# Patient Record
Sex: FEMALE | Race: WHITE | NOT HISPANIC OR LATINO | Employment: OTHER | URBAN - METROPOLITAN AREA
[De-identification: names, ages, dates, MRNs, and addresses within clinical notes are randomized per-mention and may not be internally consistent; named-entity substitution may affect disease eponyms.]

---

## 2020-09-27 ENCOUNTER — HOSPITAL ENCOUNTER (INPATIENT)
Facility: HOSPITAL | Age: 65
LOS: 5 days | Discharge: HOME | DRG: 674 | End: 2020-10-02
Attending: EMERGENCY MEDICINE | Admitting: INTERNAL MEDICINE
Payer: COMMERCIAL

## 2020-09-27 DIAGNOSIS — E87.20 METABOLIC ACIDOSIS: ICD-10-CM

## 2020-09-27 DIAGNOSIS — R10.9 ABDOMINAL PAIN: ICD-10-CM

## 2020-09-27 DIAGNOSIS — N17.9 AKI (ACUTE KIDNEY INJURY): Primary | ICD-10-CM

## 2020-09-27 DIAGNOSIS — K52.9 COLITIS: ICD-10-CM

## 2020-09-27 DIAGNOSIS — I10 HTN (HYPERTENSION): ICD-10-CM

## 2020-09-27 DIAGNOSIS — K52.9 ACUTE COLITIS: ICD-10-CM

## 2020-09-27 DIAGNOSIS — I16.0 HYPERTENSIVE URGENCY: ICD-10-CM

## 2020-09-27 DIAGNOSIS — N17.9 ACUTE KIDNEY INJURY: ICD-10-CM

## 2020-09-27 DIAGNOSIS — I25.10 CAD (CORONARY ARTERY DISEASE): ICD-10-CM

## 2020-09-27 DIAGNOSIS — I10 HYPERTENSION COMPLICATIONS: ICD-10-CM

## 2020-09-27 PROBLEM — E78.5 HLD (HYPERLIPIDEMIA): Status: ACTIVE | Noted: 2020-09-27

## 2020-09-27 PROBLEM — K92.1 BLOOD IN STOOL: Status: ACTIVE | Noted: 2020-09-27

## 2020-09-27 PROBLEM — E27.8 ADRENAL HYPERPLASIA: Status: ACTIVE | Noted: 2020-09-27

## 2020-09-27 LAB
ABO + RH BLD: NORMAL
ALBUMIN SERPL BCP-MCNC: 3.4 G/DL (ref 3.5–5.2)
ALP SERPL-CCNC: 94 U/L (ref 55–135)
ALT SERPL W/O P-5'-P-CCNC: 7 U/L (ref 10–44)
AMYLASE SERPL-CCNC: 115 U/L (ref 20–110)
ANION GAP SERPL CALC-SCNC: 10 MMOL/L (ref 8–16)
APTT PPP: 32.8 SEC (ref 23.6–33.3)
AST SERPL-CCNC: 12 U/L (ref 10–40)
BASOPHILS # BLD AUTO: 0.05 K/UL (ref 0–0.2)
BASOPHILS NFR BLD: 0.7 % (ref 0–1.9)
BILIRUB SERPL-MCNC: 0.7 MG/DL (ref 0.1–1)
BLD GP AB SCN CELLS X3 SERPL QL: NORMAL
BUN SERPL-MCNC: 52 MG/DL (ref 8–23)
CALCIUM SERPL-MCNC: 8.2 MG/DL (ref 8.7–10.5)
CHLORIDE SERPL-SCNC: 114 MMOL/L (ref 95–110)
CK MB SERPL-MCNC: 1 NG/ML (ref 0.1–6.5)
CK SERPL-CCNC: 31 U/L (ref 20–180)
CO2 SERPL-SCNC: 15 MMOL/L (ref 23–29)
CREAT SERPL-MCNC: 3.5 MG/DL (ref 0.5–1.4)
DIFFERENTIAL METHOD: ABNORMAL
EOSINOPHIL # BLD AUTO: 0.1 K/UL (ref 0–0.5)
EOSINOPHIL NFR BLD: 0.7 % (ref 0–8)
ERYTHROCYTE [DISTWIDTH] IN BLOOD BY AUTOMATED COUNT: 13.5 % (ref 11.5–14.5)
EST. GFR  (AFRICAN AMERICAN): 15 ML/MIN/1.73 M^2
EST. GFR  (NON AFRICAN AMERICAN): 13 ML/MIN/1.73 M^2
GLUCOSE SERPL-MCNC: 123 MG/DL (ref 70–110)
HCT VFR BLD AUTO: 42.8 % (ref 37–48.5)
HGB BLD-MCNC: 14.2 G/DL (ref 12–16)
IMM GRANULOCYTES # BLD AUTO: 0.02 K/UL (ref 0–0.04)
IMM GRANULOCYTES NFR BLD AUTO: 0.3 % (ref 0–0.5)
INR PPP: 1.1
LIPASE SERPL-CCNC: 60 U/L (ref 4–60)
LYMPHOCYTES # BLD AUTO: 1.2 K/UL (ref 1–4.8)
LYMPHOCYTES NFR BLD: 16.5 % (ref 18–48)
MAGNESIUM SERPL-MCNC: 2.4 MG/DL (ref 1.6–2.6)
MCH RBC QN AUTO: 28.5 PG (ref 27–31)
MCHC RBC AUTO-ENTMCNC: 33.2 G/DL (ref 32–36)
MCV RBC AUTO: 86 FL (ref 82–98)
MONOCYTES # BLD AUTO: 0.4 K/UL (ref 0.3–1)
MONOCYTES NFR BLD: 5.4 % (ref 4–15)
NEUTROPHILS # BLD AUTO: 5.3 K/UL (ref 1.8–7.7)
NEUTROPHILS NFR BLD: 76.4 % (ref 38–73)
NRBC BLD-RTO: 0 /100 WBC
OB PNL STL: NEGATIVE
PLATELET # BLD AUTO: 213 K/UL (ref 150–350)
PMV BLD AUTO: 9.3 FL (ref 9.2–12.9)
POTASSIUM SERPL-SCNC: 5 MMOL/L (ref 3.5–5.1)
PROT SERPL-MCNC: 6.4 G/DL (ref 6–8.4)
PROTHROMBIN TIME: 14.1 SEC (ref 10.6–14.8)
RBC # BLD AUTO: 4.99 M/UL (ref 4–5.4)
SARS-COV-2 RDRP RESP QL NAA+PROBE: NEGATIVE
SODIUM SERPL-SCNC: 139 MMOL/L (ref 136–145)
TROPONIN I SERPL DL<=0.01 NG/ML-MCNC: <0.03 NG/ML
WBC # BLD AUTO: 6.99 K/UL (ref 3.9–12.7)

## 2020-09-27 PROCEDURE — 93005 ELECTROCARDIOGRAM TRACING: CPT | Performed by: INTERNAL MEDICINE

## 2020-09-27 PROCEDURE — 82272 OCCULT BLD FECES 1-3 TESTS: CPT

## 2020-09-27 PROCEDURE — C9113 INJ PANTOPRAZOLE SODIUM, VIA: HCPCS | Performed by: EMERGENCY MEDICINE

## 2020-09-27 PROCEDURE — 80053 COMPREHEN METABOLIC PANEL: CPT

## 2020-09-27 PROCEDURE — 51798 US URINE CAPACITY MEASURE: CPT

## 2020-09-27 PROCEDURE — 82550 ASSAY OF CK (CPK): CPT

## 2020-09-27 PROCEDURE — 82150 ASSAY OF AMYLASE: CPT

## 2020-09-27 PROCEDURE — 63600175 PHARM REV CODE 636 W HCPCS: Performed by: EMERGENCY MEDICINE

## 2020-09-27 PROCEDURE — 25000003 PHARM REV CODE 250: Performed by: INTERNAL MEDICINE

## 2020-09-27 PROCEDURE — 12000002 HC ACUTE/MED SURGE SEMI-PRIVATE ROOM

## 2020-09-27 PROCEDURE — 84484 ASSAY OF TROPONIN QUANT: CPT

## 2020-09-27 PROCEDURE — S0030 INJECTION, METRONIDAZOLE: HCPCS | Performed by: EMERGENCY MEDICINE

## 2020-09-27 PROCEDURE — 85730 THROMBOPLASTIN TIME PARTIAL: CPT

## 2020-09-27 PROCEDURE — 51702 INSERT TEMP BLADDER CATH: CPT

## 2020-09-27 PROCEDURE — 25000003 PHARM REV CODE 250: Performed by: EMERGENCY MEDICINE

## 2020-09-27 PROCEDURE — 82553 CREATINE MB FRACTION: CPT

## 2020-09-27 PROCEDURE — 93010 ELECTROCARDIOGRAM REPORT: CPT | Mod: ,,, | Performed by: INTERNAL MEDICINE

## 2020-09-27 PROCEDURE — 85025 COMPLETE CBC W/AUTO DIFF WBC: CPT

## 2020-09-27 PROCEDURE — 36415 COLL VENOUS BLD VENIPUNCTURE: CPT

## 2020-09-27 PROCEDURE — 99291 CRITICAL CARE FIRST HOUR: CPT

## 2020-09-27 PROCEDURE — 83735 ASSAY OF MAGNESIUM: CPT

## 2020-09-27 PROCEDURE — U0002 COVID-19 LAB TEST NON-CDC: HCPCS

## 2020-09-27 PROCEDURE — 87040 BLOOD CULTURE FOR BACTERIA: CPT

## 2020-09-27 PROCEDURE — 83690 ASSAY OF LIPASE: CPT

## 2020-09-27 PROCEDURE — 86850 RBC ANTIBODY SCREEN: CPT

## 2020-09-27 PROCEDURE — 93010 EKG 12-LEAD: ICD-10-PCS | Mod: ,,, | Performed by: INTERNAL MEDICINE

## 2020-09-27 PROCEDURE — S0030 INJECTION, METRONIDAZOLE: HCPCS | Performed by: INTERNAL MEDICINE

## 2020-09-27 PROCEDURE — 85610 PROTHROMBIN TIME: CPT

## 2020-09-27 RX ORDER — SODIUM CHLORIDE 9 MG/ML
1000 INJECTION, SOLUTION INTRAVENOUS
Status: DISCONTINUED | OUTPATIENT
Start: 2020-09-27 | End: 2020-09-27

## 2020-09-27 RX ORDER — SODIUM CHLORIDE 9 MG/ML
INJECTION, SOLUTION INTRAVENOUS CONTINUOUS
Status: DISCONTINUED | OUTPATIENT
Start: 2020-09-27 | End: 2020-09-28

## 2020-09-27 RX ORDER — ASPIRIN 81 MG/1
81 TABLET ORAL DAILY
COMMUNITY
End: 2022-03-11

## 2020-09-27 RX ORDER — ACETAMINOPHEN 325 MG/1
650 TABLET ORAL EVERY 4 HOURS PRN
Status: DISCONTINUED | OUTPATIENT
Start: 2020-09-27 | End: 2020-10-02 | Stop reason: HOSPADM

## 2020-09-27 RX ORDER — CARVEDILOL 3.12 MG/1
3.12 TABLET ORAL 2 TIMES DAILY
Status: DISCONTINUED | OUTPATIENT
Start: 2020-09-27 | End: 2020-09-27

## 2020-09-27 RX ORDER — PANTOPRAZOLE SODIUM 40 MG/10ML
80 INJECTION, POWDER, LYOPHILIZED, FOR SOLUTION INTRAVENOUS
Status: COMPLETED | OUTPATIENT
Start: 2020-09-27 | End: 2020-09-27

## 2020-09-27 RX ORDER — LEVOFLOXACIN 5 MG/ML
250 INJECTION, SOLUTION INTRAVENOUS
Status: DISCONTINUED | OUTPATIENT
Start: 2020-09-29 | End: 2020-10-02 | Stop reason: HOSPADM

## 2020-09-27 RX ORDER — CARVEDILOL 25 MG/1
25 TABLET ORAL 2 TIMES DAILY WITH MEALS
Status: ON HOLD | COMMUNITY
End: 2020-10-02 | Stop reason: HOSPADM

## 2020-09-27 RX ORDER — POTASSIUM CHLORIDE 1.5 G/1.58G
40 POWDER, FOR SOLUTION ORAL
Status: DISCONTINUED | OUTPATIENT
Start: 2020-09-27 | End: 2020-10-02 | Stop reason: HOSPADM

## 2020-09-27 RX ORDER — PHENAZOPYRIDINE HYDROCHLORIDE 95 MG/1
95 TABLET ORAL 3 TIMES DAILY PRN
Status: ON HOLD | COMMUNITY
End: 2020-10-02 | Stop reason: HOSPADM

## 2020-09-27 RX ORDER — LOPERAMIDE HYDROCHLORIDE 2 MG/1
2 CAPSULE ORAL 4 TIMES DAILY PRN
COMMUNITY
End: 2020-10-21

## 2020-09-27 RX ORDER — ONDANSETRON 2 MG/ML
4 INJECTION INTRAMUSCULAR; INTRAVENOUS EVERY 6 HOURS PRN
Status: DISCONTINUED | OUTPATIENT
Start: 2020-09-27 | End: 2020-10-02 | Stop reason: HOSPADM

## 2020-09-27 RX ORDER — ATORVASTATIN CALCIUM 40 MG/1
40 TABLET, FILM COATED ORAL NIGHTLY
Status: DISCONTINUED | OUTPATIENT
Start: 2020-09-27 | End: 2020-10-02 | Stop reason: HOSPADM

## 2020-09-27 RX ORDER — NIFEDIPINE 30 MG/1
30 TABLET, FILM COATED, EXTENDED RELEASE ORAL DAILY
Status: ON HOLD | COMMUNITY
End: 2020-10-02 | Stop reason: HOSPADM

## 2020-09-27 RX ORDER — LEVOFLOXACIN 5 MG/ML
500 INJECTION, SOLUTION INTRAVENOUS
Status: COMPLETED | OUTPATIENT
Start: 2020-09-27 | End: 2020-09-27

## 2020-09-27 RX ORDER — CARVEDILOL 3.12 MG/1
3.12 TABLET ORAL 2 TIMES DAILY
Status: DISCONTINUED | OUTPATIENT
Start: 2020-09-27 | End: 2020-10-02 | Stop reason: HOSPADM

## 2020-09-27 RX ORDER — NIFEDIPINE 30 MG/1
30 TABLET, EXTENDED RELEASE ORAL DAILY
Status: DISCONTINUED | OUTPATIENT
Start: 2020-09-27 | End: 2020-09-28

## 2020-09-27 RX ORDER — LANOLIN ALCOHOL/MO/W.PET/CERES
800 CREAM (GRAM) TOPICAL
Status: DISCONTINUED | OUTPATIENT
Start: 2020-09-27 | End: 2020-10-02 | Stop reason: HOSPADM

## 2020-09-27 RX ORDER — LISINOPRIL 40 MG/1
40 TABLET ORAL DAILY
Status: ON HOLD | COMMUNITY
End: 2020-10-02 | Stop reason: HOSPADM

## 2020-09-27 RX ORDER — LEVOFLOXACIN 5 MG/ML
500 INJECTION, SOLUTION INTRAVENOUS
Status: DISCONTINUED | OUTPATIENT
Start: 2020-09-28 | End: 2020-09-27

## 2020-09-27 RX ORDER — PANTOPRAZOLE SODIUM 40 MG/1
40 TABLET, DELAYED RELEASE ORAL DAILY
Status: DISCONTINUED | OUTPATIENT
Start: 2020-09-28 | End: 2020-10-02 | Stop reason: HOSPADM

## 2020-09-27 RX ORDER — METRONIDAZOLE 500 MG/100ML
500 INJECTION, SOLUTION INTRAVENOUS
Status: DISCONTINUED | OUTPATIENT
Start: 2020-09-27 | End: 2020-10-02 | Stop reason: HOSPADM

## 2020-09-27 RX ORDER — ATORVASTATIN CALCIUM 40 MG/1
40 TABLET, FILM COATED ORAL DAILY
COMMUNITY

## 2020-09-27 RX ORDER — METRONIDAZOLE 500 MG/100ML
500 INJECTION, SOLUTION INTRAVENOUS
Status: COMPLETED | OUTPATIENT
Start: 2020-09-27 | End: 2020-09-27

## 2020-09-27 RX ADMIN — LEVOFLOXACIN 500 MG: 500 INJECTION, SOLUTION INTRAVENOUS at 12:09

## 2020-09-27 RX ADMIN — METRONIDAZOLE 500 MG: 500 INJECTION, SOLUTION INTRAVENOUS at 10:09

## 2020-09-27 RX ADMIN — SODIUM CHLORIDE: 0.9 INJECTION, SOLUTION INTRAVENOUS at 03:09

## 2020-09-27 RX ADMIN — ATORVASTATIN CALCIUM 40 MG: 40 TABLET, FILM COATED ORAL at 09:09

## 2020-09-27 RX ADMIN — CARVEDILOL 3.12 MG: 3.12 TABLET, FILM COATED ORAL at 09:09

## 2020-09-27 RX ADMIN — PANTOPRAZOLE SODIUM 80 MG: 40 INJECTION, POWDER, FOR SOLUTION INTRAVENOUS at 12:09

## 2020-09-27 RX ADMIN — SODIUM CHLORIDE 500 ML: 0.9 INJECTION, SOLUTION INTRAVENOUS at 12:09

## 2020-09-27 RX ADMIN — METRONIDAZOLE 500 MG: 500 INJECTION, SOLUTION INTRAVENOUS at 01:09

## 2020-09-27 NOTE — Clinical Note
The site was marked. Prepped: groin. Prepped with: ChloraPrep. The site was clipped. The patient was draped.  I will STOP taking the medications listed below when I get home from the hospital:    Micardis HCT 80 mg-12.5 mg oral tablet  -- 1 tab(s) by mouth once a day    atenolol 25 mg oral tablet  -- 1 tab(s) by mouth once a day

## 2020-09-27 NOTE — ED NOTES
No urine returned with ojeda insertion. Bladder scan performed after insertion with 0 mL showing in bladder.

## 2020-09-27 NOTE — Clinical Note
Catheter is inserted into the and secured in place right atrium. POWER TRIALYSIS DIALYSIS CATHETER

## 2020-09-27 NOTE — ED TRIAGE NOTES
Pt states she had diarrhea all day yesterday, then at 0230 this am she used the restroom and noticed a lot of blood with diarrhea.  At 0830 this am she had another BM which was diarrhea with less blood.

## 2020-09-27 NOTE — PROGRESS NOTES
Pharmacist Renal Dose Adjustment Note    Latricia Higgins is a 65 y.o. female being treated with the medication Levofloxacin    Patient Data:    Vital Signs (Most Recent):  Temp: 98.4 °F (36.9 °C) (09/27/20 1447)  Pulse: 67 (09/27/20 1447)  Resp: 16 (09/27/20 1447)  BP: (!) 167/86 (09/27/20 1447)  SpO2: 99 % (09/27/20 1447)   Vital Signs (72h Range):  Temp:  [98.4 °F (36.9 °C)-98.7 °F (37.1 °C)]   Pulse:  [63-75]   Resp:  [16-18]   BP: (126-177)/(65-86)   SpO2:  [96 %-100 %]      Recent Labs   Lab 09/27/20  1009   CREATININE 3.5*     Serum creatinine: 3.5 mg/dL (H) 09/27/20 1009  Estimated creatinine clearance: 17.4 mL/min (A)    Medication: Levofloxacin 500 mg IV Q24h will be changed to Levofloxacin 500 mg IV once (a dose was given in the ED at 1300 on 9/27/20), then Levofloxacin 250 mg IV Q48h    Pharmacist's Name: Aida Crane PharmD  Pharmacist's Extension: [phone]

## 2020-09-27 NOTE — H&P
[facility]  History & Physical    Patient Name: Latricia Higgins  MRN: [id number]  Admission Date: 9/27/2020  Attending Physician: Harjit Cardoza MD   Primary Care Provider: Ralf Ham MD         Patient information was obtained from patient and ER records.     Subjective:     Principal Problem:Acute colitis    Chief Complaint:   Chief Complaint   Patient presents with    Rectal Bleeding     Since 0230 this am        HPI: 65 year old patient getting admitted with acute colitis and RICHIE  Today patient all of sudden had Lower abdominal pain followed by Bloody diarrhea  Describes pain as crampy and spastic which was temporary followed by 2 x episodes of fresh blood in stools  She came to ER and ER MD did a rectal examination which showed blood and hemorrhoids  Patient denies fever/nausea or any other c/o  She never had Colonoscopy before    Past Medical History:   Diagnosis Date    HLD (hyperlipidemia)     Hypertension        Past Surgical History:   Procedure Laterality Date    NO PAST SURGERIES         Review of patient's allergies indicates:  No Known Allergies    No current facility-administered medications on file prior to encounter.      Current Outpatient Medications on File Prior to Encounter   Medication Sig    aspirin (ECOTRIN) 81 MG EC tablet Take 81 mg by mouth once daily.    atorvastatin (LIPITOR) 40 MG tablet Take 40 mg by mouth once daily.    carvediloL (COREG) 25 MG tablet Take 25 mg by mouth 2 (two) times daily with meals.    lisinopriL (PRINIVIL,ZESTRIL) 40 MG tablet Take 40 mg by mouth once daily.    loperamide (IMODIUM) 2 mg capsule Take 2 mg by mouth 4 (four) times daily as needed for Diarrhea.    NIFEdipine (ADALAT CC) 30 MG TbSR Take 30 mg by mouth once daily.    phenazopyridine (PYRIDIUM) 95 MG tablet Take 95 mg by mouth 3 (three) times daily as needed for Pain.     Family History     Problem Relation (Age of Onset)    Cancer Mother    Heart disease  Father        Tobacco Use    Smoking status: Not on file   Substance and Sexual Activity    Alcohol use: Not on file    Drug use: Not on file    Sexual activity: Not on file     Review of Systems   Constitutional: Negative for activity change and appetite change.   HENT: Negative for congestion and dental problem.    Eyes: Negative for discharge and itching.   Respiratory: Negative for shortness of breath.    Cardiovascular: Negative for chest pain.   Gastrointestinal: Positive for abdominal pain, blood in stool and diarrhea. Negative for abdominal distention.   Endocrine: Negative for cold intolerance.   Genitourinary: Negative for difficulty urinating and dysuria.   Musculoskeletal: Negative for arthralgias and back pain.   Skin: Negative for color change.   Neurological: Negative for dizziness and facial asymmetry.   Hematological: Negative for adenopathy.   Psychiatric/Behavioral: Negative for agitation and behavioral problems.     Objective:     Vital Signs (Most Recent):  Temp: 98.4 °F (36.9 °C) (09/27/20 1447)  Pulse: 67 (09/27/20 1447)  Resp: 16 (09/27/20 1447)  BP: (!) 167/86 (09/27/20 1447)  SpO2: 99 % (09/27/20 1447) Vital Signs (24h Range):  Temp:  [98.4 °F (36.9 °C)-98.7 °F (37.1 °C)] 98.4 °F (36.9 °C)  Pulse:  [63-75] 67  Resp:  [16-18] 16  SpO2:  [96 %-100 %] 99 %  BP: (126-177)/(65-86) 167/86     Weight: 83.2 kg (183 lb 6.8 oz)  Body mass index is 29.61 kg/m².    Physical Exam  Vitals signs and nursing note reviewed.   Constitutional:       General: She is not in acute distress.  HENT:      Head: Atraumatic.      Right Ear: External ear normal.      Left Ear: External ear normal.      Nose: Nose normal.      Mouth/Throat:      Mouth: Mucous membranes are moist.   Eyes:      Conjunctiva/sclera: Conjunctivae normal.   Neck:      Musculoskeletal: Normal range of motion.   Cardiovascular:      Rate and Rhythm: Normal rate.   Pulmonary:      Effort: Pulmonary effort is normal.   Abdominal:       General: There is no distension.   Musculoskeletal: Normal range of motion.   Skin:     General: Skin is warm.   Neurological:      Mental Status: She is alert and oriented to person, place, and time.   Psychiatric:         Behavior: Behavior normal.             Significant Labs:   CBC:   Recent Labs   Lab 09/27/20  1009   WBC 6.99   HGB 14.2   HCT 42.8        CMP:   Recent Labs   Lab 09/27/20  1009      K 5.0   *   CO2 15*   *   BUN 52*   CREATININE 3.5*   CALCIUM 8.2*   PROT 6.4   ALBUMIN 3.4*   BILITOT 0.7   ALKPHOS 94   AST 12   ALT 7*   ANIONGAP 10   EGFRNONAA 13.0*       Significant Imaging: I have reviewed all pertinent imaging results/findings within the past 24 hours.    Assessment/Plan:     * Acute colitis  Stool and Blood cultures  NPO from midnight  Start Iv Fluids/Iv abx  Consult GI MD  Patient may need CT W Contrast imaging once her renal function is stable      Blood in stool  As above      RICHIE (acute kidney injury)  Continue iv fluids  Pt has NAGMA       HLD (hyperlipidemia)  On Statins      HTN (hypertension)  Stable issue  Hold ACE inh      Adrenal hyperplasia  Cortisol in AM  Need CT w contrast later on        VTE Risk Mitigation (From admission, onward)         Ordered     IP VTE LOW RISK PATIENT  Once      09/27/20 1239     Place CHRISTOPHER hose  Until discontinued      09/27/20 1239     Place sequential compression device  Until discontinued      09/27/20 1239                   Harjit Cardoza MD  Department of Hospital Medicine   [facility]

## 2020-09-27 NOTE — HPI
65 year old patient getting admitted with acute colitis and RICHIE  Today patient all of sudden had Lower abdominal pain followed by Bloody diarrhea  Describes pain as crampy and spastic which was temporary followed by 2 x episodes of fresh blood in stools  She came to ER and ER MD did a rectal examination which showed blood and hemorrhoids  Patient denies fever/nausea or any other c/o  She never had Colonoscopy before

## 2020-09-27 NOTE — ED PROVIDER NOTES
Encounter Date: 9/27/2020       History     Chief Complaint   Patient presents with    Rectal Bleeding     Since 0230 this am     Patient with a history of hypertension.  Patient noted some mild lower abdominal cramping last week.  She started with more diffuse abdominal cramping yesterday.  She had approximately 6-7 loose stools.  Last night she had loose stool with large amount of bright red blood.  This happened again this morning so came to the emergency department for further evaluation.  She denies any chest pain or shortness of breath.  No similar symptoms in the past.  Currently abdominal pain is very mild.        Review of patient's allergies indicates:  No Known Allergies  History reviewed. No pertinent past medical history.  History reviewed. No pertinent surgical history.  No family history on file.  Social History     Tobacco Use    Smoking status: Not on file   Substance Use Topics    Alcohol use: Not on file    Drug use: Not on file     Review of Systems   Constitutional: Negative for chills and fever.   HENT: Negative for congestion.    Eyes: Negative for visual disturbance.   Respiratory: Negative for shortness of breath.    Cardiovascular: Negative for chest pain and palpitations.   Gastrointestinal: Positive for abdominal pain and diarrhea. Negative for vomiting.   Genitourinary: Negative for dysuria.   Musculoskeletal: Negative for joint swelling.   Neurological: Negative for headaches.   Psychiatric/Behavioral: Negative for confusion.       Physical Exam     Initial Vitals [09/27/20 0956]   BP Pulse Resp Temp SpO2   126/68 75 18 98.7 °F (37.1 °C) 96 %      MAP       --         Physical Exam    Nursing note and vitals reviewed.  Constitutional: She is not diaphoretic. No distress.   HENT:   Head: Normocephalic and atraumatic.   Eyes: Conjunctivae are normal.   Neck: Normal range of motion.   Cardiovascular: Normal rate.   Pulmonary/Chest: Breath sounds normal.   Abdominal: Soft. Bowel sounds  are normal.   Mild diffuse abdominal tenderness with no guarding or rebound.  Pain is more pronounced to left upper quadrant.  Rectal exam with small amount of bright red blood.  Patient with small external hemorrhoid with no active bleeding.   Musculoskeletal: Normal range of motion.   Neurological: She is alert and oriented to person, place, and time. She has normal strength. No cranial nerve deficit or sensory deficit.   No gross deficits   Skin: No rash noted.   Psychiatric: She has a normal mood and affect.         ED Course   Critical Care    Date/Time: 9/27/2020 11:41 AM  Performed by: Avila Quiles MD  Authorized by: Avila Quiles MD   Direct patient critical care time: 15 minutes  Additional history critical care time: 5 minutes  Ordering / reviewing critical care time: 5 minutes  Documentation critical care time: 5 minutes  Consulting other physicians critical care time: 5 minutes  Total critical care time (exclusive of procedural time) : 35 minutes        Labs Reviewed   AMYLASE - Abnormal; Notable for the following components:       Result Value    Amylase 115 (*)     All other components within normal limits   CBC W/ AUTO DIFFERENTIAL - Abnormal; Notable for the following components:    Gran% 76.4 (*)     Lymph% 16.5 (*)     All other components within normal limits   COMPREHENSIVE METABOLIC PANEL - Abnormal; Notable for the following components:    Chloride 114 (*)     CO2 15 (*)     Glucose 123 (*)     BUN, Bld 52 (*)     Creatinine 3.5 (*)     Calcium 8.2 (*)     Albumin 3.4 (*)     ALT 7 (*)     eGFR if  15.0 (*)     eGFR if non  13.0 (*)     All other components within normal limits   CLOSTRIDIUM DIFFICILE   CULTURE, STOOL   CULTURE, BLOOD   SARS-COV-2 RNA AMPLIFICATION, QUAL   APTT   LIPASE   MAGNESIUM   PROTIME-INR   TROPONIN I   CK-MB   CK   OCCULT BLOOD X 1, STOOL   URINALYSIS, REFLEX TO URINE CULTURE   STOOL EXAM-OVA,CYSTS,PARASITES   WBC, STOOL   TYPE  & SCREEN        ECG Results          EKG 12-lead (In process)  Result time 09/27/20 12:03:58    [facility] (09/27/20 12:03:58)                 Narrative:    Test Reason : R10.9,    Vent. Rate : 065 BPM     Atrial Rate : 065 BPM     P-R Int : 182 ms          QRS Dur : 084 ms      QT Int : 438 ms       P-R-T Axes : 029 -12 -08 degrees     QTc Int : 455 ms    Normal sinus rhythm  Normal ECG  No previous ECGs available    Referred By: [email or web address]   SELF           Confirmed By:                             Imaging Results          CT Renal Stone Study ABD Pelvis WO (Final result)  Result time 09/27/20 11:32:20    Final result by Joey Westfall MD (09/27/20 11:32:20)                 Narrative:    CMS MANDATED QUALITY DATA - CT RADIATION - 436    All CT scans at this facility utilize dose modulation, iterative  reconstruction, and/or weight based dosing when appropriate to reduce  radiation dose to as low as reasonably achievable.      REASON: Abdominal pain, fever    TECHNIQUE: Abdomen and pelvis CT without IV contrast.    COMPARISON: None    FINDINGS:    There is mild subsegmental atelectasis at lung bases. Focal area of  bronchiectasis versus parenchymal destruction noted at the posterior  right lung base. Heart is normal size.    Liver is normal size. A hepatic cyst is noted in the left liver lobe.  Gallbladder is mildly distended with a single calcified gallbladder  noted in the dependent portion. No pericholecystic inflammatory  changes. The and spleen are unremarkable. A small splenule is noted.  The bilateral adrenal glands are enlarged, with left gland measuring  approximately 2.3 x 5.4 cm, and right gland measuring approximately  1.8 x 4.3 cm. Left kidney is atrophic. Right kidney is normal size  with no hydronephrosis or nephrolithiasis. Right ureter is normal  caliber with no obstructions. Left ureter is unremarkable. Bladder is  mostly decompressed.    The uterus is within normal  limits, eccentric to the left. There is  trace free fluid in the pelvic cul-de-sac. The adnexal structures are  not identified.    There is diverticulosis of the descending and sigmoid colon. There is  bowel wall thickening throughout the large bowel with submucosal fatty  infiltration. The appendix is not identified. There is free fluid in  the right pericolic gutter and Hernandez's pouch. There is mild  pericolonic fat stranding involving the cecum and ascending colon and  transverse colon. The small bowel is normal caliber. The stomach is  decompressed.    The abdominal aorta is normal caliber with mild atherosclerosis. No  intra-abdominal lymphadenopathy. The ventral abdominal wall is  unremarkable. No acute osseous abnormality.    IMPRESSION:    1.  Bowel wall thickening throughout the large bowel with  submucosal fatty infiltration. Free fluid noted in the right pericolic  gutter and Hernandez's pouch and less so in the pelvis. There is mild  pericolonic fat stranding involving the cecum, ascending colon and  transverse colon. Findings are consistent with colitis, inflammatory  versus infectious. Correlate.  2.  Diverticulosis of the descending and sigmoid colon.  3.  Enlarged adrenal glands could reflect adrenal hyperplasia.  Correlate for adrenal pathology.    Electronically Signed by Joey Westfall on 9/27/2020 11:44 AM                               Medical Decision Making:   History:   Old Medical Records: I decided to obtain old medical records.  Clinical Tests:   Lab Tests: Reviewed  Radiological Study: Reviewed  Medical Tests: Reviewed  ED Management:  Patient presents with acute kidney injury with creatinine greater than 3.  Patient has non-anion gap metabolic acidosis consistent with diarrhea.  Fluid resuscitation initiated in the emergency department.  Hospitalist consulted for admission.  Patient has gross blood on rectal exam.  For some reason the occult blood is negative.  Clinically patient does  have gastrointestinal bleeding.  Protonix given.  My interpretation of CT scan is consistent with colitis.  Will begin broad-spectrum antibiotics.                             Clinical Impression:       ICD-10-CM ICD-9-CM   1. Colitis  K52.9 558.9   2. Abdominal pain  R10.9 789.00   3. Acute kidney injury  N17.9 584.9   4. Metabolic acidosis  E87.2 276.2   5. RICHIE (acute kidney injury)  N17.9 584.9                          ED Disposition Condition    Admit                             Avila Quiles MD  09/27/20 1025       Avila Quiles MD  09/27/20 1343       Avila Quiles MD  09/27/20 1343

## 2020-09-27 NOTE — PLAN OF CARE
Problem: Fall Injury Risk  Goal: Absence of Fall and Fall-Related Injury  Outcome: Ongoing, Progressing     Problem: Adult Inpatient Plan of Care  Goal: Plan of Care Review  Outcome: Ongoing, Progressing  Goal: Patient-Specific Goal (Individualization)  Outcome: Ongoing, Progressing  Goal: Absence of Hospital-Acquired Illness or Injury  Outcome: Ongoing, Progressing  Goal: Optimal Comfort and Wellbeing  Outcome: Ongoing, Progressing  Goal: Readiness for Transition of Care  Outcome: Ongoing, Progressing  Goal: Rounds/Family Conference  Outcome: Ongoing, Progressing     Problem: Infection  Goal: Infection Symptom Resolution  Outcome: Ongoing, Progressing     Problem: Electrolyte Imbalance (Acute Kidney Injury/Impairment)  Goal: Serum Electrolyte Balance  Outcome: Ongoing, Progressing     Problem: Fluid Imbalance (Acute Kidney Injury/Impairment)  Goal: Optimal Fluid Balance  Outcome: Ongoing, Progressing     Problem: Hematologic Alteration (Acute Kidney Injury/Impairment)  Goal: Hemoglobin, Hematocrit and Platelets Within Normal Range  Outcome: Ongoing, Progressing     Problem: Oral Intake Inadequate (Acute Kidney Injury/Impairment)  Goal: Optimal Nutrition Intake  Outcome: Ongoing, Progressing     Problem: Renal Function Impairment (Acute Kidney Injury/Impairment)  Goal: Effective Renal Function  Outcome: Ongoing, Progressing

## 2020-09-27 NOTE — SUBJECTIVE & OBJECTIVE
Past Medical History:   Diagnosis Date    HLD (hyperlipidemia)     Hypertension        Past Surgical History:   Procedure Laterality Date    NO PAST SURGERIES         Review of patient's allergies indicates:  No Known Allergies    No current facility-administered medications on file prior to encounter.      Current Outpatient Medications on File Prior to Encounter   Medication Sig    aspirin (ECOTRIN) 81 MG EC tablet Take 81 mg by mouth once daily.    atorvastatin (LIPITOR) 40 MG tablet Take 40 mg by mouth once daily.    carvediloL (COREG) 25 MG tablet Take 25 mg by mouth 2 (two) times daily with meals.    lisinopriL (PRINIVIL,ZESTRIL) 40 MG tablet Take 40 mg by mouth once daily.    loperamide (IMODIUM) 2 mg capsule Take 2 mg by mouth 4 (four) times daily as needed for Diarrhea.    NIFEdipine (ADALAT CC) 30 MG TbSR Take 30 mg by mouth once daily.    phenazopyridine (PYRIDIUM) 95 MG tablet Take 95 mg by mouth 3 (three) times daily as needed for Pain.     Family History     Problem Relation (Age of Onset)    Cancer Mother    Heart disease Father        Tobacco Use    Smoking status: Not on file   Substance and Sexual Activity    Alcohol use: Not on file    Drug use: Not on file    Sexual activity: Not on file     Review of Systems   Constitutional: Negative for activity change and appetite change.   HENT: Negative for congestion and dental problem.    Eyes: Negative for discharge and itching.   Respiratory: Negative for shortness of breath.    Cardiovascular: Negative for chest pain.   Gastrointestinal: Positive for abdominal pain, blood in stool and diarrhea. Negative for abdominal distention.   Endocrine: Negative for cold intolerance.   Genitourinary: Negative for difficulty urinating and dysuria.   Musculoskeletal: Negative for arthralgias and back pain.   Skin: Negative for color change.   Neurological: Negative for dizziness and facial asymmetry.   Hematological: Negative for adenopathy.    Psychiatric/Behavioral: Negative for agitation and behavioral problems.     Objective:     Vital Signs (Most Recent):  Temp: 98.4 °F (36.9 °C) (09/27/20 1447)  Pulse: 67 (09/27/20 1447)  Resp: 16 (09/27/20 1447)  BP: (!) 167/86 (09/27/20 1447)  SpO2: 99 % (09/27/20 1447) Vital Signs (24h Range):  Temp:  [98.4 °F (36.9 °C)-98.7 °F (37.1 °C)] 98.4 °F (36.9 °C)  Pulse:  [63-75] 67  Resp:  [16-18] 16  SpO2:  [96 %-100 %] 99 %  BP: (126-177)/(65-86) 167/86     Weight: 83.2 kg (183 lb 6.8 oz)  Body mass index is 29.61 kg/m².    Physical Exam  Vitals signs and nursing note reviewed.   Constitutional:       General: She is not in acute distress.  HENT:      Head: Atraumatic.      Right Ear: External ear normal.      Left Ear: External ear normal.      Nose: Nose normal.      Mouth/Throat:      Mouth: Mucous membranes are moist.   Eyes:      Conjunctiva/sclera: Conjunctivae normal.   Neck:      Musculoskeletal: Normal range of motion.   Cardiovascular:      Rate and Rhythm: Normal rate.   Pulmonary:      Effort: Pulmonary effort is normal.   Abdominal:      General: There is no distension.   Musculoskeletal: Normal range of motion.   Skin:     General: Skin is warm.   Neurological:      Mental Status: She is alert and oriented to person, place, and time.   Psychiatric:         Behavior: Behavior normal.             Significant Labs:   CBC:   Recent Labs   Lab 09/27/20  1009   WBC 6.99   HGB 14.2   HCT 42.8        CMP:   Recent Labs   Lab 09/27/20  1009      K 5.0   *   CO2 15*   *   BUN 52*   CREATININE 3.5*   CALCIUM 8.2*   PROT 6.4   ALBUMIN 3.4*   BILITOT 0.7   ALKPHOS 94   AST 12   ALT 7*   ANIONGAP 10   EGFRNONAA 13.0*       Significant Imaging: I have reviewed all pertinent imaging results/findings within the past 24 hours.

## 2020-09-27 NOTE — ASSESSMENT & PLAN NOTE
Stool and Blood cultures  NPO from midnight  Start Iv Fluids/Iv abx  Consult GI MD  Patient may need CT W Contrast imaging once her renal function is stable

## 2020-09-28 PROBLEM — I16.0 HYPERTENSIVE URGENCY: Status: ACTIVE | Noted: 2020-09-28

## 2020-09-28 LAB
ALBUMIN SERPL BCP-MCNC: 2.6 G/DL (ref 3.5–5.2)
ALP SERPL-CCNC: 73 U/L (ref 55–135)
ALT SERPL W/O P-5'-P-CCNC: 7 U/L (ref 10–44)
ANION GAP SERPL CALC-SCNC: 8 MMOL/L (ref 8–16)
AST SERPL-CCNC: 10 U/L (ref 10–40)
BASOPHILS # BLD AUTO: 0.04 K/UL (ref 0–0.2)
BASOPHILS NFR BLD: 0.6 % (ref 0–1.9)
BILIRUB SERPL-MCNC: 0.6 MG/DL (ref 0.1–1)
BUN SERPL-MCNC: 60 MG/DL (ref 8–23)
CALCIUM SERPL-MCNC: 7.6 MG/DL (ref 8.7–10.5)
CHLORIDE SERPL-SCNC: 112 MMOL/L (ref 95–110)
CO2 SERPL-SCNC: 16 MMOL/L (ref 23–29)
CORTIS SERPL-MCNC: 11.8 UG/DL
CREAT SERPL-MCNC: 5.3 MG/DL (ref 0.5–1.4)
DIFFERENTIAL METHOD: ABNORMAL
EOSINOPHIL # BLD AUTO: 0.1 K/UL (ref 0–0.5)
EOSINOPHIL NFR BLD: 1 % (ref 0–8)
ERYTHROCYTE [DISTWIDTH] IN BLOOD BY AUTOMATED COUNT: 13.4 % (ref 11.5–14.5)
EST. GFR  (AFRICAN AMERICAN): 9.1 ML/MIN/1.73 M^2
EST. GFR  (NON AFRICAN AMERICAN): 7.9 ML/MIN/1.73 M^2
GLUCOSE SERPL-MCNC: 90 MG/DL (ref 70–110)
GLUCOSE SERPL-MCNC: 91 MG/DL (ref 70–110)
HCT VFR BLD AUTO: 35 % (ref 37–48.5)
HGB BLD-MCNC: 11.6 G/DL (ref 12–16)
IMM GRANULOCYTES # BLD AUTO: 0.02 K/UL (ref 0–0.04)
IMM GRANULOCYTES NFR BLD AUTO: 0.3 % (ref 0–0.5)
LYMPHOCYTES # BLD AUTO: 1.1 K/UL (ref 1–4.8)
LYMPHOCYTES NFR BLD: 17.1 % (ref 18–48)
MAGNESIUM SERPL-MCNC: 2.2 MG/DL (ref 1.6–2.6)
MCH RBC QN AUTO: 28.8 PG (ref 27–31)
MCHC RBC AUTO-ENTMCNC: 33.1 G/DL (ref 32–36)
MCV RBC AUTO: 87 FL (ref 82–98)
MONOCYTES # BLD AUTO: 0.5 K/UL (ref 0.3–1)
MONOCYTES NFR BLD: 7.6 % (ref 4–15)
NEUTROPHILS # BLD AUTO: 4.9 K/UL (ref 1.8–7.7)
NEUTROPHILS NFR BLD: 73.4 % (ref 38–73)
NRBC BLD-RTO: 0 /100 WBC
PLATELET # BLD AUTO: 145 K/UL (ref 150–350)
PMV BLD AUTO: 9.6 FL (ref 9.2–12.9)
POTASSIUM SERPL-SCNC: 5.4 MMOL/L (ref 3.5–5.1)
PROT SERPL-MCNC: 5 G/DL (ref 6–8.4)
RBC # BLD AUTO: 4.03 M/UL (ref 4–5.4)
SODIUM SERPL-SCNC: 136 MMOL/L (ref 136–145)
WBC # BLD AUTO: 6.67 K/UL (ref 3.9–12.7)

## 2020-09-28 PROCEDURE — 21000000 HC CCU ICU ROOM CHARGE

## 2020-09-28 PROCEDURE — 36415 COLL VENOUS BLD VENIPUNCTURE: CPT

## 2020-09-28 PROCEDURE — 63600175 PHARM REV CODE 636 W HCPCS: Performed by: FAMILY MEDICINE

## 2020-09-28 PROCEDURE — S0030 INJECTION, METRONIDAZOLE: HCPCS | Performed by: INTERNAL MEDICINE

## 2020-09-28 PROCEDURE — 25000003 PHARM REV CODE 250: Performed by: FAMILY MEDICINE

## 2020-09-28 PROCEDURE — 80053 COMPREHEN METABOLIC PANEL: CPT

## 2020-09-28 PROCEDURE — 83735 ASSAY OF MAGNESIUM: CPT

## 2020-09-28 PROCEDURE — 25000003 PHARM REV CODE 250: Performed by: INTERNAL MEDICINE

## 2020-09-28 PROCEDURE — 99221 PR INITIAL HOSPITAL CARE,LEVL I: ICD-10-PCS | Mod: ,,, | Performed by: INTERNAL MEDICINE

## 2020-09-28 PROCEDURE — 25000003 PHARM REV CODE 250: Performed by: NURSE PRACTITIONER

## 2020-09-28 PROCEDURE — S0030 INJECTION, METRONIDAZOLE: HCPCS | Performed by: FAMILY MEDICINE

## 2020-09-28 PROCEDURE — 85025 COMPLETE CBC W/AUTO DIFF WBC: CPT

## 2020-09-28 PROCEDURE — 87045 FECES CULTURE AEROBIC BACT: CPT

## 2020-09-28 PROCEDURE — 89055 LEUKOCYTE ASSESSMENT FECAL: CPT

## 2020-09-28 PROCEDURE — 82533 TOTAL CORTISOL: CPT

## 2020-09-28 PROCEDURE — 87209 SMEAR COMPLEX STAIN: CPT

## 2020-09-28 PROCEDURE — 87046 STOOL CULTR AEROBIC BACT EA: CPT

## 2020-09-28 PROCEDURE — 87015 SPECIMEN INFECT AGNT CONCNTJ: CPT

## 2020-09-28 PROCEDURE — 99221 1ST HOSP IP/OBS SF/LOW 40: CPT | Mod: ,,, | Performed by: INTERNAL MEDICINE

## 2020-09-28 RX ORDER — HYDRALAZINE HYDROCHLORIDE 20 MG/ML
5 INJECTION INTRAMUSCULAR; INTRAVENOUS EVERY 4 HOURS PRN
Status: DISCONTINUED | OUTPATIENT
Start: 2020-09-28 | End: 2020-09-28

## 2020-09-28 RX ORDER — HYDRALAZINE HYDROCHLORIDE 20 MG/ML
10 INJECTION INTRAMUSCULAR; INTRAVENOUS ONCE
Status: DISCONTINUED | OUTPATIENT
Start: 2020-09-28 | End: 2020-09-28

## 2020-09-28 RX ORDER — HYDRALAZINE HYDROCHLORIDE 20 MG/ML
10 INJECTION INTRAMUSCULAR; INTRAVENOUS EVERY 4 HOURS PRN
Status: DISCONTINUED | OUTPATIENT
Start: 2020-09-28 | End: 2020-09-28

## 2020-09-28 RX ORDER — AMLODIPINE BESYLATE 5 MG/1
5 TABLET ORAL DAILY
Status: DISCONTINUED | OUTPATIENT
Start: 2020-09-28 | End: 2020-09-29

## 2020-09-28 RX ORDER — NICARDIPINE HYDROCHLORIDE 0.2 MG/ML
5 INJECTION INTRAVENOUS CONTINUOUS
Status: DISCONTINUED | OUTPATIENT
Start: 2020-09-28 | End: 2020-10-01

## 2020-09-28 RX ADMIN — HYDRALAZINE HYDROCHLORIDE 10 MG: 20 INJECTION INTRAMUSCULAR; INTRAVENOUS at 09:09

## 2020-09-28 RX ADMIN — PANTOPRAZOLE SODIUM 40 MG: 40 TABLET, DELAYED RELEASE ORAL at 05:09

## 2020-09-28 RX ADMIN — NIFEDIPINE 30 MG: 30 TABLET, FILM COATED, EXTENDED RELEASE ORAL at 09:09

## 2020-09-28 RX ADMIN — ATORVASTATIN CALCIUM 40 MG: 40 TABLET, FILM COATED ORAL at 08:09

## 2020-09-28 RX ADMIN — METRONIDAZOLE 500 MG: 500 INJECTION, SOLUTION INTRAVENOUS at 03:09

## 2020-09-28 RX ADMIN — SODIUM ZIRCONIUM CYCLOSILICATE 10 G: 10 POWDER, FOR SUSPENSION ORAL at 07:09

## 2020-09-28 RX ADMIN — METRONIDAZOLE 500 MG: 500 INJECTION, SOLUTION INTRAVENOUS at 05:09

## 2020-09-28 RX ADMIN — SODIUM BICARBONATE: 84 INJECTION, SOLUTION INTRAVENOUS at 05:09

## 2020-09-28 RX ADMIN — CARVEDILOL 3.12 MG: 3.12 TABLET, FILM COATED ORAL at 09:09

## 2020-09-28 RX ADMIN — NICARDIPINE HYDROCHLORIDE 5 MG/HR: 0.2 INJECTION INTRAVENOUS at 05:09

## 2020-09-28 RX ADMIN — HYDRALAZINE HYDROCHLORIDE 10 MG: 20 INJECTION INTRAMUSCULAR; INTRAVENOUS at 10:09

## 2020-09-28 RX ADMIN — CARVEDILOL 3.12 MG: 3.12 TABLET, FILM COATED ORAL at 08:09

## 2020-09-28 RX ADMIN — SODIUM CHLORIDE: 0.9 INJECTION, SOLUTION INTRAVENOUS at 03:09

## 2020-09-28 RX ADMIN — METRONIDAZOLE 500 MG: 500 INJECTION, SOLUTION INTRAVENOUS at 10:09

## 2020-09-28 RX ADMIN — AMLODIPINE BESYLATE 5 MG: 5 TABLET ORAL at 05:09

## 2020-09-28 NOTE — NURSING
Patient had a ojeda put in in the ed. No urine output noted for several hours. MD informed irrigated done. Patient with still no relief and no urine output.Patient has a lot of white thick discharge noted. Ojeda was removed and patient voided 700. Patient IV site to her left ac was red and painful. IV removed and 22G placed in her left hand. Patient resting quietly, call bell in reach.

## 2020-09-28 NOTE — NURSING
Ojeda cath inserted without difficulty.  Very minimal urine noted in ojeda tubing.  Not enough to drain to bag.  Secured to right upper thigh with bladder to drain to gravity

## 2020-09-28 NOTE — PROGRESS NOTES
"[facility] Medicine  Progress Note    Patient Name: Latricia Higgins  MRN: [id number]  Patient Class: IP- Inpatient   Admission Date: 9/27/2020  9:55 AM  Attending Physician: Yajaira Oswald MD  Primary Care Provider: Ralf Ham MD  Face-to-Face encounter date: 09/28/2020    Assessment & Plan:   Latricia Higgins is a 65 y.o. female with:    Rectal Bleeding  Due to acute colitis  - GI consulted, thank you  - IVFs, abx  - following cultures  - ?CT A/P when renal fxn stable    RICHIE (worsening) with NAGMA  - IVFs  - CT w/o contrast reviewed  - nephrology consulted, thank you    Adrenal hyperplasia  - AM cortisol wnl  - will need CT w contrast    Other chronic conditions:  Home meds as appropriate  Electrolyte derangement:  Trending BMP, Mg; Replacement prn  DVT ppx:  SCDs  FULL CODE    Discharge Planning:     Likely home when medically stable.    Subjective:      9/28:  No bleeding since admission.  No CP, no abdominal pain.  No SOB.    Review of Systems   All systems reviewed and are negative except as noted per above.  Objective:     Physical Exam  BP (!) 202/105   Pulse 67   Temp 98.4 °F (36.9 °C) (Oral)   Resp 20   Ht 5' 6" (1.676 m)   Wt 83.2 kg (183 lb 6.8 oz)   SpO2 97%   BMI 29.61 kg/m²     Gen: alert, responsive   HEENT:  Eyes - no pallor  External ears with no lesions  Nares patent  Mouth - lips chapped  CV: RRR  Lungs: CTA B/L  Abd: +BS, soft, NT, ND  Ext: no atrophy or edema  Skin: warm, dry  Neuro: grossly intact  Psych: pleasant    Recent Labs   Lab 09/28/20  0510   WBC 6.67   HGB 11.6*   HCT 35.0*   *     Recent Labs   Lab 09/28/20  0510   CALCIUM 7.6*   ALBUMIN 2.6*   PROT 5.0*      K 5.4*   CO2 16*   *   BUN 60*   CREATININE 5.3*   ALKPHOS 73   ALT 7*   AST 10   BILITOT 0.6     No results found for: POCTGLUCOSE   Microbiology Results (last 7 days)     Procedure Component Value Units Date/Time    Blood culture [[id number]] Collected: 09/27/20 1550    Order " Status: Completed Specimen: Blood from Antecubital, Right Arm Updated: 09/27/20 2317     Blood Culture, Routine No Growth to date    Clostridium difficile EIA [[id number]]     Order Status: No result Specimen: Stool     Stool culture [[id number]]     Order Status: No result Specimen: Stool         CT Renal Stone Study ABD Pelvis WO   Final Result          All labs, images, and other studies - including those not included in this note -- were reviewed personally by me.    Inpatient medications  Scheduled Meds:   atorvastatin  40 mg Oral QHS    carvediloL  3.125 mg Oral BID    [START ON 9/29/2020] levoFLOXacin  250 mg Intravenous Q48H    metronidazole  500 mg Intravenous Q8H    NIFEdipine  30 mg Oral Daily    pantoprazole  40 mg Oral Daily     Continuous Infusions:   sodium chloride 0.9% 125 mL/hr at 09/27/20 1500     PRN Meds:.acetaminophen, magnesium oxide, magnesium oxide, ondansetron, potassium chloride, potassium chloride    Above encounter included review of the medical records, interviewing and examining the patient face-to-face, discussion with family and other health care providers, ordering and interpreting lab/test results and formulating a plan of care.     Yajaira Oswald MD  [facility] Hospitalist

## 2020-09-28 NOTE — CONSULTS
Chief Complaint:  Abdominal pain, diarrhea    HPI:  65-year-old female with tobacco use who presented to the hospital with acute onset moderate to severe epigastric abdominal pain that began on Saturday associated with multiple episodes of loose stool followed by 3 episodes of bright red blood per rectum on Sunday.  CT imaging done on admission revealed right-sided colitis.  Patient is also in renal failure.    Review of Systems:  Complete ROS performed and negative unless stated above in HPI          Past Medical History:   Diagnosis Date    HLD (hyperlipidemia)     Hypertension        Past Surgical History:   Procedure Laterality Date    NO PAST SURGERIES         Family History   Problem Relation Age of Onset    Cancer Mother     Heart disease Father        Social History     Socioeconomic History    Marital status:      Spouse name: Not on file    Number of children: Not on file    Years of education: Not on file    Highest education level: Not on file   Occupational History    Not on file   Social Needs    Financial resource strain: Not on file    Food insecurity     Worry: Not on file     Inability: Not on file    Transportation needs     Medical: Not on file     Non-medical: Not on file   Tobacco Use    Smoking status: Not on file   Substance and Sexual Activity    Alcohol use: Not on file    Drug use: Not on file    Sexual activity: Not on file   Lifestyle    Physical activity     Days per week: Not on file     Minutes per session: Not on file    Stress: Not on file   Relationships    Social connections     Talks on phone: Not on file     Gets together: Not on file     Attends Zoroastrianism service: Not on file     Active member of club or organization: Not on file     Attends meetings of clubs or organizations: Not on file     Relationship status: Not on file   Other Topics Concern    Not on file   Social History Narrative    Not on file       Review of patient's allergies  "indicates:  No Known Allergies    No current facility-administered medications on file prior to encounter.      Current Outpatient Medications on File Prior to Encounter   Medication Sig Dispense Refill    aspirin (ECOTRIN) 81 MG EC tablet Take 81 mg by mouth once daily.      atorvastatin (LIPITOR) 40 MG tablet Take 40 mg by mouth once daily.      carvediloL (COREG) 25 MG tablet Take 25 mg by mouth 2 (two) times daily with meals.      lisinopriL (PRINIVIL,ZESTRIL) 40 MG tablet Take 40 mg by mouth once daily.      loperamide (IMODIUM) 2 mg capsule Take 2 mg by mouth 4 (four) times daily as needed for Diarrhea.      NIFEdipine (ADALAT CC) 30 MG TbSR Take 30 mg by mouth once daily.      phenazopyridine (PYRIDIUM) 95 MG tablet Take 95 mg by mouth 3 (three) times daily as needed for Pain.         Objective:  BP (!) 202/100 (BP Location: Left arm)   Pulse 77   Temp 98.4 °F (36.9 °C) (Oral)   Resp 20   Ht 5' 6" (1.676 m)   Wt 83.2 kg (183 lb 6.8 oz)   SpO2 99%   BMI 29.61 kg/m²   General: wd, wn, nad  HE: ncat, perrl, eomi  ENT: op pink and moist without lesions or exudates  CV: +s1s2, no mrg, rrr  Resp: ctapb, no wrr  GI: bs active, abd soft, tender to palpation in right upper and lower quadrants.  Skin: no lesions, no rash  Neuro: cn 2-12 in tact, no focal deficits, no asterixis  Psych: regular rate speech, normal affect    Labs:  Lab Results   Component Value Date    WBC 6.67 09/28/2020    HGB 11.6 (L) 09/28/2020    HCT 35.0 (L) 09/28/2020    MCV 87 09/28/2020     (L) 09/28/2020       BMP  Lab Results   Component Value Date     09/28/2020    K 5.4 (H) 09/28/2020     (H) 09/28/2020    CO2 16 (L) 09/28/2020    BUN 60 (H) 09/28/2020    CREATININE 5.3 (H) 09/28/2020    CALCIUM 7.6 (L) 09/28/2020    ANIONGAP 8 09/28/2020    ESTGFRAFRICA 9.1 (A) 09/28/2020    EGFRNONAA 7.9 (A) 09/28/2020     Lab Results   Component Value Date    ALT 7 (L) 09/28/2020    AST 10 09/28/2020    ALKPHOS 73 " 09/28/2020    BILITOT 0.6 09/28/2020     CT Abd     IMPRESSION:     1.  Bowel wall thickening throughout the large bowel with  submucosal fatty infiltration. Free fluid noted in the right pericolic  gutter and Hernandez's pouch and less so in the pelvis. There is mild  pericolonic fat stranding involving the cecum, ascending colon and  transverse colon. Findings are consistent with colitis, inflammatory  versus infectious. Correlate.  2.  Diverticulosis of the descending and sigmoid colon.  3.  Enlarged adrenal glands could reflect adrenal hyperplasia.  Correlate for adrenal pathology.    Assessment:  65-year-old female with likely ischemic colitis of the right colon unfortunately with acute progressive renal failure.  Patient is not making much urine.  Family history of CRC- no prior colonoscopy    Plan:  Consult nephrology  Wound renal function is turning the corner can prep for colonoscopy to further evaluate.  Recommend stool culture.  Agree with antibiotics for now  IV Hydration

## 2020-09-28 NOTE — NURSING
Updated  Dr Oswald of patients /102 and that colonoscopy not being done today vs diet of NPO.    Orders pending

## 2020-09-28 NOTE — CONSULTS
[facility]  Department of Cardiology  Consult Note      PATIENT NAME: Latricia Higgins  MRN: [id number]  TODAY'S DATE: 09/28/2020  ADMIT DATE: 9/27/2020                          CONSULT REQUESTED BY: Yajaira Oswald MD    SUBJECTIVE     PRINCIPAL PROBLEM: Acute colitis      REASON FOR CONSULT:  HTN      HPI:  65 Year old female patient with a PMH significant for HTN, and Dyslipidemia. She was in her normal state of health until yesterday when she had abdominal pain and bloody diarrhea x 2. She came to ER for evaluation. Rectal exam showed hemmrhoids  H/H Stable. She state she has not been urinating like she should. She denies chest pain or SOB. No arm neck or jaw pain. No palpitations. No recent fever cough chills or congestion. Her blood pressure is elevated and we have been consulted.    FROM H AND P    65 year old patient getting admitted with acute colitis and RICHIE  Today patient all of sudden had Lower abdominal pain followed by Bloody diarrhea  Describes pain as crampy and spastic which was temporary followed by 2 x episodes of fresh blood in stools  She came to ER and ER MD did a rectal examination which showed blood and hemorrhoids  Patient denies fever/nausea or any other c/o  She never had Colonoscopy before      Review of patient's allergies indicates:  No Known Allergies    Past Medical History:   Diagnosis Date    HLD (hyperlipidemia)     Hypertension      Past Surgical History:   Procedure Laterality Date    NO PAST SURGERIES       Social History     Tobacco Use    Smoking status: Not on file   Substance Use Topics    Alcohol use: Not on file    Drug use: Not on file        REVIEW OF SYSTEMS  CONSTITUTIONAL: Negative for chills, fatigue and fever.   EYES: No double vision, No blurred vision  NEURO: No headaches, No dizziness  RESPIRATORY: Negative for cough, shortness of breath and wheezing.    CARDIOVASCULAR: Negative for chest pain. Negative for palpitations and leg swelling.   GI:  +for abdominal pain,+melena, diarrhea, nausea and vomiting.   : +dysuria and frequency, Negative for hematuria  SKIN: Negative for bruising, Negative for edema or discoloration noted.   ENDOCRINE: Negative for polyphagia, Negative for heat intolerance, Negative for cold intolerance  PSYCHIATRIC: Negative for depression, Negative for anxiety, Negative for memory loss  MUSCULOSKELETAL: Negative for neck pain, Negative for muscle weakness, Negative for back pain     OBJECTIVE     VITAL SIGNS (Most Recent)  Temp: 97.9 °F (36.6 °C) (09/28/20 1054)  Pulse: 84 (09/28/20 1258)  Resp: 20 (09/28/20 1054)  BP: (!) 207/92 (09/28/20 1258)  SpO2: 99 % (09/28/20 1054)    VENTILATION STATUS  Resp: 20 (09/28/20 1054)  SpO2: 99 % (09/28/20 1054)       I & O (Last 24H):    Intake/Output Summary (Last 24 hours) at 9/28/2020 1442  Last data filed at 9/28/2020 0900  Gross per 24 hour   Intake 0 ml   Output --   Net 0 ml       WEIGHTS  Wt Readings from Last 1 Encounters:   09/27/20 1447 83.2 kg (183 lb 6.8 oz)   09/27/20 0956 83.9 kg (185 lb)       PHYSICAL EXAM  GENERAL: well built, well nourished, well-developed in no apparent distress alert and oriented.   HEENT: Normocephalic. Pupils normal and conjunctivae normal.  Mucous membranes normal, no cyanosis or icterus, trachea central,no pallor or icterus is noted..   NECK: No JVD. Bruit left at the base of the neck and arm.   THYROID: Thyroid not enlarged. No nodules present..   CARDIAC: Regular rate and rhythm. S1 is normal.S2 is normal.No gallops, clicks or murmurs noted at this time.  CHEST ANATOMY: normal.   LUNGS: Clear to auscultation. No wheezing or rhonchi..   ABDOMEN: Soft no masses or organomegaly.  Hyperactive BS - soft bruit auscultated  URINARY: No ojeda catheter   EXTREMITIES: No cyanosis, clubbing or edema noted at this time., no calf tenderness bilaterally.   PERIPHERAL VASCULAR SYSTEM: Good palpable distal pulses.   CENTRAL NERVOUS SYSTEM: No focal motor or sensory  deficits noted.   SKIN: Skin without lesions, moist, well perfused.   MUSCLE STRENGTH & TONE: No noteable weakness, atrophy or abnormal movement.     HOME MEDICATIONS:  No current facility-administered medications on file prior to encounter.      Current Outpatient Medications on File Prior to Encounter   Medication Sig Dispense Refill    aspirin (ECOTRIN) 81 MG EC tablet Take 81 mg by mouth once daily.      atorvastatin (LIPITOR) 40 MG tablet Take 40 mg by mouth once daily.      carvediloL (COREG) 25 MG tablet Take 25 mg by mouth 2 (two) times daily with meals.      lisinopriL (PRINIVIL,ZESTRIL) 40 MG tablet Take 40 mg by mouth once daily.      loperamide (IMODIUM) 2 mg capsule Take 2 mg by mouth 4 (four) times daily as needed for Diarrhea.      NIFEdipine (ADALAT CC) 30 MG TbSR Take 30 mg by mouth once daily.      phenazopyridine (PYRIDIUM) 95 MG tablet Take 95 mg by mouth 3 (three) times daily as needed for Pain.         SCHEDULED MEDS:   atorvastatin  40 mg Oral QHS    carvediloL  3.125 mg Oral BID    hydrALAZINE  10 mg Intravenous Once    [START ON 9/29/2020] levoFLOXacin  250 mg Intravenous Q48H    metronidazole  500 mg Intravenous Q8H    pantoprazole  40 mg Oral Daily       CONTINUOUS INFUSIONS:   sodium chloride 0.9% 125 mL/hr at 09/27/20 1500    nicardipine         PRN MEDS:acetaminophen, hydrALAZINE, hydrALAZINE, magnesium oxide, magnesium oxide, ondansetron, potassium chloride, potassium chloride    LABS AND DIAGNOSTICS     CBC LAST 3 DAYS  Recent Labs   Lab 09/27/20  1009 09/28/20  0510   WBC 6.99 6.67   RBC 4.99 4.03   HGB 14.2 11.6*   HCT 42.8 35.0*   MCV 86 87   MCH 28.5 28.8   MCHC 33.2 33.1   RDW 13.5 13.4    145*   MPV 9.3 9.6   GRAN 76.4*  5.3 73.4*  4.9   LYMPH 16.5*  1.2 17.1*  1.1   MONO 5.4  0.4 7.6  0.5   BASO 0.05 0.04   NRBC 0 0       COAGULATION LAST 3 DAYS  Recent Labs   Lab 09/27/20  1009   LABPT 14.1   INR 1.1   APTT 32.8       CHEMISTRY LAST 3 DAYS  Recent  Labs   Lab 09/27/20  1009 09/28/20  0510    136   K 5.0 5.4*   * 112*   CO2 15* 16*   ANIONGAP 10 8   BUN 52* 60*   CREATININE 3.5* 5.3*   * 90   CALCIUM 8.2* 7.6*   MG 2.4 2.2   ALBUMIN 3.4* 2.6*   PROT 6.4 5.0*   ALKPHOS 94 73   ALT 7* 7*   AST 12 10   BILITOT 0.7 0.6       CARDIAC PROFILE LAST 3 DAYS  Recent Labs   Lab 09/27/20  1009   CPK 31   CPKMB 1.0   TROPONINI <0.030       ENDOCRINE LAST 3 DAYS  No results for input(s): TSH, PROCAL in the last 168 hours.    LAST ARTERIAL BLOOD GAS  ABG  No results for input(s): PH, PO2, PCO2, HCO3, BE in the last 168 hours.    LAST 7 DAYS MICROBIOLOGY   Microbiology Results (last 7 days)     Procedure Component Value Units Date/Time    Blood culture [[id number]] Collected: 09/27/20 1550    Order Status: Completed Specimen: Blood from Antecubital, Right Arm Updated: 09/27/20 2317     Blood Culture, Routine No Growth to date    Clostridium difficile EIA [[id number]]     Order Status: Canceled Specimen: Stool     Stool culture [[id number]]     Order Status: No result Specimen: Stool           MOST RECENT IMAGING  CT Renal Stone Study ABD Pelvis WO  CMS MANDATED QUALITY DATA - CT RADIATION - 436    All CT scans at this facility utilize dose modulation, iterative  reconstruction, and/or weight based dosing when appropriate to reduce  radiation dose to as low as reasonably achievable.    REASON: Abdominal pain, fever    TECHNIQUE: Abdomen and pelvis CT without IV contrast.    COMPARISON: None    FINDINGS:    There is mild subsegmental atelectasis at lung bases. Focal area of  bronchiectasis versus parenchymal destruction noted at the posterior  right lung base. Heart is normal size.    Liver is normal size. A hepatic cyst is noted in the left liver lobe.  Gallbladder is mildly distended with a single calcified gallbladder  noted in the dependent portion. No pericholecystic inflammatory  changes. The and spleen are unremarkable. A small splenule is noted.  The  bilateral adrenal glands are enlarged, with left gland measuring  approximately 2.3 x 5.4 cm, and right gland measuring approximately  1.8 x 4.3 cm. Left kidney is atrophic. Right kidney is normal size  with no hydronephrosis or nephrolithiasis. Right ureter is normal  caliber with no obstructions. Left ureter is unremarkable. Bladder is  mostly decompressed.    The uterus is within normal limits, eccentric to the left. There is  trace free fluid in the pelvic cul-de-sac. The adnexal structures are  not identified.    There is diverticulosis of the descending and sigmoid colon. There is  bowel wall thickening throughout the large bowel with submucosal fatty  infiltration. The appendix is not identified. There is free fluid in  the right pericolic gutter and Hernandez's pouch. There is mild  pericolonic fat stranding involving the cecum and ascending colon and  transverse colon. The small bowel is normal caliber. The stomach is  decompressed.    The abdominal aorta is normal caliber with mild atherosclerosis. No  intra-abdominal lymphadenopathy. The ventral abdominal wall is  unremarkable. No acute osseous abnormality.    IMPRESSION:    1.  Bowel wall thickening throughout the large bowel with  submucosal fatty infiltration. Free fluid noted in the right pericolic  gutter and Ehrnandez's pouch and less so in the pelvis. There is mild  pericolonic fat stranding involving the cecum, ascending colon and  transverse colon. Findings are consistent with colitis, inflammatory  versus infectious. Correlate.  2.  Diverticulosis of the descending and sigmoid colon.  3.  Enlarged adrenal glands could reflect adrenal hyperplasia.  Correlate for adrenal pathology.    Electronically Signed by Joey Westfall on 9/27/2020 11:44 AM      ECHOCARDIOGRAM RESULTS (last 5)  No results found for this or any previous visit.    CURRENT/PREVIOUS VISIT EKG  Results for orders placed or performed during the hospital encounter of 09/27/20   EKG  12-lead    Collection Time: 09/27/20 10:28 AM    Narrative    Test Reason : R10.9,    Vent. Rate : 065 BPM     Atrial Rate : 065 BPM     P-R Int : 182 ms          QRS Dur : 084 ms      QT Int : 438 ms       P-R-T Axes : 029 -12 -08 degrees     QTc Int : 455 ms    Normal sinus rhythm  Leftward axis  No previous ECGs available  Confirmed by Faraz Griffith MD ([id number]) on 9/27/2020 10:31:46 PM    Referred By: [email or web address]   SELF           Confirmed By:Faraz Griffith MD           ASSESSMENT/PLAN:     Active Hospital Problems    Diagnosis    *Acute colitis    Hypertensive urgency    RICHIE (acute kidney injury)    Blood in stool    Adrenal hyperplasia    HTN (hypertension)    HLD (hyperlipidemia)       ASSESSMENT & PLAN:     1. HTN Urgency  2. RICHIE  3. Acute colitis  4. Dyslipidemia  5. Adrenal Hyperplasia  6. Possible HEATH?        RECOMMENDATIONS:    Renal artery US in view of RICHIE  Amlodipine 5 mg daily  Continue Coreg 3.125 mg PO BID  Will follow you  Thank you for the consultation        Charlette Mitchell NP  [facility]  Department of Cardiology  Date of Service: 09/28/2020        I have personally interviewed and examined the patient, I have reviewed the Nurse Practitioner's history and physical, assessment, and plan. I agree with the findings and plan.      Landen Griffith M.D.  [facility]  Department of Cardiology  Date of Service: 09/28/2020  2:42 PM

## 2020-09-28 NOTE — CONSULTS
INPATIENT NEPHROLOGY CONSULT   [facility] NEPHROLOGY    Latricia Higgins  09/28/2020    Reason for consultation:      Acute kidney injury    Chief Complaint:   Chief Complaint   Patient presents with    Rectal Bleeding     Since 0230 this am          History of Present Illness:     65-year-old female with tobacco use who presented to the hospital with acute onset moderate to severe epigastric abdominal pain that began on Saturday associated with multiple episodes of loose stool followed by 3 episodes of bright red blood per rectum on Sunday.  CT imaging done on admission revealed right-sided colitis.   Her creatinine has precipitously risen prompting renal consultation    9/28  No nausea, chest pain, sob, fever, urinary or bowel complaint, new neurologic symptoms, new joint pain,          Plan of Care:       Assessment:    Acute kidney injury suspicious for hypertensive emergency renal failure.  Urinalysis reveals hyaline casts implication low perfusion  --iv fluids  --renal us (duplex imaging given high bp)  --adrienne, c3, c4  --paraproteins  --ojeda  --strict I/Os  --Avoid NSAIDS, Ho II inhibitors, and other non-essential nephrotoxic agents       I have discussed the risks and benefits of hemodialysis with the patient.  All of their questions were answered.  Risks include low blood pressure, stroke, heart attack, seizure, infection, nausea, delirium, and death.  She expressed understanding.  No acute indication at this time.    Metabolic acidosis  --add exogenous base to iv fluids given hyperkalemia  --urine anion gap    Hyperkalemia  --lokelma  --add bicarb to iv fluids    Hypertension  --avoid over correcting  --150s over 90s ok for now         Thank you for allowing us to participate in this patient's care. We will continue to follow.    Vital Signs:  Temp Readings from Last 3 Encounters:   09/28/20 99.1 °F (37.3 °C) (Oral)       Pulse Readings from Last 3 Encounters:   09/28/20 79       BP Readings from Last 3  Encounters:   09/28/20 (!) 191/93       Weight:  Wt Readings from Last 3 Encounters:   09/27/20 83.2 kg (183 lb 6.8 oz)       Past Medical & Surgical History:  Past Medical History:   Diagnosis Date    HLD (hyperlipidemia)     Hypertension        Past Surgical History:   Procedure Laterality Date    NO PAST SURGERIES         Past Social History:  Social History     Socioeconomic History    Marital status:      Spouse name: Not on file    Number of children: Not on file    Years of education: Not on file    Highest education level: Not on file   Occupational History    Not on file   Social Needs    Financial resource strain: Not on file    Food insecurity     Worry: Not on file     Inability: Not on file    Transportation needs     Medical: Not on file     Non-medical: Not on file   Tobacco Use    Smoking status: Not on file   Substance and Sexual Activity    Alcohol use: Not on file    Drug use: Not on file    Sexual activity: Not on file   Lifestyle    Physical activity     Days per week: Not on file     Minutes per session: Not on file    Stress: Not on file   Relationships    Social connections     Talks on phone: Not on file     Gets together: Not on file     Attends Mosque service: Not on file     Active member of club or organization: Not on file     Attends meetings of clubs or organizations: Not on file     Relationship status: Not on file   Other Topics Concern    Not on file   Social History Narrative    Not on file       Medications:  No current facility-administered medications on file prior to encounter.      Current Outpatient Medications on File Prior to Encounter   Medication Sig Dispense Refill    aspirin (ECOTRIN) 81 MG EC tablet Take 81 mg by mouth once daily.      atorvastatin (LIPITOR) 40 MG tablet Take 40 mg by mouth once daily.      carvediloL (COREG) 25 MG tablet Take 25 mg by mouth 2 (two) times daily with meals.      lisinopriL (PRINIVIL,ZESTRIL) 40 MG  "tablet Take 40 mg by mouth once daily.      loperamide (IMODIUM) 2 mg capsule Take 2 mg by mouth 4 (four) times daily as needed for Diarrhea.      NIFEdipine (ADALAT CC) 30 MG TbSR Take 30 mg by mouth once daily.      phenazopyridine (PYRIDIUM) 95 MG tablet Take 95 mg by mouth 3 (three) times daily as needed for Pain.       Scheduled Meds:   atorvastatin  40 mg Oral QHS    carvediloL  3.125 mg Oral BID    [START ON 9/29/2020] levoFLOXacin  250 mg Intravenous Q48H    metronidazole  500 mg Intravenous Q8H    pantoprazole  40 mg Oral Daily    sodium zirconium cyclosilicate  10 g Oral Once     Continuous Infusions:   custom IV infusion builder      nicardipine Stopped (09/28/20 1315)     PRN Meds:.acetaminophen, magnesium oxide, magnesium oxide, ondansetron, potassium chloride, potassium chloride    Allergies:  Patient has no known allergies.    Past Family History:  Reviewed; refer to Hospitalist Admission Note    Review of Systems:  Review of Systems - All 14 systems reviewed and negative, except as noted in HPI    Physical Exam:    BP (!) 191/93   Pulse 79   Temp 99.1 °F (37.3 °C) (Oral)   Resp 16   Ht 5' 6" (1.676 m)   Wt 83.2 kg (183 lb 6.8 oz)   SpO2 97%   BMI 29.61 kg/m²     General Appearance:    Alert, cooperative, no distress, appears stated age   Head:    Normocephalic, without obvious abnormality, atraumatic   Eyes:    PER, conjunctiva/corneas clear, EOM's intact in both eyes        Throat:   Lips, mucosa, and tongue normal; teeth and gums normal   Back:     Symmetric, no curvature, ROM normal, no CVA tenderness   Lungs:     Clear to auscultation bilaterally, respirations unlabored   Chest wall:    No tenderness or deformity   Heart:    Regular rate and rhythm, S1 and S2 normal, no murmur, rub   or gallop   Abdomen:     Soft, non-tender, bowel sounds active all four quadrants,     no masses, no organomegaly   Extremities:   Extremities normal, atraumatic, no cyanosis or edema   Pulses:   2+ " and symmetric all extremities   MSK:   No joint or muscle swelling, tenderness or deformity   Skin:   Skin color, texture, turgor normal, no rashes or lesions   Neurologic:   CNII-XII intact, normal strength and sensation       Throughout.  No flap     Results:  Lab Results   Component Value Date     09/28/2020    K 5.4 (H) 09/28/2020     (H) 09/28/2020    CO2 16 (L) 09/28/2020    BUN 60 (H) 09/28/2020    CREATININE 5.3 (H) 09/28/2020    CALCIUM 7.6 (L) 09/28/2020    ANIONGAP 8 09/28/2020    ESTGFRAFRICA 9.1 (A) 09/28/2020    EGFRNONAA 7.9 (A) 09/28/2020       Lab Results   Component Value Date    CALCIUM 7.6 (L) 09/28/2020       Recent Labs   Lab 09/28/20  0510   WBC 6.67   RBC 4.03   HGB 11.6*   HCT 35.0*   *   MCV 87   MCH 28.8   MCHC 33.1          I have personally reviewed pertinent radiological imaging and reports.    Patient care was time spent personally by me on the following activities:   · Obtaining a history  · Examination of patient.  · Providing medical care at the patients bedside.  · Developing a treatment plan with patient or surrogate and bedside caregivers  · Ordering and reviewing laboratory studies, radiographic studies, pulse oximetry.  · Ordering and performing treatments and interventions.  · Evaluation of patient's response to treatment.  · Discussions with consultants while on the unit and immediately available to the patient.  · Re-evaluation of the patient's condition.  · Documentation in the medical record.     Eliseo Barnes MD  Nephrology  [facility]  [phone]'

## 2020-09-28 NOTE — PLAN OF CARE
Pt lives alone her pcp is Carlito Ham she uses [facility] she will have no discharge needs at this time       09/28/20 1016   Discharge Assessment   Assessment Type Discharge Planning Assessment   Confirmed/corrected address and phone number on facesheet? Yes   Assessment information obtained from? Patient;Medical Record   Communicated expected length of stay with patient/caregiver no   Prior to hospitilization cognitive status: Alert/Oriented   Prior to hospitalization functional status: Independent   Current cognitive status: Alert/Oriented   Current Functional Status: Independent   Facility Arrived From: home   Lives With alone   Able to Return to Prior Arrangements yes   Is patient able to care for self after discharge? Yes   Who are your caregiver(s) and their phone number(s)? Jordan Gallegos [phone]   Patient's perception of discharge disposition home or selfcare   Readmission Within the Last 30 Days no previous admission in last 30 days   Patient currently being followed by outpatient case management? No   Patient currently receives any other outside agency services? No   Equipment Currently Used at Home none   Do you have any problems affording any of your prescribed medications? No   Is the patient taking medications as prescribed? yes   Does the patient have transportation home? Yes   Transportation Anticipated car, drives self;family or friend will provide   Dialysis Name and Scheduled days n/a   Does the patient receive services at the [facility]? No   Discharge Plan A Home with family   Discharge Plan B Home   DME Needed Upon Discharge  none   Patient/Family in Agreement with Plan unable to assess

## 2020-09-28 NOTE — PLAN OF CARE
Problem: Fall Injury Risk  Goal: Absence of Fall and Fall-Related Injury  Outcome: Ongoing, Progressing     Problem: Adult Inpatient Plan of Care  Goal: Plan of Care Review  Outcome: Ongoing, Progressing     Problem: Adult Inpatient Plan of Care  Goal: Readiness for Transition of Care  Outcome: Ongoing, Progressing

## 2020-09-28 NOTE — NURSING
Notified Jhoana (charge nurse ) of transfer orders and reason.  Patient going to room 2536 per Jhoana. Waiting for it to be cleaned    Notified patient of pending transfer

## 2020-09-28 NOTE — NURSING
Called report to Ar[facility] on Cardiology for room 2528    She agrees to call me when she can take the patient

## 2020-09-29 ENCOUNTER — CLINICAL SUPPORT (OUTPATIENT)
Dept: CARDIOLOGY | Facility: HOSPITAL | Age: 65
DRG: 674 | End: 2020-09-29
Attending: EMERGENCY MEDICINE
Payer: COMMERCIAL

## 2020-09-29 VITALS — HEIGHT: 66 IN | BODY MASS INDEX: 30.53 KG/M2 | WEIGHT: 190 LBS

## 2020-09-29 LAB
ALBUMIN SERPL BCP-MCNC: 2.8 G/DL (ref 3.5–5.2)
ALBUMIN SERPL BCP-MCNC: 2.8 G/DL (ref 3.5–5.2)
ALP SERPL-CCNC: 91 U/L (ref 55–135)
ALP SERPL-CCNC: 91 U/L (ref 55–135)
ALT SERPL W/O P-5'-P-CCNC: 8 U/L (ref 10–44)
ALT SERPL W/O P-5'-P-CCNC: 8 U/L (ref 10–44)
ANION GAP SERPL CALC-SCNC: 12 MMOL/L (ref 8–16)
ANION GAP SERPL CALC-SCNC: 12 MMOL/L (ref 8–16)
AST SERPL-CCNC: 14 U/L (ref 10–40)
AST SERPL-CCNC: 14 U/L (ref 10–40)
BACTERIA #/AREA URNS HPF: ABNORMAL /HPF
BASOPHILS # BLD AUTO: 0.02 K/UL (ref 0–0.2)
BASOPHILS # BLD AUTO: 0.02 K/UL (ref 0–0.2)
BASOPHILS NFR BLD: 0.3 % (ref 0–1.9)
BASOPHILS NFR BLD: 0.3 % (ref 0–1.9)
BILIRUB SERPL-MCNC: 0.9 MG/DL (ref 0.1–1)
BILIRUB SERPL-MCNC: 0.9 MG/DL (ref 0.1–1)
BILIRUB UR QL STRIP: NEGATIVE
BUN SERPL-MCNC: 64 MG/DL (ref 8–23)
BUN SERPL-MCNC: 64 MG/DL (ref 8–23)
CALCIUM SERPL-MCNC: 7.6 MG/DL (ref 8.7–10.5)
CALCIUM SERPL-MCNC: 7.6 MG/DL (ref 8.7–10.5)
CHLORIDE SERPL-SCNC: 107 MMOL/L (ref 95–110)
CHLORIDE SERPL-SCNC: 107 MMOL/L (ref 95–110)
CLARITY UR: ABNORMAL
CO2 SERPL-SCNC: 14 MMOL/L (ref 23–29)
CO2 SERPL-SCNC: 14 MMOL/L (ref 23–29)
COLOR UR: YELLOW
CREAT SERPL-MCNC: 6.8 MG/DL (ref 0.5–1.4)
CREAT SERPL-MCNC: 6.8 MG/DL (ref 0.5–1.4)
DIFFERENTIAL METHOD: ABNORMAL
DIFFERENTIAL METHOD: ABNORMAL
EOSINOPHIL # BLD AUTO: 0 K/UL (ref 0–0.5)
EOSINOPHIL # BLD AUTO: 0 K/UL (ref 0–0.5)
EOSINOPHIL NFR BLD: 0.5 % (ref 0–8)
EOSINOPHIL NFR BLD: 0.5 % (ref 0–8)
ERYTHROCYTE [DISTWIDTH] IN BLOOD BY AUTOMATED COUNT: 13.3 % (ref 11.5–14.5)
ERYTHROCYTE [DISTWIDTH] IN BLOOD BY AUTOMATED COUNT: 13.3 % (ref 11.5–14.5)
EST. GFR  (AFRICAN AMERICAN): 6.7 ML/MIN/1.73 M^2
EST. GFR  (AFRICAN AMERICAN): 6.7 ML/MIN/1.73 M^2
EST. GFR  (NON AFRICAN AMERICAN): 5.8 ML/MIN/1.73 M^2
EST. GFR  (NON AFRICAN AMERICAN): 5.8 ML/MIN/1.73 M^2
GLUCOSE SERPL-MCNC: 96 MG/DL (ref 70–110)
GLUCOSE SERPL-MCNC: 96 MG/DL (ref 70–110)
GLUCOSE UR QL STRIP: NEGATIVE
HCT VFR BLD AUTO: 35.6 % (ref 37–48.5)
HCT VFR BLD AUTO: 35.6 % (ref 37–48.5)
HGB BLD-MCNC: 12.2 G/DL (ref 12–16)
HGB BLD-MCNC: 12.2 G/DL (ref 12–16)
HGB UR QL STRIP: ABNORMAL
HYALINE CASTS #/AREA URNS LPF: 873 /LPF
IMM GRANULOCYTES # BLD AUTO: 0.04 K/UL (ref 0–0.04)
IMM GRANULOCYTES # BLD AUTO: 0.04 K/UL (ref 0–0.04)
IMM GRANULOCYTES NFR BLD AUTO: 0.6 % (ref 0–0.5)
IMM GRANULOCYTES NFR BLD AUTO: 0.6 % (ref 0–0.5)
KETONES UR QL STRIP: NEGATIVE
LEUKOCYTE ESTERASE UR QL STRIP: ABNORMAL
LYMPHOCYTES # BLD AUTO: 0.8 K/UL (ref 1–4.8)
LYMPHOCYTES # BLD AUTO: 0.8 K/UL (ref 1–4.8)
LYMPHOCYTES NFR BLD: 11.9 % (ref 18–48)
LYMPHOCYTES NFR BLD: 11.9 % (ref 18–48)
MAGNESIUM SERPL-MCNC: 2.1 MG/DL (ref 1.6–2.6)
MCH RBC QN AUTO: 29.3 PG (ref 27–31)
MCH RBC QN AUTO: 29.3 PG (ref 27–31)
MCHC RBC AUTO-ENTMCNC: 34.3 G/DL (ref 32–36)
MCHC RBC AUTO-ENTMCNC: 34.3 G/DL (ref 32–36)
MCV RBC AUTO: 86 FL (ref 82–98)
MCV RBC AUTO: 86 FL (ref 82–98)
MICROSCOPIC COMMENT: ABNORMAL
MONOCYTES # BLD AUTO: 0.4 K/UL (ref 0.3–1)
MONOCYTES # BLD AUTO: 0.4 K/UL (ref 0.3–1)
MONOCYTES NFR BLD: 5.9 % (ref 4–15)
MONOCYTES NFR BLD: 5.9 % (ref 4–15)
NEUTROPHILS # BLD AUTO: 5.4 K/UL (ref 1.8–7.7)
NEUTROPHILS # BLD AUTO: 5.4 K/UL (ref 1.8–7.7)
NEUTROPHILS NFR BLD: 80.8 % (ref 38–73)
NEUTROPHILS NFR BLD: 80.8 % (ref 38–73)
NITRITE UR QL STRIP: NEGATIVE
NRBC BLD-RTO: 0 /100 WBC
NRBC BLD-RTO: 0 /100 WBC
PH UR STRIP: 7 [PH] (ref 5–8)
PLATELET # BLD AUTO: 167 K/UL (ref 150–350)
PLATELET # BLD AUTO: 167 K/UL (ref 150–350)
PMV BLD AUTO: 10.1 FL (ref 9.2–12.9)
PMV BLD AUTO: 10.1 FL (ref 9.2–12.9)
POTASSIUM SERPL-SCNC: 4.4 MMOL/L (ref 3.5–5.1)
POTASSIUM SERPL-SCNC: 4.4 MMOL/L (ref 3.5–5.1)
PROT SERPL-MCNC: 5.4 G/DL (ref 6–8.4)
PROT SERPL-MCNC: 5.4 G/DL (ref 6–8.4)
PROT UR QL STRIP: ABNORMAL
RBC # BLD AUTO: 4.16 M/UL (ref 4–5.4)
RBC # BLD AUTO: 4.16 M/UL (ref 4–5.4)
RBC #/AREA URNS HPF: 26 /HPF (ref 0–4)
SODIUM SERPL-SCNC: 133 MMOL/L (ref 136–145)
SODIUM SERPL-SCNC: 133 MMOL/L (ref 136–145)
SODIUM UR-SCNC: 144 MMOL/L (ref 20–250)
SP GR UR STRIP: 1.01 (ref 1–1.03)
SQUAMOUS #/AREA URNS HPF: 5 /HPF
URATE SERPL-MCNC: 7.9 MG/DL (ref 2.4–5.7)
URN SPEC COLLECT METH UR: ABNORMAL
UROBILINOGEN UR STRIP-ACNC: NEGATIVE EU/DL
WBC # BLD AUTO: 6.64 K/UL (ref 3.9–12.7)
WBC # BLD AUTO: 6.64 K/UL (ref 3.9–12.7)
WBC #/AREA STL HPF: ABNORMAL /[HPF]
WBC #/AREA URNS HPF: >100 /HPF (ref 0–5)

## 2020-09-29 PROCEDURE — 63600175 PHARM REV CODE 636 W HCPCS: Performed by: FAMILY MEDICINE

## 2020-09-29 PROCEDURE — 25000003 PHARM REV CODE 250: Performed by: INTERNAL MEDICINE

## 2020-09-29 PROCEDURE — 99232 PR SUBSEQUENT HOSPITAL CARE,LEVL II: ICD-10-PCS | Mod: ,,, | Performed by: INTERNAL MEDICINE

## 2020-09-29 PROCEDURE — 86038 ANTINUCLEAR ANTIBODIES: CPT

## 2020-09-29 PROCEDURE — 81001 URINALYSIS AUTO W/SCOPE: CPT

## 2020-09-29 PROCEDURE — 93306 ECHO (CUPID ONLY): ICD-10-PCS | Mod: 26,,, | Performed by: INTERNAL MEDICINE

## 2020-09-29 PROCEDURE — 86160 COMPLEMENT ANTIGEN: CPT | Mod: 59

## 2020-09-29 PROCEDURE — 21000000 HC CCU ICU ROOM CHARGE

## 2020-09-29 PROCEDURE — 84166 PROTEIN E-PHORESIS/URINE/CSF: CPT

## 2020-09-29 PROCEDURE — 87086 URINE CULTURE/COLONY COUNT: CPT

## 2020-09-29 PROCEDURE — 93306 TTE W/DOPPLER COMPLETE: CPT

## 2020-09-29 PROCEDURE — 99232 SBSQ HOSP IP/OBS MODERATE 35: CPT | Mod: ,,, | Performed by: INTERNAL MEDICINE

## 2020-09-29 PROCEDURE — 93306 TTE W/DOPPLER COMPLETE: CPT | Mod: 26,,, | Performed by: INTERNAL MEDICINE

## 2020-09-29 PROCEDURE — S0030 INJECTION, METRONIDAZOLE: HCPCS | Performed by: FAMILY MEDICINE

## 2020-09-29 PROCEDURE — 85025 COMPLETE CBC W/AUTO DIFF WBC: CPT

## 2020-09-29 PROCEDURE — 84550 ASSAY OF BLOOD/URIC ACID: CPT

## 2020-09-29 PROCEDURE — 84300 ASSAY OF URINE SODIUM: CPT

## 2020-09-29 PROCEDURE — 83735 ASSAY OF MAGNESIUM: CPT

## 2020-09-29 PROCEDURE — 36415 COLL VENOUS BLD VENIPUNCTURE: CPT

## 2020-09-29 PROCEDURE — 80053 COMPREHEN METABOLIC PANEL: CPT

## 2020-09-29 PROCEDURE — 84165 PROTEIN E-PHORESIS SERUM: CPT

## 2020-09-29 PROCEDURE — 25000003 PHARM REV CODE 250: Performed by: FAMILY MEDICINE

## 2020-09-29 PROCEDURE — 86160 COMPLEMENT ANTIGEN: CPT

## 2020-09-29 PROCEDURE — 25000003 PHARM REV CODE 250: Performed by: NURSE PRACTITIONER

## 2020-09-29 RX ORDER — LABETALOL HYDROCHLORIDE 5 MG/ML
20 INJECTION, SOLUTION INTRAVENOUS EVERY 4 HOURS PRN
Status: DISCONTINUED | OUTPATIENT
Start: 2020-09-29 | End: 2020-10-02 | Stop reason: HOSPADM

## 2020-09-29 RX ORDER — AMLODIPINE BESYLATE 5 MG/1
5 TABLET ORAL 2 TIMES DAILY
Status: DISCONTINUED | OUTPATIENT
Start: 2020-09-29 | End: 2020-10-02 | Stop reason: HOSPADM

## 2020-09-29 RX ADMIN — ATORVASTATIN CALCIUM 40 MG: 40 TABLET, FILM COATED ORAL at 09:09

## 2020-09-29 RX ADMIN — LEVOFLOXACIN 250 MG: 5 INJECTION, SOLUTION INTRAVENOUS at 12:09

## 2020-09-29 RX ADMIN — PANTOPRAZOLE SODIUM 40 MG: 40 TABLET, DELAYED RELEASE ORAL at 06:09

## 2020-09-29 RX ADMIN — METRONIDAZOLE 500 MG: 500 INJECTION, SOLUTION INTRAVENOUS at 06:09

## 2020-09-29 RX ADMIN — AMLODIPINE BESYLATE 5 MG: 5 TABLET ORAL at 09:09

## 2020-09-29 RX ADMIN — METRONIDAZOLE 500 MG: 500 INJECTION, SOLUTION INTRAVENOUS at 02:09

## 2020-09-29 RX ADMIN — METRONIDAZOLE 500 MG: 500 INJECTION, SOLUTION INTRAVENOUS at 09:09

## 2020-09-29 RX ADMIN — SODIUM BICARBONATE: 84 INJECTION, SOLUTION INTRAVENOUS at 02:09

## 2020-09-29 RX ADMIN — CARVEDILOL 3.12 MG: 3.12 TABLET, FILM COATED ORAL at 09:09

## 2020-09-29 NOTE — PROGRESS NOTES
INPATIENT NEPHROLOGY PROGRESS  [facility] NEPHROLOGY    Latricia Higgins  09/29/2020    Reason for consultation:      Acute kidney injury    Chief Complaint:   Chief Complaint   Patient presents with    Rectal Bleeding     Since 0230 this am          History of Present Illness:     65-year-old female with tobacco use who presented to the hospital with acute onset moderate to severe epigastric abdominal pain that began on Saturday associated with multiple episodes of loose stool followed by 3 episodes of bright red blood per rectum on Sunday.  CT imaging done on admission revealed right-sided colitis.   Her creatinine has precipitously risen prompting renal consultation    9/28  No nausea, chest pain, sob, fever, urinary or bowel complaint, new neurologic symptoms, new joint pain,  9/29  Renal function, acidosis worse.  K+ at goal.  D/w pt, who states she feels great, no uremic s/sx.      Plan of Care:       Assessment:    Acute kidney injury suspicious for hypertensive emergency renal failure.  Urinalysis reveals hyaline casts implication low perfusion  --iv fluids  --renal us (duplex imaging given high bp)  --adrienne, c3, c4  --paraproteins  --ojeda  --strict I/Os  --Avoid NSAIDS, Ho II inhibitors, and other non-essential nephrotoxic agents  --renal biopsy.        I have discussed the risks and benefits of hemodialysis with the patient.  All of their questions were answered.  Risks include low blood pressure, stroke, heart attack, seizure, infection, nausea, delirium, and death.  She expressed understanding.  No acute indication at this time.    Metabolic acidosis  --add exogenous base to iv fluids given hyperkalemia  --urine anion gap    Hyperkalemia  --lokelma  --add bicarb to iv fluids    Hypertension  --avoid over correcting  --150s over 90s ok for now         Thank you for allowing us to participate in this patient's care. We will continue to follow.    Vital Signs:  Temp Readings from Last 3 Encounters:   09/29/20  97.4 °F (36.3 °C) (Axillary)       Pulse Readings from Last 3 Encounters:   09/29/20 87       BP Readings from Last 3 Encounters:   09/29/20 (!) 151/70       Weight:  Wt Readings from Last 3 Encounters:   09/29/20 86.2 kg (190 lb 0.6 oz)       Past Medical & Surgical History:  Past Medical History:   Diagnosis Date    HLD (hyperlipidemia)     Hypertension        Past Surgical History:   Procedure Laterality Date    NO PAST SURGERIES         Past Social History:  Social History     Socioeconomic History    Marital status:      Spouse name: Not on file    Number of children: Not on file    Years of education: Not on file    Highest education level: Not on file   Occupational History    Not on file   Social Needs    Financial resource strain: Not on file    Food insecurity     Worry: Not on file     Inability: Not on file    Transportation needs     Medical: Not on file     Non-medical: Not on file   Tobacco Use    Smoking status: Not on file   Substance and Sexual Activity    Alcohol use: Not on file    Drug use: Not on file    Sexual activity: Not on file   Lifestyle    Physical activity     Days per week: Not on file     Minutes per session: Not on file    Stress: Not on file   Relationships    Social connections     Talks on phone: Not on file     Gets together: Not on file     Attends Gnosticism service: Not on file     Active member of club or organization: Not on file     Attends meetings of clubs or organizations: Not on file     Relationship status: Not on file   Other Topics Concern    Not on file   Social History Narrative    Not on file       Medications:  No current facility-administered medications on file prior to encounter.      Current Outpatient Medications on File Prior to Encounter   Medication Sig Dispense Refill    aspirin (ECOTRIN) 81 MG EC tablet Take 81 mg by mouth once daily.      atorvastatin (LIPITOR) 40 MG tablet Take 40 mg by mouth once daily.      carvediloL  "(COREG) 25 MG tablet Take 25 mg by mouth 2 (two) times daily with meals.      lisinopriL (PRINIVIL,ZESTRIL) 40 MG tablet Take 40 mg by mouth once daily.      loperamide (IMODIUM) 2 mg capsule Take 2 mg by mouth 4 (four) times daily as needed for Diarrhea.      NIFEdipine (ADALAT CC) 30 MG TbSR Take 30 mg by mouth once daily.      phenazopyridine (PYRIDIUM) 95 MG tablet Take 95 mg by mouth 3 (three) times daily as needed for Pain.       Scheduled Meds:   amLODIPine  5 mg Oral Daily    atorvastatin  40 mg Oral QHS    carvediloL  3.125 mg Oral BID    levoFLOXacin  250 mg Intravenous Q48H    metronidazole  500 mg Intravenous Q8H    pantoprazole  40 mg Oral Daily     Continuous Infusions:   custom IV infusion builder 80 mL/hr at 09/29/20 0618    nicardipine Stopped (09/29/20 0245)     PRN Meds:.acetaminophen, magnesium oxide, magnesium oxide, ondansetron, potassium chloride, potassium chloride    Allergies:  Patient has no known allergies.    Past Family History:  Reviewed; refer to Hospitalist Admission Note    Review of Systems:  Review of Systems - All 14 systems reviewed and negative, except as noted in HPI    Physical Exam:    BP (!) 151/70   Pulse 87   Temp 97.4 °F (36.3 °C) (Axillary)   Resp 15   Ht 5' 6" (1.676 m)   Wt 86.2 kg (190 lb 0.6 oz)   SpO2 97%   BMI 30.67 kg/m²     General Appearance:    Alert, cooperative, no distress, appears stated age   Head:    Normocephalic, without obvious abnormality, atraumatic   Eyes:    PER, conjunctiva/corneas clear, EOM's intact in both eyes        Throat:   Lips, mucosa, and tongue normal; teeth and gums normal   Back:     Symmetric, no curvature, ROM normal, no CVA tenderness   Lungs:     Clear to auscultation bilaterally, respirations unlabored   Chest wall:    No tenderness or deformity   Heart:    Regular rate and rhythm, S1 and S2 normal, no murmur, rub   or gallop   Abdomen:     Soft, non-tender, bowel sounds active all four quadrants,     no " masses, no organomegaly   Extremities:   Extremities normal, atraumatic, no cyanosis or edema   Pulses:   2+ and symmetric all extremities   MSK:   No joint or muscle swelling, tenderness or deformity   Skin:   Skin color, texture, turgor normal, no rashes or lesions   Neurologic:   CNII-XII intact, normal strength and sensation       Throughout.  No flap     Results:  Lab Results   Component Value Date     (L) 09/29/2020     (L) 09/29/2020    K 4.4 09/29/2020    K 4.4 09/29/2020     09/29/2020     09/29/2020    CO2 14 (L) 09/29/2020    CO2 14 (L) 09/29/2020    BUN 64 (H) 09/29/2020    BUN 64 (H) 09/29/2020    CREATININE 6.8 (H) 09/29/2020    CREATININE 6.8 (H) 09/29/2020    CALCIUM 7.6 (L) 09/29/2020    CALCIUM 7.6 (L) 09/29/2020    ANIONGAP 12 09/29/2020    ANIONGAP 12 09/29/2020    ESTGFRAFRICA 6.7 (A) 09/29/2020    ESTGFRAFRICA 6.7 (A) 09/29/2020    EGFRNONAA 5.8 (A) 09/29/2020    EGFRNONAA 5.8 (A) 09/29/2020       Lab Results   Component Value Date    CALCIUM 7.6 (L) 09/29/2020    CALCIUM 7.6 (L) 09/29/2020       Recent Labs   Lab 09/29/20  0517   WBC 6.64  6.64   RBC 4.16  4.16   HGB 12.2  12.2   HCT 35.6*  35.6*     167   MCV 86  86   MCH 29.3  29.3   MCHC 34.3  34.3          I have personally reviewed pertinent radiological imaging and reports.    Patient care was time spent personally by me on the following activities:   · Obtaining a history  · Examination of patient.  · Providing medical care at the patients bedside.  · Developing a treatment plan with patient or surrogate and bedside caregivers  · Ordering and reviewing laboratory studies, radiographic studies, pulse oximetry.  · Ordering and performing treatments and interventions.  · Evaluation of patient's response to treatment.  · Discussions with consultants while on the unit and immediately available to the patient.  · Re-evaluation of the patient's condition.  · Documentation in the medical record.      Eliseo Barnes MD  Nephrology  [facility] [place]  [phone]'

## 2020-09-29 NOTE — PROGRESS NOTES
"[facility] Medicine  Progress Note    Patient Name: Latricia Higgins  MRN: [id number]  Patient Class: IP- Inpatient   Admission Date: 9/27/2020  9:55 AM  Attending Physician: FRED COLON MD  Primary Care Provider: Ralf Ham MD  Face-to-Face encounter date: 09/29/2020    Assessment & Plan:   Latricia Higgins is a 65 y.o. female with:    Rectal Bleeding  Due to acute possible ischemia colitis  - GI consulted, thank you  - IVFs, abx  - following cultures  - CT imaging done on admission revealed right-sided colitis.    Hypertensive emergency with RICHIE (worsening) with NAGMA  - IVFs  - CT w/o contrast reviewed  - nephrology consulted, thank you  -Cardiology consulted thank you  -follow-up renal ultrasound with duplex and RICHIE workup  Continue to be on Cardene drip and bicarb drip    Adrenal hyperplasia  - AM cortisol wnl  - will need CT w contrast    Other chronic conditions:  Home meds as appropriate  Electrolyte derangement:  Trending BMP, Mg; Replacement prn  DVT ppx:  SCDs  FULL CODE    Discharge Planning:     Likely home when medically stable.    Subjective:      9/28:  No bleeding since admission.  No CP, no abdominal pain.  No SOB.  9/29:  Asymptomatic, no signs of fluid overload/uremia but remains oliguric Worsening renal function with mild hyponatremia, ultrasound renal artery report pending   Patient reports her left kidney was small 7 years ago when she was diagnosed with high blood pressure, was on lisinopril, not sure about baseline renal function  Potassium within normal limits  Review of Systems   All systems reviewed and are negative except as noted per above.  Objective:     Physical Exam  BP (!) 151/70   Pulse 87   Temp 97.4 °F (36.3 °C) (Axillary)   Resp 15   Ht 5' 6" (1.676 m)   Wt 86.2 kg (190 lb 0.6 oz)   SpO2 97%   BMI 30.67 kg/m²     Gen: alert, responsive   HEENT:  Eyes - no pallor  External ears with no lesions  Nares patent  Mouth - lips chapped  CV: " RRR  Lungs: CTA B/L  Abd: +BS, soft, NT, ND  Ext: no atrophy or edema  Skin: warm, dry  Neuro: grossly intact  Psych: pleasant    Recent Labs   Lab 09/29/20  0517   WBC 6.64  6.64   HGB 12.2  12.2   HCT 35.6*  35.6*     167     Recent Labs   Lab 09/29/20  0517   CALCIUM 7.6*  7.6*   ALBUMIN 2.8*  2.8*   PROT 5.4*  5.4*   *  133*   K 4.4  4.4   CO2 14*  14*     107   BUN 64*  64*   CREATININE 6.8*  6.8*   ALKPHOS 91  91   ALT 8*  8*   AST 14  14   BILITOT 0.9  0.9     No results found for: POCTGLUCOSE   Microbiology Results (last 7 days)     Procedure Component Value Units Date/Time    Stool culture [[id number]] Collected: 09/28/20 2358    Order Status: Sent Specimen: Stool Updated: 09/29/20 0036    Blood culture [[id number]] Collected: 09/27/20 1550    Order Status: Completed Specimen: Blood from Antecubital, Right Arm Updated: 09/28/20 1632     Blood Culture, Routine No Growth to date      No Growth to date    Clostridium difficile EIA [[id number]]     Order Status: Canceled Specimen: Stool         CT Renal Stone Study ABD Pelvis WO   Final Result      US Renal Artery Stenosis Hyperten (xpd)    (Results Pending)       All labs, images, and other studies - including those not included in this note -- were reviewed personally by me.    Inpatient medications  Scheduled Meds:   amLODIPine  5 mg Oral Daily    atorvastatin  40 mg Oral QHS    carvediloL  3.125 mg Oral BID    levoFLOXacin  250 mg Intravenous Q48H    metronidazole  500 mg Intravenous Q8H    pantoprazole  40 mg Oral Daily     Continuous Infusions:   custom IV infusion builder 80 mL/hr at 09/29/20 0618    nicardipine Stopped (09/29/20 0245)     PRN Meds:.acetaminophen, magnesium oxide, magnesium oxide, ondansetron, potassium chloride, potassium chloride    Above encounter included review of the medical records, interviewing and examining the patient face-to-face, discussion with family and other health care providers,  ordering and interpreting lab/test results and formulating a plan of care.     Yajaira Oswald MD  [facility] Hospitalist

## 2020-09-29 NOTE — NURSING
No urine output in ojeda, Dr Zamora notified at 1230am. IV fluids decreased to 80cc/hr. States she doesn't feel bad, no respiratory difficulty or abdominal discomfort.

## 2020-09-29 NOTE — NURSING
No urine output this shift. Ultrasound tech in room early this am for renal ultrasound,  Verified bladder is empty. States she feels fine, no abdominal discomfort, no resp difficulty.

## 2020-09-29 NOTE — PROGRESS NOTES
[facility]  Department of Cardiology  Consult Note      PATIENT NAME: Latricia Higgins  MRN: [id number]  TODAY'S DATE: 09/29/2020  ADMIT DATE: 9/27/2020                          CONSULT REQUESTED BY: Lisa Perkins MD    SUBJECTIVE     9- Interval History:    Blood pressure is improved.  Patient has only made 15 ml of urine. Kidney function has worsened. She denies any symptoms. Renal US done no results as of yet        PRINCIPAL PROBLEM: Acute colitis      REASON FOR CONSULT:  HTN      HPI:  65 Year old female patient with a PMH significant for HTN, and Dyslipidemia. She was in her normal state of health until yesterday when she had abdominal pain and bloody diarrhea x 2. She came to ER for evaluation. Rectal exam showed hemmrhoids  H/H Stable. She state she has not been urinating like she should. She denies chest pain or SOB. No arm neck or jaw pain. No palpitations. No recent fever cough chills or congestion. Her blood pressure is elevated and we have been consulted.    FROM H AND P    65 year old patient getting admitted with acute colitis and RICHIE  Today patient all of sudden had Lower abdominal pain followed by Bloody diarrhea  Describes pain as crampy and spastic which was temporary followed by 2 x episodes of fresh blood in stools  She came to ER and ER MD did a rectal examination which showed blood and hemorrhoids  Patient denies fever/nausea or any other c/o  She never had Colonoscopy before      Review of patient's allergies indicates:  No Known Allergies    Past Medical History:   Diagnosis Date    HLD (hyperlipidemia)     Hypertension      Past Surgical History:   Procedure Laterality Date    NO PAST SURGERIES       Social History     Tobacco Use    Smoking status: Not on file   Substance Use Topics    Alcohol use: Not on file    Drug use: Not on file        REVIEW OF SYSTEMS  CONSTITUTIONAL: Negative for chills, fatigue and fever.   EYES: No double vision, No blurred  vision  NEURO: No headaches, No dizziness  RESPIRATORY: Negative for cough, shortness of breath and wheezing.    CARDIOVASCULAR: Negative for chest pain. Negative for palpitations and leg swelling.   GI: +for abdominal pain,+melena, diarrhea, nausea and vomiting.   : +dysuria and frequency, Negative for hematuria  SKIN: Negative for bruising, Negative for edema or discoloration noted.   ENDOCRINE: Negative for polyphagia, Negative for heat intolerance, Negative for cold intolerance  PSYCHIATRIC: Negative for depression, Negative for anxiety, Negative for memory loss  MUSCULOSKELETAL: Negative for neck pain, Negative for muscle weakness, Negative for back pain     OBJECTIVE     VITAL SIGNS (Most Recent)  Temp: 97.9 °F (36.6 °C) (09/29/20 1120)  Pulse: 83 (09/29/20 1120)  Resp: 20 (09/29/20 1120)  BP: (!) 161/81 (09/29/20 1120)  SpO2: 95 % (09/29/20 1120)    VENTILATION STATUS  Resp: 20 (09/29/20 1120)  SpO2: 95 % (09/29/20 1120)       I & O (Last 24H):    Intake/Output Summary (Last 24 hours) at 9/29/2020 1141  Last data filed at 9/29/2020 0900  Gross per 24 hour   Intake 2160 ml   Output 15 ml   Net 2145 ml       WEIGHTS  Wt Readings from Last 1 Encounters:   09/29/20 0645 86.2 kg (190 lb 0.6 oz)   09/27/20 1447 83.2 kg (183 lb 6.8 oz)   09/27/20 0956 83.9 kg (185 lb)       PHYSICAL EXAM  GENERAL: well built, well nourished, well-developed in no apparent distress alert and oriented.   HEENT: Normocephalic. Pupils normal and conjunctivae normal.  Mucous membranes normal, no cyanosis or icterus, trachea central,no pallor or icterus is noted..   NECK: No JVD. Bruit left at the base of the neck and arm.   THYROID: Thyroid not enlarged. No nodules present..   CARDIAC: Regular rate and rhythm. S1 is normal.S2 is normal.No gallops, clicks or murmurs noted at this time.  CHEST ANATOMY: normal.   LUNGS: Clear to auscultation. No wheezing or rhonchi..   ABDOMEN: Soft no masses or organomegaly.  Hyperactive BS - soft bruit  auscultated  URINARY: No ojeda catheter   EXTREMITIES: No cyanosis, clubbing or edema noted at this time., no calf tenderness bilaterally.   PERIPHERAL VASCULAR SYSTEM: Good palpable distal pulses.   CENTRAL NERVOUS SYSTEM: No focal motor or sensory deficits noted.   SKIN: Skin without lesions, moist, well perfused.   MUSCLE STRENGTH & TONE: No noteable weakness, atrophy or abnormal movement.     HOME MEDICATIONS:  No current facility-administered medications on file prior to encounter.      Current Outpatient Medications on File Prior to Encounter   Medication Sig Dispense Refill    aspirin (ECOTRIN) 81 MG EC tablet Take 81 mg by mouth once daily.      atorvastatin (LIPITOR) 40 MG tablet Take 40 mg by mouth once daily.      carvediloL (COREG) 25 MG tablet Take 25 mg by mouth 2 (two) times daily with meals.      lisinopriL (PRINIVIL,ZESTRIL) 40 MG tablet Take 40 mg by mouth once daily.      loperamide (IMODIUM) 2 mg capsule Take 2 mg by mouth 4 (four) times daily as needed for Diarrhea.      NIFEdipine (ADALAT CC) 30 MG TbSR Take 30 mg by mouth once daily.      phenazopyridine (PYRIDIUM) 95 MG tablet Take 95 mg by mouth 3 (three) times daily as needed for Pain.         SCHEDULED MEDS:   amLODIPine  5 mg Oral Daily    atorvastatin  40 mg Oral QHS    carvediloL  3.125 mg Oral BID    levoFLOXacin  250 mg Intravenous Q48H    metronidazole  500 mg Intravenous Q8H    pantoprazole  40 mg Oral Daily       CONTINUOUS INFUSIONS:   custom IV infusion builder 80 mL/hr at 09/29/20 0618    nicardipine Stopped (09/29/20 0245)       PRN MEDS:acetaminophen, magnesium oxide, magnesium oxide, ondansetron, potassium chloride, potassium chloride    LABS AND DIAGNOSTICS     CBC LAST 3 DAYS  Recent Labs   Lab 09/27/20  1009 09/28/20  0510 09/29/20  0517   WBC 6.99 6.67 6.64  6.64   RBC 4.99 4.03 4.16  4.16   HGB 14.2 11.6* 12.2  12.2   HCT 42.8 35.0* 35.6*  35.6*   MCV 86 87 86  86   MCH 28.5 28.8 29.3  29.3   [facility]  33.2 33.1 34.3  34.3   RDW 13.5 13.4 13.3  13.3    145* 167  167   MPV 9.3 9.6 10.1  10.1   GRAN 76.4*  5.3 73.4*  4.9 80.8*  80.8*  5.4  5.4   LYMPH 16.5*  1.2 17.1*  1.1 11.9*  11.9*  0.8*  0.8*   MONO 5.4  0.4 7.6  0.5 5.9  5.9  0.4  0.4   BASO 0.05 0.04 0.02  0.02   NRBC 0 0 0  0       COAGULATION LAST 3 DAYS  Recent Labs   Lab 09/27/20  1009   LABPT 14.1   INR 1.1   APTT 32.8       CHEMISTRY LAST 3 DAYS  Recent Labs   Lab 09/27/20  1009 09/28/20  0510 09/29/20  0517    136 133*  133*   K 5.0 5.4* 4.4  4.4   * 112* 107  107   CO2 15* 16* 14*  14*   ANIONGAP 10 8 12  12   BUN 52* 60* 64*  64*   CREATININE 3.5* 5.3* 6.8*  6.8*   * 90 96  96   CALCIUM 8.2* 7.6* 7.6*  7.6*   MG 2.4 2.2 2.1   ALBUMIN 3.4* 2.6* 2.8*  2.8*   PROT 6.4 5.0* 5.4*  5.4*   ALKPHOS 94 73 91  91   ALT 7* 7* 8*  8*   AST 12 10 14  14   BILITOT 0.7 0.6 0.9  0.9       CARDIAC PROFILE LAST 3 DAYS  Recent Labs   Lab 09/27/20  1009   CPK 31   CPKMB 1.0   TROPONINI <0.030       ENDOCRINE LAST 3 DAYS  No results for input(s): TSH, PROCAL in the last 168 hours.    LAST ARTERIAL BLOOD GAS  ABG  No results for input(s): PH, PO2, PCO2, HCO3, BE in the last 168 hours.    LAST 7 DAYS MICROBIOLOGY   Microbiology Results (last 7 days)     Procedure Component Value Units Date/Time    Urine culture [[id number]] Collected: 09/29/20 0903    Order Status: No result Specimen: Urine Updated: 09/29/20 1046    Stool culture [[id number]] Collected: 09/28/20 2358    Order Status: Sent Specimen: Stool Updated: 09/29/20 0036    Blood culture [[id number]] Collected: 09/27/20 1550    Order Status: Completed Specimen: Blood from Antecubital, Right Arm Updated: 09/28/20 1632     Blood Culture, Routine No Growth to date      No Growth to date    Clostridium difficile EIA [[id number]]     Order Status: Canceled Specimen: Stool           MOST RECENT IMAGING  CT Renal Stone Study ABD Pelvis WO  CMS MANDATED QUALITY DATA -  CT RADIATION - 436    All CT scans at this facility utilize dose modulation, iterative  reconstruction, and/or weight based dosing when appropriate to reduce  radiation dose to as low as reasonably achievable.    REASON: Abdominal pain, fever    TECHNIQUE: Abdomen and pelvis CT without IV contrast.    COMPARISON: None    FINDINGS:    There is mild subsegmental atelectasis at lung bases. Focal area of  bronchiectasis versus parenchymal destruction noted at the posterior  right lung base. Heart is normal size.    Liver is normal size. A hepatic cyst is noted in the left liver lobe.  Gallbladder is mildly distended with a single calcified gallbladder  noted in the dependent portion. No pericholecystic inflammatory  changes. The and spleen are unremarkable. A small splenule is noted.  The bilateral adrenal glands are enlarged, with left gland measuring  approximately 2.3 x 5.4 cm, and right gland measuring approximately  1.8 x 4.3 cm. Left kidney is atrophic. Right kidney is normal size  with no hydronephrosis or nephrolithiasis. Right ureter is normal  caliber with no obstructions. Left ureter is unremarkable. Bladder is  mostly decompressed.    The uterus is within normal limits, eccentric to the left. There is  trace free fluid in the pelvic cul-de-sac. The adnexal structures are  not identified.    There is diverticulosis of the descending and sigmoid colon. There is  bowel wall thickening throughout the large bowel with submucosal fatty  infiltration. The appendix is not identified. There is free fluid in  the right pericolic gutter and Hernandez's pouch. There is mild  pericolonic fat stranding involving the cecum and ascending colon and  transverse colon. The small bowel is normal caliber. The stomach is  decompressed.    The abdominal aorta is normal caliber with mild atherosclerosis. No  intra-abdominal lymphadenopathy. The ventral abdominal wall is  unremarkable. No acute osseous  abnormality.    IMPRESSION:    1.  Bowel wall thickening throughout the large bowel with  submucosal fatty infiltration. Free fluid noted in the right pericolic  gutter and Hernandez's pouch and less so in the pelvis. There is mild  pericolonic fat stranding involving the cecum, ascending colon and  transverse colon. Findings are consistent with colitis, inflammatory  versus infectious. Correlate.  2.  Diverticulosis of the descending and sigmoid colon.  3.  Enlarged adrenal glands could reflect adrenal hyperplasia.  Correlate for adrenal pathology.    Electronically Signed by Joey Westfall on 9/27/2020 11:44 AM      ECHOCARDIOGRAM RESULTS (last 5)  No results found for this or any previous visit.    CURRENT/PREVIOUS VISIT EKG  Results for orders placed or performed during the hospital encounter of 09/27/20   EKG 12-lead    Collection Time: 09/27/20 10:28 AM    Narrative    Test Reason : R10.9,    Vent. Rate : 065 BPM     Atrial Rate : 065 BPM     P-R Int : 182 ms          QRS Dur : 084 ms      QT Int : 438 ms       P-R-T Axes : 029 -12 -08 degrees     QTc Int : 455 ms    Normal sinus rhythm  Leftward axis  No previous ECGs available  Confirmed by Faraz Griffith MD ([id number]) on 9/27/2020 10:31:46 PM    Referred By: [email or web address]   SELF           Confirmed By:Faraz Griffith MD           ASSESSMENT/PLAN:     Active Hospital Problems    Diagnosis    *Acute colitis    Hypertensive urgency    RICHIE (acute kidney injury)    Blood in stool    Adrenal hyperplasia    HTN (hypertension)    HLD (hyperlipidemia)       ASSESSMENT & PLAN:     1. HTN Urgency  2. RICHIE  3. Acute colitis  4. Dyslipidemia  5. Adrenal Hyperplasia  6. Possible HEATH?        RECOMMENDATIONS:    Continue Amlodipine 5 mg daily  Continue Coreg 3.125 mg BID  Await renal artery US  No further recommendation from a cardiac standpoint  Thank you        Charlette Mitchell NP  [facility]  Department of Cardiology  Date of Service: 09/29/2020        I have  personally interviewed and examined the patient, I have reviewed the Nurse Practitioner's history and physical, assessment, and plan. I agree with the findings and plan.      Landen Griffith M.D.  [facility]  Department of Cardiology  Date of Service: 09/29/2020  2:42 PM

## 2020-09-29 NOTE — NURSING
Spoke with Izzy SOLIS on cardiology A.  Patient needs to be off of Aspirin x 5 days per IR guidelines. Last dose documented as taken 9/27 at unknown time.  She will let Nay SOUZA (patients current nurse) know and inform Dr. Barnes.  If still an inpatient, could do on 10/1 or schedule as an outpatient.

## 2020-09-30 PROBLEM — K52.9 COLITIS: Status: ACTIVE | Noted: 2020-09-30

## 2020-09-30 LAB
ALBUMIN MFR UR ELPH: 49.9 %
ALBUMIN SERPL BCP-MCNC: 2.8 G/DL (ref 3.5–5.2)
ALP SERPL-CCNC: 79 U/L (ref 55–135)
ALPHA-2 GLOBULIN URINE RANDOM (ELEC): 11.4 %
ALPHA1 GLOB MFR UR ELPH: 6 %
ALT SERPL W/O P-5'-P-CCNC: 9 U/L (ref 10–44)
ANA TITR SER IF: POSITIVE {TITER}
ANION GAP SERPL CALC-SCNC: 12 MMOL/L (ref 8–16)
APTT PPP: 32.4 SEC (ref 23.6–33.3)
AST SERPL-CCNC: 17 U/L (ref 10–40)
B-GLOBULIN MFR UR ELPH: 20.4 %
BASOPHILS # BLD AUTO: 0.03 K/UL (ref 0–0.2)
BASOPHILS NFR BLD: 0.4 % (ref 0–1.9)
BILIRUB SERPL-MCNC: 0.8 MG/DL (ref 0.1–1)
BUN SERPL-MCNC: 64 MG/DL (ref 8–23)
C3 SERPL-MCNC: 94 MG/DL (ref 82–167)
C4 SERPL-MCNC: 29 MG/DL (ref 14–44)
CALCIUM SERPL-MCNC: 7.3 MG/DL (ref 8.7–10.5)
CHLORIDE SERPL-SCNC: 100 MMOL/L (ref 95–110)
CO2 SERPL-SCNC: 17 MMOL/L (ref 23–29)
CREAT SERPL-MCNC: 8.5 MG/DL (ref 0.5–1.4)
DIFFERENTIAL METHOD: ABNORMAL
EOSINOPHIL # BLD AUTO: 0.1 K/UL (ref 0–0.5)
EOSINOPHIL NFR BLD: 1 % (ref 0–8)
ERYTHROCYTE [DISTWIDTH] IN BLOOD BY AUTOMATED COUNT: 13.1 % (ref 11.5–14.5)
EST. GFR  (AFRICAN AMERICAN): 5.1 ML/MIN/1.73 M^2
EST. GFR  (NON AFRICAN AMERICAN): 4.5 ML/MIN/1.73 M^2
GAMMA GLOB MFR UR ELPH: 12.4 %
GLUCOSE SERPL-MCNC: 110 MG/DL (ref 70–110)
HCT VFR BLD AUTO: 33.5 % (ref 37–48.5)
HGB BLD-MCNC: 11.5 G/DL (ref 12–16)
IMM GRANULOCYTES # BLD AUTO: 0.05 K/UL (ref 0–0.04)
IMM GRANULOCYTES NFR BLD AUTO: 0.7 % (ref 0–0.5)
LABORATORY COMMENT REPORT: NORMAL
LYMPHOCYTES # BLD AUTO: 0.8 K/UL (ref 1–4.8)
LYMPHOCYTES NFR BLD: 10.9 % (ref 18–48)
M PROTEIN MFR UR ELPH: NORMAL %
MAGNESIUM SERPL-MCNC: 1.9 MG/DL (ref 1.6–2.6)
MCH RBC QN AUTO: 28.3 PG (ref 27–31)
MCHC RBC AUTO-ENTMCNC: 34.3 G/DL (ref 32–36)
MCV RBC AUTO: 83 FL (ref 82–98)
MONOCYTES # BLD AUTO: 0.4 K/UL (ref 0.3–1)
MONOCYTES NFR BLD: 5.6 % (ref 4–15)
NEUTROPHILS # BLD AUTO: 5.7 K/UL (ref 1.8–7.7)
NEUTROPHILS NFR BLD: 81.4 % (ref 38–73)
NRBC BLD-RTO: 0 /100 WBC
PLATELET # BLD AUTO: 155 K/UL (ref 150–350)
PMV BLD AUTO: 9.5 FL (ref 9.2–12.9)
POTASSIUM SERPL-SCNC: 3.9 MMOL/L (ref 3.5–5.1)
PROT SERPL-MCNC: 5.4 G/DL (ref 6–8.4)
PROT UR-MCNC: 812.8 MG/DL
RBC # BLD AUTO: 4.06 M/UL (ref 4–5.4)
SODIUM SERPL-SCNC: 129 MMOL/L (ref 136–145)
TSH SERPL DL<=0.005 MIU/L-ACNC: 3.26 UIU/ML (ref 0.34–5.6)
WBC # BLD AUTO: 6.95 K/UL (ref 3.9–12.7)

## 2020-09-30 PROCEDURE — 25000003 PHARM REV CODE 250: Performed by: FAMILY MEDICINE

## 2020-09-30 PROCEDURE — 83735 ASSAY OF MAGNESIUM: CPT

## 2020-09-30 PROCEDURE — 75625 CONTRAST EXAM ABDOMINL AORTA: CPT | Performed by: SURGERY

## 2020-09-30 PROCEDURE — 86256 FLUORESCENT ANTIBODY TITER: CPT | Mod: 59

## 2020-09-30 PROCEDURE — 99233 PR SUBSEQUENT HOSPITAL CARE,LEVL III: ICD-10-PCS | Mod: ,,, | Performed by: INTERNAL MEDICINE

## 2020-09-30 PROCEDURE — 86704 HEP B CORE ANTIBODY TOTAL: CPT

## 2020-09-30 PROCEDURE — 25000003 PHARM REV CODE 250: Performed by: SURGERY

## 2020-09-30 PROCEDURE — C1887 CATHETER, GUIDING: HCPCS | Performed by: SURGERY

## 2020-09-30 PROCEDURE — 25000003 PHARM REV CODE 250: Performed by: INTERNAL MEDICINE

## 2020-09-30 PROCEDURE — C1894 INTRO/SHEATH, NON-LASER: HCPCS | Performed by: SURGERY

## 2020-09-30 PROCEDURE — 21000000 HC CCU ICU ROOM CHARGE

## 2020-09-30 PROCEDURE — C1769 GUIDE WIRE: HCPCS | Performed by: SURGERY

## 2020-09-30 PROCEDURE — 85025 COMPLETE CBC W/AUTO DIFF WBC: CPT

## 2020-09-30 PROCEDURE — 36200 PLACE CATHETER IN AORTA: CPT | Performed by: SURGERY

## 2020-09-30 PROCEDURE — 80053 COMPREHEN METABOLIC PANEL: CPT

## 2020-09-30 PROCEDURE — 27800903 OPTIME MED/SURG SUP & DEVICES OTHER IMPLANTS: Performed by: SURGERY

## 2020-09-30 PROCEDURE — 36415 COLL VENOUS BLD VENIPUNCTURE: CPT

## 2020-09-30 PROCEDURE — 85730 THROMBOPLASTIN TIME PARTIAL: CPT

## 2020-09-30 PROCEDURE — 27201423 OPTIME MED/SURG SUP & DEVICES STERILE SUPPLY: Performed by: SURGERY

## 2020-09-30 PROCEDURE — 83516 IMMUNOASSAY NONANTIBODY: CPT

## 2020-09-30 PROCEDURE — 87340 HEPATITIS B SURFACE AG IA: CPT

## 2020-09-30 PROCEDURE — 99153 MOD SED SAME PHYS/QHP EA: CPT | Performed by: SURGERY

## 2020-09-30 PROCEDURE — 90935 HEMODIALYSIS ONE EVALUATION: CPT

## 2020-09-30 PROCEDURE — 84443 ASSAY THYROID STIM HORMONE: CPT

## 2020-09-30 PROCEDURE — 86706 HEP B SURFACE ANTIBODY: CPT

## 2020-09-30 PROCEDURE — 63600175 PHARM REV CODE 636 W HCPCS: Performed by: FAMILY MEDICINE

## 2020-09-30 PROCEDURE — S0030 INJECTION, METRONIDAZOLE: HCPCS | Performed by: SURGERY

## 2020-09-30 PROCEDURE — 99233 SBSQ HOSP IP/OBS HIGH 50: CPT | Mod: ,,, | Performed by: INTERNAL MEDICINE

## 2020-09-30 PROCEDURE — 99152 MOD SED SAME PHYS/QHP 5/>YRS: CPT | Performed by: SURGERY

## 2020-09-30 PROCEDURE — S0030 INJECTION, METRONIDAZOLE: HCPCS | Performed by: FAMILY MEDICINE

## 2020-09-30 PROCEDURE — 36556 INSERT NON-TUNNEL CV CATH: CPT | Performed by: SURGERY

## 2020-09-30 PROCEDURE — C1881 DIALYSIS ACCESS SYSTEM: HCPCS | Performed by: SURGERY

## 2020-09-30 PROCEDURE — 63600175 PHARM REV CODE 636 W HCPCS: Performed by: SURGERY

## 2020-09-30 DEVICE — 13FR TRIALYSIS PC 20CM TRAY
Type: IMPLANTABLE DEVICE | Site: NECK | Status: FUNCTIONAL
Brand: POWER-TRIALYSIS CATHETER

## 2020-09-30 RX ORDER — MIDAZOLAM HYDROCHLORIDE 1 MG/ML
INJECTION INTRAMUSCULAR; INTRAVENOUS
Status: DISCONTINUED | OUTPATIENT
Start: 2020-09-30 | End: 2020-09-30

## 2020-09-30 RX ORDER — SODIUM CHLORIDE 9 MG/ML
INJECTION, SOLUTION INTRAVENOUS ONCE
Status: DISCONTINUED | OUTPATIENT
Start: 2020-09-30 | End: 2020-09-30

## 2020-09-30 RX ORDER — MUPIROCIN 20 MG/G
OINTMENT TOPICAL 2 TIMES DAILY
Status: DISCONTINUED | OUTPATIENT
Start: 2020-09-30 | End: 2020-10-02 | Stop reason: HOSPADM

## 2020-09-30 RX ORDER — HYDROCODONE BITARTRATE AND ACETAMINOPHEN 5; 325 MG/1; MG/1
1 TABLET ORAL ONCE AS NEEDED
Status: COMPLETED | OUTPATIENT
Start: 2020-09-30 | End: 2020-09-30

## 2020-09-30 RX ORDER — SODIUM CHLORIDE 9 MG/ML
INJECTION, SOLUTION INTRAVENOUS ONCE
Status: COMPLETED | OUTPATIENT
Start: 2020-09-30 | End: 2020-09-30

## 2020-09-30 RX ORDER — LIDOCAINE HYDROCHLORIDE 10 MG/ML
INJECTION, SOLUTION EPIDURAL; INFILTRATION; INTRACAUDAL; PERINEURAL
Status: DISCONTINUED | OUTPATIENT
Start: 2020-09-30 | End: 2020-09-30

## 2020-09-30 RX ORDER — DIPHENHYDRAMINE HCL 25 MG
50 CAPSULE ORAL
Status: DISCONTINUED | OUTPATIENT
Start: 2020-09-30 | End: 2020-09-30

## 2020-09-30 RX ORDER — SODIUM CHLORIDE 9 MG/ML
INJECTION, SOLUTION INTRAVENOUS CONTINUOUS
Status: DISCONTINUED | OUTPATIENT
Start: 2020-09-30 | End: 2020-10-02 | Stop reason: HOSPADM

## 2020-09-30 RX ORDER — HEPARIN SODIUM 1000 [USP'U]/ML
4000 INJECTION, SOLUTION INTRAVENOUS; SUBCUTANEOUS
Status: DISCONTINUED | OUTPATIENT
Start: 2020-09-30 | End: 2020-10-02 | Stop reason: HOSPADM

## 2020-09-30 RX ORDER — ACETAMINOPHEN 325 MG/1
650 TABLET ORAL EVERY 4 HOURS PRN
Status: DISCONTINUED | OUTPATIENT
Start: 2020-09-30 | End: 2020-10-02 | Stop reason: HOSPADM

## 2020-09-30 RX ORDER — HYDRALAZINE HYDROCHLORIDE 25 MG/1
25 TABLET, FILM COATED ORAL EVERY 8 HOURS PRN
Status: DISCONTINUED | OUTPATIENT
Start: 2020-09-30 | End: 2020-10-02 | Stop reason: HOSPADM

## 2020-09-30 RX ORDER — ONDANSETRON 4 MG/1
8 TABLET, ORALLY DISINTEGRATING ORAL EVERY 8 HOURS PRN
Status: DISCONTINUED | OUTPATIENT
Start: 2020-09-30 | End: 2020-10-02 | Stop reason: HOSPADM

## 2020-09-30 RX ORDER — FENTANYL CITRATE 50 UG/ML
INJECTION, SOLUTION INTRAMUSCULAR; INTRAVENOUS
Status: DISCONTINUED | OUTPATIENT
Start: 2020-09-30 | End: 2020-09-30

## 2020-09-30 RX ORDER — SODIUM CHLORIDE 9 MG/ML
INJECTION, SOLUTION INTRAVENOUS
Status: DISCONTINUED | OUTPATIENT
Start: 2020-09-30 | End: 2020-10-02 | Stop reason: HOSPADM

## 2020-09-30 RX ORDER — CLOPIDOGREL BISULFATE 75 MG/1
75 TABLET ORAL DAILY
Status: DISCONTINUED | OUTPATIENT
Start: 2020-09-30 | End: 2020-10-02 | Stop reason: HOSPADM

## 2020-09-30 RX ADMIN — CARVEDILOL 3.12 MG: 3.12 TABLET, FILM COATED ORAL at 09:09

## 2020-09-30 RX ADMIN — AMLODIPINE BESYLATE 5 MG: 5 TABLET ORAL at 09:09

## 2020-09-30 RX ADMIN — MUPIROCIN: 20 OINTMENT TOPICAL at 09:09

## 2020-09-30 RX ADMIN — SODIUM CHLORIDE: 0.9 INJECTION, SOLUTION INTRAVENOUS at 05:09

## 2020-09-30 RX ADMIN — SODIUM BICARBONATE: 84 INJECTION, SOLUTION INTRAVENOUS at 04:09

## 2020-09-30 RX ADMIN — CLOPIDOGREL BISULFATE 75 MG: 75 TABLET, FILM COATED ORAL at 05:09

## 2020-09-30 RX ADMIN — METRONIDAZOLE 500 MG: 500 INJECTION, SOLUTION INTRAVENOUS at 09:09

## 2020-09-30 RX ADMIN — ATORVASTATIN CALCIUM 40 MG: 40 TABLET, FILM COATED ORAL at 09:09

## 2020-09-30 RX ADMIN — METRONIDAZOLE 500 MG: 500 INJECTION, SOLUTION INTRAVENOUS at 05:09

## 2020-09-30 RX ADMIN — HYDROCODONE BITARTRATE AND ACETAMINOPHEN 1 TABLET: 5; 325 TABLET ORAL at 07:09

## 2020-09-30 RX ADMIN — AMLODIPINE BESYLATE 5 MG: 5 TABLET ORAL at 08:09

## 2020-09-30 RX ADMIN — HYDRALAZINE HYDROCHLORIDE 25 MG: 25 TABLET, FILM COATED ORAL at 10:09

## 2020-09-30 RX ADMIN — CARVEDILOL 3.12 MG: 3.12 TABLET, FILM COATED ORAL at 08:09

## 2020-09-30 RX ADMIN — ONDANSETRON 4 MG: 2 INJECTION INTRAMUSCULAR; INTRAVENOUS at 07:09

## 2020-09-30 RX ADMIN — PANTOPRAZOLE SODIUM 40 MG: 40 TABLET, DELAYED RELEASE ORAL at 05:09

## 2020-09-30 NOTE — PROGRESS NOTES
[facility], later [facility]  Progress Note    Patient Name: Latricia Higgins  MRN: [id number]  Patient Class: IP- Inpatient   Admission Date: 9/27/2020  9:55 AM  Attending Physician: FRED COLON MD  Primary Care Provider: Ralf Ham MD  Face-to-Face encounter date: 09/30/2020    Assessment & Plan:   Latricia Higgins is a 65 y.o. female with:    Rectal Bleeding  Due to acute possible ischemia colitis  - GI consulted, thank you  - IVFs, abx  - following cultures  - CT imaging done on admission revealed right-sided colitis.    Hypertensive emergency with RICHIE (worsening) with NAGMA possible renal artery stenosis  -off IVFs, appears euvolemic  - CT w/o contrast reviewed  - nephrology consulted, thank you  -Cardiology consulted thank you     renal ultrasound with duplex unremarkable for obstruction and RICHIE workup  P.o. blood pressure medication and bicarb drip    Adrenal hyperplasia  - AM cortisol wnl  - will need CT w contrast    Other chronic conditions:  Home meds as appropriate  Electrolyte derangement:  Trending BMP, Mg; Replacement prn  DVT ppx:  SCDs  FULL CODE    Discharge Planning:     Likely home when medically stable.    Subjective:      9/28:  No bleeding since admission.  No CP, no abdominal pain.  No SOB.  9/29:  Asymptomatic, no signs of fluid overload/uremia but remains oliguric Worsening renal function with mild hyponatremia, ultrasound renal artery report pending   Patient reports her left kidney was small 7 years ago when she was diagnosed with high blood pressure, was on lisinopril, not sure about baseline renal function  Potassium within normal limits    9/30 renal function progressively worsening, with 15 cc of urine output, patient reports mild nausea tolerating diet, frustrated with prolonged hospital stay  Case discussed with cardiology, vascular surgery consulted for co2 angiogram and dialysis catheter  Hydralazine p.r.n. added for accelerated blood pressure     Review of  "Systems   All systems reviewed and are negative except as noted per above.  Objective:     Physical Exam  BP (!) 164/88   Pulse 80   Temp 98.1 °F (36.7 °C) (Oral)   Resp 20   Ht 5' 6" (1.676 m)   Wt 89.3 kg (196 lb 13.9 oz)   SpO2 98%   BMI 31.78 kg/m²     Gen: alert, responsive   HEENT:  Eyes - no pallor  External ears with no lesions  Nares patent  Mouth - lips chapped  CV: RRR  Lungs: CTA B/L  Abd: +BS, soft, NT, ND  Ext: no atrophy or edema  Skin: warm, dry  Neuro: grossly intact  Psych: pleasant    Recent Labs   Lab 09/30/20  0405   WBC 6.95   HGB 11.5*   HCT 33.5*        Recent Labs   Lab 09/30/20  0405   CALCIUM 7.3*   ALBUMIN 2.8*   PROT 5.4*   *   K 3.9   CO2 17*      BUN 64*   CREATININE 8.5*   ALKPHOS 79   ALT 9*   AST 17   BILITOT 0.8     No results found for: POCTGLUCOSE   Microbiology Results (last 7 days)     Procedure Component Value Units Date/Time    Stool culture [[id number]] Collected: 09/28/20 2358    Order Status: Completed Specimen: Stool Updated: 09/30/20 1027     Stool Culture Nothing significant to date      Reduced normal ruperto    Urine culture [[id number]] Collected: 09/29/20 0903    Order Status: Completed Specimen: Urine Updated: 09/30/20 0824     Urine Culture, Routine No growth to date    Narrative:      Specimen Source->Urine    Blood culture [[id number]] Collected: 09/27/20 1550    Order Status: Completed Specimen: Blood from Antecubital, Right Arm Updated: 09/29/20 1632     Blood Culture, Routine No Growth to date      No Growth to date      No Growth to date    Clostridium difficile EIA [[id number]]     Order Status: Canceled Specimen: Stool         US Renal Artery Stenosis Hyperten (xpd)   Final Result      CT Renal Stone Study ABD Pelvis WO   Final Result      CT Biopsy Kidney    (Results Pending)       All labs, images, and other studies - including those not included in this note -- were reviewed personally by me.    Inpatient medications  Scheduled " Meds:   sodium chloride 0.9%   Intravenous Once    amLODIPine  5 mg Oral BID    atorvastatin  40 mg Oral QHS    carvediloL  3.125 mg Oral BID    clopidogreL  75 mg Oral Daily    levoFLOXacin  250 mg Intravenous Q48H    metronidazole  500 mg Intravenous Q8H    pantoprazole  40 mg Oral Daily     Continuous Infusions:   sodium chloride 0.9%      custom IV infusion builder 80 mL/hr at 09/30/20 0435    nicardipine Stopped (09/29/20 0245)     PRN Meds:.acetaminophen, acetaminophen, diphenhydrAMINE, fentaNYL, hydrALAZINE, labetaloL, lidocaine (PF) 10 mg/ml (1%), magnesium oxide, magnesium oxide, midazolam, ondansetron, ondansetron, potassium chloride, potassium chloride    Above encounter included review of the medical records, interviewing and examining the patient face-to-face, discussion with family and other health care providers, ordering and interpreting lab/test results and formulating a plan of care.     Yajaira Oswald MD  [facility] Hospitalist

## 2020-09-30 NOTE — PROGRESS NOTES
INPATIENT NEPHROLOGY PROGRESS  [facility] NEPHROLOGY    Latricia Higgins  09/30/2020    Reason for consultation:      Acute kidney injury    Chief Complaint:   Chief Complaint   Patient presents with    Rectal Bleeding     Since 0230 this am          History of Present Illness:     65-year-old female with tobacco use who presented to the hospital with acute onset moderate to severe epigastric abdominal pain that began on Saturday associated with multiple episodes of loose stool followed by 3 episodes of bright red blood per rectum on Sunday.  CT imaging done on admission revealed right-sided colitis.   Her creatinine has precipitously risen prompting renal consultation    9/28  No nausea, chest pain, sob, fever, urinary or bowel complaint, new neurologic symptoms, new joint pain,  9/29  Renal function, acidosis worse.  K+ at goal.  D/w pt, who states she feels great, no uremic s/sx.    9/30  Bilateral renal artery occlusion seen.  Pt in recovery.  No distress    Plan of Care:       Assessment:    Acute kidney injury suspicious for hypertensive emergency renal failure.  Urinalysis reveals hyaline casts implication low perfusion  -bilateral renal artery occlusion seen on   CO2 renal angiogram.    --cancel renal biopsy.  Ischemic nephropathy the etiology.    --initiate hemodialysis     I have discussed the risks and benefits of hemodialysis with the patient.  All of their questions were answered.  Risks include low blood pressure, stroke, heart attack, seizure, infection, nausea, delirium, and death.  She expressed understanding.  No acute indication at this time.    Metabolic acidosis  --bicarb dialysate    Hyperkalemia--better       Hypertension  --better control         Thank you for allowing us to participate in this patient's care. We will continue to follow.    Vital Signs:  Temp Readings from Last 3 Encounters:   09/30/20 98.1 °F (36.7 °C) (Oral)       Pulse Readings from Last 3 Encounters:   09/30/20 68       BP  Readings from Last 3 Encounters:   09/30/20 (!) 145/81       Weight:  Wt Readings from Last 3 Encounters:   09/30/20 89.3 kg (196 lb 13.9 oz)   09/29/20 86.2 kg (190 lb)       Past Medical & Surgical History:  Past Medical History:   Diagnosis Date    HLD (hyperlipidemia)     Hypertension        Past Surgical History:   Procedure Laterality Date    NO PAST SURGERIES         Past Social History:  Social History     Socioeconomic History    Marital status:      Spouse name: Not on file    Number of children: Not on file    Years of education: Not on file    Highest education level: Not on file   Occupational History    Not on file   Social Needs    Financial resource strain: Not on file    Food insecurity     Worry: Not on file     Inability: Not on file    Transportation needs     Medical: Not on file     Non-medical: Not on file   Tobacco Use    Smoking status: Unknown If Ever Smoked   Substance and Sexual Activity    Alcohol use: Not on file    Drug use: Not on file    Sexual activity: Not on file   Lifestyle    Physical activity     Days per week: Not on file     Minutes per session: Not on file    Stress: Not on file   Relationships    Social connections     Talks on phone: Not on file     Gets together: Not on file     Attends Mandaen service: Not on file     Active member of club or organization: Not on file     Attends meetings of clubs or organizations: Not on file     Relationship status: Not on file   Other Topics Concern    Not on file   Social History Narrative    Not on file       Medications:  No current facility-administered medications on file prior to encounter.      Current Outpatient Medications on File Prior to Encounter   Medication Sig Dispense Refill    aspirin (ECOTRIN) 81 MG EC tablet Take 81 mg by mouth once daily.      atorvastatin (LIPITOR) 40 MG tablet Take 40 mg by mouth once daily.      carvediloL (COREG) 25 MG tablet Take 25 mg by mouth 2 (two) times  "daily with meals.      lisinopriL (PRINIVIL,ZESTRIL) 40 MG tablet Take 40 mg by mouth once daily.      loperamide (IMODIUM) 2 mg capsule Take 2 mg by mouth 4 (four) times daily as needed for Diarrhea.      NIFEdipine (ADALAT CC) 30 MG TbSR Take 30 mg by mouth once daily.      phenazopyridine (PYRIDIUM) 95 MG tablet Take 95 mg by mouth 3 (three) times daily as needed for Pain.       Scheduled Meds:   amLODIPine  5 mg Oral BID    atorvastatin  40 mg Oral QHS    carvediloL  3.125 mg Oral BID    clopidogreL  75 mg Oral Daily    levoFLOXacin  250 mg Intravenous Q48H    metronidazole  500 mg Intravenous Q8H    pantoprazole  40 mg Oral Daily     Continuous Infusions:   sodium chloride 0.9%      custom IV infusion builder 80 mL/hr at 09/30/20 0435    nicardipine Stopped (09/29/20 0245)     PRN Meds:.acetaminophen, acetaminophen, hydrALAZINE, labetaloL, magnesium oxide, magnesium oxide, ondansetron, ondansetron, potassium chloride, potassium chloride    Allergies:  Patient has no known allergies.    Past Family History:  Reviewed; refer to Hospitalist Admission Note    Review of Systems:  Review of Systems - All 14 systems reviewed and negative, except as noted in HPI    Physical Exam:    BP (!) 145/81   Pulse 68   Temp 98.1 °F (36.7 °C) (Oral)   Resp 16   Ht 5' 6" (1.676 m)   Wt 89.3 kg (196 lb 13.9 oz)   SpO2 96%   BMI 31.78 kg/m²     General Appearance:    Alert, cooperative, no distress, appears stated age   Head:    Normocephalic, without obvious abnormality, atraumatic   Eyes:    PER, conjunctiva/corneas clear, EOM's intact in both eyes        Throat:   Lips, mucosa, and tongue normal; teeth and gums normal   Back:     Symmetric, no curvature, ROM normal, no CVA tenderness   Lungs:     Clear to auscultation bilaterally, respirations unlabored   Chest wall:    No tenderness or deformity   Heart:    Regular rate and rhythm, S1 and S2 normal, no murmur, rub   or gallop   Abdomen:     Soft, non-tender, " bowel sounds active all four quadrants,     no masses, no organomegaly   Extremities:   Extremities normal, atraumatic, no cyanosis or edema   Pulses:   2+ and symmetric all extremities   MSK:   No joint or muscle swelling, tenderness or deformity   Skin:   Skin color, texture, turgor normal, no rashes or lesions   Neurologic:   CNII-XII intact, normal strength and sensation       Throughout.  No flap     Results:  Lab Results   Component Value Date     (L) 09/30/2020    K 3.9 09/30/2020     09/30/2020    CO2 17 (L) 09/30/2020    BUN 64 (H) 09/30/2020    CREATININE 8.5 (H) 09/30/2020    CALCIUM 7.3 (L) 09/30/2020    ANIONGAP 12 09/30/2020    ESTGFRAFRICA 5.1 (A) 09/30/2020    EGFRNONAA 4.5 (A) 09/30/2020       Lab Results   Component Value Date    CALCIUM 7.3 (L) 09/30/2020       Recent Labs   Lab 09/30/20  0405   WBC 6.95   RBC 4.06   HGB 11.5*   HCT 33.5*      MCV 83   MCH 28.3   MCHC 34.3          I have personally reviewed pertinent radiological imaging and reports.    Patient care was time spent personally by me on the following activities:   · Obtaining a history  · Examination of patient.  · Providing medical care at the patients bedside.  · Developing a treatment plan with patient or surrogate and bedside caregivers  · Ordering and reviewing laboratory studies, radiographic studies, pulse oximetry.  · Ordering and performing treatments and interventions.  · Evaluation of patient's response to treatment.  · Discussions with consultants while on the unit and immediately available to the patient.  · Re-evaluation of the patient's condition.  · Documentation in the medical record.     Eliseo Barnes MD  Nephrology  [facility] [place]  [phone]'

## 2020-09-30 NOTE — OP NOTE
[facility]  Surgery Department  Operative Note    SUMMARY     Date of Procedure: 9/30/2020     Procedure: Procedure(s) (LRB):  co2 renal angio (N/A)  Insertion, Dialysis Access (Right)     Surgeon(s) and Role:     * Lance Bustamante MD - Primary     * Pedro Nash MD - Fellow    Assisting Surgeon: None    Pre-Operative Diagnosis: CAD (coronary artery disease) [I25.10]    Post-Operative Diagnosis: Post-Op Diagnosis Codes:     * CAD (coronary artery disease) [I25.10]    Anesthesia: RN IV Sedation    Technical Procedures Used:  Abdominal aortogram and attempted selection of right renal artery, CO2 angiography, insertion Trialysis catheter the right jugular    Description of the Findings of the Procedure:  Right femoral artery was accessed over the femoral head 6 Croatian sheath was placed.  Pigtail catheters advanced to the visceral segment abdominal aortogram performed with CO2 angiography then switched out to a LIMA guide catheter attempted to access the orifice of the right renal artery.  Findings were patent celiac and superior mesenteric arteries the infrarenal aorta is atherosclerotic the renal arteries were both occluded we did not see enough of the left side but there did appear to be the origin of the right renal artery which I accessed with several wire still attempt across it with a 014 trip was a catheter this was unsuccessful.  We then removed the catheters and a right jugular Trialysis catheter was placed via Seldinger technique under ultrasound guidance with fluoroscopy all ports aspirated and flushed well.  Catheter was sewn in place.  Right femoral sheath was pulled and pressure was held.    Significant Surgical Tasks Conducted by the Assistant(s), if Applicable:     Complications: No    Estimated Blood Loss (EBL): * No values recorded between 9/30/2020  1:27 PM and 9/30/2020  1:56 PM *           Implants:   Implant Name Type Inv. Item Serial No.  Lot No. LRB No. Used Action    POWER TRIALYSIS DIALYSIS CATHETER  SHORT TERM      [id number] Right 1 Implanted       Specimens:   Specimen (12h ago, onward)    None                  Condition: Good    Disposition: PACU - hemodynamically stable.    Attestation: I was present and scrubbed for the entire procedure.

## 2020-09-30 NOTE — PLAN OF CARE
Problem: Adult Inpatient Plan of Care  Goal: Plan of Care Review  Outcome: Ongoing, Progressing  Flowsheets (Taken 9/30/2020 1720)  Plan of Care Reviewed With:   patient   son     Problem: Device-Related Complication Risk (Hemodialysis)  Goal: Safe, Effective Therapy Delivery  Outcome: Ongoing, Progressing     Problem: Renal Function Impairment (Acute Kidney Injury/Impairment)  Goal: Effective Renal Function  Outcome: Ongoing, Not Progressing  Intervention: Monitor and Support Renal Function  Flowsheets (Taken 9/30/2020 1720)  Stabilization Measures: prepared for surgical intervention  Medication Review/Management:   medications reviewed   high risk medications identified     Problem: Adult Inpatient Plan of Care  Goal: Patient-Specific Goal (Individualization)  Flowsheets (Taken 9/30/2020 1720)  Individualized Care Needs: education on renal failure  Anxieties, Fears or Concerns: diagnosis of matt  Patient-Specific Goals (Include Timeframe): dialysis care     Problem: Infection (Hemodialysis)  Goal: Absence of Infection Signs/Symptoms  Intervention: Prevent or Manage Infection  Flowsheets (Taken 9/30/2020 1720)  Infection Prevention:   environmental surveillance performed   visitors restricted/screened   rest/sleep promoted

## 2020-09-30 NOTE — PLAN OF CARE
Problem: Fall Injury Risk  Goal: Absence of Fall and Fall-Related Injury  Outcome: Ongoing, Progressing   Safety measures discussed with patient. Patient verbalized that she will call for assistance before ambulating.

## 2020-09-30 NOTE — PROGRESS NOTES
[facility]  Department of Cardiology  Consult Note      PATIENT NAME: Latricia Higgins  MRN: [id number]  TODAY'S DATE: 09/30/2020  ADMIT DATE: 9/27/2020                          CONSULT REQUESTED BY: Lisa Perkins MD    SUBJECTIVE     09/30/2020, interval history:  Patient remains to have labile blood pressure.  And anuric 15 cc urine output overnight.  Hydralazine has been added to her regimen.      9- Interval History:    Blood pressure is improved.  Patient has only made 15 ml of urine. Kidney function has worsened. She denies any symptoms. Renal US done no results as of yet        PRINCIPAL PROBLEM: Acute colitis      REASON FOR CONSULT:  HTN      HPI:  65 Year old female patient with a PMH significant for HTN, and Dyslipidemia. She was in her normal state of health until yesterday when she had abdominal pain and bloody diarrhea x 2. She came to ER for evaluation. Rectal exam showed hemmrhoids  H/H Stable. She state she has not been urinating like she should. She denies chest pain or SOB. No arm neck or jaw pain. No palpitations. No recent fever cough chills or congestion. Her blood pressure is elevated and we have been consulted.    FROM H AND P    65 year old patient getting admitted with acute colitis and RICHIE  Today patient all of sudden had Lower abdominal pain followed by Bloody diarrhea  Describes pain as crampy and spastic which was temporary followed by 2 x episodes of fresh blood in stools  She came to ER and ER MD did a rectal examination which showed blood and hemorrhoids  Patient denies fever/nausea or any other c/o  She never had Colonoscopy before      Review of patient's allergies indicates:  No Known Allergies    Past Medical History:   Diagnosis Date    HLD (hyperlipidemia)     Hypertension      Past Surgical History:   Procedure Laterality Date    NO PAST SURGERIES       Social History     Tobacco Use    Smoking status: Unknown If Ever Smoked   Substance Use Topics     Alcohol use: Not on file    Drug use: Not on file        REVIEW OF SYSTEMS  CONSTITUTIONAL: Negative for chills, fatigue and fever.   EYES: No double vision, No blurred vision  NEURO: No headaches, No dizziness  RESPIRATORY: Negative for cough, shortness of breath and wheezing.    CARDIOVASCULAR: Negative for chest pain. Negative for palpitations and leg swelling.   GI: +for abdominal pain,+melena, diarrhea, nausea and vomiting.   : +dysuria and frequency, Negative for hematuria  SKIN: Negative for bruising, Negative for edema or discoloration noted.   ENDOCRINE: Negative for polyphagia, Negative for heat intolerance, Negative for cold intolerance  PSYCHIATRIC: Negative for depression, Negative for anxiety, Negative for memory loss  MUSCULOSKELETAL: Negative for neck pain, Negative for muscle weakness, Negative for back pain     OBJECTIVE     VITAL SIGNS (Most Recent)  Temp: 98.1 °F (36.7 °C) (09/30/20 0730)  Pulse: 80 (09/30/20 0730)  Resp: 20 (09/30/20 0730)  BP: (!) 164/88 (09/30/20 0730)  SpO2: 98 % (09/30/20 0730)    VENTILATION STATUS  Resp: 20 (09/30/20 0730)  SpO2: 98 % (09/30/20 0730)       I & O (Last 24H):    Intake/Output Summary (Last 24 hours) at 9/30/2020 1101  Last data filed at 9/30/2020 0600  Gross per 24 hour   Intake 1491.33 ml   Output 10 ml   Net 1481.33 ml       WEIGHTS  Wt Readings from Last 1 Encounters:   09/30/20 0416 89.3 kg (196 lb 13.9 oz)   09/29/20 0645 86.2 kg (190 lb 0.6 oz)   09/27/20 1447 83.2 kg (183 lb 6.8 oz)   09/27/20 0956 83.9 kg (185 lb)       PHYSICAL EXAM  GENERAL: well built, well nourished, well-developed in no apparent distress alert and oriented.   HEENT: Normocephalic. Pupils normal and conjunctivae normal.  Mucous membranes normal, no cyanosis or icterus, trachea central,no pallor or icterus is noted..   NECK: No JVD. Bruit left at the base of the neck and arm.   THYROID: Thyroid not enlarged. No nodules present..   CARDIAC: Regular rate and rhythm. S1 is  normal.S2 is normal.No gallops, clicks or murmurs noted at this time.  CHEST ANATOMY: normal.   LUNGS: Clear to auscultation. No wheezing or rhonchi..   ABDOMEN: Soft no masses or organomegaly.  Hyperactive BS - soft bruit auscultated  URINARY: No ojeda catheter   EXTREMITIES: No cyanosis, clubbing or edema noted at this time., no calf tenderness bilaterally.   PERIPHERAL VASCULAR SYSTEM: Good palpable distal pulses.   CENTRAL NERVOUS SYSTEM: No focal motor or sensory deficits noted.   SKIN: Skin without lesions, moist, well perfused.   MUSCLE STRENGTH & TONE: No noteable weakness, atrophy or abnormal movement.     HOME MEDICATIONS:  No current facility-administered medications on file prior to encounter.      Current Outpatient Medications on File Prior to Encounter   Medication Sig Dispense Refill    aspirin (ECOTRIN) 81 MG EC tablet Take 81 mg by mouth once daily.      atorvastatin (LIPITOR) 40 MG tablet Take 40 mg by mouth once daily.      carvediloL (COREG) 25 MG tablet Take 25 mg by mouth 2 (two) times daily with meals.      lisinopriL (PRINIVIL,ZESTRIL) 40 MG tablet Take 40 mg by mouth once daily.      loperamide (IMODIUM) 2 mg capsule Take 2 mg by mouth 4 (four) times daily as needed for Diarrhea.      NIFEdipine (ADALAT CC) 30 MG TbSR Take 30 mg by mouth once daily.      phenazopyridine (PYRIDIUM) 95 MG tablet Take 95 mg by mouth 3 (three) times daily as needed for Pain.         SCHEDULED MEDS:   amLODIPine  5 mg Oral BID    atorvastatin  40 mg Oral QHS    carvediloL  3.125 mg Oral BID    levoFLOXacin  250 mg Intravenous Q48H    metronidazole  500 mg Intravenous Q8H    pantoprazole  40 mg Oral Daily       CONTINUOUS INFUSIONS:   custom IV infusion builder 80 mL/hr at 09/30/20 0435    nicardipine Stopped (09/29/20 0245)       PRN MEDS:acetaminophen, hydrALAZINE, labetaloL, magnesium oxide, magnesium oxide, ondansetron, potassium chloride, potassium chloride    LABS AND DIAGNOSTICS     CBC LAST  3 DAYS  Recent Labs   Lab 09/28/20  0510 09/29/20  0517 09/30/20  0405   WBC 6.67 6.64  6.64 6.95   RBC 4.03 4.16  4.16 4.06   HGB 11.6* 12.2  12.2 11.5*   HCT 35.0* 35.6*  35.6* 33.5*   MCV 87 86  86 83   MCH 28.8 29.3  29.3 28.3   MCHC 33.1 34.3  34.3 34.3   RDW 13.4 13.3  13.3 13.1   * 167  167 155   MPV 9.6 10.1  10.1 9.5   GRAN 73.4*  4.9 80.8*  80.8*  5.4  5.4 81.4*  5.7   LYMPH 17.1*  1.1 11.9*  11.9*  0.8*  0.8* 10.9*  0.8*   MONO 7.6  0.5 5.9  5.9  0.4  0.4 5.6  0.4   BASO 0.04 0.02  0.02 0.03   NRBC 0 0  0 0       COAGULATION LAST 3 DAYS  Recent Labs   Lab 09/27/20  1009   LABPT 14.1   INR 1.1   APTT 32.8       CHEMISTRY LAST 3 DAYS  Recent Labs   Lab 09/28/20  0510 09/29/20  0517 09/30/20  0405    133*  133* 129*   K 5.4* 4.4  4.4 3.9   * 107  107 100   CO2 16* 14*  14* 17*   ANIONGAP 8 12  12 12   BUN 60* 64*  64* 64*   CREATININE 5.3* 6.8*  6.8* 8.5*   GLU 90 96  96 110   CALCIUM 7.6* 7.6*  7.6* 7.3*   MG 2.2 2.1 1.9   ALBUMIN 2.6* 2.8*  2.8* 2.8*   PROT 5.0* 5.4*  5.4* 5.4*   ALKPHOS 73 91  91 79   ALT 7* 8*  8* 9*   AST 10 14  14 17   BILITOT 0.6 0.9  0.9 0.8       CARDIAC PROFILE LAST 3 DAYS  Recent Labs   Lab 09/27/20  1009   CPK 31   CPKMB 1.0   TROPONINI <0.030       ENDOCRINE LAST 3 DAYS  No results for input(s): TSH, PROCAL in the last 168 hours.    LAST ARTERIAL BLOOD GAS  ABG  No results for input(s): PH, PO2, PCO2, HCO3, BE in the last 168 hours.    LAST 7 DAYS MICROBIOLOGY   Microbiology Results (last 7 days)     Procedure Component Value Units Date/Time    Stool culture [[id number]] Collected: 09/28/20 2358    Order Status: Completed Specimen: Stool Updated: 09/30/20 1027     Stool Culture Nothing significant to date      Reduced normal ruperto    Urine culture [[id number]] Collected: 09/29/20 0903    Order Status: Completed Specimen: Urine Updated: 09/30/20 0824     Urine Culture, Routine No growth to date    Narrative:      Specimen  Source->Urine    Blood culture [[id number]] Collected: 09/27/20 1550    Order Status: Completed Specimen: Blood from Antecubital, Right Arm Updated: 09/29/20 1632     Blood Culture, Routine No Growth to date      No Growth to date      No Growth to date    Clostridium difficile EIA [[id number]]     Order Status: Canceled Specimen: Stool           MOST RECENT IMAGING  US Renal Artery Stenosis Hyperten (xpd)  REASON: HTN    TECHNIQUE: Grayscale and color Doppler ultrasound of the renal  arteries.    COMPARISON: None.    FINDINGS:    Right kidney measures 9.1 x 3.7 x 3.4 cm. No hydronephrosis,  nephrolithiasis or renal lesion identified. Peak systolic and  end-diastolic velocities of the right kidney's are as follows.    Proximal 39.4 cm/s and 18.4 cm/s. RI 0.5  Mid 34.9 cm/s and 22.1 cm/s. RI 0.3.  Distal 41.3 cm/s and 23.0 cm/s. RI 0.4.    Left kidney measures 5.5 x 1.7 x 1.7 cm. No hydronephrosis,  nephrolithiasis or renal lesion identified. Peak systolic and  end-diastolic velocities of the left kidney's are as follows 71.5  cm/s.    Proximal kidney 51.8 cm/s and 13.4 cm/s. RI 0.7.  Mid kidney 35.3 cm/s in 12.3 cm/s. RI 0.6.  Distal unable to assess.    IMPRESSION:    No abnormal elevated peak systolic velocities or restrictive indices  identified. Unremarkable ultrasound of the kidneys.    Electronically Signed by Joey Westfall on 9/29/2020 1:22 PM      ECHOCARDIOGRAM RESULTS (last 5)  No results found for this or any previous visit.    CURRENT/PREVIOUS VISIT EKG  Results for orders placed or performed during the hospital encounter of 09/27/20   EKG 12-lead    Collection Time: 09/27/20 10:28 AM    Narrative    Test Reason : R10.9,    Vent. Rate : 065 BPM     Atrial Rate : 065 BPM     P-R Int : 182 ms          QRS Dur : 084 ms      QT Int : 438 ms       P-R-T Axes : 029 -12 -08 degrees     QTc Int : 455 ms    Normal sinus rhythm  Leftward axis  No previous ECGs available  Confirmed by Faraz Griffith MD ([id number]) on  9/27/2020 10:31:46 PM    Referred By: SYDNEY   SELF           Confirmed By:Faraz Griffith MD           ASSESSMENT/PLAN:     Active Hospital Problems    Diagnosis    *Acute colitis    Hypertensive urgency    RICHIE (acute kidney injury)    Blood in stool    Adrenal hyperplasia    HTN (hypertension)    HLD (hyperlipidemia)       ASSESSMENT & PLAN:     1. HTN Urgency  2.  Acute kidney injury with severe anuria   3. Acute colitis  4. Dyslipidemia  5. Adrenal Hyperplasia  6. Possible HEATH?        RECOMMENDATIONS:    Continue Amlodipine 5 mg daily  Continue Coreg 3.125 mg BID  Recommend to do CO2 renal angiogram-consult Dr. Lance Bustamante  Has patient has developed total anuria with accelerated hypertension   to rule out bilateral renal artery stenosis  No further recommendation from a cardiac standpoint  Discussed with vascular surgery and discussed with hospitalist as well as the nursing staff and the patient  Thank you            Landen Griffith M.D.  [facility]  Department of Cardiology  Date of Service: 09/30/2020  11:42 PM

## 2020-09-30 NOTE — PLAN OF CARE
Nursing Transfer Note      9/30/2020     Transfer To: [id number]    Transfer via stretcher, wheelchair    Transfer with cardiac monitoring    Transported by LIBBY Maier    Medicines sent: none    Chart send with patient: Yes    Notified: son    Patient reassessed at: 1348 (date, time)    Upon arrival to floor: cardiac monitor applied, patient oriented to room, call bell in reach and bed in lowest position

## 2020-09-30 NOTE — NURSING
Hemodialysis consent signed / son at bedside with patient's ok. Trialysis cath okayed for use / Dr. Barnes.

## 2020-09-30 NOTE — CONSULTS
[facility]  Vascular Surgery  Consult Note    Consults  Subjective:     Chief Complaint/Reason for Admission:  Hypertension and renal failure    History of Present Illness: 65-year-old female with tobacco use who presented to the hospital with acute onset moderate to severe epigastric abdominal pain that began on Saturday associated with multiple episodes of loose stool followed by 3 episodes of bright red blood per rectum on Sunday.  CT imaging done on admission revealed right-sided colitis.   Her creatinine has precipitously risen prompting renal consultation     9/28  No nausea, chest pain, sob, fever, urinary or bowel complaint, new neurologic symptoms, new joint pain,    Consult today by Cardiology for CO2 angiography of her renal arteries and dialysis catheter placement    Medications Prior to Admission   Medication Sig Dispense Refill Last Dose    aspirin (ECOTRIN) 81 MG EC tablet Take 81 mg by mouth once daily.   9/27/2020 at Unknown time    atorvastatin (LIPITOR) 40 MG tablet Take 40 mg by mouth once daily.   9/26/2020 at Unknown time    carvediloL (COREG) 25 MG tablet Take 25 mg by mouth 2 (two) times daily with meals.   9/27/2020 at Unknown time    lisinopriL (PRINIVIL,ZESTRIL) 40 MG tablet Take 40 mg by mouth once daily.   9/27/2020 at Unknown time    loperamide (IMODIUM) 2 mg capsule Take 2 mg by mouth 4 (four) times daily as needed for Diarrhea.   9/26/2020 at Unknown time    NIFEdipine (ADALAT CC) 30 MG TbSR Take 30 mg by mouth once daily.   9/27/2020    phenazopyridine (PYRIDIUM) 95 MG tablet Take 95 mg by mouth 3 (three) times daily as needed for Pain.   9/24/2020       Review of patient's allergies indicates:  No Known Allergies    Past Medical History:   Diagnosis Date    HLD (hyperlipidemia)     Hypertension      Past Surgical History:   Procedure Laterality Date    NO PAST SURGERIES       Family History     Problem Relation (Age of Onset)    Cancer Mother    Heart  disease Father        Tobacco Use    Smoking status: Unknown If Ever Smoked   Substance and Sexual Activity    Alcohol use: Not on file    Drug use: Not on file    Sexual activity: Not on file     Review of Systems   Constitutional: Negative.    Respiratory: Negative.    Cardiovascular: Negative.    Gastrointestinal: Positive for abdominal pain.   Musculoskeletal: Negative.    Skin: Negative.      Objective:     Vital Signs (Most Recent):  Temp: 98.1 °F (36.7 °C) (09/30/20 0730)  Pulse: 80 (09/30/20 0730)  Resp: 20 (09/30/20 0730)  BP: (!) 164/88 (09/30/20 0730)  SpO2: 98 % (09/30/20 0730) Vital Signs (24h Range):  Temp:  [98 °F (36.7 °C)-99 °F (37.2 °C)] 98.1 °F (36.7 °C)  Pulse:  [80-84] 80  Resp:  [16-20] 20  SpO2:  [95 %-98 %] 98 %  BP: (160-174)/(81-89) 164/88     Weight: 89.3 kg (196 lb 13.9 oz)  Body mass index is 31.78 kg/m².        Physical Exam  Constitutional:       Appearance: She is well-developed.   Neck:      Musculoskeletal: Normal range of motion and neck supple.      Vascular: No carotid bruit.   Cardiovascular:      Rate and Rhythm: Normal rate and regular rhythm.      Pulses:           Femoral pulses are 2+ on the right side and 2+ on the left side.       Posterior tibial pulses are detected w/ Doppler on the right side and detected w/ Doppler on the left side.   Pulmonary:      Breath sounds: Normal breath sounds.   Abdominal:      General: Bowel sounds are normal.      Palpations: Abdomen is soft.   Musculoskeletal: Normal range of motion.         Significant Labs:  CBC:   Recent Labs   Lab 09/30/20  0405   WBC 6.95   RBC 4.06   HGB 11.5*   HCT 33.5*      MCV 83   MCH 28.3   MCHC 34.3     CMP:   Recent Labs   Lab 09/30/20  0405      CALCIUM 7.3*   ALBUMIN 2.8*   PROT 5.4*   *   K 3.9   CO2 17*      BUN 64*   CREATININE 8.5*   ALKPHOS 79   ALT 9*   AST 17   BILITOT 0.8       Significant Diagnostics:  Ultrasound of the renal arteries reviewed    Assessment/Plan:    Plan for angiography to completely rule out renovascular hypertension, patient also needs dialysis catheter placed.  Active Diagnoses:    Diagnosis Date Noted POA    PRINCIPAL PROBLEM:  Acute colitis [K52.9] 09/27/2020 Yes    Hypertensive urgency [I16.0] 09/28/2020 Yes    RICHIE (acute kidney injury) [N17.9] 09/27/2020 Yes    Blood in stool [K92.1] 09/27/2020 Yes    Adrenal hyperplasia [E27.8] 09/27/2020 Yes    HTN (hypertension) [I10] 09/27/2020 Yes    HLD (hyperlipidemia) [E78.5] 09/27/2020 Yes      Problems Resolved During this Admission:       Thank you for your consult. I will follow-up with patient. Please contact us if you have any additional questions.    Lance Bustamante MD  Vascular Surgery  [facility]

## 2020-09-30 NOTE — NURSING
Patient back to floor / stretcher. Nad noted. Right groin cath site WNL. No bleeding No swelling No    Discoloration or pain noted. Right tunnel cath with dressing CDI.  Call bell in reach . Post op vs set with monitor. Son at bedside. Will monitor closely.

## 2020-10-01 LAB
ALBUMIN SERPL BCP-MCNC: 2.3 G/DL (ref 3.5–5.2)
ALBUMIN SERPL ELPH-MCNC: 2.5 G/DL (ref 2.9–4.4)
ALBUMIN/GLOB SERPL: 1 {RATIO} (ref 0.7–1.7)
ALP SERPL-CCNC: 90 U/L (ref 55–135)
ALPHA1 GLOB SERPL ELPH-MCNC: 0.3 G/DL (ref 0–0.4)
ALPHA2 GLOB SERPL ELPH-MCNC: 0.8 G/DL (ref 0.4–1)
ALT SERPL W/O P-5'-P-CCNC: 13 U/L (ref 10–44)
ANA HOMOGEN TITR SER: ABNORMAL {TITER}
ANA NOTE: ABNORMAL
ANION GAP SERPL CALC-SCNC: 15 MMOL/L (ref 8–16)
AST SERPL-CCNC: 34 U/L (ref 10–40)
B-GLOBULIN SERPL ELPH-MCNC: 0.6 G/DL (ref 0.7–1.3)
BACTERIA UR CULT: NO GROWTH
BASOPHILS # BLD AUTO: 0.02 K/UL (ref 0–0.2)
BASOPHILS NFR BLD: 0.3 % (ref 0–1.9)
BILIRUB SERPL-MCNC: 0.9 MG/DL (ref 0.1–1)
BUN SERPL-MCNC: 29 MG/DL (ref 8–23)
C-ANCA TITR SER IF: NORMAL TITER
CALCIUM SERPL-MCNC: 7.2 MG/DL (ref 8.7–10.5)
CHLORIDE SERPL-SCNC: 97 MMOL/L (ref 95–110)
CO2 SERPL-SCNC: 22 MMOL/L (ref 23–29)
CREAT SERPL-MCNC: 5.8 MG/DL (ref 0.5–1.4)
DIFFERENTIAL METHOD: ABNORMAL
EOSINOPHIL # BLD AUTO: 0 K/UL (ref 0–0.5)
EOSINOPHIL NFR BLD: 0.4 % (ref 0–8)
ERYTHROCYTE [DISTWIDTH] IN BLOOD BY AUTOMATED COUNT: 13.2 % (ref 11.5–14.5)
EST. GFR  (AFRICAN AMERICAN): 8.2 ML/MIN/1.73 M^2
EST. GFR  (NON AFRICAN AMERICAN): 7.1 ML/MIN/1.73 M^2
GAMMA GLOB SERPL ELPH-MCNC: 0.8 G/DL (ref 0.4–1.8)
GLOBULIN SER CALC-MCNC: 2.5 G/DL (ref 2.2–3.9)
GLUCOSE SERPL-MCNC: 87 MG/DL (ref 70–110)
GLUCOSE SERPL-MCNC: 91 MG/DL (ref 70–110)
GLUCOSE SERPL-MCNC: 96 MG/DL (ref 70–110)
HBV CORE AB SERPL QL IA: POSITIVE
HBV SURFACE AB SER QL: REACTIVE
HBV SURFACE AG SERPL QL IA: NEGATIVE
HCT VFR BLD AUTO: 33.8 % (ref 37–48.5)
HGB BLD-MCNC: 11.5 G/DL (ref 12–16)
IMM GRANULOCYTES # BLD AUTO: 0.03 K/UL (ref 0–0.04)
IMM GRANULOCYTES NFR BLD AUTO: 0.4 % (ref 0–0.5)
LABORATORY COMMENT REPORT: ABNORMAL
LYMPHOCYTES # BLD AUTO: 0.5 K/UL (ref 1–4.8)
LYMPHOCYTES NFR BLD: 6.6 % (ref 18–48)
M PROTEIN SERPL ELPH-MCNC: ABNORMAL G/DL
MAGNESIUM SERPL-MCNC: 1.8 MG/DL (ref 1.6–2.6)
MCH RBC QN AUTO: 28.9 PG (ref 27–31)
MCHC RBC AUTO-ENTMCNC: 34 G/DL (ref 32–36)
MCV RBC AUTO: 85 FL (ref 82–98)
MONOCYTES # BLD AUTO: 0.4 K/UL (ref 0.3–1)
MONOCYTES NFR BLD: 5.2 % (ref 4–15)
NEUTROPHILS # BLD AUTO: 5.9 K/UL (ref 1.8–7.7)
NEUTROPHILS NFR BLD: 87.1 % (ref 38–73)
NRBC BLD-RTO: 0 /100 WBC
P-ANCA ATYPICAL TITR SER IF: NORMAL TITER
P-ANCA TITR SER IF: NORMAL TITER
PLATELET # BLD AUTO: 143 K/UL (ref 150–350)
PMV BLD AUTO: 9.9 FL (ref 9.2–12.9)
POTASSIUM SERPL-SCNC: 3.9 MMOL/L (ref 3.5–5.1)
PROT SERPL-MCNC: 4.7 G/DL (ref 6–8.4)
PROT SERPL-MCNC: 5 G/DL (ref 6–8.5)
RBC # BLD AUTO: 3.98 M/UL (ref 4–5.4)
SODIUM SERPL-SCNC: 134 MMOL/L (ref 136–145)
STOOL CULTURE: NORMAL
WBC # BLD AUTO: 6.79 K/UL (ref 3.9–12.7)

## 2020-10-01 PROCEDURE — S0030 INJECTION, METRONIDAZOLE: HCPCS | Performed by: SURGERY

## 2020-10-01 PROCEDURE — 90935 HEMODIALYSIS ONE EVALUATION: CPT

## 2020-10-01 PROCEDURE — 80053 COMPREHEN METABOLIC PANEL: CPT

## 2020-10-01 PROCEDURE — 25000003 PHARM REV CODE 250: Performed by: SURGERY

## 2020-10-01 PROCEDURE — 80074 ACUTE HEPATITIS PANEL: CPT

## 2020-10-01 PROCEDURE — 63600175 PHARM REV CODE 636 W HCPCS: Performed by: SURGERY

## 2020-10-01 PROCEDURE — 94761 N-INVAS EAR/PLS OXIMETRY MLT: CPT

## 2020-10-01 PROCEDURE — 85025 COMPLETE CBC W/AUTO DIFF WBC: CPT

## 2020-10-01 PROCEDURE — 21400001 HC TELEMETRY ROOM

## 2020-10-01 PROCEDURE — 63600175 PHARM REV CODE 636 W HCPCS: Performed by: INTERNAL MEDICINE

## 2020-10-01 PROCEDURE — 83735 ASSAY OF MAGNESIUM: CPT

## 2020-10-01 PROCEDURE — 99900035 HC TECH TIME PER 15 MIN (STAT)

## 2020-10-01 PROCEDURE — 36415 COLL VENOUS BLD VENIPUNCTURE: CPT

## 2020-10-01 RX ORDER — SODIUM CHLORIDE 9 MG/ML
INJECTION, SOLUTION INTRAVENOUS ONCE
Status: DISCONTINUED | OUTPATIENT
Start: 2020-10-01 | End: 2020-10-02 | Stop reason: HOSPADM

## 2020-10-01 RX ORDER — SODIUM CHLORIDE 9 MG/ML
INJECTION, SOLUTION INTRAVENOUS
Status: DISCONTINUED | OUTPATIENT
Start: 2020-10-01 | End: 2020-10-02 | Stop reason: HOSPADM

## 2020-10-01 RX ADMIN — LEVOFLOXACIN 250 MG: 5 INJECTION, SOLUTION INTRAVENOUS at 01:10

## 2020-10-01 RX ADMIN — METRONIDAZOLE 500 MG: 500 INJECTION, SOLUTION INTRAVENOUS at 05:10

## 2020-10-01 RX ADMIN — ONDANSETRON 4 MG: 2 INJECTION INTRAMUSCULAR; INTRAVENOUS at 12:10

## 2020-10-01 RX ADMIN — AMLODIPINE BESYLATE 5 MG: 5 TABLET ORAL at 08:10

## 2020-10-01 RX ADMIN — CARVEDILOL 3.12 MG: 3.12 TABLET, FILM COATED ORAL at 08:10

## 2020-10-01 RX ADMIN — HYDRALAZINE HYDROCHLORIDE 25 MG: 25 TABLET, FILM COATED ORAL at 01:10

## 2020-10-01 RX ADMIN — CARVEDILOL 3.12 MG: 3.12 TABLET, FILM COATED ORAL at 11:10

## 2020-10-01 RX ADMIN — METRONIDAZOLE 500 MG: 500 INJECTION, SOLUTION INTRAVENOUS at 09:10

## 2020-10-01 RX ADMIN — PANTOPRAZOLE SODIUM 40 MG: 40 TABLET, DELAYED RELEASE ORAL at 05:10

## 2020-10-01 RX ADMIN — HEPARIN SODIUM 4000 UNITS: 1000 INJECTION, SOLUTION INTRAVENOUS; SUBCUTANEOUS at 12:10

## 2020-10-01 RX ADMIN — ATORVASTATIN CALCIUM 40 MG: 40 TABLET, FILM COATED ORAL at 08:10

## 2020-10-01 RX ADMIN — AMLODIPINE BESYLATE 5 MG: 5 TABLET ORAL at 11:10

## 2020-10-01 RX ADMIN — METRONIDAZOLE 500 MG: 500 INJECTION, SOLUTION INTRAVENOUS at 02:10

## 2020-10-01 NOTE — PLAN OF CARE
Problem: Oral Intake Inadequate  Goal: Improved Oral Intake  Outcome: Ongoing, Progressing  Intervention: Promote and Optimize Oral Intake  Flowsheets (Taken 10/1/2020 0987)  Oral Nutrition Promotion: calorie dense liquids provided

## 2020-10-01 NOTE — PROGRESS NOTES
[facility] Medicine  Progress Note    Patient Name: Latricia Higgins  MRN: [id number]  Patient Class: IP- Inpatient   Admission Date: 9/27/2020  9:55 AM  Attending Physician: FRED COLON MD  Primary Care Provider: Ralf Ham MD  Face-to-Face encounter date: 10/01/2020    Assessment & Plan:   Latricia Higgins is a 65 y.o. female with:    Rectal Bleeding  Due to acute possible ischemia colitis  - GI consulted, thank you  - IVFs, abx  - following cultures  - CT imaging done on admission revealed right-sided colitis.    Hypertensive emergency with RICHIE (worsening) with NAGMA and renal artery stenosis  -off IVFs, appears euvolemic  - CT w/o contrast reviewed  - nephrology consulted, thank you  -Cardiology consulted thank you  -vascular sx consulted     renal ultrasound with duplex No abnormal elevated peak systolic velocities  and RICHIE workup  P.o. blood pressure medication     Adrenal hyperplasia  - AM cortisol wnl  - will need CT w contrast    Other chronic conditions:  Home meds as appropriate  Electrolyte derangement:  Trending BMP, Mg; Replacement prn  DVT ppx:  SCDs  FULL CODE    Discharge Planning:     Likely home when medically stable.    Subjective:      9/28:  No bleeding since admission.  No CP, no abdominal pain.  No SOB.  9/29:  Asymptomatic, no signs of fluid overload/uremia but remains oliguric Worsening renal function with mild hyponatremia, ultrasound renal artery report pending   Patient reports her left kidney was small 7 years ago when she was diagnosed with high blood pressure, was on lisinopril, not sure about baseline renal function  Potassium within normal limits    9/30 renal function progressively worsening, with 15 cc of urine output, patient reports mild nausea tolerating diet, frustrated with prolonged hospital stay  Case discussed with cardiology, vascular surgery consulted for co2 angiogram and dialysis catheter  Hydralazine p.r.n. added for accelerated blood  "pressure    1/10 s/p HD for bilateral inguinal artery stenosis diagnosis stone CO2 angiogram with unsuccessful attempt to cross origin of bilateral renal  Awaiting for PermCath placement  Vascular surgery consult  Blood pressure controlled     Review of Systems   All systems reviewed and are negative except as noted per above.  Objective:     Physical Exam  /79 (BP Location: Left arm, Patient Position: Lying)   Pulse 85   Temp 98.1 °F (36.7 °C) (Oral)   Resp 17   Ht 5' 6" (1.676 m)   Wt 87.7 kg (193 lb 5.5 oz)   SpO2 97%   BMI 31.21 kg/m²     Gen: alert, responsive   HEENT:  Eyes - no pallor  External ears with no lesions  Nares patent  Mouth - lips chapped  CV: RRR  Lungs: CTA B/L  Abd: +BS, soft, NT, ND  Ext: no atrophy or edema  Skin: warm, dry  Neuro: grossly intact  Psych: pleasant    Recent Labs   Lab 10/01/20  0416   WBC 6.79   HGB 11.5*   HCT 33.8*   *     Recent Labs   Lab 10/01/20  0416   CALCIUM 7.2*   ALBUMIN 2.3*   PROT 4.7*   *   K 3.9   CO2 22*   CL 97   BUN 29*   CREATININE 5.8*   ALKPHOS 90   ALT 13   AST 34   BILITOT 0.9     No results found for: POCTGLUCOSE   Microbiology Results (last 7 days)     Procedure Component Value Units Date/Time    Blood culture [[id number]] Collected: 09/27/20 1550    Order Status: Completed Specimen: Blood from Antecubital, Right Arm Updated: 10/01/20 1632     Blood Culture, Routine No Growth to date      No Growth to date      No Growth to date      No Growth to date      No Growth to date    Stool culture [[id number]] Collected: 09/28/20 2358    Order Status: Completed Specimen: Stool Updated: 10/01/20 1102     Stool Culture Reduced normal ruperto      No Salmonella,Shigella,Vibrio,Campylobacter.      No E coli 0157:H7 isolated.    Urine culture [[id number]] Collected: 09/29/20 0903    Order Status: Completed Specimen: Urine Updated: 10/01/20 0850     Urine Culture, Routine No growth    Narrative:      Specimen Source->Urine    Clostridium " difficile EIA [[id number]]     Order Status: Canceled Specimen: Stool         X-Ray Chest 1 View   Final Result      Right IJ catheter tip at the atrial caval junction without pneumothorax      Mild cardiomegaly         Electronically signed by: Angeline Goodrich MD   Date:    09/30/2020   Time:    17:33       Renal Artery Stenosis Hyperten (xpd)   Final Result      CT Renal Stone Study ABD Pelvis WO   Final Result          All labs, images, and other studies - including those not included in this note -- were reviewed personally by me.    Inpatient medications  Scheduled Meds:   sodium chloride 0.9%   Intravenous Once    amLODIPine  5 mg Oral BID    atorvastatin  40 mg Oral QHS    carvediloL  3.125 mg Oral BID    clopidogreL  75 mg Oral Daily    levoFLOXacin  250 mg Intravenous Q48H    metronidazole  500 mg Intravenous Q8H    mupirocin   Nasal BID    pantoprazole  40 mg Oral Daily     Continuous Infusions:   sodium chloride 0.9%       PRN Meds:.sodium chloride 0.9%, sodium chloride 0.9%, acetaminophen, acetaminophen, heparin (porcine), hydrALAZINE, labetaloL, magnesium oxide, magnesium oxide, ondansetron, ondansetron, potassium chloride, potassium chloride    Above encounter included review of the medical records, interviewing and examining the patient face-to-face, discussion with family and other health care providers, ordering and interpreting lab/test results and formulating a plan of care.     Yajaira Oswald MD  [facility] Hospitalist

## 2020-10-01 NOTE — PLAN OF CARE
Referral sent to [facility] for OP dialysis set-up.     10/01/20 1502   Discharge Reassessment   Assessment Type Discharge Planning Reassessment   Anticipated Discharge Disposition Home   Discharge Plan A Home   Discharge Plan B Home   Patient choice form signed by patient/caregiver List with quality metrics by geographic area provided   Post-Acute Status   Post-Acute Authorization Dialysis   Diaylsis Status Referrals Sent   Discharge Delays None known at this time

## 2020-10-01 NOTE — PLAN OF CARE
Patient in dialysis at current time. Placed call to patient's son, Mr. Gallegos to inquire on company and/or location of desired dialysis clinic to send referral. Son wishes to take some time to make the decision and discuss with patient. Explained the process and provided a list of choices.     10/01/20 1118   Discharge Reassessment   Assessment Type Discharge Planning Reassessment   Anticipated Discharge Disposition Home   Discharge Plan A Home   Discharge Plan B Home   Patient choice form signed by patient/caregiver List with quality metrics by geographic area provided   Post-Acute Status   Post-Acute Authorization Dialysis   Diaylsis Status Patient List Provided   Discharge Delays (!) Patient and Family Barriers

## 2020-10-01 NOTE — PROGRESS NOTES
[facility]  Department of Cardiology  Consult Note      PATIENT NAME: Latricia Higgins  MRN: [id number]  TODAY'S DATE: 10/01/2020  ADMIT DATE: 9/27/2020                          CONSULT REQUESTED BY: Lisa Perkins MD    SUBJECTIVE     10/1/2020 INTERVAL HISTORY:    Patient see and examined in dialysis. She complains of on and off nausea. She denies chest pain or headache.             09/30/2020, interval history:  Patient remains to have labile blood pressure.  And anuric 15 cc urine output overnight.  Hydralazine has been added to her regimen.      9- Interval History:    Blood pressure is improved.  Patient has only made 15 ml of urine. Kidney function has worsened. She denies any symptoms. Renal US done no results as of yet        PRINCIPAL PROBLEM: Hypertensive urgency      REASON FOR CONSULT:  HTN      HPI:  65 Year old female patient with a PMH significant for HTN, and Dyslipidemia. She was in her normal state of health until yesterday when she had abdominal pain and bloody diarrhea x 2. She came to ER for evaluation. Rectal exam showed hemmrhoids  H/H Stable. She state she has not been urinating like she should. She denies chest pain or SOB. No arm neck or jaw pain. No palpitations. No recent fever cough chills or congestion. Her blood pressure is elevated and we have been consulted.    FROM H AND P    65 year old patient getting admitted with acute colitis and RICHIE  Today patient all of sudden had Lower abdominal pain followed by Bloody diarrhea  Describes pain as crampy and spastic which was temporary followed by 2 x episodes of fresh blood in stools  She came to ER and ER MD did a rectal examination which showed blood and hemorrhoids  Patient denies fever/nausea or any other c/o  She never had Colonoscopy before      Review of patient's allergies indicates:  No Known Allergies    Past Medical History:   Diagnosis Date    HLD (hyperlipidemia)     Hypertension      Past Surgical  History:   Procedure Laterality Date    AORTOGRAPHY WITH SERIALOGRAPHY N/A 9/30/2020    Procedure: co2 renal angio;  Surgeon: Lance Bustamante MD;  Location: [facility] CATH/EP LAB;  Service: Vascular;  Laterality: N/A;    NO PAST SURGERIES      PLACEMENT OF DIALYSIS ACCESS Right 9/30/2020    Procedure: Insertion, Dialysis Access;  Surgeon: Lance Bustamante MD;  Location: [facility] CATH/EP LAB;  Service: Vascular;  Laterality: Right;     Social History     Tobacco Use    Smoking status: Unknown If Ever Smoked   Substance Use Topics    Alcohol use: Not on file    Drug use: Not on file        REVIEW OF SYSTEMS  CONSTITUTIONAL: Negative for chills, fatigue and fever.   EYES: No double vision, No blurred vision  NEURO: No headaches, No dizziness  RESPIRATORY: Negative for cough, shortness of breath and wheezing.    CARDIOVASCULAR: Negative for chest pain. Negative for palpitations and leg swelling.   GI: +for abdominal pain,+melena, diarrhea, nausea and vomiting.   : +dysuria and frequency, Negative for hematuria  SKIN: Negative for bruising, Negative for edema or discoloration noted.   ENDOCRINE: Negative for polyphagia, Negative for heat intolerance, Negative for cold intolerance  PSYCHIATRIC: Negative for depression, Negative for anxiety, Negative for memory loss  MUSCULOSKELETAL: Negative for neck pain, Negative for muscle weakness, Negative for back pain     OBJECTIVE     VITAL SIGNS (Most Recent)  Temp: 98.4 °F (36.9 °C) (10/01/20 0750)  Pulse: 78 (10/01/20 0750)  Resp: 20 (10/01/20 0750)  BP: (!) 164/77 (10/01/20 0750)  SpO2: 95 % (10/01/20 0750)    VENTILATION STATUS  Resp: 20 (10/01/20 0750)  SpO2: 95 % (10/01/20 0750)       I & O (Last 24H):    Intake/Output Summary (Last 24 hours) at 10/1/2020 0947  Last data filed at 10/1/2020 0600  Gross per 24 hour   Intake 1610 ml   Output 2500 ml   Net -890 ml       WEIGHTS  Wt Readings from Last 1 Encounters:   10/01/20 0222 87.7 kg (193 lb 5.5 oz)   09/30/20 2300 87.7 kg  (193 lb 5.5 oz)   09/30/20 0416 89.3 kg (196 lb 13.9 oz)   09/29/20 0645 86.2 kg (190 lb 0.6 oz)   09/27/20 1447 83.2 kg (183 lb 6.8 oz)   09/27/20 0956 83.9 kg (185 lb)       PHYSICAL EXAM  GENERAL: well built, well nourished, well-developed in no apparent distress alert and oriented.   HEENT: Normocephalic. Pupils normal and conjunctivae normal.  Mucous membranes normal, no cyanosis or icterus, trachea central,no pallor or icterus is noted..   NECK: No JVD. Dialysis catheter in place.   THYROID: Thyroid not enlarged. No nodules present..   CARDIAC: Regular rate and rhythm. S1 is normal.S2 is normal.No gallops, clicks or murmurs noted at this time.  CHEST ANATOMY: normal.   LUNGS: Clear to auscultation. No wheezing or rhonchi..   ABDOMEN: Soft no masses or organomegaly.  Hyperactive BS - soft bruit auscultated  URINARY: No ojeda catheter   EXTREMITIES: No cyanosis, clubbing or edema noted at this time., no calf tenderness bilaterally.   PERIPHERAL VASCULAR SYSTEM: Good palpable distal pulses.   CENTRAL NERVOUS SYSTEM: No focal motor or sensory deficits noted.   SKIN: Skin without lesions, moist, well perfused.   MUSCLE STRENGTH & TONE: No noteable weakness, atrophy or abnormal movement.     HOME MEDICATIONS:  No current facility-administered medications on file prior to encounter.      Current Outpatient Medications on File Prior to Encounter   Medication Sig Dispense Refill    aspirin (ECOTRIN) 81 MG EC tablet Take 81 mg by mouth once daily.      atorvastatin (LIPITOR) 40 MG tablet Take 40 mg by mouth once daily.      carvediloL (COREG) 25 MG tablet Take 25 mg by mouth 2 (two) times daily with meals.      lisinopriL (PRINIVIL,ZESTRIL) 40 MG tablet Take 40 mg by mouth once daily.      loperamide (IMODIUM) 2 mg capsule Take 2 mg by mouth 4 (four) times daily as needed for Diarrhea.      NIFEdipine (ADALAT CC) 30 MG TbSR Take 30 mg by mouth once daily.      phenazopyridine (PYRIDIUM) 95 MG tablet Take 95 mg by  mouth 3 (three) times daily as needed for Pain.         SCHEDULED MEDS:   amLODIPine  5 mg Oral BID    atorvastatin  40 mg Oral QHS    carvediloL  3.125 mg Oral BID    clopidogreL  75 mg Oral Daily    levoFLOXacin  250 mg Intravenous Q48H    metronidazole  500 mg Intravenous Q8H    mupirocin   Nasal BID    pantoprazole  40 mg Oral Daily       CONTINUOUS INFUSIONS:   sodium chloride 0.9%      nicardipine Stopped (09/29/20 0245)       PRN MEDS:sodium chloride 0.9%, acetaminophen, acetaminophen, heparin (porcine), hydrALAZINE, labetaloL, magnesium oxide, magnesium oxide, ondansetron, ondansetron, potassium chloride, potassium chloride    LABS AND DIAGNOSTICS     CBC LAST 3 DAYS  Recent Labs   Lab 09/29/20  0517 09/30/20  0405 10/01/20  0416   WBC 6.64  6.64 6.95 6.79   RBC 4.16  4.16 4.06 3.98*   HGB 12.2  12.2 11.5* 11.5*   HCT 35.6*  35.6* 33.5* 33.8*   MCV 86  86 83 85   MCH 29.3  29.3 28.3 28.9   MCHC 34.3  34.3 34.3 34.0   RDW 13.3  13.3 13.1 13.2     167 155 143*   MPV 10.1  10.1 9.5 9.9   GRAN 80.8*  80.8*  5.4  5.4 81.4*  5.7 87.1*  5.9   LYMPH 11.9*  11.9*  0.8*  0.8* 10.9*  0.8* 6.6*  0.5*   MONO 5.9  5.9  0.4  0.4 5.6  0.4 5.2  0.4   BASO 0.02  0.02 0.03 0.02   NRBC 0  0 0 0       COAGULATION LAST 3 DAYS  Recent Labs   Lab 09/27/20  1009 09/30/20  1830   LABPT 14.1  --    INR 1.1  --    APTT 32.8 32.4       CHEMISTRY LAST 3 DAYS  Recent Labs   Lab 09/29/20  0517 09/30/20 0405 10/01/20  0416   *  133* 129* 134*   K 4.4  4.4 3.9 3.9     107 100 97   CO2 14*  14* 17* 22*   ANIONGAP 12  12 12 15   BUN 64*  64* 64* 29*   CREATININE 6.8*  6.8* 8.5* 5.8*   GLU 96  96 110 91   CALCIUM 7.6*  7.6* 7.3* 7.2*   MG 2.1 1.9 1.8   ALBUMIN 2.8*  2.8* 2.8* 2.3*   PROT 5.4*  5.4* 5.4* 4.7*   ALKPHOS 91  91 79 90   ALT 8*  8* 9* 13   AST 14  14 17 34   BILITOT 0.9  0.9 0.8 0.9       CARDIAC PROFILE LAST 3 DAYS  Recent Labs   Lab 09/27/20  1009   CPK 31    CPKMB 1.0   TROPONINI <0.030       ENDOCRINE LAST 3 DAYS  Recent Labs   Lab 09/30/20  0405   TSH 3.260       LAST ARTERIAL BLOOD GAS  ABG  No results for input(s): PH, PO2, PCO2, HCO3, BE in the last 168 hours.    LAST 7 DAYS MICROBIOLOGY   Microbiology Results (last 7 days)     Procedure Component Value Units Date/Time    Urine culture [[id number]] Collected: 09/29/20 0903    Order Status: Completed Specimen: Urine Updated: 10/01/20 0850     Urine Culture, Routine No growth    Narrative:      Specimen Source->Urine    Blood culture [[id number]] Collected: 09/27/20 1550    Order Status: Completed Specimen: Blood from Antecubital, Right Arm Updated: 09/30/20 1632     Blood Culture, Routine No Growth to date      No Growth to date      No Growth to date      No Growth to date    Stool culture [[id number]] Collected: 09/28/20 2358    Order Status: Completed Specimen: Stool Updated: 09/30/20 1027     Stool Culture Nothing significant to date      Reduced normal ruperto    Clostridium difficile EIA [[id number]]     Order Status: Canceled Specimen: Stool           MOST RECENT IMAGING  X-Ray Chest 1 View  Narrative: EXAMINATION:  XR CHEST 1 VIEW    CLINICAL HISTORY:  line placement;    FINDINGS:  Portable chest x-ray at 16:57 hours demonstrates a right IJ catheter at the atrial caval junction.  There is no pneumothorax.  The heart is mildly enlarged.  The lungs are clear.  There are no acute osseous abnormalities.  Impression: Right IJ catheter tip at the atrial caval junction without pneumothorax    Mild cardiomegaly    Electronically signed by: Angeline Goodrich MD  Date:    09/30/2020  Time:    17:33  Cardiac catheterization  Procedure performed in the Invasive Lab    - See Procedure Log link below for nursing documentation    - See OpNote on Surgeries Tab for physician findings       ECHOCARDIOGRAM RESULTS (last 5)  No results found for this or any previous visit.    CURRENT/PREVIOUS VISIT EKG  Results for orders placed or  performed during the hospital encounter of 09/27/20   EKG 12-lead    Collection Time: 09/27/20 10:28 AM    Narrative    Test Reason : R10.9,    Vent. Rate : 065 BPM     Atrial Rate : 065 BPM     P-R Int : 182 ms          QRS Dur : 084 ms      QT Int : 438 ms       P-R-T Axes : 029 -12 -08 degrees     QTc Int : 455 ms    Normal sinus rhythm  Leftward axis  No previous ECGs available  Confirmed by Faraz Griffith MD ([id number]) on 9/27/2020 10:31:46 PM    Referred By: SYDNEY   SELF           Confirmed By:Faraz Griffith MD           ASSESSMENT/PLAN:     Active Hospital Problems    Diagnosis    *Hypertensive urgency    Colitis    Acute colitis    RICHIE (acute kidney injury)    Blood in stool    Adrenal hyperplasia    HTN (hypertension)    HLD (hyperlipidemia)       ASSESSMENT & PLAN:     1. HTN Urgency secondary to Bilateral HEATH  2.  Acute kidney injury with severe anuria secondary to Bilateral HEATH  3. Acute colitis  4. Dyslipidemia  5. Adrenal Hyperplasia  6. Currently on HD        RECOMMENDATIONS:    Bilateral HEATH was diagnosed with CO2 angiogram yesterday however Unsuccessful attempts to cross origin of the Right renal artery  Dialysis has been initiated  If blood pressure becomes higher increase amlodipine to 10 mg daily  Thank you          Charlette Mitchell NP.  [facility]  Department of Cardiology  Date of Service: 10/01/2020      I have reviewed the Nurse Practitioner's history and physical, assessment, and plan.agree with the findings.  I have previously discussed the findings of renal angiogram with Dr. Lance Bustamante and also discussed with nephrologist  and the Hospitalist  Dr. Perkins

## 2020-10-01 NOTE — PROGRESS NOTES
INPATIENT NEPHROLOGY PROGRESS  [facility] NEPHROLOGY    Latricia Yaraota  10/01/2020    Reason for consultation:      Acute kidney injury    Chief Complaint:   Chief Complaint   Patient presents with    Rectal Bleeding     Since 0230 this am          History of Present Illness:     65-year-old female with tobacco use who presented to the hospital with acute onset moderate to severe epigastric abdominal pain that began on Saturday associated with multiple episodes of loose stool followed by 3 episodes of bright red blood per rectum on Sunday.  CT imaging done on admission revealed right-sided colitis.   Her creatinine has precipitously risen prompting renal consultation    9/28  No nausea, chest pain, sob, fever, urinary or bowel complaint, new neurologic symptoms, new joint pain,  9/29  Renal function, acidosis worse.  K+ at goal.  D/w pt, who states she feels great, no uremic s/sx.    9/30  Bilateral renal artery occlusion seen.  Pt in recovery.  No distress  10/1  Seem pm dialysis.  No nausea, chest pain, sob, fever, urinary or bowel complaint, new neurologic symptoms, new joint pain,      Plan of Care:       Assessment:    Acute kidney injury suspicious for hypertensive emergency renal failure.  Urinalysis reveals hyaline casts implication low perfusion  -bilateral renal artery occlusion seen on   CO2 renal angiogram.    --cancel renal biopsy.  Ischemic nephropathy the etiology.    --initiated hemodialysis  --will need a hemosplit catheter and placement in outpt dialysis unit         Metabolic acidosis  --bicarb dialysate    Hyperkalemia--better       Hypertension  --better control         Thank you for allowing us to participate in this patient's care. We will continue to follow.    Vital Signs:  Temp Readings from Last 3 Encounters:   10/01/20 98 °F (36.7 °C) (Oral)       Pulse Readings from Last 3 Encounters:   10/01/20 72       BP Readings from Last 3 Encounters:   10/01/20 (!) 173/94       Weight:  Wt Readings  from Last 3 Encounters:   10/01/20 87.7 kg (193 lb 5.5 oz)   09/29/20 86.2 kg (190 lb)       Past Medical & Surgical History:  Past Medical History:   Diagnosis Date    HLD (hyperlipidemia)     Hypertension        Past Surgical History:   Procedure Laterality Date    AORTOGRAPHY WITH SERIALOGRAPHY N/A 9/30/2020    Procedure: co2 renal angio;  Surgeon: Lance Bustamante MD;  Location: [facility] CATH/EP LAB;  Service: Vascular;  Laterality: N/A;    NO PAST SURGERIES      PLACEMENT OF DIALYSIS ACCESS Right 9/30/2020    Procedure: Insertion, Dialysis Access;  Surgeon: Lance Bustamante MD;  Location: [facility] CATH/EP LAB;  Service: Vascular;  Laterality: Right;       Past Social History:  Social History     Socioeconomic History    Marital status:      Spouse name: Not on file    Number of children: Not on file    Years of education: Not on file    Highest education level: Not on file   Occupational History    Not on file   Social Needs    Financial resource strain: Not on file    Food insecurity     Worry: Not on file     Inability: Not on file    Transportation needs     Medical: Not on file     Non-medical: Not on file   Tobacco Use    Smoking status: Unknown If Ever Smoked   Substance and Sexual Activity    Alcohol use: Not on file    Drug use: Not on file    Sexual activity: Not on file   Lifestyle    Physical activity     Days per week: Not on file     Minutes per session: Not on file    Stress: Not on file   Relationships    Social connections     Talks on phone: Not on file     Gets together: Not on file     Attends [employer] service: Not on file     Active member of club or organization: Not on file     Attends meetings of clubs or organizations: Not on file     Relationship status: Not on file   Other Topics Concern    Not on file   Social History Narrative    Not on file       Medications:  No current facility-administered medications on file prior to encounter.      Current Outpatient  "Medications on File Prior to Encounter   Medication Sig Dispense Refill    aspirin (ECOTRIN) 81 MG EC tablet Take 81 mg by mouth once daily.      atorvastatin (LIPITOR) 40 MG tablet Take 40 mg by mouth once daily.      carvediloL (COREG) 25 MG tablet Take 25 mg by mouth 2 (two) times daily with meals.      lisinopriL (PRINIVIL,ZESTRIL) 40 MG tablet Take 40 mg by mouth once daily.      loperamide (IMODIUM) 2 mg capsule Take 2 mg by mouth 4 (four) times daily as needed for Diarrhea.      NIFEdipine (ADALAT CC) 30 MG TbSR Take 30 mg by mouth once daily.      phenazopyridine (PYRIDIUM) 95 MG tablet Take 95 mg by mouth 3 (three) times daily as needed for Pain.       Scheduled Meds:   sodium chloride 0.9%   Intravenous Once    amLODIPine  5 mg Oral BID    atorvastatin  40 mg Oral QHS    carvediloL  3.125 mg Oral BID    clopidogreL  75 mg Oral Daily    levoFLOXacin  250 mg Intravenous Q48H    metronidazole  500 mg Intravenous Q8H    mupirocin   Nasal BID    pantoprazole  40 mg Oral Daily     Continuous Infusions:   sodium chloride 0.9%       PRN Meds:.sodium chloride 0.9%, sodium chloride 0.9%, acetaminophen, acetaminophen, heparin (porcine), hydrALAZINE, labetaloL, magnesium oxide, magnesium oxide, ondansetron, ondansetron, potassium chloride, potassium chloride    Allergies:  Patient has no known allergies.    Past Family History:  Reviewed; refer to Hospitalist Admission Note    Review of Systems:  Review of Systems - All 14 systems reviewed and negative, except as noted in HPI    Physical Exam:    BP (!) 173/94   Pulse 72   Temp 98 °F (36.7 °C) (Oral)   Resp 18   Ht 5' 6" (1.676 m)   Wt 87.7 kg (193 lb 5.5 oz)   SpO2 95%   BMI 31.21 kg/m²     General Appearance:    Alert, cooperative, no distress, appears stated age   Head:    Normocephalic, without obvious abnormality, atraumatic   Eyes:    PER, conjunctiva/corneas clear, EOM's intact in both eyes        Throat:   Lips, mucosa, and tongue " normal; teeth and gums normal   Back:     Symmetric, no curvature, ROM normal, no CVA tenderness   Lungs:     Clear to auscultation bilaterally, respirations unlabored   Chest wall:    No tenderness or deformity   Heart:    Regular rate and rhythm, S1 and S2 normal, no murmur, rub   or gallop   Abdomen:     Soft, non-tender, bowel sounds active all four quadrants,     no masses, no organomegaly   Extremities:   Extremities normal, atraumatic, no cyanosis or edema   Pulses:   2+ and symmetric all extremities   MSK:   No joint or muscle swelling, tenderness or deformity   Skin:   Skin color, texture, turgor normal, no rashes or lesions   Neurologic:   CNII-XII intact, normal strength and sensation       Throughout.  No flap     Results:  Lab Results   Component Value Date     (L) 10/01/2020    K 3.9 10/01/2020    CL 97 10/01/2020    CO2 22 (L) 10/01/2020    BUN 29 (H) 10/01/2020    CREATININE 5.8 (H) 10/01/2020    CALCIUM 7.2 (L) 10/01/2020    ANIONGAP 15 10/01/2020    ESTGFRAFRICA 8.2 (A) 10/01/2020    EGFRNONAA 7.1 (A) 10/01/2020       Lab Results   Component Value Date    CALCIUM 7.2 (L) 10/01/2020       Recent Labs   Lab 10/01/20  0416   WBC 6.79   RBC 3.98*   HGB 11.5*   HCT 33.8*   *   MCV 85   MCH 28.9   MCHC 34.0          I have personally reviewed pertinent radiological imaging and reports.    Patient care was time spent personally by me on the following activities:   · Obtaining a history  · Examination of patient.  · Providing medical care at the patients bedside.  · Developing a treatment plan with patient or surrogate and bedside caregivers  · Ordering and reviewing laboratory studies, radiographic studies, pulse oximetry.  · Ordering and performing treatments and interventions.  · Evaluation of patient's response to treatment.  · Discussions with consultants while on the unit and immediately available to the patient.  · Re-evaluation of the patient's condition.  · Documentation in the  medical record.     Eliseo Barnes MD  Nephrology  [facility]  [phone]'

## 2020-10-01 NOTE — PROGRESS NOTES
"[facility]  Adult Nutrition   Progress Note (Initial Assessment)     SUMMARY     Recommendations/Interventions:    Recommendation/Intervention: 1. Continue current diet as tolerated, encourage intake. 2. Added Nepro TID to aid in meeting estimated needs via PO intake of meals and supplements. 3.  to assist in meal choices daily.  Goals: 1. Patient to meet at least 75% of estimated needs via PO intake of meals.  Nutrition Goal Status: new    Dietitian Rounds Brief:  · Seen 2' LOS. Patient presented due to abdominal cramping and bloody stool. Per GI: "CT imaging done on admission revealed right-sided colitis". Patient also in renal failure. Catheter placed-dialysis today. Appetite and intake poor. She stated she doesn't eat much at home. Offered ONS patient accepted. Having some issues with N/V since admission. No issues with diarrhea or constipation. Will continue to monitor intake, labs, and plan of care.  Reason for Assessment  Reason For Assessment: length of stay  Diagnosis: cardiac disease  Relevant Medical History: HTN, HLD  Interdisciplinary Rounds: attended    Nutrition Risk Screen  Nutrition Risk Screen: no indicators present     MST Score: 0  Have you recently lost weight without trying?: No  Weight loss score: 0  Have you been eating poorly because of a decreased appetite?: No  Appetite score: 0     Nutrition/Diet History  Food Allergies: NKFA  Factors Affecting Nutritional Intake: None identified at this time    Anthropometrics  Temp: 98.4 °F (36.9 °C)  Height Method: Stated  Height: 5' 6" (167.6 cm)  Height (inches): 66 in  Weight Method: Standard Scale  Weight: 87.7 kg (193 lb 5.5 oz)  Weight (lb): 193.35 lb  Ideal Body Weight (IBW), Female: 130 lb  % Ideal Body Weight, Female (lb): 141.09 %  BMI (Calculated): 31.2  BMI Grade: 30 - 34.9- obesity - grade I     Weight History:  Wt Readings from Last 10 Encounters:   10/01/20 87.7 kg (193 lb 5.5 oz)   09/29/20 86.2 kg (190 lb) "     Lab/Procedures/Meds: Pertinent Labs Reviewed  Clinical Chemistry:  Recent Labs   Lab 09/27/20  1009 10/01/20  0416    134*   K 5.0 3.9   * 97   CO2 15* 22*   * 91   BUN 52* 29*   CREATININE 3.5* 5.8*   CALCIUM 8.2* 7.2*   PROT 6.4 4.7*   ALBUMIN 3.4* 2.3*   BILITOT 0.7 0.9   ALKPHOS 94 90   AST 12 34   ALT 7* 13   ANIONGAP 10 15   ESTGFRAFRICA 15.0* 8.2*   EGFRNONAA 13.0* 7.1*   MG 2.4 1.8   AMYLASE 115*  --    LIPASE 60  --     < > = values in this interval not displayed.     CBC:   Recent Labs   Lab 10/01/20  0416   WBC 6.79   RBC 3.98*   HGB 11.5*   HCT 33.8*   *   MCV 85   MCH 28.9   MCHC 34.0     Cardiac Profile:  Recent Labs   Lab 09/27/20  1009   CPK 31   CPKMB 1.0   TROPONINI <0.030     Thyroid & Parathyroid:  Recent Labs   Lab 09/30/20  0405   TSH 3.260     Medications: Pertinent Medications reviewed  Scheduled Meds:   amLODIPine  5 mg Oral BID    atorvastatin  40 mg Oral QHS    carvediloL  3.125 mg Oral BID    clopidogreL  75 mg Oral Daily    levoFLOXacin  250 mg Intravenous Q48H    metronidazole  500 mg Intravenous Q8H    mupirocin   Nasal BID    pantoprazole  40 mg Oral Daily     Continuous Infusions:   sodium chloride 0.9%      nicardipine Stopped (09/29/20 0245)     PRN Meds:.sodium chloride 0.9%, acetaminophen, acetaminophen, heparin (porcine), hydrALAZINE, labetaloL, magnesium oxide, magnesium oxide, ondansetron, ondansetron, potassium chloride, potassium chloride    Antibiotics (From admission, onward)    Start     Stop Route Frequency Ordered    09/30/20 2100  mupirocin 2 % ointment      10/05 2059 Nasl 2 times daily 09/30/20 1545    09/29/20 1300  levoFLOXacin 250 mg/50 mL IVPB 250 mg      -- IV Every 48 hours (non-standard times) 09/27/20 1632    09/27/20 2200  metronidazole IVPB 500 mg      -- IV Every 8 hours (non-standard times) 09/27/20 1234        Estimated/Assessed Needs    Weight Used For Calorie Calculations: 87.7 kg (193 lb 5.5 oz)  Energy Calorie  Requirements (kcal): 6443-5336 kcals/day (20-25 kcals/kg)  Energy Need Method: Kcal/kg  Protein Requirements: 53-70 g/day (0.6-0.8 g/kg)  Weight Used For Protein Calculations: 87.7 kg (193 lb 5.5 oz)  Fluid Requirements (mL): Per nephrology       Nutrition Prescription Ordered    Current Diet Order: Renal Diet    Evaluation of Received Nutrient/Fluid Intake    Energy Calories Required: not meeting needs  Protein Required: not meeting needs  Fluid Required: not meeting needs  Tolerance: tolerating  % Intake of Estimated Energy Needs: 0 - 25 %  % Meal Intake: 0 - 25 %    Intake/Output Summary (Last 24 hours) at 10/1/2020 0828  Last data filed at 10/1/2020 0600  Gross per 24 hour   Intake 1610 ml   Output 2500 ml   Net -890 ml      Nutrition Risk    Level of Risk/Frequency of Follow-up: moderate - high   Monitor and Evaluation    Food and Nutrient Adminstration: diet order  Physical Activity and Function: nutrition-related ADLs and IADLs, factors affecting access to physical activity  Anthropometric Measurements: weight, weight change, body mass index  Biochemical Data, Medical Tests and Procedures: electrolyte and renal panel, lipid profile, gastrointestinal profile, glucose/endocrine profile, inflammatory profile  Nutrition-Focused Physical Findings: overall appearance     Nutrition Follow-Up    RD Follow-up?: Yes  Kellie Mendoza RD 10/01/2020 9:48 AM

## 2020-10-02 ENCOUNTER — ANESTHESIA (OUTPATIENT)
Dept: SURGERY | Facility: HOSPITAL | Age: 65
DRG: 674 | End: 2020-10-02
Payer: COMMERCIAL

## 2020-10-02 ENCOUNTER — ANESTHESIA EVENT (OUTPATIENT)
Dept: SURGERY | Facility: HOSPITAL | Age: 65
DRG: 674 | End: 2020-10-02
Payer: COMMERCIAL

## 2020-10-02 VITALS
HEIGHT: 66 IN | OXYGEN SATURATION: 95 % | WEIGHT: 191.13 LBS | RESPIRATION RATE: 18 BRPM | DIASTOLIC BLOOD PRESSURE: 88 MMHG | HEART RATE: 75 BPM | SYSTOLIC BLOOD PRESSURE: 184 MMHG | TEMPERATURE: 98 F | BODY MASS INDEX: 30.72 KG/M2

## 2020-10-02 LAB
ALBUMIN SERPL BCP-MCNC: 2.4 G/DL (ref 3.5–5.2)
ALP SERPL-CCNC: 77 U/L (ref 55–135)
ALT SERPL W/O P-5'-P-CCNC: 16 U/L (ref 10–44)
ANION GAP SERPL CALC-SCNC: 8 MMOL/L (ref 8–16)
AST SERPL-CCNC: 34 U/L (ref 10–40)
BACTERIA BLD CULT: NORMAL
BASOPHILS # BLD AUTO: 0.02 K/UL (ref 0–0.2)
BASOPHILS NFR BLD: 0.3 % (ref 0–1.9)
BILIRUB SERPL-MCNC: 0.6 MG/DL (ref 0.1–1)
BUN SERPL-MCNC: 22 MG/DL (ref 8–23)
CALCIUM SERPL-MCNC: 7.2 MG/DL (ref 8.7–10.5)
CHLORIDE SERPL-SCNC: 103 MMOL/L (ref 95–110)
CO2 SERPL-SCNC: 24 MMOL/L (ref 23–29)
CREAT SERPL-MCNC: 5.3 MG/DL (ref 0.5–1.4)
DIFFERENTIAL METHOD: ABNORMAL
EOSINOPHIL # BLD AUTO: 0.2 K/UL (ref 0–0.5)
EOSINOPHIL NFR BLD: 2.5 % (ref 0–8)
ERYTHROCYTE [DISTWIDTH] IN BLOOD BY AUTOMATED COUNT: 13.6 % (ref 11.5–14.5)
EST. GFR  (AFRICAN AMERICAN): 9.1 ML/MIN/1.73 M^2
EST. GFR  (NON AFRICAN AMERICAN): 7.9 ML/MIN/1.73 M^2
GBM IGG SER-ACNC: 3 UNITS (ref 0–20)
GLUCOSE SERPL-MCNC: 87 MG/DL (ref 70–110)
HCT VFR BLD AUTO: 30.5 % (ref 37–48.5)
HGB BLD-MCNC: 10.2 G/DL (ref 12–16)
IMM GRANULOCYTES # BLD AUTO: 0.05 K/UL (ref 0–0.04)
IMM GRANULOCYTES NFR BLD AUTO: 0.8 % (ref 0–0.5)
LYMPHOCYTES # BLD AUTO: 1 K/UL (ref 1–4.8)
LYMPHOCYTES NFR BLD: 16.1 % (ref 18–48)
MAGNESIUM SERPL-MCNC: 1.9 MG/DL (ref 1.6–2.6)
MCH RBC QN AUTO: 29.2 PG (ref 27–31)
MCHC RBC AUTO-ENTMCNC: 33.4 G/DL (ref 32–36)
MCV RBC AUTO: 87 FL (ref 82–98)
MONOCYTES # BLD AUTO: 0.7 K/UL (ref 0.3–1)
MONOCYTES NFR BLD: 10.8 % (ref 4–15)
NEUTROPHILS # BLD AUTO: 4.2 K/UL (ref 1.8–7.7)
NEUTROPHILS NFR BLD: 69.5 % (ref 38–73)
NRBC BLD-RTO: 0 /100 WBC
PLATELET # BLD AUTO: 135 K/UL (ref 150–350)
PMV BLD AUTO: 9.8 FL (ref 9.2–12.9)
POTASSIUM SERPL-SCNC: 3.6 MMOL/L (ref 3.5–5.1)
PROT SERPL-MCNC: 4.8 G/DL (ref 6–8.4)
RBC # BLD AUTO: 3.49 M/UL (ref 4–5.4)
SODIUM SERPL-SCNC: 135 MMOL/L (ref 136–145)
WBC # BLD AUTO: 6.09 K/UL (ref 3.9–12.7)

## 2020-10-02 PROCEDURE — 80053 COMPREHEN METABOLIC PANEL: CPT

## 2020-10-02 PROCEDURE — 63600175 PHARM REV CODE 636 W HCPCS: Performed by: INTERNAL MEDICINE

## 2020-10-02 PROCEDURE — 25000003 PHARM REV CODE 250: Performed by: SURGERY

## 2020-10-02 PROCEDURE — 63600175 PHARM REV CODE 636 W HCPCS: Performed by: SURGERY

## 2020-10-02 PROCEDURE — 85025 COMPLETE CBC W/AUTO DIFF WBC: CPT

## 2020-10-02 PROCEDURE — C1750 CATH, HEMODIALYSIS,LONG-TERM: HCPCS | Performed by: SURGERY

## 2020-10-02 PROCEDURE — 90694 VACC AIIV4 NO PRSRV 0.5ML IM: CPT | Performed by: INTERNAL MEDICINE

## 2020-10-02 PROCEDURE — 71000033 HC RECOVERY, INTIAL HOUR: Performed by: SURGERY

## 2020-10-02 PROCEDURE — S0030 INJECTION, METRONIDAZOLE: HCPCS | Performed by: SURGERY

## 2020-10-02 PROCEDURE — 37000009 HC ANESTHESIA EA ADD 15 MINS: Performed by: SURGERY

## 2020-10-02 PROCEDURE — 27201423 OPTIME MED/SURG SUP & DEVICES STERILE SUPPLY: Performed by: SURGERY

## 2020-10-02 PROCEDURE — 25000003 PHARM REV CODE 250: Performed by: INTERNAL MEDICINE

## 2020-10-02 PROCEDURE — 63600175 PHARM REV CODE 636 W HCPCS: Performed by: NURSE ANESTHETIST, CERTIFIED REGISTERED

## 2020-10-02 PROCEDURE — 83735 ASSAY OF MAGNESIUM: CPT

## 2020-10-02 PROCEDURE — 87040 BLOOD CULTURE FOR BACTERIA: CPT

## 2020-10-02 PROCEDURE — 36415 COLL VENOUS BLD VENIPUNCTURE: CPT

## 2020-10-02 PROCEDURE — 71000039 HC RECOVERY, EACH ADD'L HOUR: Performed by: SURGERY

## 2020-10-02 PROCEDURE — 36000707: Performed by: SURGERY

## 2020-10-02 PROCEDURE — 27000080 OPTIME MED/SURG SUP & DEVICES GENERAL CLASSIFICATION: Performed by: SURGERY

## 2020-10-02 PROCEDURE — 90472 IMMUNIZATION ADMIN EACH ADD: CPT | Performed by: INTERNAL MEDICINE

## 2020-10-02 PROCEDURE — 90471 IMMUNIZATION ADMIN: CPT | Performed by: INTERNAL MEDICINE

## 2020-10-02 PROCEDURE — 37000008 HC ANESTHESIA 1ST 15 MINUTES: Performed by: SURGERY

## 2020-10-02 PROCEDURE — 36000706: Performed by: SURGERY

## 2020-10-02 PROCEDURE — 25000003 PHARM REV CODE 250: Performed by: NURSE ANESTHETIST, CERTIFIED REGISTERED

## 2020-10-02 DEVICE — CATHETER HEMOSPLIT 23CM 5733730: Type: IMPLANTABLE DEVICE | Site: SUBCLAVIAN | Status: FUNCTIONAL

## 2020-10-02 RX ORDER — CARVEDILOL 3.12 MG/1
3.12 TABLET ORAL 2 TIMES DAILY
Qty: 60 TABLET | Refills: 11 | Status: SHIPPED | OUTPATIENT
Start: 2020-10-02 | End: 2022-03-28

## 2020-10-02 RX ORDER — LIDOCAINE HYDROCHLORIDE 10 MG/ML
INJECTION, SOLUTION EPIDURAL; INFILTRATION; INTRACAUDAL; PERINEURAL
Status: DISCONTINUED | OUTPATIENT
Start: 2020-10-02 | End: 2020-10-02 | Stop reason: HOSPADM

## 2020-10-02 RX ORDER — CLOPIDOGREL BISULFATE 75 MG/1
75 TABLET ORAL DAILY
Qty: 30 TABLET | Refills: 0 | Status: SHIPPED | OUTPATIENT
Start: 2020-10-03 | End: 2020-10-02 | Stop reason: HOSPADM

## 2020-10-02 RX ORDER — PROPOFOL 10 MG/ML
VIAL (ML) INTRAVENOUS
Status: DISCONTINUED | OUTPATIENT
Start: 2020-10-02 | End: 2020-10-02

## 2020-10-02 RX ORDER — SODIUM CHLORIDE 9 MG/ML
INJECTION, SOLUTION INTRAVENOUS CONTINUOUS PRN
Status: DISCONTINUED | OUTPATIENT
Start: 2020-10-02 | End: 2020-10-02

## 2020-10-02 RX ORDER — MIDAZOLAM HYDROCHLORIDE 1 MG/ML
INJECTION INTRAMUSCULAR; INTRAVENOUS
Status: DISCONTINUED | OUTPATIENT
Start: 2020-10-02 | End: 2020-10-02

## 2020-10-02 RX ORDER — CEFAZOLIN SODIUM 1 G/3ML
1 INJECTION, POWDER, FOR SOLUTION INTRAMUSCULAR; INTRAVENOUS
Status: DISCONTINUED | OUTPATIENT
Start: 2020-10-02 | End: 2020-10-02 | Stop reason: HOSPADM

## 2020-10-02 RX ORDER — CEFAZOLIN SODIUM 1 G/3ML
INJECTION, POWDER, FOR SOLUTION INTRAMUSCULAR; INTRAVENOUS
Status: DISCONTINUED | OUTPATIENT
Start: 2020-10-02 | End: 2020-10-02

## 2020-10-02 RX ORDER — SODIUM CHLORIDE 0.9 % (FLUSH) 0.9 %
10 SYRINGE (ML) INJECTION
Status: DISCONTINUED | OUTPATIENT
Start: 2020-10-02 | End: 2020-10-02 | Stop reason: HOSPADM

## 2020-10-02 RX ORDER — ONDANSETRON 2 MG/ML
INJECTION INTRAMUSCULAR; INTRAVENOUS
Status: DISCONTINUED | OUTPATIENT
Start: 2020-10-02 | End: 2020-10-02

## 2020-10-02 RX ORDER — AMLODIPINE BESYLATE 5 MG/1
5 TABLET ORAL 2 TIMES DAILY
Qty: 60 TABLET | Refills: 11 | Status: SHIPPED | OUTPATIENT
Start: 2020-10-02 | End: 2021-03-23 | Stop reason: DRUGHIGH

## 2020-10-02 RX ORDER — HYDROCODONE BITARTRATE AND ACETAMINOPHEN 5; 325 MG/1; MG/1
1 TABLET ORAL EVERY 6 HOURS PRN
Status: DISCONTINUED | OUTPATIENT
Start: 2020-10-02 | End: 2020-10-02 | Stop reason: HOSPADM

## 2020-10-02 RX ORDER — HEPARIN SODIUM 1000 [USP'U]/ML
INJECTION, SOLUTION INTRAVENOUS; SUBCUTANEOUS
Status: DISCONTINUED | OUTPATIENT
Start: 2020-10-02 | End: 2020-10-02 | Stop reason: HOSPADM

## 2020-10-02 RX ORDER — FENTANYL CITRATE 50 UG/ML
INJECTION, SOLUTION INTRAMUSCULAR; INTRAVENOUS
Status: DISCONTINUED | OUTPATIENT
Start: 2020-10-02 | End: 2020-10-02

## 2020-10-02 RX ORDER — ONDANSETRON 2 MG/ML
4 INJECTION INTRAMUSCULAR; INTRAVENOUS DAILY PRN
Status: DISCONTINUED | OUTPATIENT
Start: 2020-10-02 | End: 2020-10-02 | Stop reason: HOSPADM

## 2020-10-02 RX ORDER — CEPHALEXIN 250 MG/1
250 CAPSULE ORAL EVERY 12 HOURS
Qty: 14 CAPSULE | Refills: 0 | Status: SHIPPED | OUTPATIENT
Start: 2020-10-02 | End: 2020-10-09

## 2020-10-02 RX ORDER — HYDROMORPHONE HYDROCHLORIDE 1 MG/ML
0.2 INJECTION, SOLUTION INTRAMUSCULAR; INTRAVENOUS; SUBCUTANEOUS EVERY 5 MIN PRN
Status: DISCONTINUED | OUTPATIENT
Start: 2020-10-02 | End: 2020-10-02 | Stop reason: HOSPADM

## 2020-10-02 RX ORDER — OXYCODONE HYDROCHLORIDE 5 MG/1
5 TABLET ORAL
Status: DISCONTINUED | OUTPATIENT
Start: 2020-10-02 | End: 2020-10-02 | Stop reason: HOSPADM

## 2020-10-02 RX ADMIN — PROPOFOL 30 MG: 10 INJECTION, EMULSION INTRAVENOUS at 12:10

## 2020-10-02 RX ADMIN — A/SINGAPORE/GP1908/2015 IVR-180 (AN A/MICHIGAN/45/2015 (H1N1)PDM09-LIKE VIRUS, A/HONG KONG/4801/2014, NYMC X-263B (H3N2) (AN A/HONG KONG/4801/2014-LIKE VIRUS), AND B/BRISBANE/60/2008, WILD TYPE (A B/BRISBANE/60/2008-LIKE VIRUS) 0.5 ML: 15; 15; 15 INJECTION, SUSPENSION INTRAMUSCULAR at 05:10

## 2020-10-02 RX ADMIN — PROPOFOL 20 MG: 10 INJECTION, EMULSION INTRAVENOUS at 12:10

## 2020-10-02 RX ADMIN — ONDANSETRON 4 MG: 2 INJECTION INTRAMUSCULAR; INTRAVENOUS at 04:10

## 2020-10-02 RX ADMIN — ONDANSETRON 4 MG: 2 INJECTION INTRAMUSCULAR; INTRAVENOUS at 12:10

## 2020-10-02 RX ADMIN — MIDAZOLAM HYDROCHLORIDE 2 MG: 1 INJECTION, SOLUTION INTRAMUSCULAR; INTRAVENOUS at 12:10

## 2020-10-02 RX ADMIN — METRONIDAZOLE 500 MG: 500 INJECTION, SOLUTION INTRAVENOUS at 02:10

## 2020-10-02 RX ADMIN — AMLODIPINE BESYLATE 5 MG: 5 TABLET ORAL at 09:10

## 2020-10-02 RX ADMIN — HYDROCODONE BITARTRATE AND ACETAMINOPHEN 1 TABLET: 5; 325 TABLET ORAL at 04:10

## 2020-10-02 RX ADMIN — SODIUM CHLORIDE: 0.9 INJECTION, SOLUTION INTRAVENOUS at 12:10

## 2020-10-02 RX ADMIN — CARVEDILOL 3.12 MG: 3.12 TABLET, FILM COATED ORAL at 09:10

## 2020-10-02 RX ADMIN — FENTANYL CITRATE 50 MCG: 50 INJECTION INTRAMUSCULAR; INTRAVENOUS at 12:10

## 2020-10-02 RX ADMIN — CEFAZOLIN 1 G: 330 INJECTION, POWDER, FOR SOLUTION INTRAMUSCULAR; INTRAVENOUS at 01:10

## 2020-10-02 RX ADMIN — METRONIDAZOLE 500 MG: 500 INJECTION, SOLUTION INTRAVENOUS at 05:10

## 2020-10-02 RX ADMIN — CEFAZOLIN 1 G: 330 INJECTION, POWDER, FOR SOLUTION INTRAMUSCULAR; INTRAVENOUS at 12:10

## 2020-10-02 NOTE — PLAN OF CARE
Important Message from Medicare was sign, explained and given to patient/caregiver on 10/02/2020 at 2:35pm     answered all questions.

## 2020-10-02 NOTE — HOSPITAL COURSE
65-year-old female presented with rectal bleeding found to have hypertensive emergency with RICHIE on CKD(possibly baseline not known) and anuria admitted to ICU treated with Cardene drip blood pressure control improved however patient remained anuric and renal function deteriorated abruptly, renal ultrasound with duplex No abnormal elevated peak systolic velocities  however, vascular surgery consulted for CO2 angiogram which showed bilateral renal artery stenosis, unsuccessful attempt to cross origin of bilateral renal artery, patient required initially Trialysis catheter within switched to [facility] prior to discharge for chronic dialysis.  During hospital course cardiology and Nephrology consults appreciated, patient noted to have left cephalic vein thrombosis on the day of discharge on ultrasound upper extremity, patient will be discharged on cephalexin for 1 week, and advised to repeat ultrasound in 1 week to assess the resolution of the clot.  Advised to follow-up outpatient PMD Nephrology and Cardiology.  Colonoscopy was postponed due to the renal failure, however her GI bleed resolved spontaneously, advised to follow-up outpatient.   consulted for outpatient dialysis placement which was scheduled for next Monday at [facility] location .  Daughter at bedside, all questions answered.  Instructions provided to follow up with primary care physician as outpatient. Patient verbalized understanding and is aware to contact primary care physician or return to ED if new or worsening symptoms.    Physical exam on the day of discharge:  General: Patient resting comfortably in no acute distress.  Right IJ PermCath  Lungs: CTA. Good air entry.  Cor: Regular rate and rhythm. No murmurs. No pedal edema.  Abd: Soft. Nontender. Non-distended.  Neuro: A&O x3. Moving all 4 extremities equally  Ext: No clubbing. No cyanosis.

## 2020-10-02 NOTE — NURSING TRANSFER
Nursing Transfer Note      10/2/2020     Transfer To: Rm [id number]    Transfer via bed    Transfer with cardiac monitoring    Transported by ESTUARDO [employer] RN    Medicines sent: n/a    Chart send with patient: Yes    Notified: daughter to be notified by JOSY Fajardo RN     Upon arrival to floor: cardiac monitor applied, patient oriented to room, call bell in reach and bed in lowest position    Pt placed dentures in mouth, per self

## 2020-10-02 NOTE — ANESTHESIA PREPROCEDURE EVALUATION
10/02/2020  Latricia Higgins is a 65 y.o., female.      Patient Active Problem List   Diagnosis    Acute colitis    RICHIE (acute kidney injury)    Blood in stool    Adrenal hyperplasia    HTN (hypertension)    HLD (hyperlipidemia)    Hypertensive urgency    Colitis       Past Surgical History:   Procedure Laterality Date    AORTOGRAPHY WITH SERIALOGRAPHY N/A 9/30/2020    Procedure: co2 renal angio;  Surgeon: Lance Bustamante MD;  Location: [facility] CATH/EP LAB;  Service: Vascular;  Laterality: N/A;    NO PAST SURGERIES      PLACEMENT OF DIALYSIS ACCESS Right 9/30/2020    Procedure: Insertion, Dialysis Access;  Surgeon: Lance Bustamante MD;  Location: [facility] CATH/EP LAB;  Service: Vascular;  Laterality: Right;        Tobacco Use:  The patient  has no history on file for tobacco.     Results for orders placed or performed during the hospital encounter of 09/27/20   EKG 12-lead    Collection Time: 09/27/20 10:28 AM    Narrative    Test Reason : R10.9,    Vent. Rate : 065 BPM     Atrial Rate : 065 BPM     P-R Int : 182 ms          QRS Dur : 084 ms      QT Int : 438 ms       P-R-T Axes : 029 -12 -08 degrees     QTc Int : 455 ms    Normal sinus rhythm  Leftward axis  No previous ECGs available  Confirmed by Faraz Griffith MD ([id number]) on 9/27/2020 10:31:46 PM    Referred By: [email or web address]   SELF           Confirmed By:Faraz Griffith MD        Imaging Results          CT Renal Stone Study ABD Pelvis WO (Final result)  Result time 09/27/20 11:32:20    Final result by Joey Westfall MD (09/27/20 11:32:20)                 Narrative:    CMS MANDATED QUALITY DATA - CT RADIATION - 436    All CT scans at this facility utilize dose modulation, iterative  reconstruction, and/or weight based dosing when appropriate to reduce  radiation dose to as low as reasonably achievable.      REASON: Abdominal pain, fever    TECHNIQUE: Abdomen  and pelvis CT without IV contrast.    COMPARISON: None    FINDINGS:    There is mild subsegmental atelectasis at lung bases. Focal area of  bronchiectasis versus parenchymal destruction noted at the posterior  right lung base. Heart is normal size.    Liver is normal size. A hepatic cyst is noted in the left liver lobe.  Gallbladder is mildly distended with a single calcified gallbladder  noted in the dependent portion. No pericholecystic inflammatory  changes. The and spleen are unremarkable. A small splenule is noted.  The bilateral adrenal glands are enlarged, with left gland measuring  approximately 2.3 x 5.4 cm, and right gland measuring approximately  1.8 x 4.3 cm. Left kidney is atrophic. Right kidney is normal size  with no hydronephrosis or nephrolithiasis. Right ureter is normal  caliber with no obstructions. Left ureter is unremarkable. Bladder is  mostly decompressed.    The uterus is within normal limits, eccentric to the left. There is  trace free fluid in the pelvic cul-de-sac. The adnexal structures are  not identified.    There is diverticulosis of the descending and sigmoid colon. There is  bowel wall thickening throughout the large bowel with submucosal fatty  infiltration. The appendix is not identified. There is free fluid in  the right pericolic gutter and Hernandez's pouch. There is mild  pericolonic fat stranding involving the cecum and ascending colon and  transverse colon. The small bowel is normal caliber. The stomach is  decompressed.    The abdominal aorta is normal caliber with mild atherosclerosis. No  intra-abdominal lymphadenopathy. The ventral abdominal wall is  unremarkable. No acute osseous abnormality.    IMPRESSION:    1.  Bowel wall thickening throughout the large bowel with  submucosal fatty infiltration. Free fluid noted in the right pericolic  gutter and Hernandez's pouch and less so in the pelvis. There is mild  pericolonic fat stranding involving the cecum, ascending colon  and  transverse colon. Findings are consistent with colitis, inflammatory  versus infectious. Correlate.  2.  Diverticulosis of the descending and sigmoid colon.  3.  Enlarged adrenal glands could reflect adrenal hyperplasia.  Correlate for adrenal pathology.    Electronically Signed by Joey Westfall on 9/27/2020 11:44 AM                               Lab Results   Component Value Date    WBC 6.09 10/02/2020    HGB 10.2 (L) 10/02/2020    HCT 30.5 (L) 10/02/2020    MCV 87 10/02/2020     (L) 10/02/2020     BMP  Lab Results   Component Value Date     (L) 10/02/2020    K 3.6 10/02/2020     10/02/2020    CO2 24 10/02/2020    BUN 22 10/02/2020    CREATININE 5.3 (H) 10/02/2020    CALCIUM 7.2 (L) 10/02/2020    ANIONGAP 8 10/02/2020    GLU 87 10/02/2020    GLU 91 10/01/2020     09/30/2020       No results found for this or any previous visit.          Anesthesia Evaluation    I have reviewed the Patient Summary Reports.    I have reviewed the Nursing Notes. I have reviewed the NPO Status.   I have reviewed the Medications.     Review of Systems  Anesthesia Hx:  Denies Family Hx of Anesthesia complications.   Denies Personal Hx of Anesthesia complications.   Hematology/Oncology:  Hematology Normal        EENT/Dental:EENT/Dental Normal   Cardiovascular:   Exercise tolerance: good Hypertension hyperlipidemia    Pulmonary:  Pulmonary Normal    Renal/:   Chronic Renal Disease (last HD 10/1), ESRD, Dialysis    Hepatic/GI:  Hepatic/GI Normal    Musculoskeletal:  Musculoskeletal Normal    Neurological:  Neurology Normal    Endocrine:  Endocrine Normal    Dermatological:  Skin Normal    Psych:  Psychiatric Normal           Physical Exam  General:  Well nourished    Airway/Jaw/Neck:  Airway Findings: Mouth Opening: Normal    Eyes/Ears/Nose:  EYES/EARS/NOSE FINDINGS: Normal   Dental:  Dental Findings: Edentulous   Chest/Lungs:  Chest/Lungs Findings: Clear to auscultation, Normal Respiratory Rate      Heart/Vascular:  Heart Findings: Rate: Normal  Rhythm: Regular Rhythm  Sounds: Normal        Mental Status:  Mental Status Findings: Normal        Anesthesia Plan  Type of Anesthesia, risks & benefits discussed:  Anesthesia Type:  MAC  Patient's Preference:   Intra-op Monitoring Plan: standard ASA monitors  Intra-op Monitoring Plan Comments:   Post Op Pain Control Plan:   Post Op Pain Control Plan Comments:   Induction:   IV  Beta Blocker:  Patient is on a Beta-Blocker and has received one dose within the past 24 hours (No further documentation required).       Informed Consent: Patient understands risks and agrees with Anesthesia plan.  Questions answered. Anesthesia consent signed with patient.  ASA Score: 3     Day of Surgery Review of History & Physical:            Ready For Surgery From Anesthesia Perspective.

## 2020-10-02 NOTE — PLAN OF CARE
10/02/20 1627   Final Note   Assessment Type Final Discharge Note   Anticipated Discharge Disposition Home   No needs at this time.

## 2020-10-02 NOTE — TRANSFER OF CARE
"Anesthesia Transfer of Care Note    Patient: Latricia Higgins    Procedure(s) Performed: Procedure(s):  INSERTION, CATHETER, CENTRAL VENOUS, HEMODIALYSIS, TUNNELED    Patient location: PACU    Anesthesia Type: MAC    Transport from OR: Transported from OR on 2-3 L/min O2 by NC with adequate spontaneous ventilation    Post pain: adequate analgesia    Post assessment: no apparent anesthetic complications    Post vital signs: stable    Level of consciousness: awake and alert    Nausea/Vomiting: no nausea/vomiting    Complications: none    Transfer of care protocol was followed      Last vitals:   Visit Vitals  BP (!) 181/86   Pulse 76   Temp 36.9 °C (98.5 °F) (Oral)   Resp 17   Ht 5' 6" (1.676 m)   Wt 86.7 kg (191 lb 2.2 oz)   SpO2 (!) 94%   BMI 30.85 kg/m²     "

## 2020-10-02 NOTE — DISCHARGE SUMMARY
[facility]  Discharge Summary      Patient Name: Latricia Higgins  MRN: [id number]  Admission Date: 9/27/2020  Hospital Length of Stay: 5 days  Discharge Date and Time:  10/02/2020 4:57 PM  Attending Physician: Lisa Perkins MD   Discharging Provider: Lisa Perkins MD  Primary Care Provider: Ralf Ham MD      HPI:   65 year old patient getting admitted with acute colitis and RICHIE  Today patient all of sudden had Lower abdominal pain followed by Bloody diarrhea  Describes pain as crampy and spastic which was temporary followed by 2 x episodes of fresh blood in stools  She came to ER and ER MD did a rectal examination which showed blood and hemorrhoids  Patient denies fever/nausea or any other c/o  She never had Colonoscopy before    Procedure(s):  INSERTION, CATHETER, CENTRAL VENOUS, HEMODIALYSIS, TUNNELED      Hospital Course:   65-year-old female presented with rectal bleeding found to have hypertensive emergency with RICHIE on CKD(possibly baseline not known) and anuria admitted to ICU treated with Cardene drip blood pressure control improved however patient remained anuric and renal function deteriorated abruptly, renal ultrasound with duplex No abnormal elevated peak systolic velocities  however, vascular surgery consulted for CO2 angiogram which showed bilateral renal artery stenosis, unsuccessful attempt to cross origin of bilateral renal artery, patient required initially Trialysis catheter within switched to PermCath prior to discharge for chronic dialysis.  During hospital course cardiology and Nephrology consults appreciated, patient noted to have left cephalic vein thrombosis on the day of discharge on ultrasound upper extremity, patient will be discharged on cephalexin for 1 week, and advised to repeat ultrasound in 1 week to assess the resolution of the clot.  Advised to follow-up outpatient PMD Nephrology and Cardiology.  Colonoscopy was postponed due to the  renal failure, however her GI bleed resolved spontaneously, advised to follow-up outpatient.   consulted for outpatient dialysis placement which was scheduled for next Monday at [facility] location .  Daughter at bedside, all questions answered.  Instructions provided to follow up with primary care physician as outpatient. Patient verbalized understanding and is aware to contact primary care physician or return to ED if new or worsening symptoms.    Physical exam on the day of discharge:  General: Patient resting comfortably in no acute distress.  Right IJ PermCath  Lungs: CTA. Good air entry.  Cor: Regular rate and rhythm. No murmurs. No pedal edema.  Abd: Soft. Nontender. Non-distended.  Neuro: A&O x3. Moving all 4 extremities equally  Ext: No clubbing. No cyanosis.      Consults:   Consults (From admission, onward)        Status Ordering Provider     Inpatient consult to Cardiology  Once     Provider:  Landen Griffith MD    Completed CHIO MALDONADO     Inpatient consult to Gastroenterology  Once     Provider:  Avila Smith III, MD    Completed HARJIT CARDOZA     Inpatient consult to Hospitalist  Once     Provider:  Harjit Cardoza MD    Acknowledged EVANGELISTA BUSTAMANTE     Inpatient consult to Nephrology  Once     Provider:  Ryan Tabor MD    Acknowledged EVANGELISTA BUSTAMANTE     Inpatient consult to Nephrology  Once     Provider:  Bijan Mcgrath MD    Acknowledged EVANGELISTA BUSTAMANTE     Inpatient consult to Social Work/Case Management  Once     Provider:  (Not yet assigned)    RYAN Rose     Inpatient consult to Vascular Surgery  Once     Provider:  Evangelista Bustamante MD    Completed FRED COLON     Inpatient consult to Vascular Surgery  Once     Provider:  Evangelista Bustamante MD    Acknowledged RYAN TABOR          No new Assessment & Plan notes have been filed under this hospital service since the last note was generated.  Service: Hospital Medicine    Final Active  Diagnoses:    Diagnosis Date Noted POA    PRINCIPAL PROBLEM:  Hypertensive urgency [I16.0] 09/28/2020 Yes    Colitis [K52.9] 09/30/2020 Yes    Acute colitis [K52.9] 09/27/2020 Yes    RICHIE (acute kidney injury) [N17.9] 09/27/2020 Yes    Blood in stool [K92.1] 09/27/2020 Yes    Adrenal hyperplasia [E27.8] 09/27/2020 Yes    HTN (hypertension) [I10] 09/27/2020 Yes    HLD (hyperlipidemia) [E78.5] 09/27/2020 Yes      Problems Resolved During this Admission:       Discharged Condition: good    Disposition: Home or Self Care    Follow Up:  Follow-up Information     Eliseo Barnes MD In 1 week.    Specialty: Nephrology  Contact information:  [address] [facility]  [place] [address]  [phone]             Ralf Ham MD In 1 week.    Specialty: Hospitalist  Contact information:  [address]  [place] [id number] [phone]             Albaro Brink MD In 2 weeks.    Specialty: Gastroenterology  Contact information:  [address]   [place] [address]  [phone]                 Patient Instructions:      US Upper Extremity Veins Bilateral   Standing Status: Future Standing Exp. Date: 10/02/21     Order Specific Question Answer Comments   Reason for Exam: Superficial venous thrombosis follow up on left cephalic vein    May the Radiologist modify the order per protocol to meet the clinical needs of the patient? Yes      Diet Cardiac     Notify your health care provider if you experience any of the following:  temperature >100.4     Notify your health care provider if you experience any of the following:  persistent nausea and vomiting or diarrhea     Notify your health care provider if you experience any of the following:  persistent dizziness, light-headedness, or visual disturbances     Activity as tolerated       Significant Diagnostic Studies: Labs:   BMP:   Recent Labs   Lab 10/01/20  0416 10/02/20  0458   GLU 91 87   * 135*   K 3.9 3.6   CL 97 103   CO2  22* 24   BUN 29* 22   CREATININE 5.8* 5.3*   CALCIUM 7.2* 7.2*   MG 1.8 1.9    and CBC   Recent Labs   Lab 10/01/20  0416 10/02/20  0458   WBC 6.79 6.09   HGB 11.5* 10.2*   HCT 33.8* 30.5*   * 135*       Pending Diagnostic Studies:     Procedure Component Value Units Date/Time    Echo Color Flow Doppler? Yes [[id number]]  (Abnormal) Resulted: 09/29/20 1519    Order Status: Sent Lab Status: In process Updated: 09/29/20 1519     BSA 2 m2      Right Atrial Pressure (from IVC) 3 mmHg      TDI SEPTAL 0.04 m/s      LV LATERAL E/E' RATIO 11.50 m/s      LV SEPTAL E/E' RATIO 23.00 m/s      AORTIC VALVE CUSP SEPERATION 2.20 cm      TDI LATERAL 0.08 m/s      PV PEAK VELOCITY 129.56 cm/s      LVIDd 4.80 cm      IVS 1.24 cm      Posterior Wall 1.20 cm      Ao root annulus 2.92 cm      LVIDs 3.06 cm      FS 36 %      LV mass 224.33 g      LA size 4.46 cm      RVDD 259.00 cm      Left Ventricle Relative Wall Thickness 0.50 cm      AV mean gradient 5 mmHg      AV valve area 3.00 cm2      AV Velocity Ratio 62.76     AV index (prosthetic) 0.76     E/A ratio 0.72     Mean e' 0.06 m/s      E wave decelartion time 194.76 msec      LVOT diameter 2.24 cm      LVOT area 3.9 cm2      LVOT peak sergio 92.89 m/s      LVOT peak VTI 20.80 cm      Ao peak sergio 1.48 m/s      Ao VTI 27.29 cm      LVOT stroke volume 81.93 cm3      AV peak gradient 9 mmHg      TV rest pulmonary artery pressure 24 mmHg      E/E' ratio 15.33 m/s      MV Peak E Sergio 0.92 m/s      TR Max Sergio 2.27 m/s      MV Peak A Sergio 1.27 m/s      LV Mass Index 115 g/m2      Triscuspid Valve Regurgitation Peak Gradient 21 mmHg     Narrative:      · The estimated PA systolic pressure is 24 mmHg.       Impression:          Vegas's Stain, Urine Random [[id number]] Collected: 09/29/20 0903    Order Status: Sent Lab Status: No result     Specimen: Urine, Catheterized          Medications:  Reconciled Home Medications:      Medication List      START taking these medications    amLODIPine  5 MG tablet  Commonly known as: NORVASC  Take 1 tablet (5 mg total) by mouth 2 (two) times daily.     cephALEXin 250 MG capsule  Commonly known as: KEFLEX  Take 1 capsule (250 mg total) by mouth every 12 (twelve) hours. for 7 days        CHANGE how you take these medications    carvediloL 3.125 MG tablet  Commonly known as: COREG  Take 1 tablet (3.125 mg total) by mouth 2 (two) times daily.  What changed:   · medication strength  · how much to take  · when to take this        CONTINUE taking these medications    aspirin 81 MG EC tablet  Commonly known as: ECOTRIN  Take 81 mg by mouth once daily.     atorvastatin 40 MG tablet  Commonly known as: LIPITOR  Take 40 mg by mouth once daily.     loperamide 2 mg capsule  Commonly known as: IMODIUM  Take 2 mg by mouth 4 (four) times daily as needed for Diarrhea.        STOP taking these medications    lisinopriL 40 MG tablet  Commonly known as: PRINIVIL,ZESTRIL     NIFEdipine 30 MG Tbsr  Commonly known as: ADALAT CC     phenazopyridine 95 MG tablet  Commonly known as: PYRIDIUM            Indwelling Lines/Drains at time of discharge:   Lines/Drains/Airways     Central Venous Catheter Line                 Hemodialysis Catheter 10/02/20 1300 right subclavian less than 1 day          Drain                 Urethral Catheter 09/28/20 1815 Non-latex 16 Fr. 3 days                Time spent on the discharge of patient: 33  minutes  Patient was seen and examined on the date of discharge and determined to be suitable for discharge.         Lisa Perkins MD  Department of Hospital Medicine  [facility]

## 2020-10-02 NOTE — CARE UPDATE
10/01/20 2045   Patient Assessment/Suction   Level of Consciousness (AVPU) alert   Respiratory Effort Unlabored   Expansion/Accessory Muscles/Retractions no use of accessory muscles   All Lung Fields Breath Sounds clear;diminished   Rhythm/Pattern, Respiratory no shortness of breath reported   Cough Frequency no cough   PRE-TX-O2   O2 Device (Oxygen Therapy) room air   SpO2 96 %   Pulse Oximetry Type Intermittent   $ Pulse Oximetry - Multiple Charge Pulse Oximetry - Multiple   Pulse 88   Resp 20   Respiratory Evaluation   $ Care Plan Tech Time 15 min

## 2020-10-02 NOTE — PROGRESS NOTES
INPATIENT NEPHROLOGY PROGRESS  [facility] NEPHROLOGY    Latricia Gisela  10/02/2020    Reason for consultation:      Acute kidney injury    Chief Complaint:   Chief Complaint   Patient presents with    Rectal Bleeding     Since 0230 this am          History of Present Illness:     65-year-old female with tobacco use who presented to the hospital with acute onset moderate to severe epigastric abdominal pain that began on Saturday associated with multiple episodes of loose stool followed by 3 episodes of bright red blood per rectum on Sunday.  CT imaging done on admission revealed right-sided colitis.   Her creatinine has precipitously risen prompting renal consultation    9/28  No nausea, chest pain, sob, fever, urinary or bowel complaint, new neurologic symptoms, new joint pain,  9/29  Renal function, acidosis worse.  K+ at goal.  D/w pt, who states she feels great, no uremic s/sx.    9/30  Bilateral renal artery occlusion seen.  Pt in recovery.  No distress  10/1  Seem pm dialysis.  No nausea, chest pain, sob, fever, urinary or bowel complaint, new neurologic symptoms, new joint pain,      Plan of Care:       Assessment:    Acute kidney injury suspicious for hypertensive emergency renal failure.  Urinalysis reveals hyaline casts implication low perfusion  -bilateral renal artery occlusion seen on   CO2 renal angiogram.    --cancel renal biopsy.  Ischemic nephropathy the etiology.    --initiated hemodialysis  --s/p hemosplit catheter placement  Ok to d/c.  Dietary and fluid restrictions d/w patient and her daughter at length    Can d/c plavix at discharge (d/w Vascular surgery)       Metabolic acidosis  --bicarb dialysate    Hyperkalemia--better       Hypertension  --better control         Thank you for allowing us to participate in this patient's care. We will continue to follow.    Vital Signs:  Temp Readings from Last 3 Encounters:   10/02/20 97.7 °F (36.5 °C)       Pulse Readings from Last 3 Encounters:    10/02/20 68       BP Readings from Last 3 Encounters:   10/02/20 (!) 165/92       Weight:  Wt Readings from Last 3 Encounters:   10/02/20 86.7 kg (191 lb 2.2 oz)   09/29/20 86.2 kg (190 lb)       Past Medical & Surgical History:  Past Medical History:   Diagnosis Date    HLD (hyperlipidemia)     Hypertension        Past Surgical History:   Procedure Laterality Date    AORTOGRAPHY WITH SERIALOGRAPHY N/A 9/30/2020    Procedure: co2 renal angio;  Surgeon: Lance Bustamante MD;  Location: [facility] CATH/EP LAB;  Service: Vascular;  Laterality: N/A;    NO PAST SURGERIES      PLACEMENT OF DIALYSIS ACCESS Right 9/30/2020    Procedure: Insertion, Dialysis Access;  Surgeon: Lance Bustamante MD;  Location: [facility] CATH/EP LAB;  Service: Vascular;  Laterality: Right;       Past Social History:  Social History     Socioeconomic History    Marital status:      Spouse name: Not on file    Number of children: Not on file    Years of education: Not on file    Highest education level: Not on file   Occupational History    Not on file   Social Needs    Financial resource strain: Not on file    Food insecurity     Worry: Not on file     Inability: Not on file    Transportation needs     Medical: Not on file     Non-medical: Not on file   Tobacco Use    Smoking status: Unknown If Ever Smoked   Substance and Sexual Activity    Alcohol use: Not on file    Drug use: Not on file    Sexual activity: Not on file   Lifestyle    Physical activity     Days per week: Not on file     Minutes per session: Not on file    Stress: Not on file   Relationships    Social connections     Talks on phone: Not on file     Gets together: Not on file     Attends Anabaptist service: Not on file     Active member of club or organization: Not on file     Attends meetings of clubs or organizations: Not on file     Relationship status: Not on file   Other Topics Concern    Not on file   Social History Narrative    Not on file  "      Medications:  No current facility-administered medications on file prior to encounter.      Current Outpatient Medications on File Prior to Encounter   Medication Sig Dispense Refill    aspirin (ECOTRIN) 81 MG EC tablet Take 81 mg by mouth once daily.      atorvastatin (LIPITOR) 40 MG tablet Take 40 mg by mouth once daily.      carvediloL (COREG) 25 MG tablet Take 25 mg by mouth 2 (two) times daily with meals.      lisinopriL (PRINIVIL,ZESTRIL) 40 MG tablet Take 40 mg by mouth once daily.      loperamide (IMODIUM) 2 mg capsule Take 2 mg by mouth 4 (four) times daily as needed for Diarrhea.      NIFEdipine (ADALAT CC) 30 MG TbSR Take 30 mg by mouth once daily.      phenazopyridine (PYRIDIUM) 95 MG tablet Take 95 mg by mouth 3 (three) times daily as needed for Pain.       Scheduled Meds:   sodium chloride 0.9%   Intravenous Once    amLODIPine  5 mg Oral BID    atorvastatin  40 mg Oral QHS    carvediloL  3.125 mg Oral BID    ceFAZolin  1 g Intravenous Q8H    clopidogreL  75 mg Oral Daily    levoFLOXacin  250 mg Intravenous Q48H    metronidazole  500 mg Intravenous Q8H    mupirocin   Nasal BID    pantoprazole  40 mg Oral Daily     Continuous Infusions:   sodium chloride 0.9%       PRN Meds:.sodium chloride 0.9%, sodium chloride 0.9%, acetaminophen, acetaminophen, heparin (porcine), hydrALAZINE, HYDROmorphone, labetaloL, magnesium oxide, magnesium oxide, ondansetron, ondansetron, ondansetron, oxyCODONE, potassium chloride, potassium chloride, sodium chloride 0.9%    Allergies:  Patient has no known allergies.    Past Family History:  Reviewed; refer to Hospitalist Admission Note    Review of Systems:  Review of Systems - All 14 systems reviewed and negative, except as noted in HPI    Physical Exam:    BP (!) 165/92   Pulse 68   Temp 97.7 °F (36.5 °C)   Resp 16   Ht 5' 6" (1.676 m)   Wt 86.7 kg (191 lb 2.2 oz)   SpO2 96%   BMI 30.85 kg/m²     General Appearance:    Alert, cooperative, no " distress, appears stated age   Head:    Normocephalic, without obvious abnormality, atraumatic   Eyes:    PER, conjunctiva/corneas clear, EOM's intact in both eyes        Throat:   Lips, mucosa, and tongue normal; teeth and gums normal   Back:     Symmetric, no curvature, ROM normal, no CVA tenderness   Lungs:     Clear to auscultation bilaterally, respirations unlabored   Chest wall:    No tenderness or deformity   Heart:    Regular rate and rhythm, S1 and S2 normal, no murmur, rub   or gallop   Abdomen:     Soft, non-tender, bowel sounds active all four quadrants,     no masses, no organomegaly   Extremities:   Extremities normal, atraumatic, no cyanosis or edema   Pulses:   2+ and symmetric all extremities   MSK:   No joint or muscle swelling, tenderness or deformity   Skin:   Skin color, texture, turgor normal, no rashes or lesions   Neurologic:   CNII-XII intact, normal strength and sensation       Throughout.  No flap     Results:  Lab Results   Component Value Date     (L) 10/02/2020    K 3.6 10/02/2020     10/02/2020    CO2 24 10/02/2020    BUN 22 10/02/2020    CREATININE 5.3 (H) 10/02/2020    CALCIUM 7.2 (L) 10/02/2020    ANIONGAP 8 10/02/2020    ESTGFRAFRICA 9.1 (A) 10/02/2020    EGFRNONAA 7.9 (A) 10/02/2020       Lab Results   Component Value Date    CALCIUM 7.2 (L) 10/02/2020       Recent Labs   Lab 10/02/20  0458   WBC 6.09   RBC 3.49*   HGB 10.2*   HCT 30.5*   *   MCV 87   MCH 29.2   MCHC 33.4          I have personally reviewed pertinent radiological imaging and reports.    Patient care was time spent personally by me on the following activities:   · Obtaining a history  · Examination of patient.  · Providing medical care at the patients bedside.  · Developing a treatment plan with patient or surrogate and bedside caregivers  · Ordering and reviewing laboratory studies, radiographic studies, pulse oximetry.  · Ordering and performing treatments and interventions.  · Evaluation of  patient's response to treatment.  · Discussions with consultants while on the unit and immediately available to the patient.  · Re-evaluation of the patient's condition.  · Documentation in the medical record.     Eliseo Barnes MD  Nephrology  [facility] [place]  [phone]'

## 2020-10-02 NOTE — ANESTHESIA POSTPROCEDURE EVALUATION
Anesthesia Post Evaluation    Patient: Latricia Higgins    Procedure(s) Performed: Procedure(s):  INSERTION, CATHETER, CENTRAL VENOUS, HEMODIALYSIS, TUNNELED    Final Anesthesia Type: general    Patient location during evaluation: PACU  Patient participation: Yes- Able to Participate  Level of consciousness: awake and alert and oriented  Post-procedure vital signs: reviewed and stable  Pain management: adequate  Airway patency: patent    PONV status at discharge: No PONV  Anesthetic complications: no      Cardiovascular status: blood pressure returned to baseline, hemodynamically stable and stable  Respiratory status: unassisted, spontaneous ventilation and room air  Hydration status: euvolemic  Follow-up not needed.          Vitals Value Taken Time   /87 10/02/20 1415   Temp 36.5 °C (97.7 °F) 10/02/20 1310   Pulse 73 10/02/20 1428   Resp 14 10/02/20 1427   SpO2 95 % 10/02/20 1428   Vitals shown include unvalidated device data.      Event Time   Out of Recovery 10/02/2020 14:32:00         Pain/Karen Score: Karen Score: 10 (10/2/2020  1:45 PM)

## 2020-10-02 NOTE — PLAN OF CARE
10/02/20 0830   Discharge Reassessment   Assessment Type Discharge Planning Reassessment   Anticipated Discharge Disposition Home   Patient choice form signed by patient/caregiver List with quality metrics by geographic area provided   Post-Acute Status   Post-Acute Authorization Dialysis     Notified by April at [facility] that patient was accepted to [facility] location for Monday, Wednesday, and Friday 1nd shift at 6:30 am. Patient is scheduled for first treatment on Oct 5 at 6:00 am. Patient notified.

## 2020-10-02 NOTE — PROGRESS NOTES
[facility]  Department of Cardiology  Consult Note      PATIENT NAME: Latricia Higgins  MRN: [id number]  TODAY'S DATE: 10/02/2020  ADMIT DATE: 9/27/2020                          CONSULT REQUESTED BY: Lisa Perkins MD    SUBJECTIVE     INTERVAL HISTORY:  10/02/2020  PATIENT IS FEELING BETTER SHE HAD DEVELOPED SWELLING IN THE LEFT UPPER EXTREMITY  Ultrasound being done conform some superficial cellulitis in the cephalic vein  Denies shortness of breath       10/01/2020  Patient see and examined in dialysis. She complains of on and off nausea. She denies chest pain or headache.             09/30/2020, interval history:  Patient remains to have labile blood pressure.  And anuric 15 cc urine output overnight.  Hydralazine has been added to her regimen.      9- Interval History:    Blood pressure is improved.  Patient has only made 15 ml of urine. Kidney function has worsened. She denies any symptoms. Renal US done no results as of yet        PRINCIPAL PROBLEM: Hypertensive urgency      REASON FOR CONSULT:  HTN      HPI:  65 Year old female patient with a PMH significant for HTN, and Dyslipidemia. She was in her normal state of health until yesterday when she had abdominal pain and bloody diarrhea x 2. She came to ER for evaluation. Rectal exam showed hemmrhoids  H/H Stable. She state she has not been urinating like she should. She denies chest pain or SOB. No arm neck or jaw pain. No palpitations. No recent fever cough chills or congestion. Her blood pressure is elevated and we have been consulted.    FROM H AND P    65 year old patient getting admitted with acute colitis and RICHIE  Today patient all of sudden had Lower abdominal pain followed by Bloody diarrhea  Describes pain as crampy and spastic which was temporary followed by 2 x episodes of fresh blood in stools  She came to ER and ER MD did a rectal examination which showed blood and hemorrhoids  Patient denies fever/nausea or any other  c/o  She never had Colonoscopy before      Review of patient's allergies indicates:  No Known Allergies    Past Medical History:   Diagnosis Date    HLD (hyperlipidemia)     Hypertension      Past Surgical History:   Procedure Laterality Date    AORTOGRAPHY WITH SERIALOGRAPHY N/A 9/30/2020    Procedure: co2 renal angio;  Surgeon: Lance Bustamante MD;  Location: [facility] CATH/EP LAB;  Service: Vascular;  Laterality: N/A;    NO PAST SURGERIES      PLACEMENT OF DIALYSIS ACCESS Right 9/30/2020    Procedure: Insertion, Dialysis Access;  Surgeon: Lance Bustamante MD;  Location: [facility] CATH/EP LAB;  Service: Vascular;  Laterality: Right;     Social History     Tobacco Use    Smoking status: Unknown If Ever Smoked   Substance Use Topics    Alcohol use: Not on file    Drug use: Not on file        REVIEW OF SYSTEMS  CONSTITUTIONAL: Negative for chills, fatigue and fever.   EYES: No double vision, No blurred vision  NEURO: No headaches, No dizziness  RESPIRATORY: Negative for cough, shortness of breath and wheezing.    CARDIOVASCULAR: Negative for chest pain. Negative for palpitations and leg swelling.   GI: +for abdominal pain,+melena, diarrhea, nausea and vomiting.   : +dysuria and frequency, Negative for hematuria  SKIN: Negative for bruising, Negative for edema or discoloration noted.   ENDOCRINE: Negative for polyphagia, Negative for heat intolerance, Negative for cold intolerance  PSYCHIATRIC: Negative for depression, Negative for anxiety, Negative for memory loss  MUSCULOSKELETAL: Negative for neck pain, Negative for muscle weakness, Negative for back pain     OBJECTIVE     VITAL SIGNS (Most Recent)  Temp: 98.5 °F (36.9 °C) (10/02/20 1114)  Pulse: 76 (10/02/20 1114)  Resp: 17 (10/02/20 1114)  BP: (!) 181/86(Notified nurse) (10/02/20 1114)  SpO2: (!) 94 % (10/02/20 1114)    VENTILATION STATUS  Resp: 17 (10/02/20 1114)  SpO2: (!) 94 % (10/02/20 1114)       I & O (Last 24H):    Intake/Output Summary (Last 24 hours) at  10/2/2020 1212  Last data filed at 10/2/2020 0347  Gross per 24 hour   Intake 620 ml   Output 2500 ml   Net -1880 ml       WEIGHTS  Wt Readings from Last 1 Encounters:   10/02/20 0347 86.7 kg (191 lb 2.2 oz)   10/01/20 0222 87.7 kg (193 lb 5.5 oz)   09/30/20 2300 87.7 kg (193 lb 5.5 oz)   09/30/20 0416 89.3 kg (196 lb 13.9 oz)   09/29/20 0645 86.2 kg (190 lb 0.6 oz)   09/27/20 1447 83.2 kg (183 lb 6.8 oz)   09/27/20 0956 83.9 kg (185 lb)       PHYSICAL EXAM  GENERAL: well built, well nourished, well-developed in no apparent distress alert and oriented.   HEENT: Normocephalic. Pupils normal and conjunctivae normal.  Mucous membranes normal, no cyanosis or icterus, trachea central,no pallor or icterus is noted..   Neck: no carotid bruit, no JVD, supple, symmetrical, trachea midline and hepatojugular reflux  Lungs: diminished breath sounds bibasilar and bilaterally, rhonchi bilaterally  Chest Wall: no tenderness  Heart: regular rate and rhythm, S1, S2 normal, no murmur, click, rub or gallop and regular rate and rhythm  Abdomen: soft, non-tender; bowel sounds normal; no masses,  no organomegaly  Extremities: Extremities normal, atraumatic, no cyanosis, clubbing  Left extremity has localized swelling consistent with increased warmth and tenderness suggestive of thrombophlebitis.    Skin: Skin color, texture, turgor normal. No rashes or lesions  Neuro: Alert and responsive. Grossly non focal    HOME MEDICATIONS:  No current facility-administered medications on file prior to encounter.      Current Outpatient Medications on File Prior to Encounter   Medication Sig Dispense Refill    aspirin (ECOTRIN) 81 MG EC tablet Take 81 mg by mouth once daily.      atorvastatin (LIPITOR) 40 MG tablet Take 40 mg by mouth once daily.      carvediloL (COREG) 25 MG tablet Take 25 mg by mouth 2 (two) times daily with meals.      lisinopriL (PRINIVIL,ZESTRIL) 40 MG tablet Take 40 mg by mouth once daily.      loperamide (IMODIUM) 2 mg  capsule Take 2 mg by mouth 4 (four) times daily as needed for Diarrhea.      NIFEdipine (ADALAT CC) 30 MG TbSR Take 30 mg by mouth once daily.      phenazopyridine (PYRIDIUM) 95 MG tablet Take 95 mg by mouth 3 (three) times daily as needed for Pain.         SCHEDULED MEDS:   sodium chloride 0.9%   Intravenous Once    amLODIPine  5 mg Oral BID    atorvastatin  40 mg Oral QHS    carvediloL  3.125 mg Oral BID    clopidogreL  75 mg Oral Daily    levoFLOXacin  250 mg Intravenous Q48H    metronidazole  500 mg Intravenous Q8H    mupirocin   Nasal BID    pantoprazole  40 mg Oral Daily       CONTINUOUS INFUSIONS:   sodium chloride 0.9%         PRN MEDS:sodium chloride 0.9%, sodium chloride 0.9%, acetaminophen, acetaminophen, heparin (porcine), hydrALAZINE, labetaloL, magnesium oxide, magnesium oxide, ondansetron, ondansetron, potassium chloride, potassium chloride    LABS AND DIAGNOSTICS     CBC LAST 3 DAYS  Recent Labs   Lab 09/30/20  0405 10/01/20  0416 10/02/20  0458   WBC 6.95 6.79 6.09   RBC 4.06 3.98* 3.49*   HGB 11.5* 11.5* 10.2*   HCT 33.5* 33.8* 30.5*   MCV 83 85 87   MCH 28.3 28.9 29.2   MCHC 34.3 34.0 33.4   RDW 13.1 13.2 13.6    143* 135*   MPV 9.5 9.9 9.8   GRAN 81.4*  5.7 87.1*  5.9 69.5  4.2   LYMPH 10.9*  0.8* 6.6*  0.5* 16.1*  1.0   MONO 5.6  0.4 5.2  0.4 10.8  0.7   BASO 0.03 0.02 0.02   NRBC 0 0 0       COAGULATION LAST 3 DAYS  Recent Labs   Lab 09/27/20  1009 09/30/20  1830   LABPT 14.1  --    INR 1.1  --    APTT 32.8 32.4       CHEMISTRY LAST 3 DAYS  Recent Labs   Lab 09/30/20  0405 10/01/20  0416 10/02/20  0458   * 134* 135*   K 3.9 3.9 3.6    97 103   CO2 17* 22* 24   ANIONGAP 12 15 8   BUN 64* 29* 22   CREATININE 8.5* 5.8* 5.3*    91 87   CALCIUM 7.3* 7.2* 7.2*   MG 1.9 1.8 1.9   ALBUMIN 2.8* 2.3* 2.4*   PROT 5.4* 4.7* 4.8*   ALKPHOS 79 90 77   ALT 9* 13 16   AST 17 34 34   BILITOT 0.8 0.9 0.6       CARDIAC PROFILE LAST 3 DAYS  Recent Labs   Lab  09/27/20  1009   CPK 31   CPKMB 1.0   TROPONINI <0.030       ENDOCRINE LAST 3 DAYS  Recent Labs   Lab 09/30/20  0405   TSH 3.260       LAST ARTERIAL BLOOD GAS  ABG  No results for input(s): PH, PO2, PCO2, HCO3, BE in the last 168 hours.    LAST 7 DAYS MICROBIOLOGY   Microbiology Results (last 7 days)     Procedure Component Value Units Date/Time    Blood culture [[id number]] Collected: 09/27/20 1550    Order Status: Completed Specimen: Blood from Antecubital, Right Arm Updated: 10/01/20 1632     Blood Culture, Routine No Growth to date      No Growth to date      No Growth to date      No Growth to date      No Growth to date    Stool culture [[id number]] Collected: 09/28/20 2358    Order Status: Completed Specimen: Stool Updated: 10/01/20 1102     Stool Culture Reduced normal ruperto      No Salmonella,Shigella,Vibrio,Campylobacter.      No E coli 0157:H7 isolated.    Urine culture [[id number]] Collected: 09/29/20 0903    Order Status: Completed Specimen: Urine Updated: 10/01/20 0850     Urine Culture, Routine No growth    Narrative:      Specimen Source->Urine    Clostridium difficile EIA [[id number]]     Order Status: Canceled Specimen: Stool           MOST RECENT IMAGING  US Upper Extremity Veins Left  CLINICAL HISTORY:  65 years (1955) Female left arm red warm swollen r/o dvt    TECHNIQUE:  US UPPER EXTREMITY VEINS LEFT.  22 images obtained. Ultrasound  examination of left upper extremity deep venous systems was performed  using grayscale, color and spectral Doppler. Deep vein thrombosis of  the upper extremity (DVT-UE) includes  any of the veins of the upper  extremity or thoracic inlet (jugular, brachiocephalic, subclavian, and  axillary veins as well as the more distal brachial, ulnar, and radial  veins). These are distinguished from thrombosis of the superficial  veins (cephalic and basilic veins).    COMPARISON:  None available.    FINDINGS:  Heterogeneous low level echoes with noncompressibility in the  left  cephalic vein (from the midhumerus region through the antecubital  fossa).    There is a normal grayscale appearance with color flow identified in  the left internal jugular, subclavian, axillary, and brachial without  findings to suggest acute deep venous thrombosis.    IMPRESSION:  1. Occlusive thrombus in the left cephalic (from the midhumerus to the  antecubital fossa).  2. No findings of deep venous thrombus as above.    Electronically Signed by CHARIS Quinteros on 10/2/2020 11:04 AM      ECHOCARDIOGRAM RESULTS (last 5)  No results found for this or any previous visit.    CURRENT/PREVIOUS VISIT EKG  Results for orders placed or performed during the hospital encounter of 09/27/20   EKG 12-lead    Collection Time: 09/27/20 10:28 AM    Narrative    Test Reason : R10.9,    Vent. Rate : 065 BPM     Atrial Rate : 065 BPM     P-R Int : 182 ms          QRS Dur : 084 ms      QT Int : 438 ms       P-R-T Axes : 029 -12 -08 degrees     QTc Int : 455 ms    Normal sinus rhythm  Leftward axis  No previous ECGs available  Confirmed by Faraz Griffith MD ([id number]) on 9/27/2020 10:31:46 PM    Referred By: [email or web address]   SELF           Confirmed By:Faraz Griffith MD           ASSESSMENT/PLAN:     Active Hospital Problems    Diagnosis    *Hypertensive urgency    Colitis    Acute colitis    RICHIE (acute kidney injury)    Blood in stool    Adrenal hyperplasia    HTN (hypertension)    HLD (hyperlipidemia)       ASSESSMENT & PLAN:     1. HTN Urgency secondary to Bilateral HEATH  2.  Acute kidney injury with severe anuria secondary to Bilateral HEATH  3. Acute colitis  4. Dyslipidemia  5. Adrenal Hyperplasia  6. Currently on HD  Seven.  A acute thrombophlebitis l brachiocephalic vein    RECOMMENDATIONS:  Consider local heat treatment and also intravenous antibiotics may postpone placement of dialysis access placement for another 48 hours  Bilateral HEATH was diagnosed with CO2 angiogram yesterday however Unsuccessful attempts to  cross origin of the Right renal artery  Dialysis has been initiated  If blood pressure becomes higher increase amlodipine to 10 mg daily  Thank you      For neck    Charlette Mitchell NP.  [facility]  Department of Cardiology  Date of Service: 10/02/2020      I have reviewed the Nurse Practitioner's history and physical, assessment, and plan.agree with the findings.  I have previously discussed the findings of renal angiogram with Dr. Lance Bustamante and also discussed with nephrologist  and the Hospitalist  Dr. Perkins

## 2020-10-03 LAB
HAV IGM SERPL QL IA: NEGATIVE
HBV CORE IGM SERPL QL IA: NEGATIVE
HBV SURFACE AG SERPL QL IA: NEGATIVE
HCV AB S/CO SERPL IA: 0.1 S/CO RATIO (ref 0–0.9)

## 2020-10-03 NOTE — NURSING
Pt discharged with all belongings and follow up appointments. Iv removed, ojeda d/c and telemetry discontinued. Pt tolerated well.  Pt denies pain , dressing to right chest wall clean ,dry and intact. Pt verbalized understanding of follow up appointments, new meds, and medication changes.

## 2020-10-05 LAB
AORTIC ROOT ANNULUS: 2.92 CM
AORTIC VALVE CUSP SEPERATION: 2.2 CM
AV INDEX (PROSTH): 0.76
AV MEAN GRADIENT: 5 MMHG
AV PEAK GRADIENT: 9 MMHG
AV VALVE AREA: 3 CM2
AV VELOCITY RATIO: 62.76
BSA FOR ECHO PROCEDURE: 2 M2
CV ECHO LV RWT: 0.5 CM
DOP CALC AO PEAK VEL: 1.48 M/S
DOP CALC AO VTI: 27.29 CM
DOP CALC LVOT AREA: 3.9 CM2
DOP CALC LVOT DIAMETER: 2.24 CM
DOP CALC LVOT PEAK VEL: 92.89 M/S
DOP CALC LVOT STROKE VOLUME: 81.93 CM3
DOP CALCLVOT PEAK VEL VTI: 20.8 CM
E WAVE DECELERATION TIME: 194.76 MSEC
E/A RATIO: 0.72
E/E' RATIO: 15.33 M/S
ECHO LV POSTERIOR WALL: 1.2 CM (ref 0.6–1.1)
FRACTIONAL SHORTENING: 36 % (ref 28–44)
INTERVENTRICULAR SEPTUM: 1.24 CM (ref 0.6–1.1)
LEFT ATRIUM SIZE: 4.46 CM
LEFT INTERNAL DIMENSION IN SYSTOLE: 3.06 CM (ref 2.1–4)
LEFT VENTRICLE MASS INDEX: 115 G/M2
LEFT VENTRICULAR INTERNAL DIMENSION IN DIASTOLE: 4.8 CM (ref 3.5–6)
LEFT VENTRICULAR MASS: 224.33 G
LV LATERAL E/E' RATIO: 11.5 M/S
LV SEPTAL E/E' RATIO: 23 M/S
MV PEAK A VEL: 1.27 M/S
MV PEAK E VEL: 0.92 M/S
PISA TR MAX VEL: 2.27 M/S
PV PEAK VELOCITY: 129.56 CM/S
RA PRESSURE: 3 MMHG
RIGHT VENTRICULAR END-DIASTOLIC DIMENSION: 259 CM
TDI LATERAL: 0.08 M/S
TDI SEPTAL: 0.04 M/S
TDI: 0.06 M/S
TR MAX PG: 21 MMHG
TV REST PULMONARY ARTERY PRESSURE: 24 MMHG

## 2020-10-07 LAB — BACTERIA BLD CULT: NORMAL

## 2020-10-09 LAB — O+P STL TRI STN: NORMAL

## 2020-10-15 ENCOUNTER — HOSPITAL ENCOUNTER (OUTPATIENT)
Dept: RADIOLOGY | Facility: HOSPITAL | Age: 65
Discharge: HOME | End: 2020-10-15
Attending: INTERNAL MEDICINE
Payer: MEDICARE

## 2020-10-15 DIAGNOSIS — N17.9 AKI (ACUTE KIDNEY INJURY): ICD-10-CM

## 2020-10-15 PROCEDURE — 93970 EXTREMITY STUDY: CPT | Mod: TC,PO

## 2020-10-21 ENCOUNTER — HOSPITAL ENCOUNTER (OUTPATIENT)
Facility: HOSPITAL | Age: 65
Discharge: HOME | End: 2020-10-22
Attending: EMERGENCY MEDICINE | Admitting: STUDENT IN AN ORGANIZED HEALTH CARE EDUCATION/TRAINING PROGRAM
Payer: COMMERCIAL

## 2020-10-21 DIAGNOSIS — E87.70 VOLUME OVERLOAD: ICD-10-CM

## 2020-10-21 DIAGNOSIS — N18.6 END STAGE RENAL DISEASE: ICD-10-CM

## 2020-10-21 DIAGNOSIS — D64.9 SYMPTOMATIC ANEMIA: ICD-10-CM

## 2020-10-21 DIAGNOSIS — R06.02 SOB (SHORTNESS OF BREATH): Primary | ICD-10-CM

## 2020-10-21 DIAGNOSIS — I50.31 ACUTE DIASTOLIC HEART FAILURE: ICD-10-CM

## 2020-10-21 PROBLEM — R79.89 ELEVATED TROPONIN: Status: ACTIVE | Noted: 2020-10-21

## 2020-10-21 LAB
ABO + RH BLD: NORMAL
ALBUMIN SERPL BCP-MCNC: 3.1 G/DL (ref 3.5–5.2)
ALP SERPL-CCNC: 77 U/L (ref 55–135)
ALT SERPL W/O P-5'-P-CCNC: 8 U/L (ref 10–44)
ANION GAP SERPL CALC-SCNC: 13 MMOL/L (ref 8–16)
ANION GAP SERPL CALC-SCNC: 16 MMOL/L (ref 8–16)
AST SERPL-CCNC: 15 U/L (ref 10–40)
BASOPHILS # BLD AUTO: 0.04 K/UL (ref 0–0.2)
BASOPHILS NFR BLD: 0.6 % (ref 0–1.9)
BILIRUB SERPL-MCNC: 1.1 MG/DL (ref 0.1–1)
BLD GP AB SCN CELLS X3 SERPL QL: NORMAL
BNP SERPL-MCNC: 3976 PG/ML (ref 0–99)
BUN SERPL-MCNC: 11 MG/DL (ref 8–23)
BUN SERPL-MCNC: 9 MG/DL (ref 8–23)
CALCIUM SERPL-MCNC: 8.7 MG/DL (ref 8.7–10.5)
CALCIUM SERPL-MCNC: 8.8 MG/DL (ref 8.7–10.5)
CHLORIDE SERPL-SCNC: 96 MMOL/L (ref 95–110)
CHLORIDE SERPL-SCNC: 98 MMOL/L (ref 95–110)
CO2 SERPL-SCNC: 27 MMOL/L (ref 23–29)
CO2 SERPL-SCNC: 29 MMOL/L (ref 23–29)
CREAT SERPL-MCNC: 3.8 MG/DL (ref 0.5–1.4)
CREAT SERPL-MCNC: 4.2 MG/DL (ref 0.5–1.4)
DIFFERENTIAL METHOD: ABNORMAL
EOSINOPHIL # BLD AUTO: 0 K/UL (ref 0–0.5)
EOSINOPHIL NFR BLD: 0.6 % (ref 0–8)
ERYTHROCYTE [DISTWIDTH] IN BLOOD BY AUTOMATED COUNT: 12.7 % (ref 11.5–14.5)
EST. GFR  (AFRICAN AMERICAN): 12.1 ML/MIN/1.73 M^2
EST. GFR  (AFRICAN AMERICAN): 13.6 ML/MIN/1.73 M^2
EST. GFR  (NON AFRICAN AMERICAN): 10.5 ML/MIN/1.73 M^2
EST. GFR  (NON AFRICAN AMERICAN): 11.8 ML/MIN/1.73 M^2
GLUCOSE SERPL-MCNC: 87 MG/DL (ref 70–110)
GLUCOSE SERPL-MCNC: 95 MG/DL (ref 70–110)
HCT VFR BLD AUTO: 26.2 % (ref 37–48.5)
HGB BLD-MCNC: 8.9 G/DL (ref 12–16)
IMM GRANULOCYTES # BLD AUTO: 0.11 K/UL (ref 0–0.04)
IMM GRANULOCYTES NFR BLD AUTO: 1.7 % (ref 0–0.5)
INR PPP: 1.1
LYMPHOCYTES # BLD AUTO: 0.9 K/UL (ref 1–4.8)
LYMPHOCYTES NFR BLD: 14.7 % (ref 18–48)
MCH RBC QN AUTO: 28.1 PG (ref 27–31)
MCHC RBC AUTO-ENTMCNC: 34 G/DL (ref 32–36)
MCV RBC AUTO: 83 FL (ref 82–98)
MONOCYTES # BLD AUTO: 0.6 K/UL (ref 0.3–1)
MONOCYTES NFR BLD: 8.8 % (ref 4–15)
NEUTROPHILS # BLD AUTO: 4.7 K/UL (ref 1.8–7.7)
NEUTROPHILS NFR BLD: 73.6 % (ref 38–73)
NRBC BLD-RTO: 0 /100 WBC
PLATELET # BLD AUTO: 214 K/UL (ref 150–350)
PMV BLD AUTO: 9.9 FL (ref 9.2–12.9)
POTASSIUM SERPL-SCNC: 2.8 MMOL/L (ref 3.5–5.1)
POTASSIUM SERPL-SCNC: 3.4 MMOL/L (ref 3.5–5.1)
PROT SERPL-MCNC: 6.6 G/DL (ref 6–8.4)
PROTHROMBIN TIME: 13.3 SEC (ref 10.6–14.8)
RBC # BLD AUTO: 3.17 M/UL (ref 4–5.4)
SARS-COV-2 RDRP RESP QL NAA+PROBE: NEGATIVE
SODIUM SERPL-SCNC: 139 MMOL/L (ref 136–145)
SODIUM SERPL-SCNC: 140 MMOL/L (ref 136–145)
TROPONIN I SERPL DL<=0.01 NG/ML-MCNC: 0.06 NG/ML
TROPONIN I SERPL DL<=0.01 NG/ML-MCNC: 0.07 NG/ML
WBC # BLD AUTO: 6.39 K/UL (ref 3.9–12.7)

## 2020-10-21 PROCEDURE — 93005 ELECTROCARDIOGRAM TRACING: CPT | Performed by: INTERNAL MEDICINE

## 2020-10-21 PROCEDURE — 63600175 PHARM REV CODE 636 W HCPCS: Performed by: INTERNAL MEDICINE

## 2020-10-21 PROCEDURE — 83036 HEMOGLOBIN GLYCOSYLATED A1C: CPT

## 2020-10-21 PROCEDURE — 93010 ELECTROCARDIOGRAM REPORT: CPT | Mod: ,,, | Performed by: INTERNAL MEDICINE

## 2020-10-21 PROCEDURE — U0002 COVID-19 LAB TEST NON-CDC: HCPCS

## 2020-10-21 PROCEDURE — 25000003 PHARM REV CODE 250: Performed by: STUDENT IN AN ORGANIZED HEALTH CARE EDUCATION/TRAINING PROGRAM

## 2020-10-21 PROCEDURE — 83880 ASSAY OF NATRIURETIC PEPTIDE: CPT

## 2020-10-21 PROCEDURE — 84484 ASSAY OF TROPONIN QUANT: CPT | Mod: 91

## 2020-10-21 PROCEDURE — 36415 COLL VENOUS BLD VENIPUNCTURE: CPT

## 2020-10-21 PROCEDURE — 85025 COMPLETE CBC W/AUTO DIFF WBC: CPT

## 2020-10-21 PROCEDURE — 84484 ASSAY OF TROPONIN QUANT: CPT

## 2020-10-21 PROCEDURE — 99285 EMERGENCY DEPT VISIT HI MDM: CPT | Mod: 25

## 2020-10-21 PROCEDURE — 25500020 PHARM REV CODE 255: Performed by: EMERGENCY MEDICINE

## 2020-10-21 PROCEDURE — G0378 HOSPITAL OBSERVATION PER HR: HCPCS

## 2020-10-21 PROCEDURE — 96374 THER/PROPH/DIAG INJ IV PUSH: CPT

## 2020-10-21 PROCEDURE — 93010 EKG 12-LEAD: ICD-10-PCS | Mod: ,,, | Performed by: INTERNAL MEDICINE

## 2020-10-21 PROCEDURE — 80048 BASIC METABOLIC PNL TOTAL CA: CPT | Mod: XB

## 2020-10-21 PROCEDURE — 90935 HEMODIALYSIS ONE EVALUATION: CPT

## 2020-10-21 PROCEDURE — 86901 BLOOD TYPING SEROLOGIC RH(D): CPT

## 2020-10-21 PROCEDURE — 80053 COMPREHEN METABOLIC PANEL: CPT

## 2020-10-21 PROCEDURE — 85610 PROTHROMBIN TIME: CPT

## 2020-10-21 RX ORDER — SODIUM CHLORIDE 9 MG/ML
INJECTION, SOLUTION INTRAVENOUS ONCE
Status: DISCONTINUED | OUTPATIENT
Start: 2020-10-21 | End: 2020-10-22

## 2020-10-21 RX ORDER — IODIXANOL 320 MG/ML
100 INJECTION, SOLUTION INTRAVASCULAR
Status: COMPLETED | OUTPATIENT
Start: 2020-10-21 | End: 2020-10-21

## 2020-10-21 RX ORDER — ASPIRIN 325 MG
325 TABLET ORAL
Status: COMPLETED | OUTPATIENT
Start: 2020-10-21 | End: 2020-10-21

## 2020-10-21 RX ORDER — HEPARIN SODIUM 1000 [USP'U]/ML
5000 INJECTION, SOLUTION INTRAVENOUS; SUBCUTANEOUS ONCE
Status: DISCONTINUED | OUTPATIENT
Start: 2020-10-21 | End: 2020-10-21

## 2020-10-21 RX ORDER — SEVELAMER HYDROCHLORIDE 800 MG/1
400 TABLET, FILM COATED ORAL
Status: DISCONTINUED | OUTPATIENT
Start: 2020-10-21 | End: 2020-10-22 | Stop reason: HOSPADM

## 2020-10-21 RX ORDER — ATORVASTATIN CALCIUM 40 MG/1
40 TABLET, FILM COATED ORAL DAILY
Status: DISCONTINUED | OUTPATIENT
Start: 2020-10-22 | End: 2020-10-22 | Stop reason: HOSPADM

## 2020-10-21 RX ORDER — POTASSIUM CHLORIDE 20 MEQ/1
TABLET, EXTENDED RELEASE ORAL
COMMUNITY
End: 2020-10-21

## 2020-10-21 RX ORDER — PANTOPRAZOLE SODIUM 40 MG/1
TABLET, DELAYED RELEASE ORAL
COMMUNITY
End: 2020-10-21

## 2020-10-21 RX ORDER — HEPARIN SODIUM 1000 [USP'U]/ML
5000 INJECTION, SOLUTION INTRAVENOUS; SUBCUTANEOUS
Status: DISCONTINUED | OUTPATIENT
Start: 2020-10-21 | End: 2020-10-22 | Stop reason: HOSPADM

## 2020-10-21 RX ORDER — AMOXICILLIN 250 MG
1 CAPSULE ORAL 2 TIMES DAILY
Status: DISCONTINUED | OUTPATIENT
Start: 2020-10-21 | End: 2020-10-22 | Stop reason: HOSPADM

## 2020-10-21 RX ORDER — SODIUM CHLORIDE 0.9 % (FLUSH) 0.9 %
10 SYRINGE (ML) INJECTION
Status: DISCONTINUED | OUTPATIENT
Start: 2020-10-21 | End: 2020-10-22 | Stop reason: HOSPADM

## 2020-10-21 RX ORDER — ASPIRIN 81 MG/1
81 TABLET ORAL DAILY
Status: DISCONTINUED | OUTPATIENT
Start: 2020-10-22 | End: 2020-10-22 | Stop reason: HOSPADM

## 2020-10-21 RX ORDER — ONDANSETRON 4 MG/1
8 TABLET, ORALLY DISINTEGRATING ORAL EVERY 8 HOURS PRN
Status: DISCONTINUED | OUTPATIENT
Start: 2020-10-21 | End: 2020-10-22 | Stop reason: HOSPADM

## 2020-10-21 RX ORDER — ACETAMINOPHEN 325 MG/1
650 TABLET ORAL EVERY 4 HOURS PRN
Status: DISCONTINUED | OUTPATIENT
Start: 2020-10-21 | End: 2020-10-22 | Stop reason: HOSPADM

## 2020-10-21 RX ORDER — CARVEDILOL 3.12 MG/1
3.12 TABLET ORAL 2 TIMES DAILY
Status: DISCONTINUED | OUTPATIENT
Start: 2020-10-21 | End: 2020-10-22 | Stop reason: HOSPADM

## 2020-10-21 RX ORDER — ACETAMINOPHEN 325 MG/1
650 TABLET ORAL EVERY 8 HOURS PRN
Status: DISCONTINUED | OUTPATIENT
Start: 2020-10-21 | End: 2020-10-22 | Stop reason: HOSPADM

## 2020-10-21 RX ORDER — ENOXAPARIN SODIUM 100 MG/ML
30 INJECTION SUBCUTANEOUS EVERY 24 HOURS
Status: DISCONTINUED | OUTPATIENT
Start: 2020-10-21 | End: 2020-10-22 | Stop reason: HOSPADM

## 2020-10-21 RX ORDER — SODIUM CHLORIDE 9 MG/ML
INJECTION, SOLUTION INTRAVENOUS
Status: DISCONTINUED | OUTPATIENT
Start: 2020-10-21 | End: 2020-10-22

## 2020-10-21 RX ORDER — HYDROCODONE BITARTRATE AND ACETAMINOPHEN 5; 325 MG/1; MG/1
TABLET ORAL
COMMUNITY
End: 2020-10-21

## 2020-10-21 RX ORDER — MUPIROCIN 20 MG/G
OINTMENT TOPICAL 2 TIMES DAILY
Status: DISCONTINUED | OUTPATIENT
Start: 2020-10-21 | End: 2020-10-22 | Stop reason: HOSPADM

## 2020-10-21 RX ORDER — AMLODIPINE BESYLATE 5 MG/1
5 TABLET ORAL 2 TIMES DAILY
Status: DISCONTINUED | OUTPATIENT
Start: 2020-10-21 | End: 2020-10-22 | Stop reason: HOSPADM

## 2020-10-21 RX ORDER — HYDRALAZINE HYDROCHLORIDE 25 MG/1
25 TABLET, FILM COATED ORAL EVERY 8 HOURS PRN
Status: DISCONTINUED | OUTPATIENT
Start: 2020-10-21 | End: 2020-10-22 | Stop reason: HOSPADM

## 2020-10-21 RX ORDER — AZITHROMYCIN 250 MG/1
TABLET, FILM COATED ORAL
COMMUNITY
End: 2020-10-21

## 2020-10-21 RX ORDER — ONDANSETRON 4 MG/1
TABLET, FILM COATED ORAL
COMMUNITY
End: 2020-10-21

## 2020-10-21 RX ORDER — SEVELAMER CARBONATE 800 MG/1
2400 TABLET, FILM COATED ORAL
COMMUNITY
Start: 2020-10-09

## 2020-10-21 RX ORDER — HEPARIN SODIUM 5000 [USP'U]/ML
5000 INJECTION, SOLUTION INTRAVENOUS; SUBCUTANEOUS
Status: DISCONTINUED | OUTPATIENT
Start: 2020-10-21 | End: 2020-10-21

## 2020-10-21 RX ORDER — TALC
6 POWDER (GRAM) TOPICAL NIGHTLY PRN
Status: DISCONTINUED | OUTPATIENT
Start: 2020-10-21 | End: 2020-10-22 | Stop reason: HOSPADM

## 2020-10-21 RX ADMIN — CARVEDILOL 3.12 MG: 3.12 TABLET, FILM COATED ORAL at 10:10

## 2020-10-21 RX ADMIN — ALTEPLASE 4 MG: 2.2 INJECTION, POWDER, LYOPHILIZED, FOR SOLUTION INTRAVENOUS at 09:10

## 2020-10-21 RX ADMIN — AMLODIPINE BESYLATE 5 MG: 5 TABLET ORAL at 10:10

## 2020-10-21 RX ADMIN — IODIXANOL 100 ML: 320 INJECTION, SOLUTION INTRAVASCULAR at 01:10

## 2020-10-21 RX ADMIN — ASPIRIN 325 MG ORAL TABLET 325 MG: 325 PILL ORAL at 04:10

## 2020-10-21 RX ADMIN — POTASSIUM BICARBONATE 25 MEQ: 977.5 TABLET, EFFERVESCENT ORAL at 04:10

## 2020-10-21 RX ADMIN — HEPARIN SODIUM 5000 UNITS: 1000 INJECTION, SOLUTION INTRAVENOUS; SUBCUTANEOUS at 09:10

## 2020-10-21 RX ADMIN — SENNOSIDES AND DOCUSATE SODIUM 1 TABLET: 8.6; 5 TABLET ORAL at 10:10

## 2020-10-21 NOTE — HPI
65-year-old female past medical history of hypertension, HFpEF, ESRD (MWF) presented to ED complaining of progressively worsening shortness of breath with exertion and orthopnea.  Denies associated chest pain, palpitations, cough, fever, chills. Reports slight bl le edema. Sob improves with rest.  She went to HD today. She has been compliant with meds and diet. Recently started HD in late September.     In the ED  Labs: H&H 8.9/26.2 baseline  Hb 10ish, potassium 2.8, BNP 3976, trop 0.061, covid negative  Imaging: cta chest negative for pe, + b/l Pleural effusions  EKG, personally reviewed, SR, occasional pvcs, prolonged   CXR, personally reviewed, + pleural effusions

## 2020-10-21 NOTE — ED NOTES
"First contact with pt when she is brought to room. She is here today because she feels short of breath. She describes the shortness of breath as "it's like I cant get enough oxygen." She is 98% on RA. She says this started on Monday after dialysis and has continued. She says the SOB is worse with exertion and improves when she is still. She denies fever and cough. She completed 4 hours of dialysis at [facility]. EKG in progress at this time.  "

## 2020-10-21 NOTE — FIRST PROVIDER EVALUATION
Medical screening exam completed.  I have conducted a focused provider triage encounter, findings are as follows:    Brief history of present illness:  SOB for 3 days  Pt just finished dialysis D/C from here 10/2 for new onset end stage renal disease pt reports clot  By port She is not on blood thinners    There were no vitals filed for this visit.    Pertinent physical exam:  HRR lungs CTA  Pt appears mildly SOB She does not appear in any distress    Brief workup plan:  Cardiac work up     Preliminary workup initiated; this workup will be continued and followed by the physician or advanced practice provider that is assigned to the patient when roomed.

## 2020-10-21 NOTE — H&P
[facility] Medicine  History & Physical    Patient Name: Latricia Higgins  MRN: [id number]  Admission Date: 10/21/2020  Attending Physician: Shaun Sam MD   Primary Care Provider: Ralf Ham MD         Patient information was obtained from patient, past medical records and ER records.     Subjective:     Principal Problem:Volume overload    Chief Complaint:   Chief Complaint   Patient presents with    Shortness of Breath     had dialysis today        HPI: 65-year-old female past medical history of hypertension, HFpEF, ESRD (MWF) presented to ED complaining of progressively worsening shortness of breath with exertion and orthopnea.  Denies associated chest pain, palpitations, cough, fever, chills. Reports slight bl le edema. Sob improves with rest.  She went to HD today. She has been compliant with meds and diet. Recently started HD in late September.     In the ED  Labs: H&H 8.9/26.2 baseline  Hb 10ish, potassium 2.8, BNP 3976, trop 0.061, covid negative  Imaging: cta chest negative for pe, + b/l Pleural effusions  EKG, personally reviewed, SR, occasional pvcs, prolonged   CXR, personally reviewed, + pleural effusions      Past Medical History:   Diagnosis Date    HLD (hyperlipidemia)     Hypertension     Renal disorder        Past Surgical History:   Procedure Laterality Date    AORTOGRAPHY WITH SERIALOGRAPHY N/A 9/30/2020    Procedure: co2 renal angio;  Surgeon: Lance Bustamante MD;  Location: [facility] CATH/EP LAB;  Service: Vascular;  Laterality: N/A;    APPENDECTOMY      INSERTION OF TUNNELED CENTRAL VENOUS HEMODIALYSIS CATHETER  10/2/2020    Procedure: INSERTION, CATHETER, CENTRAL VENOUS, HEMODIALYSIS, TUNNELED;  Surgeon: Ali Khoobehi, MD;  Location: [facility] OR;  Service: Vascular;;    NO PAST SURGERIES      PLACEMENT OF DIALYSIS ACCESS Right 9/30/2020    Procedure: Insertion, Dialysis Access;  Surgeon: Lance Bustamante MD;  Location: [facility] CATH/EP LAB;  Service: Vascular;   Laterality: Right;    TUBAL LIGATION         Review of patient's allergies indicates:  No Known Allergies    No current facility-administered medications on file prior to encounter.      Current Outpatient Medications on File Prior to Encounter   Medication Sig    amLODIPine (NORVASC) 5 MG tablet Take 1 tablet (5 mg total) by mouth 2 (two) times daily.    aspirin (ECOTRIN) 81 MG EC tablet Take 81 mg by mouth once daily.    atorvastatin (LIPITOR) 40 MG tablet Take 40 mg by mouth once daily.    carvediloL (COREG) 3.125 MG tablet Take 1 tablet (3.125 mg total) by mouth 2 (two) times daily.    sevelamer carbonate (RENVELA) 800 mg Tab Take 800 mg by mouth 3 (three) times daily with meals.    [DISCONTINUED] azithromycin (Z-NATHALY) 250 MG tablet azithromycin 250 mg tablet    [DISCONTINUED] HYDROcodone-acetaminophen (NORCO) 5-325 mg per tablet hydrocodone 5 mg-acetaminophen 325 mg tablet    [DISCONTINUED] loperamide (IMODIUM) 2 mg capsule Take 2 mg by mouth 4 (four) times daily as needed for Diarrhea.    [DISCONTINUED] ondansetron (ZOFRAN) 4 MG tablet ondansetron HCl 4 mg tablet    [DISCONTINUED] pantoprazole (PROTONIX) 40 MG tablet pantoprazole 40 mg tablet,delayed release    [DISCONTINUED] potassium chloride SA (KLOR-CON M20) 20 MEQ tablet Klor-Con M20 mEq tablet,extended release     Family History     Problem Relation (Age of Onset)    Cancer Mother    Heart disease Father        Tobacco Use    Smoking status: Former Smoker     Packs/day: 0.25     Years: 40.00     Pack years: 10.00     Quit date: 2020     Years since quittin.0   Substance and Sexual Activity    Alcohol use: Not Currently    Drug use: Not Currently    Sexual activity: Not on file     Review of Systems   Constitutional: Negative for chills and fever.   HENT: Negative for congestion, rhinorrhea and sore throat.    Eyes: Negative for photophobia.   Respiratory: Positive for shortness of breath. Negative for chest tightness and wheezing.     Cardiovascular: Negative for chest pain and leg swelling.   Gastrointestinal: Negative for abdominal pain, constipation, diarrhea, nausea and vomiting.   Endocrine: Negative for polyuria.   Genitourinary: Negative for dysuria and urgency.   Musculoskeletal: Negative for arthralgias and myalgias.   Skin: Negative for rash.   Neurological: Negative for dizziness, weakness and headaches.   Psychiatric/Behavioral: Negative for behavioral problems. The patient is not nervous/anxious.      Objective:     Vital Signs (Most Recent):  Temp: 98 °F (36.7 °C) (10/21/20 1111)  Pulse: 78 (10/21/20 1606)  Resp: (!) 31 (10/21/20 1606)  BP: (!) 173/83 (10/21/20 1606)  SpO2: 96 % (10/21/20 1606) Vital Signs (24h Range):  Temp:  [98 °F (36.7 °C)] 98 °F (36.7 °C)  Pulse:  [72-91] 78  Resp:  [19-33] 31  SpO2:  [95 %-98 %] 96 %  BP: (166-185)/(79-92) 173/83     Weight: 79 kg (174 lb 2.6 oz)  Body mass index is 28.11 kg/m².    Physical Exam  Vitals signs and nursing note reviewed.   Constitutional:       Appearance: She is well-developed.   HENT:      Head: Normocephalic and atraumatic.      Nose: Nose normal.   Eyes:      Conjunctiva/sclera: Conjunctivae normal.      Pupils: Pupils are equal, round, and reactive to light.   Neck:      Musculoskeletal: Normal range of motion and neck supple.      Thyroid: No thyromegaly.      Vascular: No JVD.   Cardiovascular:      Rate and Rhythm: Normal rate and regular rhythm.      Heart sounds: Normal heart sounds. No murmur. No friction rub. No gallop.    Pulmonary:      Effort: Pulmonary effort is normal. No respiratory distress.      Breath sounds: Rales (bilateral bases) present. No wheezing.      Comments: Room air  Abdominal:      General: Bowel sounds are normal. There is no distension.      Palpations: Abdomen is soft.      Tenderness: There is no abdominal tenderness.   Musculoskeletal: Normal range of motion.      Right lower leg: Edema present.      Left lower leg: Edema present.    Skin:     General: Skin is warm and dry.   Neurological:      General: No focal deficit present.      Mental Status: She is alert and oriented to person, place, and time.      Deep Tendon Reflexes: Reflexes are normal and symmetric.   Psychiatric:         Mood and Affect: Mood normal.         Behavior: Behavior normal.            Significant Labs:   CBC:   Recent Labs   Lab 10/21/20  1205   WBC 6.39   HGB 8.9*   HCT 26.2*        CMP:   Recent Labs   Lab 10/21/20  1205      K 2.8*   CL 98   CO2 29   GLU 95   BUN 9   CREATININE 3.8*   CALCIUM 8.7   PROT 6.6   ALBUMIN 3.1*   BILITOT 1.1*   ALKPHOS 77   AST 15   ALT 8*   ANIONGAP 13   EGFRNONAA 11.8*     Cardiac Markers:   Recent Labs   Lab 10/21/20  1205   BNP 3,976*     Troponin:   Recent Labs   Lab 10/21/20  1205   TROPONINI 0.061*     All pertinent labs within the past 24 hours have been reviewed.    Significant Imaging: I have reviewed and interpreted all pertinent imaging results/findings within the past 24 hours.   Imaging Results          CTA Chest Non-Coronary (PE Study) (Final result)  Result time 10/21/20 14:02:18    Final result by Merrill Jarrell IV, MD (10/21/20 14:02:18)                 Narrative:    All CT scans at this facility use dose modulation, degenerative  reconstruction  and/or weight-based dosing when appropriate to reduce  radiation dose to as low as reasonably achievable.    CTA chest    CLINICAL HISTORY: Chest pain, shortness of breath.    The study was performed with thin sections with subsequent 3-D  reformations  and reconstructions at multiple planes with images  permanently stored in the PACS system.    The examination utilizes 100 cc of intravenous nonionic contrast  material.    There is appropriate enhancement of the main pulmonary arteries and  major branch vessels. There are no findings of acute pulmonary  thromboembolism.    The heart is at the upper end of normal range in size. There is a  small amount of pericardial  fluid. There is fusiform ectasia of the  ascending aorta measuring up to 4.3 cm transversely. Scattered  atheromatous changes are noted in the aorta and branch vessels  including the coronary arteries. A right-sided central venous catheter  via RIJ approach has its tip within the superior vena cava.    There are mildly prominent mediastinal lymph nodes in the subcarinal  region. There are mildly prominent right hilar lymph nodes. There are  additional scattered normal sized lymph nodes. While potentially  reactive in etiology, short-term CT follow-up is recommended to assess  stability or resolution and exclude a neoplastic etiology.    There are small bilateral dependent pleural effusions and associated  atelectatic changes in the lung bases.    There are underlying emphysematous changes. There is diffuse pulmonary  interstitial prominence in keeping with pulmonary interstitial edema.  There are some mild parahilar and lower lung zone groundglass  opacities which could reflect mild changes of pulmonary alveolar  edema.    A 6 mm noncalcified nodule located within the right upper lobe  anteriorly has slightly spiculated margins. Developing primary  neoplasm is not excluded. No additional suspicious nodules are  identified.    A low-density mass within the left lobe of the liver likely reflects  an incidental hepatic cyst. There is bilateral diffuse adrenal  enlargement as noted on previous CT. There is marked atrophy of the  left kidney.    IMPRESSION:    No CT evidence of acute pulmonary thromboembolism.    Changes of pulmonary interstitial edema. There are mild scattered  groundglass type opacities which may reflect mild alveolar edema.    There are small bilateral dependent pleural effusions and associated  atelectatic changes, most pronounced in the lung bases.    Emphysematous changes. There is a 6 mm noncalcified right upper lobe  pulmonary nodule (image 110). Developing primary neoplasm is  not  excluded.    Mildly prominent mediastinal and hilar lymph nodes of uncertain  significance. Short-term CT follow-up is recommended at 3 months to  assess stability and/or resolution.    Fusiform dilatation of the ascending aorta measuring a maximum of 4.3  cm.    Arteriosclerosis including coronary arterial calcification.    Multiple additional incidental observations as above.    Electronically Signed by Merrill Jarrell M.D. on 10/21/2020 2:22 PM                             X-Ray Chest AP Portable (Final result)  Result time 10/21/20 11:38:55    Final result by Merrill Jarrell IV, MD (10/21/20 11:38:55)                 Narrative:    Chest, single view    HISTORY: Dyspnea, chest pain.    Comparison 10/2/2020.    There is persistent mild diffuse interstitial prominence. There has  been interval development of lower lung zone groundglass opacities.  There are additional elements of probable basilar atelectasis. There  has been development of small bilateral pleural effusions, left  greater than right. The upper lung zones are appropriately aerated.    The heart size is within normal limits. There is mild central  pulmonary vascular prominence. A dual-lumen right-sided central venous  catheter is unchanged in position.    IMPRESSION:    Diffuse pulmonary interstitial prominence and interval development of  lower lung zone groundglass opacities and probable atelectasis.    Interval development of small bilateral pleural effusions, left larger  than right.    Electronically Signed by Merrill Jarrell M.D. on 10/21/2020 11:45 AM                              echo from July  Transthoracic echo (TTE) complete  The estimated PA systolic pressure is 24 mmHg.  There is mild left ventricular concentric hypertrophy.  The left ventricle is normal in size with normal systolic function. The estimated ejection fraction is 70%.  Indeterminate diastolic function.  Normal right ventricular systolic function.  Mild left atrial  enlargement.    Assessment/Plan:     Active Hospital Problems    Diagnosis  POA    *Volume overload [E87.70]  Yes    ESRD (end stage renal disease) [N18.6]  Yes    Elevated troponin [R77.8]  Yes    HTN (hypertension) [I10]  Yes    HLD (hyperlipidemia) [E78.5]  Yes        ESRD   Volume overload  - HD MWF at [facility] with Dr. Stevens, access right subclavian cath,  does not make urine  - Nephrology consulted, for HD/UF  - last HD today  - c/w home medications  - elevated BNP  - prn o2 for O2 sats > 92  currently stable and sating well on room air    Hypokalemia  40 units repleted in the ED  Repeat BMP later this evening  Cautious replacement given end-stage renal disease    Elevated trop  likely demand in setting of volume overload and esrd  Trending  Remote tele          VTE Risk Mitigation (From admission, onward)         Ordered     enoxaparin injection 30 mg  Every 24 hours      10/21/20 1634     IP VTE HIGH RISK PATIENT  Once      10/21/20 1634     Place sequential compression device  Until discontinued      10/21/20 1634                   Kelsey Westfall DO  Department of Hospital Medicine   Formerly [facility]

## 2020-10-21 NOTE — ED PROVIDER NOTES
Encounter Date: 10/21/2020       History     Chief Complaint   Patient presents with    Shortness of Breath     had dialysis today     65-year-old female with history of end-stage renal disease presented emergency department with shortness of breath with minimal exertion over the past 3 days.  Patient was recently diagnosed of having  superficial blood clots in the extremities.  Patient denies any chest pain.  Denies nausea vomiting or abdominal pain or weakness or numbness.  Denies fever or chills.  Patient received dialysis this morning.  Patient still short of breath so came here        Review of patient's allergies indicates:  No Known Allergies  Past Medical History:   Diagnosis Date    HLD (hyperlipidemia)     Hypertension      Past Surgical History:   Procedure Laterality Date    AORTOGRAPHY WITH SERIALOGRAPHY N/A 9/30/2020    Procedure: co2 renal angio;  Surgeon: Lance Bustamante MD;  Location: [facility] CATH/EP LAB;  Service: Vascular;  Laterality: N/A;    INSERTION OF TUNNELED CENTRAL VENOUS HEMODIALYSIS CATHETER  10/2/2020    Procedure: INSERTION, CATHETER, CENTRAL VENOUS, HEMODIALYSIS, TUNNELED;  Surgeon: Ali Khoobehi, MD;  Location: [facility] OR;  Service: Vascular;;    NO PAST SURGERIES      PLACEMENT OF DIALYSIS ACCESS Right 9/30/2020    Procedure: Insertion, Dialysis Access;  Surgeon: Lance Bustamante MD;  Location: [facility] CATH/EP LAB;  Service: Vascular;  Laterality: Right;     Family History   Problem Relation Age of Onset    Cancer Mother     Heart disease Father      Social History     Tobacco Use    Smoking status: Unknown If Ever Smoked   Substance Use Topics    Alcohol use: Not on file    Drug use: Not on file     Review of Systems   Constitutional: Negative.    HENT: Negative.    Eyes: Negative.    Respiratory: Positive for shortness of breath.    Cardiovascular: Negative.  Negative for chest pain.   Gastrointestinal: Negative.    Endocrine: Negative.    Genitourinary: Negative.     Musculoskeletal: Negative.    Skin: Negative.    Allergic/Immunologic: Negative.    Neurological: Negative.    Hematological: Negative.    Psychiatric/Behavioral: Negative.    All other systems reviewed and are negative.      Physical Exam     Initial Vitals [10/21/20 1111]   BP Pulse Resp Temp SpO2   (!) 175/92 91 20 98 °F (36.7 °C) 95 %      MAP       --         Physical Exam    Nursing note and vitals reviewed.  Constitutional: She appears well-developed and well-nourished.   HENT:   Head: Normocephalic and atraumatic.   Nose: Nose normal.   Mouth/Throat: Oropharynx is clear and moist.   Eyes: Conjunctivae and EOM are normal. Pupils are equal, round, and reactive to light.   Neck: Normal range of motion. Neck supple. No thyromegaly present. No tracheal deviation present. No JVD present.   Cardiovascular: Normal rate, regular rhythm, normal heart sounds and intact distal pulses.   No murmur heard.  Pulmonary/Chest: Breath sounds normal. No stridor. No respiratory distress. She has no wheezes. She has no rales.   Dialysis catheter in place   Abdominal: Soft. Bowel sounds are normal. There is no abdominal tenderness.   Musculoskeletal: Normal range of motion. No tenderness or edema.   Neurological: She is alert and oriented to person, place, and time. She has normal strength. No sensory deficit. GCS score is 15. GCS eye subscore is 4. GCS verbal subscore is 5. GCS motor subscore is 6.   Skin: Skin is warm. Capillary refill takes less than 2 seconds.   Psychiatric: She has a normal mood and affect. Thought content normal.         ED Course   Procedures  Labs Reviewed   CBC W/ AUTO DIFFERENTIAL - Abnormal; Notable for the following components:       Result Value    RBC 3.17 (*)     Hemoglobin 8.9 (*)     Hematocrit 26.2 (*)     Immature Granulocytes 1.7 (*)     Immature Grans (Abs) 0.11 (*)     Lymph # 0.9 (*)     Gran% 73.6 (*)     Lymph% 14.7 (*)     All other components within normal limits   COMPREHENSIVE  METABOLIC PANEL - Abnormal; Notable for the following components:    Potassium 2.8 (*)     Creatinine 3.8 (*)     Albumin 3.1 (*)     Total Bilirubin 1.1 (*)     ALT 8 (*)     eGFR if  13.6 (*)     eGFR if non  11.8 (*)     All other components within normal limits    Narrative:     Potassium, Troponin critical result(s) repeated. Called and verbal   readback obtained from Tamiko Lopez RN/ED by JBElvis 10/21/2020 12:59   TROPONIN I - Abnormal; Notable for the following components:    Troponin I 0.061 (*)     All other components within normal limits    Narrative:     Potassium, Troponin critical result(s) repeated. Called and verbal   readback obtained from Tamiko Lopez RN/ED by JBElvis 10/21/2020 12:59   B-TYPE NATRIURETIC PEPTIDE - Abnormal; Notable for the following components:    BNP 3,976 (*)     All other components within normal limits   PROTIME-INR   SARS-COV-2 RNA AMPLIFICATION, QUAL   TYPE & SCREEN     EKG Readings: (Independently Interpreted)   Initial Reading: No STEMI. Ectopy: No Ectopy. Conduction: Normal.   QT prolongation noted     ECG Results          EKG 12-lead (In process)  Result time 10/21/20 13:23:34    In process by Interface, Lab In [facility] (10/21/20 13:23:34)                 Narrative:    Test Reason : R07.9,    Vent. Rate : 089 BPM     Atrial Rate : 089 BPM     P-R Int : 152 ms          QRS Dur : 094 ms      QT Int : 464 ms       P-R-T Axes : 027 007 -01 degrees     QTc Int : 564 ms    Sinus rhythm with frequent and consecutive Premature ventricular complexes  Possible Left atrial enlargement  Prolonged QT  Abnormal ECG  When compared with ECG of 27-SEP-2020 10:28,  Premature ventricular complexes are now Present  QT has lengthened    Referred By: [email or web address]   SELF           Confirmed By:                             Imaging Results          CTA Chest Non-Coronary (PE Study) (Final result)  Result time 10/21/20 14:02:18    Final result by Merrill Jarrell IV, MD (10/21/20  14:02:18)                 Narrative:    All CT scans at this facility use dose modulation, degenerative  reconstruction  and/or weight-based dosing when appropriate to reduce  radiation dose to as low as reasonably achievable.    CTA chest    CLINICAL HISTORY: Chest pain, shortness of breath.    The study was performed with thin sections with subsequent 3-D  reformations  and reconstructions at multiple planes with images  permanently stored in the PACS system.    The examination utilizes 100 cc of intravenous nonionic contrast  material.    There is appropriate enhancement of the main pulmonary arteries and  major branch vessels. There are no findings of acute pulmonary  thromboembolism.    The heart is at the upper end of normal range in size. There is a  small amount of pericardial fluid. There is fusiform ectasia of the  ascending aorta measuring up to 4.3 cm transversely. Scattered  atheromatous changes are noted in the aorta and branch vessels  including the coronary arteries. A right-sided central venous catheter  via RIJ approach has its tip within the superior vena cava.    There are mildly prominent mediastinal lymph nodes in the subcarinal  region. There are mildly prominent right hilar lymph nodes. There are  additional scattered normal sized lymph nodes. While potentially  reactive in etiology, short-term CT follow-up is recommended to assess  stability or resolution and exclude a neoplastic etiology.    There are small bilateral dependent pleural effusions and associated  atelectatic changes in the lung bases.    There are underlying emphysematous changes. There is diffuse pulmonary  interstitial prominence in keeping with pulmonary interstitial edema.  There are some mild parahilar and lower lung zone groundglass  opacities which could reflect mild changes of pulmonary alveolar  edema.    A 6 mm noncalcified nodule located within the right upper lobe  anteriorly has slightly spiculated margins.  Developing primary  neoplasm is not excluded. No additional suspicious nodules are  identified.    A low-density mass within the left lobe of the liver likely reflects  an incidental hepatic cyst. There is bilateral diffuse adrenal  enlargement as noted on previous CT. There is marked atrophy of the  left kidney.    IMPRESSION:    No CT evidence of acute pulmonary thromboembolism.    Changes of pulmonary interstitial edema. There are mild scattered  groundglass type opacities which may reflect mild alveolar edema.    There are small bilateral dependent pleural effusions and associated  atelectatic changes, most pronounced in the lung bases.    Emphysematous changes. There is a 6 mm noncalcified right upper lobe  pulmonary nodule (image 110). Developing primary neoplasm is not  excluded.    Mildly prominent mediastinal and hilar lymph nodes of uncertain  significance. Short-term CT follow-up is recommended at 3 months to  assess stability and/or resolution.    Fusiform dilatation of the ascending aorta measuring a maximum of 4.3  cm.    Arteriosclerosis including coronary arterial calcification.    Multiple additional incidental observations as above.    Electronically Signed by Merrill Jarrell M.D. on 10/21/2020 2:22 PM                             X-Ray Chest AP Portable (Final result)  Result time 10/21/20 11:38:55    Final result by Merrill Jarrell IV, MD (10/21/20 11:38:55)                 Narrative:    Chest, single view    HISTORY: Dyspnea, chest pain.    Comparison 10/2/2020.    There is persistent mild diffuse interstitial prominence. There has  been interval development of lower lung zone groundglass opacities.  There are additional elements of probable basilar atelectasis. There  has been development of small bilateral pleural effusions, left  greater than right. The upper lung zones are appropriately aerated.    The heart size is within normal limits. There is mild central  pulmonary vascular prominence. A  dual-lumen right-sided central venous  catheter is unchanged in position.    IMPRESSION:    Diffuse pulmonary interstitial prominence and interval development of  lower lung zone groundglass opacities and probable atelectasis.    Interval development of small bilateral pleural effusions, left larger  than right.    Electronically Signed by Merrill Jarrell M.D. on 10/21/2020 11:45 AM                            X-Rays:   Independently Interpreted Readings:   Other Readings:  Chest x-ray shows pleural effusions and pulmonary vascular congestion    Medical Decision Making:   Differential Diagnosis:   65-year-old female presented emergency department with shortness of breath with minimal exertion.  Patient does have evidence of fluid overload.  Patient received dialysis today.  CT scan of the chest done as patient recently had evidence of clots and CT scan did not show evidence of pulmonary emboli.  Patient noted to be anemic.  Patient likely would benefit from dialysis with blood transfusion.  Hospital Medicine consulted for evaluation for admission and further management.  Patient received blood transfusion during dialysis  Clinical Tests:   Lab Tests: Reviewed  Radiological Study: Reviewed  Medical Tests: Reviewed                             Clinical Impression:       ICD-10-CM ICD-9-CM   1. SOB (shortness of breath)  R06.02 786.05   2. Symptomatic anemia  D64.9 285.9   3. End stage renal disease  N18.6 585.6   4. Acute diastolic heart failure  I50.31 428.31                          ED Disposition Condition    Observation                             Shaun Sam MD  10/21/20 [id number]

## 2020-10-21 NOTE — SUBJECTIVE & OBJECTIVE
Past Medical History:   Diagnosis Date    HLD (hyperlipidemia)     Hypertension     Renal disorder        Past Surgical History:   Procedure Laterality Date    AORTOGRAPHY WITH SERIALOGRAPHY N/A 9/30/2020    Procedure: co2 renal angio;  Surgeon: Lance Bustamante MD;  Location: [facility] CATH/EP LAB;  Service: Vascular;  Laterality: N/A;    APPENDECTOMY      INSERTION OF TUNNELED CENTRAL VENOUS HEMODIALYSIS CATHETER  10/2/2020    Procedure: INSERTION, CATHETER, CENTRAL VENOUS, HEMODIALYSIS, TUNNELED;  Surgeon: Ali Khoobehi, MD;  Location: [facility] OR;  Service: Vascular;;    NO PAST SURGERIES      PLACEMENT OF DIALYSIS ACCESS Right 9/30/2020    Procedure: Insertion, Dialysis Access;  Surgeon: Lance Bustamante MD;  Location: [facility] CATH/EP LAB;  Service: Vascular;  Laterality: Right;    TUBAL LIGATION         Review of patient's allergies indicates:  No Known Allergies    No current facility-administered medications on file prior to encounter.      Current Outpatient Medications on File Prior to Encounter   Medication Sig    amLODIPine (NORVASC) 5 MG tablet Take 1 tablet (5 mg total) by mouth 2 (two) times daily.    aspirin (ECOTRIN) 81 MG EC tablet Take 81 mg by mouth once daily.    atorvastatin (LIPITOR) 40 MG tablet Take 40 mg by mouth once daily.    carvediloL (COREG) 3.125 MG tablet Take 1 tablet (3.125 mg total) by mouth 2 (two) times daily.    sevelamer carbonate (RENVELA) 800 mg Tab Take 800 mg by mouth 3 (three) times daily with meals.    [DISCONTINUED] azithromycin (Z-NATHALY) 250 MG tablet azithromycin 250 mg tablet    [DISCONTINUED] HYDROcodone-acetaminophen (NORCO) 5-325 mg per tablet hydrocodone 5 mg-acetaminophen 325 mg tablet    [DISCONTINUED] loperamide (IMODIUM) 2 mg capsule Take 2 mg by mouth 4 (four) times daily as needed for Diarrhea.    [DISCONTINUED] ondansetron (ZOFRAN) 4 MG tablet ondansetron HCl 4 mg tablet    [DISCONTINUED] pantoprazole (PROTONIX) 40 MG tablet pantoprazole 40 mg  tablet,delayed release    [DISCONTINUED] potassium chloride SA (KLOR-CON M20) 20 MEQ tablet Klor-Con M20 mEq tablet,extended release     Family History     Problem Relation (Age of Onset)    Cancer Mother    Heart disease Father        Tobacco Use    Smoking status: Former Smoker     Packs/day: 0.25     Years: 40.00     Pack years: 10.00     Quit date: 2020     Years since quittin.0   Substance and Sexual Activity    Alcohol use: Not Currently    Drug use: Not Currently    Sexual activity: Not on file     Review of Systems   Constitutional: Negative for chills and fever.   HENT: Negative for congestion, rhinorrhea and sore throat.    Eyes: Negative for photophobia.   Respiratory: Positive for shortness of breath. Negative for chest tightness and wheezing.    Cardiovascular: Negative for chest pain and leg swelling.   Gastrointestinal: Negative for abdominal pain, constipation, diarrhea, nausea and vomiting.   Endocrine: Negative for polyuria.   Genitourinary: Negative for dysuria and urgency.   Musculoskeletal: Negative for arthralgias and myalgias.   Skin: Negative for rash.   Neurological: Negative for dizziness, weakness and headaches.   Psychiatric/Behavioral: Negative for behavioral problems. The patient is not nervous/anxious.      Objective:     Vital Signs (Most Recent):  Temp: 98 °F (36.7 °C) (10/21/20 1111)  Pulse: 78 (10/21/20 1606)  Resp: (!) 31 (10/21/20 1606)  BP: (!) 173/83 (10/21/20 1606)  SpO2: 96 % (10/21/20 1606) Vital Signs (24h Range):  Temp:  [98 °F (36.7 °C)] 98 °F (36.7 °C)  Pulse:  [72-91] 78  Resp:  [19-33] 31  SpO2:  [95 %-98 %] 96 %  BP: (166-185)/(79-92) 173/83     Weight: 79 kg (174 lb 2.6 oz)  Body mass index is 28.11 kg/m².    Physical Exam  Vitals signs and nursing note reviewed.   Constitutional:       Appearance: She is well-developed.   HENT:      Head: Normocephalic and atraumatic.      Nose: Nose normal.   Eyes:      Conjunctiva/sclera: Conjunctivae normal.       Pupils: Pupils are equal, round, and reactive to light.   Neck:      Musculoskeletal: Normal range of motion and neck supple.      Thyroid: No thyromegaly.      Vascular: No JVD.   Cardiovascular:      Rate and Rhythm: Normal rate and regular rhythm.      Heart sounds: Normal heart sounds. No murmur. No friction rub. No gallop.    Pulmonary:      Effort: Pulmonary effort is normal. No respiratory distress.      Breath sounds: Rales (bilateral bases) present. No wheezing.      Comments: Room air  Abdominal:      General: Bowel sounds are normal. There is no distension.      Palpations: Abdomen is soft.      Tenderness: There is no abdominal tenderness.   Musculoskeletal: Normal range of motion.      Right lower leg: Edema present.      Left lower leg: Edema present.   Skin:     General: Skin is warm and dry.   Neurological:      General: No focal deficit present.      Mental Status: She is alert and oriented to person, place, and time.      Deep Tendon Reflexes: Reflexes are normal and symmetric.   Psychiatric:         Mood and Affect: Mood normal.         Behavior: Behavior normal.            Significant Labs:   CBC:   Recent Labs   Lab 10/21/20  1205   WBC 6.39   HGB 8.9*   HCT 26.2*        CMP:   Recent Labs   Lab 10/21/20  1205      K 2.8*   CL 98   CO2 29   GLU 95   BUN 9   CREATININE 3.8*   CALCIUM 8.7   PROT 6.6   ALBUMIN 3.1*   BILITOT 1.1*   ALKPHOS 77   AST 15   ALT 8*   ANIONGAP 13   EGFRNONAA 11.8*     Cardiac Markers:   Recent Labs   Lab 10/21/20  1205   BNP 3,976*     Troponin:   Recent Labs   Lab 10/21/20  1205   TROPONINI 0.061*     All pertinent labs within the past 24 hours have been reviewed.    Significant Imaging: I have reviewed and interpreted all pertinent imaging results/findings within the past 24 hours.   Imaging Results          CTA Chest Non-Coronary (PE Study) (Final result)  Result time 10/21/20 14:02:18    Final result by Merrill Jarrell IV, MD (10/21/20 14:02:18)                  Narrative:    All CT scans at this facility use dose modulation, degenerative  reconstruction  and/or weight-based dosing when appropriate to reduce  radiation dose to as low as reasonably achievable.    CTA chest    CLINICAL HISTORY: Chest pain, shortness of breath.    The study was performed with thin sections with subsequent 3-D  reformations  and reconstructions at multiple planes with images  permanently stored in the PACS system.    The examination utilizes 100 cc of intravenous nonionic contrast  material.    There is appropriate enhancement of the main pulmonary arteries and  major branch vessels. There are no findings of acute pulmonary  thromboembolism.    The heart is at the upper end of normal range in size. There is a  small amount of pericardial fluid. There is fusiform ectasia of the  ascending aorta measuring up to 4.3 cm transversely. Scattered  atheromatous changes are noted in the aorta and branch vessels  including the coronary arteries. A right-sided central venous catheter  via RIJ approach has its tip within the superior vena cava.    There are mildly prominent mediastinal lymph nodes in the subcarinal  region. There are mildly prominent right hilar lymph nodes. There are  additional scattered normal sized lymph nodes. While potentially  reactive in etiology, short-term CT follow-up is recommended to assess  stability or resolution and exclude a neoplastic etiology.    There are small bilateral dependent pleural effusions and associated  atelectatic changes in the lung bases.    There are underlying emphysematous changes. There is diffuse pulmonary  interstitial prominence in keeping with pulmonary interstitial edema.  There are some mild parahilar and lower lung zone groundglass  opacities which could reflect mild changes of pulmonary alveolar  edema.    A 6 mm noncalcified nodule located within the right upper lobe  anteriorly has slightly spiculated margins. Developing  primary  neoplasm is not excluded. No additional suspicious nodules are  identified.    A low-density mass within the left lobe of the liver likely reflects  an incidental hepatic cyst. There is bilateral diffuse adrenal  enlargement as noted on previous CT. There is marked atrophy of the  left kidney.    IMPRESSION:    No CT evidence of acute pulmonary thromboembolism.    Changes of pulmonary interstitial edema. There are mild scattered  groundglass type opacities which may reflect mild alveolar edema.    There are small bilateral dependent pleural effusions and associated  atelectatic changes, most pronounced in the lung bases.    Emphysematous changes. There is a 6 mm noncalcified right upper lobe  pulmonary nodule (image 110). Developing primary neoplasm is not  excluded.    Mildly prominent mediastinal and hilar lymph nodes of uncertain  significance. Short-term CT follow-up is recommended at 3 months to  assess stability and/or resolution.    Fusiform dilatation of the ascending aorta measuring a maximum of 4.3  cm.    Arteriosclerosis including coronary arterial calcification.    Multiple additional incidental observations as above.    Electronically Signed by Merrill Jarrell M.D. on 10/21/2020 2:22 PM                             X-Ray Chest AP Portable (Final result)  Result time 10/21/20 11:38:55    Final result by Merrill Jarrell IV, MD (10/21/20 11:38:55)                 Narrative:    Chest, single view    HISTORY: Dyspnea, chest pain.    Comparison 10/2/2020.    There is persistent mild diffuse interstitial prominence. There has  been interval development of lower lung zone groundglass opacities.  There are additional elements of probable basilar atelectasis. There  has been development of small bilateral pleural effusions, left  greater than right. The upper lung zones are appropriately aerated.    The heart size is within normal limits. There is mild central  pulmonary vascular prominence. A dual-lumen  right-sided central venous  catheter is unchanged in position.    IMPRESSION:    Diffuse pulmonary interstitial prominence and interval development of  lower lung zone groundglass opacities and probable atelectasis.    Interval development of small bilateral pleural effusions, left larger  than right.    Electronically Signed by Merrill Jarrell M.D. on 10/21/2020 11:45 AM

## 2020-10-22 VITALS
DIASTOLIC BLOOD PRESSURE: 82 MMHG | SYSTOLIC BLOOD PRESSURE: 163 MMHG | HEART RATE: 84 BPM | RESPIRATION RATE: 18 BRPM | OXYGEN SATURATION: 97 % | WEIGHT: 176.38 LBS | BODY MASS INDEX: 28.34 KG/M2 | TEMPERATURE: 98 F | HEIGHT: 66 IN

## 2020-10-22 PROBLEM — R79.89 ELEVATED TROPONIN: Status: RESOLVED | Noted: 2020-10-21 | Resolved: 2020-10-22

## 2020-10-22 PROBLEM — I50.31 ACUTE DIASTOLIC HEART FAILURE: Status: RESOLVED | Noted: 2020-10-21 | Resolved: 2020-10-22

## 2020-10-22 PROBLEM — E87.70 VOLUME OVERLOAD: Status: RESOLVED | Noted: 2020-10-21 | Resolved: 2020-10-22

## 2020-10-22 LAB
ANION GAP SERPL CALC-SCNC: 13 MMOL/L (ref 8–16)
BASOPHILS # BLD AUTO: 0.05 K/UL (ref 0–0.2)
BASOPHILS NFR BLD: 0.8 % (ref 0–1.9)
BUN SERPL-MCNC: 17 MG/DL (ref 8–23)
CALCIUM SERPL-MCNC: 8.6 MG/DL (ref 8.7–10.5)
CHLORIDE SERPL-SCNC: 98 MMOL/L (ref 95–110)
CO2 SERPL-SCNC: 27 MMOL/L (ref 23–29)
CREAT SERPL-MCNC: 5.1 MG/DL (ref 0.5–1.4)
DIFFERENTIAL METHOD: ABNORMAL
EOSINOPHIL # BLD AUTO: 0.1 K/UL (ref 0–0.5)
EOSINOPHIL NFR BLD: 1.2 % (ref 0–8)
ERYTHROCYTE [DISTWIDTH] IN BLOOD BY AUTOMATED COUNT: 13.1 % (ref 11.5–14.5)
EST. GFR  (AFRICAN AMERICAN): 9.5 ML/MIN/1.73 M^2
EST. GFR  (NON AFRICAN AMERICAN): 8.3 ML/MIN/1.73 M^2
ESTIMATED AVG GLUCOSE: 97 MG/DL (ref 68–131)
GLUCOSE SERPL-MCNC: 90 MG/DL (ref 70–110)
HBA1C MFR BLD HPLC: 5 % (ref 4.5–6.2)
HCT VFR BLD AUTO: 28.9 % (ref 37–48.5)
HGB BLD-MCNC: 9.4 G/DL (ref 12–16)
IMM GRANULOCYTES # BLD AUTO: 0.08 K/UL (ref 0–0.04)
IMM GRANULOCYTES NFR BLD AUTO: 1.2 % (ref 0–0.5)
LYMPHOCYTES # BLD AUTO: 1.1 K/UL (ref 1–4.8)
LYMPHOCYTES NFR BLD: 16.8 % (ref 18–48)
MAGNESIUM SERPL-MCNC: 2.5 MG/DL (ref 1.6–2.6)
MCH RBC QN AUTO: 28.1 PG (ref 27–31)
MCHC RBC AUTO-ENTMCNC: 32.5 G/DL (ref 32–36)
MCV RBC AUTO: 86 FL (ref 82–98)
MONOCYTES # BLD AUTO: 0.6 K/UL (ref 0.3–1)
MONOCYTES NFR BLD: 8.4 % (ref 4–15)
NEUTROPHILS # BLD AUTO: 4.7 K/UL (ref 1.8–7.7)
NEUTROPHILS NFR BLD: 71.6 % (ref 38–73)
NRBC BLD-RTO: 0 /100 WBC
PHOSPHATE SERPL-MCNC: 3 MG/DL (ref 2.7–4.5)
PLATELET # BLD AUTO: 207 K/UL (ref 150–350)
PMV BLD AUTO: 10 FL (ref 9.2–12.9)
POTASSIUM SERPL-SCNC: 3.9 MMOL/L (ref 3.5–5.1)
RBC # BLD AUTO: 3.35 M/UL (ref 4–5.4)
SODIUM SERPL-SCNC: 138 MMOL/L (ref 136–145)
TROPONIN I SERPL DL<=0.01 NG/ML-MCNC: 0.06 NG/ML
TROPONIN I SERPL DL<=0.01 NG/ML-MCNC: 0.06 NG/ML
WBC # BLD AUTO: 6.53 K/UL (ref 3.9–12.7)

## 2020-10-22 PROCEDURE — 96375 TX/PRO/DX INJ NEW DRUG ADDON: CPT

## 2020-10-22 PROCEDURE — 83735 ASSAY OF MAGNESIUM: CPT

## 2020-10-22 PROCEDURE — 85025 COMPLETE CBC W/AUTO DIFF WBC: CPT

## 2020-10-22 PROCEDURE — 80048 BASIC METABOLIC PNL TOTAL CA: CPT

## 2020-10-22 PROCEDURE — 96372 THER/PROPH/DIAG INJ SC/IM: CPT | Mod: 59

## 2020-10-22 PROCEDURE — 84100 ASSAY OF PHOSPHORUS: CPT

## 2020-10-22 PROCEDURE — 25000003 PHARM REV CODE 250: Performed by: HOSPITALIST

## 2020-10-22 PROCEDURE — 36415 COLL VENOUS BLD VENIPUNCTURE: CPT

## 2020-10-22 PROCEDURE — 63600175 PHARM REV CODE 636 W HCPCS: Performed by: HOSPITALIST

## 2020-10-22 PROCEDURE — G0378 HOSPITAL OBSERVATION PER HR: HCPCS

## 2020-10-22 PROCEDURE — 63600175 PHARM REV CODE 636 W HCPCS: Performed by: STUDENT IN AN ORGANIZED HEALTH CARE EDUCATION/TRAINING PROGRAM

## 2020-10-22 PROCEDURE — 94761 N-INVAS EAR/PLS OXIMETRY MLT: CPT

## 2020-10-22 PROCEDURE — 84484 ASSAY OF TROPONIN QUANT: CPT

## 2020-10-22 PROCEDURE — 25000003 PHARM REV CODE 250: Performed by: STUDENT IN AN ORGANIZED HEALTH CARE EDUCATION/TRAINING PROGRAM

## 2020-10-22 RX ORDER — POTASSIUM CHLORIDE 20 MEQ/1
40 TABLET, EXTENDED RELEASE ORAL ONCE
Status: COMPLETED | OUTPATIENT
Start: 2020-10-22 | End: 2020-10-22

## 2020-10-22 RX ORDER — SODIUM CHLORIDE 9 MG/ML
INJECTION, SOLUTION INTRAVENOUS ONCE
Status: CANCELLED | OUTPATIENT
Start: 2020-10-22 | End: 2020-10-22

## 2020-10-22 RX ORDER — HYDRALAZINE HYDROCHLORIDE 25 MG/1
25 TABLET, FILM COATED ORAL EVERY 8 HOURS PRN
Qty: 45 TABLET | Refills: 5 | Status: SHIPPED | OUTPATIENT
Start: 2020-10-22 | End: 2020-12-03 | Stop reason: ALTCHOICE

## 2020-10-22 RX ORDER — MAGNESIUM SULFATE HEPTAHYDRATE 40 MG/ML
2 INJECTION, SOLUTION INTRAVENOUS ONCE
Status: COMPLETED | OUTPATIENT
Start: 2020-10-22 | End: 2020-10-22

## 2020-10-22 RX ORDER — SODIUM CHLORIDE 9 MG/ML
INJECTION, SOLUTION INTRAVENOUS
Status: CANCELLED | OUTPATIENT
Start: 2020-10-22

## 2020-10-22 RX ADMIN — SEVELAMER HYDROCHLORIDE 800 MG: 800 TABLET, FILM COATED PARENTERAL at 08:10

## 2020-10-22 RX ADMIN — ASPIRIN 81 MG: 81 TABLET, DELAYED RELEASE ORAL at 08:10

## 2020-10-22 RX ADMIN — SEVELAMER HYDROCHLORIDE 800 MG: 800 TABLET, FILM COATED PARENTERAL at 12:10

## 2020-10-22 RX ADMIN — SENNOSIDES AND DOCUSATE SODIUM 1 TABLET: 8.6; 5 TABLET ORAL at 08:10

## 2020-10-22 RX ADMIN — ATORVASTATIN CALCIUM 40 MG: 40 TABLET, FILM COATED ORAL at 08:10

## 2020-10-22 RX ADMIN — SEVELAMER HYDROCHLORIDE 800 MG: 800 TABLET, FILM COATED PARENTERAL at 05:10

## 2020-10-22 RX ADMIN — ENOXAPARIN SODIUM 30 MG: 100 INJECTION SUBCUTANEOUS at 05:10

## 2020-10-22 RX ADMIN — POTASSIUM CHLORIDE 40 MEQ: 20 TABLET, EXTENDED RELEASE ORAL at 02:10

## 2020-10-22 RX ADMIN — CARVEDILOL 3.12 MG: 3.12 TABLET, FILM COATED ORAL at 08:10

## 2020-10-22 RX ADMIN — AMLODIPINE BESYLATE 5 MG: 5 TABLET ORAL at 08:10

## 2020-10-22 RX ADMIN — MAGNESIUM SULFATE IN WATER 2 G: 40 INJECTION, SOLUTION INTRAVENOUS at 02:10

## 2020-10-22 NOTE — HOSPITAL COURSE
"65-year-old female past medical history of hypertension, HFpEF, ESRD (MWF) presented to ED complaining of progressively worsening shortness of breath with exertion and orthopnea.  Denies associated chest pain, palpitations, cough, fever, chills. Reports slight bl le edema. Sob improves with rest.  She went to HD today. She has been compliant with meds and diet. Recently started HD in late September.      In the ED  Labs: H&H 8.9/26.2 baseline  Hb 10ish, potassium 2.8, BNP 3976, trop 0.061, covid negative  Imaging: cta chest negative for pe, + b/l Pleural effusions  EKG, personally reviewed, SR, occasional pvcs, prolonged   CXR, personally reviewed, + pleural effusions    10/22 Patient had dialysis.She feels "really good". Nephrology has cleared her for discharge.She will attend dialysis in AM  VSS  Lungs: no adventitious  Heart S1S2  Abdo: soft  "

## 2020-10-22 NOTE — NURSING
0220  Informed Dr. Sotelo patient had 12 run Vtach.  New orders per Dr. Sotelo, Magnesium Sulfate 2 g, IV x 1 dose, Potassium Chloride 40 meq, see MAR for admin.

## 2020-10-22 NOTE — PLAN OF CARE
Problem: Fall Injury Risk  Goal: Absence of Fall and Fall-Related Injury  Outcome: Met     Problem: Adult Inpatient Plan of Care  Goal: Plan of Care Review  Outcome: Met  Goal: Patient-Specific Goal (Individualization)  Outcome: Met  Goal: Absence of Hospital-Acquired Illness or Injury  Outcome: Met  Goal: Optimal Comfort and Wellbeing  Outcome: Met  Goal: Readiness for Transition of Care  Outcome: Met  Goal: Rounds/Family Conference  Outcome: Met     Problem: Electrolyte Imbalance (Acute Kidney Injury/Impairment)  Goal: Serum Electrolyte Balance  Outcome: Met     Problem: Fluid Imbalance (Acute Kidney Injury/Impairment)  Goal: Optimal Fluid Balance  Outcome: Met     Problem: Hematologic Alteration (Acute Kidney Injury/Impairment)  Goal: Hemoglobin, Hematocrit and Platelets Within Normal Range  Outcome: Met     Problem: Oral Intake Inadequate (Acute Kidney Injury/Impairment)  Goal: Optimal Nutrition Intake  Outcome: Met     Problem: Renal Function Impairment (Acute Kidney Injury/Impairment)  Goal: Effective Renal Function  Outcome: Met     Problem: Infection  Goal: Infection Symptom Resolution  Outcome: Met     Problem: Device-Related Complication Risk (Hemodialysis)  Goal: Safe, Effective Therapy Delivery  Outcome: Met     Problem: Hemodynamic Instability (Hemodialysis)  Goal: Vital Signs Remain in Desired Range  Outcome: Met     Problem: Infection (Hemodialysis)  Goal: Absence of Infection Signs/Symptoms  Outcome: Met

## 2020-10-22 NOTE — CONSULTS
Nephrology Consult Note        Patient Name: Latricia Higgins  MRN: [id number]    Patient Class: OP- Observation   Admission Date: 10/21/2020  Length of Stay: 0 days  Date of Service: 10/22/2020    Attending Physician: Jose Cruz Engel MD  Primary Care Provider: Ralf Ham MD    Reason for Consult: esrd/anemia/htn/shpt    SUBJECTIVE:     HPI: 65F with HTN, HFpEF, ESRD (HD on MWF) presented with progressively worsening shortness of breath with exertion and orthopnea. Denies associated chest pain, palpitations, cough, fever, chills. Reports slight bl LE edema. Sob improves with rest. She has been compliant with HD, meds and diet. Recently started HD in late September after admission for RICHIE 2/2 clots.      In the ED  Labs: H&H 8.9/26.2 baseline  Hb 10ish, potassium 2.8, BNP 3976, trop 0.061, covid negative  Imaging: cta chest negative for pe, + b/l Pleural effusions  EKG, SR, occasional pvcs, prolonged   CXR, + pleural effusions    Past Medical History:   Diagnosis Date    HLD (hyperlipidemia)     Hypertension     Renal disorder      Past Surgical History:   Procedure Laterality Date    AORTOGRAPHY WITH SERIALOGRAPHY N/A 9/30/2020    Procedure: co2 renal angio;  Surgeon: Lance Bustamante MD;  Location: [facility] CATH/EP LAB;  Service: Vascular;  Laterality: N/A;    APPENDECTOMY      INSERTION OF TUNNELED CENTRAL VENOUS HEMODIALYSIS CATHETER  10/2/2020    Procedure: INSERTION, CATHETER, CENTRAL VENOUS, HEMODIALYSIS, TUNNELED;  Surgeon: Ali Khoobehi, MD;  Location: [facility] OR;  Service: Vascular;;    NO PAST SURGERIES      PLACEMENT OF DIALYSIS ACCESS Right 9/30/2020    Procedure: Insertion, Dialysis Access;  Surgeon: Lance Bustamante MD;  Location: [facility] CATH/EP LAB;  Service: Vascular;  Laterality: Right;    TUBAL LIGATION       Family History   Problem Relation Age of Onset    Cancer Mother     Heart disease Father      Social History     Tobacco Use    Smoking status: Former Smoker     Packs/day: 0.25      Years: 40.00     Pack years: 10.00     Quit date: 2020     Years since quittin.0   Substance Use Topics    Alcohol use: Not Currently    Drug use: Not Currently       Review of patient's allergies indicates:  No Known Allergies    Outpatient meds:  No current facility-administered medications on file prior to encounter.      Current Outpatient Medications on File Prior to Encounter   Medication Sig Dispense Refill    amLODIPine (NORVASC) 5 MG tablet Take 1 tablet (5 mg total) by mouth 2 (two) times daily. 60 tablet 11    aspirin (ECOTRIN) 81 MG EC tablet Take 81 mg by mouth once daily.      atorvastatin (LIPITOR) 40 MG tablet Take 40 mg by mouth once daily.      carvediloL (COREG) 3.125 MG tablet Take 1 tablet (3.125 mg total) by mouth 2 (two) times daily. 60 tablet 11    sevelamer carbonate (RENVELA) 800 mg Tab Take 800 mg by mouth 3 (three) times daily with meals.         Scheduled meds:   sodium chloride 0.9%   Intravenous Once    amLODIPine  5 mg Oral BID    aspirin  81 mg Oral Daily    atorvastatin  40 mg Oral Daily    carvediloL  3.125 mg Oral BID    enoxaparin  30 mg Subcutaneous Q24H    mupirocin   Nasal BID    senna-docusate 8.6-50 mg  1 tablet Oral BID    sevelamer HCL  800 mg Oral TID WM       Infusions:      PRN meds:  sodium chloride 0.9%, acetaminophen, acetaminophen, heparin (porcine), hydrALAZINE, melatonin, ondansetron, sodium chloride 0.9%    Review of Systems:  Review of Systems   Constitutional: Negative for chills, fever, malaise/fatigue and weight loss.   HENT: Negative for hearing loss and nosebleeds.    Eyes: Negative for blurred vision, double vision and photophobia.   Respiratory: Negative for cough, shortness of breath and wheezing.    Cardiovascular: Negative for chest pain, palpitations and leg swelling.   Gastrointestinal: Negative for abdominal pain, constipation, diarrhea, heartburn, nausea and vomiting.   Genitourinary: Negative for dysuria, frequency and  urgency.   Musculoskeletal: Negative for falls, joint pain and myalgias.   Skin: Negative for itching and rash.   Neurological: Negative for dizziness, speech change, focal weakness, loss of consciousness and headaches.   Endo/Heme/Allergies: Does not bruise/bleed easily.   Psychiatric/Behavioral: Negative for depression and substance abuse. The patient is not nervous/anxious.        OBJECTIVE:     Vital Signs and IO (Last 24H):  Temp:  [97 °F (36.1 °C)-98.7 °F (37.1 °C)]   Pulse:  [68-82]   Resp:  [16-31]   BP: (138-192)/(67-98)   SpO2:  [94 %-97 %]   I/O last 3 completed shifts:  In: 500 [Other:500]  Out: 3500 [Other:3500]    Wt Readings from Last 5 Encounters:   10/21/20 80 kg (176 lb 5.9 oz)   10/02/20 86.7 kg (191 lb 2.2 oz)   09/29/20 86.2 kg (190 lb)         Physical Exam:  Physical Exam  Constitutional:       Appearance: She is well-developed. She is not diaphoretic.   HENT:      Head: Normocephalic and atraumatic.   Eyes:      General: No scleral icterus.     Pupils: Pupils are equal, round, and reactive to light.   Neck:      Musculoskeletal: Neck supple.   Cardiovascular:      Rate and Rhythm: Normal rate and regular rhythm.   Pulmonary:      Effort: Pulmonary effort is normal. No respiratory distress.      Breath sounds: No stridor.   Abdominal:      General: There is no distension.      Palpations: Abdomen is soft.   Musculoskeletal: Normal range of motion.         General: No deformity.   Skin:     General: Skin is warm and dry.      Findings: No erythema or rash.   Neurological:      Mental Status: She is alert and oriented to person, place, and time.      Cranial Nerves: No cranial nerve deficit.   Psychiatric:         Behavior: Behavior normal.         Body mass index is 28.47 kg/m².    Laboratory:  Recent Labs   Lab 10/21/20  1205 10/21/20  2030 10/22/20  0510    139 138   K 2.8* 3.4* 3.9   CL 98 96 98   CO2 29 27 27   BUN 9 11 17   CREATININE 3.8* 4.2* 5.1*   ESTGFRAFRICA 13.6* 12.1* 9.5*    EGFRNONAA 11.8* 10.5* 8.3*   GLU 95 87 90       Recent Labs   Lab 10/21/20  1205 10/21/20  2030 10/22/20  0510   CALCIUM 8.7 8.8 8.6*   ALBUMIN 3.1*  --   --    PHOS  --   --  3.0   MG  --   --  2.5             No results for input(s): POCTGLUCOSE in the last 168 hours.    Recent Labs   Lab 10/21/20  2030   Hemoglobin A1C 5.0       Recent Labs   Lab 10/21/20  1205 10/22/20  0510   WBC 6.39 6.53   HGB 8.9* 9.4*   HCT 26.2* 28.9*    207   MCV 83 86   MCHC 34.0 32.5   MONO 8.8  0.6 8.4  0.6       Recent Labs   Lab 10/21/20  1205   BILITOT 1.1*   PROT 6.6   ALBUMIN 3.1*   ALKPHOS 77   ALT 8*   AST 15       Recent Labs   Lab 09/29/20  0903   Color, UA Yellow   Appearance, UA Hazy A   pH, UA 7.0   Specific Gravity, UA 1.010   Protein, UA 3+ A   Glucose, UA Negative   Ketones, UA Negative   Urobilinogen, UA Negative   Bilirubin (UA) Negative   Occult Blood UA 3+ A   Nitrite, UA Negative   RBC, UA 26 H   WBC, UA >100 H   Bacteria Occasional   Hyaline Casts,  A             Microbiology Results (last 7 days)     ** No results found for the last 168 hours. **          ASSESSMENT/PLAN:     Active Hospital Problems    Diagnosis  POA    *Volume overload [E87.70]  Yes    ESRD (end stage renal disease) [N18.6]  Yes    Elevated troponin [R77.8]  Yes    HTN (hypertension) [I10]  Yes    HLD (hyperlipidemia) [E78.5]  Yes      Resolved Hospital Problems    Diagnosis Date Resolved POA    Acute diastolic heart failure [I50.31] 10/21/2020 Yes     ESRD on HD MWF  via cath  SOB  Continue current dialysis prescription.  Next HD on Fri or PRN.  Renal diet - low K, low phos.  No IVs or BP checks on access and/or non-dominant arm.    Anemia of CKD  Hgb and HCT are acceptable. Monitor.  Will provide CAROLE and/or IV iron PRN.    MBD / Secondary HPT  Ca, phos, PTH and vitamin D levels are acceptable.   Phos binders, vitamin D analogues and calcimimetics as needed.    HTN  BP seem controlled.   Tolerate asymptomatic HTN up to  -160.  Continue home meds.  Low sodium diet.    Thank you for allowing us to participate in the care of your patient!   We will follow the patient and provide recommendations as needed.    Bijan Mcgrath MD    [facility]  [address] [place] [address]  [place] [address]    [phone] - tel  [phone] - fax    10/22/2020 3:02 PM

## 2020-10-22 NOTE — DISCHARGE SUMMARY
"[facility] Medicine  Discharge Summary      Patient Name: Latricia Higgins  MRN: [id number]  Admission Date: 10/21/2020  Hospital Length of Stay: 0 days  Discharge Date and Time:  10/22/2020 5:59 PM  Attending Physician: Jose Cruz Engel MD   Discharging Provider: Jose Cruz Engel MD  Primary Care Provider: Ralf Ham MD      HPI:   65-year-old female past medical history of hypertension, HFpEF, ESRD (MWF) presented to ED complaining of progressively worsening shortness of breath with exertion and orthopnea.  Denies associated chest pain, palpitations, cough, fever, chills. Reports slight bl le edema. Sob improves with rest.  She went to HD today. She has been compliant with meds and diet. Recently started HD in late September.     In the ED  Labs: H&H 8.9/26.2 baseline  Hb 10ish, potassium 2.8, BNP 3976, trop 0.061, covid negative  Imaging: cta chest negative for pe, + b/l Pleural effusions  EKG, personally reviewed, SR, occasional pvcs, prolonged   CXR, personally reviewed, + pleural effusions      * No surgery found *      Hospital Course:   65-year-old female past medical history of hypertension, HFpEF, ESRD (MWF) presented to ED complaining of progressively worsening shortness of breath with exertion and orthopnea.  Denies associated chest pain, palpitations, cough, fever, chills. Reports slight bl le edema. Sob improves with rest.  She went to HD today. She has been compliant with meds and diet. Recently started HD in late September.      In the ED  Labs: H&H 8.9/26.2 baseline  Hb 10ish, potassium 2.8, BNP 3976, trop 0.061, covid negative  Imaging: cta chest negative for pe, + b/l Pleural effusions  EKG, personally reviewed, SR, occasional pvcs, prolonged   CXR, personally reviewed, + pleural effusions    10/22 Patient had dialysis.She feels "really good". Nephrology has cleared her for discharge.She will attend dialysis in AM  VSS  Lungs: no adventitious  Heart S1S2  Abdo: soft "     Consults:   Consults (From admission, onward)        Status Ordering Provider     Inpatient consult to Hospitalist  Once     Provider:  Jose Cruz Engel MD    Acknowledged JUDI WILKS     Inpatient consult to Nephrology  Once     Provider:  Eliseo Barnes MD    Completed JUDI WILKS          HLD (hyperlipidemia)  Continue current regimen      HTN (hypertension)  Add hydralazine 25 mg q8h prn sys >170; continue home regimen otherwise      Acute diastolic heart failure-resolved as of 10/22/2020          Final Active Diagnoses:    Diagnosis Date Noted POA    ESRD (end stage renal disease) [N18.6] 10/21/2020 Yes    HTN (hypertension) [I10] 09/27/2020 Yes    HLD (hyperlipidemia) [E78.5] 09/27/2020 Yes      Problems Resolved During this Admission:    Diagnosis Date Noted Date Resolved POA    PRINCIPAL PROBLEM:  Volume overload [E87.70] 10/21/2020 10/22/2020 Yes    Acute diastolic heart failure [I50.31] 10/21/2020 10/22/2020 Yes    Elevated troponin [R77.8] 10/21/2020 10/22/2020 Yes       Discharged Condition: good    Disposition: Home or Self Care    Follow Up:    Patient Instructions:      Diet renal     Activity as tolerated       Significant Diagnostic Studies: Labs:   BMP:   Recent Labs   Lab 10/21/20  1205 10/21/20  2030 10/22/20  0510   GLU 95 87 90    139 138   K 2.8* 3.4* 3.9   CL 98 96 98   CO2 29 27 27   BUN 9 11 17   CREATININE 3.8* 4.2* 5.1*   CALCIUM 8.7 8.8 8.6*   MG  --   --  2.5    and CBC   Recent Labs   Lab 10/21/20  1205 10/22/20  0510   WBC 6.39 6.53   HGB 8.9* 9.4*   HCT 26.2* 28.9*    207       Pending Diagnostic Studies:     None         Medications:  Reconciled Home Medications:      Medication List      START taking these medications    hydrALAZINE 25 MG tablet  Commonly known as: APRESOLINE  Take 1 tablet (25 mg total) by mouth every 8 (eight) hours as needed (sbp > 170).        CONTINUE taking these medications    amLODIPine 5 MG tablet  Commonly known as:  NORVASC  Take 1 tablet (5 mg total) by mouth 2 (two) times daily.     aspirin 81 MG EC tablet  Commonly known as: ECOTRIN  Take 81 mg by mouth once daily.     atorvastatin 40 MG tablet  Commonly known as: LIPITOR  Take 40 mg by mouth once daily.     carvediloL 3.125 MG tablet  Commonly known as: COREG  Take 1 tablet (3.125 mg total) by mouth 2 (two) times daily.     sevelamer carbonate 800 mg Tab  Commonly known as: RENVELA  Take 800 mg by mouth 3 (three) times daily with meals.            Indwelling Lines/Drains at time of discharge:   Lines/Drains/Airways     Central Venous Catheter Line                 Hemodialysis Catheter 10/02/20 1300 right subclavian 20 days                Time spent on the discharge of patient: 32  minutes  Patient was seen and examined on the date of discharge and determined to be suitable for discharge.         Jose Cruz Engel MD  Department of Hospital Medicine  [facility]

## 2020-10-22 NOTE — PLAN OF CARE
Patient lives alone. She uses [facility] on [address]. She has Dialysis MWF at 6:30AM at [facility] on [address]. She has no other needs at this time. CM will continue to follow patient until discharged.        10/22/20 1107   Discharge Assessment   Assessment Type Discharge Planning Assessment   Confirmed/corrected address and phone number on facesheet? Yes   Assessment information obtained from? Patient   Communicated expected length of stay with patient/caregiver yes   Prior to hospitilization cognitive status: Alert/Oriented   Prior to hospitalization functional status: Independent   Current cognitive status: Alert/Oriented   Current Functional Status: Independent   Lives With alone   Able to Return to Prior Arrangements yes   Is patient able to care for self after discharge? Yes   Readmission Within the Last 30 Days current reason for admission unrelated to previous admission   If yes, most recent facility name: [facility]   Patient currently being followed by outpatient case management? No   Patient currently receives any other outside agency services? No   Equipment Currently Used at Home none   Do you have any problems affording any of your prescribed medications? No   Is the patient taking medications as prescribed? yes   Does the patient have transportation home? Yes   Transportation Anticipated family or friend will provide   Dialysis Name and Scheduled days [facility] on [address] 6:30AM-10:30AM MWF   Does the patient receive services at the [facility]mad[facility]? No   Discharge Plan A Home   Discharge Plan B Home   DME Needed Upon Discharge  none   Patient/Family in Agreement with Plan yes   Readmission Questionnaire   At the time of your discharge, did someone talk to you about what your health problems were? Yes   At the time of discharge, did someone talk to you about what to watch out for regarding worsening of your health problem? Yes   At the time of discharge, did someone talk to you about what to  do if you experienced worsening of your health problem? Yes   At the time of discharge, did someone talk to you about which medication to take when you left the hospital and which ones to stop taking? Yes   At the time of discharge, did someone talk to you about when and where to follow up with a doctor after you left the hospital? Yes   What do you believe caused you to be sick enough to be re-admitted? short of breath   How often do you need to have someone help you when you read instructions, pamphlets, or other written material from your doctor or pharmacy? Never   Do you have problems taking your medications as prescribed? No   Do you have any problems affording any of  your prescribed medications? No   Do you have problems obtaining/receiving your medications? No   Does the patient have transportation to healthcare appointments? Yes   Living Arrangements house   Does the patient have family/friends to help with healtcare needs after discharge? yes   Does your caregiver provide all the help you need? Yes   Are you currently feeling confused? No   Are you currently having problems thinking? No   Are you currently having memory problems? No   Have you felt down, depressed, or hopeless? 0   Have you felt little interest or pleasure in doing things? 0   In the last 7 days, my sleep quality was: fair

## 2020-10-22 NOTE — NURSING
UF tx complete, pt tolerated well  Net UF 3L  CVC dressing changed  When disconnecting pt, clot noted in system lines, unable to aspirate venous port  MD notified and cathflo instilled in both lumens  Pt denies complaints

## 2020-10-23 NOTE — PLAN OF CARE
No needs at this time.       10/23/20 0808   Final Note   Assessment Type Final Discharge Note   Anticipated Discharge Disposition Home

## 2020-11-16 ENCOUNTER — HOSPITAL ENCOUNTER (OUTPATIENT)
Facility: HOSPITAL | Age: 65
Discharge: HOME | End: 2020-11-17
Attending: EMERGENCY MEDICINE | Admitting: FAMILY MEDICINE
Payer: MEDICARE

## 2020-11-16 DIAGNOSIS — R07.9 CHEST PAIN: ICD-10-CM

## 2020-11-16 DIAGNOSIS — Z01.818 PREOP EXAMINATION: Primary | ICD-10-CM

## 2020-11-16 DIAGNOSIS — Z49.01 ENCOUNTER FOR FITTING AND ADJUSTMENT OF DIALYSIS CATHETER: ICD-10-CM

## 2020-11-16 LAB
ALBUMIN SERPL BCP-MCNC: 4.5 G/DL (ref 3.5–5.2)
ALP SERPL-CCNC: 104 U/L (ref 55–135)
ALT SERPL W/O P-5'-P-CCNC: 11 U/L (ref 10–44)
ANION GAP SERPL CALC-SCNC: 17 MMOL/L (ref 8–16)
AST SERPL-CCNC: 16 U/L (ref 10–40)
BASOPHILS # BLD AUTO: 0.04 K/UL (ref 0–0.2)
BASOPHILS NFR BLD: 0.8 % (ref 0–1.9)
BILIRUB SERPL-MCNC: 0.9 MG/DL (ref 0.1–1)
BUN SERPL-MCNC: 67 MG/DL (ref 8–23)
CALCIUM SERPL-MCNC: 10.4 MG/DL (ref 8.7–10.5)
CHLORIDE SERPL-SCNC: 92 MMOL/L (ref 95–110)
CO2 SERPL-SCNC: 22 MMOL/L (ref 23–29)
CREAT SERPL-MCNC: 11.4 MG/DL (ref 0.5–1.4)
DIFFERENTIAL METHOD: ABNORMAL
EOSINOPHIL # BLD AUTO: 0.1 K/UL (ref 0–0.5)
EOSINOPHIL NFR BLD: 1.8 % (ref 0–8)
ERYTHROCYTE [DISTWIDTH] IN BLOOD BY AUTOMATED COUNT: 16.5 % (ref 11.5–14.5)
EST. GFR  (AFRICAN AMERICAN): 3.6 ML/MIN/1.73 M^2
EST. GFR  (NON AFRICAN AMERICAN): 3.1 ML/MIN/1.73 M^2
ESTIMATED AVG GLUCOSE: 82 MG/DL (ref 68–131)
GLUCOSE SERPL-MCNC: 95 MG/DL (ref 70–110)
HBA1C MFR BLD HPLC: 4.5 % (ref 4.5–6.2)
HCT VFR BLD AUTO: 34.7 % (ref 37–48.5)
HGB BLD-MCNC: 11.9 G/DL (ref 12–16)
IMM GRANULOCYTES # BLD AUTO: 0.02 K/UL (ref 0–0.04)
IMM GRANULOCYTES NFR BLD AUTO: 0.4 % (ref 0–0.5)
LYMPHOCYTES # BLD AUTO: 1.3 K/UL (ref 1–4.8)
LYMPHOCYTES NFR BLD: 25.6 % (ref 18–48)
MCH RBC QN AUTO: 29.4 PG (ref 27–31)
MCHC RBC AUTO-ENTMCNC: 34.3 G/DL (ref 32–36)
MCV RBC AUTO: 86 FL (ref 82–98)
MONOCYTES # BLD AUTO: 0.5 K/UL (ref 0.3–1)
MONOCYTES NFR BLD: 9.6 % (ref 4–15)
NEUTROPHILS # BLD AUTO: 3 K/UL (ref 1.8–7.7)
NEUTROPHILS NFR BLD: 61.8 % (ref 38–73)
NRBC BLD-RTO: 0 /100 WBC
PLATELET # BLD AUTO: 185 K/UL (ref 150–350)
PMV BLD AUTO: 9.4 FL (ref 9.2–12.9)
POTASSIUM SERPL-SCNC: 4.6 MMOL/L (ref 3.5–5.1)
PROT SERPL-MCNC: 7.9 G/DL (ref 6–8.4)
RBC # BLD AUTO: 4.05 M/UL (ref 4–5.4)
SARS-COV-2 RDRP RESP QL NAA+PROBE: NEGATIVE
SODIUM SERPL-SCNC: 131 MMOL/L (ref 136–145)
TROPONIN I SERPL DL<=0.01 NG/ML-MCNC: 0.04 NG/ML
TROPONIN I SERPL DL<=0.01 NG/ML-MCNC: 0.05 NG/ML
WBC # BLD AUTO: 4.89 K/UL (ref 3.9–12.7)

## 2020-11-16 PROCEDURE — 99285 EMERGENCY DEPT VISIT HI MDM: CPT | Mod: 25,CS

## 2020-11-16 PROCEDURE — 85025 COMPLETE CBC W/AUTO DIFF WBC: CPT

## 2020-11-16 PROCEDURE — 36415 COLL VENOUS BLD VENIPUNCTURE: CPT

## 2020-11-16 PROCEDURE — G0378 HOSPITAL OBSERVATION PER HR: HCPCS | Mod: CS

## 2020-11-16 PROCEDURE — U0002 COVID-19 LAB TEST NON-CDC: HCPCS

## 2020-11-16 PROCEDURE — 84484 ASSAY OF TROPONIN QUANT: CPT

## 2020-11-16 PROCEDURE — 83036 HEMOGLOBIN GLYCOSYLATED A1C: CPT

## 2020-11-16 PROCEDURE — 25000003 PHARM REV CODE 250: Performed by: NURSE PRACTITIONER

## 2020-11-16 PROCEDURE — G0378 HOSPITAL OBSERVATION PER HR: HCPCS

## 2020-11-16 PROCEDURE — 80053 COMPREHEN METABOLIC PANEL: CPT

## 2020-11-16 RX ORDER — ACETAMINOPHEN 325 MG/1
650 TABLET ORAL EVERY 4 HOURS PRN
Status: DISCONTINUED | OUTPATIENT
Start: 2020-11-16 | End: 2020-11-17 | Stop reason: HOSPADM

## 2020-11-16 RX ORDER — IBUPROFEN 200 MG
16 TABLET ORAL
Status: DISCONTINUED | OUTPATIENT
Start: 2020-11-16 | End: 2020-11-17 | Stop reason: HOSPADM

## 2020-11-16 RX ORDER — AMOXICILLIN 250 MG
1 CAPSULE ORAL 2 TIMES DAILY PRN
Status: DISCONTINUED | OUTPATIENT
Start: 2020-11-16 | End: 2020-11-17 | Stop reason: HOSPADM

## 2020-11-16 RX ORDER — IBUPROFEN 200 MG
24 TABLET ORAL
Status: DISCONTINUED | OUTPATIENT
Start: 2020-11-16 | End: 2020-11-17 | Stop reason: HOSPADM

## 2020-11-16 RX ORDER — ASPIRIN 81 MG/1
81 TABLET ORAL DAILY
Status: DISCONTINUED | OUTPATIENT
Start: 2020-11-17 | End: 2020-11-17 | Stop reason: HOSPADM

## 2020-11-16 RX ORDER — AMLODIPINE BESYLATE 5 MG/1
5 TABLET ORAL 2 TIMES DAILY
Status: DISCONTINUED | OUTPATIENT
Start: 2020-11-16 | End: 2020-11-17 | Stop reason: HOSPADM

## 2020-11-16 RX ORDER — GLUCAGON 1 MG
1 KIT INJECTION
Status: DISCONTINUED | OUTPATIENT
Start: 2020-11-16 | End: 2020-11-17 | Stop reason: HOSPADM

## 2020-11-16 RX ORDER — SEVELAMER HYDROCHLORIDE 800 MG/1
800 TABLET, FILM COATED ORAL
Status: DISCONTINUED | OUTPATIENT
Start: 2020-11-16 | End: 2020-11-17 | Stop reason: HOSPADM

## 2020-11-16 RX ORDER — SODIUM CHLORIDE 0.9 % (FLUSH) 0.9 %
10 SYRINGE (ML) INJECTION
Status: DISCONTINUED | OUTPATIENT
Start: 2020-11-16 | End: 2020-11-17 | Stop reason: HOSPADM

## 2020-11-16 RX ORDER — ONDANSETRON 2 MG/ML
4 INJECTION INTRAMUSCULAR; INTRAVENOUS EVERY 6 HOURS PRN
Status: DISCONTINUED | OUTPATIENT
Start: 2020-11-16 | End: 2020-11-17 | Stop reason: HOSPADM

## 2020-11-16 RX ORDER — HYDRALAZINE HYDROCHLORIDE 25 MG/1
25 TABLET, FILM COATED ORAL EVERY 8 HOURS PRN
Status: DISCONTINUED | OUTPATIENT
Start: 2020-11-16 | End: 2020-11-17 | Stop reason: HOSPADM

## 2020-11-16 RX ORDER — ATORVASTATIN CALCIUM 40 MG/1
40 TABLET, FILM COATED ORAL NIGHTLY
Status: DISCONTINUED | OUTPATIENT
Start: 2020-11-17 | End: 2020-11-17 | Stop reason: HOSPADM

## 2020-11-16 RX ORDER — CARVEDILOL 3.12 MG/1
3.12 TABLET ORAL 2 TIMES DAILY
Status: DISCONTINUED | OUTPATIENT
Start: 2020-11-16 | End: 2020-11-17 | Stop reason: HOSPADM

## 2020-11-16 RX ADMIN — AMLODIPINE BESYLATE 5 MG: 5 TABLET ORAL at 09:11

## 2020-11-16 RX ADMIN — CARVEDILOL 3.12 MG: 3.12 TABLET, FILM COATED ORAL at 09:11

## 2020-11-16 RX ADMIN — SEVELAMER HYDROCHLORIDE 800 MG: 800 TABLET, FILM COATED ORAL at 04:11

## 2020-11-16 NOTE — ED PROVIDER NOTES
Encounter Date: 11/16/2020       History     Chief Complaint   Patient presents with    dialysis cath problem     pt went to dialysis this am and sent here due to possible dialysis cath disloged      This 65-year-old female who has recently diagnosed with chronic kidney disease and began on dialysis, has a Glen catheter in the right subclavian area and this morning when she went to dialysis they refused attempted dialyze her because they thought at her catheter had become dislodged.  The patient was also concerned that she have had some infection.  She denies any fever or drainage from the affected area.  No bleeding at the skin site.  No complaints of any skin tenderness.  She thought that the area surrounding the catheter was read however she appears to have the same skin hue that as she has had previously.  No documented fever.  No nausea vomiting.  No complaints of any shortness of breath.  No history of any dependent edema.  Patient states she is due to get her fistula on the 9th of next month.        Review of patient's allergies indicates:  No Known Allergies  Past Medical History:   Diagnosis Date    HLD (hyperlipidemia)     Hypertension     Renal disorder      Past Surgical History:   Procedure Laterality Date    AORTOGRAPHY WITH SERIALOGRAPHY N/A 9/30/2020    Procedure: co2 renal angio;  Surgeon: Lance Bustamante MD;  Location: [facility] CATH/EP LAB;  Service: Vascular;  Laterality: N/A;    APPENDECTOMY      INSERTION OF TUNNELED CENTRAL VENOUS HEMODIALYSIS CATHETER  10/2/2020    Procedure: INSERTION, CATHETER, CENTRAL VENOUS, HEMODIALYSIS, TUNNELED;  Surgeon: Ali Khoobehi, MD;  Location: [facility] OR;  Service: Vascular;;    NO PAST SURGERIES      PLACEMENT OF DIALYSIS ACCESS Right 9/30/2020    Procedure: Insertion, Dialysis Access;  Surgeon: Lance Bustamante MD;  Location: [facility] CATH/EP LAB;  Service: Vascular;  Laterality: Right;    TUBAL LIGATION       Family History   Problem Relation Age of Onset     Cancer Mother     Heart disease Father      Social History     Tobacco Use    Smoking status: Former Smoker     Packs/day: 0.25     Years: 40.00     Pack years: 10.00     Quit date: 2020     Years since quittin.1   Substance Use Topics    Alcohol use: Not Currently    Drug use: Not Currently     Review of Systems   Constitutional: Negative for activity change, appetite change, chills, diaphoresis and fever.   HENT: Negative for congestion, rhinorrhea, sinus pressure, sinus pain, sneezing, sore throat and trouble swallowing.    Respiratory: Negative for cough, chest tightness, shortness of breath and stridor.    Cardiovascular: Negative for chest pain.   Gastrointestinal: Negative for nausea and vomiting.   Musculoskeletal: Negative for back pain.   Skin: Negative.  Negative for rash.   Neurological: Negative for dizziness, weakness and headaches.   Hematological: Does not bruise/bleed easily.       Physical Exam     Initial Vitals [20 0858]   BP Pulse Resp Temp SpO2   (!) 146/79 79 16 97.6 °F (36.4 °C) 98 %      MAP       --         Physical Exam    Vitals reviewed.  Constitutional: She appears well-developed and well-nourished. She is not diaphoretic. No distress.   HENT:   Head: Normocephalic and atraumatic.   Nose: Nose normal.   Mouth/Throat: Oropharynx is clear and moist. No oropharyngeal exudate.   Eyes: Conjunctivae are normal. Pupils are equal, round, and reactive to light. Right eye exhibits no discharge. Left eye exhibits no discharge.   Neck: Normal range of motion. Neck supple. No JVD present.   Cardiovascular: Normal rate, regular rhythm, normal heart sounds and intact distal pulses. Exam reveals no gallop and no friction rub.    No murmur heard.  Pulmonary/Chest: Breath sounds normal. No respiratory distress. She has no wheezes. She has no rhonchi. She has no rales.   Dialysis catheter located in the subclavian area on the right is not currently sutured the end and the cuff is  sliding in out of the entrance between 1/2 to 1/2 cm.  There is no purulent drainage.  No surrounding skin tenderness or warmth.   Abdominal: Soft. Bowel sounds are normal. She exhibits no distension. There is no abdominal tenderness. There is no rebound and no guarding.   Musculoskeletal: Normal range of motion. No tenderness or edema.   Lymphadenopathy:     She has no cervical adenopathy.   Neurological: She is alert and oriented to person, place, and time. She has normal strength. No cranial nerve deficit or sensory deficit. GCS score is 15. GCS eye subscore is 4. GCS verbal subscore is 5. GCS motor subscore is 6.   Skin: Skin is warm and dry. Capillary refill takes less than 2 seconds. No rash noted. No erythema. No pallor.   Psychiatric: She has a normal mood and affect. Her behavior is normal. Judgment and thought content normal.         ED Course   Procedures  Labs Reviewed   CBC W/ AUTO DIFFERENTIAL - Abnormal; Notable for the following components:       Result Value    Hemoglobin 11.9 (*)     Hematocrit 34.7 (*)     RDW 16.5 (*)     All other components within normal limits   COMPREHENSIVE METABOLIC PANEL - Abnormal; Notable for the following components:    Sodium 131 (*)     Chloride 92 (*)     CO2 22 (*)     BUN 67 (*)     Creatinine 11.4 (*)     Anion Gap 17 (*)     eGFR if  3.6 (*)     eGFR if non  3.1 (*)     All other components within normal limits   SARS-COV-2 RNA AMPLIFICATION, QUAL          Imaging Results          X-Ray Chest PA And Lateral (Final result)  Result time 11/16/20 09:30:52    Final result by Abhijeet Drummond MD (11/16/20 09:30:52)                 Narrative:    CLINICAL HISTORY:  65 years (1955) Female Recent insertion of port a cath/ pt states  catheter feels and looks like it moved    TECHNIQUE:  PA and lateral radiograph of the chest.    COMPARISON:  Most recent radiograph from  10.21.20    FINDINGS:  The lungs are hyperinflated with  flattening of the bilateral  hemidiaphragms in keeping with chronic obstructive lung disease.  Costophrenic angles are seen without effusion. No pneumothorax is  identified. The heart is normal in size. The mediastinum is within  normal limits. Osseous structures appear within normal limits. The  visualized upper abdomen is unremarkable.    Lines and tubes: Right-sided IJ central venous catheter with tip at  the level of the SVC (this is mildly retracted when compared to the  previous exam).    IMPRESSION:  No acute cardiac or pulmonary process.                  .            Electronically Signed by CHARIS Quinteros on 11/16/2020 9:51 AM                                            Attending Attestation:             Attending ED Notes:   This 65-year-old female who presented with her dialysis catheter being somewhat dislodged, did not have the catheter sutured in a dot of early sutures that were in initially place have since fallen out.  The patient's catheter is not significantly dislodged and chest x-ray showed only slight movement for the catheter is still in the SVC.  Labs were consistent with a history of renal failure but she has no evidence of any hyperkalemia.  During the ED course I did speak to Dr. Bustamante who stated that probably Dr. Kobehee he will see the patient this evening and possibly bring into the operating room for catheter replacement.  Additionally have spoke to Dr. Barnes, her nephrologist who stated he would still dialyze the patient today.  Hospital Medicine is consulted and will be admitting the patient to med surg.                    Clinical Impression:       ICD-10-CM ICD-9-CM   1. Preop examination  Z01.818 V72.84   2. Encounter for fitting and adjustment of dialysis catheter  Z49.01 V56.1                          ED Disposition Condition    Observation                             Brady Pabon Jr., MD  11/16/20 [id number]

## 2020-11-16 NOTE — CONSULTS
[facility]  Vascular Surgery  Consult Note    Inpatient consult to Vascular Surgery  Consult performed by: Ali Khoobehi, MD  Consult ordered by: Shannon Gross NP        Subjective:     Chief Complaint/Reason for Admission: PC falling out    History of Present Illness:  Patient with history of acute renal failure and being prepped for right arm fistula by Dr. Bustamante.  Patient has a right IJ PermCath which was noted to be dislodged dialysis today.  She was sent to the hospital.  The cuff is visible outside the skin..  Patient denies any fevers or chills.  She has no complaints.    Medications Prior to Admission   Medication Sig Dispense Refill Last Dose    amLODIPine (NORVASC) 5 MG tablet Take 1 tablet (5 mg total) by mouth 2 (two) times daily. 60 tablet 11 11/16/2020 at 07:00    aspirin (ECOTRIN) 81 MG EC tablet Take 81 mg by mouth once daily.   11/16/2020 at 07:00    atorvastatin (LIPITOR) 40 MG tablet Take 40 mg by mouth once daily.   11/16/2020 at 07:00    carvediloL (COREG) 3.125 MG tablet Take 1 tablet (3.125 mg total) by mouth 2 (two) times daily. 60 tablet 11 11/16/2020 at 07:00    sevelamer carbonate (RENVELA) 800 mg Tab Take 800 mg by mouth 3 (three) times daily with meals.   11/15/2020 at 19:00    hydrALAZINE (APRESOLINE) 25 MG tablet Take 1 tablet (25 mg total) by mouth every 8 (eight) hours as needed (sbp > 170). 45 tablet 5 Unknown at Unknown time       Review of patient's allergies indicates:  No Known Allergies    Past Medical History:   Diagnosis Date    HLD (hyperlipidemia)     Hypertension     Renal disorder      Past Surgical History:   Procedure Laterality Date    AORTOGRAPHY WITH SERIALOGRAPHY N/A 9/30/2020    Procedure: co2 renal angio;  Surgeon: Lance Bustamante MD;  Location: [facility] CATH/EP LAB;  Service: Vascular;  Laterality: N/A;    APPENDECTOMY      INSERTION OF TUNNELED CENTRAL VENOUS HEMODIALYSIS CATHETER  10/2/2020    Procedure: INSERTION, CATHETER, CENTRAL  VENOUS, HEMODIALYSIS, TUNNELED;  Surgeon: Ali Khoobehi, MD;  Location: [facility] OR;  Service: Vascular;;    NO PAST SURGERIES      PLACEMENT OF DIALYSIS ACCESS Right 2020    Procedure: Insertion, Dialysis Access;  Surgeon: Lance Bustamante MD;  Location: [facility] CATH/EP LAB;  Service: Vascular;  Laterality: Right;    TUBAL LIGATION       Family History     Problem Relation (Age of Onset)    Cancer Mother    Heart disease Father        Tobacco Use    Smoking status: Former Smoker     Packs/day: 0.25     Years: 40.00     Pack years: 10.00     Quit date: 2020     Years since quittin.1   Substance and Sexual Activity    Alcohol use: Not Currently    Drug use: Not Currently    Sexual activity: Not on file     Review of Systems   Constitutional: Negative for chills and fever.   All other systems reviewed and are negative.    Objective:     Vital Signs (Most Recent):  Temp: 98.2 °F (36.8 °C) (20 1552)  Pulse: 71 (20 1552)  Resp: 20 (20 1552)  BP: (!) 163/79 (20 1552)  SpO2: 98 % (20 1552) Vital Signs (24h Range):  Temp:  [97.6 °F (36.4 °C)-98.2 °F (36.8 °C)] 98.2 °F (36.8 °C)  Pulse:  [68-79] 71  Resp:  [16-20] 20  SpO2:  [98 %-100 %] 98 %  BP: (141-163)/(70-81) 163/79     Weight: 71.1 kg (156 lb 12 oz)  Body mass index is 25.3 kg/m².        Physical Exam  Vitals signs and nursing note reviewed.   HENT:      Head: Normocephalic and atraumatic.   Cardiovascular:      Rate and Rhythm: Normal rate and regular rhythm.      Heart sounds: Normal heart sounds.      Comments: Right chest PC with visible catheter cuff  Pulmonary:      Effort: Pulmonary effort is normal.      Breath sounds: Normal breath sounds.   Abdominal:      General: Abdomen is flat.      Palpations: Abdomen is soft.      Tenderness: There is no abdominal tenderness.         Significant Labs:  CBC:   Recent Labs   Lab 20  1111   WBC 4.89   RBC 4.05   HGB 11.9*   HCT 34.7*      MCV 86   MCH 29.4   MCHC  34.3     CMP:   Recent Labs   Lab 11/16/20  1111   GLU 95   CALCIUM 10.4   ALBUMIN 4.5   PROT 7.9   *   K 4.6   CO2 22*   CL 92*   BUN 67*   CREATININE 11.4*   ALKPHOS 104   ALT 11   AST 16   BILITOT 0.9     Coagulation: No results for input(s): LABPROT, INR, APTT in the last 48 hours.    Significant Diagnostics:  I have reviewed all pertinent imaging results/findings within the past 24 hours.    Assessment/Plan:   Patient with right IJ PermCath partially pulled back.  It is possible this catheter is infected as the cough should be well incorporated by now.  I removed the catheter and applied pressure to achieve hemostasis.  Dressing was applied.    Will plan for catheter replacement tomorrow.    Active Diagnoses:    Diagnosis Date Noted POA    Preop examination [Z01.818] 11/16/2020 Not Applicable      Problems Resolved During this Admission:       Thank you for your consult. I will follow-up with patient. Please contact us if you have any additional questions.    Ali Khoobehi, MD  Vascular Surgery  [facility]

## 2020-11-16 NOTE — H&P
[facility] History & Physical Examination   Patient Name: Latricia Higgins  MRN: [id number]  Patient Class: OP- Observation   Admission Date: 11/16/2020 10:11 AM  Length of Stay: 0  Attending Physician: Jordan Kasper MD  Primary Care Provider: Ralf Ham MD  Face-to-Face encounter date: 11/16/2020  Code Status: full code  Chief Complaint: dialysis cath problem (pt went to dialysis this am and sent here due to possible dialysis cath disloged )        Patient information was obtained from patient, past medical records and ER records.   HISTORY OF PRESENT ILLNESS:   History was obtained from the patient and ER physician Sign-out. Patient presents to the ED with the complaint of dislodged dialysis catheter. The patient reports that she went to the clinic for dialysis this morning, and they refused to do her dialysis because they thought that her Glen catheter had become dislodged. She was sent to the ED for further evaluation. She denies any complaints at this time.     In the emergency room, patient is afebrile. VSS. CBC was unremarkable. CMP was unremarkable. CXR shows Right sided central venous catheter tip at the level of the SVC which is mildly retracted when compared to previous exam. Nephrology was consulted who would like to admit the patient for dialysis and plans for vascular surgery to possibly take patient to OR.      Decision to admit was taken and patient was informed about the plan of care.   REVIEW OF SYSTEMS:   10 Point Review of System was performed and was found to be negative except for that mentioned already in the HPI above.     PAST MEDICAL HISTORY:     Past Medical History:   Diagnosis Date    HLD (hyperlipidemia)     Hypertension     Renal disorder        PAST SURGICAL HISTORY:     Past Surgical History:   Procedure Laterality Date    AORTOGRAPHY WITH SERIALOGRAPHY N/A 9/30/2020    Procedure: co2 renal angio;  Surgeon: Lance Bustamante MD;  Location:  [facility] CATH/EP LAB;  Service: Vascular;  Laterality: N/A;    APPENDECTOMY      INSERTION OF TUNNELED CENTRAL VENOUS HEMODIALYSIS CATHETER  10/2/2020    Procedure: INSERTION, CATHETER, CENTRAL VENOUS, HEMODIALYSIS, TUNNELED;  Surgeon: Ali Khoobehi, MD;  Location: [facility] OR;  Service: Vascular;;    NO PAST SURGERIES      PLACEMENT OF DIALYSIS ACCESS Right 2020    Procedure: Insertion, Dialysis Access;  Surgeon: Lance Bustamante MD;  Location: [facility] CATH/EP LAB;  Service: Vascular;  Laterality: Right;    TUBAL LIGATION         ALLERGIES:   Patient has no known allergies.    FAMILY HISTORY:     Family History   Problem Relation Age of Onset    Cancer Mother     Heart disease Father        SOCIAL HISTORY:     Social History     Tobacco Use    Smoking status: Former Smoker     Packs/day: 0.25     Years: 40.00     Pack years: 10.00     Quit date: 2020     Years since quittin.1   Substance Use Topics    Alcohol use: Not Currently        Social History     Substance and Sexual Activity   Sexual Activity Not on file        HOME MEDICATIONS:     Prior to Admission medications    Medication Sig Start Date End Date Taking? Authorizing Provider   amLODIPine (NORVASC) 5 MG tablet Take 1 tablet (5 mg total) by mouth 2 (two) times daily. 10/2/20 10/2/21 Yes Lisa Perkins MD   aspirin (ECOTRIN) 81 MG EC tablet Take 81 mg by mouth once daily.   Yes Historical Provider   atorvastatin (LIPITOR) 40 MG tablet Take 40 mg by mouth once daily.   Yes Historical Provider   carvediloL (COREG) 3.125 MG tablet Take 1 tablet (3.125 mg total) by mouth 2 (two) times daily. 10/2/20 10/2/21 Yes Lisa Perkins MD   sevelamer carbonate (RENVELA) 800 mg Tab Take 800 mg by mouth 3 (three) times daily with meals. 10/9/20  Yes Historical Provider   hydrALAZINE (APRESOLINE) 25 MG tablet Take 1 tablet (25 mg total) by mouth every 8 (eight) hours as needed (sbp > 170). 10/22/20 10/22/21  Jose Cruz Engel MD         PHYSICAL EXAM:  "  BP (!) 141/74   Pulse 71   Temp 97.6 °F (36.4 °C) (Oral)   Resp 16   Ht 5' 6" (1.676 m)   Wt 70.3 kg (155 lb)   SpO2 99%   BMI 25.02 kg/m²   Vitals Reviewed  General appearance: Well-developed, well-nourished, White female in no apparent distress.  Skin: No Rash. Warm,dry.   Neuro: AAOx3. Motor and sensory exams grossly intact. Good tone. Power in all 4 extremities 5/5.   HENT: Atraumatic head. Moist mucous membranes of oral cavity.  Eyes: Normal extraocular movements. PERRLA  Neck: Supple. No evidence of lymphadenopathy. No thyroidomegaly.  Lungs: Clear to auscultation bilaterally. No wheezing present.   Heart: Regular rate and rhythm. S1 and S2 present with no murmurs/gallop/rub. No pedal edema. No JVD present.   Abdomen: Soft, non-distended, non-tender. No rebound tenderness/guarding. No masses or organomegaly. Bowel sounds are normal. Bladder is not palpable. Dialysis catheter to R chest. There is no suturing to the end and the catheter is sliding. No redness or drainage. Dressing is CDI.  Extremities: No cyanosis, clubbing. Capillary refill less than 2 seconds  Psych/mental status: Mood and affect appropriate. Cooperative. Responds appropriately to questions.   EMERGENCY DEPARTMENT LABS AND IMAGING:     Labs Reviewed   CBC W/ AUTO DIFFERENTIAL - Abnormal; Notable for the following components:       Result Value    Hemoglobin 11.9 (*)     Hematocrit 34.7 (*)     RDW 16.5 (*)     All other components within normal limits   COMPREHENSIVE METABOLIC PANEL - Abnormal; Notable for the following components:    Sodium 131 (*)     Chloride 92 (*)     CO2 22 (*)     BUN 67 (*)     Creatinine 11.4 (*)     Anion Gap 17 (*)     eGFR if  3.6 (*)     eGFR if non  3.1 (*)     All other components within normal limits   SARS-COV-2 RNA AMPLIFICATION, QUAL       X-Ray Chest PA And Lateral   Final Result          ASSESSMENT & PLAN:   Malfunctioning HD catheter  ESRD on HD " MWF  HTN  HLD    Admit to Med-tele  Consult Dr. Barnes; plans for HD today  Consult Vascular surgery  BP stable; continue home medications  AM labs    DVT Prophylaxis: Mechanical DVT prophylaxis and will be advised to be as mobile as possible and sit in a chair as tolerated.   ________________________________________________________________________________    Discharge Planning and Disposition: No mobility needs. Ambulating well. Patient will be discharged in 1-2 days  Face-to-Face encounter date: 11/16/2020  Encounter included review of the medical records, interviewing and examining the patient face-to-face, discussion with family and other health care providers including emergency medicine physician, admission orders, interpreting lab/test results and formulating a plan of care.   Medical Decision Making during this encounter was  [_] Low Complexity  [x] Moderate Complexity  [  ] High Complexity  _________________________________________________________________________________    INPATIENT LIST OF MEDICATIONS   No current facility-administered medications for this encounter.     Current Outpatient Medications:     amLODIPine (NORVASC) 5 MG tablet, Take 1 tablet (5 mg total) by mouth 2 (two) times daily., Disp: 60 tablet, Rfl: 11    aspirin (ECOTRIN) 81 MG EC tablet, Take 81 mg by mouth once daily., Disp: , Rfl:     atorvastatin (LIPITOR) 40 MG tablet, Take 40 mg by mouth once daily., Disp: , Rfl:     carvediloL (COREG) 3.125 MG tablet, Take 1 tablet (3.125 mg total) by mouth 2 (two) times daily., Disp: 60 tablet, Rfl: 11    sevelamer carbonate (RENVELA) 800 mg Tab, Take 800 mg by mouth 3 (three) times daily with meals., Disp: , Rfl:     hydrALAZINE (APRESOLINE) 25 MG tablet, Take 1 tablet (25 mg total) by mouth every 8 (eight) hours as needed (sbp > 170)., Disp: 45 tablet, Rfl: 5      Scheduled Meds:  Continuous Infusions:  PRN Meds:.      Shannon Gross  [facility] Hospitalist  11/16/2020

## 2020-11-17 ENCOUNTER — ANESTHESIA EVENT (OUTPATIENT)
Dept: SURGERY | Facility: HOSPITAL | Age: 65
End: 2020-11-17
Payer: COMMERCIAL

## 2020-11-17 ENCOUNTER — ANESTHESIA (OUTPATIENT)
Dept: SURGERY | Facility: HOSPITAL | Age: 65
End: 2020-11-17
Payer: COMMERCIAL

## 2020-11-17 VITALS
HEIGHT: 66 IN | BODY MASS INDEX: 25.19 KG/M2 | OXYGEN SATURATION: 100 % | HEART RATE: 83 BPM | SYSTOLIC BLOOD PRESSURE: 151 MMHG | DIASTOLIC BLOOD PRESSURE: 86 MMHG | WEIGHT: 156.75 LBS | TEMPERATURE: 98 F | RESPIRATION RATE: 18 BRPM

## 2020-11-17 LAB
ALBUMIN SERPL BCP-MCNC: 3.9 G/DL (ref 3.5–5.2)
ALP SERPL-CCNC: 79 U/L (ref 55–135)
ALT SERPL W/O P-5'-P-CCNC: 9 U/L (ref 10–44)
ANION GAP SERPL CALC-SCNC: 17 MMOL/L (ref 8–16)
AST SERPL-CCNC: 15 U/L (ref 10–40)
BASOPHILS # BLD AUTO: 0.02 K/UL (ref 0–0.2)
BASOPHILS NFR BLD: 0.4 % (ref 0–1.9)
BILIRUB SERPL-MCNC: 1 MG/DL (ref 0.1–1)
BUN SERPL-MCNC: 72 MG/DL (ref 8–23)
CALCIUM SERPL-MCNC: 9.4 MG/DL (ref 8.7–10.5)
CHLORIDE SERPL-SCNC: 93 MMOL/L (ref 95–110)
CO2 SERPL-SCNC: 23 MMOL/L (ref 23–29)
CREAT SERPL-MCNC: 12.9 MG/DL (ref 0.5–1.4)
DIFFERENTIAL METHOD: ABNORMAL
EOSINOPHIL # BLD AUTO: 0.2 K/UL (ref 0–0.5)
EOSINOPHIL NFR BLD: 3.2 % (ref 0–8)
ERYTHROCYTE [DISTWIDTH] IN BLOOD BY AUTOMATED COUNT: 16.5 % (ref 11.5–14.5)
EST. GFR  (AFRICAN AMERICAN): 3.1 ML/MIN/1.73 M^2
EST. GFR  (NON AFRICAN AMERICAN): 2.7 ML/MIN/1.73 M^2
GLUCOSE SERPL-MCNC: 82 MG/DL (ref 70–110)
HCT VFR BLD AUTO: 30.3 % (ref 37–48.5)
HGB BLD-MCNC: 10.6 G/DL (ref 12–16)
IMM GRANULOCYTES # BLD AUTO: 0.02 K/UL (ref 0–0.04)
IMM GRANULOCYTES NFR BLD AUTO: 0.4 % (ref 0–0.5)
LYMPHOCYTES # BLD AUTO: 1.3 K/UL (ref 1–4.8)
LYMPHOCYTES NFR BLD: 28.7 % (ref 18–48)
MAGNESIUM SERPL-MCNC: 2.4 MG/DL (ref 1.6–2.6)
MCH RBC QN AUTO: 30.6 PG (ref 27–31)
MCHC RBC AUTO-ENTMCNC: 35 G/DL (ref 32–36)
MCV RBC AUTO: 88 FL (ref 82–98)
MONOCYTES # BLD AUTO: 0.5 K/UL (ref 0.3–1)
MONOCYTES NFR BLD: 10.1 % (ref 4–15)
NEUTROPHILS # BLD AUTO: 2.7 K/UL (ref 1.8–7.7)
NEUTROPHILS NFR BLD: 57.2 % (ref 38–73)
NRBC BLD-RTO: 0 /100 WBC
PHOSPHATE SERPL-MCNC: 8.3 MG/DL (ref 2.7–4.5)
PLATELET # BLD AUTO: 179 K/UL (ref 150–350)
PMV BLD AUTO: 9.4 FL (ref 9.2–12.9)
POTASSIUM SERPL-SCNC: 4.7 MMOL/L (ref 3.5–5.1)
PROT SERPL-MCNC: 6.7 G/DL (ref 6–8.4)
RBC # BLD AUTO: 3.46 M/UL (ref 4–5.4)
SODIUM SERPL-SCNC: 133 MMOL/L (ref 136–145)
TROPONIN I SERPL DL<=0.01 NG/ML-MCNC: 0.04 NG/ML
WBC # BLD AUTO: 4.67 K/UL (ref 3.9–12.7)

## 2020-11-17 PROCEDURE — 84484 ASSAY OF TROPONIN QUANT: CPT

## 2020-11-17 PROCEDURE — 27000080 OPTIME MED/SURG SUP & DEVICES GENERAL CLASSIFICATION: Performed by: SURGERY

## 2020-11-17 PROCEDURE — 84100 ASSAY OF PHOSPHORUS: CPT

## 2020-11-17 PROCEDURE — 63600175 PHARM REV CODE 636 W HCPCS: Performed by: INTERNAL MEDICINE

## 2020-11-17 PROCEDURE — 87040 BLOOD CULTURE FOR BACTERIA: CPT

## 2020-11-17 PROCEDURE — 85025 COMPLETE CBC W/AUTO DIFF WBC: CPT

## 2020-11-17 PROCEDURE — 25000003 PHARM REV CODE 250: Performed by: STUDENT IN AN ORGANIZED HEALTH CARE EDUCATION/TRAINING PROGRAM

## 2020-11-17 PROCEDURE — 83735 ASSAY OF MAGNESIUM: CPT

## 2020-11-17 PROCEDURE — 27000675 HC TUBING MICRODRIP: Performed by: NURSE ANESTHETIST, CERTIFIED REGISTERED

## 2020-11-17 PROCEDURE — 25000003 PHARM REV CODE 250: Performed by: SURGERY

## 2020-11-17 PROCEDURE — 27201423 OPTIME MED/SURG SUP & DEVICES STERILE SUPPLY: Performed by: SURGERY

## 2020-11-17 PROCEDURE — C1750 CATH, HEMODIALYSIS,LONG-TERM: HCPCS | Performed by: SURGERY

## 2020-11-17 PROCEDURE — 37000008 HC ANESTHESIA 1ST 15 MINUTES: Performed by: SURGERY

## 2020-11-17 PROCEDURE — 90935 HEMODIALYSIS ONE EVALUATION: CPT

## 2020-11-17 PROCEDURE — 76937 US GUIDE VASCULAR ACCESS: CPT

## 2020-11-17 PROCEDURE — 36000707: Performed by: SURGERY

## 2020-11-17 PROCEDURE — 37000009 HC ANESTHESIA EA ADD 15 MINS: Performed by: SURGERY

## 2020-11-17 PROCEDURE — 25000003 PHARM REV CODE 250: Performed by: NURSE ANESTHETIST, CERTIFIED REGISTERED

## 2020-11-17 PROCEDURE — 80053 COMPREHEN METABOLIC PANEL: CPT

## 2020-11-17 PROCEDURE — 63600175 PHARM REV CODE 636 W HCPCS: Performed by: NURSE ANESTHETIST, CERTIFIED REGISTERED

## 2020-11-17 PROCEDURE — 71000033 HC RECOVERY, INTIAL HOUR: Performed by: SURGERY

## 2020-11-17 PROCEDURE — 63600175 PHARM REV CODE 636 W HCPCS: Performed by: SURGERY

## 2020-11-17 PROCEDURE — 25000003 PHARM REV CODE 250: Performed by: NURSE PRACTITIONER

## 2020-11-17 PROCEDURE — 36000706: Performed by: SURGERY

## 2020-11-17 PROCEDURE — G0378 HOSPITAL OBSERVATION PER HR: HCPCS | Mod: CS

## 2020-11-17 PROCEDURE — 36415 COLL VENOUS BLD VENIPUNCTURE: CPT

## 2020-11-17 PROCEDURE — 71000039 HC RECOVERY, EACH ADD'L HOUR: Performed by: SURGERY

## 2020-11-17 PROCEDURE — 27000284 HC CANNULA NASAL: Performed by: NURSE ANESTHETIST, CERTIFIED REGISTERED

## 2020-11-17 DEVICE — CATHETER HEMOSPLIT 23CM 5733730: Type: IMPLANTABLE DEVICE | Site: CHEST | Status: FUNCTIONAL

## 2020-11-17 RX ORDER — HYDROMORPHONE HYDROCHLORIDE 1 MG/ML
0.2 INJECTION, SOLUTION INTRAMUSCULAR; INTRAVENOUS; SUBCUTANEOUS
Status: DISCONTINUED | OUTPATIENT
Start: 2020-11-17 | End: 2020-11-17 | Stop reason: HOSPADM

## 2020-11-17 RX ORDER — MEPERIDINE HYDROCHLORIDE 50 MG/ML
12.5 INJECTION INTRAMUSCULAR; INTRAVENOUS; SUBCUTANEOUS EVERY 10 MIN PRN
Status: DISCONTINUED | OUTPATIENT
Start: 2020-11-17 | End: 2020-11-17 | Stop reason: HOSPADM

## 2020-11-17 RX ORDER — SODIUM CHLORIDE 0.9 % (FLUSH) 0.9 %
10 SYRINGE (ML) INJECTION
Status: DISCONTINUED | OUTPATIENT
Start: 2020-11-17 | End: 2020-11-17 | Stop reason: HOSPADM

## 2020-11-17 RX ORDER — SODIUM CHLORIDE 9 MG/ML
INJECTION, SOLUTION INTRAVENOUS
Status: CANCELLED | OUTPATIENT
Start: 2020-11-17

## 2020-11-17 RX ORDER — OXYCODONE HYDROCHLORIDE 5 MG/1
5 TABLET ORAL
Status: DISCONTINUED | OUTPATIENT
Start: 2020-11-17 | End: 2020-11-17 | Stop reason: HOSPADM

## 2020-11-17 RX ORDER — HEPARIN SODIUM 1000 [USP'U]/ML
3000 INJECTION, SOLUTION INTRAVENOUS; SUBCUTANEOUS
Status: DISCONTINUED | OUTPATIENT
Start: 2020-11-17 | End: 2020-11-17 | Stop reason: HOSPADM

## 2020-11-17 RX ORDER — MUPIROCIN 20 MG/G
OINTMENT TOPICAL 2 TIMES DAILY
Status: DISCONTINUED | OUTPATIENT
Start: 2020-11-17 | End: 2020-11-17 | Stop reason: HOSPADM

## 2020-11-17 RX ORDER — SODIUM CHLORIDE 9 MG/ML
INJECTION, SOLUTION INTRAVENOUS ONCE
Status: CANCELLED | OUTPATIENT
Start: 2020-11-17 | End: 2020-11-17

## 2020-11-17 RX ORDER — LIDOCAINE HYDROCHLORIDE 10 MG/ML
INJECTION, SOLUTION EPIDURAL; INFILTRATION; INTRACAUDAL; PERINEURAL
Status: DISCONTINUED | OUTPATIENT
Start: 2020-11-17 | End: 2020-11-17 | Stop reason: HOSPADM

## 2020-11-17 RX ORDER — SODIUM CHLORIDE 9 MG/ML
INJECTION, SOLUTION INTRAVENOUS CONTINUOUS PRN
Status: DISCONTINUED | OUTPATIENT
Start: 2020-11-17 | End: 2020-11-17

## 2020-11-17 RX ORDER — PROPOFOL 10 MG/ML
VIAL (ML) INTRAVENOUS
Status: DISCONTINUED | OUTPATIENT
Start: 2020-11-17 | End: 2020-11-17

## 2020-11-17 RX ORDER — LIDOCAINE HYDROCHLORIDE 20 MG/ML
JELLY TOPICAL
Status: DISCONTINUED | OUTPATIENT
Start: 2020-11-17 | End: 2020-11-17

## 2020-11-17 RX ORDER — HEPARIN SODIUM 1000 [USP'U]/ML
INJECTION, SOLUTION INTRAVENOUS; SUBCUTANEOUS
Status: DISCONTINUED | OUTPATIENT
Start: 2020-11-17 | End: 2020-11-17 | Stop reason: HOSPADM

## 2020-11-17 RX ORDER — DIPHENHYDRAMINE HYDROCHLORIDE 50 MG/ML
12.5 INJECTION INTRAMUSCULAR; INTRAVENOUS
Status: DISCONTINUED | OUTPATIENT
Start: 2020-11-17 | End: 2020-11-17 | Stop reason: HOSPADM

## 2020-11-17 RX ORDER — ONDANSETRON 2 MG/ML
4 INJECTION INTRAMUSCULAR; INTRAVENOUS DAILY PRN
Status: DISCONTINUED | OUTPATIENT
Start: 2020-11-17 | End: 2020-11-17 | Stop reason: HOSPADM

## 2020-11-17 RX ADMIN — LIDOCAINE HYDROCHLORIDE 50 ML: 20 JELLY TOPICAL at 12:11

## 2020-11-17 RX ADMIN — PROPOFOL 50 MG: 10 INJECTION, EMULSION INTRAVENOUS at 01:11

## 2020-11-17 RX ADMIN — SODIUM CHLORIDE: 0.9 INJECTION, SOLUTION INTRAVENOUS at 12:11

## 2020-11-17 RX ADMIN — HEPARIN SODIUM 3000 UNITS: 1000 INJECTION, SOLUTION INTRAVENOUS; SUBCUTANEOUS at 06:11

## 2020-11-17 RX ADMIN — PROPOFOL 100 MG: 10 INJECTION, EMULSION INTRAVENOUS at 12:11

## 2020-11-17 RX ADMIN — CARVEDILOL 3.12 MG: 3.12 TABLET, FILM COATED ORAL at 08:11

## 2020-11-17 RX ADMIN — EPOETIN ALFA-EPBX 10000 UNITS: 10000 INJECTION, SOLUTION INTRAVENOUS; SUBCUTANEOUS at 06:11

## 2020-11-17 RX ADMIN — SEVELAMER HYDROCHLORIDE 800 MG: 800 TABLET, FILM COATED ORAL at 08:11

## 2020-11-17 RX ADMIN — OXYCODONE HYDROCHLORIDE 5 MG: 5 TABLET ORAL at 02:11

## 2020-11-17 RX ADMIN — PROPOFOL 100 MG: 10 INJECTION, EMULSION INTRAVENOUS at 01:11

## 2020-11-17 RX ADMIN — DEXTROSE 2 G: 50 INJECTION, SOLUTION INTRAVENOUS at 12:11

## 2020-11-17 RX ADMIN — ASPIRIN 81 MG: 81 TABLET, DELAYED RELEASE ORAL at 08:11

## 2020-11-17 RX ADMIN — AMLODIPINE BESYLATE 5 MG: 5 TABLET ORAL at 08:11

## 2020-11-17 NOTE — ANESTHESIA PREPROCEDURE EVALUATION
11/17/2020  Latricia Higgins is a 65 y.o., female.      Patient Active Problem List   Diagnosis    Acute colitis    RICHIE (acute kidney injury)    Blood in stool    Adrenal hyperplasia    HTN (hypertension)    HLD (hyperlipidemia)    Hypertensive urgency    Colitis    ESRD (end stage renal disease)    Preop examination       Past Surgical History:   Procedure Laterality Date    AORTOGRAPHY WITH SERIALOGRAPHY N/A 9/30/2020    Procedure: co2 renal angio;  Surgeon: Lance Bustamante MD;  Location: [facility] CATH/EP LAB;  Service: Vascular;  Laterality: N/A;    APPENDECTOMY      INSERTION OF TUNNELED CENTRAL VENOUS HEMODIALYSIS CATHETER  10/2/2020    Procedure: INSERTION, CATHETER, CENTRAL VENOUS, HEMODIALYSIS, TUNNELED;  Surgeon: Ali Khoobehi, MD;  Location: [facility] OR;  Service: Vascular;;    NO PAST SURGERIES      PLACEMENT OF DIALYSIS ACCESS Right 9/30/2020    Procedure: Insertion, Dialysis Access;  Surgeon: Lance Bustamante MD;  Location: [facility] CATH/EP LAB;  Service: Vascular;  Laterality: Right;    TUBAL LIGATION          Tobacco Use:  The patient  reports that she quit smoking about 7 weeks ago. She has a 10.00 pack-year smoking history. She does not have any smokeless tobacco history on file.     Results for orders placed or performed during the hospital encounter of 10/21/20   EKG 12-lead    Collection Time: 10/21/20 11:25 AM    Narrative    Test Reason : R07.9,    Vent. Rate : 089 BPM     Atrial Rate : 089 BPM     P-R Int : 152 ms          QRS Dur : 094 ms      QT Int : 464 ms       P-R-T Axes : 027 007 -01 degrees     QTc Int : 564 ms    Sinus rhythm with frequent and consecutive Premature ventricular complexes  Possible Left atrial enlargement  Prolonged QT  Abnormal ECG  When compared with ECG of 27-SEP-2020 10:28,  Premature ventricular complexes are now Present  QT has lengthened  Confirmed by  Landen Griffith MD ([id number]) on 10/22/2020 8:23:58 PM    Referred By: SYDNEY   SELF           Confirmed By:Landen Griffith MD             Lab Results   Component Value Date    WBC 4.67 11/17/2020    HGB 10.6 (L) 11/17/2020    HCT 30.3 (L) 11/17/2020    MCV 88 11/17/2020     11/17/2020     BMP  Lab Results   Component Value Date     (L) 11/17/2020    K 4.7 11/17/2020    CL 93 (L) 11/17/2020    CO2 23 11/17/2020    BUN 72 (H) 11/17/2020    CREATININE 12.9 (H) 11/17/2020    CALCIUM 9.4 11/17/2020    ANIONGAP 17 (H) 11/17/2020    GLU 82 11/17/2020    GLU 95 11/16/2020    GLU 90 10/22/2020       No results found for this or any previous visit.          Pre-op Assessment    I have reviewed the Patient Summary Reports.     I have reviewed the Nursing Notes. I have reviewed the NPO Status.   I have reviewed the Medications.     Review of Systems  Anesthesia Hx:  No problems with previous Anesthesia  History of prior surgery of interest to airway management or planning: Previous anesthesia: General, MAC Denies Family Hx of Anesthesia complications.   Denies Personal Hx of Anesthesia complications.   Social:  Former Smoker    Hematology/Oncology:  Hematology Normal        EENT/Dental:EENT/Dental Normal   Cardiovascular:   Exercise tolerance: good Hypertension hyperlipidemia    Pulmonary:  Pulmonary Normal    Renal/:   Chronic Renal Disease (last HD ??), ESRD, Dialysis    Hepatic/GI:  Hepatic/GI Normal    Musculoskeletal:  Musculoskeletal Normal    Neurological:  Neurology Normal    Endocrine:  Endocrine Normal    Dermatological:  Skin Normal    Psych:  Psychiatric Normal           Physical Exam  General:  Well nourished    Airway/Jaw/Neck:  Airway Findings: Mouth Opening: Normal General Airway Assessment: Adult     Eyes/Ears/Nose:  EYES/EARS/NOSE FINDINGS: Normal   Dental:  Dental Findings: Edentulous   Chest/Lungs:  Chest/Lungs Findings: Clear to auscultation, Normal Respiratory Rate      Heart/Vascular:  Heart Findings: Rate: Normal  Rhythm: Regular Rhythm  Sounds: Normal        Mental Status:  Mental Status Findings: Normal        Anesthesia Plan  Type of Anesthesia, risks & benefits discussed:  Anesthesia Type:  MAC  Patient's Preference:   Intra-op Monitoring Plan: standard ASA monitors  Intra-op Monitoring Plan Comments:   Post Op Pain Control Plan:   Post Op Pain Control Plan Comments:   Induction:   IV  Beta Blocker:  Patient is on a Beta-Blocker and has received one dose within the past 24 hours (No further documentation required).       Informed Consent: Patient understands risks and agrees with Anesthesia plan.  Questions answered. Anesthesia consent signed with patient.  ASA Score: 3  emergent   Day of Surgery Review of History & Physical:        Anesthesia Plan Notes: Last HD on Friday  MAC poss Gen LMA  Propofol        Ready For Surgery From Anesthesia Perspective.

## 2020-11-17 NOTE — HOSPITAL COURSE
Patient was admitted for dislodged dialysis catheter replacement, noted to have mild erythema at the site of dialysis catheter access which was removed, nontender, denies any fever, obtained blood culture to rule out bacteremia from infected cath, patient underwent new catheter placement by Dr. Ali Khoobehi success, underwent hemodialysis following that without any complication.   Instructions provided to follow up with primary care physician as outpatient. Patient verbalized understanding and is aware to contact primary care physician or return to ED if new or worsening symptoms.    Physical exam on the day of discharge:  General: Patient resting comfortably in no acute distress.  Right internal jugular tunnel catheter+  Lungs: CTA. Good air entry.  Cor: Regular rate and rhythm. No murmurs. No pedal edema.  Abd: Soft. Nontender. Non-distended.  Neuro: A&O x3. Moving all 4 extremities equally  Ext: No clubbing. No cyanosis.

## 2020-11-17 NOTE — CONSULTS
INPATIENT NEPHROLOGY CONSULT   [facility] NEPHROLOGY    Latricia Higgins  11/17/2020    Reason for consultation:    esrd    Chief Complaint:   Chief Complaint   Patient presents with    dialysis cath problem     pt went to dialysis this am and sent here due to possible dialysis cath disloged           History of Present Illness:    History was obtained from the patient and ER physician Sign-out. Patient presents to the ED with the complaint of dislodged dialysis catheter. The patient reports that she went to the clinic for dialysis this morning, and they refused to do her dialysis because they thought that her [place] catheter had become dislodged. She was sent to the ED for further evaluation. She denies any complaints at this time.      In the emergency room, patient is afebrile. VSS. CBC was unremarkable. CMP was unremarkable. CXR shows Right sided central venous catheter tip at the level of the SVC which is mildly retracted when compared to previous exam. Nephrology was consulted who would like to admit the patient for dialysis and plans for vascular surgery to possibly take patient to OR.     11/17  Line removed yesterday,   Patient afebrile with stable vitals        Plan of Care:       Assessment:    esrd  --continue dialysis per routine  --fluid restrict  --renal dose medication per routine  --continue outpt medication  --continue binders with meals    Anemia  --erythropoiesis stimulating agent with renal replacement therapy    Hypertension  --uf 3-4 liters  --continue amlodipine and coreg    hyperparathyroidism  --continue sevelamer with meals  --continue activated vit d as outpt  --renal diet         Thank you for allowing us to participate in this patient's care. We will continue to follow.    Vital Signs:  Temp Readings from Last 3 Encounters:   11/17/20 98.5 °F (36.9 °C) (Oral)   10/22/20 98 °F (36.7 °C) (Oral)   10/02/20 98.3 °F (36.8 °C) (Oral)       Pulse Readings from Last 3 Encounters:   11/17/20 67    10/22/20 84   10/02/20 75       BP Readings from Last 3 Encounters:   20 (!) 150/82   10/22/20 (!) 163/82   10/02/20 (!) 184/88       Weight:  Wt Readings from Last 3 Encounters:   20 71.1 kg (156 lb 12 oz)   10/21/20 80 kg (176 lb 5.9 oz)   10/02/20 86.7 kg (191 lb 2.2 oz)       Past Medical & Surgical History:  Past Medical History:   Diagnosis Date    HLD (hyperlipidemia)     Hypertension     Renal disorder        Past Surgical History:   Procedure Laterality Date    AORTOGRAPHY WITH SERIALOGRAPHY N/A 2020    Procedure: co2 renal angio;  Surgeon: Lance Bustamante MD;  Location: [facility] CATH/EP LAB;  Service: Vascular;  Laterality: N/A;    APPENDECTOMY      INSERTION OF TUNNELED CENTRAL VENOUS HEMODIALYSIS CATHETER  10/2/2020    Procedure: INSERTION, CATHETER, CENTRAL VENOUS, HEMODIALYSIS, TUNNELED;  Surgeon: Ali Khoobehi, MD;  Location: [facility] OR;  Service: Vascular;;    NO PAST SURGERIES      PLACEMENT OF DIALYSIS ACCESS Right 2020    Procedure: Insertion, Dialysis Access;  Surgeon: Lance Bustamante MD;  Location: [facility] CATH/EP LAB;  Service: Vascular;  Laterality: Right;    TUBAL LIGATION         Past Social History:  Social History     Socioeconomic History    Marital status:      Spouse name: Not on file    Number of children: Not on file    Years of education: Not on file    Highest education level: Not on file   Occupational History    Not on file   Social Needs    Financial resource strain: Not on file    Food insecurity     Worry: Not on file     Inability: Not on file    Transportation needs     Medical: Not on file     Non-medical: Not on file   Tobacco Use    Smoking status: Former Smoker     Packs/day: 0.25     Years: 40.00     Pack years: 10.00     Quit date: 2020     Years since quittin.1   Substance and Sexual Activity    Alcohol use: Not Currently    Drug use: Not Currently    Sexual activity: Not on file   Lifestyle    Physical activity      Days per week: Not on file     Minutes per session: Not on file    Stress: Not on file   Relationships    Social connections     Talks on phone: Not on file     Gets together: Not on file     Attends Latter day service: Not on file     Active member of club or organization: Not on file     Attends meetings of clubs or organizations: Not on file     Relationship status: Not on file   Other Topics Concern    Not on file   Social History Narrative    Not on file       Medications:  No current facility-administered medications on file prior to encounter.      Current Outpatient Medications on File Prior to Encounter   Medication Sig Dispense Refill    amLODIPine (NORVASC) 5 MG tablet Take 1 tablet (5 mg total) by mouth 2 (two) times daily. 60 tablet 11    aspirin (ECOTRIN) 81 MG EC tablet Take 81 mg by mouth once daily.      atorvastatin (LIPITOR) 40 MG tablet Take 40 mg by mouth once daily.      carvediloL (COREG) 3.125 MG tablet Take 1 tablet (3.125 mg total) by mouth 2 (two) times daily. 60 tablet 11    sevelamer carbonate (RENVELA) 800 mg Tab Take 800 mg by mouth 3 (three) times daily with meals.      hydrALAZINE (APRESOLINE) 25 MG tablet Take 1 tablet (25 mg total) by mouth every 8 (eight) hours as needed (sbp > 170). 45 tablet 5     Scheduled Meds:   amLODIPine  5 mg Oral BID    aspirin  81 mg Oral Daily    atorvastatin  40 mg Oral QHS    carvediloL  3.125 mg Oral BID    sevelamer HCL  800 mg Oral TID WM     Continuous Infusions:  PRN Meds:.acetaminophen, dextrose 50%, dextrose 50%, glucagon (human recombinant), glucose, glucose, hydrALAZINE, ondansetron, senna-docusate 8.6-50 mg, sodium chloride 0.9%    Allergies:  Patient has no known allergies.    Past Family History:  Reviewed; refer to Hospitalist Admission Note    Review of Systems:  Review of Systems - All 14 systems reviewed and negative, except as noted in HPI    Physical Exam:    BP (!) 150/82   Pulse 67   Temp 98.5 °F (36.9 °C) (Oral)    "Resp 17   Ht 5' 6" (1.676 m)   Wt 71.1 kg (156 lb 12 oz)   SpO2 99%   BMI 25.30 kg/m²     General Appearance:    Alert, cooperative, no distress, appears stated age   Head:    Normocephalic, without obvious abnormality, atraumatic   Eyes:    PER, conjunctiva/corneas clear, EOM's intact in both eyes        Throat:   Lips, mucosa, and tongue normal; teeth and gums normal   Back:     Symmetric, no curvature, ROM normal, no CVA tenderness   Lungs:     Clear to auscultation bilaterally, respirations unlabored   Chest wall:    No tenderness or deformity   Heart:    Regular rate and rhythm, S1 and S2 normal, no murmur, rub   or gallop   Abdomen:     Soft, non-tender, bowel sounds active all four quadrants,     no masses, no organomegaly   Extremities:   Extremities normal, atraumatic, no cyanosis or edema   Pulses:   2+ and symmetric all extremities   MSK:   No joint or muscle swelling, tenderness or deformity   Skin:   Skin color, texture, turgor normal, no rashes or lesions   Neurologic:   CNII-XII intact, normal strength and sensation       Throughout.  No flap     Results:  Lab Results   Component Value Date     (L) 11/17/2020    K 4.7 11/17/2020    CL 93 (L) 11/17/2020    CO2 23 11/17/2020    BUN 72 (H) 11/17/2020    CREATININE 12.9 (H) 11/17/2020    CALCIUM 9.4 11/17/2020    ANIONGAP 17 (H) 11/17/2020    ESTGFRAFRICA 3.1 (A) 11/17/2020    EGFRNONAA 2.7 (A) 11/17/2020       Lab Results   Component Value Date    CALCIUM 9.4 11/17/2020    PHOS 8.3 (H) 11/17/2020       Recent Labs   Lab 11/17/20  0339   WBC 4.67   RBC 3.46*   HGB 10.6*   HCT 30.3*      MCV 88   MCH 30.6   MCHC 35.0          I have personally reviewed pertinent radiological imaging and reports.    Eliseo Barnes MD  Nephrology  [facility]  [phone]         "

## 2020-11-17 NOTE — TRANSFER OF CARE
"Anesthesia Transfer of Care Note    Patient: Latricia Higgins    Procedure(s) Performed: Procedure(s) (LRB):  INSERTION, CATHETER, TUNNELED (Right)    Patient location: PACU    Anesthesia Type: MAC    Transport from OR: Transported from OR on room air with adequate spontaneous ventilation. Transported from OR on 2-3 L/min O2 by NC with adequate spontaneous ventilation    Post pain: adequate analgesia    Post assessment: no apparent anesthetic complications    Post vital signs: stable    Level of consciousness: awake    Nausea/Vomiting: no nausea/vomiting    Complications: none    Transfer of care protocol was followed      Last vitals:   Visit Vitals  BP (!) 154/79   Pulse 69   Temp 36.9 °C (98.4 °F)   Resp 18   Ht 5' 6" (1.676 m)   Wt 71.1 kg (156 lb 12 oz)   SpO2 99%   BMI 25.30 kg/m²     "

## 2020-11-17 NOTE — OP NOTE
[facility]  Vascular Surgery  Operative Note    SUMMARY     Date of Procedure: 11/17/2020     Procedure: Procedure(s) (LRB):  INSERTION, CATHETER, TUNNELED (Right)   US guidance for right IJ access    Surgeon(s) and Role:     * Ali Khoobehi, MD - Primary    Assisting Surgeon: None    Pre-Operative Diagnosis: Preop examination [Z01.818]    Post-Operative Diagnosis: Post-Op Diagnosis Codes:     * Preop examination [Z01.818]    Anesthesia: General    Technical Procedures Used:   I discussed with the patient indications, risks, and benefits of PermCath placement.  Risks discussed included bleeding, infection, DVT, myocardial infarction, pneumothorax, cardiac arrhythmia, death.  Patient was agreeable and gave consent.    Patient was brought to the operating theater and placed under sedation.  Right chest and neck were prepped and draped in a sterile fashion.     Under ultrasound images of the right IJ were taken. The vessel is noted to be thrombosed, with chronic DVT. Using ultrasound guidance right IJ was visualized and punctured with introducer needle.  Wire was passed proximally.  Fluoroscopy was used to confirm the correct position of the wire.  Wire tract was dilated with a 12 [place] dilator followed by 14 [place] dilator followed by 16 [place] peel-away sheath.  23 centimeter (cuff to tip) catheter was tunneled from the right chest incision to the right neck incision.  Catheter was introduced into the sheath, and sheath was peeled off.  With the catheter completely within the patient, fluoro was used to confirm the tip at the right atriocaval junction.  Both ports were aspirated and flushed and noted to have brisk flow.  Catheter was sutured in place, neck incision closed.  The patient was brought to recovery room in stable condition.    Description of the Findings of the Procedure: right IJ DVT    Significant Surgical Tasks Conducted by the Assistant(s), if Applicable: n/a    Complications:  No    Estimated Blood Loss (EBL):   0 cc           Implants:   Implant Name Type Inv. Item Serial No.  Lot No. LRB No. Used Action   CATHETER HEMOSPLIT 23CM [id number] - [id number]  CATHETER HEMOSPLIT 23CM [id number]   [id number] 1 Implanted       Specimens:   Specimen (12h ago, onward)    None                  Condition: Good    Disposition: PACU - hemodynamically stable.    Attestation: I performed the procedure.

## 2020-11-17 NOTE — PLAN OF CARE
11/17/20 1544   Discharge Assessment   Assessment Type Discharge Planning Assessment   Assessment information obtained from? Medical Record   Prior to hospitilization cognitive status: Alert/Oriented   Prior to hospitalization functional status: Independent   Current cognitive status: Alert/Oriented   Current Functional Status: Independent   Discharge Plan A Home   Discharge Plan B Home

## 2020-11-17 NOTE — ANESTHESIA POSTPROCEDURE EVALUATION
Anesthesia Post Evaluation    Patient: Latricia Higgins    Procedure(s) Performed: Procedure(s) (LRB):  INSERTION, CATHETER, TUNNELED (Right)    Final Anesthesia Type: MAC    Patient location during evaluation: PACU  Patient participation: Yes- Able to Participate  Level of consciousness: awake and alert  Post-procedure vital signs: reviewed and stable  Pain management: adequate  Airway patency: patent    PONV status at discharge: No PONV  Anesthetic complications: no      Cardiovascular status: hemodynamically stable  Respiratory status: unassisted, spontaneous ventilation and room air  Hydration status: euvolemic  Follow-up not needed.          Vitals Value Taken Time   /79 11/17/20 1430   Temp 36.4 °C (97.5 °F) 11/17/20 1430   Pulse 64 11/17/20 1442   Resp 14 11/17/20 1442   SpO2 100 % 11/17/20 1442   Vitals shown include unvalidated device data.      No case tracking events are documented in the log.      Pain/Karen Score: Pain Rating Prior to Med Admin: 3 (11/17/2020  2:18 PM)  Karen Score: 10 (11/17/2020  2:30 PM)

## 2020-11-17 NOTE — PLAN OF CARE
11/17/20 1545   Final Note   Assessment Type Final Discharge Note   Anticipated Discharge Disposition Home   No needs at this time.

## 2020-11-17 NOTE — PLAN OF CARE
Medicare Outpatient Observation Notice was signed, explained and given to patient/caregiver on 11/17/2020 at 9:02am     answered all questions

## 2020-11-18 NOTE — NURSING
Patient back in room from dialysis via bed. Patients son is on his way to pick her up. No complaints from patients.

## 2020-11-18 NOTE — DISCHARGE SUMMARY
[facility] Medicine  Discharge Summary      Patient Name: Latricia Higgins  MRN: [id number]  Admission Date: 11/16/2020  Hospital Length of Stay: 0 days  Discharge Date and Time:  11/17/2020 7:52 PM  Attending Physician: Lisa Perkins MD   Discharging Provider: Lisa Perkins MD  Primary Care Provider: Ralf Ham MD      HPI:   No notes on file    Procedure(s) (LRB):  INSERTION, CATHETER, TUNNELED (Right)      Hospital Course:   Patient was admitted for dislodged dialysis catheter replacement, noted to have mild erythema at the site of dialysis catheter access which was removed, nontender, denies any fever, obtained blood culture to rule out bacteremia from infected cath, patient underwent new catheter placement by Dr. Ali Khoobehi success, underwent hemodialysis following that without any complication.   Instructions provided to follow up with primary care physician as outpatient. Patient verbalized understanding and is aware to contact primary care physician or return to ED if new or worsening symptoms.    Physical exam on the day of discharge:  General: Patient resting comfortably in no acute distress.  Right internal jugular tunnel catheter+  Lungs: CTA. Good air entry.  Cor: Regular rate and rhythm. No murmurs. No pedal edema.  Abd: Soft. Nontender. Non-distended.  Neuro: A&O x3. Moving all 4 extremities equally  Ext: No clubbing. No cyanosis.      Consults:   Consults (From admission, onward)        Status Ordering Provider     Inpatient consult to Hospitalist  Once     Provider:  Jordan Kasper MD    Acknowledged ARLEN BEAR JR     Inpatient consult to Hospitalist  Once     Provider:  Jordan Kasper MD    Acknowledged ARLEN BEAR JR     Inpatient consult to Nephrology  Once     Provider:  Eliseo Barnes MD    Acknowledged SATINDER GUADALUPE     Inpatient consult to Vascular Surgery  Once     Provider:  Ali Khoobehi, MD    Completed SATINDER GUADALUPE           No new Assessment & Plan notes have been filed under this hospital service since the last note was generated.  Service: Hospital Medicine    Final Active Diagnoses:    Diagnosis Date Noted POA    PRINCIPAL PROBLEM:  Preop examination [Z01.818] 11/16/2020 Not Applicable      Problems Resolved During this Admission:       Discharged Condition: stable    Disposition: Home or Self Care    Follow Up:  Follow-up Information     Ralf Ham MD In 1 week.    Specialty: Hospitalist  Contact information:  [id number] [id number] [phone]                 Patient Instructions:      Diet Cardiac     Diet renal     Activity as tolerated       Significant Diagnostic Studies: Labs:   BMP:   Recent Labs   Lab 11/16/20  1111 11/17/20  0339   GLU 95 82   * 133*   K 4.6 4.7   CL 92* 93*   CO2 22* 23   BUN 67* 72*   CREATININE 11.4* 12.9*   CALCIUM 10.4 9.4   MG  --  2.4    and CBC   Recent Labs   Lab 11/16/20  1111 11/17/20  0339   WBC 4.89 4.67   HGB 11.9* 10.6*   HCT 34.7* 30.3*    179       Pending Diagnostic Studies:     None         Medications:  Reconciled Home Medications:      Medication List      CONTINUE taking these medications    amLODIPine 5 MG tablet  Commonly known as: NORVASC  Take 1 tablet (5 mg total) by mouth 2 (two) times daily.     aspirin 81 MG EC tablet  Commonly known as: ECOTRIN  Take 81 mg by mouth once daily.     atorvastatin 40 MG tablet  Commonly known as: LIPITOR  Take 40 mg by mouth once daily.     carvediloL 3.125 MG tablet  Commonly known as: COREG  Take 1 tablet (3.125 mg total) by mouth 2 (two) times daily.     hydrALAZINE 25 MG tablet  Commonly known as: APRESOLINE  Take 1 tablet (25 mg total) by mouth every 8 (eight) hours as needed (sbp > 170).     sevelamer carbonate 800 mg Tab  Commonly known as: RENVELA  Take 800 mg by mouth 3 (three) times daily with meals.            Indwelling Lines/Drains at time of discharge:   Lines/Drains/Airways      Central Venous Catheter Line                 Hemodialysis Catheter 11/17/20 1335 right internal jugular less than 1 day                Time spent on the discharge of patient: 33  minutes  Patient was seen and examined on the date of discharge and determined to be suitable for discharge.         Lisa Perkins MD  Department of Hospital Medicine  Critical access hospital

## 2020-11-22 LAB — BACTERIA BLD CULT: NORMAL

## 2020-12-02 DIAGNOSIS — N17.9 ACUTE KIDNEY FAILURE, UNSPECIFIED: Primary | ICD-10-CM

## 2020-12-03 ENCOUNTER — HOSPITAL ENCOUNTER (OUTPATIENT)
Dept: PREADMISSION TESTING | Facility: HOSPITAL | Age: 65
Discharge: HOME | End: 2020-12-03
Attending: SURGERY
Payer: COMMERCIAL

## 2020-12-03 ENCOUNTER — HOSPITAL ENCOUNTER (OUTPATIENT)
Dept: RADIOLOGY | Facility: HOSPITAL | Age: 65
Discharge: HOME | End: 2020-12-03
Attending: SURGERY
Payer: MEDICARE

## 2020-12-03 ENCOUNTER — LAB VISIT (OUTPATIENT)
Dept: LAB | Facility: HOSPITAL | Age: 65
End: 2020-12-03
Attending: SURGERY
Payer: COMMERCIAL

## 2020-12-03 VITALS
SYSTOLIC BLOOD PRESSURE: 134 MMHG | WEIGHT: 158.06 LBS | TEMPERATURE: 98 F | HEIGHT: 66 IN | RESPIRATION RATE: 18 BRPM | HEART RATE: 85 BPM | OXYGEN SATURATION: 99 % | DIASTOLIC BLOOD PRESSURE: 82 MMHG | BODY MASS INDEX: 25.4 KG/M2

## 2020-12-03 DIAGNOSIS — Z41.9 SURGERY, ELECTIVE: ICD-10-CM

## 2020-12-03 DIAGNOSIS — Z01.818 PREOP TESTING: Primary | ICD-10-CM

## 2020-12-03 DIAGNOSIS — Z01.818 PREOP EXAMINATION: Primary | ICD-10-CM

## 2020-12-03 DIAGNOSIS — N17.9 ACUTE KIDNEY FAILURE, UNSPECIFIED: ICD-10-CM

## 2020-12-03 DIAGNOSIS — Z01.818 PRE-OP EXAM: ICD-10-CM

## 2020-12-03 LAB
APTT PPP: 28.4 SEC (ref 23.6–33.3)
INR PPP: 1
PROTHROMBIN TIME: 12.8 SEC (ref 10.6–14.8)

## 2020-12-03 PROCEDURE — 36415 COLL VENOUS BLD VENIPUNCTURE: CPT

## 2020-12-03 PROCEDURE — 85730 THROMBOPLASTIN TIME PARTIAL: CPT

## 2020-12-03 PROCEDURE — 85610 PROTHROMBIN TIME: CPT

## 2020-12-03 RX ORDER — CEFAZOLIN SODIUM 2 G/50ML
2 SOLUTION INTRAVENOUS ONCE
Status: CANCELLED | OUTPATIENT
Start: 2020-12-09

## 2020-12-03 NOTE — DISCHARGE INSTRUCTIONS
To confirm, Your doctor has instructed you that surgery is scheduled for: Dec. 9   COVID swab [facility] [address] Thru Dec. 6 @ 2:30 pm  Pre-Op will call the afternoon prior to surgery between 4:00 and 6:00 PM with the final arrival time.     1 Person can come with you the day of surgery.  Enter at [facility] [address] and come through front entrance.  You will be screened/ have temperature checked.    You may have 1 visitor who will also be screened.     Check in at registration.   After registration, proceed past gift shop and through glass door ( [facility] [place]) Check in at the nurses station to the left.   Do not eat or drink anything after midnight the night before your surgery - THIS INCLUDES  WATER, GUM, MINTS AND CANDY.  YOU MAY BRUSH YOUR TEETH BUT DO NOT SWALLOW     TAKE ONLY THESE MEDICATIONS WITH A SMALL SIP OF WATER THE MORNING OF YOUR PROCEDURE:  NONE        PLEASE NOTE:  The surgery schedule has many variables which may affect the time of your surgery case.  Family members should be available if your surgery time changes.  Plan to be here the day of your procedure between 4-6 hours.      DO NOT TAKE THESE MEDICATIONS 5-7 DAYS PRIOR to your procedure or per your surgeon's request: ASPIRIN, ALEVE, ADVIL, IBUPROFEN,  JOSE CARLOS SELTZER, BC , FISH OIL , VITAMIN E, HERBALS  (May take Tylenol)                                                          IMPORTANT INSTRUCTIONS      · Do not smoke, vape or drink alcoholic beverages 24 hours prior to your procedure.  · Shower the night before AND the morning of your procedure with a Chlorhexidine wash such as Hibiclens or Dial antibacterial soap from the neck down.   ·  Do not get it on your face or in your eyes.  You may use your own shampoo and face wash. This helps your skin to be as bacteria free as possible.   ·  DO NOT remove hair from the surgery site.  Do not shave the incision site unless you are given specific instructions to do so.    · Sleep in a bed with  clean sheets.  Do not sleep with a pet in the bed.   · If you wear contact lenses, dentures, hearing aids or glasses, bring a container to put them in during surgery and give to a family member for safe keeping.    · Please leave all jewelry, piercing's and valuables at home.   · Wear rubber soled shoes (no flip flops).  · If your doctor has scheduled you for an overnight stay, bring a small overnight bag with any personal items you need.    · Make arrangements in advance for transportation home by a responsible adult.      · You must make arrangements for transportation, TAXI'S, UBER'S OR LYFTS ARE NOT ALLOWED.        If you have any questions about these instructions, call (Monday - Friday) Pre-Op Admit  Nursing  at [phone] or the Pre-Op Day Surgery Unit at [phone].

## 2020-12-03 NOTE — PRE-PROCEDURE INSTRUCTIONS
Pre op instructions given - verbalized understanding . Will not take any meds the morning of surgery - will arrive at hospital directly from dialysis

## 2020-12-06 ENCOUNTER — LAB VISIT (OUTPATIENT)
Dept: PRIMARY CARE CLINIC | Facility: CLINIC | Age: 65
End: 2020-12-06
Payer: MEDICARE

## 2020-12-06 DIAGNOSIS — Z01.818 PRE-OP EXAM: ICD-10-CM

## 2020-12-06 PROCEDURE — U0003 INFECTIOUS AGENT DETECTION BY NUCLEIC ACID (DNA OR RNA); SEVERE ACUTE RESPIRATORY SYNDROME CORONAVIRUS 2 (SARS-COV-2) (CORONAVIRUS DISEASE [COVID-19]), AMPLIFIED PROBE TECHNIQUE, MAKING USE OF HIGH THROUGHPUT TECHNOLOGIES AS DESCRIBED BY CMS-2020-01-R: HCPCS

## 2020-12-07 LAB — SARS-COV-2 RNA RESP QL NAA+PROBE: NOT DETECTED

## 2020-12-09 ENCOUNTER — HOSPITAL ENCOUNTER (OUTPATIENT)
Facility: HOSPITAL | Age: 65
Discharge: HOME | End: 2020-12-09
Attending: SURGERY | Admitting: SURGERY
Payer: COMMERCIAL

## 2020-12-09 ENCOUNTER — ANESTHESIA (OUTPATIENT)
Dept: SURGERY | Facility: HOSPITAL | Age: 65
End: 2020-12-09
Payer: MEDICARE

## 2020-12-09 ENCOUNTER — ANESTHESIA EVENT (OUTPATIENT)
Dept: SURGERY | Facility: HOSPITAL | Age: 65
End: 2020-12-09
Payer: COMMERCIAL

## 2020-12-09 VITALS
WEIGHT: 153 LBS | BODY MASS INDEX: 24.59 KG/M2 | SYSTOLIC BLOOD PRESSURE: 159 MMHG | DIASTOLIC BLOOD PRESSURE: 95 MMHG | HEIGHT: 66 IN | OXYGEN SATURATION: 99 % | TEMPERATURE: 98 F | HEART RATE: 78 BPM | RESPIRATION RATE: 16 BRPM

## 2020-12-09 DIAGNOSIS — N18.6 ESRD (END STAGE RENAL DISEASE): Primary | ICD-10-CM

## 2020-12-09 DIAGNOSIS — Z41.9 SURGERY, ELECTIVE: ICD-10-CM

## 2020-12-09 DIAGNOSIS — Z01.818 PREOP EXAMINATION: ICD-10-CM

## 2020-12-09 LAB
ANION GAP SERPL CALC-SCNC: 13 MMOL/L (ref 8–16)
BUN SERPL-MCNC: 18 MG/DL (ref 8–23)
CALCIUM SERPL-MCNC: 8.8 MG/DL (ref 8.7–10.5)
CHLORIDE SERPL-SCNC: 90 MMOL/L (ref 95–110)
CO2 SERPL-SCNC: 27 MMOL/L (ref 23–29)
CREAT SERPL-MCNC: 5.2 MG/DL (ref 0.5–1.4)
EST. GFR  (AFRICAN AMERICAN): 9.3 ML/MIN/1.73 M^2
EST. GFR  (NON AFRICAN AMERICAN): 8.1 ML/MIN/1.73 M^2
GLUCOSE SERPL-MCNC: 99 MG/DL (ref 70–110)
POTASSIUM SERPL-SCNC: 3.9 MMOL/L (ref 3.5–5.1)
SODIUM SERPL-SCNC: 130 MMOL/L (ref 136–145)

## 2020-12-09 PROCEDURE — 25000003 PHARM REV CODE 250: Performed by: NURSE ANESTHETIST, CERTIFIED REGISTERED

## 2020-12-09 PROCEDURE — 63600175 PHARM REV CODE 636 W HCPCS: Performed by: NURSE ANESTHETIST, CERTIFIED REGISTERED

## 2020-12-09 PROCEDURE — 27201423 OPTIME MED/SURG SUP & DEVICES STERILE SUPPLY: Performed by: SURGERY

## 2020-12-09 PROCEDURE — 27000673 HC TUBING BLOOD Y: Performed by: ANESTHESIOLOGY

## 2020-12-09 PROCEDURE — 27000671 HC TUBING MICROBORE EXT: Performed by: ANESTHESIOLOGY

## 2020-12-09 PROCEDURE — 80048 BASIC METABOLIC PNL TOTAL CA: CPT

## 2020-12-09 PROCEDURE — 71000015 HC POSTOP RECOV 1ST HR: Performed by: SURGERY

## 2020-12-09 PROCEDURE — 36000707: Performed by: SURGERY

## 2020-12-09 PROCEDURE — 36000706: Performed by: SURGERY

## 2020-12-09 PROCEDURE — 71000033 HC RECOVERY, INTIAL HOUR: Performed by: SURGERY

## 2020-12-09 PROCEDURE — 76942 ECHO GUIDE FOR BIOPSY: CPT | Performed by: ANESTHESIOLOGY

## 2020-12-09 PROCEDURE — 63600175 PHARM REV CODE 636 W HCPCS: Performed by: SURGERY

## 2020-12-09 PROCEDURE — 27202107 HC XP QUATRO SENSOR: Performed by: ANESTHESIOLOGY

## 2020-12-09 PROCEDURE — 27200651 HC AIRWAY, LMA: Performed by: ANESTHESIOLOGY

## 2020-12-09 PROCEDURE — 37000009 HC ANESTHESIA EA ADD 15 MINS: Performed by: SURGERY

## 2020-12-09 PROCEDURE — 64415 NJX AA&/STRD BRCH PLXS IMG: CPT | Mod: 59 | Performed by: ANESTHESIOLOGY

## 2020-12-09 PROCEDURE — 37000008 HC ANESTHESIA 1ST 15 MINUTES: Performed by: SURGERY

## 2020-12-09 PROCEDURE — 27000284 HC CANNULA NASAL: Performed by: ANESTHESIOLOGY

## 2020-12-09 RX ORDER — HYDROCODONE BITARTRATE AND ACETAMINOPHEN 7.5; 325 MG/1; MG/1
1 TABLET ORAL EVERY 4 HOURS PRN
Qty: 30 TABLET | Refills: 0
Start: 2020-12-09 | End: 2020-12-15 | Stop reason: ALTCHOICE

## 2020-12-09 RX ORDER — ACETAMINOPHEN 10 MG/ML
INJECTION, SOLUTION INTRAVENOUS
Status: DISCONTINUED | OUTPATIENT
Start: 2020-12-09 | End: 2020-12-09

## 2020-12-09 RX ORDER — LIDOCAINE HYDROCHLORIDE 20 MG/ML
JELLY TOPICAL
Status: DISCONTINUED | OUTPATIENT
Start: 2020-12-09 | End: 2020-12-09

## 2020-12-09 RX ORDER — FENTANYL CITRATE 50 UG/ML
INJECTION, SOLUTION INTRAMUSCULAR; INTRAVENOUS
Status: DISCONTINUED | OUTPATIENT
Start: 2020-12-09 | End: 2020-12-09

## 2020-12-09 RX ORDER — PROPOFOL 10 MG/ML
VIAL (ML) INTRAVENOUS
Status: DISCONTINUED | OUTPATIENT
Start: 2020-12-09 | End: 2020-12-09

## 2020-12-09 RX ORDER — CEFAZOLIN SODIUM 1 G/3ML
INJECTION, POWDER, FOR SOLUTION INTRAMUSCULAR; INTRAVENOUS
Status: DISCONTINUED | OUTPATIENT
Start: 2020-12-09 | End: 2020-12-09

## 2020-12-09 RX ORDER — DEXAMETHASONE SODIUM PHOSPHATE 4 MG/ML
INJECTION, SOLUTION INTRA-ARTICULAR; INTRALESIONAL; INTRAMUSCULAR; INTRAVENOUS; SOFT TISSUE
Status: DISCONTINUED | OUTPATIENT
Start: 2020-12-09 | End: 2020-12-09

## 2020-12-09 RX ORDER — HYDROCODONE BITARTRATE AND ACETAMINOPHEN 5; 325 MG/1; MG/1
1 TABLET ORAL EVERY 4 HOURS PRN
Status: DISCONTINUED | OUTPATIENT
Start: 2020-12-09 | End: 2020-12-09 | Stop reason: HOSPADM

## 2020-12-09 RX ORDER — SODIUM CHLORIDE 0.9 % (FLUSH) 0.9 %
10 SYRINGE (ML) INJECTION
Status: DISCONTINUED | OUTPATIENT
Start: 2020-12-09 | End: 2020-12-09 | Stop reason: HOSPADM

## 2020-12-09 RX ORDER — ONDANSETRON 2 MG/ML
4 INJECTION INTRAMUSCULAR; INTRAVENOUS DAILY PRN
Status: DISCONTINUED | OUTPATIENT
Start: 2020-12-09 | End: 2020-12-09 | Stop reason: HOSPADM

## 2020-12-09 RX ORDER — MIDAZOLAM HYDROCHLORIDE 1 MG/ML
INJECTION INTRAMUSCULAR; INTRAVENOUS
Status: DISCONTINUED | OUTPATIENT
Start: 2020-12-09 | End: 2020-12-09

## 2020-12-09 RX ORDER — SODIUM CHLORIDE, SODIUM LACTATE, POTASSIUM CHLORIDE, CALCIUM CHLORIDE 600; 310; 30; 20 MG/100ML; MG/100ML; MG/100ML; MG/100ML
INJECTION, SOLUTION INTRAVENOUS CONTINUOUS PRN
Status: DISCONTINUED | OUTPATIENT
Start: 2020-12-09 | End: 2020-12-09

## 2020-12-09 RX ORDER — ONDANSETRON 2 MG/ML
INJECTION INTRAMUSCULAR; INTRAVENOUS
Status: DISCONTINUED | OUTPATIENT
Start: 2020-12-09 | End: 2020-12-09

## 2020-12-09 RX ORDER — OXYCODONE HYDROCHLORIDE 5 MG/1
5 TABLET ORAL
Status: DISCONTINUED | OUTPATIENT
Start: 2020-12-09 | End: 2020-12-09 | Stop reason: HOSPADM

## 2020-12-09 RX ORDER — ACETAMINOPHEN 325 MG/1
650 TABLET ORAL EVERY 4 HOURS PRN
Status: DISCONTINUED | OUTPATIENT
Start: 2020-12-09 | End: 2020-12-09 | Stop reason: HOSPADM

## 2020-12-09 RX ORDER — FENTANYL CITRATE 50 UG/ML
25 INJECTION, SOLUTION INTRAMUSCULAR; INTRAVENOUS
Status: DISCONTINUED | OUTPATIENT
Start: 2020-12-09 | End: 2020-12-09 | Stop reason: HOSPADM

## 2020-12-09 RX ORDER — HEPARIN SODIUM 5000 [USP'U]/ML
INJECTION, SOLUTION INTRAVENOUS; SUBCUTANEOUS
Status: DISCONTINUED | OUTPATIENT
Start: 2020-12-09 | End: 2020-12-09 | Stop reason: HOSPADM

## 2020-12-09 RX ADMIN — CEFAZOLIN 2 G: 330 INJECTION, POWDER, FOR SOLUTION INTRAMUSCULAR; INTRAVENOUS at 12:12

## 2020-12-09 RX ADMIN — FENTANYL CITRATE 100 MCG: 50 INJECTION INTRAMUSCULAR; INTRAVENOUS at 11:12

## 2020-12-09 RX ADMIN — PROPOFOL 100 MG: 10 INJECTION, EMULSION INTRAVENOUS at 12:12

## 2020-12-09 RX ADMIN — ONDANSETRON 4 MG: 2 INJECTION INTRAMUSCULAR; INTRAVENOUS at 11:12

## 2020-12-09 RX ADMIN — MIDAZOLAM HYDROCHLORIDE 2 MG: 1 INJECTION, SOLUTION INTRAMUSCULAR; INTRAVENOUS at 11:12

## 2020-12-09 RX ADMIN — ACETAMINOPHEN 1000 MG: 10 INJECTION, SOLUTION INTRAVENOUS at 12:12

## 2020-12-09 RX ADMIN — SODIUM CHLORIDE, SODIUM LACTATE, POTASSIUM CHLORIDE, AND CALCIUM CHLORIDE: .6; .31; .03; .02 INJECTION, SOLUTION INTRAVENOUS at 11:12

## 2020-12-09 RX ADMIN — PROPOFOL 50 MG: 10 INJECTION, EMULSION INTRAVENOUS at 12:12

## 2020-12-09 RX ADMIN — DEXAMETHASONE SODIUM PHOSPHATE 8 MG: 4 INJECTION, SOLUTION INTRAMUSCULAR; INTRAVENOUS at 12:12

## 2020-12-09 RX ADMIN — LIDOCAINE HYDROCHLORIDE 5 ML: 20 JELLY TOPICAL at 12:12

## 2020-12-09 NOTE — PLAN OF CARE
Transported to Room 1551 in [facility] in stable condition.  Moving right arm a bit more now.  Palpable pulses, fingers warm and has good thrill and bruit to fistula.  Denies pain.  Report given to Charlette VYAS RN

## 2020-12-09 NOTE — ANESTHESIA POSTPROCEDURE EVALUATION
Anesthesia Post Evaluation    Patient: Latricia Higgins    Procedure(s) Performed: Procedure(s) (LRB):  CREATION, AV FISTULA (Right)    Final Anesthesia Type: general    Patient location during evaluation: PACU  Patient participation: Yes- Able to Participate  Level of consciousness: awake and alert, oriented and awake  Post-procedure vital signs: reviewed and stable  Pain management: adequate  Airway patency: patent    PONV status at discharge: No PONV  Anesthetic complications: no      Cardiovascular status: blood pressure returned to baseline, hemodynamically stable and stable  Respiratory status: unassisted, spontaneous ventilation and room air  Hydration status: euvolemic  Follow-up not needed.          Vitals Value Taken Time   /80 12/09/20 1412   Temp 97.9 12/09/20 1414   Pulse 79 12/09/20 1412   Resp 15 12/09/20 1412   SpO2 99 % 12/09/20 1412   Vitals shown include unvalidated device data.      No case tracking events are documented in the log.      Pain/Karen Score: Karen Score: 9 (12/9/2020  1:36 PM)

## 2020-12-09 NOTE — ANESTHESIA PROCEDURE NOTES
Peripheral Block    Patient location during procedure: pre-op   Block not for primary anesthetic.  Reason for block: at surgeon's request and post-op pain management   Post-op Pain Location: Right arm  Start time: 12/9/2020 11:52 AM  Timeout: 12/9/2020 11:51 AM   End time: 12/9/2020 12:12 PM    Staffing  Authorizing Provider: Raji Mao MD  Performing Provider: Raji Mao MD    Preanesthetic Checklist  Completed: patient identified, site marked, surgical consent, pre-op evaluation, timeout performed, IV checked, risks and benefits discussed and monitors and equipment checked  Peripheral Block  Patient position: supine  Prep: ChloraPrep  Patient monitoring: heart rate, cardiac monitor, continuous pulse ox, continuous capnometry and frequent blood pressure checks  Block type: supraclavicular  Laterality: right  Injection technique: single shot  Needle  Needle type: Stimuplex   Needle gauge: 22 G  Needle length: 2 in  Needle localization: anatomical landmarks and ultrasound guidance   -ultrasound image captured on disc.  Assessment  Injection assessment: negative aspiration, negative parasthesia and local visualized surrounding nerve  Paresthesia pain: none  Heart rate change: no  Slow fractionated injection: yes  Additional Notes  VSS.  [facility] RN monitoring vitals throughout procedure.  Patient tolerated procedure well.  Lidocaine 2% with epinephrine was injected 2 mL of time for a total of 20 mL.  There were no signs and symptoms of systemic injection.

## 2020-12-09 NOTE — PLAN OF CARE
Arrived to PACU s/p right antecubital AV fistula placement.  Has Mepilex dressing in place and has good thrill and bruit.  Denies pain.  Had supraclavicular block and can wiggle fingers and lift hand slightly.

## 2020-12-09 NOTE — ANESTHESIA PREPROCEDURE EVALUATION
12/09/2020  Latricia Higgins is a 65 y.o., female.    Patient Active Problem List   Diagnosis    Acute colitis    RICHIE (acute kidney injury)    Blood in stool    Adrenal hyperplasia    HTN (hypertension)    HLD (hyperlipidemia)    Hypertensive urgency    Colitis    ESRD (end stage renal disease)    Preop examination       Past Surgical History:   Procedure Laterality Date    AORTOGRAPHY WITH SERIALOGRAPHY N/A 9/30/2020    Procedure: co2 renal angio;  Surgeon: Lance Bustamante MD;  Location: [facility] CATH/EP LAB;  Service: Vascular;  Laterality: N/A;    APPENDECTOMY      INSERTION OF TUNNELED CENTRAL VENOUS HEMODIALYSIS CATHETER  10/2/2020    Procedure: INSERTION, CATHETER, CENTRAL VENOUS, HEMODIALYSIS, TUNNELED;  Surgeon: Ali Khoobehi, MD;  Location: [facility] OR;  Service: Vascular;;    NO PAST SURGERIES      PLACEMENT OF DIALYSIS ACCESS Right 9/30/2020    Procedure: Insertion, Dialysis Access;  Surgeon: Lance Bustamante MD;  Location: [facility] CATH/EP LAB;  Service: Vascular;  Laterality: Right;    TUBAL LIGATION          Tobacco Use:  The patient  reports that she quit smoking about 2 months ago. She has a 10.00 pack-year smoking history. She does not have any smokeless tobacco history on file.     Results for orders placed or performed during the hospital encounter of 10/21/20   EKG 12-lead    Collection Time: 10/21/20 11:25 AM    Narrative    Test Reason : R07.9,    Vent. Rate : 089 BPM     Atrial Rate : 089 BPM     P-R Int : 152 ms          QRS Dur : 094 ms      QT Int : 464 ms       P-R-T Axes : 027 007 -01 degrees     QTc Int : 564 ms    Sinus rhythm with frequent and consecutive Premature ventricular complexes  Possible Left atrial enlargement  Prolonged QT  Abnormal ECG  When compared with ECG of 27-SEP-2020 10:28,  Premature ventricular complexes are now Present  QT has lengthened  Confirmed by  Landen Griffith MD ([id number]) on 10/22/2020 8:23:58 PM    Referred By: [email or web address]   SELF           Confirmed By:Landen Griffith MD             Lab Results   Component Value Date    WBC 4.67 11/17/2020    HGB 10.6 (L) 11/17/2020    HCT 30.3 (L) 11/17/2020    MCV 88 11/17/2020     11/17/2020     BMP  Lab Results   Component Value Date     (L) 12/09/2020    K 3.9 12/09/2020    CL 90 (L) 12/09/2020    CO2 27 12/09/2020    BUN 72 (H) 11/17/2020    CREATININE 12.9 (H) 11/17/2020    CALCIUM 8.8 12/09/2020    ANIONGAP 13 12/09/2020    GLU 99 12/09/2020    GLU 82 11/17/2020    GLU 95 11/16/2020       Results for orders placed during the hospital encounter of 09/27/20   Echo Color Flow Doppler? Yes    Narrative · The estimated PA systolic pressure is 24 mmHg.  · There is mild left ventricular concentric hypertrophy.  · The left ventricle is normal in size with normal systolic function. The   estimated ejection fraction is 70%.  · Indeterminate diastolic function.  · Normal right ventricular systolic function.  · Mild left atrial enlargement.            Anesthesia Evaluation    I have reviewed the Patient Summary Reports.    I have reviewed the Nursing Notes. I have reviewed the NPO Status.   I have reviewed the Medications.     Review of Systems  Anesthesia Hx:  No problems with previous Anesthesia Denies Hx of Anesthetic complications  Denies Family Hx of Anesthesia complications.   Denies Personal Hx of Anesthesia complications.   Social:  Former Smoker, No Alcohol Use    Hematology/Oncology:     Oncology Normal    -- Anemia:   EENT/Dental:EENT/Dental Normal   Cardiovascular:   Hypertension, well controlled hyperlipidemia ECG has been reviewed.    Pulmonary:  Pulmonary Normal    Renal/:   Chronic Renal Disease, ESRD, Dialysis, CRI    Hepatic/GI:   Liver Disease, Hepatitis    Musculoskeletal:  Musculoskeletal Normal    Neurological:  Neurology Normal    Endocrine:  Endocrine Normal    Psych:  Psychiatric Normal            Physical Exam  General:  Well nourished    Airway/Jaw/Neck:  Airway Findings: Mouth Opening: Normal Tongue: Normal  General Airway Assessment: Adult  Mallampati: III  Improves to II with phonation.  TM Distance: < 4 cm  Jaw/Neck Findings:  Neck ROM: Normal ROM      Dental:  Dental Findings: Upper Dentures, Lower Dentures   Chest/Lungs:  Chest/Lungs Findings: Clear to auscultation, Normal Respiratory Rate     Heart/Vascular:  Heart Findings: Rate: Normal  Rhythm: Regular Rhythm  Sounds: Normal        Mental Status:  Mental Status Findings:  Cooperative, Alert and Oriented         Anesthesia Plan  Type of Anesthesia, risks & benefits discussed:  Anesthesia Type:  MAC, regional  Patient's Preference:   Intra-op Monitoring Plan: standard ASA monitors  Intra-op Monitoring Plan Comments:   Post Op Pain Control Plan: multimodal analgesia, IV/PO Opioids PRN and per primary service following discharge from PACU  Post Op Pain Control Plan Comments:   Induction:   IV  Beta Blocker:  Patient is on a Beta-Blocker and has received one dose within the past 24 hours (No further documentation required).       Informed Consent: Patient understands risks and agrees with Anesthesia plan.  Questions answered.   ASA Score: 3     Day of Surgery Review of History & Physical:        Anesthesia Plan Notes: Supraclavicular Block  MAC or GETA   Decadron 8 mg iv   Zofran 4 mg iv   Pepcid 20mg iv   Ofirmev 1000 mg iv   Sugammadex if needed        Ready For Surgery From Anesthesia Perspective.

## 2020-12-09 NOTE — DISCHARGE INSTRUCTIONS
Arteriovenous (AV) Fistula for Dialysis  An AV fistula is a connection between an artery and a vein. For this procedure, an AV fistula is surgically created using an artery and a vein in your arm. (Your healthcare provider will let you know if another site is to be used.) When the artery and vein are joined, blood flow increases from the artery into the vein. As a result, the vein gets bigger over time. The enlarged vein provides easier access to the blood for a treatment for kidney failure (dialysis). This sheet explains the procedure and what to expect.     An AV fistula increases blood flow from the artery into the vein. Over time, the vein becomes stronger and enlarged.   Preparing for the procedure  Prepare as you have been told. In addition:  · Tell your healthcare provider about all the medicines you take. This includes all over-the-counter and prescription medicines, and street drugs. It also includes herbs, vitamins, and other supplements. You may need to stop taking some or all of them before the procedure.  · Follow any directions youre given for not eating or drinking before the procedure.  · Do not allow anyone to draw blood from or take blood pressure on the arm that will have the fistula before the procedure.  The day of the procedure  The procedure takes about 1 to 2 hours. Youll likely go home the same day.  Before the procedure begins:  · An IV (intravenous) line is put into a vein in the arm or hand not being used for the procedure. This line supplies fluids and medicines.  · To keep you free of pain during the procedure, youre given general anesthesia. This medicine puts you into a state like a deep sleep through the procedure. Or a nerve block may be used. This medicine numbs the arm. With it, you may also be given medicine that makes you relaxed and drowsy through the procedure.  During the procedure:  · The skin over your arm may be injected with numbing medicine.  · One or more small  cuts (incisions) are then made through the numbed skin. This depends on the size of your arm and the depth of the vein in your arm.  · The vein is attached to the selected artery.  · Any incisions made are then closed with stitches (sutures), staples, surgical glue, or strips of surgical tape.  After the procedure:  · Youll be asked to keep your arm raised (elevated) as often as possible for at least a week after the procedure.  · Youll be given medicines to manage pain as needed.  · Your arm and hand will be checked to make sure blood is flowing through the fistula properly. The feeling of blood rushing through the fistula is called a thrill. It is somewhat similar to the purring of a cat. Youll be taught how to check for this feeling each day to make sure there are no problems with your fistula. Youll also be taught how to care for your fistula at home.  · When its time for you to leave the hospital, have an adult family member or friend ready to drive you home.  Recovering at home  Once at home, follow all of the instructions youve been given. Be sure to:  · Take all medicines as directed.  · Care for your incision as instructed.  · Check for signs of infection at the incision site (see below).  · Avoid heavy lifting and strenuous activities as directed.  · Monitor and care for your fistula as instructed.  · Do your hand and arm exercises as instructed. This usually involves squeezing a ball in your hand for a few minutes each hour.  Call your healthcare provider if you have any of the following:  · Fever of 100.4°F (38°C) or higher  · Signs of infection at the incision site, such as increased redness or swelling, warmth, worsening pain, bleeding, or bad-smelling drainage  · You cant feel a thrill (the vibration of blood going through your arm)  · Pain or numbness in your fingers, hand, or arm  · Bleeding, redness, or warmth around your fistula  · Sudden bulging of the fistula (more than usual; a slight  bulge is normal)   Follow-Up  Your healthcare provider will check your fistula within 1 to 2 weeks after the procedure. It will likely take about 6 to 8 weeks for the fistula to enlarge enough to start dialysis. After that, make sure the fistula is checked each time you have dialysis. Your healthcare provider may also suggest checkups every 6 months.  Risks and possible complications include:  · The fistula not working properly  · Long wait before the fistula is ready (up to 6 months)  · Coldness or numbness in the hand (due to blood flowing away from the hand and into the fistula)  · An unsightly bump under the skin (due to enlargement of the fistula)  · Prolonged bleeding from the fistula after dialysis  · Narrowing or weakening of the blood vessels used for the fistula  · Formation of blood clots in the blood vessels used for the fistula  · Risks of anesthesia or any other medicines used during the procedure   Living with an AV Fistula  A problem, such as a narrowing (stricture) of the vein or an infection, can make the fistula unusable. If this happens, you may need other treatments to repair or make a new fistula. To protect your fistula, follow these and any other guidelines youre given:  · Check your fistula as often as your healthcare provider says. If you cant feel your thrill, let your provider know right away.  · Make sure your fistula is checked before each dialysis treatment.  · Dont let anyone draw blood from or take blood pressure on the arm that has the fistula.  · Wash your hands often and keep the area around your fistula clean.  · Dont sleep on the arm that has the fistula.  · Dont wear tight jewelry or a watch on the arm with your fistula.  · Protect your fistula from cuts, scrapes, or blows.  Date Last Reviewed: 1/1/2017  © 6793-3012 [employer]. [address] [place] [address], [place] [address]. All rights reserved. This information is not intended as a substitute for professional  medical care. Always follow your healthcare professional's instructions.

## 2020-12-09 NOTE — ANESTHESIA PROCEDURE NOTES
Intubation  Performed by: Hemal Abel CRNA  Authorized by: Juaquin Stephenson MD     Intubation:     Induction:  Intravenous    Intubated:  Postinduction    Mask Ventilation:  N/a    Attempts:  1    Attempted By:  CRNA    Difficult Airway Encountered?: No      Complications:  None    Airway Device:  Supraglottic airway/LMA    Airway Device Size:  4.0    Style/Cuff Inflation:  Cuffed    Secured at:  The lips    Placement Verified By:  Capnometry    Complicating Factors:  None    Findings Post-Intubation:  Atraumatic/condition of teeth unchanged

## 2020-12-09 NOTE — TRANSFER OF CARE
"Anesthesia Transfer of Care Note    Patient: Latricia Higgins    Procedure(s) Performed: Procedure(s) (LRB):  CREATION, AV FISTULA (Right)    Patient location: PACU    Anesthesia Type: general    Transport from OR: Transported from OR on room air with adequate spontaneous ventilation    Post pain: adequate analgesia    Post assessment: no apparent anesthetic complications    Post vital signs: stable    Level of consciousness: awake and alert    Nausea/Vomiting: no nausea/vomiting    Complications: none    Transfer of care protocol was followed      Last vitals:   Visit Vitals  BP (!) 163/105 (BP Location: Left arm, Patient Position: Sitting)   Pulse 100   Temp 36.5 °C (97.7 °F) (Oral)   Resp 18   Ht 5' 6" (1.676 m)   Wt 69.4 kg (153 lb)   SpO2 99%   Breastfeeding No   BMI 24.69 kg/m²     "

## 2020-12-09 NOTE — OP NOTE
Counts include 234 beds at the [facility]  Surgery Department  Operative Note    SUMMARY     Date of Procedure: 12/9/2020     Procedure: Procedure(s) (LRB):  CREATION, AV FISTULA (Right)     Surgeon(s) and Role:     * Lance Bustamante MD - Primary    Assisting Surgeon: None    Pre-Operative Diagnosis: Acute renal failure, unspecified acute renal failure type [N17.9]    Post-Operative Diagnosis: Post-Op Diagnosis Codes:     * Acute renal failure, unspecified acute renal failure type [N17.9]    Anesthesia: General    Technical Procedures Used:  Right upper forearm cephalic brachial AV fistula    Description of the Findings of the Procedure:  Transverse incision was made just below the antecubital fossa the right arm the antebrachial cephalic vein were dissected out side branches ligated and divided.  The brachial ulnar and radial artery was dissected out and looped proximally and distally.  The vein was ligated and divided distally entrance and dissected it dilate with hep saline was good-sized vein it was spatula aided and then vessel loops of pulled up on the brachial ulnar and radial artery and 4 mm arteriotomy was made on the all brachial artery and end-to-side anastomosis was performed with 7 0 Prolene.  After completion the anastomosis the vessels lead flashback leak closure was completed there was good thrill within the fistula wounds were irrigated out close the deep layer 3-0 Vicryl and 4-0 Monocryl.    Significant Surgical Tasks Conducted by the Assistant(s), if Applicable:     Complications: No    Estimated Blood Loss (EBL): 5 mL           Implants: * No implants in log *    Specimens:   Specimen (12h ago, onward)    None                  Condition: Good    Disposition: PACU - hemodynamically stable.    Attestation: I was present and scrubbed for the entire procedure.

## 2020-12-10 ENCOUNTER — TELEPHONE (OUTPATIENT)
Dept: ANESTHESIOLOGY | Facility: HOSPITAL | Age: 65
End: 2020-12-10

## 2020-12-10 NOTE — TELEPHONE ENCOUNTER
The patient is postoperative day 1.  Following right upper extremity AV fistula placement with right upper extremity sympathectomy performed using a right single shot supraclavicular nerve block.  The patient reports full return of normal motor and sensory to her right upper extremity at this time without numbness and tingling.  She reports full motor to her right upper extremity at this time.  The patient reports minimal pain and states that she is pleased with the quality of the block and anesthesia services.  The patient was instructed to notify the Department of Anesthesiology with any questions, problems or concerns.  The patient demonstrates no anesthesia complications at this time.

## 2020-12-15 RX ORDER — HYDRALAZINE HYDROCHLORIDE 25 MG/1
25 TABLET, FILM COATED ORAL 3 TIMES DAILY PRN
COMMUNITY
End: 2021-03-23 | Stop reason: ALTCHOICE

## 2020-12-16 ENCOUNTER — HOSPITAL ENCOUNTER (OUTPATIENT)
Facility: HOSPITAL | Age: 65
Discharge: HOME | End: 2020-12-16
Attending: SURGERY | Admitting: SURGERY
Payer: COMMERCIAL

## 2020-12-16 ENCOUNTER — HOSPITAL ENCOUNTER (OUTPATIENT)
Dept: RADIOLOGY | Facility: HOSPITAL | Age: 65
Discharge: HOME | End: 2020-12-16
Attending: SURGERY | Admitting: SURGERY
Payer: COMMERCIAL

## 2020-12-16 ENCOUNTER — ANESTHESIA (OUTPATIENT)
Dept: SURGERY | Facility: HOSPITAL | Age: 65
End: 2020-12-16
Payer: COMMERCIAL

## 2020-12-16 ENCOUNTER — ANESTHESIA EVENT (OUTPATIENT)
Dept: SURGERY | Facility: HOSPITAL | Age: 65
End: 2020-12-16
Payer: COMMERCIAL

## 2020-12-16 VITALS
OXYGEN SATURATION: 98 % | TEMPERATURE: 98 F | DIASTOLIC BLOOD PRESSURE: 96 MMHG | HEART RATE: 78 BPM | BODY MASS INDEX: 24.59 KG/M2 | RESPIRATION RATE: 18 BRPM | WEIGHT: 153 LBS | SYSTOLIC BLOOD PRESSURE: 168 MMHG | HEIGHT: 66 IN

## 2020-12-16 DIAGNOSIS — Z01.818 PREOP EXAMINATION: Primary | ICD-10-CM

## 2020-12-16 DIAGNOSIS — N18.6 ESRD (END STAGE RENAL DISEASE): ICD-10-CM

## 2020-12-16 DIAGNOSIS — Z99.2 DIALYSIS PATIENT: ICD-10-CM

## 2020-12-16 LAB
ANION GAP SERPL CALC-SCNC: 14 MMOL/L (ref 8–16)
BUN SERPL-MCNC: 22 MG/DL (ref 8–23)
CALCIUM SERPL-MCNC: 8.3 MG/DL (ref 8.7–10.5)
CHLORIDE SERPL-SCNC: 91 MMOL/L (ref 95–110)
CO2 SERPL-SCNC: 28 MMOL/L (ref 23–29)
CREAT SERPL-MCNC: 5 MG/DL (ref 0.5–1.4)
EST. GFR  (AFRICAN AMERICAN): 9.8 ML/MIN/1.73 M^2
EST. GFR  (NON AFRICAN AMERICAN): 8.5 ML/MIN/1.73 M^2
GLUCOSE SERPL-MCNC: 92 MG/DL (ref 70–110)
POTASSIUM SERPL-SCNC: 3.7 MMOL/L (ref 3.5–5.1)
SARS-COV-2 RDRP RESP QL NAA+PROBE: NEGATIVE
SODIUM SERPL-SCNC: 133 MMOL/L (ref 136–145)

## 2020-12-16 PROCEDURE — 63600175 PHARM REV CODE 636 W HCPCS: Performed by: NURSE ANESTHETIST, CERTIFIED REGISTERED

## 2020-12-16 PROCEDURE — U0002 COVID-19 LAB TEST NON-CDC: HCPCS

## 2020-12-16 PROCEDURE — 37000008 HC ANESTHESIA 1ST 15 MINUTES: Performed by: SURGERY

## 2020-12-16 PROCEDURE — C1750 CATH, HEMODIALYSIS,LONG-TERM: HCPCS | Performed by: SURGERY

## 2020-12-16 PROCEDURE — 36000706: Performed by: SURGERY

## 2020-12-16 PROCEDURE — 77001 FLUOROGUIDE FOR VEIN DEVICE: CPT | Mod: TC

## 2020-12-16 PROCEDURE — 27202105 HC BIS BILATERAL SENSOR: Performed by: STUDENT IN AN ORGANIZED HEALTH CARE EDUCATION/TRAINING PROGRAM

## 2020-12-16 PROCEDURE — 37000009 HC ANESTHESIA EA ADD 15 MINS: Performed by: SURGERY

## 2020-12-16 PROCEDURE — C1769 GUIDE WIRE: HCPCS | Performed by: SURGERY

## 2020-12-16 PROCEDURE — 63600175 PHARM REV CODE 636 W HCPCS: Performed by: SURGERY

## 2020-12-16 PROCEDURE — 80048 BASIC METABOLIC PNL TOTAL CA: CPT

## 2020-12-16 PROCEDURE — 71000033 HC RECOVERY, INTIAL HOUR: Performed by: SURGERY

## 2020-12-16 PROCEDURE — 27201423 OPTIME MED/SURG SUP & DEVICES STERILE SUPPLY: Performed by: SURGERY

## 2020-12-16 PROCEDURE — 71000015 HC POSTOP RECOV 1ST HR: Performed by: SURGERY

## 2020-12-16 PROCEDURE — 25000003 PHARM REV CODE 250: Performed by: NURSE ANESTHETIST, CERTIFIED REGISTERED

## 2020-12-16 PROCEDURE — 27000675 HC TUBING MICRODRIP: Performed by: STUDENT IN AN ORGANIZED HEALTH CARE EDUCATION/TRAINING PROGRAM

## 2020-12-16 PROCEDURE — 27000653 HC CATH, IV CATHLN: Performed by: STUDENT IN AN ORGANIZED HEALTH CARE EDUCATION/TRAINING PROGRAM

## 2020-12-16 PROCEDURE — 27000671 HC TUBING MICROBORE EXT: Performed by: STUDENT IN AN ORGANIZED HEALTH CARE EDUCATION/TRAINING PROGRAM

## 2020-12-16 PROCEDURE — 36415 COLL VENOUS BLD VENIPUNCTURE: CPT

## 2020-12-16 PROCEDURE — 25000003 PHARM REV CODE 250: Performed by: SURGERY

## 2020-12-16 PROCEDURE — 27000080 OPTIME MED/SURG SUP & DEVICES GENERAL CLASSIFICATION: Performed by: SURGERY

## 2020-12-16 PROCEDURE — 36000707: Performed by: SURGERY

## 2020-12-16 PROCEDURE — 27201107 HC STYLET, STANDARD: Performed by: STUDENT IN AN ORGANIZED HEALTH CARE EDUCATION/TRAINING PROGRAM

## 2020-12-16 PROCEDURE — A4216 STERILE WATER/SALINE, 10 ML: HCPCS | Performed by: SURGERY

## 2020-12-16 DEVICE — CATHETER HEMOSPLIT 23CM 5733730: Type: IMPLANTABLE DEVICE | Site: CHEST | Status: FUNCTIONAL

## 2020-12-16 RX ORDER — SODIUM CHLORIDE 9 MG/ML
INJECTION, SOLUTION INTRAMUSCULAR; INTRAVENOUS; SUBCUTANEOUS
Status: DISCONTINUED | OUTPATIENT
Start: 2020-12-16 | End: 2020-12-16 | Stop reason: HOSPADM

## 2020-12-16 RX ORDER — ONDANSETRON 2 MG/ML
INJECTION INTRAMUSCULAR; INTRAVENOUS
Status: DISCONTINUED | OUTPATIENT
Start: 2020-12-16 | End: 2020-12-16

## 2020-12-16 RX ORDER — FENTANYL CITRATE 50 UG/ML
INJECTION, SOLUTION INTRAMUSCULAR; INTRAVENOUS
Status: DISCONTINUED | OUTPATIENT
Start: 2020-12-16 | End: 2020-12-16

## 2020-12-16 RX ORDER — HEPARIN SODIUM 1000 [USP'U]/ML
INJECTION, SOLUTION INTRAVENOUS; SUBCUTANEOUS
Status: DISCONTINUED | OUTPATIENT
Start: 2020-12-16 | End: 2020-12-16 | Stop reason: HOSPADM

## 2020-12-16 RX ORDER — LIDOCAINE HYDROCHLORIDE 10 MG/ML
INJECTION, SOLUTION EPIDURAL; INFILTRATION; INTRACAUDAL; PERINEURAL
Status: DISCONTINUED | OUTPATIENT
Start: 2020-12-16 | End: 2020-12-16

## 2020-12-16 RX ORDER — FAMOTIDINE 10 MG/ML
INJECTION INTRAVENOUS
Status: DISCONTINUED | OUTPATIENT
Start: 2020-12-16 | End: 2020-12-16

## 2020-12-16 RX ORDER — SODIUM CHLORIDE 9 MG/ML
INJECTION, SOLUTION INTRAVENOUS CONTINUOUS PRN
Status: DISCONTINUED | OUTPATIENT
Start: 2020-12-16 | End: 2020-12-16

## 2020-12-16 RX ORDER — FENTANYL CITRATE 50 UG/ML
25 INJECTION, SOLUTION INTRAMUSCULAR; INTRAVENOUS
Status: DISCONTINUED | OUTPATIENT
Start: 2020-12-16 | End: 2020-12-16 | Stop reason: HOSPADM

## 2020-12-16 RX ORDER — SUCCINYLCHOLINE CHLORIDE 20 MG/ML
INJECTION INTRAMUSCULAR; INTRAVENOUS
Status: DISCONTINUED | OUTPATIENT
Start: 2020-12-16 | End: 2020-12-16

## 2020-12-16 RX ORDER — SODIUM CHLORIDE 0.9 % (FLUSH) 0.9 %
10 SYRINGE (ML) INJECTION
Status: DISCONTINUED | OUTPATIENT
Start: 2020-12-16 | End: 2020-12-16 | Stop reason: HOSPADM

## 2020-12-16 RX ORDER — MIDAZOLAM HYDROCHLORIDE 1 MG/ML
INJECTION INTRAMUSCULAR; INTRAVENOUS
Status: DISCONTINUED | OUTPATIENT
Start: 2020-12-16 | End: 2020-12-16

## 2020-12-16 RX ORDER — ONDANSETRON 2 MG/ML
4 INJECTION INTRAMUSCULAR; INTRAVENOUS DAILY PRN
Status: DISCONTINUED | OUTPATIENT
Start: 2020-12-16 | End: 2020-12-16 | Stop reason: HOSPADM

## 2020-12-16 RX ORDER — DEXAMETHASONE SODIUM PHOSPHATE 4 MG/ML
INJECTION, SOLUTION INTRA-ARTICULAR; INTRALESIONAL; INTRAMUSCULAR; INTRAVENOUS; SOFT TISSUE
Status: DISCONTINUED | OUTPATIENT
Start: 2020-12-16 | End: 2020-12-16

## 2020-12-16 RX ORDER — ROCURONIUM BROMIDE 10 MG/ML
INJECTION, SOLUTION INTRAVENOUS
Status: DISCONTINUED | OUTPATIENT
Start: 2020-12-16 | End: 2020-12-16

## 2020-12-16 RX ORDER — LIDOCAINE HYDROCHLORIDE 10 MG/ML
INJECTION, SOLUTION EPIDURAL; INFILTRATION; INTRACAUDAL; PERINEURAL
Status: DISCONTINUED | OUTPATIENT
Start: 2020-12-16 | End: 2020-12-16 | Stop reason: HOSPADM

## 2020-12-16 RX ORDER — OXYCODONE HYDROCHLORIDE 5 MG/1
5 TABLET ORAL
Status: DISCONTINUED | OUTPATIENT
Start: 2020-12-16 | End: 2020-12-16 | Stop reason: HOSPADM

## 2020-12-16 RX ORDER — PROPOFOL 10 MG/ML
VIAL (ML) INTRAVENOUS
Status: DISCONTINUED | OUTPATIENT
Start: 2020-12-16 | End: 2020-12-16

## 2020-12-16 RX ORDER — ACETAMINOPHEN 10 MG/ML
INJECTION, SOLUTION INTRAVENOUS
Status: DISCONTINUED | OUTPATIENT
Start: 2020-12-16 | End: 2020-12-16

## 2020-12-16 RX ADMIN — SUGAMMADEX 200 MG: 100 INJECTION, SOLUTION INTRAVENOUS at 02:12

## 2020-12-16 RX ADMIN — SUCCINYLCHOLINE CHLORIDE 140 MG: 20 INJECTION, SOLUTION INTRAMUSCULAR; INTRAVENOUS at 01:12

## 2020-12-16 RX ADMIN — PROPOFOL 120 MG: 10 INJECTION, EMULSION INTRAVENOUS at 01:12

## 2020-12-16 RX ADMIN — ACETAMINOPHEN 1000 MG: 10 INJECTION, SOLUTION INTRAVENOUS at 01:12

## 2020-12-16 RX ADMIN — DEXAMETHASONE SODIUM PHOSPHATE 8 MG: 4 INJECTION, SOLUTION INTRAMUSCULAR; INTRAVENOUS at 01:12

## 2020-12-16 RX ADMIN — ROCURONIUM BROMIDE 5 MG: 10 INJECTION, SOLUTION INTRAVENOUS at 01:12

## 2020-12-16 RX ADMIN — FAMOTIDINE 20 MG: 10 INJECTION, SOLUTION INTRAVENOUS at 01:12

## 2020-12-16 RX ADMIN — FENTANYL CITRATE 50 MCG: 50 INJECTION INTRAMUSCULAR; INTRAVENOUS at 01:12

## 2020-12-16 RX ADMIN — MIDAZOLAM HYDROCHLORIDE 2 MG: 1 INJECTION, SOLUTION INTRAMUSCULAR; INTRAVENOUS at 01:12

## 2020-12-16 RX ADMIN — ONDANSETRON 4 MG: 2 INJECTION INTRAMUSCULAR; INTRAVENOUS at 01:12

## 2020-12-16 RX ADMIN — LIDOCAINE HYDROCHLORIDE 80 MG: 10 INJECTION, SOLUTION EPIDURAL; INFILTRATION; INTRACAUDAL; PERINEURAL at 01:12

## 2020-12-16 RX ADMIN — SODIUM CHLORIDE: 0.9 INJECTION, SOLUTION INTRAVENOUS at 01:12

## 2020-12-16 NOTE — OP NOTE
[facility]  Vascular Surgery  Operative Note    SUMMARY     Date of Procedure: 12/16/2020     Procedure: Procedure(s) (LRB):  INSERTION, CATHETER, TUNNELED and removal of old tunneled catheter. (Right)     Surgeon(s) and Role:     * Ali Khoobehi, MD - Primary    Assisting Surgeon: None    Pre-Operative Diagnosis: Mechanical complication of arteriovenous fistula surgically created, initial encounter [T82.590A]    Post-Operative Diagnosis: Post-Op Diagnosis Codes:     * Mechanical complication of arteriovenous fistula surgically created, initial encounter [T82.590A]    Anesthesia: RN IV Sedation    Technical Procedures Used:   I discussed with the patient indications, risks, and benefits of PermCath placement.  Risks discussed included bleeding, infection, DVT, myocardial infarction, pneumothorax, cardiac arrhythmia, death.  Patient was agreeable and gave consent.    Patient was brought to the operating theater and placed under sedation.  Right chest and neck were prepped and draped in a sterile fashion.     Old PC cuff was bluntly dissected free from surrounding tissue and wired. Wire was passed proximally.  Fluoroscopy was used to confirm the correct position of the wire, and then wire was retrieved via neck incision.     Wire tract was dilated with a 12 Cambodian dilator followed by 14 Cambodian dilator followed by 16 Cambodian peel-away sheath.  23 centimeter (cuff to tip) catheter was tunneled from a new right chest incision to the right neck incision.  Catheter was introduced into the sheath, and sheath was peeled off.  With the catheter completely within the patient, fluoro was used to confirm the tip at the right atriocaval junction.  Both ports were aspirated and flushed and noted to have brisk flow.  Catheter was sutured in place, neck incision closed.  The patient was brought to recovery room in stable condition.    Description of the Findings of the Procedure: n/a    Significant Surgical Tasks  Conducted by the Assistant(s), if Applicable: n/a    Complications: No    Estimated Blood Loss (EBL): 0           Implants:   Implant Name Type Inv. Item Serial No.  Lot No. LRB No. Used Action   CATHETER HEMOSPLIT [id number] [id number] - [id number]  CATHETER HEMOSPLIT [id number] [id number]   [id number] 1 Implanted       Specimens:   Specimen (12h ago, onward)    None                  Condition: Good    Disposition: PACU - hemodynamically stable.    Attestation: I performed the procedure.

## 2020-12-16 NOTE — ANESTHESIA PREPROCEDURE EVALUATION
12/16/2020  Latricia Higgins is a 65 y.o., female.    Patient Active Problem List   Diagnosis    Acute colitis    RICHIE (acute kidney injury)    Blood in stool    Adrenal hyperplasia    HTN (hypertension)    HLD (hyperlipidemia)    Hypertensive urgency    Colitis    ESRD (end stage renal disease)    Preop examination       Past Surgical History:   Procedure Laterality Date    AORTOGRAPHY WITH SERIALOGRAPHY N/A 9/30/2020    Procedure: co2 renal angio;  Surgeon: Lance Bustamante MD;  Location: [facility] CATH/EP LAB;  Service: Vascular;  Laterality: N/A;    APPENDECTOMY      AV FISTULA PLACEMENT Right 12/9/2020    Procedure: CREATION, AV FISTULA;  Surgeon: Lance Bustamante MD;  Location: [facility] OR;  Service: General;  Laterality: Right;    INSERTION OF TUNNELED CENTRAL VENOUS HEMODIALYSIS CATHETER  10/2/2020    Procedure: INSERTION, CATHETER, CENTRAL VENOUS, HEMODIALYSIS, TUNNELED;  Surgeon: Ali Khoobehi, MD;  Location: [facility] OR;  Service: Vascular;;    PLACEMENT OF DIALYSIS ACCESS Right 9/30/2020    Procedure: Insertion, Dialysis Access;  Surgeon: Lance Bustamante MD;  Location: [facility] CATH/EP LAB;  Service: Vascular;  Laterality: Right;    TONSILLECTOMY      TUBAL LIGATION          Tobacco Use:  The patient  reports that she quit smoking about 2 months ago. She has a 10.00 pack-year smoking history. She does not have any smokeless tobacco history on file.     Results for orders placed or performed during the hospital encounter of 10/21/20   EKG 12-lead    Collection Time: 10/21/20 11:25 AM    Narrative    Test Reason : R07.9,    Vent. Rate : 089 BPM     Atrial Rate : 089 BPM     P-R Int : 152 ms          QRS Dur : 094 ms      QT Int : 464 ms       P-R-T Axes : 027 007 -01 degrees     QTc Int : 564 ms    Sinus rhythm with frequent and consecutive Premature ventricular complexes  Possible Left atrial  enlargement  Prolonged QT  Abnormal ECG  When compared with ECG of 27-SEP-2020 10:28,  Premature ventricular complexes are now Present  QT has lengthened  Confirmed by Landen Griffith MD ([id number]) on 10/22/2020 8:23:58 PM    Referred By: SYDNEY   SELF           Confirmed By:Landen Griffith MD             Lab Results   Component Value Date    WBC 4.67 11/17/2020    HGB 10.6 (L) 11/17/2020    HCT 30.3 (L) 11/17/2020    MCV 88 11/17/2020     11/17/2020     BMP  Lab Results   Component Value Date     (L) 12/16/2020    K 3.7 12/16/2020    CL 91 (L) 12/16/2020    CO2 28 12/16/2020    BUN 18 12/09/2020    CREATININE 5.2 (H) 12/09/2020    CALCIUM 8.3 (L) 12/16/2020    ANIONGAP 14 12/16/2020    GLU 92 12/16/2020    GLU 99 12/09/2020    GLU 82 11/17/2020       Results for orders placed during the hospital encounter of 09/27/20   Echo Color Flow Doppler? Yes    Narrative · The estimated PA systolic pressure is 24 mmHg.  · There is mild left ventricular concentric hypertrophy.  · The left ventricle is normal in size with normal systolic function. The   estimated ejection fraction is 70%.  · Indeterminate diastolic function.  · Normal right ventricular systolic function.  · Mild left atrial enlargement.            Pre-op Assessment    I have reviewed the Patient Summary Reports.     I have reviewed the Nursing Notes. I have reviewed the NPO Status.   I have reviewed the Medications.     Review of Systems  Anesthesia Hx:  No problems with previous Anesthesia Denies Hx of Anesthetic complications  Denies Family Hx of Anesthesia complications.   Denies Personal Hx of Anesthesia complications.   Social:  Former Smoker, No Alcohol Use    Hematology/Oncology:     Oncology Normal    -- Anemia:   EENT/Dental:EENT/Dental Normal   Cardiovascular:   Hypertension, well controlled hyperlipidemia ECG has been reviewed.    Pulmonary:  Pulmonary Normal    Renal/:   Chronic Renal Disease, ESRD, Dialysis, CRI    Hepatic/GI:    Liver Disease, Hepatitis    Musculoskeletal:  Musculoskeletal Normal    Neurological:  Neurology Normal    Endocrine:  Endocrine Normal    Psych:  Psychiatric Normal           Physical Exam  General:  Well nourished    Airway/Jaw/Neck:  Airway Findings: Mouth Opening: Normal Tongue: Normal  General Airway Assessment: Adult  Mallampati: III  Improves to II with phonation.  TM Distance: < 4 cm  Jaw/Neck Findings:  Neck ROM: Normal ROM      Dental:  Dental Findings: Upper Dentures, Lower Dentures   Chest/Lungs:  Chest/Lungs Findings: Clear to auscultation, Normal Respiratory Rate     Heart/Vascular:  Heart Findings: Rate: Normal  Rhythm: Regular Rhythm  Sounds: Normal        Mental Status:  Mental Status Findings:  Cooperative, Alert and Oriented         Anesthesia Plan  Type of Anesthesia, risks & benefits discussed:  Anesthesia Type:  MAC, general  Patient's Preference:   Intra-op Monitoring Plan: standard ASA monitors  Intra-op Monitoring Plan Comments:   Post Op Pain Control Plan: multimodal analgesia, IV/PO Opioids PRN and per primary service following discharge from PACU  Post Op Pain Control Plan Comments:   Induction:   IV  Beta Blocker:  Patient is on a Beta-Blocker and has received one dose within the past 24 hours (No further documentation required).       Informed Consent: Patient understands risks and agrees with Anesthesia plan.  Questions answered.   ASA Score: 3     Day of Surgery Review of History & Physical:        Anesthesia Plan Notes:       MAC; propofol; if general use LMA  Decadron 8 mg iv   Zofran 4 mg iv   Pepcid 20mg iv   Ofirmev 1000 mg iv   Sugammadex if needed        Ready For Surgery From Anesthesia Perspective.

## 2020-12-16 NOTE — ANESTHESIA POSTPROCEDURE EVALUATION
Anesthesia Post Evaluation    Patient: Latricia Higgins    Procedure(s) Performed: Procedure(s) (LRB):  INSERTION, CATHETER, TUNNELED and removal of old tunneled catheter. (Right)    Final Anesthesia Type: general    Patient location during evaluation: PACU  Patient participation: Yes- Able to Participate  Level of consciousness: awake and alert  Post-procedure vital signs: reviewed and stable  Pain management: adequate  Airway patency: patent  KASSI mitigation strategies: Extubation while patient is awake and Extubation and recovery carried out in lateral, semiupright, or other nonsupine position  PONV status at discharge: No PONV  Anesthetic complications: no      Cardiovascular status: blood pressure returned to baseline  Respiratory status: unassisted  Hydration status: euvolemic  Follow-up not needed.          Vitals Value Taken Time   /79 12/16/20 1445   Temp 36.4 °C (97.6 °F) 12/16/20 1445   Pulse 76 12/16/20 1454   Resp 16 12/16/20 1454   SpO2 98 % 12/16/20 1454   Vitals shown include unvalidated device data.      No case tracking events are documented in the log.      Pain/Karen Score: Karen Score: 10 (12/16/2020  2:45 PM)

## 2020-12-16 NOTE — TRANSFER OF CARE
"Anesthesia Transfer of Care Note    Patient: Latricia Higgins    Procedure(s) Performed: Procedure(s) (LRB):  INSERTION, CATHETER, TUNNELED and removal of old tunneled catheter. (Right)    Patient location: PACU    Anesthesia Type: general    Transport from OR: Transported from OR on room air with adequate spontaneous ventilation    Post pain: adequate analgesia    Post assessment: no apparent anesthetic complications    Post vital signs: stable    Level of consciousness: awake    Nausea/Vomiting: no nausea/vomiting    Complications: none    Transfer of care protocol was followed      Last vitals:   Visit Vitals  BP (!) 178/95 (BP Location: Left arm, Patient Position: Sitting)   Pulse 84   Temp 36.7 °C (98.1 °F) (Oral)   Resp 18   Ht 5' 6" (1.676 m)   Wt 69.4 kg (153 lb)   SpO2 99%   Breastfeeding No   BMI 24.69 kg/m²     "

## 2020-12-29 ENCOUNTER — TELEPHONE (OUTPATIENT)
Dept: TRANSPLANT | Facility: CLINIC | Age: 65
End: 2020-12-29

## 2021-01-08 ENCOUNTER — PATIENT MESSAGE (OUTPATIENT)
Dept: TRANSPLANT | Facility: CLINIC | Age: 66
End: 2021-01-08

## 2021-01-13 DIAGNOSIS — Z76.82 ORGAN TRANSPLANT CANDIDATE: Primary | ICD-10-CM

## 2021-01-20 ENCOUNTER — TELEPHONE (OUTPATIENT)
Dept: TRANSPLANT | Facility: CLINIC | Age: 66
End: 2021-01-20

## 2021-02-25 DIAGNOSIS — Z76.82 ORGAN TRANSPLANT CANDIDATE: Primary | ICD-10-CM

## 2021-03-23 ENCOUNTER — TELEPHONE (OUTPATIENT)
Dept: TRANSPLANT | Facility: CLINIC | Age: 66
End: 2021-03-23

## 2021-03-23 ENCOUNTER — HOSPITAL ENCOUNTER (OUTPATIENT)
Dept: RADIOLOGY | Facility: HOSPITAL | Age: 66
Discharge: HOME | End: 2021-03-23
Attending: NURSE PRACTITIONER
Payer: MEDICARE

## 2021-03-23 ENCOUNTER — OFFICE VISIT (OUTPATIENT)
Dept: TRANSPLANT | Facility: CLINIC | Age: 66
End: 2021-03-23
Payer: MEDICARE

## 2021-03-23 ENCOUNTER — HOSPITAL ENCOUNTER (OUTPATIENT)
Dept: RADIOLOGY | Facility: HOSPITAL | Age: 66
Discharge: HOME | End: 2021-03-23
Attending: NURSE PRACTITIONER
Payer: COMMERCIAL

## 2021-03-23 VITALS
BODY MASS INDEX: 24.35 KG/M2 | HEART RATE: 95 BPM | OXYGEN SATURATION: 96 % | DIASTOLIC BLOOD PRESSURE: 109 MMHG | WEIGHT: 142.63 LBS | SYSTOLIC BLOOD PRESSURE: 160 MMHG | RESPIRATION RATE: 18 BRPM | HEIGHT: 64 IN | TEMPERATURE: 99 F

## 2021-03-23 DIAGNOSIS — Z87.891 HISTORY OF TOBACCO ABUSE: ICD-10-CM

## 2021-03-23 DIAGNOSIS — Z01.818 PRE-TRANSPLANT EVALUATION FOR KIDNEY TRANSPLANT: Primary | ICD-10-CM

## 2021-03-23 DIAGNOSIS — K92.1 BLOOD IN STOOL: ICD-10-CM

## 2021-03-23 DIAGNOSIS — Z76.82 ORGAN TRANSPLANT CANDIDATE: ICD-10-CM

## 2021-03-23 DIAGNOSIS — N18.6 ESRD ON HEMODIALYSIS: ICD-10-CM

## 2021-03-23 DIAGNOSIS — Z99.2 ESRD ON HEMODIALYSIS: ICD-10-CM

## 2021-03-23 DIAGNOSIS — I15.0 RENOVASCULAR HYPERTENSION: ICD-10-CM

## 2021-03-23 DIAGNOSIS — E78.5 HYPERLIPIDEMIA, UNSPECIFIED HYPERLIPIDEMIA TYPE: ICD-10-CM

## 2021-03-23 PROCEDURE — 72170 X-RAY EXAM OF PELVIS: CPT | Mod: 26,TXP,, | Performed by: RADIOLOGY

## 2021-03-23 PROCEDURE — 99999 PR PBB SHADOW E&M-EST. PATIENT-LVL IV: CPT | Mod: PBBFAC,TXP,, | Performed by: NURSE PRACTITIONER

## 2021-03-23 PROCEDURE — 99205 OFFICE O/P NEW HI 60 MIN: CPT | Mod: S$PBB,TXP,, | Performed by: NURSE PRACTITIONER

## 2021-03-23 PROCEDURE — 99214 OFFICE O/P EST MOD 30 MIN: CPT | Mod: PBBFAC,25,TXP | Performed by: NURSE PRACTITIONER

## 2021-03-23 PROCEDURE — 76770 US EXAM ABDO BACK WALL COMP: CPT | Mod: TC,TXP

## 2021-03-23 PROCEDURE — 99205 PR OFFICE/OUTPT VISIT, NEW, LEVL V, 60-74 MIN: ICD-10-PCS | Mod: S$PBB,TXP,, | Performed by: NURSE PRACTITIONER

## 2021-03-23 PROCEDURE — 99999 PR PBB SHADOW E&M-EST. PATIENT-LVL IV: ICD-10-PCS | Mod: PBBFAC,TXP,, | Performed by: NURSE PRACTITIONER

## 2021-03-23 PROCEDURE — 76770 US EXAM ABDO BACK WALL COMP: CPT | Mod: 26,TXP,, | Performed by: RADIOLOGY

## 2021-03-23 PROCEDURE — 99204 PR OFFICE/OUTPT VISIT, NEW, LEVL IV, 45-59 MIN: ICD-10-PCS | Mod: S$PBB,TXP,, | Performed by: TRANSPLANT SURGERY

## 2021-03-23 PROCEDURE — 71046 XR CHEST PA AND LATERAL: ICD-10-PCS | Mod: 26,TXP,, | Performed by: RADIOLOGY

## 2021-03-23 PROCEDURE — 72170 X-RAY EXAM OF PELVIS: CPT | Mod: TC,TXP

## 2021-03-23 PROCEDURE — 71046 X-RAY EXAM CHEST 2 VIEWS: CPT | Mod: TC,TXP

## 2021-03-23 PROCEDURE — 76770 US RETROPERITONEAL COMPLETE: ICD-10-PCS | Mod: 26,TXP,, | Performed by: RADIOLOGY

## 2021-03-23 PROCEDURE — 72170 XR PELVIS ROUTINE AP: ICD-10-PCS | Mod: 26,TXP,, | Performed by: RADIOLOGY

## 2021-03-23 PROCEDURE — 99204 OFFICE O/P NEW MOD 45 MIN: CPT | Mod: S$PBB,TXP,, | Performed by: TRANSPLANT SURGERY

## 2021-03-23 PROCEDURE — 71046 X-RAY EXAM CHEST 2 VIEWS: CPT | Mod: 26,TXP,, | Performed by: RADIOLOGY

## 2021-03-23 RX ORDER — AMLODIPINE BESYLATE 10 MG/1
5 TABLET ORAL 2 TIMES DAILY
COMMUNITY
Start: 2021-03-02 | End: 2022-03-11

## 2021-03-23 RX ORDER — ONDANSETRON 4 MG/1
4 TABLET, ORALLY DISINTEGRATING ORAL EVERY 6 HOURS PRN
Status: ON HOLD | COMMUNITY
End: 2022-08-28

## 2021-03-28 ENCOUNTER — HOSPITAL ENCOUNTER (OUTPATIENT)
Facility: HOSPITAL | Age: 66
Discharge: HOME | End: 2021-03-29
Attending: EMERGENCY MEDICINE | Admitting: INTERNAL MEDICINE
Payer: COMMERCIAL

## 2021-03-28 DIAGNOSIS — E87.70 FLUID OVERLOAD: ICD-10-CM

## 2021-03-28 DIAGNOSIS — R91.1 LUNG NODULE: ICD-10-CM

## 2021-03-28 DIAGNOSIS — J44.1 COPD EXACERBATION: ICD-10-CM

## 2021-03-28 DIAGNOSIS — I31.39 PERICARDIAL EFFUSION: ICD-10-CM

## 2021-03-28 DIAGNOSIS — E87.79 OTHER HYPERVOLEMIA: ICD-10-CM

## 2021-03-28 DIAGNOSIS — J81.0 ACUTE PULMONARY EDEMA: ICD-10-CM

## 2021-03-28 DIAGNOSIS — J98.11 ATELECTASIS: ICD-10-CM

## 2021-03-28 DIAGNOSIS — J43.2 CENTRILOBULAR EMPHYSEMA: ICD-10-CM

## 2021-03-28 DIAGNOSIS — R06.03 ACUTE RESPIRATORY DISTRESS: Primary | ICD-10-CM

## 2021-03-28 DIAGNOSIS — J90 PLEURAL EFFUSION: ICD-10-CM

## 2021-03-28 DIAGNOSIS — N18.6 END STAGE RENAL DISEASE: ICD-10-CM

## 2021-03-28 DIAGNOSIS — I70.0 CALCIFICATION OF AORTA: ICD-10-CM

## 2021-03-28 DIAGNOSIS — J90 PLEURAL EFFUSION ON LEFT: ICD-10-CM

## 2021-03-28 LAB
ALBUMIN SERPL BCP-MCNC: 3.7 G/DL (ref 3.5–5.2)
ALP SERPL-CCNC: 96 U/L (ref 55–135)
ALT SERPL W/O P-5'-P-CCNC: 8 U/L (ref 10–44)
ANION GAP SERPL CALC-SCNC: 13 MMOL/L (ref 8–16)
AST SERPL-CCNC: 12 U/L (ref 10–40)
BASOPHILS # BLD AUTO: 0.04 K/UL (ref 0–0.2)
BASOPHILS NFR BLD: 0.8 % (ref 0–1.9)
BILIRUB SERPL-MCNC: 1.2 MG/DL (ref 0.1–1)
BNP SERPL-MCNC: >4500 PG/ML (ref 0–99)
BUN SERPL-MCNC: 27 MG/DL (ref 8–23)
CALCIUM SERPL-MCNC: 9.1 MG/DL (ref 8.7–10.5)
CHLORIDE SERPL-SCNC: 92 MMOL/L (ref 95–110)
CK MB SERPL-MCNC: 0.7 NG/ML (ref 0.1–6.5)
CK SERPL-CCNC: 26 U/L (ref 20–180)
CO2 SERPL-SCNC: 31 MMOL/L (ref 23–29)
CREAT SERPL-MCNC: 8.4 MG/DL (ref 0.5–1.4)
DIFFERENTIAL METHOD: ABNORMAL
EOSINOPHIL # BLD AUTO: 0.1 K/UL (ref 0–0.5)
EOSINOPHIL NFR BLD: 1.4 % (ref 0–8)
ERYTHROCYTE [DISTWIDTH] IN BLOOD BY AUTOMATED COUNT: 12 % (ref 11.5–14.5)
EST. GFR  (AFRICAN AMERICAN): 5.2 ML/MIN/1.73 M^2
EST. GFR  (NON AFRICAN AMERICAN): 4.5 ML/MIN/1.73 M^2
GLUCOSE SERPL-MCNC: 113 MG/DL (ref 70–110)
HCT VFR BLD AUTO: 30.8 % (ref 37–48.5)
HGB BLD-MCNC: 10.4 G/DL (ref 12–16)
IMM GRANULOCYTES # BLD AUTO: 0.01 K/UL (ref 0–0.04)
IMM GRANULOCYTES NFR BLD AUTO: 0.2 % (ref 0–0.5)
LYMPHOCYTES # BLD AUTO: 0.8 K/UL (ref 1–4.8)
LYMPHOCYTES NFR BLD: 15.3 % (ref 18–48)
MCH RBC QN AUTO: 31 PG (ref 27–31)
MCHC RBC AUTO-ENTMCNC: 33.8 G/DL (ref 32–36)
MCV RBC AUTO: 92 FL (ref 82–98)
MONOCYTES # BLD AUTO: 0.5 K/UL (ref 0.3–1)
MONOCYTES NFR BLD: 10.3 % (ref 4–15)
NEUTROPHILS # BLD AUTO: 3.6 K/UL (ref 1.8–7.7)
NEUTROPHILS NFR BLD: 72 % (ref 38–73)
NRBC BLD-RTO: 0 /100 WBC
PLATELET # BLD AUTO: 213 K/UL (ref 150–350)
PMV BLD AUTO: 10.4 FL (ref 9.2–12.9)
POTASSIUM SERPL-SCNC: 3.7 MMOL/L (ref 3.5–5.1)
PROT SERPL-MCNC: 7 G/DL (ref 6–8.4)
RBC # BLD AUTO: 3.35 M/UL (ref 4–5.4)
SARS-COV-2 RDRP RESP QL NAA+PROBE: NEGATIVE
SODIUM SERPL-SCNC: 136 MMOL/L (ref 136–145)
TROPONIN I SERPL DL<=0.01 NG/ML-MCNC: 0.07 NG/ML
TROPONIN I SERPL DL<=0.01 NG/ML-MCNC: 0.07 NG/ML
WBC # BLD AUTO: 4.97 K/UL (ref 3.9–12.7)

## 2021-03-28 PROCEDURE — G0378 HOSPITAL OBSERVATION PER HR: HCPCS | Mod: CS

## 2021-03-28 PROCEDURE — 90935 HEMODIALYSIS ONE EVALUATION: CPT

## 2021-03-28 PROCEDURE — G0378 HOSPITAL OBSERVATION PER HR: HCPCS

## 2021-03-28 PROCEDURE — 83880 ASSAY OF NATRIURETIC PEPTIDE: CPT | Performed by: EMERGENCY MEDICINE

## 2021-03-28 PROCEDURE — 99285 EMERGENCY DEPT VISIT HI MDM: CPT | Mod: 25,CS

## 2021-03-28 PROCEDURE — 99900031 HC PATIENT EDUCATION (STAT)

## 2021-03-28 PROCEDURE — 99204 OFFICE O/P NEW MOD 45 MIN: CPT | Mod: ,,, | Performed by: INTERNAL MEDICINE

## 2021-03-28 PROCEDURE — 85025 COMPLETE CBC W/AUTO DIFF WBC: CPT | Performed by: EMERGENCY MEDICINE

## 2021-03-28 PROCEDURE — 25000242 PHARM REV CODE 250 ALT 637 W/ HCPCS: Performed by: EMERGENCY MEDICINE

## 2021-03-28 PROCEDURE — 94640 AIRWAY INHALATION TREATMENT: CPT

## 2021-03-28 PROCEDURE — 82553 CREATINE MB FRACTION: CPT | Performed by: EMERGENCY MEDICINE

## 2021-03-28 PROCEDURE — 93005 ELECTROCARDIOGRAM TRACING: CPT | Mod: 59 | Performed by: GENERAL PRACTICE

## 2021-03-28 PROCEDURE — 99900035 HC TECH TIME PER 15 MIN (STAT)

## 2021-03-28 PROCEDURE — 99204 PR OFFICE/OUTPT VISIT, NEW, LEVL IV, 45-59 MIN: ICD-10-PCS | Mod: ,,, | Performed by: INTERNAL MEDICINE

## 2021-03-28 PROCEDURE — 36415 COLL VENOUS BLD VENIPUNCTURE: CPT | Performed by: EMERGENCY MEDICINE

## 2021-03-28 PROCEDURE — 84484 ASSAY OF TROPONIN QUANT: CPT | Mod: 91 | Performed by: EMERGENCY MEDICINE

## 2021-03-28 PROCEDURE — 82550 ASSAY OF CK (CPK): CPT | Performed by: EMERGENCY MEDICINE

## 2021-03-28 PROCEDURE — U0002 COVID-19 LAB TEST NON-CDC: HCPCS | Performed by: EMERGENCY MEDICINE

## 2021-03-28 PROCEDURE — 25000003 PHARM REV CODE 250: Performed by: NURSE PRACTITIONER

## 2021-03-28 PROCEDURE — 25000003 PHARM REV CODE 250: Performed by: INTERNAL MEDICINE

## 2021-03-28 PROCEDURE — 80053 COMPREHEN METABOLIC PANEL: CPT | Performed by: EMERGENCY MEDICINE

## 2021-03-28 RX ORDER — ACETAMINOPHEN 325 MG/1
325 TABLET ORAL EVERY 6 HOURS PRN
Status: ON HOLD | COMMUNITY
End: 2022-08-28

## 2021-03-28 RX ORDER — ONDANSETRON 2 MG/ML
4 INJECTION INTRAMUSCULAR; INTRAVENOUS EVERY 6 HOURS PRN
Status: DISCONTINUED | OUTPATIENT
Start: 2021-03-28 | End: 2021-03-29 | Stop reason: HOSPADM

## 2021-03-28 RX ORDER — IPRATROPIUM BROMIDE AND ALBUTEROL SULFATE 2.5; .5 MG/3ML; MG/3ML
3 SOLUTION RESPIRATORY (INHALATION) EVERY 8 HOURS
Status: DISCONTINUED | OUTPATIENT
Start: 2021-03-28 | End: 2021-03-29

## 2021-03-28 RX ORDER — CARVEDILOL 3.12 MG/1
3.12 TABLET ORAL 2 TIMES DAILY
Status: DISCONTINUED | OUTPATIENT
Start: 2021-03-28 | End: 2021-03-29 | Stop reason: HOSPADM

## 2021-03-28 RX ORDER — MUPIROCIN 20 MG/G
OINTMENT TOPICAL 2 TIMES DAILY
Status: DISCONTINUED | OUTPATIENT
Start: 2021-03-28 | End: 2021-03-28

## 2021-03-28 RX ORDER — MUPIROCIN 20 MG/G
OINTMENT TOPICAL 2 TIMES DAILY
Status: DISCONTINUED | OUTPATIENT
Start: 2021-03-28 | End: 2021-03-29 | Stop reason: HOSPADM

## 2021-03-28 RX ORDER — SODIUM CHLORIDE 9 MG/ML
INJECTION, SOLUTION INTRAVENOUS ONCE
Status: CANCELLED | OUTPATIENT
Start: 2021-03-28 | End: 2021-03-28

## 2021-03-28 RX ORDER — LEVALBUTEROL 1.25 MG/.5ML
1.25 SOLUTION, CONCENTRATE RESPIRATORY (INHALATION)
Status: COMPLETED | OUTPATIENT
Start: 2021-03-28 | End: 2021-03-28

## 2021-03-28 RX ORDER — PANTOPRAZOLE SODIUM 40 MG/1
40 TABLET, DELAYED RELEASE ORAL DAILY
Status: DISCONTINUED | OUTPATIENT
Start: 2021-03-28 | End: 2021-03-29 | Stop reason: HOSPADM

## 2021-03-28 RX ADMIN — CARVEDILOL 3.12 MG: 3.12 TABLET, FILM COATED ORAL at 09:03

## 2021-03-28 RX ADMIN — LEVALBUTEROL HYDROCHLORIDE 1.25 MG: 1.25 SOLUTION, CONCENTRATE RESPIRATORY (INHALATION) at 11:03

## 2021-03-28 RX ADMIN — MUPIROCIN: 20 OINTMENT TOPICAL at 09:03

## 2021-03-29 VITALS
HEART RATE: 80 BPM | TEMPERATURE: 98 F | BODY MASS INDEX: 24.28 KG/M2 | SYSTOLIC BLOOD PRESSURE: 146 MMHG | WEIGHT: 142.19 LBS | RESPIRATION RATE: 18 BRPM | OXYGEN SATURATION: 96 % | HEIGHT: 64 IN | DIASTOLIC BLOOD PRESSURE: 65 MMHG

## 2021-03-29 PROBLEM — E87.70 FLUID OVERLOAD: Status: RESOLVED | Noted: 2021-03-28 | Resolved: 2021-03-29

## 2021-03-29 PROBLEM — J81.0 ACUTE PULMONARY EDEMA: Status: RESOLVED | Noted: 2021-03-28 | Resolved: 2021-03-29

## 2021-03-29 PROBLEM — J98.11 ATELECTASIS: Status: RESOLVED | Noted: 2021-03-28 | Resolved: 2021-03-29

## 2021-03-29 PROBLEM — J90 PLEURAL EFFUSION: Status: RESOLVED | Noted: 2021-03-28 | Resolved: 2021-03-29

## 2021-03-29 PROBLEM — J44.1 COPD EXACERBATION: Status: RESOLVED | Noted: 2021-03-28 | Resolved: 2021-03-29

## 2021-03-29 LAB
ALBUMIN SERPL BCP-MCNC: 3.6 G/DL (ref 3.5–5.2)
ALP SERPL-CCNC: 90 U/L (ref 55–135)
ALT SERPL W/O P-5'-P-CCNC: 8 U/L (ref 10–44)
ANION GAP SERPL CALC-SCNC: 12 MMOL/L (ref 8–16)
AST SERPL-CCNC: 11 U/L (ref 10–40)
BASOPHILS # BLD AUTO: 0.04 K/UL (ref 0–0.2)
BASOPHILS NFR BLD: 0.9 % (ref 0–1.9)
BILIRUB SERPL-MCNC: 1 MG/DL (ref 0.1–1)
BUN SERPL-MCNC: 19 MG/DL (ref 8–23)
CALCIUM SERPL-MCNC: 9.1 MG/DL (ref 8.7–10.5)
CHLORIDE SERPL-SCNC: 102 MMOL/L (ref 95–110)
CO2 SERPL-SCNC: 26 MMOL/L (ref 23–29)
CREAT SERPL-MCNC: 6.8 MG/DL (ref 0.5–1.4)
DIFFERENTIAL METHOD: ABNORMAL
EOSINOPHIL # BLD AUTO: 0.1 K/UL (ref 0–0.5)
EOSINOPHIL NFR BLD: 2.4 % (ref 0–8)
ERYTHROCYTE [DISTWIDTH] IN BLOOD BY AUTOMATED COUNT: 12.3 % (ref 11.5–14.5)
EST. GFR  (AFRICAN AMERICAN): 6.7 ML/MIN/1.73 M^2
EST. GFR  (NON AFRICAN AMERICAN): 5.8 ML/MIN/1.73 M^2
GLUCOSE SERPL-MCNC: 91 MG/DL (ref 70–110)
HCT VFR BLD AUTO: 32.4 % (ref 37–48.5)
HGB BLD-MCNC: 11 G/DL (ref 12–16)
IMM GRANULOCYTES # BLD AUTO: 0.02 K/UL (ref 0–0.04)
IMM GRANULOCYTES NFR BLD AUTO: 0.4 % (ref 0–0.5)
LYMPHOCYTES # BLD AUTO: 1.1 K/UL (ref 1–4.8)
LYMPHOCYTES NFR BLD: 23.9 % (ref 18–48)
MAGNESIUM SERPL-MCNC: 2.2 MG/DL (ref 1.6–2.6)
MCH RBC QN AUTO: 31.7 PG (ref 27–31)
MCHC RBC AUTO-ENTMCNC: 34 G/DL (ref 32–36)
MCV RBC AUTO: 93 FL (ref 82–98)
MONOCYTES # BLD AUTO: 0.4 K/UL (ref 0.3–1)
MONOCYTES NFR BLD: 9.5 % (ref 4–15)
NEUTROPHILS # BLD AUTO: 2.8 K/UL (ref 1.8–7.7)
NEUTROPHILS NFR BLD: 62.9 % (ref 38–73)
NRBC BLD-RTO: 0 /100 WBC
PLATELET # BLD AUTO: 203 K/UL (ref 150–450)
PMV BLD AUTO: 10.6 FL (ref 9.2–12.9)
POTASSIUM SERPL-SCNC: 3.9 MMOL/L (ref 3.5–5.1)
PROT SERPL-MCNC: 7 G/DL (ref 6–8.4)
RBC # BLD AUTO: 3.47 M/UL (ref 4–5.4)
SODIUM SERPL-SCNC: 140 MMOL/L (ref 136–145)
WBC # BLD AUTO: 4.52 K/UL (ref 3.9–12.7)

## 2021-03-29 PROCEDURE — 85025 COMPLETE CBC W/AUTO DIFF WBC: CPT | Performed by: INTERNAL MEDICINE

## 2021-03-29 PROCEDURE — 90935 HEMODIALYSIS ONE EVALUATION: CPT

## 2021-03-29 PROCEDURE — 99204 OFFICE O/P NEW MOD 45 MIN: CPT | Mod: ,,, | Performed by: INTERNAL MEDICINE

## 2021-03-29 PROCEDURE — 25500020 PHARM REV CODE 255: Performed by: INTERNAL MEDICINE

## 2021-03-29 PROCEDURE — 99900031 HC PATIENT EDUCATION (STAT)

## 2021-03-29 PROCEDURE — 94640 AIRWAY INHALATION TREATMENT: CPT

## 2021-03-29 PROCEDURE — 99900035 HC TECH TIME PER 15 MIN (STAT)

## 2021-03-29 PROCEDURE — 25000242 PHARM REV CODE 250 ALT 637 W/ HCPCS: Performed by: INTERNAL MEDICINE

## 2021-03-29 PROCEDURE — 94761 N-INVAS EAR/PLS OXIMETRY MLT: CPT

## 2021-03-29 PROCEDURE — G0378 HOSPITAL OBSERVATION PER HR: HCPCS

## 2021-03-29 PROCEDURE — 25000003 PHARM REV CODE 250: Performed by: INTERNAL MEDICINE

## 2021-03-29 PROCEDURE — 36415 COLL VENOUS BLD VENIPUNCTURE: CPT | Performed by: INTERNAL MEDICINE

## 2021-03-29 PROCEDURE — 99204 PR OFFICE/OUTPT VISIT, NEW, LEVL IV, 45-59 MIN: ICD-10-PCS | Mod: ,,, | Performed by: INTERNAL MEDICINE

## 2021-03-29 PROCEDURE — 80053 COMPREHEN METABOLIC PANEL: CPT | Performed by: INTERNAL MEDICINE

## 2021-03-29 PROCEDURE — 83735 ASSAY OF MAGNESIUM: CPT | Performed by: INTERNAL MEDICINE

## 2021-03-29 PROCEDURE — 25000003 PHARM REV CODE 250: Performed by: NURSE PRACTITIONER

## 2021-03-29 RX ORDER — ALBUTEROL SULFATE 0.83 MG/ML
2.5 SOLUTION RESPIRATORY (INHALATION) EVERY 6 HOURS PRN
Status: DISCONTINUED | OUTPATIENT
Start: 2021-03-29 | End: 2021-03-29 | Stop reason: HOSPADM

## 2021-03-29 RX ORDER — IODIXANOL 270 MG/ML
100 INJECTION, SOLUTION INTRAVASCULAR
Status: COMPLETED | OUTPATIENT
Start: 2021-03-29 | End: 2021-03-29

## 2021-03-29 RX ORDER — SEVELAMER HYDROCHLORIDE 800 MG/1
2400 TABLET, FILM COATED ORAL
Status: DISCONTINUED | OUTPATIENT
Start: 2021-03-29 | End: 2021-03-29 | Stop reason: HOSPADM

## 2021-03-29 RX ORDER — SEVELAMER CARBONATE 800 MG/1
2400 TABLET, FILM COATED ORAL
Status: DISCONTINUED | OUTPATIENT
Start: 2021-03-29 | End: 2021-03-29

## 2021-03-29 RX ADMIN — UMECLIDINIUM BROMIDE AND VILANTEROL TRIFENATATE 1 PUFF: 62.5; 25 POWDER RESPIRATORY (INHALATION) at 05:03

## 2021-03-29 RX ADMIN — IODIXANOL 100 ML: 270 INJECTION, SOLUTION INTRAVASCULAR at 08:03

## 2021-03-29 RX ADMIN — PANTOPRAZOLE SODIUM 40 MG: 40 TABLET, DELAYED RELEASE ORAL at 06:03

## 2021-03-29 RX ADMIN — IPRATROPIUM BROMIDE AND ALBUTEROL SULFATE 3 ML: .5; 3 SOLUTION RESPIRATORY (INHALATION) at 12:03

## 2021-03-29 RX ADMIN — SEVELAMER HYDROCHLORIDE 2400 MG: 800 TABLET, FILM COATED ORAL at 05:03

## 2021-03-29 RX ADMIN — CARVEDILOL 3.12 MG: 3.12 TABLET, FILM COATED ORAL at 08:03

## 2021-03-29 RX ADMIN — IPRATROPIUM BROMIDE AND ALBUTEROL SULFATE 3 ML: .5; 3 SOLUTION RESPIRATORY (INHALATION) at 08:03

## 2021-03-29 RX ADMIN — SEVELAMER HYDROCHLORIDE 2400 MG: 800 TABLET, FILM COATED ORAL at 01:03

## 2021-03-29 RX ADMIN — MUPIROCIN: 20 OINTMENT TOPICAL at 08:03

## 2021-04-06 ENCOUNTER — TELEPHONE (OUTPATIENT)
Dept: TRANSPLANT | Facility: CLINIC | Age: 66
End: 2021-04-06

## 2021-04-06 DIAGNOSIS — Z76.82 ORGAN TRANSPLANT CANDIDATE: Primary | ICD-10-CM

## 2021-04-07 ENCOUNTER — TELEPHONE (OUTPATIENT)
Dept: TRANSPLANT | Facility: CLINIC | Age: 66
End: 2021-04-07

## 2021-04-09 ENCOUNTER — TELEPHONE (OUTPATIENT)
Dept: CARDIOLOGY | Facility: CLINIC | Age: 66
End: 2021-04-09

## 2021-04-13 ENCOUNTER — HOSPITAL ENCOUNTER (OUTPATIENT)
Dept: CARDIOLOGY | Facility: HOSPITAL | Age: 66
Discharge: HOME | End: 2021-04-13
Attending: NURSE PRACTITIONER
Payer: COMMERCIAL

## 2021-04-13 ENCOUNTER — OFFICE VISIT (OUTPATIENT)
Dept: CARDIOLOGY | Facility: CLINIC | Age: 66
End: 2021-04-13
Payer: COMMERCIAL

## 2021-04-13 VITALS
SYSTOLIC BLOOD PRESSURE: 138 MMHG | WEIGHT: 137.38 LBS | DIASTOLIC BLOOD PRESSURE: 70 MMHG | HEART RATE: 94 BPM | BODY MASS INDEX: 23.45 KG/M2 | HEIGHT: 64 IN

## 2021-04-13 DIAGNOSIS — Z76.82 ORGAN TRANSPLANT CANDIDATE: ICD-10-CM

## 2021-04-13 DIAGNOSIS — Z01.810 PRE-OPERATIVE CARDIOVASCULAR EXAMINATION: Primary | ICD-10-CM

## 2021-04-13 LAB
CV PHARM DOSE: 0.4 MG
CV STRESS BASE HR: 88 BPM
DIASTOLIC BLOOD PRESSURE: 89 MMHG
END DIASTOLIC INDEX-HIGH: 170 ML/M2
END SYSTOLIC INDEX-HIGH: 70 ML/M2
NUC REST DIASTOLIC VOLUME INDEX: 207
NUC REST EJECTION FRACTION: 48
NUC REST SYSTOLIC VOLUME INDEX: 107
NUC STRESS DIASTOLIC VOLUME INDEX: 197
NUC STRESS EJECTION FRACTION: 46 %
NUC STRESS SYSTOLIC VOLUME INDEX: 107
OHS CV CPX 85 PERCENT MAX PREDICTED HEART RATE MALE: 126
OHS CV CPX MAX PREDICTED HEART RATE: 149
OHS CV CPX PATIENT IS FEMALE: 1
OHS CV CPX PATIENT IS MALE: 0
OHS CV CPX PEAK DIASTOLIC BLOOD PRESSURE: 89 MMHG
OHS CV CPX PEAK HEAR RATE: 93 BPM
OHS CV CPX PEAK RATE PRESSURE PRODUCT: NORMAL
OHS CV CPX PEAK SYSTOLIC BLOOD PRESSURE: 161 MMHG
OHS CV CPX PERCENT MAX PREDICTED HEART RATE ACHIEVED: 63
OHS CV CPX RATE PRESSURE PRODUCT PRESENTING: NORMAL
RETIRED EF AND QEF - SEE NOTES: 51 %
SYSTOLIC BLOOD PRESSURE: 161 MMHG

## 2021-04-13 PROCEDURE — 93016 CV STRESS TEST SUPVJ ONLY: CPT | Mod: TXP,,, | Performed by: INTERNAL MEDICINE

## 2021-04-13 PROCEDURE — 93018 CV STRESS TEST I&R ONLY: CPT | Mod: TXP,,, | Performed by: INTERNAL MEDICINE

## 2021-04-13 PROCEDURE — 78452 HT MUSCLE IMAGE SPECT MULT: CPT | Mod: TXP

## 2021-04-13 PROCEDURE — 99999 PR PBB SHADOW E&M-EST. PATIENT-LVL IV: ICD-10-PCS | Mod: PBBFAC,TXP,, | Performed by: INTERNAL MEDICINE

## 2021-04-13 PROCEDURE — 93016 STRESS TEST WITH MYOCARDIAL PERFUSION (CUPID ONLY): ICD-10-PCS | Mod: TXP,,, | Performed by: INTERNAL MEDICINE

## 2021-04-13 PROCEDURE — 93018 STRESS TEST WITH MYOCARDIAL PERFUSION (CUPID ONLY): ICD-10-PCS | Mod: TXP,,, | Performed by: INTERNAL MEDICINE

## 2021-04-13 PROCEDURE — 78452 STRESS TEST WITH MYOCARDIAL PERFUSION (CUPID ONLY): ICD-10-PCS | Mod: 26,TXP,, | Performed by: INTERNAL MEDICINE

## 2021-04-13 PROCEDURE — 63600175 PHARM REV CODE 636 W HCPCS: Mod: TXP | Performed by: NURSE PRACTITIONER

## 2021-04-13 PROCEDURE — 78452 HT MUSCLE IMAGE SPECT MULT: CPT | Mod: 26,TXP,, | Performed by: INTERNAL MEDICINE

## 2021-04-13 PROCEDURE — 99204 OFFICE O/P NEW MOD 45 MIN: CPT | Mod: S$GLB,TXP,, | Performed by: INTERNAL MEDICINE

## 2021-04-13 PROCEDURE — A9502 TC99M TETROFOSMIN: HCPCS | Mod: TXP

## 2021-04-13 PROCEDURE — 99999 PR PBB SHADOW E&M-EST. PATIENT-LVL IV: CPT | Mod: PBBFAC,TXP,, | Performed by: INTERNAL MEDICINE

## 2021-04-13 PROCEDURE — 99204 PR OFFICE/OUTPT VISIT, NEW, LEVL IV, 45-59 MIN: ICD-10-PCS | Mod: S$GLB,TXP,, | Performed by: INTERNAL MEDICINE

## 2021-04-13 RX ORDER — REGADENOSON 0.08 MG/ML
0.4 INJECTION, SOLUTION INTRAVENOUS ONCE
Status: COMPLETED | OUTPATIENT
Start: 2021-04-13 | End: 2021-04-13

## 2021-04-13 RX ADMIN — REGADENOSON 0.4 MG: 0.08 INJECTION, SOLUTION INTRAVENOUS at 09:04

## 2021-04-14 ENCOUNTER — HOSPITAL ENCOUNTER (OUTPATIENT)
Dept: RADIOLOGY | Facility: HOSPITAL | Age: 66
Discharge: HOME | End: 2021-04-14
Attending: INTERNAL MEDICINE
Payer: COMMERCIAL

## 2021-04-14 ENCOUNTER — TELEPHONE (OUTPATIENT)
Dept: TRANSPLANT | Facility: CLINIC | Age: 66
End: 2021-04-14

## 2021-04-14 ENCOUNTER — OFFICE VISIT (OUTPATIENT)
Dept: PULMONOLOGY | Facility: CLINIC | Age: 66
End: 2021-04-14
Payer: COMMERCIAL

## 2021-04-14 VITALS
DIASTOLIC BLOOD PRESSURE: 75 MMHG | BODY MASS INDEX: 23.22 KG/M2 | WEIGHT: 136 LBS | OXYGEN SATURATION: 99 % | SYSTOLIC BLOOD PRESSURE: 131 MMHG | HEART RATE: 89 BPM | HEIGHT: 64 IN

## 2021-04-14 DIAGNOSIS — N18.6 ESRD (END STAGE RENAL DISEASE): ICD-10-CM

## 2021-04-14 DIAGNOSIS — Z76.82 ORGAN TRANSPLANT CANDIDATE: Primary | ICD-10-CM

## 2021-04-14 DIAGNOSIS — J43.2 CENTRILOBULAR EMPHYSEMA: ICD-10-CM

## 2021-04-14 DIAGNOSIS — J43.2 CENTRILOBULAR EMPHYSEMA: Primary | ICD-10-CM

## 2021-04-14 DIAGNOSIS — J90 PLEURAL EFFUSION: ICD-10-CM

## 2021-04-14 DIAGNOSIS — I31.39 PERICARDIAL EFFUSION: ICD-10-CM

## 2021-04-14 DIAGNOSIS — R91.8 PULMONARY NODULES: ICD-10-CM

## 2021-04-14 PROCEDURE — 1126F AMNT PAIN NOTED NONE PRSNT: CPT | Mod: S$GLB,,, | Performed by: INTERNAL MEDICINE

## 2021-04-14 PROCEDURE — 3075F SYST BP GE 130 - 139MM HG: CPT | Mod: S$GLB,,, | Performed by: INTERNAL MEDICINE

## 2021-04-14 PROCEDURE — 1126F PR PAIN SEVERITY QUANTIFIED, NO PAIN PRESENT: ICD-10-PCS | Mod: S$GLB,,, | Performed by: INTERNAL MEDICINE

## 2021-04-14 PROCEDURE — 3078F DIAST BP <80 MM HG: CPT | Mod: S$GLB,,, | Performed by: INTERNAL MEDICINE

## 2021-04-14 PROCEDURE — 71046 X-RAY EXAM CHEST 2 VIEWS: CPT | Mod: TC

## 2021-04-14 PROCEDURE — 3008F PR BODY MASS INDEX (BMI) DOCUMENTED: ICD-10-PCS | Mod: S$GLB,,, | Performed by: INTERNAL MEDICINE

## 2021-04-14 PROCEDURE — 3008F BODY MASS INDEX DOCD: CPT | Mod: S$GLB,,, | Performed by: INTERNAL MEDICINE

## 2021-04-14 PROCEDURE — 3078F PR MOST RECENT DIASTOLIC BLOOD PRESSURE < 80 MM HG: ICD-10-PCS | Mod: S$GLB,,, | Performed by: INTERNAL MEDICINE

## 2021-04-14 PROCEDURE — 99214 PR OFFICE/OUTPT VISIT, EST, LEVL IV, 30-39 MIN: ICD-10-PCS | Mod: S$GLB,,, | Performed by: INTERNAL MEDICINE

## 2021-04-14 PROCEDURE — 99214 OFFICE O/P EST MOD 30 MIN: CPT | Mod: S$GLB,,, | Performed by: INTERNAL MEDICINE

## 2021-04-14 PROCEDURE — 3075F PR MOST RECENT SYSTOLIC BLOOD PRESS GE 130-139MM HG: ICD-10-PCS | Mod: S$GLB,,, | Performed by: INTERNAL MEDICINE

## 2021-04-16 ENCOUNTER — TELEPHONE (OUTPATIENT)
Dept: TRANSPLANT | Facility: CLINIC | Age: 66
End: 2021-04-16

## 2021-04-19 ENCOUNTER — TELEPHONE (OUTPATIENT)
Dept: TRANSPLANT | Facility: CLINIC | Age: 66
End: 2021-04-19

## 2021-04-20 ENCOUNTER — TELEPHONE (OUTPATIENT)
Dept: TRANSPLANT | Facility: CLINIC | Age: 66
End: 2021-04-20

## 2021-04-21 ENCOUNTER — TELEPHONE (OUTPATIENT)
Dept: TRANSPLANT | Facility: CLINIC | Age: 66
End: 2021-04-21

## 2021-04-21 DIAGNOSIS — Z76.82 ORGAN TRANSPLANT CANDIDATE: Primary | ICD-10-CM

## 2021-04-26 ENCOUNTER — OFFICE VISIT (OUTPATIENT)
Dept: OBSTETRICS AND GYNECOLOGY | Facility: CLINIC | Age: 66
End: 2021-04-26
Payer: COMMERCIAL

## 2021-04-26 VITALS
BODY MASS INDEX: 22.85 KG/M2 | WEIGHT: 133.81 LBS | SYSTOLIC BLOOD PRESSURE: 130 MMHG | DIASTOLIC BLOOD PRESSURE: 80 MMHG | HEIGHT: 64 IN | RESPIRATION RATE: 16 BRPM

## 2021-04-26 DIAGNOSIS — Z01.419 ENCOUNTER FOR ROUTINE GYNECOLOGICAL EXAMINATION WITH PAPANICOLAOU SMEAR OF CERVIX: Primary | ICD-10-CM

## 2021-04-26 DIAGNOSIS — Z76.82 ORGAN TRANSPLANT CANDIDATE: ICD-10-CM

## 2021-04-26 PROCEDURE — 3008F PR BODY MASS INDEX (BMI) DOCUMENTED: ICD-10-PCS | Mod: CPTII,S$GLB,, | Performed by: OBSTETRICS & GYNECOLOGY

## 2021-04-26 PROCEDURE — 99387 INIT PM E/M NEW PAT 65+ YRS: CPT | Mod: S$GLB,,, | Performed by: OBSTETRICS & GYNECOLOGY

## 2021-04-26 PROCEDURE — 1126F PR PAIN SEVERITY QUANTIFIED, NO PAIN PRESENT: ICD-10-PCS | Mod: S$GLB,,, | Performed by: OBSTETRICS & GYNECOLOGY

## 2021-04-26 PROCEDURE — 99999 PR PBB SHADOW E&M-EST. PATIENT-LVL IV: ICD-10-PCS | Mod: PBBFAC,,, | Performed by: OBSTETRICS & GYNECOLOGY

## 2021-04-26 PROCEDURE — 3288F PR FALLS RISK ASSESSMENT DOCUMENTED: ICD-10-PCS | Mod: CPTII,S$GLB,, | Performed by: OBSTETRICS & GYNECOLOGY

## 2021-04-26 PROCEDURE — 99999 PR PBB SHADOW E&M-EST. PATIENT-LVL IV: CPT | Mod: PBBFAC,,, | Performed by: OBSTETRICS & GYNECOLOGY

## 2021-04-26 PROCEDURE — 1101F PT FALLS ASSESS-DOCD LE1/YR: CPT | Mod: CPTII,S$GLB,, | Performed by: OBSTETRICS & GYNECOLOGY

## 2021-04-26 PROCEDURE — 99387 PR PREVENTIVE VISIT,NEW,65 & OVER: ICD-10-PCS | Mod: S$GLB,,, | Performed by: OBSTETRICS & GYNECOLOGY

## 2021-04-26 PROCEDURE — 88175 CYTOPATH C/V AUTO FLUID REDO: CPT | Performed by: OBSTETRICS & GYNECOLOGY

## 2021-04-26 PROCEDURE — 1126F AMNT PAIN NOTED NONE PRSNT: CPT | Mod: S$GLB,,, | Performed by: OBSTETRICS & GYNECOLOGY

## 2021-04-26 PROCEDURE — 1101F PR PT FALLS ASSESS DOC 0-1 FALLS W/OUT INJ PAST YR: ICD-10-PCS | Mod: CPTII,S$GLB,, | Performed by: OBSTETRICS & GYNECOLOGY

## 2021-04-26 PROCEDURE — 3288F FALL RISK ASSESSMENT DOCD: CPT | Mod: CPTII,S$GLB,, | Performed by: OBSTETRICS & GYNECOLOGY

## 2021-04-26 PROCEDURE — 3008F BODY MASS INDEX DOCD: CPT | Mod: CPTII,S$GLB,, | Performed by: OBSTETRICS & GYNECOLOGY

## 2021-04-27 DIAGNOSIS — Z76.82 ORGAN TRANSPLANT CANDIDATE: Primary | ICD-10-CM

## 2021-04-29 LAB
CLINICAL INFO: NORMAL
CYTO CVX: NORMAL
CYTOLOGIST CVX/VAG CYTO: NORMAL
CYTOLOGIST CVX/VAG CYTO: NORMAL
CYTOLOGY CMNT CVX/VAG CYTO-IMP: NORMAL
CYTOLOGY PAP THIN PREP EXPLANATION: NORMAL
DATE OF PREVIOUS PAP: NORMAL
DATE PREVIOUS BX: NO
LMP START DATE: NORMAL
SPECIMEN SOURCE CVX/VAG CYTO: NORMAL
STAT OF ADQ CVX/VAG CYTO-IMP: NORMAL

## 2021-05-01 ENCOUNTER — PATIENT MESSAGE (OUTPATIENT)
Dept: OBSTETRICS AND GYNECOLOGY | Facility: CLINIC | Age: 66
End: 2021-05-01

## 2021-05-12 ENCOUNTER — HOSPITAL ENCOUNTER (OUTPATIENT)
Facility: HOSPITAL | Age: 66
Discharge: HOME | End: 2021-05-14
Attending: EMERGENCY MEDICINE | Admitting: INTERNAL MEDICINE
Payer: COMMERCIAL

## 2021-05-12 DIAGNOSIS — R06.02 SOB (SHORTNESS OF BREATH): ICD-10-CM

## 2021-05-12 DIAGNOSIS — E87.70 HYPERVOLEMIA: ICD-10-CM

## 2021-05-12 LAB
ALBUMIN SERPL BCP-MCNC: 3.7 G/DL (ref 3.5–5.2)
ALP SERPL-CCNC: 83 U/L (ref 55–135)
ALT SERPL W/O P-5'-P-CCNC: 8 U/L (ref 10–44)
ANION GAP SERPL CALC-SCNC: 17 MMOL/L (ref 8–16)
AST SERPL-CCNC: 14 U/L (ref 10–40)
BASOPHILS # BLD AUTO: 0.03 K/UL (ref 0–0.2)
BASOPHILS NFR BLD: 0.3 % (ref 0–1.9)
BILIRUB SERPL-MCNC: 1.2 MG/DL (ref 0.1–1)
BNP SERPL-MCNC: >4500 PG/ML (ref 0–99)
BUN SERPL-MCNC: 43 MG/DL (ref 8–23)
CALCIUM SERPL-MCNC: 8.8 MG/DL (ref 8.7–10.5)
CHLORIDE SERPL-SCNC: 89 MMOL/L (ref 95–110)
CO2 SERPL-SCNC: 28 MMOL/L (ref 23–29)
CREAT SERPL-MCNC: 8.8 MG/DL (ref 0.5–1.4)
DIFFERENTIAL METHOD: ABNORMAL
EOSINOPHIL # BLD AUTO: 0.2 K/UL (ref 0–0.5)
EOSINOPHIL NFR BLD: 2.8 % (ref 0–8)
ERYTHROCYTE [DISTWIDTH] IN BLOOD BY AUTOMATED COUNT: 12.1 % (ref 11.5–14.5)
EST. GFR  (AFRICAN AMERICAN): 4.9 ML/MIN/1.73 M^2
EST. GFR  (NON AFRICAN AMERICAN): 4.3 ML/MIN/1.73 M^2
GLUCOSE SERPL-MCNC: 119 MG/DL (ref 70–110)
HCT VFR BLD AUTO: 31.3 % (ref 37–48.5)
HGB BLD-MCNC: 10.3 G/DL (ref 12–16)
IMM GRANULOCYTES # BLD AUTO: 0.11 K/UL (ref 0–0.04)
IMM GRANULOCYTES NFR BLD AUTO: 1.3 % (ref 0–0.5)
INR PPP: 1
LYMPHOCYTES # BLD AUTO: 0.7 K/UL (ref 1–4.8)
LYMPHOCYTES NFR BLD: 8.4 % (ref 18–48)
MCH RBC QN AUTO: 30.9 PG (ref 27–31)
MCHC RBC AUTO-ENTMCNC: 32.9 G/DL (ref 32–36)
MCV RBC AUTO: 94 FL (ref 82–98)
MONOCYTES # BLD AUTO: 0.5 K/UL (ref 0.3–1)
MONOCYTES NFR BLD: 6.1 % (ref 4–15)
NEUTROPHILS # BLD AUTO: 7 K/UL (ref 1.8–7.7)
NEUTROPHILS NFR BLD: 81.1 % (ref 38–73)
NRBC BLD-RTO: 0 /100 WBC
PLATELET # BLD AUTO: 200 K/UL (ref 150–450)
PMV BLD AUTO: 9.7 FL (ref 9.2–12.9)
POTASSIUM SERPL-SCNC: 4.8 MMOL/L (ref 3.5–5.1)
PROT SERPL-MCNC: 7.8 G/DL (ref 6–8.4)
PROTHROMBIN TIME: 12.5 SEC (ref 11.8–14.3)
RBC # BLD AUTO: 3.33 M/UL (ref 4–5.4)
SARS-COV-2 RDRP RESP QL NAA+PROBE: NEGATIVE
SODIUM SERPL-SCNC: 134 MMOL/L (ref 136–145)
TROPONIN I SERPL DL<=0.01 NG/ML-MCNC: 0.06 NG/ML
TROPONIN I SERPL DL<=0.01 NG/ML-MCNC: 0.07 NG/ML
TROPONIN I SERPL DL<=0.01 NG/ML-MCNC: 0.07 NG/ML
WBC # BLD AUTO: 8.65 K/UL (ref 3.9–12.7)

## 2021-05-12 PROCEDURE — 96374 THER/PROPH/DIAG INJ IV PUSH: CPT | Mod: 59

## 2021-05-12 PROCEDURE — 94640 AIRWAY INHALATION TREATMENT: CPT

## 2021-05-12 PROCEDURE — 25000242 PHARM REV CODE 250 ALT 637 W/ HCPCS: Performed by: EMERGENCY MEDICINE

## 2021-05-12 PROCEDURE — 93010 ELECTROCARDIOGRAM REPORT: CPT | Mod: ,,, | Performed by: SPECIALIST

## 2021-05-12 PROCEDURE — 99900035 HC TECH TIME PER 15 MIN (STAT)

## 2021-05-12 PROCEDURE — 85025 COMPLETE CBC W/AUTO DIFF WBC: CPT | Performed by: EMERGENCY MEDICINE

## 2021-05-12 PROCEDURE — G0378 HOSPITAL OBSERVATION PER HR: HCPCS

## 2021-05-12 PROCEDURE — 63600175 PHARM REV CODE 636 W HCPCS: Performed by: NURSE PRACTITIONER

## 2021-05-12 PROCEDURE — 90935 HEMODIALYSIS ONE EVALUATION: CPT

## 2021-05-12 PROCEDURE — 80053 COMPREHEN METABOLIC PANEL: CPT | Performed by: EMERGENCY MEDICINE

## 2021-05-12 PROCEDURE — 94761 N-INVAS EAR/PLS OXIMETRY MLT: CPT

## 2021-05-12 PROCEDURE — 85610 PROTHROMBIN TIME: CPT | Performed by: EMERGENCY MEDICINE

## 2021-05-12 PROCEDURE — 99285 EMERGENCY DEPT VISIT HI MDM: CPT | Mod: 25

## 2021-05-12 PROCEDURE — 99900031 HC PATIENT EDUCATION (STAT)

## 2021-05-12 PROCEDURE — 25000003 PHARM REV CODE 250: Performed by: NURSE PRACTITIONER

## 2021-05-12 PROCEDURE — 84484 ASSAY OF TROPONIN QUANT: CPT | Mod: 91 | Performed by: NURSE PRACTITIONER

## 2021-05-12 PROCEDURE — U0002 COVID-19 LAB TEST NON-CDC: HCPCS | Performed by: INTERNAL MEDICINE

## 2021-05-12 PROCEDURE — 25000003 PHARM REV CODE 250: Performed by: INTERNAL MEDICINE

## 2021-05-12 PROCEDURE — 25000242 PHARM REV CODE 250 ALT 637 W/ HCPCS: Performed by: INTERNAL MEDICINE

## 2021-05-12 PROCEDURE — 36415 COLL VENOUS BLD VENIPUNCTURE: CPT | Performed by: NURSE PRACTITIONER

## 2021-05-12 PROCEDURE — 84484 ASSAY OF TROPONIN QUANT: CPT | Performed by: EMERGENCY MEDICINE

## 2021-05-12 PROCEDURE — 83880 ASSAY OF NATRIURETIC PEPTIDE: CPT | Performed by: EMERGENCY MEDICINE

## 2021-05-12 PROCEDURE — 27000221 HC OXYGEN, UP TO 24 HOURS

## 2021-05-12 PROCEDURE — 93010 EKG 12-LEAD: ICD-10-PCS | Mod: ,,, | Performed by: SPECIALIST

## 2021-05-12 PROCEDURE — 93005 ELECTROCARDIOGRAM TRACING: CPT | Mod: 59 | Performed by: SPECIALIST

## 2021-05-12 RX ORDER — AMLODIPINE BESYLATE 5 MG/1
10 TABLET ORAL DAILY
Status: DISCONTINUED | OUTPATIENT
Start: 2021-05-12 | End: 2021-05-14 | Stop reason: HOSPADM

## 2021-05-12 RX ORDER — TALC
6 POWDER (GRAM) TOPICAL NIGHTLY PRN
Status: DISCONTINUED | OUTPATIENT
Start: 2021-05-12 | End: 2021-05-14 | Stop reason: HOSPADM

## 2021-05-12 RX ORDER — ATORVASTATIN CALCIUM 40 MG/1
40 TABLET, FILM COATED ORAL DAILY
Status: DISCONTINUED | OUTPATIENT
Start: 2021-05-12 | End: 2021-05-14 | Stop reason: HOSPADM

## 2021-05-12 RX ORDER — IPRATROPIUM BROMIDE AND ALBUTEROL SULFATE 2.5; .5 MG/3ML; MG/3ML
3 SOLUTION RESPIRATORY (INHALATION) EVERY 6 HOURS
Status: DISCONTINUED | OUTPATIENT
Start: 2021-05-12 | End: 2021-05-13

## 2021-05-12 RX ORDER — ONDANSETRON 2 MG/ML
4 INJECTION INTRAMUSCULAR; INTRAVENOUS EVERY 8 HOURS PRN
Status: DISCONTINUED | OUTPATIENT
Start: 2021-05-12 | End: 2021-05-14 | Stop reason: HOSPADM

## 2021-05-12 RX ORDER — HYDROCODONE BITARTRATE AND ACETAMINOPHEN 5; 325 MG/1; MG/1
1 TABLET ORAL EVERY 6 HOURS PRN
Status: DISCONTINUED | OUTPATIENT
Start: 2021-05-12 | End: 2021-05-14 | Stop reason: HOSPADM

## 2021-05-12 RX ORDER — CARVEDILOL 3.12 MG/1
3.12 TABLET ORAL 2 TIMES DAILY
Status: DISCONTINUED | OUTPATIENT
Start: 2021-05-12 | End: 2021-05-14 | Stop reason: HOSPADM

## 2021-05-12 RX ORDER — IPRATROPIUM BROMIDE AND ALBUTEROL SULFATE 2.5; .5 MG/3ML; MG/3ML
3 SOLUTION RESPIRATORY (INHALATION)
Status: COMPLETED | OUTPATIENT
Start: 2021-05-12 | End: 2021-05-12

## 2021-05-12 RX ORDER — SEVELAMER CARBONATE 800 MG/1
2400 TABLET, FILM COATED ORAL
Status: DISCONTINUED | OUTPATIENT
Start: 2021-05-12 | End: 2021-05-14 | Stop reason: HOSPADM

## 2021-05-12 RX ORDER — MUPIROCIN 20 MG/G
OINTMENT TOPICAL 2 TIMES DAILY
Status: DISCONTINUED | OUTPATIENT
Start: 2021-05-12 | End: 2021-05-14 | Stop reason: HOSPADM

## 2021-05-12 RX ORDER — SODIUM CHLORIDE 0.9 % (FLUSH) 0.9 %
10 SYRINGE (ML) INJECTION
Status: DISCONTINUED | OUTPATIENT
Start: 2021-05-12 | End: 2021-05-14 | Stop reason: HOSPADM

## 2021-05-12 RX ORDER — ASPIRIN 81 MG/1
81 TABLET ORAL DAILY
Status: DISCONTINUED | OUTPATIENT
Start: 2021-05-12 | End: 2021-05-14 | Stop reason: HOSPADM

## 2021-05-12 RX ORDER — SODIUM CHLORIDE 9 MG/ML
INJECTION, SOLUTION INTRAVENOUS ONCE
Status: DISCONTINUED | OUTPATIENT
Start: 2021-05-12 | End: 2021-05-14 | Stop reason: HOSPADM

## 2021-05-12 RX ORDER — ACETAMINOPHEN 325 MG/1
650 TABLET ORAL EVERY 8 HOURS PRN
Status: DISCONTINUED | OUTPATIENT
Start: 2021-05-12 | End: 2021-05-14 | Stop reason: HOSPADM

## 2021-05-12 RX ORDER — FAMOTIDINE 20 MG/1
20 TABLET, FILM COATED ORAL DAILY
Status: DISCONTINUED | OUTPATIENT
Start: 2021-05-12 | End: 2021-05-14 | Stop reason: HOSPADM

## 2021-05-12 RX ADMIN — MUPIROCIN: 20 OINTMENT TOPICAL at 09:05

## 2021-05-12 RX ADMIN — ONDANSETRON 4 MG: 2 INJECTION INTRAMUSCULAR; INTRAVENOUS at 08:05

## 2021-05-12 RX ADMIN — IPRATROPIUM BROMIDE AND ALBUTEROL SULFATE 3 ML: .5; 3 SOLUTION RESPIRATORY (INHALATION) at 08:05

## 2021-05-12 RX ADMIN — IPRATROPIUM BROMIDE AND ALBUTEROL SULFATE 3 ML: .5; 3 SOLUTION RESPIRATORY (INHALATION) at 06:05

## 2021-05-12 RX ADMIN — ASPIRIN 81 MG: 81 TABLET, DELAYED RELEASE ORAL at 09:05

## 2021-05-12 RX ADMIN — CARVEDILOL 3.12 MG: 3.12 TABLET, FILM COATED ORAL at 09:05

## 2021-05-12 RX ADMIN — ACETAMINOPHEN 650 MG: 325 TABLET, FILM COATED ORAL at 07:05

## 2021-05-12 RX ADMIN — IPRATROPIUM BROMIDE AND ALBUTEROL SULFATE 3 ML: .5; 3 SOLUTION RESPIRATORY (INHALATION) at 07:05

## 2021-05-12 RX ADMIN — AMLODIPINE BESYLATE 10 MG: 5 TABLET ORAL at 09:05

## 2021-05-12 RX ADMIN — SEVELAMER CARBONATE 2400 MG: 800 TABLET, FILM COATED ORAL at 03:05

## 2021-05-12 RX ADMIN — MUPIROCIN: 20 OINTMENT TOPICAL at 08:05

## 2021-05-12 RX ADMIN — ATORVASTATIN CALCIUM 40 MG: 40 TABLET, FILM COATED ORAL at 09:05

## 2021-05-12 RX ADMIN — FAMOTIDINE 20 MG: 20 TABLET ORAL at 09:05

## 2021-05-12 RX ADMIN — SEVELAMER CARBONATE 2400 MG: 800 TABLET, FILM COATED ORAL at 05:05

## 2021-05-12 RX ADMIN — CARVEDILOL 3.12 MG: 3.12 TABLET, FILM COATED ORAL at 08:05

## 2021-05-13 LAB
ANION GAP SERPL CALC-SCNC: 12 MMOL/L (ref 8–16)
BASOPHILS # BLD AUTO: 0.02 K/UL (ref 0–0.2)
BASOPHILS NFR BLD: 0.3 % (ref 0–1.9)
BUN SERPL-MCNC: 31 MG/DL (ref 8–23)
CALCIUM SERPL-MCNC: 9 MG/DL (ref 8.7–10.5)
CHLORIDE SERPL-SCNC: 98 MMOL/L (ref 95–110)
CO2 SERPL-SCNC: 24 MMOL/L (ref 23–29)
CREAT SERPL-MCNC: 6.5 MG/DL (ref 0.5–1.4)
DIFFERENTIAL METHOD: ABNORMAL
EOSINOPHIL # BLD AUTO: 0.1 K/UL (ref 0–0.5)
EOSINOPHIL NFR BLD: 2 % (ref 0–8)
ERYTHROCYTE [DISTWIDTH] IN BLOOD BY AUTOMATED COUNT: 12.3 % (ref 11.5–14.5)
EST. GFR  (AFRICAN AMERICAN): 7.1 ML/MIN/1.73 M^2
EST. GFR  (NON AFRICAN AMERICAN): 6.2 ML/MIN/1.73 M^2
GLUCOSE SERPL-MCNC: 92 MG/DL (ref 70–110)
HCT VFR BLD AUTO: 28.5 % (ref 37–48.5)
HGB BLD-MCNC: 9.2 G/DL (ref 12–16)
IMM GRANULOCYTES # BLD AUTO: 0.03 K/UL (ref 0–0.04)
IMM GRANULOCYTES NFR BLD AUTO: 0.5 % (ref 0–0.5)
LYMPHOCYTES # BLD AUTO: 1 K/UL (ref 1–4.8)
LYMPHOCYTES NFR BLD: 16.2 % (ref 18–48)
MCH RBC QN AUTO: 31.4 PG (ref 27–31)
MCHC RBC AUTO-ENTMCNC: 32.3 G/DL (ref 32–36)
MCV RBC AUTO: 97 FL (ref 82–98)
MONOCYTES # BLD AUTO: 0.5 K/UL (ref 0.3–1)
MONOCYTES NFR BLD: 7.8 % (ref 4–15)
NEUTROPHILS # BLD AUTO: 4.4 K/UL (ref 1.8–7.7)
NEUTROPHILS NFR BLD: 73.2 % (ref 38–73)
NRBC BLD-RTO: 0 /100 WBC
PLATELET # BLD AUTO: 166 K/UL (ref 150–450)
PMV BLD AUTO: 9.8 FL (ref 9.2–12.9)
POTASSIUM SERPL-SCNC: 5.2 MMOL/L (ref 3.5–5.1)
RBC # BLD AUTO: 2.93 M/UL (ref 4–5.4)
SODIUM SERPL-SCNC: 134 MMOL/L (ref 136–145)
WBC # BLD AUTO: 5.99 K/UL (ref 3.9–12.7)

## 2021-05-13 PROCEDURE — 36415 COLL VENOUS BLD VENIPUNCTURE: CPT | Performed by: NURSE PRACTITIONER

## 2021-05-13 PROCEDURE — 99900031 HC PATIENT EDUCATION (STAT)

## 2021-05-13 PROCEDURE — 25000003 PHARM REV CODE 250: Performed by: NURSE PRACTITIONER

## 2021-05-13 PROCEDURE — 94761 N-INVAS EAR/PLS OXIMETRY MLT: CPT

## 2021-05-13 PROCEDURE — G0378 HOSPITAL OBSERVATION PER HR: HCPCS

## 2021-05-13 PROCEDURE — 25000003 PHARM REV CODE 250: Performed by: INTERNAL MEDICINE

## 2021-05-13 PROCEDURE — 27000221 HC OXYGEN, UP TO 24 HOURS

## 2021-05-13 PROCEDURE — 80048 BASIC METABOLIC PNL TOTAL CA: CPT | Performed by: NURSE PRACTITIONER

## 2021-05-13 PROCEDURE — 85025 COMPLETE CBC W/AUTO DIFF WBC: CPT | Performed by: NURSE PRACTITIONER

## 2021-05-13 PROCEDURE — 99900035 HC TECH TIME PER 15 MIN (STAT)

## 2021-05-13 PROCEDURE — 94640 AIRWAY INHALATION TREATMENT: CPT

## 2021-05-13 PROCEDURE — 25000242 PHARM REV CODE 250 ALT 637 W/ HCPCS: Performed by: INTERNAL MEDICINE

## 2021-05-13 RX ORDER — IPRATROPIUM BROMIDE AND ALBUTEROL SULFATE 2.5; .5 MG/3ML; MG/3ML
3 SOLUTION RESPIRATORY (INHALATION)
Status: DISCONTINUED | OUTPATIENT
Start: 2021-05-14 | End: 2021-05-14 | Stop reason: HOSPADM

## 2021-05-13 RX ORDER — IPRATROPIUM BROMIDE AND ALBUTEROL SULFATE 2.5; .5 MG/3ML; MG/3ML
3 SOLUTION RESPIRATORY (INHALATION) EVERY 4 HOURS PRN
Status: DISCONTINUED | OUTPATIENT
Start: 2021-05-13 | End: 2021-05-14 | Stop reason: HOSPADM

## 2021-05-13 RX ADMIN — SEVELAMER CARBONATE 2400 MG: 800 TABLET, FILM COATED ORAL at 12:05

## 2021-05-13 RX ADMIN — MUPIROCIN: 20 OINTMENT TOPICAL at 10:05

## 2021-05-13 RX ADMIN — SEVELAMER CARBONATE 2400 MG: 800 TABLET, FILM COATED ORAL at 05:05

## 2021-05-13 RX ADMIN — ASPIRIN 81 MG: 81 TABLET, DELAYED RELEASE ORAL at 07:05

## 2021-05-13 RX ADMIN — MUPIROCIN: 20 OINTMENT TOPICAL at 07:05

## 2021-05-13 RX ADMIN — IPRATROPIUM BROMIDE AND ALBUTEROL SULFATE 3 ML: .5; 3 SOLUTION RESPIRATORY (INHALATION) at 07:05

## 2021-05-13 RX ADMIN — IPRATROPIUM BROMIDE AND ALBUTEROL SULFATE 3 ML: .5; 3 SOLUTION RESPIRATORY (INHALATION) at 05:05

## 2021-05-13 RX ADMIN — CARVEDILOL 3.12 MG: 3.12 TABLET, FILM COATED ORAL at 08:05

## 2021-05-13 RX ADMIN — CARVEDILOL 3.12 MG: 3.12 TABLET, FILM COATED ORAL at 10:05

## 2021-05-13 RX ADMIN — IPRATROPIUM BROMIDE AND ALBUTEROL SULFATE 3 ML: .5; 3 SOLUTION RESPIRATORY (INHALATION) at 02:05

## 2021-05-13 RX ADMIN — ATORVASTATIN CALCIUM 40 MG: 40 TABLET, FILM COATED ORAL at 08:05

## 2021-05-13 RX ADMIN — SEVELAMER CARBONATE 2400 MG: 800 TABLET, FILM COATED ORAL at 07:05

## 2021-05-13 RX ADMIN — HYDROCODONE BITARTRATE AND ACETAMINOPHEN 1 TABLET: 5; 325 TABLET ORAL at 06:05

## 2021-05-13 RX ADMIN — AMLODIPINE BESYLATE 10 MG: 5 TABLET ORAL at 08:05

## 2021-05-13 RX ADMIN — IPRATROPIUM BROMIDE AND ALBUTEROL SULFATE 3 ML: .5; 3 SOLUTION RESPIRATORY (INHALATION) at 01:05

## 2021-05-13 RX ADMIN — FAMOTIDINE 20 MG: 20 TABLET ORAL at 08:05

## 2021-05-14 VITALS
HEART RATE: 103 BPM | BODY MASS INDEX: 23.86 KG/M2 | DIASTOLIC BLOOD PRESSURE: 70 MMHG | OXYGEN SATURATION: 95 % | HEIGHT: 64 IN | SYSTOLIC BLOOD PRESSURE: 134 MMHG | WEIGHT: 139.75 LBS | RESPIRATION RATE: 17 BRPM | TEMPERATURE: 99 F

## 2021-05-14 PROBLEM — R06.02 SOB (SHORTNESS OF BREATH): Status: RESOLVED | Noted: 2021-05-12 | Resolved: 2021-05-14

## 2021-05-14 PROBLEM — J44.1 COPD EXACERBATION: Status: RESOLVED | Noted: 2021-03-28 | Resolved: 2021-05-14

## 2021-05-14 PROBLEM — E87.70 HYPERVOLEMIA: Status: RESOLVED | Noted: 2021-03-28 | Resolved: 2021-05-14

## 2021-05-14 LAB
ALBUMIN SERPL BCP-MCNC: 3.2 G/DL (ref 3.5–5.2)
ANION GAP SERPL CALC-SCNC: 12 MMOL/L (ref 8–16)
BASOPHILS # BLD AUTO: 0.01 K/UL (ref 0–0.2)
BASOPHILS NFR BLD: 0.2 % (ref 0–1.9)
BUN SERPL-MCNC: 60 MG/DL (ref 8–23)
CALCIUM SERPL-MCNC: 8.9 MG/DL (ref 8.7–10.5)
CHLORIDE SERPL-SCNC: 96 MMOL/L (ref 95–110)
CO2 SERPL-SCNC: 21 MMOL/L (ref 23–29)
CREAT SERPL-MCNC: 8.8 MG/DL (ref 0.5–1.4)
DIFFERENTIAL METHOD: ABNORMAL
EOSINOPHIL # BLD AUTO: 0 K/UL (ref 0–0.5)
EOSINOPHIL NFR BLD: 0.6 % (ref 0–8)
ERYTHROCYTE [DISTWIDTH] IN BLOOD BY AUTOMATED COUNT: 12.1 % (ref 11.5–14.5)
EST. GFR  (AFRICAN AMERICAN): 4.9 ML/MIN/1.73 M^2
EST. GFR  (NON AFRICAN AMERICAN): 4.3 ML/MIN/1.73 M^2
GLUCOSE SERPL-MCNC: 118 MG/DL (ref 70–110)
HCT VFR BLD AUTO: 26.7 % (ref 37–48.5)
HGB BLD-MCNC: 8.8 G/DL (ref 12–16)
IMM GRANULOCYTES # BLD AUTO: 0.02 K/UL (ref 0–0.04)
IMM GRANULOCYTES NFR BLD AUTO: 0.4 % (ref 0–0.5)
LYMPHOCYTES # BLD AUTO: 0.7 K/UL (ref 1–4.8)
LYMPHOCYTES NFR BLD: 12.5 % (ref 18–48)
MCH RBC QN AUTO: 31.5 PG (ref 27–31)
MCHC RBC AUTO-ENTMCNC: 33 G/DL (ref 32–36)
MCV RBC AUTO: 96 FL (ref 82–98)
MONOCYTES # BLD AUTO: 0.4 K/UL (ref 0.3–1)
MONOCYTES NFR BLD: 7.1 % (ref 4–15)
NEUTROPHILS # BLD AUTO: 4.1 K/UL (ref 1.8–7.7)
NEUTROPHILS NFR BLD: 79.2 % (ref 38–73)
NRBC BLD-RTO: 0 /100 WBC
PHOSPHATE SERPL-MCNC: 2.9 MG/DL (ref 2.7–4.5)
PLATELET # BLD AUTO: 174 K/UL (ref 150–450)
PMV BLD AUTO: 10.2 FL (ref 9.2–12.9)
POTASSIUM SERPL-SCNC: 5.8 MMOL/L (ref 3.5–5.1)
RBC # BLD AUTO: 2.79 M/UL (ref 4–5.4)
SODIUM SERPL-SCNC: 129 MMOL/L (ref 136–145)
WBC # BLD AUTO: 5.22 K/UL (ref 3.9–12.7)

## 2021-05-14 PROCEDURE — 99900035 HC TECH TIME PER 15 MIN (STAT)

## 2021-05-14 PROCEDURE — 25000242 PHARM REV CODE 250 ALT 637 W/ HCPCS: Performed by: INTERNAL MEDICINE

## 2021-05-14 PROCEDURE — 25000003 PHARM REV CODE 250: Performed by: INTERNAL MEDICINE

## 2021-05-14 PROCEDURE — G0378 HOSPITAL OBSERVATION PER HR: HCPCS

## 2021-05-14 PROCEDURE — 94640 AIRWAY INHALATION TREATMENT: CPT

## 2021-05-14 PROCEDURE — 94761 N-INVAS EAR/PLS OXIMETRY MLT: CPT

## 2021-05-14 PROCEDURE — 36415 COLL VENOUS BLD VENIPUNCTURE: CPT | Performed by: INTERNAL MEDICINE

## 2021-05-14 PROCEDURE — 27000221 HC OXYGEN, UP TO 24 HOURS

## 2021-05-14 PROCEDURE — 90935 HEMODIALYSIS ONE EVALUATION: CPT

## 2021-05-14 PROCEDURE — 63600175 PHARM REV CODE 636 W HCPCS: Performed by: NURSE PRACTITIONER

## 2021-05-14 PROCEDURE — 25000003 PHARM REV CODE 250: Performed by: NURSE PRACTITIONER

## 2021-05-14 PROCEDURE — 85025 COMPLETE CBC W/AUTO DIFF WBC: CPT | Performed by: NURSE PRACTITIONER

## 2021-05-14 PROCEDURE — 96376 TX/PRO/DX INJ SAME DRUG ADON: CPT

## 2021-05-14 PROCEDURE — 80069 RENAL FUNCTION PANEL: CPT | Performed by: INTERNAL MEDICINE

## 2021-05-14 RX ADMIN — HYDROCODONE BITARTRATE AND ACETAMINOPHEN 1 TABLET: 5; 325 TABLET ORAL at 04:05

## 2021-05-14 RX ADMIN — MUPIROCIN: 20 OINTMENT TOPICAL at 09:05

## 2021-05-14 RX ADMIN — SEVELAMER CARBONATE 2400 MG: 800 TABLET, FILM COATED ORAL at 01:05

## 2021-05-14 RX ADMIN — ASPIRIN 81 MG: 81 TABLET, DELAYED RELEASE ORAL at 01:05

## 2021-05-14 RX ADMIN — IPRATROPIUM BROMIDE AND ALBUTEROL SULFATE 3 ML: .5; 3 SOLUTION RESPIRATORY (INHALATION) at 02:05

## 2021-05-14 RX ADMIN — FAMOTIDINE 20 MG: 20 TABLET ORAL at 01:05

## 2021-05-14 RX ADMIN — IPRATROPIUM BROMIDE AND ALBUTEROL SULFATE 3 ML: .5; 3 SOLUTION RESPIRATORY (INHALATION) at 07:05

## 2021-05-14 RX ADMIN — ATORVASTATIN CALCIUM 40 MG: 40 TABLET, FILM COATED ORAL at 01:05

## 2021-05-14 RX ADMIN — AMLODIPINE BESYLATE 10 MG: 5 TABLET ORAL at 01:05

## 2021-05-14 RX ADMIN — ONDANSETRON 4 MG: 2 INJECTION INTRAMUSCULAR; INTRAVENOUS at 09:05

## 2021-05-14 RX ADMIN — CARVEDILOL 3.12 MG: 3.12 TABLET, FILM COATED ORAL at 01:05

## 2021-05-18 ENCOUNTER — OFFICE VISIT (OUTPATIENT)
Dept: INFECTIOUS DISEASES | Facility: CLINIC | Age: 66
End: 2021-05-18
Payer: COMMERCIAL

## 2021-05-18 ENCOUNTER — HOSPITAL ENCOUNTER (OUTPATIENT)
Dept: CARDIOLOGY | Facility: HOSPITAL | Age: 66
Discharge: HOME | End: 2021-05-18
Attending: NURSE PRACTITIONER
Payer: MEDICARE

## 2021-05-18 ENCOUNTER — HOSPITAL ENCOUNTER (OUTPATIENT)
Dept: RADIOLOGY | Facility: HOSPITAL | Age: 66
Discharge: HOME | End: 2021-05-18
Attending: NURSE PRACTITIONER
Payer: MEDICARE

## 2021-05-18 VITALS
TEMPERATURE: 98 F | BODY MASS INDEX: 22.4 KG/M2 | WEIGHT: 131.19 LBS | SYSTOLIC BLOOD PRESSURE: 108 MMHG | DIASTOLIC BLOOD PRESSURE: 65 MMHG | HEART RATE: 90 BPM | HEIGHT: 64 IN

## 2021-05-18 VITALS — HEART RATE: 92 BPM | DIASTOLIC BLOOD PRESSURE: 61 MMHG | SYSTOLIC BLOOD PRESSURE: 127 MMHG

## 2021-05-18 DIAGNOSIS — B78.9 INFECTION DUE TO STRONGYLOIDES: Primary | ICD-10-CM

## 2021-05-18 DIAGNOSIS — Z76.82 ORGAN TRANSPLANT CANDIDATE: ICD-10-CM

## 2021-05-18 LAB
CFR FLOW - ANTERIOR: 1.52
CFR FLOW - INFERIOR: 1.52
CFR FLOW - LATERAL: 1.53
CFR FLOW - MAX: 1.87
CFR FLOW - MIN: 1.05
CFR FLOW - SEPTAL: 1.54
CFR FLOW - WHOLE HEART: 1.53
CV PHARM DOSE: 35.4 MG
CV STRESS BASE HR: 93 BPM
DIASTOLIC BLOOD PRESSURE: 75 MMHG
END DIASTOLIC INDEX-HIGH: 170 ML/M2
END SYSTOLIC INDEX-HIGH: 70 ML/M2
NUC REST DIASTOLIC VOLUME INDEX: 225
NUC REST EJECTION FRACTION: 24
NUC REST SYSTOLIC VOLUME INDEX: 170
NUC STRESS DIASTOLIC VOLUME INDEX: 230
NUC STRESS EJECTION FRACTION: 30 %
NUC STRESS SYSTOLIC VOLUME INDEX: 162
OHS CV CPX 85 PERCENT MAX PREDICTED HEART RATE MALE: 126
OHS CV CPX MAX PREDICTED HEART RATE: 149
OHS CV CPX PATIENT IS FEMALE: 1
OHS CV CPX PATIENT IS MALE: 0
OHS CV CPX PEAK DIASTOLIC BLOOD PRESSURE: 66 MMHG
OHS CV CPX PEAK HEAR RATE: 90 BPM
OHS CV CPX PEAK RATE PRESSURE PRODUCT: NORMAL
OHS CV CPX PEAK SYSTOLIC BLOOD PRESSURE: 132 MMHG
OHS CV CPX PERCENT MAX PREDICTED HEART RATE ACHIEVED: 60
OHS CV CPX RATE PRESSURE PRODUCT PRESENTING: NORMAL
REST FLOW - ANTERIOR: 1.12 CC/MIN/G
REST FLOW - INFERIOR: 1.07 CC/MIN/G
REST FLOW - LATERAL: 1.16 CC/MIN/G
REST FLOW - MAX: 1.56 CC/MIN/G
REST FLOW - MIN: 0.65 CC/MIN/G
REST FLOW - SEPTAL: 1.15 CC/MIN/G
REST FLOW - WHOLE HEART: 1.13 CC/MIN/G
RETIRED EF AND QEF - SEE NOTES: 51 %
STRESS FLOW - ANTERIOR: 1.69 CC/MIN/G
STRESS FLOW - INFERIOR: 1.63 CC/MIN/G
STRESS FLOW - LATERAL: 1.77 CC/MIN/G
STRESS FLOW - MAX: 2.19 CC/MIN/G
STRESS FLOW - MIN: 0.74 CC/MIN/G
STRESS FLOW - SEPTAL: 1.78 CC/MIN/G
STRESS FLOW - WHOLE HEART: 1.72 CC/MIN/G
SYSTOLIC BLOOD PRESSURE: 136 MMHG

## 2021-05-18 PROCEDURE — 63600175 PHARM REV CODE 636 W HCPCS: Mod: TXP | Performed by: NURSE PRACTITIONER

## 2021-05-18 PROCEDURE — 93978 US DOPP ILIACS BILATERAL: ICD-10-PCS | Mod: 26,TXP,, | Performed by: RADIOLOGY

## 2021-05-18 PROCEDURE — 78434 AQMBF PET REST & RX STRESS: CPT | Mod: TXP

## 2021-05-18 PROCEDURE — 99204 OFFICE O/P NEW MOD 45 MIN: CPT | Mod: S$GLB,TXP,, | Performed by: INTERNAL MEDICINE

## 2021-05-18 PROCEDURE — 99999 PR PBB SHADOW E&M-EST. PATIENT-LVL IV: ICD-10-PCS | Mod: PBBFAC,TXP,, | Performed by: INTERNAL MEDICINE

## 2021-05-18 PROCEDURE — 93978 VASCULAR STUDY: CPT | Mod: 26,TXP,, | Performed by: RADIOLOGY

## 2021-05-18 PROCEDURE — 99204 PR OFFICE/OUTPT VISIT, NEW, LEVL IV, 45-59 MIN: ICD-10-PCS | Mod: S$GLB,TXP,, | Performed by: INTERNAL MEDICINE

## 2021-05-18 PROCEDURE — 78434 CARDIAC PET SCAN STRESS (CUPID ONLY): ICD-10-PCS | Mod: 26,TXP,, | Performed by: INTERNAL MEDICINE

## 2021-05-18 PROCEDURE — 78431 MYOCRD IMG PET RST&STRS CT: CPT | Mod: 26,TXP,, | Performed by: INTERNAL MEDICINE

## 2021-05-18 PROCEDURE — 93016 CARDIAC PET SCAN STRESS (CUPID ONLY): ICD-10-PCS | Mod: TXP,,, | Performed by: INTERNAL MEDICINE

## 2021-05-18 PROCEDURE — 93016 CV STRESS TEST SUPVJ ONLY: CPT | Mod: TXP,,, | Performed by: INTERNAL MEDICINE

## 2021-05-18 PROCEDURE — 93018 CV STRESS TEST I&R ONLY: CPT | Mod: TXP,,, | Performed by: INTERNAL MEDICINE

## 2021-05-18 PROCEDURE — 78434 AQMBF PET REST & RX STRESS: CPT | Mod: 26,TXP,, | Performed by: INTERNAL MEDICINE

## 2021-05-18 PROCEDURE — 93978 VASCULAR STUDY: CPT | Mod: TC,TXP

## 2021-05-18 PROCEDURE — 93018 CARDIAC PET SCAN STRESS (CUPID ONLY): ICD-10-PCS | Mod: TXP,,, | Performed by: INTERNAL MEDICINE

## 2021-05-18 PROCEDURE — 99999 PR PBB SHADOW E&M-EST. PATIENT-LVL IV: CPT | Mod: PBBFAC,TXP,, | Performed by: INTERNAL MEDICINE

## 2021-05-18 PROCEDURE — 78431 CARDIAC PET SCAN STRESS (CUPID ONLY): ICD-10-PCS | Mod: 26,TXP,, | Performed by: INTERNAL MEDICINE

## 2021-05-18 RX ORDER — IVERMECTIN 3 MG/1
12 TABLET ORAL
Qty: 8 TABLET | Refills: 0 | Status: SHIPPED | OUTPATIENT
Start: 2021-05-18 | End: 2021-06-07

## 2021-05-18 RX ORDER — DIPYRIDAMOLE 5 MG/ML
35.38 INJECTION INTRAVENOUS ONCE
Status: COMPLETED | OUTPATIENT
Start: 2021-05-18 | End: 2021-05-18

## 2021-05-18 RX ADMIN — DIPYRIDAMOLE 35.4 MG: 5 INJECTION INTRAVENOUS at 01:05

## 2021-05-24 ENCOUNTER — HOSPITAL ENCOUNTER (INPATIENT)
Facility: HOSPITAL | Age: 66
LOS: 2 days | Discharge: HOME | DRG: 682 | End: 2021-05-26
Attending: EMERGENCY MEDICINE | Admitting: INTERNAL MEDICINE
Payer: COMMERCIAL

## 2021-05-24 DIAGNOSIS — N18.6 ESRD (END STAGE RENAL DISEASE): ICD-10-CM

## 2021-05-24 DIAGNOSIS — J96.01 ACUTE RESPIRATORY FAILURE WITH HYPOXIA: Primary | ICD-10-CM

## 2021-05-24 DIAGNOSIS — J96.01 ACUTE HYPOXEMIC RESPIRATORY FAILURE: ICD-10-CM

## 2021-05-24 DIAGNOSIS — R06.02 SOB (SHORTNESS OF BREATH): ICD-10-CM

## 2021-05-24 DIAGNOSIS — E87.70 HYPERVOLEMIA, UNSPECIFIED HYPERVOLEMIA TYPE: ICD-10-CM

## 2021-05-24 PROBLEM — E87.5 HYPERKALEMIA: Status: ACTIVE | Noted: 2021-05-24

## 2021-05-24 LAB
ALBUMIN SERPL BCP-MCNC: 3.5 G/DL (ref 3.5–5.2)
ALLENS TEST: ABNORMAL
ALLENS TEST: ABNORMAL
ALP SERPL-CCNC: 93 U/L (ref 55–135)
ALT SERPL W/O P-5'-P-CCNC: 10 U/L (ref 10–44)
ANION GAP SERPL CALC-SCNC: 16 MMOL/L (ref 8–16)
AST SERPL-CCNC: 15 U/L (ref 10–40)
BASOPHILS # BLD AUTO: 0.05 K/UL (ref 0–0.2)
BASOPHILS NFR BLD: 0.6 % (ref 0–1.9)
BILIRUB SERPL-MCNC: 0.9 MG/DL (ref 0.1–1)
BNP SERPL-MCNC: >4500 PG/ML (ref 0–99)
BUN SERPL-MCNC: 61 MG/DL (ref 8–23)
CALCIUM SERPL-MCNC: 8.5 MG/DL (ref 8.7–10.5)
CHLORIDE SERPL-SCNC: 89 MMOL/L (ref 95–110)
CO2 SERPL-SCNC: 28 MMOL/L (ref 23–29)
CREAT SERPL-MCNC: 9.3 MG/DL (ref 0.5–1.4)
DELSYS: ABNORMAL
DELSYS: ABNORMAL
DIFFERENTIAL METHOD: ABNORMAL
EOSINOPHIL # BLD AUTO: 0.2 K/UL (ref 0–0.5)
EOSINOPHIL NFR BLD: 2 % (ref 0–8)
ERYTHROCYTE [DISTWIDTH] IN BLOOD BY AUTOMATED COUNT: 12.2 % (ref 11.5–14.5)
ERYTHROCYTE [SEDIMENTATION RATE] IN BLOOD BY WESTERGREN METHOD: 21 MM/H
EST. GFR  (AFRICAN AMERICAN): 4.6 ML/MIN/1.73 M^2
EST. GFR  (NON AFRICAN AMERICAN): 4 ML/MIN/1.73 M^2
FIO2: 75
FLOW: 20
FLOW: 5
GLUCOSE SERPL-MCNC: 109 MG/DL (ref 70–110)
GLUCOSE SERPL-MCNC: 112 MG/DL (ref 70–110)
HCO3 UR-SCNC: 27.3 MMOL/L (ref 24–28)
HCO3 UR-SCNC: 28.9 MMOL/L (ref 24–28)
HCT VFR BLD AUTO: 27.4 % (ref 37–48.5)
HCT VFR BLD CALC: 26 %PCV (ref 36–54)
HGB BLD-MCNC: 9.2 G/DL (ref 12–16)
IMM GRANULOCYTES # BLD AUTO: 0.07 K/UL (ref 0–0.04)
IMM GRANULOCYTES NFR BLD AUTO: 0.8 % (ref 0–0.5)
INR PPP: 1
LYMPHOCYTES # BLD AUTO: 1.2 K/UL (ref 1–4.8)
LYMPHOCYTES NFR BLD: 12.8 % (ref 18–48)
MAGNESIUM SERPL-MCNC: 2.4 MG/DL (ref 1.6–2.6)
MCH RBC QN AUTO: 31.4 PG (ref 27–31)
MCHC RBC AUTO-ENTMCNC: 33.6 G/DL (ref 32–36)
MCV RBC AUTO: 94 FL (ref 82–98)
MODE: ABNORMAL
MODE: ABNORMAL
MONOCYTES # BLD AUTO: 0.5 K/UL (ref 0.3–1)
MONOCYTES NFR BLD: 5.1 % (ref 4–15)
NEUTROPHILS # BLD AUTO: 7.1 K/UL (ref 1.8–7.7)
NEUTROPHILS NFR BLD: 78.7 % (ref 38–73)
NRBC BLD-RTO: 0 /100 WBC
PCO2 BLDA: 34.5 MMHG (ref 35–45)
PCO2 BLDA: 35.3 MMHG (ref 35–45)
PH SMN: 7.5 [PH] (ref 7.35–7.45)
PH SMN: 7.52 [PH] (ref 7.35–7.45)
PLATELET # BLD AUTO: 366 K/UL (ref 150–450)
PMV BLD AUTO: 9.4 FL (ref 9.2–12.9)
PO2 BLDA: 50 MMHG (ref 80–100)
PO2 BLDA: 73 MMHG (ref 80–100)
POC BE: 4 MMOL/L
POC BE: 6 MMOL/L
POC IONIZED CALCIUM: 1.06 MMOL/L (ref 1.06–1.42)
POC SATURATED O2: 89 % (ref 95–100)
POC SATURATED O2: 96 % (ref 95–100)
POC TCO2: 28 MMOL/L (ref 23–27)
POC TCO2: 30 MMOL/L (ref 23–27)
POTASSIUM BLD-SCNC: 5.5 MMOL/L (ref 3.5–5.1)
POTASSIUM SERPL-SCNC: 5.3 MMOL/L (ref 3.5–5.1)
PROT SERPL-MCNC: 7.2 G/DL (ref 6–8.4)
PROTHROMBIN TIME: 12.8 SEC (ref 11.8–14.3)
RBC # BLD AUTO: 2.93 M/UL (ref 4–5.4)
SAMPLE: ABNORMAL
SAMPLE: ABNORMAL
SARS-COV-2 RDRP RESP QL NAA+PROBE: NEGATIVE
SITE: ABNORMAL
SITE: ABNORMAL
SODIUM BLD-SCNC: 129 MMOL/L (ref 136–145)
SODIUM SERPL-SCNC: 133 MMOL/L (ref 136–145)
SP02: 95
TROPONIN I SERPL DL<=0.01 NG/ML-MCNC: 0.06 NG/ML
WBC # BLD AUTO: 9.04 K/UL (ref 3.9–12.7)

## 2021-05-24 PROCEDURE — 94761 N-INVAS EAR/PLS OXIMETRY MLT: CPT

## 2021-05-24 PROCEDURE — 36600 WITHDRAWAL OF ARTERIAL BLOOD: CPT

## 2021-05-24 PROCEDURE — 84484 ASSAY OF TROPONIN QUANT: CPT | Performed by: NURSE PRACTITIONER

## 2021-05-24 PROCEDURE — 27000221 HC OXYGEN, UP TO 24 HOURS

## 2021-05-24 PROCEDURE — U0002 COVID-19 LAB TEST NON-CDC: HCPCS | Performed by: EMERGENCY MEDICINE

## 2021-05-24 PROCEDURE — 25000003 PHARM REV CODE 250: Performed by: EMERGENCY MEDICINE

## 2021-05-24 PROCEDURE — 83735 ASSAY OF MAGNESIUM: CPT | Performed by: NURSE PRACTITIONER

## 2021-05-24 PROCEDURE — 84295 ASSAY OF SERUM SODIUM: CPT

## 2021-05-24 PROCEDURE — 93010 EKG 12-LEAD: ICD-10-PCS | Mod: ,,, | Performed by: SPECIALIST

## 2021-05-24 PROCEDURE — 94640 AIRWAY INHALATION TREATMENT: CPT

## 2021-05-24 PROCEDURE — 84132 ASSAY OF SERUM POTASSIUM: CPT

## 2021-05-24 PROCEDURE — 93005 ELECTROCARDIOGRAM TRACING: CPT | Performed by: SPECIALIST

## 2021-05-24 PROCEDURE — 25000003 PHARM REV CODE 250: Performed by: INTERNAL MEDICINE

## 2021-05-24 PROCEDURE — 83880 ASSAY OF NATRIURETIC PEPTIDE: CPT | Performed by: NURSE PRACTITIONER

## 2021-05-24 PROCEDURE — 85014 HEMATOCRIT: CPT

## 2021-05-24 PROCEDURE — 99900035 HC TECH TIME PER 15 MIN (STAT)

## 2021-05-24 PROCEDURE — 90935 HEMODIALYSIS ONE EVALUATION: CPT

## 2021-05-24 PROCEDURE — 99900031 HC PATIENT EDUCATION (STAT)

## 2021-05-24 PROCEDURE — 85610 PROTHROMBIN TIME: CPT | Performed by: NURSE PRACTITIONER

## 2021-05-24 PROCEDURE — 27100171 HC OXYGEN HIGH FLOW UP TO 24 HOURS

## 2021-05-24 PROCEDURE — 82330 ASSAY OF CALCIUM: CPT

## 2021-05-24 PROCEDURE — 63600175 PHARM REV CODE 636 W HCPCS: Performed by: INTERNAL MEDICINE

## 2021-05-24 PROCEDURE — 99291 CRITICAL CARE FIRST HOUR: CPT

## 2021-05-24 PROCEDURE — 36415 COLL VENOUS BLD VENIPUNCTURE: CPT | Performed by: NURSE PRACTITIONER

## 2021-05-24 PROCEDURE — 93010 ELECTROCARDIOGRAM REPORT: CPT | Mod: ,,, | Performed by: SPECIALIST

## 2021-05-24 PROCEDURE — 82803 BLOOD GASES ANY COMBINATION: CPT

## 2021-05-24 PROCEDURE — 85025 COMPLETE CBC W/AUTO DIFF WBC: CPT | Performed by: NURSE PRACTITIONER

## 2021-05-24 PROCEDURE — 80053 COMPREHEN METABOLIC PANEL: CPT | Performed by: NURSE PRACTITIONER

## 2021-05-24 PROCEDURE — 20000000 HC ICU ROOM

## 2021-05-24 PROCEDURE — 25000242 PHARM REV CODE 250 ALT 637 W/ HCPCS: Performed by: EMERGENCY MEDICINE

## 2021-05-24 RX ORDER — LORAZEPAM 2 MG/ML
0.25 INJECTION INTRAMUSCULAR ONCE
Status: DISCONTINUED | OUTPATIENT
Start: 2021-05-24 | End: 2021-05-24

## 2021-05-24 RX ORDER — HEPARIN SODIUM 5000 [USP'U]/ML
5000 INJECTION, SOLUTION INTRAVENOUS; SUBCUTANEOUS
Status: DISCONTINUED | OUTPATIENT
Start: 2021-05-24 | End: 2021-05-26 | Stop reason: HOSPADM

## 2021-05-24 RX ORDER — ENOXAPARIN SODIUM 100 MG/ML
30 INJECTION SUBCUTANEOUS EVERY 24 HOURS
Status: DISCONTINUED | OUTPATIENT
Start: 2021-05-24 | End: 2021-05-26 | Stop reason: HOSPADM

## 2021-05-24 RX ORDER — IPRATROPIUM BROMIDE AND ALBUTEROL SULFATE 2.5; .5 MG/3ML; MG/3ML
3 SOLUTION RESPIRATORY (INHALATION)
Status: COMPLETED | OUTPATIENT
Start: 2021-05-24 | End: 2021-05-24

## 2021-05-24 RX ORDER — ONDANSETRON 2 MG/ML
4 INJECTION INTRAMUSCULAR; INTRAVENOUS EVERY 6 HOURS PRN
Status: DISCONTINUED | OUTPATIENT
Start: 2021-05-24 | End: 2021-05-26 | Stop reason: HOSPADM

## 2021-05-24 RX ORDER — LORAZEPAM 2 MG/ML
0.25 INJECTION INTRAMUSCULAR ONCE
Status: COMPLETED | OUTPATIENT
Start: 2021-05-24 | End: 2021-05-24

## 2021-05-24 RX ORDER — ASPIRIN 325 MG
325 TABLET ORAL
Status: COMPLETED | OUTPATIENT
Start: 2021-05-24 | End: 2021-05-24

## 2021-05-24 RX ORDER — ACETAMINOPHEN 325 MG/1
325 TABLET ORAL EVERY 6 HOURS PRN
Status: DISCONTINUED | OUTPATIENT
Start: 2021-05-24 | End: 2021-05-26 | Stop reason: HOSPADM

## 2021-05-24 RX ORDER — MUPIROCIN 20 MG/G
OINTMENT TOPICAL 2 TIMES DAILY
Status: DISCONTINUED | OUTPATIENT
Start: 2021-05-24 | End: 2021-05-26 | Stop reason: HOSPADM

## 2021-05-24 RX ORDER — SODIUM CHLORIDE 9 MG/ML
INJECTION, SOLUTION INTRAVENOUS ONCE
Status: COMPLETED | OUTPATIENT
Start: 2021-05-24 | End: 2021-05-24

## 2021-05-24 RX ORDER — ATORVASTATIN CALCIUM 20 MG/1
40 TABLET, FILM COATED ORAL NIGHTLY
Status: DISCONTINUED | OUTPATIENT
Start: 2021-05-24 | End: 2021-05-26 | Stop reason: HOSPADM

## 2021-05-24 RX ORDER — SEVELAMER CARBONATE 800 MG/1
2400 TABLET, FILM COATED ORAL
Status: DISCONTINUED | OUTPATIENT
Start: 2021-05-24 | End: 2021-05-26 | Stop reason: HOSPADM

## 2021-05-24 RX ORDER — SEVELAMER CARBONATE 800 MG/1
800 TABLET, FILM COATED ORAL
Status: DISCONTINUED | OUTPATIENT
Start: 2021-05-24 | End: 2021-05-24

## 2021-05-24 RX ORDER — AMLODIPINE BESYLATE 5 MG/1
10 TABLET ORAL DAILY
Status: DISCONTINUED | OUTPATIENT
Start: 2021-05-24 | End: 2021-05-26 | Stop reason: HOSPADM

## 2021-05-24 RX ORDER — CARVEDILOL 3.12 MG/1
3.12 TABLET ORAL 2 TIMES DAILY
Status: DISCONTINUED | OUTPATIENT
Start: 2021-05-24 | End: 2021-05-26 | Stop reason: HOSPADM

## 2021-05-24 RX ORDER — ASPIRIN 81 MG/1
81 TABLET ORAL DAILY
Status: DISCONTINUED | OUTPATIENT
Start: 2021-05-24 | End: 2021-05-26 | Stop reason: HOSPADM

## 2021-05-24 RX ADMIN — SODIUM CHLORIDE: 0.9 INJECTION, SOLUTION INTRAVENOUS at 10:05

## 2021-05-24 RX ADMIN — IPRATROPIUM BROMIDE AND ALBUTEROL SULFATE 3 ML: .5; 3 SOLUTION RESPIRATORY (INHALATION) at 01:05

## 2021-05-24 RX ADMIN — CARVEDILOL 3.12 MG: 3.12 TABLET, FILM COATED ORAL at 08:05

## 2021-05-24 RX ADMIN — CARVEDILOL 3.12 MG: 3.12 TABLET, FILM COATED ORAL at 09:05

## 2021-05-24 RX ADMIN — MUPIROCIN: 20 OINTMENT TOPICAL at 09:05

## 2021-05-24 RX ADMIN — ATORVASTATIN CALCIUM 40 MG: 20 TABLET, FILM COATED ORAL at 09:05

## 2021-05-24 RX ADMIN — ASPIRIN 81 MG: 81 TABLET, DELAYED RELEASE ORAL at 08:05

## 2021-05-24 RX ADMIN — ACETAMINOPHEN 325 MG: 325 TABLET, FILM COATED ORAL at 08:05

## 2021-05-24 RX ADMIN — LORAZEPAM 0.25 MG: 2 INJECTION INTRAMUSCULAR; INTRAVENOUS at 03:05

## 2021-05-24 RX ADMIN — SEVELAMER CARBONATE 2400 MG: 800 TABLET, FILM COATED ORAL at 04:05

## 2021-05-24 RX ADMIN — ENOXAPARIN SODIUM 30 MG: 30 INJECTION SUBCUTANEOUS at 04:05

## 2021-05-24 RX ADMIN — MUPIROCIN: 20 OINTMENT TOPICAL at 11:05

## 2021-05-24 RX ADMIN — ASPIRIN 325 MG ORAL TABLET 325 MG: 325 PILL ORAL at 04:05

## 2021-05-24 RX ADMIN — AMLODIPINE BESYLATE 10 MG: 5 TABLET ORAL at 08:05

## 2021-05-25 LAB
ALBUMIN SERPL BCP-MCNC: 3.1 G/DL (ref 3.5–5.2)
ALP SERPL-CCNC: 95 U/L (ref 55–135)
ALT SERPL W/O P-5'-P-CCNC: 9 U/L (ref 10–44)
ANION GAP SERPL CALC-SCNC: 10 MMOL/L (ref 8–16)
AST SERPL-CCNC: 13 U/L (ref 10–40)
BASOPHILS # BLD AUTO: 0.04 K/UL (ref 0–0.2)
BASOPHILS NFR BLD: 0.6 % (ref 0–1.9)
BILIRUB SERPL-MCNC: 0.8 MG/DL (ref 0.1–1)
BUN SERPL-MCNC: 53 MG/DL (ref 8–23)
CALCIUM SERPL-MCNC: 8.5 MG/DL (ref 8.7–10.5)
CHLORIDE SERPL-SCNC: 98 MMOL/L (ref 95–110)
CO2 SERPL-SCNC: 24 MMOL/L (ref 23–29)
CREAT SERPL-MCNC: 7.7 MG/DL (ref 0.5–1.4)
DIFFERENTIAL METHOD: ABNORMAL
EOSINOPHIL # BLD AUTO: 0.2 K/UL (ref 0–0.5)
EOSINOPHIL NFR BLD: 2.2 % (ref 0–8)
ERYTHROCYTE [DISTWIDTH] IN BLOOD BY AUTOMATED COUNT: 12.4 % (ref 11.5–14.5)
EST. GFR  (AFRICAN AMERICAN): 5.8 ML/MIN/1.73 M^2
EST. GFR  (NON AFRICAN AMERICAN): 5 ML/MIN/1.73 M^2
GLUCOSE SERPL-MCNC: 89 MG/DL (ref 70–110)
HCT VFR BLD AUTO: 25.3 % (ref 37–48.5)
HGB BLD-MCNC: 8.3 G/DL (ref 12–16)
IMM GRANULOCYTES # BLD AUTO: 0.08 K/UL (ref 0–0.04)
IMM GRANULOCYTES NFR BLD AUTO: 1.1 % (ref 0–0.5)
LYMPHOCYTES # BLD AUTO: 1.1 K/UL (ref 1–4.8)
LYMPHOCYTES NFR BLD: 15.7 % (ref 18–48)
MAGNESIUM SERPL-MCNC: 2.1 MG/DL (ref 1.6–2.6)
MCH RBC QN AUTO: 31.1 PG (ref 27–31)
MCHC RBC AUTO-ENTMCNC: 32.8 G/DL (ref 32–36)
MCV RBC AUTO: 95 FL (ref 82–98)
MONOCYTES # BLD AUTO: 0.6 K/UL (ref 0.3–1)
MONOCYTES NFR BLD: 7.8 % (ref 4–15)
NEUTROPHILS # BLD AUTO: 5.2 K/UL (ref 1.8–7.7)
NEUTROPHILS NFR BLD: 72.6 % (ref 38–73)
NRBC BLD-RTO: 0 /100 WBC
PLATELET # BLD AUTO: 295 K/UL (ref 150–450)
PMV BLD AUTO: 9.7 FL (ref 9.2–12.9)
POTASSIUM SERPL-SCNC: 5.4 MMOL/L (ref 3.5–5.1)
PROT SERPL-MCNC: 6.9 G/DL (ref 6–8.4)
RBC # BLD AUTO: 2.67 M/UL (ref 4–5.4)
SODIUM SERPL-SCNC: 132 MMOL/L (ref 136–145)
WBC # BLD AUTO: 7.15 K/UL (ref 3.9–12.7)

## 2021-05-25 PROCEDURE — 90935 HEMODIALYSIS ONE EVALUATION: CPT

## 2021-05-25 PROCEDURE — 99900031 HC PATIENT EDUCATION (STAT)

## 2021-05-25 PROCEDURE — 25000003 PHARM REV CODE 250: Performed by: INTERNAL MEDICINE

## 2021-05-25 PROCEDURE — 27000221 HC OXYGEN, UP TO 24 HOURS

## 2021-05-25 PROCEDURE — 25000003 PHARM REV CODE 250

## 2021-05-25 PROCEDURE — 99900035 HC TECH TIME PER 15 MIN (STAT)

## 2021-05-25 PROCEDURE — 63600175 PHARM REV CODE 636 W HCPCS: Performed by: INTERNAL MEDICINE

## 2021-05-25 PROCEDURE — 85025 COMPLETE CBC W/AUTO DIFF WBC: CPT | Performed by: INTERNAL MEDICINE

## 2021-05-25 PROCEDURE — 94761 N-INVAS EAR/PLS OXIMETRY MLT: CPT

## 2021-05-25 PROCEDURE — 20000000 HC ICU ROOM

## 2021-05-25 PROCEDURE — 80053 COMPREHEN METABOLIC PANEL: CPT | Performed by: INTERNAL MEDICINE

## 2021-05-25 PROCEDURE — 83735 ASSAY OF MAGNESIUM: CPT | Performed by: INTERNAL MEDICINE

## 2021-05-25 RX ORDER — SODIUM CHLORIDE 9 MG/ML
INJECTION, SOLUTION INTRAVENOUS
Status: CANCELLED | OUTPATIENT
Start: 2021-05-25

## 2021-05-25 RX ORDER — HEPARIN SODIUM 5000 [USP'U]/ML
5000 INJECTION, SOLUTION INTRAVENOUS; SUBCUTANEOUS
Status: CANCELLED | OUTPATIENT
Start: 2021-05-25

## 2021-05-25 RX ORDER — SODIUM CHLORIDE 9 MG/ML
INJECTION, SOLUTION INTRAVENOUS ONCE
Status: CANCELLED | OUTPATIENT
Start: 2021-05-25 | End: 2021-05-25

## 2021-05-25 RX ORDER — ALPRAZOLAM 0.5 MG/1
0.5 TABLET ORAL 3 TIMES DAILY PRN
Status: DISCONTINUED | OUTPATIENT
Start: 2021-05-25 | End: 2021-05-26 | Stop reason: HOSPADM

## 2021-05-25 RX ORDER — CHLORHEXIDINE GLUCONATE ORAL RINSE 1.2 MG/ML
15 SOLUTION DENTAL 2 TIMES DAILY
Status: DISCONTINUED | OUTPATIENT
Start: 2021-05-25 | End: 2021-05-26 | Stop reason: HOSPADM

## 2021-05-25 RX ORDER — ONDANSETRON 4 MG/1
4 TABLET, ORALLY DISINTEGRATING ORAL EVERY 6 HOURS PRN
Status: DISCONTINUED | OUTPATIENT
Start: 2021-05-25 | End: 2021-05-26 | Stop reason: HOSPADM

## 2021-05-25 RX ADMIN — ASPIRIN 81 MG: 81 TABLET, DELAYED RELEASE ORAL at 09:05

## 2021-05-25 RX ADMIN — ENOXAPARIN SODIUM 30 MG: 30 INJECTION SUBCUTANEOUS at 04:05

## 2021-05-25 RX ADMIN — ALPRAZOLAM 0.5 MG: 0.5 TABLET ORAL at 10:05

## 2021-05-25 RX ADMIN — SEVELAMER CARBONATE 2400 MG: 800 TABLET, FILM COATED ORAL at 07:05

## 2021-05-25 RX ADMIN — SEVELAMER CARBONATE 2400 MG: 800 TABLET, FILM COATED ORAL at 04:05

## 2021-05-25 RX ADMIN — AMLODIPINE BESYLATE 10 MG: 5 TABLET ORAL at 09:05

## 2021-05-25 RX ADMIN — MUPIROCIN: 20 OINTMENT TOPICAL at 08:05

## 2021-05-25 RX ADMIN — CARVEDILOL 3.12 MG: 3.12 TABLET, FILM COATED ORAL at 09:05

## 2021-05-25 RX ADMIN — CARVEDILOL 3.12 MG: 3.12 TABLET, FILM COATED ORAL at 08:05

## 2021-05-25 RX ADMIN — SEVELAMER CARBONATE 2400 MG: 800 TABLET, FILM COATED ORAL at 11:05

## 2021-05-25 RX ADMIN — MUPIROCIN: 20 OINTMENT TOPICAL at 09:05

## 2021-05-25 RX ADMIN — ONDANSETRON 4 MG: 4 TABLET, ORALLY DISINTEGRATING ORAL at 03:05

## 2021-05-25 RX ADMIN — CHLORHEXIDINE GLUCONATE 15 ML: 1.2 RINSE ORAL at 11:05

## 2021-05-25 RX ADMIN — ATORVASTATIN CALCIUM 40 MG: 20 TABLET, FILM COATED ORAL at 08:05

## 2021-05-25 RX ADMIN — CHLORHEXIDINE GLUCONATE 15 ML: 1.2 RINSE ORAL at 08:05

## 2021-05-26 VITALS
OXYGEN SATURATION: 97 % | RESPIRATION RATE: 23 BRPM | BODY MASS INDEX: 23.37 KG/M2 | HEIGHT: 64 IN | HEART RATE: 83 BPM | TEMPERATURE: 98 F | DIASTOLIC BLOOD PRESSURE: 59 MMHG | SYSTOLIC BLOOD PRESSURE: 116 MMHG | WEIGHT: 136.88 LBS

## 2021-05-26 PROBLEM — J96.01 ACUTE HYPOXEMIC RESPIRATORY FAILURE: Status: RESOLVED | Noted: 2021-05-24 | Resolved: 2021-05-26

## 2021-05-26 PROBLEM — E87.70 FLUID OVERLOAD: Status: RESOLVED | Noted: 2021-05-24 | Resolved: 2021-05-26

## 2021-05-26 PROBLEM — E87.5 HYPERKALEMIA: Status: RESOLVED | Noted: 2021-05-24 | Resolved: 2021-05-26

## 2021-05-26 LAB
ALBUMIN SERPL BCP-MCNC: 3.2 G/DL (ref 3.5–5.2)
ALP SERPL-CCNC: 80 U/L (ref 55–135)
ALT SERPL W/O P-5'-P-CCNC: 9 U/L (ref 10–44)
ANION GAP SERPL CALC-SCNC: 11 MMOL/L (ref 8–16)
AST SERPL-CCNC: 11 U/L (ref 10–40)
BASOPHILS # BLD AUTO: 0.04 K/UL (ref 0–0.2)
BASOPHILS NFR BLD: 0.7 % (ref 0–1.9)
BILIRUB SERPL-MCNC: 0.7 MG/DL (ref 0.1–1)
BNP SERPL-MCNC: >4500 PG/ML (ref 0–99)
BUN SERPL-MCNC: 47 MG/DL (ref 8–23)
CALCIUM SERPL-MCNC: 8.9 MG/DL (ref 8.7–10.5)
CHLORIDE SERPL-SCNC: 97 MMOL/L (ref 95–110)
CO2 SERPL-SCNC: 24 MMOL/L (ref 23–29)
CREAT SERPL-MCNC: 6.9 MG/DL (ref 0.5–1.4)
DIFFERENTIAL METHOD: ABNORMAL
EOSINOPHIL # BLD AUTO: 0.1 K/UL (ref 0–0.5)
EOSINOPHIL NFR BLD: 1.8 % (ref 0–8)
ERYTHROCYTE [DISTWIDTH] IN BLOOD BY AUTOMATED COUNT: 12.3 % (ref 11.5–14.5)
EST. GFR  (AFRICAN AMERICAN): 6.6 ML/MIN/1.73 M^2
EST. GFR  (NON AFRICAN AMERICAN): 5.7 ML/MIN/1.73 M^2
GLUCOSE SERPL-MCNC: 97 MG/DL (ref 70–110)
HCT VFR BLD AUTO: 26.4 % (ref 37–48.5)
HGB BLD-MCNC: 8.9 G/DL (ref 12–16)
IMM GRANULOCYTES # BLD AUTO: 0.04 K/UL (ref 0–0.04)
IMM GRANULOCYTES NFR BLD AUTO: 0.7 % (ref 0–0.5)
LYMPHOCYTES # BLD AUTO: 1.1 K/UL (ref 1–4.8)
LYMPHOCYTES NFR BLD: 18.5 % (ref 18–48)
MAGNESIUM SERPL-MCNC: 2.5 MG/DL (ref 1.6–2.6)
MCH RBC QN AUTO: 31.4 PG (ref 27–31)
MCHC RBC AUTO-ENTMCNC: 33.7 G/DL (ref 32–36)
MCV RBC AUTO: 93 FL (ref 82–98)
MONOCYTES # BLD AUTO: 0.6 K/UL (ref 0.3–1)
MONOCYTES NFR BLD: 11.3 % (ref 4–15)
NEUTROPHILS # BLD AUTO: 3.8 K/UL (ref 1.8–7.7)
NEUTROPHILS NFR BLD: 67 % (ref 38–73)
NRBC BLD-RTO: 0 /100 WBC
PLATELET # BLD AUTO: 282 K/UL (ref 150–450)
PMV BLD AUTO: 9.3 FL (ref 9.2–12.9)
POTASSIUM SERPL-SCNC: 5.4 MMOL/L (ref 3.5–5.1)
PROT SERPL-MCNC: 7.1 G/DL (ref 6–8.4)
RBC # BLD AUTO: 2.83 M/UL (ref 4–5.4)
SODIUM SERPL-SCNC: 132 MMOL/L (ref 136–145)
WBC # BLD AUTO: 5.68 K/UL (ref 3.9–12.7)

## 2021-05-26 PROCEDURE — 83735 ASSAY OF MAGNESIUM: CPT | Performed by: INTERNAL MEDICINE

## 2021-05-26 PROCEDURE — 63600175 PHARM REV CODE 636 W HCPCS: Mod: TB | Performed by: INTERNAL MEDICINE

## 2021-05-26 PROCEDURE — 85025 COMPLETE CBC W/AUTO DIFF WBC: CPT | Performed by: INTERNAL MEDICINE

## 2021-05-26 PROCEDURE — 94761 N-INVAS EAR/PLS OXIMETRY MLT: CPT

## 2021-05-26 PROCEDURE — 25000003 PHARM REV CODE 250

## 2021-05-26 PROCEDURE — 25000003 PHARM REV CODE 250: Performed by: INTERNAL MEDICINE

## 2021-05-26 PROCEDURE — 80053 COMPREHEN METABOLIC PANEL: CPT | Performed by: INTERNAL MEDICINE

## 2021-05-26 PROCEDURE — 90935 HEMODIALYSIS ONE EVALUATION: CPT

## 2021-05-26 PROCEDURE — 83880 ASSAY OF NATRIURETIC PEPTIDE: CPT | Performed by: INTERNAL MEDICINE

## 2021-05-26 PROCEDURE — 99900035 HC TECH TIME PER 15 MIN (STAT)

## 2021-05-26 PROCEDURE — 36415 COLL VENOUS BLD VENIPUNCTURE: CPT | Performed by: INTERNAL MEDICINE

## 2021-05-26 RX ADMIN — AMLODIPINE BESYLATE 10 MG: 5 TABLET ORAL at 08:05

## 2021-05-26 RX ADMIN — SEVELAMER CARBONATE 2400 MG: 800 TABLET, FILM COATED ORAL at 01:05

## 2021-05-26 RX ADMIN — EPOETIN ALFA-EPBX 3100 UNITS: 10000 INJECTION, SOLUTION INTRAVENOUS; SUBCUTANEOUS at 12:05

## 2021-05-26 RX ADMIN — CHLORHEXIDINE GLUCONATE 15 ML: 1.2 RINSE ORAL at 08:05

## 2021-05-26 RX ADMIN — CARVEDILOL 3.12 MG: 3.12 TABLET, FILM COATED ORAL at 08:05

## 2021-05-26 RX ADMIN — ASPIRIN 81 MG: 81 TABLET, DELAYED RELEASE ORAL at 08:05

## 2021-05-26 RX ADMIN — MUPIROCIN: 20 OINTMENT TOPICAL at 08:05

## 2021-05-27 ENCOUNTER — TELEPHONE (OUTPATIENT)
Dept: PULMONOLOGY | Facility: CLINIC | Age: 66
End: 2021-05-27

## 2021-05-27 ENCOUNTER — HOSPITAL ENCOUNTER (OUTPATIENT)
Dept: PULMONOLOGY | Facility: HOSPITAL | Age: 66
Discharge: HOME | End: 2021-05-27
Attending: INTERNAL MEDICINE
Payer: COMMERCIAL

## 2021-05-27 DIAGNOSIS — J43.2 CENTRILOBULAR EMPHYSEMA: ICD-10-CM

## 2021-05-27 DIAGNOSIS — J43.2 CENTRILOBULAR EMPHYSEMA: Primary | ICD-10-CM

## 2021-06-07 ENCOUNTER — HOSPITAL ENCOUNTER (OUTPATIENT)
Dept: PREADMISSION TESTING | Facility: HOSPITAL | Age: 66
Discharge: HOME | End: 2021-06-07
Attending: INTERNAL MEDICINE
Payer: COMMERCIAL

## 2021-06-07 ENCOUNTER — HOSPITAL ENCOUNTER (OUTPATIENT)
Dept: RADIOLOGY | Facility: HOSPITAL | Age: 66
Discharge: HOME | End: 2021-06-07
Attending: STUDENT IN AN ORGANIZED HEALTH CARE EDUCATION/TRAINING PROGRAM
Payer: COMMERCIAL

## 2021-06-07 VITALS
HEART RATE: 84 BPM | WEIGHT: 130.44 LBS | BODY MASS INDEX: 22.27 KG/M2 | HEIGHT: 64 IN | SYSTOLIC BLOOD PRESSURE: 109 MMHG | RESPIRATION RATE: 20 BRPM | DIASTOLIC BLOOD PRESSURE: 73 MMHG | OXYGEN SATURATION: 98 % | TEMPERATURE: 98 F

## 2021-06-07 DIAGNOSIS — Z01.818 PRE-OP TESTING: ICD-10-CM

## 2021-06-07 DIAGNOSIS — Z01.818 PRE-OP TESTING: Primary | ICD-10-CM

## 2021-06-07 PROCEDURE — 71046 X-RAY EXAM CHEST 2 VIEWS: CPT | Mod: TC

## 2021-06-08 ENCOUNTER — ANESTHESIA (OUTPATIENT)
Dept: SURGERY | Facility: HOSPITAL | Age: 66
End: 2021-06-08
Payer: COMMERCIAL

## 2021-06-08 ENCOUNTER — ANESTHESIA EVENT (OUTPATIENT)
Dept: SURGERY | Facility: HOSPITAL | Age: 66
End: 2021-06-08
Payer: COMMERCIAL

## 2021-06-08 ENCOUNTER — HOSPITAL ENCOUNTER (OUTPATIENT)
Facility: HOSPITAL | Age: 66
Discharge: HOME | End: 2021-06-08
Attending: INTERNAL MEDICINE | Admitting: INTERNAL MEDICINE
Payer: COMMERCIAL

## 2021-06-08 VITALS
SYSTOLIC BLOOD PRESSURE: 155 MMHG | OXYGEN SATURATION: 99 % | TEMPERATURE: 98 F | DIASTOLIC BLOOD PRESSURE: 75 MMHG | RESPIRATION RATE: 18 BRPM | HEART RATE: 56 BPM

## 2021-06-08 DIAGNOSIS — Z12.11 ENCOUNTER FOR SCREENING COLONOSCOPY: ICD-10-CM

## 2021-06-08 PROCEDURE — 27202438 HC MUCOSAL LIFTING AGENT: Performed by: INTERNAL MEDICINE

## 2021-06-08 PROCEDURE — 45390 COLONOSCOPY W/RESECTION: CPT | Performed by: INTERNAL MEDICINE

## 2021-06-08 PROCEDURE — 27202343 HC ENDOSCOPIC MARKER: Performed by: INTERNAL MEDICINE

## 2021-06-08 PROCEDURE — 27201114 HC TRAP (ANY): Performed by: INTERNAL MEDICINE

## 2021-06-08 PROCEDURE — 63600175 PHARM REV CODE 636 W HCPCS: Performed by: NURSE ANESTHETIST, CERTIFIED REGISTERED

## 2021-06-08 PROCEDURE — 45381 COLONOSCOPY SUBMUCOUS NJX: CPT | Performed by: INTERNAL MEDICINE

## 2021-06-08 PROCEDURE — 37000008 HC ANESTHESIA 1ST 15 MINUTES: Performed by: INTERNAL MEDICINE

## 2021-06-08 PROCEDURE — 27201028 HC NEEDLE, SCLERO: Performed by: INTERNAL MEDICINE

## 2021-06-08 PROCEDURE — 25000003 PHARM REV CODE 250: Performed by: NURSE ANESTHETIST, CERTIFIED REGISTERED

## 2021-06-08 PROCEDURE — 45385 COLONOSCOPY W/LESION REMOVAL: CPT | Performed by: INTERNAL MEDICINE

## 2021-06-08 PROCEDURE — 27201089 HC SNARE, DISP (ANY): Performed by: INTERNAL MEDICINE

## 2021-06-08 PROCEDURE — 37000009 HC ANESTHESIA EA ADD 15 MINS: Performed by: INTERNAL MEDICINE

## 2021-06-08 PROCEDURE — 27201042 HC RETRIEVAL NET: Performed by: INTERNAL MEDICINE

## 2021-06-08 RX ORDER — PROPOFOL 10 MG/ML
VIAL (ML) INTRAVENOUS
Status: DISCONTINUED | OUTPATIENT
Start: 2021-06-08 | End: 2021-06-08

## 2021-06-08 RX ADMIN — PROPOFOL 50 MG: 10 INJECTION, EMULSION INTRAVENOUS at 10:06

## 2021-06-08 RX ADMIN — PROPOFOL 30 MG: 10 INJECTION, EMULSION INTRAVENOUS at 09:06

## 2021-06-08 RX ADMIN — PROPOFOL 100 MG: 10 INJECTION, EMULSION INTRAVENOUS at 09:06

## 2021-06-08 RX ADMIN — SODIUM CHLORIDE: 9 INJECTION, SOLUTION INTRAVENOUS at 08:06

## 2021-06-08 RX ADMIN — PROPOFOL 30 MG: 10 INJECTION, EMULSION INTRAVENOUS at 10:06

## 2021-06-08 RX ADMIN — PROPOFOL 50 MG: 10 INJECTION, EMULSION INTRAVENOUS at 09:06

## 2021-06-08 RX ADMIN — PROPOFOL 20 MG: 10 INJECTION, EMULSION INTRAVENOUS at 10:06

## 2021-06-08 RX ADMIN — PROPOFOL 20 MG: 10 INJECTION, EMULSION INTRAVENOUS at 09:06

## 2021-06-15 ENCOUNTER — TELEPHONE (OUTPATIENT)
Dept: TRANSPLANT | Facility: CLINIC | Age: 66
End: 2021-06-15

## 2021-06-15 DIAGNOSIS — Z76.82 ORGAN TRANSPLANT CANDIDATE: Primary | ICD-10-CM

## 2021-06-17 ENCOUNTER — HOSPITAL ENCOUNTER (OUTPATIENT)
Dept: PULMONOLOGY | Facility: HOSPITAL | Age: 66
Discharge: HOME | End: 2021-06-17
Attending: INTERNAL MEDICINE
Payer: COMMERCIAL

## 2021-06-17 DIAGNOSIS — J43.2 CENTRILOBULAR EMPHYSEMA: ICD-10-CM

## 2021-06-17 PROCEDURE — 94729 DIFFUSING CAPACITY: CPT

## 2021-06-17 PROCEDURE — 94010 BREATHING CAPACITY TEST: CPT

## 2021-06-17 PROCEDURE — 94727 GAS DIL/WSHOT DETER LNG VOL: CPT

## 2021-06-17 PROCEDURE — 94060 EVALUATION OF WHEEZING: CPT

## 2021-06-21 ENCOUNTER — TELEPHONE (OUTPATIENT)
Dept: PULMONOLOGY | Facility: HOSPITAL | Age: 66
End: 2021-06-21

## 2021-06-29 RX ORDER — SODIUM ZIRCONIUM CYCLOSILICATE 10 G/10G
10 POWDER, FOR SUSPENSION ORAL DAILY
Qty: 30 PACKET | Refills: 0 | Status: CANCELLED | OUTPATIENT
Start: 2021-06-29

## 2021-07-19 ENCOUNTER — TELEPHONE (OUTPATIENT)
Dept: CARDIOLOGY | Facility: HOSPITAL | Age: 66
End: 2021-07-19

## 2021-07-20 ENCOUNTER — CLINICAL SUPPORT (OUTPATIENT)
Dept: CARDIOLOGY | Facility: HOSPITAL | Age: 66
End: 2021-07-20
Attending: NURSE PRACTITIONER
Payer: COMMERCIAL

## 2021-07-20 VITALS — BODY MASS INDEX: 22.28 KG/M2 | WEIGHT: 130.5 LBS | HEIGHT: 64 IN

## 2021-07-20 DIAGNOSIS — Z76.82 ORGAN TRANSPLANT CANDIDATE: ICD-10-CM

## 2021-07-20 PROCEDURE — 93306 TTE W/DOPPLER COMPLETE: CPT

## 2021-07-20 PROCEDURE — 93306 ECHO (CUPID ONLY): ICD-10-PCS | Mod: 26,,, | Performed by: INTERNAL MEDICINE

## 2021-07-20 PROCEDURE — 93306 TTE W/DOPPLER COMPLETE: CPT | Mod: 26,,, | Performed by: INTERNAL MEDICINE

## 2021-07-24 ENCOUNTER — PATIENT MESSAGE (OUTPATIENT)
Dept: TRANSPLANT | Facility: CLINIC | Age: 66
End: 2021-07-24

## 2021-07-28 LAB
AORTIC ROOT ANNULUS: 3.36 CM
AORTIC VALVE CUSP SEPERATION: 2.12 CM
AV INDEX (PROSTH): 0.55
AV MEAN GRADIENT: 5 MMHG
AV PEAK GRADIENT: 7 MMHG
AV VALVE AREA: 2.07 CM2
AV VELOCITY RATIO: 55.72
BSA FOR ECHO PROCEDURE: 1.63 M2
CV ECHO LV RWT: 0.42 CM
DOP CALC AO PEAK VEL: 1.36 M/S
DOP CALC AO VTI: 27.1 CM
DOP CALC LVOT AREA: 3.7 CM2
DOP CALC LVOT DIAMETER: 2.18 CM
DOP CALC LVOT PEAK VEL: 75.78 M/S
DOP CALC LVOT STROKE VOLUME: 56 CM3
DOP CALCLVOT PEAK VEL VTI: 15.01 CM
E WAVE DECELERATION TIME: 339.8 MSEC
E/A RATIO: 0.55
E/E' RATIO: 9.38 M/S
ECHO LV POSTERIOR WALL: 1.28 CM (ref 0.6–1.1)
EJECTION FRACTION: 45 %
FRACTIONAL SHORTENING: 20 % (ref 28–44)
INTERVENTRICULAR SEPTUM: 1.28 CM (ref 0.6–1.1)
IVRT: 87.02 MSEC
LEFT ATRIUM SIZE: 4.76 CM
LEFT INTERNAL DIMENSION IN SYSTOLE: 4.81 CM (ref 2.1–4)
LEFT VENTRICLE MASS INDEX: 212 G/M2
LEFT VENTRICULAR INTERNAL DIMENSION IN DIASTOLE: 6.03 CM (ref 3.5–6)
LEFT VENTRICULAR MASS: 345.53 G
LV LATERAL E/E' RATIO: 6.78 M/S
LV SEPTAL E/E' RATIO: 15.25 M/S
MV PEAK A VEL: 1.1 M/S
MV PEAK E VEL: 0.61 M/S
PISA TR MAX VEL: 2.74 M/S
PV PEAK VELOCITY: 124.67 CM/S
RA PRESSURE: 3 MMHG
RIGHT VENTRICULAR END-DIASTOLIC DIMENSION: 244 CM
TDI LATERAL: 0.09 M/S
TDI SEPTAL: 0.04 M/S
TDI: 0.07 M/S
TR MAX PG: 30 MMHG
TV REST PULMONARY ARTERY PRESSURE: 33 MMHG

## 2021-08-09 DIAGNOSIS — Z76.82 ORGAN TRANSPLANT CANDIDATE: Primary | ICD-10-CM

## 2021-08-16 ENCOUNTER — TELEPHONE (OUTPATIENT)
Dept: HEMATOLOGY/ONCOLOGY | Facility: CLINIC | Age: 66
End: 2021-08-16

## 2021-08-26 ENCOUNTER — TELEPHONE (OUTPATIENT)
Dept: TRANSPLANT | Facility: CLINIC | Age: 66
End: 2021-08-26

## 2021-09-01 ENCOUNTER — TELEPHONE (OUTPATIENT)
Dept: HEMATOLOGY/ONCOLOGY | Facility: CLINIC | Age: 66
End: 2021-09-01

## 2021-09-15 ENCOUNTER — TELEPHONE (OUTPATIENT)
Dept: PULMONOLOGY | Facility: CLINIC | Age: 66
End: 2021-09-15

## 2021-09-15 DIAGNOSIS — R91.1 SOLITARY PULMONARY NODULE: ICD-10-CM

## 2021-09-17 ENCOUNTER — OFFICE VISIT (OUTPATIENT)
Dept: HEMATOLOGY/ONCOLOGY | Facility: CLINIC | Age: 66
End: 2021-09-17
Payer: COMMERCIAL

## 2021-09-17 VITALS
TEMPERATURE: 98 F | BODY MASS INDEX: 21.61 KG/M2 | DIASTOLIC BLOOD PRESSURE: 63 MMHG | WEIGHT: 126.56 LBS | OXYGEN SATURATION: 98 % | SYSTOLIC BLOOD PRESSURE: 110 MMHG | HEART RATE: 85 BPM | RESPIRATION RATE: 18 BRPM | HEIGHT: 64 IN

## 2021-09-17 DIAGNOSIS — J43.2 CENTRILOBULAR EMPHYSEMA: Primary | ICD-10-CM

## 2021-09-17 DIAGNOSIS — E78.00 PURE HYPERCHOLESTEROLEMIA: ICD-10-CM

## 2021-09-17 DIAGNOSIS — I10 ESSENTIAL HYPERTENSION: ICD-10-CM

## 2021-09-17 DIAGNOSIS — Z76.82 ORGAN TRANSPLANT CANDIDATE: ICD-10-CM

## 2021-09-17 PROCEDURE — 3288F FALL RISK ASSESSMENT DOCD: CPT | Mod: CPTII,S$GLB,, | Performed by: INTERNAL MEDICINE

## 2021-09-17 PROCEDURE — 3066F PR DOCUMENTATION OF TREATMENT FOR NEPHROPATHY: ICD-10-PCS | Mod: CPTII,S$GLB,, | Performed by: INTERNAL MEDICINE

## 2021-09-17 PROCEDURE — 99999 PR PBB SHADOW E&M-EST. PATIENT-LVL IV: CPT | Mod: PBBFAC,,, | Performed by: INTERNAL MEDICINE

## 2021-09-17 PROCEDURE — 3074F SYST BP LT 130 MM HG: CPT | Mod: CPTII,S$GLB,, | Performed by: INTERNAL MEDICINE

## 2021-09-17 PROCEDURE — 1101F PR PT FALLS ASSESS DOC 0-1 FALLS W/OUT INJ PAST YR: ICD-10-PCS | Mod: CPTII,S$GLB,, | Performed by: INTERNAL MEDICINE

## 2021-09-17 PROCEDURE — 1159F MED LIST DOCD IN RCRD: CPT | Mod: CPTII,S$GLB,, | Performed by: INTERNAL MEDICINE

## 2021-09-17 PROCEDURE — 99204 PR OFFICE/OUTPT VISIT, NEW, LEVL IV, 45-59 MIN: ICD-10-PCS | Mod: S$GLB,,, | Performed by: INTERNAL MEDICINE

## 2021-09-17 PROCEDURE — 3008F PR BODY MASS INDEX (BMI) DOCUMENTED: ICD-10-PCS | Mod: CPTII,S$GLB,, | Performed by: INTERNAL MEDICINE

## 2021-09-17 PROCEDURE — 3078F DIAST BP <80 MM HG: CPT | Mod: CPTII,S$GLB,, | Performed by: INTERNAL MEDICINE

## 2021-09-17 PROCEDURE — 99999 PR PBB SHADOW E&M-EST. PATIENT-LVL IV: ICD-10-PCS | Mod: PBBFAC,,, | Performed by: INTERNAL MEDICINE

## 2021-09-17 PROCEDURE — 3066F NEPHROPATHY DOC TX: CPT | Mod: CPTII,S$GLB,, | Performed by: INTERNAL MEDICINE

## 2021-09-17 PROCEDURE — 3078F PR MOST RECENT DIASTOLIC BLOOD PRESSURE < 80 MM HG: ICD-10-PCS | Mod: CPTII,S$GLB,, | Performed by: INTERNAL MEDICINE

## 2021-09-17 PROCEDURE — 3074F PR MOST RECENT SYSTOLIC BLOOD PRESSURE < 130 MM HG: ICD-10-PCS | Mod: CPTII,S$GLB,, | Performed by: INTERNAL MEDICINE

## 2021-09-17 PROCEDURE — 3008F BODY MASS INDEX DOCD: CPT | Mod: CPTII,S$GLB,, | Performed by: INTERNAL MEDICINE

## 2021-09-17 PROCEDURE — 1126F PR PAIN SEVERITY QUANTIFIED, NO PAIN PRESENT: ICD-10-PCS | Mod: CPTII,S$GLB,, | Performed by: INTERNAL MEDICINE

## 2021-09-17 PROCEDURE — 1101F PT FALLS ASSESS-DOCD LE1/YR: CPT | Mod: CPTII,S$GLB,, | Performed by: INTERNAL MEDICINE

## 2021-09-17 PROCEDURE — 1126F AMNT PAIN NOTED NONE PRSNT: CPT | Mod: CPTII,S$GLB,, | Performed by: INTERNAL MEDICINE

## 2021-09-17 PROCEDURE — 99204 OFFICE O/P NEW MOD 45 MIN: CPT | Mod: S$GLB,,, | Performed by: INTERNAL MEDICINE

## 2021-09-17 PROCEDURE — 1159F PR MEDICATION LIST DOCUMENTED IN MEDICAL RECORD: ICD-10-PCS | Mod: CPTII,S$GLB,, | Performed by: INTERNAL MEDICINE

## 2021-09-17 PROCEDURE — 3288F PR FALLS RISK ASSESSMENT DOCUMENTED: ICD-10-PCS | Mod: CPTII,S$GLB,, | Performed by: INTERNAL MEDICINE

## 2021-09-23 ENCOUNTER — TELEPHONE (OUTPATIENT)
Dept: TRANSPLANT | Facility: CLINIC | Age: 66
End: 2021-09-23

## 2021-09-28 ENCOUNTER — HOSPITAL ENCOUNTER (OUTPATIENT)
Dept: RADIOLOGY | Facility: HOSPITAL | Age: 66
Discharge: HOME | End: 2021-09-28
Attending: NURSE PRACTITIONER
Payer: COMMERCIAL

## 2021-09-28 DIAGNOSIS — R91.1 SOLITARY PULMONARY NODULE: ICD-10-CM

## 2021-09-28 PROCEDURE — 71250 CT THORAX DX C-: CPT | Mod: TC,PO

## 2021-09-29 ENCOUNTER — TELEPHONE (OUTPATIENT)
Dept: PULMONOLOGY | Facility: CLINIC | Age: 66
End: 2021-09-29

## 2021-10-14 ENCOUNTER — OFFICE VISIT (OUTPATIENT)
Dept: PULMONOLOGY | Facility: CLINIC | Age: 66
End: 2021-10-14
Payer: COMMERCIAL

## 2021-10-14 VITALS
BODY MASS INDEX: 22.02 KG/M2 | OXYGEN SATURATION: 98 % | HEIGHT: 64 IN | DIASTOLIC BLOOD PRESSURE: 90 MMHG | HEART RATE: 91 BPM | SYSTOLIC BLOOD PRESSURE: 142 MMHG | WEIGHT: 129 LBS

## 2021-10-14 DIAGNOSIS — J90 PLEURAL EFFUSION: ICD-10-CM

## 2021-10-14 DIAGNOSIS — R91.8 PULMONARY NODULES: Primary | ICD-10-CM

## 2021-10-14 DIAGNOSIS — R59.1 LYMPHADENOPATHY: ICD-10-CM

## 2021-10-14 DIAGNOSIS — J44.9 CHRONIC OBSTRUCTIVE PULMONARY DISEASE, UNSPECIFIED COPD TYPE: ICD-10-CM

## 2021-10-14 DIAGNOSIS — N18.6 ESRD (END STAGE RENAL DISEASE): ICD-10-CM

## 2021-10-14 DIAGNOSIS — I31.39 PERICARDIAL EFFUSION: ICD-10-CM

## 2021-10-14 DIAGNOSIS — J43.2 CENTRILOBULAR EMPHYSEMA: ICD-10-CM

## 2021-10-14 PROCEDURE — 99214 PR OFFICE/OUTPT VISIT, EST, LEVL IV, 30-39 MIN: ICD-10-PCS | Mod: S$GLB,,, | Performed by: INTERNAL MEDICINE

## 2021-10-14 PROCEDURE — 1126F PR PAIN SEVERITY QUANTIFIED, NO PAIN PRESENT: ICD-10-PCS | Mod: S$GLB,,, | Performed by: INTERNAL MEDICINE

## 2021-10-14 PROCEDURE — 1159F MED LIST DOCD IN RCRD: CPT | Mod: S$GLB,,, | Performed by: INTERNAL MEDICINE

## 2021-10-14 PROCEDURE — 3077F PR MOST RECENT SYSTOLIC BLOOD PRESSURE >= 140 MM HG: ICD-10-PCS | Mod: S$GLB,,, | Performed by: INTERNAL MEDICINE

## 2021-10-14 PROCEDURE — 3066F PR DOCUMENTATION OF TREATMENT FOR NEPHROPATHY: ICD-10-PCS | Mod: S$GLB,,, | Performed by: INTERNAL MEDICINE

## 2021-10-14 PROCEDURE — 3066F NEPHROPATHY DOC TX: CPT | Mod: S$GLB,,, | Performed by: INTERNAL MEDICINE

## 2021-10-14 PROCEDURE — 3080F DIAST BP >= 90 MM HG: CPT | Mod: S$GLB,,, | Performed by: INTERNAL MEDICINE

## 2021-10-14 PROCEDURE — 3077F SYST BP >= 140 MM HG: CPT | Mod: S$GLB,,, | Performed by: INTERNAL MEDICINE

## 2021-10-14 PROCEDURE — 3008F PR BODY MASS INDEX (BMI) DOCUMENTED: ICD-10-PCS | Mod: S$GLB,,, | Performed by: INTERNAL MEDICINE

## 2021-10-14 PROCEDURE — 99214 OFFICE O/P EST MOD 30 MIN: CPT | Mod: S$GLB,,, | Performed by: INTERNAL MEDICINE

## 2021-10-14 PROCEDURE — 1126F AMNT PAIN NOTED NONE PRSNT: CPT | Mod: S$GLB,,, | Performed by: INTERNAL MEDICINE

## 2021-10-14 PROCEDURE — 3080F PR MOST RECENT DIASTOLIC BLOOD PRESSURE >= 90 MM HG: ICD-10-PCS | Mod: S$GLB,,, | Performed by: INTERNAL MEDICINE

## 2021-10-14 PROCEDURE — 3008F BODY MASS INDEX DOCD: CPT | Mod: S$GLB,,, | Performed by: INTERNAL MEDICINE

## 2021-10-14 PROCEDURE — 1159F PR MEDICATION LIST DOCUMENTED IN MEDICAL RECORD: ICD-10-PCS | Mod: S$GLB,,, | Performed by: INTERNAL MEDICINE

## 2021-10-14 RX ORDER — LIDOCAINE AND PRILOCAINE 25; 25 MG/G; MG/G
CREAM TOPICAL
Status: ON HOLD | COMMUNITY
Start: 2021-07-16 | End: 2022-08-28

## 2021-11-08 ENCOUNTER — HOSPITAL ENCOUNTER (EMERGENCY)
Facility: HOSPITAL | Age: 66
Discharge: HOME | End: 2021-11-08
Attending: EMERGENCY MEDICINE
Payer: COMMERCIAL

## 2021-11-08 VITALS
OXYGEN SATURATION: 96 % | TEMPERATURE: 99 F | RESPIRATION RATE: 38 BRPM | WEIGHT: 130 LBS | BODY MASS INDEX: 22.2 KG/M2 | DIASTOLIC BLOOD PRESSURE: 68 MMHG | HEIGHT: 64 IN | HEART RATE: 88 BPM | SYSTOLIC BLOOD PRESSURE: 137 MMHG

## 2021-11-08 DIAGNOSIS — R06.02 SOB (SHORTNESS OF BREATH): ICD-10-CM

## 2021-11-08 DIAGNOSIS — J06.9 UPPER RESPIRATORY TRACT INFECTION, UNSPECIFIED TYPE: ICD-10-CM

## 2021-11-08 DIAGNOSIS — J44.1 COPD EXACERBATION: Primary | ICD-10-CM

## 2021-11-08 LAB
ALBUMIN SERPL BCP-MCNC: 3.8 G/DL (ref 3.5–5.2)
ALP SERPL-CCNC: 111 U/L (ref 55–135)
ALT SERPL W/O P-5'-P-CCNC: 14 U/L (ref 10–44)
ANION GAP SERPL CALC-SCNC: 18 MMOL/L (ref 8–16)
AST SERPL-CCNC: 18 U/L (ref 10–40)
BASOPHILS # BLD AUTO: 0.03 K/UL (ref 0–0.2)
BASOPHILS NFR BLD: 0.6 % (ref 0–1.9)
BILIRUB SERPL-MCNC: 0.9 MG/DL (ref 0.1–1)
BNP SERPL-MCNC: >4500 PG/ML (ref 0–99)
BUN SERPL-MCNC: 39 MG/DL (ref 8–23)
CALCIUM SERPL-MCNC: 9.6 MG/DL (ref 8.7–10.5)
CHLORIDE SERPL-SCNC: 92 MMOL/L (ref 95–110)
CO2 SERPL-SCNC: 29 MMOL/L (ref 23–29)
CREAT SERPL-MCNC: 5.7 MG/DL (ref 0.5–1.4)
DIFFERENTIAL METHOD: ABNORMAL
EOSINOPHIL # BLD AUTO: 0.1 K/UL (ref 0–0.5)
EOSINOPHIL NFR BLD: 0.9 % (ref 0–8)
ERYTHROCYTE [DISTWIDTH] IN BLOOD BY AUTOMATED COUNT: 13.3 % (ref 11.5–14.5)
EST. GFR  (AFRICAN AMERICAN): 8.3 ML/MIN/1.73 M^2
EST. GFR  (NON AFRICAN AMERICAN): 7.2 ML/MIN/1.73 M^2
GLUCOSE SERPL-MCNC: 104 MG/DL (ref 70–110)
HCT VFR BLD AUTO: 31.7 % (ref 37–48.5)
HGB BLD-MCNC: 10.3 G/DL (ref 12–16)
IMM GRANULOCYTES # BLD AUTO: 0.02 K/UL (ref 0–0.04)
IMM GRANULOCYTES NFR BLD AUTO: 0.4 % (ref 0–0.5)
INR PPP: 1.6
LIPASE SERPL-CCNC: 33 U/L (ref 4–60)
LYMPHOCYTES # BLD AUTO: 0.8 K/UL (ref 1–4.8)
LYMPHOCYTES NFR BLD: 13.9 % (ref 18–48)
MCH RBC QN AUTO: 32.7 PG (ref 27–31)
MCHC RBC AUTO-ENTMCNC: 32.5 G/DL (ref 32–36)
MCV RBC AUTO: 101 FL (ref 82–98)
MONOCYTES # BLD AUTO: 0.5 K/UL (ref 0.3–1)
MONOCYTES NFR BLD: 9 % (ref 4–15)
NEUTROPHILS # BLD AUTO: 4.1 K/UL (ref 1.8–7.7)
NEUTROPHILS NFR BLD: 75.2 % (ref 38–73)
NRBC BLD-RTO: 0 /100 WBC
PLATELET # BLD AUTO: 244 K/UL (ref 150–450)
PMV BLD AUTO: 9.9 FL (ref 9.2–12.9)
POTASSIUM SERPL-SCNC: 4 MMOL/L (ref 3.5–5.1)
PROT SERPL-MCNC: 7.8 G/DL (ref 6–8.4)
PROTHROMBIN TIME: 17.9 SEC (ref 11.4–13.7)
RBC # BLD AUTO: 3.15 M/UL (ref 4–5.4)
SARS-COV-2 RDRP RESP QL NAA+PROBE: NEGATIVE
SODIUM SERPL-SCNC: 139 MMOL/L (ref 136–145)
TROPONIN I SERPL DL<=0.01 NG/ML-MCNC: 0.07 NG/ML
WBC # BLD AUTO: 5.45 K/UL (ref 3.9–12.7)

## 2021-11-08 PROCEDURE — 94640 AIRWAY INHALATION TREATMENT: CPT

## 2021-11-08 PROCEDURE — 84484 ASSAY OF TROPONIN QUANT: CPT | Performed by: EMERGENCY MEDICINE

## 2021-11-08 PROCEDURE — 93010 ELECTROCARDIOGRAM REPORT: CPT | Mod: ,,, | Performed by: INTERNAL MEDICINE

## 2021-11-08 PROCEDURE — 63600175 PHARM REV CODE 636 W HCPCS: Performed by: EMERGENCY MEDICINE

## 2021-11-08 PROCEDURE — 96374 THER/PROPH/DIAG INJ IV PUSH: CPT

## 2021-11-08 PROCEDURE — 99900031 HC PATIENT EDUCATION (STAT)

## 2021-11-08 PROCEDURE — 85610 PROTHROMBIN TIME: CPT | Performed by: EMERGENCY MEDICINE

## 2021-11-08 PROCEDURE — 83690 ASSAY OF LIPASE: CPT | Performed by: EMERGENCY MEDICINE

## 2021-11-08 PROCEDURE — 93010 EKG 12-LEAD: ICD-10-PCS | Mod: ,,, | Performed by: INTERNAL MEDICINE

## 2021-11-08 PROCEDURE — 36415 COLL VENOUS BLD VENIPUNCTURE: CPT | Performed by: EMERGENCY MEDICINE

## 2021-11-08 PROCEDURE — 83880 ASSAY OF NATRIURETIC PEPTIDE: CPT | Performed by: EMERGENCY MEDICINE

## 2021-11-08 PROCEDURE — U0002 COVID-19 LAB TEST NON-CDC: HCPCS | Performed by: EMERGENCY MEDICINE

## 2021-11-08 PROCEDURE — 85025 COMPLETE CBC W/AUTO DIFF WBC: CPT | Performed by: EMERGENCY MEDICINE

## 2021-11-08 PROCEDURE — 94761 N-INVAS EAR/PLS OXIMETRY MLT: CPT

## 2021-11-08 PROCEDURE — 80053 COMPREHEN METABOLIC PANEL: CPT | Performed by: EMERGENCY MEDICINE

## 2021-11-08 PROCEDURE — 99284 EMERGENCY DEPT VISIT MOD MDM: CPT | Mod: 25

## 2021-11-08 PROCEDURE — 25000242 PHARM REV CODE 250 ALT 637 W/ HCPCS: Performed by: EMERGENCY MEDICINE

## 2021-11-08 PROCEDURE — 93005 ELECTROCARDIOGRAM TRACING: CPT | Performed by: INTERNAL MEDICINE

## 2021-11-08 RX ORDER — PREDNISONE 20 MG/1
40 TABLET ORAL DAILY
Qty: 10 TABLET | Refills: 0 | Status: SHIPPED | OUTPATIENT
Start: 2021-11-08 | End: 2021-11-08 | Stop reason: SDUPTHER

## 2021-11-08 RX ORDER — IPRATROPIUM BROMIDE AND ALBUTEROL SULFATE 2.5; .5 MG/3ML; MG/3ML
3 SOLUTION RESPIRATORY (INHALATION)
Status: COMPLETED | OUTPATIENT
Start: 2021-11-08 | End: 2021-11-08

## 2021-11-08 RX ORDER — HYDROCODONE POLISTIREX AND CHLORPHENIRAMINE POLISTIREX 10; 8 MG/5ML; MG/5ML
5 SUSPENSION, EXTENDED RELEASE ORAL NIGHTLY PRN
Qty: 50 ML | Refills: 0 | Status: SHIPPED | OUTPATIENT
Start: 2021-11-08 | End: 2021-11-18

## 2021-11-08 RX ORDER — METHYLPREDNISOLONE SOD SUCC 125 MG
125 VIAL (EA) INJECTION
Status: COMPLETED | OUTPATIENT
Start: 2021-11-08 | End: 2021-11-08

## 2021-11-08 RX ORDER — ALBUTEROL SULFATE 90 UG/1
1-2 AEROSOL, METERED RESPIRATORY (INHALATION) EVERY 6 HOURS PRN
Qty: 18 G | Refills: 1 | Status: SHIPPED | OUTPATIENT
Start: 2021-11-08 | End: 2022-05-18

## 2021-11-08 RX ORDER — PREDNISONE 20 MG/1
40 TABLET ORAL DAILY
Qty: 10 TABLET | Refills: 0 | Status: SHIPPED | OUTPATIENT
Start: 2021-11-08 | End: 2021-11-13

## 2021-11-08 RX ORDER — AZITHROMYCIN 250 MG/1
250 TABLET, FILM COATED ORAL DAILY
Qty: 6 TABLET | Refills: 0 | Status: SHIPPED | OUTPATIENT
Start: 2021-11-08 | End: 2021-12-03 | Stop reason: ALTCHOICE

## 2021-11-08 RX ADMIN — METHYLPREDNISOLONE SODIUM SUCCINATE 125 MG: 125 INJECTION, POWDER, FOR SOLUTION INTRAMUSCULAR; INTRAVENOUS at 10:11

## 2021-11-08 RX ADMIN — IPRATROPIUM BROMIDE AND ALBUTEROL SULFATE 3 ML: .5; 3 SOLUTION RESPIRATORY (INHALATION) at 10:11

## 2021-11-10 ENCOUNTER — TELEPHONE (OUTPATIENT)
Dept: TRANSPLANT | Facility: CLINIC | Age: 66
End: 2021-11-10
Payer: COMMERCIAL

## 2021-11-22 ENCOUNTER — COMMITTEE REVIEW (OUTPATIENT)
Dept: TRANSPLANT | Facility: CLINIC | Age: 66
End: 2021-11-22
Payer: COMMERCIAL

## 2021-11-22 ENCOUNTER — EPISODE CHANGES (OUTPATIENT)
Dept: TRANSPLANT | Facility: CLINIC | Age: 66
End: 2021-11-22

## 2021-12-01 DIAGNOSIS — Z76.82 ORGAN TRANSPLANT CANDIDATE: Primary | ICD-10-CM

## 2021-12-03 ENCOUNTER — HOSPITAL ENCOUNTER (OUTPATIENT)
Dept: PREADMISSION TESTING | Facility: HOSPITAL | Age: 66
Discharge: HOME | End: 2021-12-03
Attending: INTERNAL MEDICINE
Payer: COMMERCIAL

## 2021-12-03 VITALS
RESPIRATION RATE: 18 BRPM | WEIGHT: 121 LBS | TEMPERATURE: 98 F | HEIGHT: 64 IN | HEART RATE: 80 BPM | DIASTOLIC BLOOD PRESSURE: 72 MMHG | OXYGEN SATURATION: 99 % | SYSTOLIC BLOOD PRESSURE: 128 MMHG | BODY MASS INDEX: 20.66 KG/M2

## 2021-12-03 DIAGNOSIS — Z01.818 PRE-OP TESTING: Primary | ICD-10-CM

## 2021-12-14 ENCOUNTER — HOSPITAL ENCOUNTER (OUTPATIENT)
Facility: HOSPITAL | Age: 66
Discharge: HOME | End: 2021-12-14
Attending: INTERNAL MEDICINE | Admitting: INTERNAL MEDICINE
Payer: COMMERCIAL

## 2021-12-14 ENCOUNTER — ANESTHESIA (OUTPATIENT)
Dept: SURGERY | Facility: HOSPITAL | Age: 66
End: 2021-12-14
Payer: COMMERCIAL

## 2021-12-14 ENCOUNTER — ANESTHESIA EVENT (OUTPATIENT)
Dept: SURGERY | Facility: HOSPITAL | Age: 66
End: 2021-12-14
Payer: COMMERCIAL

## 2021-12-14 VITALS
DIASTOLIC BLOOD PRESSURE: 92 MMHG | OXYGEN SATURATION: 98 % | HEART RATE: 68 BPM | SYSTOLIC BLOOD PRESSURE: 162 MMHG | RESPIRATION RATE: 18 BRPM | TEMPERATURE: 98 F

## 2021-12-14 DIAGNOSIS — Z01.818 PRE-OP TESTING: Primary | ICD-10-CM

## 2021-12-14 DIAGNOSIS — Z86.010 PERSONAL HISTORY OF COLONIC POLYPS: ICD-10-CM

## 2021-12-14 LAB
POTASSIUM SERPL-SCNC: 3.7 MMOL/L (ref 3.5–5.1)
SARS-COV-2 RDRP RESP QL NAA+PROBE: NEGATIVE

## 2021-12-14 PROCEDURE — 84132 ASSAY OF SERUM POTASSIUM: CPT | Performed by: ANESTHESIOLOGY

## 2021-12-14 PROCEDURE — 45388 COLONOSCOPY W/ABLATION: CPT | Performed by: INTERNAL MEDICINE

## 2021-12-14 PROCEDURE — 27201114 HC TRAP (ANY): Performed by: INTERNAL MEDICINE

## 2021-12-14 PROCEDURE — 27201089 HC SNARE, DISP (ANY): Performed by: INTERNAL MEDICINE

## 2021-12-14 PROCEDURE — 27000671 HC TUBING MICROBORE EXT: Performed by: ANESTHESIOLOGY

## 2021-12-14 PROCEDURE — 63600175 PHARM REV CODE 636 W HCPCS: Performed by: NURSE ANESTHETIST, CERTIFIED REGISTERED

## 2021-12-14 PROCEDURE — U0002 COVID-19 LAB TEST NON-CDC: HCPCS | Performed by: INTERNAL MEDICINE

## 2021-12-14 PROCEDURE — 37000009 HC ANESTHESIA EA ADD 15 MINS: Performed by: INTERNAL MEDICINE

## 2021-12-14 PROCEDURE — 37000008 HC ANESTHESIA 1ST 15 MINUTES: Performed by: INTERNAL MEDICINE

## 2021-12-14 PROCEDURE — 25000003 PHARM REV CODE 250: Performed by: NURSE ANESTHETIST, CERTIFIED REGISTERED

## 2021-12-14 PROCEDURE — 45385 COLONOSCOPY W/LESION REMOVAL: CPT | Performed by: INTERNAL MEDICINE

## 2021-12-14 RX ORDER — PROPOFOL 10 MG/ML
VIAL (ML) INTRAVENOUS
Status: DISCONTINUED | OUTPATIENT
Start: 2021-12-14 | End: 2021-12-14

## 2021-12-14 RX ADMIN — SODIUM CHLORIDE: 900 INJECTION, SOLUTION INTRAVENOUS at 10:12

## 2021-12-14 RX ADMIN — PROPOFOL 10 MG: 10 INJECTION, EMULSION INTRAVENOUS at 10:12

## 2021-12-14 RX ADMIN — PROPOFOL 20 MG: 10 INJECTION, EMULSION INTRAVENOUS at 10:12

## 2021-12-14 RX ADMIN — PROPOFOL 30 MG: 10 INJECTION, EMULSION INTRAVENOUS at 10:12

## 2022-01-02 ENCOUNTER — HOSPITAL ENCOUNTER (EMERGENCY)
Facility: HOSPITAL | Age: 67
Discharge: HOME | End: 2022-01-02
Attending: EMERGENCY MEDICINE
Payer: COMMERCIAL

## 2022-01-02 VITALS
DIASTOLIC BLOOD PRESSURE: 89 MMHG | TEMPERATURE: 98 F | RESPIRATION RATE: 24 BRPM | OXYGEN SATURATION: 95 % | WEIGHT: 125 LBS | BODY MASS INDEX: 21.46 KG/M2 | HEART RATE: 87 BPM | SYSTOLIC BLOOD PRESSURE: 154 MMHG

## 2022-01-02 DIAGNOSIS — J44.9 CHRONIC OBSTRUCTIVE PULMONARY DISEASE, UNSPECIFIED COPD TYPE: ICD-10-CM

## 2022-01-02 DIAGNOSIS — N18.6 ESRD (END STAGE RENAL DISEASE): Primary | ICD-10-CM

## 2022-01-02 DIAGNOSIS — R06.02 SOB (SHORTNESS OF BREATH): ICD-10-CM

## 2022-01-02 LAB
ALBUMIN SERPL BCP-MCNC: 3.7 G/DL (ref 3.5–5.2)
ALP SERPL-CCNC: 135 U/L (ref 55–135)
ALT SERPL W/O P-5'-P-CCNC: 25 U/L (ref 10–44)
ANION GAP SERPL CALC-SCNC: 15 MMOL/L (ref 8–16)
AST SERPL-CCNC: 28 U/L (ref 10–40)
BASOPHILS # BLD AUTO: 0.07 K/UL (ref 0–0.2)
BASOPHILS NFR BLD: 1.3 % (ref 0–1.9)
BILIRUB SERPL-MCNC: 0.9 MG/DL (ref 0.1–1)
BUN SERPL-MCNC: 48 MG/DL (ref 8–23)
CALCIUM SERPL-MCNC: 9.2 MG/DL (ref 8.7–10.5)
CHLORIDE SERPL-SCNC: 91 MMOL/L (ref 95–110)
CO2 SERPL-SCNC: 29 MMOL/L (ref 23–29)
CREAT SERPL-MCNC: 7.5 MG/DL (ref 0.5–1.4)
DIFFERENTIAL METHOD: ABNORMAL
EOSINOPHIL # BLD AUTO: 0.1 K/UL (ref 0–0.5)
EOSINOPHIL NFR BLD: 1.5 % (ref 0–8)
ERYTHROCYTE [DISTWIDTH] IN BLOOD BY AUTOMATED COUNT: 12.8 % (ref 11.5–14.5)
EST. GFR  (AFRICAN AMERICAN): 5.9 ML/MIN/1.73 M^2
EST. GFR  (NON AFRICAN AMERICAN): 5.1 ML/MIN/1.73 M^2
GLUCOSE SERPL-MCNC: 115 MG/DL (ref 70–110)
GLUCOSE SERPL-MCNC: 136 MG/DL (ref 70–110)
HCT VFR BLD AUTO: 38.8 % (ref 37–48.5)
HGB BLD-MCNC: 12.4 G/DL (ref 12–16)
IMM GRANULOCYTES # BLD AUTO: 0.02 K/UL (ref 0–0.04)
IMM GRANULOCYTES NFR BLD AUTO: 0.4 % (ref 0–0.5)
LYMPHOCYTES # BLD AUTO: 0.7 K/UL (ref 1–4.8)
LYMPHOCYTES NFR BLD: 14 % (ref 18–48)
MAGNESIUM SERPL-MCNC: 2.6 MG/DL (ref 1.6–2.6)
MCH RBC QN AUTO: 32.5 PG (ref 27–31)
MCHC RBC AUTO-ENTMCNC: 32 G/DL (ref 32–36)
MCV RBC AUTO: 102 FL (ref 82–98)
MONOCYTES # BLD AUTO: 0.5 K/UL (ref 0.3–1)
MONOCYTES NFR BLD: 9 % (ref 4–15)
NEUTROPHILS # BLD AUTO: 3.8 K/UL (ref 1.8–7.7)
NEUTROPHILS NFR BLD: 73.8 % (ref 38–73)
NRBC BLD-RTO: 0 /100 WBC
PLATELET # BLD AUTO: 172 K/UL (ref 150–450)
PMV BLD AUTO: 11.2 FL (ref 9.2–12.9)
POTASSIUM SERPL-SCNC: 5.5 MMOL/L (ref 3.5–5.1)
PROT SERPL-MCNC: 7 G/DL (ref 6–8.4)
RBC # BLD AUTO: 3.82 M/UL (ref 4–5.4)
SARS-COV-2 RDRP RESP QL NAA+PROBE: NEGATIVE
SODIUM SERPL-SCNC: 135 MMOL/L (ref 136–145)
TROPONIN I SERPL DL<=0.01 NG/ML-MCNC: 0.06 NG/ML
WBC # BLD AUTO: 5.2 K/UL (ref 3.9–12.7)

## 2022-01-02 PROCEDURE — 84484 ASSAY OF TROPONIN QUANT: CPT | Performed by: EMERGENCY MEDICINE

## 2022-01-02 PROCEDURE — 82962 GLUCOSE BLOOD TEST: CPT

## 2022-01-02 PROCEDURE — 80053 COMPREHEN METABOLIC PANEL: CPT | Performed by: EMERGENCY MEDICINE

## 2022-01-02 PROCEDURE — 99900031 HC PATIENT EDUCATION (STAT)

## 2022-01-02 PROCEDURE — 85025 COMPLETE CBC W/AUTO DIFF WBC: CPT | Performed by: EMERGENCY MEDICINE

## 2022-01-02 PROCEDURE — 83735 ASSAY OF MAGNESIUM: CPT | Performed by: EMERGENCY MEDICINE

## 2022-01-02 PROCEDURE — 25000003 PHARM REV CODE 250: Performed by: EMERGENCY MEDICINE

## 2022-01-02 PROCEDURE — 99900035 HC TECH TIME PER 15 MIN (STAT)

## 2022-01-02 PROCEDURE — U0002 COVID-19 LAB TEST NON-CDC: HCPCS | Performed by: EMERGENCY MEDICINE

## 2022-01-02 PROCEDURE — 96375 TX/PRO/DX INJ NEW DRUG ADDON: CPT

## 2022-01-02 PROCEDURE — 96374 THER/PROPH/DIAG INJ IV PUSH: CPT

## 2022-01-02 PROCEDURE — 93010 ELECTROCARDIOGRAM REPORT: CPT | Mod: ,,, | Performed by: INTERNAL MEDICINE

## 2022-01-02 PROCEDURE — 36415 COLL VENOUS BLD VENIPUNCTURE: CPT | Performed by: EMERGENCY MEDICINE

## 2022-01-02 PROCEDURE — 25000242 PHARM REV CODE 250 ALT 637 W/ HCPCS: Performed by: EMERGENCY MEDICINE

## 2022-01-02 PROCEDURE — 93010 EKG 12-LEAD: ICD-10-PCS | Mod: ,,, | Performed by: INTERNAL MEDICINE

## 2022-01-02 PROCEDURE — 93005 ELECTROCARDIOGRAM TRACING: CPT | Performed by: INTERNAL MEDICINE

## 2022-01-02 PROCEDURE — 94640 AIRWAY INHALATION TREATMENT: CPT | Mod: 76

## 2022-01-02 PROCEDURE — 63600175 PHARM REV CODE 636 W HCPCS: Performed by: EMERGENCY MEDICINE

## 2022-01-02 PROCEDURE — 99284 EMERGENCY DEPT VISIT MOD MDM: CPT | Mod: 25

## 2022-01-02 RX ORDER — DEXTROSE 50 % IN WATER (D50W) INTRAVENOUS SYRINGE
25
Status: DISCONTINUED | OUTPATIENT
Start: 2022-01-02 | End: 2022-01-02

## 2022-01-02 RX ORDER — PREDNISONE 20 MG/1
40 TABLET ORAL DAILY
Qty: 10 TABLET | Refills: 0 | Status: SHIPPED | OUTPATIENT
Start: 2022-01-02 | End: 2022-01-07

## 2022-01-02 RX ORDER — ALBUTEROL SULFATE 0.83 MG/ML
2.5 SOLUTION RESPIRATORY (INHALATION)
Status: COMPLETED | OUTPATIENT
Start: 2022-01-02 | End: 2022-01-02

## 2022-01-02 RX ORDER — METHYLPREDNISOLONE SOD SUCC 125 MG
125 VIAL (EA) INJECTION
Status: COMPLETED | OUTPATIENT
Start: 2022-01-02 | End: 2022-01-02

## 2022-01-02 RX ORDER — SODIUM BICARBONATE 1 MEQ/ML
50 SYRINGE (ML) INTRAVENOUS
Status: COMPLETED | OUTPATIENT
Start: 2022-01-02 | End: 2022-01-02

## 2022-01-02 RX ORDER — SODIUM CHLORIDE 0.9 % (FLUSH) 0.9 %
10 SYRINGE (ML) INJECTION
Status: DISCONTINUED | OUTPATIENT
Start: 2022-01-02 | End: 2022-01-03 | Stop reason: HOSPADM

## 2022-01-02 RX ORDER — ONDANSETRON 2 MG/ML
4 INJECTION INTRAMUSCULAR; INTRAVENOUS
Status: COMPLETED | OUTPATIENT
Start: 2022-01-02 | End: 2022-01-02

## 2022-01-02 RX ORDER — ALBUTEROL SULFATE 2.5 MG/.5ML
2.5 SOLUTION RESPIRATORY (INHALATION) EVERY 4 HOURS PRN
Qty: 1 EACH | Refills: 0 | Status: SHIPPED | OUTPATIENT
Start: 2022-01-02 | End: 2022-02-01

## 2022-01-02 RX ORDER — ONDANSETRON 4 MG/1
4 TABLET, FILM COATED ORAL EVERY 8 HOURS PRN
COMMUNITY
End: 2022-01-02 | Stop reason: CLARIF

## 2022-01-02 RX ORDER — IPRATROPIUM BROMIDE AND ALBUTEROL SULFATE 2.5; .5 MG/3ML; MG/3ML
3 SOLUTION RESPIRATORY (INHALATION)
Status: COMPLETED | OUTPATIENT
Start: 2022-01-02 | End: 2022-01-02

## 2022-01-02 RX ADMIN — METHYLPREDNISOLONE SODIUM SUCCINATE 125 MG: 125 INJECTION, POWDER, FOR SOLUTION INTRAMUSCULAR; INTRAVENOUS at 07:01

## 2022-01-02 RX ADMIN — SODIUM BICARBONATE 50 MEQ: 84 INJECTION, SOLUTION INTRAVENOUS at 09:01

## 2022-01-02 RX ADMIN — ALBUTEROL SULFATE 2.5 MG: 2.5 SOLUTION RESPIRATORY (INHALATION) at 09:01

## 2022-01-02 RX ADMIN — CALCIUM GLUCONATE 1 G: 98 INJECTION, SOLUTION INTRAVENOUS at 09:01

## 2022-01-02 RX ADMIN — IPRATROPIUM BROMIDE AND ALBUTEROL SULFATE 3 ML: .5; 3 SOLUTION RESPIRATORY (INHALATION) at 07:01

## 2022-01-02 RX ADMIN — ONDANSETRON 4 MG: 2 INJECTION INTRAMUSCULAR; INTRAVENOUS at 09:01
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
## 2022-01-03 NOTE — CARE UPDATE
01/02/22 1915   Patient Assessment/Suction   Level of Consciousness (AVPU) alert   Respiratory Effort Normal   Expansion/Accessory Muscles/Retractions no use of accessory muscles   All Lung Fields Breath Sounds diminished   Rhythm/Pattern, Respiratory unlabored   Cough Frequency no cough   PRE-TX-O2   O2 Device (Oxygen Therapy) room air   SpO2 100 %   Pulse Oximetry Type Continuous   Pulse 87   Resp 18   Aerosol Therapy   $ Aerosol Therapy Charges Aerosol Treatment   Daily Review of Necessity (SVN) completed   Respiratory Treatment Status (SVN) given   Treatment Route (SVN) mask   Patient Position (SVN) semi-Verma's   Post Treatment Assessment (SVN) breath sounds unchanged;vital signs unchanged   Signs of Intolerance (SVN) none   Education   $ Education Bronchodilator;15 min   Respiratory Evaluation   $ Care Plan Tech Time 15 min

## 2022-01-11 ENCOUNTER — TELEPHONE (OUTPATIENT)
Dept: TRANSPLANT | Facility: CLINIC | Age: 67
End: 2022-01-11
Payer: COMMERCIAL

## 2022-01-11 NOTE — TELEPHONE ENCOUNTER
----- Message from Rolo Vance sent at 1/10/2022 10:52 AM CST -----  Regarding: call back  Pt call in regards to asking some question about Endoscopy requesting call back    Call

## 2022-01-11 NOTE — TELEPHONE ENCOUNTER
Returned pt's call. Pt stated she got a msg from endoscopy and wanted to know why? This Rn asked if they left a # and reason and she stated no. The pt had her colonoscopy in Dec in [facility] and stated she is not aware she needs anything else related to endoscopy. This Rn couldn't see anything right off either but gave her endoscopy # in [place] to call to inquire. In the meantime the pt is approved pending.The pt also stated she will call back next week to talk to her coordinator about status.

## 2022-01-15 ENCOUNTER — HOSPITAL ENCOUNTER (EMERGENCY)
Facility: HOSPITAL | Age: 67
Discharge: HOME | End: 2022-01-16
Attending: EMERGENCY MEDICINE
Payer: COMMERCIAL

## 2022-01-15 DIAGNOSIS — R06.02 SHORTNESS OF BREATH: ICD-10-CM

## 2022-01-15 DIAGNOSIS — R05.9 COUGH: ICD-10-CM

## 2022-01-15 DIAGNOSIS — R53.81 MALAISE: ICD-10-CM

## 2022-01-15 DIAGNOSIS — R42 DIZZINESS: ICD-10-CM

## 2022-01-15 DIAGNOSIS — U07.1 COVID-19: Primary | ICD-10-CM

## 2022-01-15 LAB
ALBUMIN SERPL BCP-MCNC: 3.4 G/DL (ref 3.5–5.2)
ALP SERPL-CCNC: 149 U/L (ref 55–135)
ALT SERPL W/O P-5'-P-CCNC: 90 U/L (ref 10–44)
ANION GAP SERPL CALC-SCNC: 15 MMOL/L (ref 8–16)
AST SERPL-CCNC: 76 U/L (ref 10–40)
BASOPHILS # BLD AUTO: 0.02 K/UL (ref 0–0.2)
BASOPHILS NFR BLD: 0.4 % (ref 0–1.9)
BILIRUB SERPL-MCNC: 1 MG/DL (ref 0.1–1)
BNP SERPL-MCNC: >4500 PG/ML (ref 0–99)
BUN SERPL-MCNC: 56 MG/DL (ref 8–23)
CALCIUM SERPL-MCNC: 8.4 MG/DL (ref 8.7–10.5)
CHLORIDE SERPL-SCNC: 87 MMOL/L (ref 95–110)
CO2 SERPL-SCNC: 30 MMOL/L (ref 23–29)
CREAT SERPL-MCNC: 6.3 MG/DL (ref 0.5–1.4)
D DIMER PPP IA.FEU-MCNC: 0.92 UG/ML FEU
DIFFERENTIAL METHOD: ABNORMAL
EOSINOPHIL # BLD AUTO: 0 K/UL (ref 0–0.5)
EOSINOPHIL NFR BLD: 0.6 % (ref 0–8)
ERYTHROCYTE [DISTWIDTH] IN BLOOD BY AUTOMATED COUNT: 13.1 % (ref 11.5–14.5)
EST. GFR  (AFRICAN AMERICAN): 7.3 ML/MIN/1.73 M^2
EST. GFR  (NON AFRICAN AMERICAN): 6.4 ML/MIN/1.73 M^2
GLUCOSE SERPL-MCNC: 116 MG/DL (ref 70–110)
HCT VFR BLD AUTO: 38.8 % (ref 37–48.5)
HGB BLD-MCNC: 12.2 G/DL (ref 12–16)
IMM GRANULOCYTES # BLD AUTO: 0.05 K/UL (ref 0–0.04)
IMM GRANULOCYTES NFR BLD AUTO: 1 % (ref 0–0.5)
INFLUENZA A, MOLECULAR: NEGATIVE
INFLUENZA B, MOLECULAR: NEGATIVE
LACTATE SERPL-SCNC: 1.5 MMOL/L (ref 0.5–1.9)
LYMPHOCYTES # BLD AUTO: 0.4 K/UL (ref 1–4.8)
LYMPHOCYTES NFR BLD: 7.6 % (ref 18–48)
MAGNESIUM SERPL-MCNC: 2.4 MG/DL (ref 1.6–2.6)
MCH RBC QN AUTO: 31.9 PG (ref 27–31)
MCHC RBC AUTO-ENTMCNC: 31.4 G/DL (ref 32–36)
MCV RBC AUTO: 102 FL (ref 82–98)
MONOCYTES # BLD AUTO: 0.2 K/UL (ref 0.3–1)
MONOCYTES NFR BLD: 4 % (ref 4–15)
NEUTROPHILS # BLD AUTO: 4.5 K/UL (ref 1.8–7.7)
NEUTROPHILS NFR BLD: 86.4 % (ref 38–73)
NRBC BLD-RTO: 0 /100 WBC
PHOSPHATE SERPL-MCNC: 4.6 MG/DL (ref 2.7–4.5)
PLATELET # BLD AUTO: 117 K/UL (ref 150–450)
PMV BLD AUTO: 12 FL (ref 9.2–12.9)
POTASSIUM SERPL-SCNC: 5.3 MMOL/L (ref 3.5–5.1)
PROT SERPL-MCNC: 5.9 G/DL (ref 6–8.4)
RBC # BLD AUTO: 3.82 M/UL (ref 4–5.4)
SARS-COV-2 RDRP RESP QL NAA+PROBE: POSITIVE
SODIUM SERPL-SCNC: 132 MMOL/L (ref 136–145)
SPECIMEN SOURCE: NORMAL
TROPONIN I SERPL DL<=0.01 NG/ML-MCNC: 0.08 NG/ML
TSH SERPL DL<=0.005 MIU/L-ACNC: 1.92 UIU/ML (ref 0.34–5.6)
WBC # BLD AUTO: 5.24 K/UL (ref 3.9–12.7)

## 2022-01-15 PROCEDURE — 87502 INFLUENZA DNA AMP PROBE: CPT | Performed by: STUDENT IN AN ORGANIZED HEALTH CARE EDUCATION/TRAINING PROGRAM

## 2022-01-15 PROCEDURE — 85379 FIBRIN DEGRADATION QUANT: CPT | Performed by: STUDENT IN AN ORGANIZED HEALTH CARE EDUCATION/TRAINING PROGRAM

## 2022-01-15 PROCEDURE — 84443 ASSAY THYROID STIM HORMONE: CPT | Performed by: STUDENT IN AN ORGANIZED HEALTH CARE EDUCATION/TRAINING PROGRAM

## 2022-01-15 PROCEDURE — 99284 EMERGENCY DEPT VISIT MOD MDM: CPT | Mod: 25

## 2022-01-15 PROCEDURE — 84484 ASSAY OF TROPONIN QUANT: CPT | Performed by: STUDENT IN AN ORGANIZED HEALTH CARE EDUCATION/TRAINING PROGRAM

## 2022-01-15 PROCEDURE — 83605 ASSAY OF LACTIC ACID: CPT | Performed by: STUDENT IN AN ORGANIZED HEALTH CARE EDUCATION/TRAINING PROGRAM

## 2022-01-15 PROCEDURE — 85025 COMPLETE CBC W/AUTO DIFF WBC: CPT | Performed by: STUDENT IN AN ORGANIZED HEALTH CARE EDUCATION/TRAINING PROGRAM

## 2022-01-15 PROCEDURE — 83880 ASSAY OF NATRIURETIC PEPTIDE: CPT | Performed by: STUDENT IN AN ORGANIZED HEALTH CARE EDUCATION/TRAINING PROGRAM

## 2022-01-15 PROCEDURE — 25000003 PHARM REV CODE 250: Performed by: STUDENT IN AN ORGANIZED HEALTH CARE EDUCATION/TRAINING PROGRAM

## 2022-01-15 PROCEDURE — 83735 ASSAY OF MAGNESIUM: CPT | Performed by: STUDENT IN AN ORGANIZED HEALTH CARE EDUCATION/TRAINING PROGRAM

## 2022-01-15 PROCEDURE — 93010 EKG 12-LEAD: ICD-10-PCS | Mod: ,,, | Performed by: SPECIALIST

## 2022-01-15 PROCEDURE — 93010 ELECTROCARDIOGRAM REPORT: CPT | Mod: ,,, | Performed by: SPECIALIST

## 2022-01-15 PROCEDURE — U0002 COVID-19 LAB TEST NON-CDC: HCPCS | Performed by: STUDENT IN AN ORGANIZED HEALTH CARE EDUCATION/TRAINING PROGRAM

## 2022-01-15 PROCEDURE — 93005 ELECTROCARDIOGRAM TRACING: CPT | Performed by: SPECIALIST

## 2022-01-15 PROCEDURE — 84100 ASSAY OF PHOSPHORUS: CPT | Performed by: STUDENT IN AN ORGANIZED HEALTH CARE EDUCATION/TRAINING PROGRAM

## 2022-01-15 PROCEDURE — 80053 COMPREHEN METABOLIC PANEL: CPT | Performed by: STUDENT IN AN ORGANIZED HEALTH CARE EDUCATION/TRAINING PROGRAM

## 2022-01-15 RX ORDER — ACETAMINOPHEN 325 MG/1
650 TABLET ORAL
Status: COMPLETED | OUTPATIENT
Start: 2022-01-15 | End: 2022-01-15

## 2022-01-15 RX ADMIN — ACETAMINOPHEN 650 MG: 325 TABLET, FILM COATED ORAL at 09:01

## 2022-01-16 VITALS
BODY MASS INDEX: 21.34 KG/M2 | WEIGHT: 125 LBS | OXYGEN SATURATION: 97 % | RESPIRATION RATE: 20 BRPM | HEART RATE: 80 BPM | DIASTOLIC BLOOD PRESSURE: 83 MMHG | SYSTOLIC BLOOD PRESSURE: 133 MMHG | HEIGHT: 64 IN | TEMPERATURE: 98 F

## 2022-01-16 DIAGNOSIS — U07.1 COVID-19 VIRUS DETECTED: ICD-10-CM

## 2022-01-16 PROCEDURE — 25000003 PHARM REV CODE 250: Performed by: STUDENT IN AN ORGANIZED HEALTH CARE EDUCATION/TRAINING PROGRAM

## 2022-01-16 RX ADMIN — SODIUM ZIRCONIUM CYCLOSILICATE 10 G: 10 POWDER, FOR SUSPENSION ORAL at 12:01

## 2022-01-16 NOTE — DISCHARGE INSTRUCTIONS
Please return to the ER for any worsening or concerning symptoms such as but not limited to: increased shortness of breath, intractable cough, weakness, syncope, severe fatigue, etc.  Take tylenol for symptom relief.

## 2022-01-16 NOTE — ED PROVIDER NOTES
"Encounter Date: 1/15/2022       History     Chief Complaint   Patient presents with    Back Pain     Mid back pain for few days    Cough    Shortness of Breath     Intermittent SOB due to COPD    Fatigue     Not sleeping well    Dizziness     HPI     Ms. Latricia Higgins is a 67 y/o F with a PMH of ESRD (dialysis MWF), HTN, hepatitis, who has presented for generalized malaise. Patient reports symptoms began on 1/2. She was seen in the ER at that time with negative work-up. Patient says she has not felt herself since. She describes body aches, particularly back pain, a "wet cough" that is non productive, fatigue, difficulty sleeping, and generalized weakness. She denies any fever or chills. She endorses nausea but denies any vomiting or diarrhea or abdominal pain. Endorses headache. Patient received dialysis on Friday without any difficulty. She does endorse lower extremity edema that "comes and goes." Denies any chest pain. All other ROS negative.     Review of patient's allergies indicates:  No Known Allergies  Past Medical History:   Diagnosis Date    Dialysis patient 10/05/2020    Emphysema of lung     ESRD (end stage renal disease)     Hepatitis 1980    HLD (hyperlipidemia)     Hypertension     Renal disorder      Past Surgical History:   Procedure Laterality Date    AORTOGRAPHY WITH SERIALOGRAPHY N/A 9/30/2020    Procedure: co2 renal angio;  Surgeon: Lance Bustamante MD;  Location: [facility] CATH/EP LAB;  Service: Vascular;  Laterality: N/A;    APPENDECTOMY      AV FISTULA PLACEMENT Right 12/9/2020    Procedure: CREATION, AV FISTULA;  Surgeon: Lance Bustamante MD;  Location: [facility] OR;  Service: General;  Laterality: Right;    COLONOSCOPY N/A 6/8/2021    Procedure: COLONOSCOPY;  Surgeon: Avila Smith III, MD;  Location: [facility] ENDO;  Service: Endoscopy;  Laterality: N/A;    COLONOSCOPY N/A 12/14/2021    Procedure: COLONOSCOPY;  Surgeon: Avila Smith III, MD;  Location: [facility] ENDO;  Service: " Endoscopy;  Laterality: N/A;    COLONOSCOPY W/ POLYPECTOMY  2021    INSERTION OF TUNNELED CENTRAL VENOUS HEMODIALYSIS CATHETER  10/2/2020    Procedure: INSERTION, CATHETER, CENTRAL VENOUS, HEMODIALYSIS, TUNNELED;  Surgeon: Ali Khoobehi, MD;  Location: [facility] OR;  Service: Vascular;;    PLACEMENT OF DIALYSIS ACCESS Right 2020    Procedure: Insertion, Dialysis Access;  Surgeon: Lance Bustamante MD;  Location: [facility] CATH/EP LAB;  Service: Vascular;  Laterality: Right;    REMOVAL OF TUNNELED CENTRAL VENOUS CATHETER (CVC) N/A 2020    Procedure: REMOVAL, CATHETER, CENTRAL VENOUS, TUNNELED;  Surgeon: Ali Khoobehi, MD;  Location: [facility] OR;  Service: Vascular;  Laterality: N/A;    TONSILLECTOMY      TUBAL LIGATION       Family History   Problem Relation Age of Onset    Cancer Mother     Heart disease Father      Social History     Tobacco Use    Smoking status: Former Smoker     Packs/day: 0.25     Years: 20.00     Pack years: 5.00     Types: Cigarettes     Quit date: 2020     Years since quittin.3    Smokeless tobacco: Never Used   Substance Use Topics    Alcohol use: Not Currently    Drug use: Not Currently     Review of Systems   Constitutional: Positive for activity change and fatigue. Negative for appetite change, chills and fever.   HENT: Negative for congestion, rhinorrhea, sneezing and sore throat.    Eyes: Negative for photophobia, redness and visual disturbance.   Respiratory: Positive for cough and shortness of breath. Negative for wheezing.    Cardiovascular: Positive for leg swelling. Negative for chest pain.   Gastrointestinal: Positive for nausea. Negative for abdominal pain, diarrhea and vomiting.   Genitourinary: Negative for frequency, hematuria, urgency and vaginal bleeding.   Musculoskeletal: Negative for arthralgias, back pain and myalgias.   Skin: Negative for color change, pallor and wound.   Neurological: Positive for weakness and headaches. Negative for dizziness and  numbness.   Hematological: Does not bruise/bleed easily.   Psychiatric/Behavioral: Negative for agitation, behavioral problems and confusion.   All other systems reviewed and are negative.      Physical Exam     Initial Vitals [01/15/22 1904]   BP Pulse Resp Temp SpO2   137/86 90 20 97.9 °F (36.6 °C) 99 %      MAP       --         Physical Exam    Nursing note and vitals reviewed.  Constitutional: She appears well-developed and well-nourished. She is not diaphoretic. She appears cachectic. No distress.   66 year old female, thin, chronically ill appearing, alert and oriented x3, speaking in full sentences   HENT:   Head: Normocephalic and atraumatic.   Right Ear: External ear normal.   Left Ear: External ear normal.   Nose: Nose normal.   Mouth/Throat: Oropharynx is clear and moist.   Eyes: Conjunctivae and EOM are normal. Pupils are equal, round, and reactive to light.   Neck: Neck supple. No tracheal deviation present.   Normal range of motion.  Cardiovascular: Normal rate, regular rhythm, normal heart sounds and intact distal pulses.   No murmur heard.  Pulmonary/Chest: Breath sounds normal. No stridor. No respiratory distress. She has no wheezes. She exhibits no tenderness.   Abdominal: Abdomen is soft. Bowel sounds are normal. She exhibits no distension. There is no abdominal tenderness. There is no guarding.   Musculoskeletal:         General: Edema present. No tenderness. Normal range of motion.      Cervical back: Normal range of motion and neck supple.      Comments: Bilateral lower extremity edema  Right arm AV fistula, palpable thrill     Neurological: She is alert and oriented to person, place, and time. She has normal strength. GCS score is 15. GCS eye subscore is 4. GCS verbal subscore is 5. GCS motor subscore is 6.   Skin: Skin is warm and dry. Capillary refill takes less than 2 seconds. No erythema. No pallor.   Psychiatric: She has a normal mood and affect. Thought content normal.         ED Course    Procedures  Labs Reviewed   CBC W/ AUTO DIFFERENTIAL - Abnormal; Notable for the following components:       Result Value    RBC 3.82 (*)      (*)     MCH 31.9 (*)     MCHC 31.4 (*)     Platelets 117 (*)     Immature Granulocytes 1.0 (*)     Immature Grans (Abs) 0.05 (*)     Lymph # 0.4 (*)     Mono # 0.2 (*)     Gran % 86.4 (*)     Lymph % 7.6 (*)     All other components within normal limits   COMPREHENSIVE METABOLIC PANEL - Abnormal; Notable for the following components:    Sodium 132 (*)     Potassium 5.3 (*)     Chloride 87 (*)     CO2 30 (*)     Glucose 116 (*)     BUN 56 (*)     Creatinine 6.3 (*)     Calcium 8.4 (*)     Total Protein 5.9 (*)     Albumin 3.4 (*)     Alkaline Phosphatase 149 (*)     AST 76 (*)     ALT 90 (*)     eGFR if  7.3 (*)     eGFR if non  6.4 (*)     All other components within normal limits   PHOSPHORUS - Abnormal; Notable for the following components:    Phosphorus 4.6 (*)     All other components within normal limits   B-TYPE NATRIURETIC PEPTIDE - Abnormal; Notable for the following components:    BNP >4,500 (*)     All other components within normal limits   TROPONIN I - Abnormal; Notable for the following components:    Troponin I 0.076 (*)     All other components within normal limits   SARS-COV-2 RNA AMPLIFICATION, QUAL - Abnormal; Notable for the following components:    SARS-CoV-2 RNA, Amplification, Qual Positive (*)     All other components within normal limits   D DIMER, QUANTITATIVE - Abnormal; Notable for the following components:    D-Dimer 0.92 (*)     All other components within normal limits    Narrative:     DDimer critical result(s) called and verbal readback obtained from   Malvin Gamino RN (ER) by SW1 01/15/2022 23:08   LACTIC ACID, PLASMA   MAGNESIUM   INFLUENZA A AND B ANTIGEN    Narrative:     Specimen Source->Nasopharyngeal Swab   TSH          Imaging Results          X-Ray Chest PA And Lateral (In process)                   Medications   sodium zirconium cyclosilicate packet 10 g (has no administration in time range)   acetaminophen tablet 650 mg (650 mg Oral Given 1/15/22 2106)     Medical Decision Making:   History:   Old Medical Records: I decided to obtain old medical records.  Old Records Summarized: records from clinic visits and records from previous admission(s).  Initial Assessment:   65 y/o F with a PMH of ESRD (dialysis MWF), HTN, hepatitis, who has presented for generalized malaise, weakness, cough, shortness of breath, body aches, headache for 10+ days. Patient is afebrile on arrival, stable vital signs. Cachectic 66 year old female, alert and oriented, not in distress. Speaking in full sentences. Clear breath sounds bilaterally. Bilateral lower extremity edema. Right arm AV fistula with palpable thrill. Otherwise benign exam.   Differential Diagnosis:   Covid, ESRD fluid overload, metabolic derangement, pneumonia, bronchitis, influenza  Clinical Tests:   Lab Tests: Ordered and Reviewed  Radiological Study: Ordered and Reviewed  Medical Tests: Ordered and Reviewed  ED Management:  Patient's lab results are consistent with her baseline. She has mildly elevated potassium 5.3, creatinine 6.3, BNP >4500, troponin elevated.076 (chronically elevated). Lactate is normal. Covid is positive. Patient's D-dimer is also positive but likely explained by Covid as well as ESRD. Patient remains 100% on room air. Her CXR does not show pneumonia or fluid overload. Discussed with Dr. Barnes her nephrologist. Together decided that patient is stable to be discharged at this time. However will give dose of Lokelma prior to discharge for her hyperkalemia. She will be seen on Monday for her regularly scheduled dialysis. She can return to the ER for any worsening symptoms. Do not feel CTA is warranted given patient is not hypoxic nor tachycardic, and BP is normotensive. Patient is pending dose of Lokelma and then to be discharged. Strict  return precautions discussed. We will refer her to the monoclonal antibody infusion center.    Toña Davis MD  PGY3 [employer] EM                      Clinical Impression:   Final diagnoses:  [R42] Dizziness  [R05.9] Cough  [U07.1] COVID-19 (Primary)  [R06.02] Shortness of breath  [R53.81] Malaise          ED Disposition Condition    Discharge Stable        ED Prescriptions     None        Follow-up Information     Follow up With Specialties Details Why Contact Info Additional Information    [facility] - Emergency Dept Emergency Medicine Go to  As needed, If symptoms worsen [address] [facility] [id number] [phone] 1st floor    Ralf Ham MD Hospitalist Schedule an appointment as soon as possible for a visit  For follow-up appointment, As needed [id number] [facility] [address] [phone]              Toña Davis MD  Resident  01/15/22 8754

## 2022-02-02 ENCOUNTER — TELEPHONE (OUTPATIENT)
Dept: TRANSPLANT | Facility: CLINIC | Age: 67
End: 2022-02-02
Payer: COMMERCIAL

## 2022-02-02 ENCOUNTER — PATIENT MESSAGE (OUTPATIENT)
Dept: TRANSPLANT | Facility: CLINIC | Age: 67
End: 2022-02-02
Payer: COMMERCIAL

## 2022-02-02 DIAGNOSIS — Z01.810 PREOP CARDIOVASCULAR EXAM: ICD-10-CM

## 2022-02-02 DIAGNOSIS — Z99.2 ESRD ON HEMODIALYSIS: ICD-10-CM

## 2022-02-02 DIAGNOSIS — N18.6 ESRD ON HEMODIALYSIS: ICD-10-CM

## 2022-02-02 DIAGNOSIS — Z01.818 PRE-TRANSPLANT EVALUATION FOR KIDNEY TRANSPLANT: Primary | ICD-10-CM

## 2022-02-02 NOTE — TELEPHONE ENCOUNTER
Spoke to patient and verified that colonoscopy was completed and normal. Cardiology follow up scheduled for 2/15/2022. If cleared by cardiology can move to list patient once financial clearance obtained.

## 2022-02-02 NOTE — TELEPHONE ENCOUNTER
----- Message from Elie Aguilar MD sent at 12/1/2021  9:21 AM CST -----  Regarding: RE: follow up kidney transplant  Thank you!    ----- Message -----  From: Dago Keita RN  Sent: 12/1/2021   7:55 AM CST  To: Elie Aguilar MD  Subject: RE: follow up kidney transplant                  Ok. We will get her scheduled.    Thanks,  Dago   ----- Message -----  From: Elie Aguilar MD  Sent: 11/26/2021  12:24 PM CST  To: Dago Keita RN  Subject: RE: follow up kidney transplant                  Would need to assess Ms. Higgins in clinic. Hard to make a determination of clinical significance without a visit. Thank you.    Yo  ----- Message -----  From: Dago Keita RN  Sent: 11/26/2021  12:00 PM CST  To: Elie Aguilar MD  Subject: follow up kidney transplant                      Hi Dr. Aguilar,   This patient was seen by you on 4/13/21 to obtain cardiac clearance for kidney transplant. She was presented to kidney transplant committee on 11/22/21. There were some concerns regarding the decrease in the patient's EF that showed on her previous cardiac testing.    ECHO 9/29/20 EF 70%  ECHO 7/20/21 EF 45%    Do you think the decrease in patient's EF is concerning?    Thanks for your time,  Dago Keita RN  Kidney Transplant Coordinator

## 2022-02-08 ENCOUNTER — TELEPHONE (OUTPATIENT)
Dept: PULMONOLOGY | Facility: CLINIC | Age: 67
End: 2022-02-08
Payer: COMMERCIAL

## 2022-02-08 DIAGNOSIS — R91.8 PULMONARY NODULES: Primary | ICD-10-CM

## 2022-02-14 ENCOUNTER — TELEPHONE (OUTPATIENT)
Dept: PULMONOLOGY | Facility: CLINIC | Age: 67
End: 2022-02-14
Payer: COMMERCIAL

## 2022-02-14 NOTE — TELEPHONE ENCOUNTER
Call [address] for appeared appear.  The person states that it is on hold because they have not received clinicals.  Will fax clinicals.

## 2022-02-15 ENCOUNTER — OFFICE VISIT (OUTPATIENT)
Dept: CARDIOLOGY | Facility: CLINIC | Age: 67
End: 2022-02-15
Payer: MEDICARE

## 2022-02-15 ENCOUNTER — PATIENT MESSAGE (OUTPATIENT)
Dept: TRANSPLANT | Facility: CLINIC | Age: 67
End: 2022-02-15
Payer: COMMERCIAL

## 2022-02-15 VITALS
SYSTOLIC BLOOD PRESSURE: 135 MMHG | BODY MASS INDEX: 20.47 KG/M2 | WEIGHT: 119.94 LBS | HEART RATE: 88 BPM | HEIGHT: 64 IN | DIASTOLIC BLOOD PRESSURE: 81 MMHG

## 2022-02-15 DIAGNOSIS — Z99.2 ESRD ON HEMODIALYSIS: ICD-10-CM

## 2022-02-15 DIAGNOSIS — Z01.818 PRE-TRANSPLANT EVALUATION FOR KIDNEY TRANSPLANT: ICD-10-CM

## 2022-02-15 DIAGNOSIS — Z01.810 PREOP CARDIOVASCULAR EXAM: ICD-10-CM

## 2022-02-15 DIAGNOSIS — N18.6 ESRD ON HEMODIALYSIS: ICD-10-CM

## 2022-02-15 PROCEDURE — 99213 PR OFFICE/OUTPT VISIT, EST, LEVL III, 20-29 MIN: ICD-10-PCS | Mod: S$PBB,GC,TXP, | Performed by: INTERNAL MEDICINE

## 2022-02-15 PROCEDURE — 99215 OFFICE O/P EST HI 40 MIN: CPT | Mod: PBBFAC,TXP | Performed by: INTERNAL MEDICINE

## 2022-02-15 PROCEDURE — 99999 PR PBB SHADOW E&M-EST. PATIENT-LVL V: ICD-10-PCS | Mod: PBBFAC,GC,TXP, | Performed by: INTERNAL MEDICINE

## 2022-02-15 PROCEDURE — 99999 PR PBB SHADOW E&M-EST. PATIENT-LVL V: CPT | Mod: PBBFAC,GC,TXP, | Performed by: INTERNAL MEDICINE

## 2022-02-15 PROCEDURE — 99213 OFFICE O/P EST LOW 20 MIN: CPT | Mod: S$PBB,GC,TXP, | Performed by: INTERNAL MEDICINE

## 2022-02-15 NOTE — PROGRESS NOTES
PCP - Ralf Ham MD  Subjective:     Latricia Higgins is a 66 y.o. y.o. female    Problems  HTN  HLD, LDL 42  History of DVT on OAC  Aortic atherosclerosis  ESRD on HD 10/2020  Emphysema    Seen in cardiology clinic 1 year prior for pre-kidney evaluation. Since that time, she was admitted 5/2021 with volume overload and underwent emergent HD. She was seen in the ED 1 month prior with malaise and negative workup. Today, she denies CP, SOB, orthopnea, PND, lower extremity edema. On OAC for DVT and hypercoagulable disorders. Denies bleeding.     ---------------  Cardiac Studies ---------------    7-2021 TTE  · The estimated PA systolic pressure is 33 mmHg.  · The left ventricle is normal in size with mild eccentric hypertrophy and mildly decreased systolic function.  · The estimated ejection fraction is 45%.  · Grade I left ventricular diastolic dysfunction.  · Normal right ventricular size with normal right ventricular systolic function.  · Moderate left atrial enlargement.  · Mild-to-moderate mitral regurgitation.  · There is left ventricular global hypokinesis.  · Mild to moderate tricuspid regurgitation.        5-2021 Cardiac PET Stress    The relative PET images are normal showing no clinically significant regional resting or stress induced perfusion defects.    The whole heart absolute myocardial perfusion values averaged 1.13 cc/min/g at rest, which is elevated; 1.72 cc/min/g at stress, which is mildly reduced; and CFR is 1.53 , which is moderately reduced in part due to elevated resting flow.    The gated perfusion images showed an ejection fraction of 24% at rest and 30% during stress. A normal ejection fraction is greater than 51%.    There is moderate global hypokinesis at rest and during stress.    The LV cavity size is moderately enlarged at rest and stress.    The EKG portion of this study is negative for ischemia.    During stress, rare PACs and frequent PVCs are noted.    The patient  reported no chest pain during the stress test.    There are no prior studies for comparison.      History:     Social History     Tobacco Use    Smoking status: Former Smoker     Packs/day: 0.25     Years: 20.00     Pack years: 5.00     Types: Cigarettes     Quit date: 2020     Years since quittin.3    Smokeless tobacco: Never Used   Substance Use Topics    Alcohol use: Not Currently     Family History   Problem Relation Age of Onset    Cancer Mother     Heart disease Father        Meds:   Review of patient's allergies indicates:  No Known Allergies    Current Outpatient Medications:     acetaminophen (TYLENOL) 325 MG tablet, Take 325 mg by mouth every 6 (six) hours as needed for Pain., Disp: , Rfl:     albuterol (PROVENTIL/VENTOLIN HFA) 90 mcg/actuation inhaler, Inhale 1-2 puffs into the lungs every 6 (six) hours as needed for Wheezing. Rescue, Disp: 18 g, Rfl: 1    amLODIPine (NORVASC) 10 MG tablet, Take 5 mg by mouth 2 (two) times daily., Disp: , Rfl:     apixaban (ELIQUIS) 5 mg Tab, Take 1 tablet (5 mg total) by mouth 2 (two) times daily., Disp: 60 tablet, Rfl: 6    aspirin (ECOTRIN) 81 MG EC tablet, Take 81 mg by mouth once daily., Disp: , Rfl:     atorvastatin (LIPITOR) 40 MG tablet, Take 40 mg by mouth 2 (two) times a day., Disp: , Rfl:     carvediloL (COREG) 3.125 MG tablet, Take 1 tablet (3.125 mg total) by mouth 2 (two) times daily., Disp: 60 tablet, Rfl: 11    LIDOcaine-prilocaine (EMLA) cream, APPLY TO FISTULA 1 HOUR PRIOR TO DIALYSIS TREATMENT 3 TIMES PER WEEK, Disp: , Rfl:     ondansetron (ZOFRAN-ODT) 4 MG TbDL, Take 4 mg by mouth every 6 (six) hours as needed (nausea)., Disp: , Rfl:     sevelamer carbonate (RENVELA) 800 mg Tab, Take 2,400 mg by mouth 3 (three) times daily with meals. , Disp: , Rfl:     sodium zirconium cyclosilicate (LOKELMA) 10 gram packet, Take 1 packet by mouth once daily. Mix all of packet into glass with >3 tablespoons of water. Stir well & drink  immediately. If powder remains in the glass, add water & repeat until no powder remains to ensure all is taken., Disp: 30 packet, Rfl: 0    umeclidinium-vilanteroL (ANORO ELLIPTA) 62.5-25 mcg/actuation DsDv, Inhale 1 puff into the lungs once daily. Controller, Disp: 1 each, Rfl: 0    Constitution: Negative for fever or chills. Negative for weight loss or gain.   HENT: Negative for sore throat or headaches. Negative for rhinorrhea.  Eyes: Negative for blurred or double vision.   Cardiovascular: See above  Pulmonary: Negative for SOB. Negative for cough.   Gastrointestinal: Negative for abdominal pain. Negative for nausea/ vomiting. Negative for diarrhea.   : Negative for dysuria.   Neurological: Negative for focal weakness or sensory changes.    Objective:     Vitals:    02/15/22 0928   BP: 135/81   Pulse: 88       General: NAD. AAO. Thin  HENT: No scleral icterus. Extraocular movements intact.  Neck: No JVD  Cardiovascular: Regular heart rate and rhythm. Fistula in RUE  Respiratory: CTAB. No increased work of breathing.  Extremities: Warm. No edema.  Skin: no ulceration or wounds present    Labs:     Lab Results   Component Value Date     (L) 01/15/2022    K 5.3 (H) 01/15/2022    CL 87 (L) 01/15/2022    CO2 30 (H) 01/15/2022    BUN 56 (H) 01/15/2022    CREATININE 6.3 (H) 01/15/2022    ANIONGAP 15 01/15/2022     Lab Results   Component Value Date    HGBA1C 4.5 11/16/2020     Lab Results   Component Value Date    BNP >4,500 (H) 01/15/2022    BNP >4,500 (H) 11/08/2021    BNP >4,500 (H) 05/26/2021       Lab Results   Component Value Date    WBC 5.24 01/15/2022    HGB 12.2 01/15/2022    HCT 38.8 01/15/2022    HCT 26 (L) 05/24/2021     (L) 01/15/2022    GRAN 4.5 01/15/2022    GRAN 86.4 (H) 01/15/2022     Lab Results   Component Value Date    CHOL 121 03/23/2021    HDL 62 03/23/2021    LDLCALC 41.8 (L) 03/23/2021    TRIG 86 03/23/2021       Lab Results   Component Value Date     (L) 01/15/2022    K  5.3 (H) 01/15/2022    CL 87 (L) 01/15/2022    CO2 30 (H) 01/15/2022    BUN 56 (H) 01/15/2022    CREATININE 6.3 (H) 01/15/2022    ANIONGAP 15 01/15/2022     Lab Results   Component Value Date    HGBA1C 4.5 11/16/2020     Lab Results   Component Value Date    BNP >4,500 (H) 01/15/2022    BNP >4,500 (H) 11/08/2021    BNP >4,500 (H) 05/26/2021    Lab Results   Component Value Date    WBC 5.24 01/15/2022    HGB 12.2 01/15/2022    HCT 38.8 01/15/2022    HCT 26 (L) 05/24/2021     (L) 01/15/2022    GRAN 4.5 01/15/2022    GRAN 86.4 (H) 01/15/2022     Lab Results   Component Value Date    CHOL 121 03/23/2021    HDL 62 03/23/2021    LDLCALC 41.8 (L) 03/23/2021    TRIG 86 03/23/2021            Assessment & Plan:     RCRI score 2 - class III  with equates to a 10.1% 30-day risk of death, MI, or cardiac arrest. Patient is able to achieve >4 METs. No unstable cardiac conditions (ACS, acute decompensated heart failure, arrhythmias) that necessitates stabilization prior to surgery. Cardiac PET stress 5/2021 without ischemia. Okay to proceed with surgery without any further cardiac evaluation. If transplant team would like a stress test prior to undergoing surgery would recommend cardiac PET stress since her SPECT was a false positive last year.    Yo Aguilar M.D.  Pager #: [phone]  [facility] Fellow PGY-6

## 2022-02-21 DIAGNOSIS — I51.9 SYSTOLIC DYSFUNCTION: Primary | ICD-10-CM

## 2022-02-21 NOTE — PROGRESS NOTES
Patient recently seen in cardiology clinic for pre-kidney evaluation. Asked to comment on EF 45% on TTE from 7/2021. EF 2020 with normal EF. She had a PET stress 5/2021 (2 months prior to TTE) with no significant epicardial disease. Etiology of systolic dysfunction related to longstanding HTN (TSH wnl, no heavy EtOH use, no significant CAD). Will repeat TTE and if EF is stable no further intervention is needed. If EF is reduced will maximize GDMT >> increase Coreg and start Entresto is this cleared by nephrology and the transplant team.     Yo Aguilar MD  Cardiology  PGY-6

## 2022-02-22 ENCOUNTER — TELEPHONE (OUTPATIENT)
Dept: PULMONOLOGY | Facility: CLINIC | Age: 67
End: 2022-02-22
Payer: COMMERCIAL

## 2022-02-22 ENCOUNTER — TELEPHONE (OUTPATIENT)
Dept: TRANSPLANT | Facility: CLINIC | Age: 67
End: 2022-02-22
Payer: COMMERCIAL

## 2022-02-22 ENCOUNTER — HOSPITAL ENCOUNTER (OUTPATIENT)
Dept: RADIOLOGY | Facility: HOSPITAL | Age: 67
Discharge: HOME | End: 2022-02-22
Attending: NURSE PRACTITIONER
Payer: MEDICARE

## 2022-02-22 DIAGNOSIS — R91.8 PULMONARY NODULES: ICD-10-CM

## 2022-02-22 PROCEDURE — 71250 CT THORAX DX C-: CPT | Mod: TC,PO

## 2022-02-22 NOTE — TELEPHONE ENCOUNTER
Ct chest  Impression:  PROGRESSION OF THE SUBPLEURAL RETICULATIONS AND INTERLOBULAR SEPTAL THICKENING WITH DEVELOPMENT OF PLEURAL BASE NODULAR ARE ALVEOLAR OPACITIES WITHIN THE LUNG BASES.  DIFFERENTIAL INCLUDES EDEMA VERSUS PNEUMONIA AND FOLLOW-UP IN 6 MONTHS IS RECOMMENDED  Stable noncalcified nodules bilaterally     Mild emphysematous changes     Mildly prominent mediastinal and left sub supraclavicular lymph nodes most likely reactive     Cardiomegaly with moderate coronary artery calcification

## 2022-02-23 ENCOUNTER — OFFICE VISIT (OUTPATIENT)
Dept: PULMONOLOGY | Facility: CLINIC | Age: 67
End: 2022-02-23
Payer: COMMERCIAL

## 2022-02-23 VITALS
BODY MASS INDEX: 20.08 KG/M2 | DIASTOLIC BLOOD PRESSURE: 78 MMHG | OXYGEN SATURATION: 96 % | SYSTOLIC BLOOD PRESSURE: 115 MMHG | HEART RATE: 86 BPM | WEIGHT: 117 LBS

## 2022-02-23 DIAGNOSIS — R91.8 PULMONARY NODULES: Primary | ICD-10-CM

## 2022-02-23 DIAGNOSIS — R59.1 LYMPHADENOPATHY: ICD-10-CM

## 2022-02-23 DIAGNOSIS — J44.9 CHRONIC OBSTRUCTIVE PULMONARY DISEASE, UNSPECIFIED COPD TYPE: ICD-10-CM

## 2022-02-23 DIAGNOSIS — N18.6 ESRD (END STAGE RENAL DISEASE): ICD-10-CM

## 2022-02-23 DIAGNOSIS — J43.2 CENTRILOBULAR EMPHYSEMA: ICD-10-CM

## 2022-02-23 PROCEDURE — 1126F AMNT PAIN NOTED NONE PRSNT: CPT | Mod: S$GLB,,, | Performed by: INTERNAL MEDICINE

## 2022-02-23 PROCEDURE — 1160F RVW MEDS BY RX/DR IN RCRD: CPT | Mod: S$GLB,,, | Performed by: INTERNAL MEDICINE

## 2022-02-23 PROCEDURE — 3008F PR BODY MASS INDEX (BMI) DOCUMENTED: ICD-10-PCS | Mod: S$GLB,,, | Performed by: INTERNAL MEDICINE

## 2022-02-23 PROCEDURE — 1101F PR PT FALLS ASSESS DOC 0-1 FALLS W/OUT INJ PAST YR: ICD-10-PCS | Mod: S$GLB,,, | Performed by: INTERNAL MEDICINE

## 2022-02-23 PROCEDURE — 1159F MED LIST DOCD IN RCRD: CPT | Mod: S$GLB,,, | Performed by: INTERNAL MEDICINE

## 2022-02-23 PROCEDURE — 3008F BODY MASS INDEX DOCD: CPT | Mod: S$GLB,,, | Performed by: INTERNAL MEDICINE

## 2022-02-23 PROCEDURE — 3288F PR FALLS RISK ASSESSMENT DOCUMENTED: ICD-10-PCS | Mod: S$GLB,,, | Performed by: INTERNAL MEDICINE

## 2022-02-23 PROCEDURE — 1160F PR REVIEW ALL MEDS BY PRESCRIBER/CLIN PHARMACIST DOCUMENTED: ICD-10-PCS | Mod: S$GLB,,, | Performed by: INTERNAL MEDICINE

## 2022-02-23 PROCEDURE — 1101F PT FALLS ASSESS-DOCD LE1/YR: CPT | Mod: S$GLB,,, | Performed by: INTERNAL MEDICINE

## 2022-02-23 PROCEDURE — 99214 PR OFFICE/OUTPT VISIT, EST, LEVL IV, 30-39 MIN: ICD-10-PCS | Mod: S$GLB,,, | Performed by: INTERNAL MEDICINE

## 2022-02-23 PROCEDURE — 3074F SYST BP LT 130 MM HG: CPT | Mod: S$GLB,,, | Performed by: INTERNAL MEDICINE

## 2022-02-23 PROCEDURE — 3078F PR MOST RECENT DIASTOLIC BLOOD PRESSURE < 80 MM HG: ICD-10-PCS | Mod: S$GLB,,, | Performed by: INTERNAL MEDICINE

## 2022-02-23 PROCEDURE — 1159F PR MEDICATION LIST DOCUMENTED IN MEDICAL RECORD: ICD-10-PCS | Mod: S$GLB,,, | Performed by: INTERNAL MEDICINE

## 2022-02-23 PROCEDURE — 3288F FALL RISK ASSESSMENT DOCD: CPT | Mod: S$GLB,,, | Performed by: INTERNAL MEDICINE

## 2022-02-23 PROCEDURE — 99214 OFFICE O/P EST MOD 30 MIN: CPT | Mod: S$GLB,,, | Performed by: INTERNAL MEDICINE

## 2022-02-23 PROCEDURE — 3074F PR MOST RECENT SYSTOLIC BLOOD PRESSURE < 130 MM HG: ICD-10-PCS | Mod: S$GLB,,, | Performed by: INTERNAL MEDICINE

## 2022-02-23 PROCEDURE — 3078F DIAST BP <80 MM HG: CPT | Mod: S$GLB,,, | Performed by: INTERNAL MEDICINE

## 2022-02-23 PROCEDURE — 1126F PR PAIN SEVERITY QUANTIFIED, NO PAIN PRESENT: ICD-10-PCS | Mod: S$GLB,,, | Performed by: INTERNAL MEDICINE

## 2022-02-23 RX ORDER — PEAK FLOW METER
EACH MISCELLANEOUS
COMMUNITY
Start: 2022-01-07

## 2022-02-23 NOTE — PROGRESS NOTES
SUBJECTIVE:    Patient ID: Latricia Higgins is a 66 y.o. female.    Chief Complaint: Results (Ct results )    Follow-up    The patient is here with her CT showing progression of her infiltrates and bronchiectasis.  Reviewed CT with patient.  The importance of ruling out an infectious process prior to her considering renal transplant was explained. She had Covid in January.  Did not require hospitalization. She coughs up clear mucus.  Cough is variable.  Will proceed with bronchoscopy.    Patient is still hoping to get a renal transplant.  Past Medical History:   Diagnosis Date    Dialysis patient 10/05/2020    Emphysema of lung     ESRD (end stage renal disease)     Hepatitis 1980    HLD (hyperlipidemia)     Hypertension     Renal disorder      Past Surgical History:   Procedure Laterality Date    AORTOGRAPHY WITH SERIALOGRAPHY N/A 9/30/2020    Procedure: co2 renal angio;  Surgeon: Lance Bustamante MD;  Location: [facility] CATH/EP LAB;  Service: Vascular;  Laterality: N/A;    APPENDECTOMY      AV FISTULA PLACEMENT Right 12/9/2020    Procedure: CREATION, AV FISTULA;  Surgeon: Lance Bustamante MD;  Location: [facility] OR;  Service: General;  Laterality: Right;    COLONOSCOPY N/A 6/8/2021    Procedure: COLONOSCOPY;  Surgeon: Avila Smith III, MD;  Location: [facility] ENDO;  Service: Endoscopy;  Laterality: N/A;    COLONOSCOPY N/A 12/14/2021    Procedure: COLONOSCOPY;  Surgeon: Avila Smith III, MD;  Location: [facility] ENDO;  Service: Endoscopy;  Laterality: N/A;    COLONOSCOPY W/ POLYPECTOMY  06/08/2021    INSERTION OF TUNNELED CENTRAL VENOUS HEMODIALYSIS CATHETER  10/2/2020    Procedure: INSERTION, CATHETER, CENTRAL VENOUS, HEMODIALYSIS, TUNNELED;  Surgeon: Ali Khoobehi, MD;  Location: [facility] OR;  Service: Vascular;;    PLACEMENT OF DIALYSIS ACCESS Right 9/30/2020    Procedure: Insertion, Dialysis Access;  Surgeon: Lance Bustamante MD;  Location: [facility] CATH/EP LAB;  Service: Vascular;  Laterality: Right;     REMOVAL OF TUNNELED CENTRAL VENOUS CATHETER (CVC) N/A 12/16/2020    Procedure: REMOVAL, CATHETER, CENTRAL VENOUS, TUNNELED;  Surgeon: Ali Khoobehi, MD;  Location: [facility];  Service: Vascular;  Laterality: N/A;    TONSILLECTOMY      TUBAL LIGATION       Family History   Problem Relation Age of Onset    Cancer Mother     Heart disease Father         Social History:   Marital Status:   Occupation: retired   Alcohol History:  reports previous alcohol use.  Tobacco History:  reports that she quit smoking about 16 months ago. Her smoking use included cigarettes. She has a 5.00 pack-year smoking history. She has never used smokeless tobacco.  Drug History:  reports previous drug use.    Review of patient's allergies indicates:  No Known Allergies    Current Outpatient Medications   Medication Sig Dispense Refill    acetaminophen (TYLENOL) 325 MG tablet Take 325 mg by mouth every 6 (six) hours as needed for Pain.      albuterol (PROVENTIL/VENTOLIN HFA) 90 mcg/actuation inhaler Inhale 1-2 puffs into the lungs every 6 (six) hours as needed for Wheezing. Rescue 18 g 1    amLODIPine (NORVASC) 10 MG tablet Take 5 mg by mouth 2 (two) times daily.      apixaban (ELIQUIS) 5 mg Tab Take 1 tablet (5 mg total) by mouth 2 (two) times daily. 60 tablet 6    aspirin (ECOTRIN) 81 MG EC tablet Take 81 mg by mouth once daily.      atorvastatin (LIPITOR) 40 MG tablet Take 40 mg by mouth 2 (two) times a day.      LIDOcaine-prilocaine (EMLA) cream APPLY TO FISTULA 1 HOUR PRIOR TO DIALYSIS TREATMENT 3 TIMES PER WEEK      ondansetron (ZOFRAN-ODT) 4 MG TbDL Take 4 mg by mouth every 6 (six) hours as needed (nausea).      sevelamer carbonate (RENVELA) 800 mg Tab Take 2,400 mg by mouth 3 (three) times daily with meals.       sodium zirconium cyclosilicate (LOKELMA) 10 gram packet Take 1 packet by mouth once daily. Mix all of packet into glass with >3 tablespoons of water. Stir well & drink immediately. If  powder remains in the glass, add water & repeat until no powder remains to ensure all is taken. 30 packet 0    umeclidinium-vilanteroL (ANORO ELLIPTA) 62.5-25 mcg/actuation DsDv Inhale 1 puff into the lungs once daily. Controller 1 each 0    VIOS AEROSOL DELIVERY SYSTEM Lizbeth AS DIRECTED      carvediloL (COREG) 3.125 MG tablet Take 1 tablet (3.125 mg total) by mouth 2 (two) times daily. 60 tablet 11     No current facility-administered medications for this visit.       Alpha-1 Antitrypsin:  Last PFT:   Last CT:2/8/22  PROGRESSION OF THE SUBPLEURAL RETICULATIONS AND INTERLOBULAR SEPTAL THICKENING WITH DEVELOPMENT OF PLEURAL BASE NODULAR ARE ALVEOLAR OPACITIES WITHIN THE LUNG BASES.  DIFFERENTIAL INCLUDES EDEMA VERSUS PNEUMONIA AND FOLLOW-UP IN 6 MONTHS IS RECOMMENDED  Stable noncalcified nodules bilaterally   Mild emphysematous changes   Mildly prominent mediastinal and left sub supraclavicular lymph nodes most likely reactive   Cardiomegaly with moderate coronary artery calcification    Review of Systems  General: Feeling fine.  Eyes: Vision is ok  ENT:  No sinusitis or pharyngitis.   Heart:: No chest pain or palpitations.  Lungs: some shortness of breath with exertion, variable amounts of coughing  GI: Nausea and vomiting after dialysis on occasion  : does not urinate  Musculoskeletal: No joint pain or myalgias.  Skin: No lesions or rashes.  Neuro: No headaches or neuropathy.  Lymph: bruises easily  Psych: No anxiety or depression.  Endo: weight is stable    OBJECTIVE:      /78 (BP Location: Left arm, Patient Position: Sitting)   Pulse 86   Wt 53.1 kg (117 lb)   SpO2 96%   BMI 20.08 kg/m²     Physical Exam  GENERAL: Older patient in no distress.  HEENT: Pupils equal and reactive. Extraocular movements intact. Nose intact.      Pharynx moist.  NECK: Supple.   HEART: Regular rate and rhythm. No murmur or gallop auscultated.  LUNGS: Clear to auscultation and percussion. Lung excursion symmetrical. No  change in fremitus. No adventitial noises.  ABDOMEN: Bowel sounds present. Non-tender, no masses palpated.  EXTREMITIES: Normal muscle tone and joint movement, no cyanosis or clubbing.  Dialysis access in the right arm  LYMPHATICS: No adenopathy palpated, no edema.  SKIN: Dry, intact, no lesions.   NEURO: Cranial nerves II-XII intact. Motor strength 5/5 bilaterally, upper and lower extremities.  PSYCH: Appropriate affect.    Assessment:       1. Pulmonary nodules    2. Lymphadenopathy    3. Chronic obstructive pulmonary disease, unspecified COPD type    4. Centrilobular emphysema    5. ESRD (end stage renal disease)        S/p flu and covid vaccines and had COVID in January  Plan:       Pulmonary nodules    Lymphadenopathy    Chronic obstructive pulmonary disease, unspecified COPD type    Centrilobular emphysema    ESRD (end stage renal disease)         Follow up in about 3 months (around 5/23/2022).  Continue Anoro  Bronchoscopy March 15th  Nothing after midnight  Hold Eliquis Sunday and Monday before  RTC in 3 months

## 2022-02-23 NOTE — PATIENT INSTRUCTIONS
Follow up in about 3 months (around 5/23/2022).  Continue Anoro  Bronchoscopy March 15th  Nothing after midnight  Hold Eliquis Sunday and Monday before  RTC in 3 months

## 2022-02-24 ENCOUNTER — TELEPHONE (OUTPATIENT)
Dept: PULMONOLOGY | Facility: CLINIC | Age: 67
End: 2022-02-24
Payer: COMMERCIAL

## 2022-02-24 NOTE — TELEPHONE ENCOUNTER
We scheduled her for March 15th at 8:30 a.m. Please put in the orders for us . If we need to change anything let me know .

## 2022-02-24 NOTE — TELEPHONE ENCOUNTER
----- Message from Edmar Napeir MA sent at 2/24/2022  7:34 AM CST -----  Regarding: Bronch    ----- Message -----  From: Tatyana Schuster MD  Sent: 2/23/2022   5:05 PM CST  To: Edmar Napier MA    Please follow-up with Patricia

## 2022-03-08 ENCOUNTER — PATIENT MESSAGE (OUTPATIENT)
Dept: TRANSPLANT | Facility: CLINIC | Age: 67
End: 2022-03-08
Payer: COMMERCIAL

## 2022-03-08 ENCOUNTER — CLINICAL SUPPORT (OUTPATIENT)
Dept: CARDIOLOGY | Facility: HOSPITAL | Age: 67
End: 2022-03-08
Attending: INTERNAL MEDICINE
Payer: COMMERCIAL

## 2022-03-08 VITALS — BODY MASS INDEX: 19.97 KG/M2 | HEIGHT: 64 IN | WEIGHT: 117 LBS

## 2022-03-08 DIAGNOSIS — I51.9 SYSTOLIC DYSFUNCTION: ICD-10-CM

## 2022-03-08 LAB
AORTIC ROOT ANNULUS: 3.42 CM
AV INDEX (PROSTH): 0.57
AV MEAN GRADIENT: 2 MMHG
AV PEAK GRADIENT: 3 MMHG
AV VALVE AREA: 1.84 CM2
AV VELOCITY RATIO: 57.92
BSA FOR ECHO PROCEDURE: 1.55 M2
CV ECHO LV RWT: 0.52 CM
DOP CALC AO PEAK VEL: 0.92 M/S
DOP CALC AO VTI: 10.7 CM
DOP CALC LVOT AREA: 3.2 CM2
DOP CALC LVOT DIAMETER: 2.03 CM
DOP CALC LVOT PEAK VEL: 53.29 M/S
DOP CALC LVOT STROKE VOLUME: 19.73 CM3
DOP CALCLVOT PEAK VEL VTI: 6.1 CM
E WAVE DECELERATION TIME: 151.51 MSEC
E/A RATIO: 2.63
E/E' RATIO: 20.17 M/S
ECHO LV POSTERIOR WALL: 1.32 CM (ref 0.6–1.1)
EJECTION FRACTION: 18 %
FRACTIONAL SHORTENING: 8 % (ref 28–44)
INTERVENTRICULAR SEPTUM: 1.32 CM (ref 0.6–1.1)
LEFT ATRIUM SIZE: 4.38 CM
LEFT INTERNAL DIMENSION IN SYSTOLE: 4.65 CM (ref 2.1–4)
LEFT VENTRICLE DIASTOLIC VOLUME INDEX: 77.95 ML/M2
LEFT VENTRICLE DIASTOLIC VOLUME: 121.6 ML
LEFT VENTRICLE MASS INDEX: 173 G/M2
LEFT VENTRICLE SYSTOLIC VOLUME INDEX: 62.3 ML/M2
LEFT VENTRICLE SYSTOLIC VOLUME: 97.2 ML
LEFT VENTRICULAR INTERNAL DIMENSION IN DIASTOLE: 5.03 CM (ref 3.5–6)
LEFT VENTRICULAR MASS: 270.13 G
LV LATERAL E/E' RATIO: 20.17 M/S
LV SEPTAL E/E' RATIO: 20.17 M/S
MV PEAK A VEL: 0.46 M/S
MV PEAK E VEL: 1.21 M/S
PISA TR MAX VEL: 2.57 M/S
PV PEAK VELOCITY: 119.78 CM/S
RIGHT VENTRICULAR END-DIASTOLIC DIMENSION: 334 CM
TDI LATERAL: 0.06 M/S
TDI SEPTAL: 0.06 M/S
TDI: 0.06 M/S
TR MAX PG: 26 MMHG

## 2022-03-08 PROCEDURE — 93306 TTE W/DOPPLER COMPLETE: CPT | Mod: 26,,, | Performed by: INTERNAL MEDICINE

## 2022-03-08 PROCEDURE — 93306 TTE W/DOPPLER COMPLETE: CPT

## 2022-03-08 PROCEDURE — 93306 ECHO (CUPID ONLY): ICD-10-PCS | Mod: 26,,, | Performed by: INTERNAL MEDICINE

## 2022-03-09 DIAGNOSIS — I31.39 PERICARDIAL EFFUSION: ICD-10-CM

## 2022-03-09 DIAGNOSIS — N18.6 ESRD (END STAGE RENAL DISEASE): ICD-10-CM

## 2022-03-09 DIAGNOSIS — Z01.818 PRE-TRANSPLANT EVALUATION FOR KIDNEY TRANSPLANT: Primary | ICD-10-CM

## 2022-03-09 DIAGNOSIS — I50.9 ACUTE ON CHRONIC HEART FAILURE, UNSPECIFIED HEART FAILURE TYPE: ICD-10-CM

## 2022-03-09 DIAGNOSIS — Z01.810 PREOP CARDIOVASCULAR EXAM: ICD-10-CM

## 2022-03-09 DIAGNOSIS — J90 PLEURAL EFFUSION: ICD-10-CM

## 2022-03-09 DIAGNOSIS — Z76.82 ORGAN TRANSPLANT CANDIDATE: ICD-10-CM

## 2022-03-10 ENCOUNTER — TELEPHONE (OUTPATIENT)
Dept: PULMONOLOGY | Facility: CLINIC | Age: 67
End: 2022-03-10
Payer: COMMERCIAL

## 2022-03-10 NOTE — TELEPHONE ENCOUNTER
Called kyle to review her instructions for preop an bronch . She said she talked to her cardiologist an her daughter who is a nurse she does not want the bronch . She said her heart is too weak an does not think she can handle the anesthesia . She also stated that her pulse has been running low . She will call back to reschedule at a different time . She was scheduled for 8:30 on 03/15/22

## 2022-03-11 ENCOUNTER — COMMITTEE REVIEW (OUTPATIENT)
Dept: TRANSPLANT | Facility: CLINIC | Age: 67
End: 2022-03-11
Payer: COMMERCIAL

## 2022-03-11 DIAGNOSIS — I50.20 HFREF (HEART FAILURE WITH REDUCED EJECTION FRACTION): Primary | ICD-10-CM

## 2022-03-11 RX ORDER — SACUBITRIL AND VALSARTAN 24; 26 MG/1; MG/1
1 TABLET, FILM COATED ORAL 2 TIMES DAILY
Qty: 60 TABLET | Refills: 11 | Status: SHIPPED | OUTPATIENT
Start: 2022-03-11 | End: 2023-03-27 | Stop reason: SDUPTHER

## 2022-03-11 NOTE — LETTER
March 11, 2022     Latricia Higgins  [address] [address]    Dear Latricia Higgins:  MRN: [id number]    It is the duty of the [facility] Selection Committee to determine which patients are candidates for a transplant. For this reason, our committee has the difficult task of evaluating patients to determine which ones have the greatest chance of having a successful transplant. We are aware of the magnitude of this responsibility, and we approach it with reverence and humility.    It is with regret I inform you that you are not approved as a transplant candidate due to change in Ejection Fraction on your Echo. Your EF is now below 40% . It was  18% on most recent echo of 3/8/2022. Patient to follow up with heart faliure for management.  Based on this review, we have determined that at this time, you are not a candidate for a transplant at [facility].      The selection committee carefully considers each patient's transplant candidacy and determines whether it is safe to proceed with transplantation on a case-by-case basis using established selection criteria.  At present, the risk of proceeding with an elective transplant surgery has become too high.                                                                               Although the selection committee believes you are not a suitable transplant candidate, you have the option to be evaluated at other transplant centers who may have different selection criteria.  You may request your [facility] records be sent to any center of your choice by contacting our Medical Records Department at [phone].                                                                               Attached is a letter from the [employer] for Organ Sharing (UNOS).  It describes the services and information offered to patients by UNOS and the Organ Procurement and Transplant Network.    The [facility] Selection [facility] sincerely wishes you the best and  remains available to answer any questions.  Please do not hesitate to contact our pre-transplant office if we can assist you in any other way.                                                                               Sincerely,      Tatum Cox MD  Medical Director, Kidney & Kidney/Pancreas Transplantation    CC:  [facility].    Encl: UNOS Letter             The Organ Procurement and Transplantation Network   Toll-free patient services line: Your resource for organ transplant information     If you have a question regarding your own medical care, you always should call your transplant hospital first. However, for general organ transplant-related information, you can call the Organ Procurement and Transplantation Network (OPTN) toll-free patient services line at 1-757.325.8257.     Anyone, including potential transplant candidates, candidates, recipients, family members, friends, living donors, and donor family members, can call this number to:     · Talk about organ donation, living donation, the transplant process, the donation process, and transplant policies.   · Get a free patient information kit with helpful booklets, waiting list and transplant information, and a list of all transplant hospitals.   · Ask questions about the OPTN website (https://optn.transplant.hrsa.gov/), the [employer] for Organ Sharings (UNOS) website (https://unos.org/), or the UNOS website for living donors and transplant recipients. (https://www.transplantliving.org/).   · Learn how the OPTN can help you.   · Talk about any concerns that you may have with a transplant hospital.     The nations transplant system, the OPTN, is managed under federal contract by the [employer] Organ Sharing (UNOS), which is a non-profit charitable organization. The OPTN helps create and define organ sharing policies that make the best use of donated organs. This process continuously  evaluating new advances and discoveries so policies can be adapted to best serve patients waiting for transplants. To do so, the OPTN works closely with transplant professionals, transplant patients, transplant candidates, donor families, living donors, and the public. All transplant programs and organ procurement organizations throughout the country are OPTN members and are obligated to follow the policies the OPTN creates for allocating organs.     The OPTN also is responsible for:   · Providing educational material for patients, the public, and professionals.   · Raising awareness of the need for donated organs and tissue.   · Coordinating organ procurement, matching, and placement.   · Collecting information about every organ transplant and donation that occurs in the [place].     Remember, you should contact your transplant hospital directly if you have questions or concerns about your own medical care including medical records, work-up progress, and test results.     We are not your transplant hospital, and our staff will not be able to answer questions about your case, so please keep your transplant hospitals phone number handy.   However, while you research your transplant needs and learn as much as you can about transplantation and donation, we welcome your call to our toll-free patient services line at [phone].

## 2022-03-11 NOTE — COMMITTEE REVIEW
Native Organ Dx: Malignant Hypertension      Not approved for LRD/CAD transplant due to change in Ejection fraction . EF is now below 40% at 18%. Patient to follow up with heart [facility] for management.       Note written by Vero Garcia RN    ===============================================    I was present at the meeting and attest to the decision of the committee.    Raji Rodriguez  03/11/2022

## 2022-03-12 NOTE — PROGRESS NOTES
Called patient regarding heart failure medications. Will start Entresto 24-26 mg BID. Discussed with Dr. Sexton, and will reach out to her nephrologist and PCP to ensure there are no issues before starting her on Entresto. Discussed BP monitoring. Given her marginal BP ~100/60 today. I would recommend stopping her Norvasc to allow for room to increase her Entresto. She is currently on Coreg 3.125 mg BID, but will wait to uptitrate at this time. Patient is on ASA and Eliquis. Would recommend stopping ASA and there is no strong indication and she is at increased risk of bleeding.    Instructed patient to discuss with her nephrologist on Monday regarding Entresto initiation.     Yo Aguilar MD  Cardiology  PGY-6  Pager: [phone]

## 2022-03-15 ENCOUNTER — TELEPHONE (OUTPATIENT)
Dept: PHARMACY | Facility: CLINIC | Age: 67
End: 2022-03-15
Payer: COMMERCIAL

## 2022-03-15 DIAGNOSIS — J90 PLEURAL EFFUSION: Primary | ICD-10-CM

## 2022-03-15 RX ORDER — UMECLIDINIUM BROMIDE AND VILANTEROL TRIFENATATE 62.5; 25 UG/1; UG/1
1 POWDER RESPIRATORY (INHALATION) DAILY
Qty: 1 EACH | Refills: 5 | Status: SHIPPED | OUTPATIENT
Start: 2022-03-15 | End: 2023-07-25 | Stop reason: SDUPTHER

## 2022-03-16 ENCOUNTER — PATIENT MESSAGE (OUTPATIENT)
Dept: PHARMACY | Facility: CLINIC | Age: 67
End: 2022-03-16
Payer: COMMERCIAL

## 2022-03-21 ENCOUNTER — TELEPHONE (OUTPATIENT)
Dept: TRANSPLANT | Facility: CLINIC | Age: 67
End: 2022-03-21
Payer: COMMERCIAL

## 2022-03-21 DIAGNOSIS — I50.9 CONGESTIVE HEART FAILURE, UNSPECIFIED HF CHRONICITY, UNSPECIFIED HEART FAILURE TYPE: Primary | ICD-10-CM

## 2022-03-25 ENCOUNTER — TELEPHONE (OUTPATIENT)
Dept: CARDIOLOGY | Facility: CLINIC | Age: 67
End: 2022-03-25
Payer: COMMERCIAL

## 2022-03-25 NOTE — TELEPHONE ENCOUNTER
I updated dr Wu verbally. He said to have pt stop amlodipine and contact dr Aguilar on Monday. I updated pt and she verbalized understanding.  I told her to go to ER / urgent care if BP does not come up once off amlodipine. She verbalized understanding.

## 2022-03-25 NOTE — TELEPHONE ENCOUNTER
----- Message from Lucie Dia RN sent at 3/25/2022  5:09 PM CDT -----  Regarding: FW: Low BP  Pt started taking Entresto on Wednesday. Dialysis M/W/F. She has been having issue w. low BP all day today, feeling weak/ poorly. Needed extra fluid during dialysis due to low BP.  VS:  Wednesday /98  Yesterday, Thursday: 92/70, and 95/70 in the afternoon.  Today: BP has been running in the 80s.  at 1620: 80/61  She stopped erroneously her carvedilol on Wednesday.  She has been taking 5mg amlodipine. Took it today.    Please adviseLucie  ----- Message -----  From: Eugenio Yeung  Sent: 3/25/2022   3:45 PM CDT  To: Lucie Dia RN  Subject: Low BP                                           PT's BP has been very since taking Entresto.  Please call Pt @ [phone]      LOV:  02/15/2022 Dr. Aguilar      Thanks

## 2022-03-28 ENCOUNTER — TELEPHONE (OUTPATIENT)
Dept: CARDIOLOGY | Facility: CLINIC | Age: 67
End: 2022-03-28
Payer: COMMERCIAL

## 2022-03-28 DIAGNOSIS — I51.9 SYSTOLIC DYSFUNCTION: ICD-10-CM

## 2022-03-28 RX ORDER — METOPROLOL SUCCINATE 25 MG/1
25 TABLET, EXTENDED RELEASE ORAL DAILY
Qty: 30 TABLET | Refills: 11 | Status: SHIPPED | OUTPATIENT
Start: 2022-03-28 | End: 2022-04-19

## 2022-03-28 NOTE — TELEPHONE ENCOUNTER
----- Message from Elie Aguilar MD sent at 3/28/2022  4:26 PM CDT -----  Regarding: RE: low bp post dialysis  Please see order and note from today. Discussed with patient and treatment team.    Yo Aguilar    ----- Message -----  From: Lucie Dia RN  Sent: 3/28/2022  11:20 AM CDT  To: Lucie Dia RN, #  Subject: low bp post dialysis                             Please see message from earlier this Am.    Pt states that she has been having a low BP with dialysis and she was told to ask if she could be started on midodrine for dialysis.  She finished her dialysis at 0830 with dizziness and SBP in the 80s. She therefore was kept an extra 90mn due to low BP.  Right now at 1115 , her BP is still only 90/61. She denies dizziness.    Please advise,    ----- Message -----  From: Eugenio Yeung  Sent: 3/28/2022  11:03 AM CDT  To: Lucie Dia RN  Subject: Call back                                        Pt would like a call back from Lucie.        Thanks

## 2022-03-28 NOTE — PROGRESS NOTES
Recently started on Entresto with issues with hypotension during HD. Discussed with patient. Will stop Coreg and start Toprol XL. Will hold Entresto on HD days. If needed can hold Entresto the night before HD. Ok to give midodrine if needed as I would like to keep her on Entresto if at all possible. Will refer her to interventional cardiology for LHC to assess for CAD as the underlying etiology. All questions were answered. Will communicate this with her nephrologist.     Yo Aguilar MD  Cardiology  PGY-6  Pager: [phone]

## 2022-03-28 NOTE — TELEPHONE ENCOUNTER
----- Message from Elie Aguilar MD sent at 3/28/2022  4:25 PM CDT -----  Regarding: RE: low BP  Please see my note from today. I discussed with patient and her treatment team. Thank you!    Yo Aguilar    ----- Message -----  From: Lucie Dia RN  Sent: 3/28/2022   9:18 AM CDT  To: Lucie Dia RN, #  Subject: low BP                                           Please refer to Friday messages.    I just spoke w. pt. She stopped amlodipine, continued Entresto, and restarted the carvedilol. She says that she checked several times her BP over the week-end and it was running in the 125/80, 130/85. She just finished dialysis but her BP post dialysis is 81/49. She has not left dialysis yet. They gave her some extra fluid.      Please advise,

## 2022-04-19 ENCOUNTER — LAB VISIT (OUTPATIENT)
Dept: LAB | Facility: HOSPITAL | Age: 67
End: 2022-04-19
Attending: INTERNAL MEDICINE
Payer: COMMERCIAL

## 2022-04-19 ENCOUNTER — OFFICE VISIT (OUTPATIENT)
Dept: TRANSPLANT | Facility: CLINIC | Age: 67
End: 2022-04-19
Payer: COMMERCIAL

## 2022-04-19 ENCOUNTER — OFFICE VISIT (OUTPATIENT)
Dept: CARDIOLOGY | Facility: CLINIC | Age: 67
End: 2022-04-19
Payer: COMMERCIAL

## 2022-04-19 VITALS
BODY MASS INDEX: 21.53 KG/M2 | HEIGHT: 64 IN | DIASTOLIC BLOOD PRESSURE: 70 MMHG | WEIGHT: 126.13 LBS | SYSTOLIC BLOOD PRESSURE: 120 MMHG | HEART RATE: 69 BPM

## 2022-04-19 VITALS
HEART RATE: 81 BPM | HEIGHT: 64 IN | WEIGHT: 125.69 LBS | OXYGEN SATURATION: 96 % | BODY MASS INDEX: 21.46 KG/M2 | DIASTOLIC BLOOD PRESSURE: 57 MMHG | SYSTOLIC BLOOD PRESSURE: 100 MMHG

## 2022-04-19 DIAGNOSIS — I31.39 PERICARDIAL EFFUSION: ICD-10-CM

## 2022-04-19 DIAGNOSIS — E78.00 PURE HYPERCHOLESTEROLEMIA: ICD-10-CM

## 2022-04-19 DIAGNOSIS — I10 PRIMARY HYPERTENSION: ICD-10-CM

## 2022-04-19 DIAGNOSIS — I50.9 CONGESTIVE HEART FAILURE, UNSPECIFIED HF CHRONICITY, UNSPECIFIED HEART FAILURE TYPE: ICD-10-CM

## 2022-04-19 DIAGNOSIS — I70.0 CALCIFICATION OF AORTA: ICD-10-CM

## 2022-04-19 DIAGNOSIS — I50.9 ACUTE ON CHRONIC HEART FAILURE, UNSPECIFIED HEART FAILURE TYPE: Primary | ICD-10-CM

## 2022-04-19 DIAGNOSIS — Z76.82 ORGAN TRANSPLANT CANDIDATE: ICD-10-CM

## 2022-04-19 DIAGNOSIS — I51.9 SYSTOLIC DYSFUNCTION: ICD-10-CM

## 2022-04-19 DIAGNOSIS — J90 PLEURAL EFFUSION: ICD-10-CM

## 2022-04-19 DIAGNOSIS — I10 PRIMARY HYPERTENSION: Primary | ICD-10-CM

## 2022-04-19 DIAGNOSIS — N18.6 ESRD (END STAGE RENAL DISEASE): ICD-10-CM

## 2022-04-19 DIAGNOSIS — Z01.818 PRE-TRANSPLANT EVALUATION FOR KIDNEY TRANSPLANT: ICD-10-CM

## 2022-04-19 DIAGNOSIS — Z01.810 PREOP CARDIOVASCULAR EXAM: ICD-10-CM

## 2022-04-19 LAB
ALBUMIN SERPL BCP-MCNC: 3.4 G/DL (ref 3.5–5.2)
ALP SERPL-CCNC: 112 U/L (ref 55–135)
ALT SERPL W/O P-5'-P-CCNC: 9 U/L (ref 10–44)
ANION GAP SERPL CALC-SCNC: 15 MMOL/L (ref 8–16)
AST SERPL-CCNC: 17 U/L (ref 10–40)
BILIRUB SERPL-MCNC: 0.7 MG/DL (ref 0.1–1)
BNP SERPL-MCNC: 854 PG/ML (ref 0–99)
BUN SERPL-MCNC: 44 MG/DL (ref 8–23)
CALCIUM SERPL-MCNC: 10 MG/DL (ref 8.7–10.5)
CHLORIDE SERPL-SCNC: 91 MMOL/L (ref 95–110)
CO2 SERPL-SCNC: 31 MMOL/L (ref 23–29)
CREAT SERPL-MCNC: 7.9 MG/DL (ref 0.5–1.4)
EST. GFR  (AFRICAN AMERICAN): 5.6 ML/MIN/1.73 M^2
EST. GFR  (NON AFRICAN AMERICAN): 4.8 ML/MIN/1.73 M^2
GLUCOSE SERPL-MCNC: 87 MG/DL (ref 70–110)
MAGNESIUM SERPL-MCNC: 2.4 MG/DL (ref 1.6–2.6)
POTASSIUM SERPL-SCNC: 4.5 MMOL/L (ref 3.5–5.1)
PROT SERPL-MCNC: 7.3 G/DL (ref 6–8.4)
SODIUM SERPL-SCNC: 137 MMOL/L (ref 136–145)
TSH SERPL DL<=0.005 MIU/L-ACNC: 1.28 UIU/ML (ref 0.4–4)

## 2022-04-19 PROCEDURE — 3008F PR BODY MASS INDEX (BMI) DOCUMENTED: ICD-10-PCS | Mod: CPTII,S$GLB,, | Performed by: INTERNAL MEDICINE

## 2022-04-19 PROCEDURE — 99213 OFFICE O/P EST LOW 20 MIN: CPT | Mod: S$PBB,,, | Performed by: INTERNAL MEDICINE

## 2022-04-19 PROCEDURE — 4010F ACE/ARB THERAPY RXD/TAKEN: CPT | Mod: CPTII,S$GLB,, | Performed by: INTERNAL MEDICINE

## 2022-04-19 PROCEDURE — 4010F PR ACE/ARB THEARPY RXD/TAKEN: ICD-10-PCS | Mod: CPTII,S$GLB,, | Performed by: INTERNAL MEDICINE

## 2022-04-19 PROCEDURE — 3074F PR MOST RECENT SYSTOLIC BLOOD PRESSURE < 130 MM HG: ICD-10-PCS | Mod: CPTII,S$GLB,, | Performed by: INTERNAL MEDICINE

## 2022-04-19 PROCEDURE — 84443 ASSAY THYROID STIM HORMONE: CPT | Performed by: INTERNAL MEDICINE

## 2022-04-19 PROCEDURE — 1101F PT FALLS ASSESS-DOCD LE1/YR: CPT | Mod: CPTII,S$GLB,, | Performed by: INTERNAL MEDICINE

## 2022-04-19 PROCEDURE — 3078F PR MOST RECENT DIASTOLIC BLOOD PRESSURE < 80 MM HG: ICD-10-PCS | Mod: CPTII,S$GLB,, | Performed by: INTERNAL MEDICINE

## 2022-04-19 PROCEDURE — 99999 PR PBB SHADOW E&M-EST. PATIENT-LVL V: CPT | Mod: PBBFAC,,, | Performed by: INTERNAL MEDICINE

## 2022-04-19 PROCEDURE — 80053 COMPREHEN METABOLIC PANEL: CPT | Performed by: INTERNAL MEDICINE

## 2022-04-19 PROCEDURE — 36415 COLL VENOUS BLD VENIPUNCTURE: CPT | Performed by: INTERNAL MEDICINE

## 2022-04-19 PROCEDURE — 1101F PR PT FALLS ASSESS DOC 0-1 FALLS W/OUT INJ PAST YR: ICD-10-PCS | Mod: CPTII,S$GLB,, | Performed by: INTERNAL MEDICINE

## 2022-04-19 PROCEDURE — 3078F DIAST BP <80 MM HG: CPT | Mod: CPTII,S$GLB,, | Performed by: INTERNAL MEDICINE

## 2022-04-19 PROCEDURE — 99204 PR OFFICE/OUTPT VISIT, NEW, LEVL IV, 45-59 MIN: ICD-10-PCS | Mod: S$GLB,,, | Performed by: INTERNAL MEDICINE

## 2022-04-19 PROCEDURE — 3288F PR FALLS RISK ASSESSMENT DOCUMENTED: ICD-10-PCS | Mod: CPTII,S$GLB,, | Performed by: INTERNAL MEDICINE

## 2022-04-19 PROCEDURE — 83880 ASSAY OF NATRIURETIC PEPTIDE: CPT | Performed by: INTERNAL MEDICINE

## 2022-04-19 PROCEDURE — 83735 ASSAY OF MAGNESIUM: CPT | Performed by: INTERNAL MEDICINE

## 2022-04-19 PROCEDURE — 99999 PR PBB SHADOW E&M-EST. PATIENT-LVL V: ICD-10-PCS | Mod: PBBFAC,,, | Performed by: INTERNAL MEDICINE

## 2022-04-19 PROCEDURE — 1126F PR PAIN SEVERITY QUANTIFIED, NO PAIN PRESENT: ICD-10-PCS | Mod: CPTII,S$GLB,, | Performed by: INTERNAL MEDICINE

## 2022-04-19 PROCEDURE — 99213 PR OFFICE/OUTPT VISIT, EST, LEVL III, 20-29 MIN: ICD-10-PCS | Mod: S$PBB,,, | Performed by: INTERNAL MEDICINE

## 2022-04-19 PROCEDURE — 3008F BODY MASS INDEX DOCD: CPT | Mod: CPTII,S$GLB,, | Performed by: INTERNAL MEDICINE

## 2022-04-19 PROCEDURE — 99204 OFFICE O/P NEW MOD 45 MIN: CPT | Mod: S$GLB,,, | Performed by: INTERNAL MEDICINE

## 2022-04-19 PROCEDURE — 1126F AMNT PAIN NOTED NONE PRSNT: CPT | Mod: CPTII,S$GLB,, | Performed by: INTERNAL MEDICINE

## 2022-04-19 PROCEDURE — 1159F PR MEDICATION LIST DOCUMENTED IN MEDICAL RECORD: ICD-10-PCS | Mod: CPTII,S$GLB,, | Performed by: INTERNAL MEDICINE

## 2022-04-19 PROCEDURE — 3074F SYST BP LT 130 MM HG: CPT | Mod: CPTII,S$GLB,, | Performed by: INTERNAL MEDICINE

## 2022-04-19 PROCEDURE — 1159F MED LIST DOCD IN RCRD: CPT | Mod: CPTII,S$GLB,, | Performed by: INTERNAL MEDICINE

## 2022-04-19 PROCEDURE — 3288F FALL RISK ASSESSMENT DOCD: CPT | Mod: CPTII,S$GLB,, | Performed by: INTERNAL MEDICINE

## 2022-04-19 RX ORDER — CARVEDILOL 3.12 MG/1
3.12 TABLET ORAL 2 TIMES DAILY
Qty: 60 TABLET | Refills: 3 | Status: SHIPPED | OUTPATIENT
Start: 2022-04-19 | End: 2022-04-19 | Stop reason: SDUPTHER

## 2022-04-19 RX ORDER — PATIROMER 8.4 G/1
8.4 POWDER, FOR SUSPENSION ORAL
COMMUNITY
Start: 2022-04-14

## 2022-04-19 RX ORDER — MIDODRINE HYDROCHLORIDE 10 MG/1
10 TABLET ORAL
COMMUNITY
Start: 2022-04-18

## 2022-04-19 RX ORDER — CARVEDILOL 3.12 MG/1
6.25 TABLET ORAL 2 TIMES DAILY
Qty: 60 TABLET | Refills: 3 | Status: SHIPPED | OUTPATIENT
Start: 2022-04-19 | End: 2022-05-31 | Stop reason: SDUPTHER

## 2022-04-19 RX ORDER — CARVEDILOL 3.12 MG/1
3.12 TABLET ORAL 2 TIMES DAILY
COMMUNITY
Start: 2022-04-04 | End: 2022-04-19 | Stop reason: SDUPTHER

## 2022-04-19 NOTE — PROGRESS NOTES
PCP - Ralf Ham MD  Subjective:     Latricia Higgins is a 66 y.o. y.o. female    Problems  HTN  Systolic dysfunction, EF 18% (normal 9/2020)  HLD, LDL 42  History of DVT on OAC  Aortic atherosclerosis  ESRD on HD 10/2020, delisted for tx at this time given reduced EF  Emphysema    Patient delisted for kidney transplant at this time since EF reduced 18%. Started on Entresto and switched coreg to Toprol XL secondary to BP issues. On midodrine for hypotension issues per nephrology. PET stress 2021 without ischemia. Derrek CHF or anginal symptoms. To see HTS today.    ---------------  Cardiac Studies ---------------    7-2021 TTE  · The estimated PA systolic pressure is 33 mmHg.  · The left ventricle is normal in size with mild eccentric hypertrophy and mildly decreased systolic function.  · The estimated ejection fraction is 45%.  · Grade I left ventricular diastolic dysfunction.  · Normal right ventricular size with normal right ventricular systolic function.  · Moderate left atrial enlargement.  · Mild-to-moderate mitral regurgitation.  · There is left ventricular global hypokinesis.  · Mild to moderate tricuspid regurgitation.        5-2021 Cardiac PET Stress    The relative PET images are normal showing no clinically significant regional resting or stress induced perfusion defects.    The whole heart absolute myocardial perfusion values averaged 1.13 cc/min/g at rest, which is elevated; 1.72 cc/min/g at stress, which is mildly reduced; and CFR is 1.53 , which is moderately reduced in part due to elevated resting flow.    The gated perfusion images showed an ejection fraction of 24% at rest and 30% during stress. A normal ejection fraction is greater than 51%.    There is moderate global hypokinesis at rest and during stress.    The LV cavity size is moderately enlarged at rest and stress.    The EKG portion of this study is negative for ischemia.    During stress, rare PACs and frequent PVCs are  noted.    The patient reported no chest pain during the stress test.    There are no prior studies for comparison.      History:     Social History     Tobacco Use    Smoking status: Former Smoker     Packs/day: 0.25     Years: 20.00     Pack years: 5.00     Types: Cigarettes     Quit date: 2020     Years since quittin.5    Smokeless tobacco: Never Used   Substance Use Topics    Alcohol use: Not Currently     Family History   Problem Relation Age of Onset    Cancer Mother     Heart disease Father        Meds:   Review of patient's allergies indicates:  No Known Allergies    Current Outpatient Medications:     acetaminophen (TYLENOL) 325 MG tablet, Take 325 mg by mouth every 6 (six) hours as needed for Pain., Disp: , Rfl:     albuterol (PROVENTIL/VENTOLIN HFA) 90 mcg/actuation inhaler, Inhale 1-2 puffs into the lungs every 6 (six) hours as needed for Wheezing. Rescue, Disp: 18 g, Rfl: 1    apixaban (ELIQUIS) 5 mg Tab, Take 1 tablet (5 mg total) by mouth 2 (two) times daily., Disp: 60 tablet, Rfl: 6    atorvastatin (LIPITOR) 40 MG tablet, Take 40 mg by mouth 2 (two) times a day., Disp: , Rfl:     LIDOcaine-prilocaine (EMLA) cream, APPLY TO FISTULA 1 HOUR PRIOR TO DIALYSIS TREATMENT 3 TIMES PER WEEK, Disp: , Rfl:     metoprolol succinate (TOPROL-XL) 25 MG 24 hr tablet, Take 1 tablet (25 mg total) by mouth once daily., Disp: 30 tablet, Rfl: 11    ondansetron (ZOFRAN-ODT) 4 MG TbDL, Take 4 mg by mouth every 6 (six) hours as needed (nausea)., Disp: , Rfl:     sacubitriL-valsartan (ENTRESTO) 24-26 mg per tablet, Take 1 tablet by mouth 2 (two) times daily., Disp: 60 tablet, Rfl: 11    sevelamer carbonate (RENVELA) 800 mg Tab, Take 2,400 mg by mouth 3 (three) times daily with meals. , Disp: , Rfl:     sodium zirconium cyclosilicate (LOKELMA) 10 gram packet, Take 1 packet by mouth once daily. Mix all of packet into glass with >3 tablespoons of water. Stir well & drink immediately. If powder remains  in the glass, add water & repeat until no powder remains to ensure all is taken., Disp: 30 packet, Rfl: 0    umeclidinium-vilanteroL (ANORO ELLIPTA) 62.5-25 mcg/actuation DsDv, Inhale 1 puff into the lungs once daily. Controller, Disp: 1 each, Rfl: 0    umeclidinium-vilanteroL (ANORO ELLIPTA) 62.5-25 mcg/actuation DsDv, Inhale 1 puff into the lungs once daily. Controller, Disp: 1 each, Rfl: 5    [employer] AER[employer]OL DELIVERY SYSTEM Lizbeth, AS DIRECTED, Disp: , Rfl:     Constitution: Negative for fever or chills. Negative for weight loss or gain.   HENT: Negative for sore throat or headaches. Negative for rhinorrhea.  Eyes: Negative for blurred or double vision.   Cardiovascular: See above  Pulmonary: Negative for SOB. Negative for cough.   Gastrointestinal: Negative for abdominal pain. Negative for nausea/ vomiting. Negative for diarrhea.   : Negative for dysuria.   Neurological: Negative for focal weakness or sensory changes.    Objective:     Vitals:    04/19/22 1046   BP: (!) 100/57   Pulse: 81       General: NAD. AAO. Thin  HENT: No scleral icterus. Extraocular movements intact.  Neck: No JVD  Cardiovascular: Regular heart rate and rhythm. Fistula in RUE  Respiratory: CTAB. No increased work of breathing.  Extremities: Warm. No edema.  Skin: no ulceration or wounds present    Labs:     Lab Results   Component Value Date     (L) 01/15/2022    K 5.3 (H) 01/15/2022    CL 87 (L) 01/15/2022    CO2 30 (H) 01/15/2022    BUN 56 (H) 01/15/2022    CREATININE 6.3 (H) 01/15/2022    ANIONGAP 15 01/15/2022     Lab Results   Component Value Date    HGBA1C 4.5 11/16/2020     Lab Results   Component Value Date    BNP >4,500 (H) 01/15/2022    BNP >4,500 (H) 11/08/2021    BNP >4,500 (H) 05/26/2021       Lab Results   Component Value Date    WBC 5.24 01/15/2022    HGB 12.2 01/15/2022    HCT 38.8 01/15/2022    HCT 26 (L) 05/24/2021     (L) 01/15/2022    GRAN 4.5 01/15/2022    GRAN 86.4 (H) 01/15/2022     Lab Results    Component Value Date    CHOL 121 03/23/2021    HDL 62 03/23/2021    LDLCALC 41.8 (L) 03/23/2021    TRIG 86 03/23/2021       Lab Results   Component Value Date     (L) 01/15/2022    K 5.3 (H) 01/15/2022    CL 87 (L) 01/15/2022    CO2 30 (H) 01/15/2022    BUN 56 (H) 01/15/2022    CREATININE 6.3 (H) 01/15/2022    ANIONGAP 15 01/15/2022     Lab Results   Component Value Date    HGBA1C 4.5 11/16/2020     Lab Results   Component Value Date    BNP >4,500 (H) 01/15/2022    BNP >4,500 (H) 11/08/2021    BNP >4,500 (H) 05/26/2021    Lab Results   Component Value Date    WBC 5.24 01/15/2022    HGB 12.2 01/15/2022    HCT 38.8 01/15/2022    HCT 26 (L) 05/24/2021     (L) 01/15/2022    GRAN 4.5 01/15/2022    GRAN 86.4 (H) 01/15/2022     Lab Results   Component Value Date    CHOL 121 03/23/2021    HDL 62 03/23/2021    LDLCALC 41.8 (L) 03/23/2021    TRIG 86 03/23/2021            Assessment & Plan:     · Systolic dysfunction: EF 18%. Unclear etiology PET stress 5/2021 without ischemia and EF read at 24% at that time. No other clear etiology of systolic dysfunction. Difficulties uptitrating GDMT secondary to hypotension (currently on midodrine)  · IC referral previously placed for C given EF reduction. Will schedule today.  · Consider increasing Toprol XL to 50 mg daily (will defer to HTS who she is seeing today)  · Continue Entresto at low dose  · No indication for CRT-D  · Contraindication to SGLT-2 inh given renal dysfunction  · HD for volume removal    · H/o hypertension: now with hypotension    · ESRD: delisted for tx at this time given EF reduction    · HLD: continue high intensity statin therapy    Yo Aguilar M.D.  Pager #: [phone]  [facility] Cardiovascular Fellow PGY-6

## 2022-04-19 NOTE — LETTER
April 19, 2022        Eliseo Barnes  [address] [facility] [address]  Phone: [phone]  Fax: [phone]             [id number]  [address]  [facility] [address]  Phone: [phone]   Patient: Latricia Higgins   MR Number: [id number]   YOB: 1955   Date of Visit: 4/19/2022       Dear Dr. Eliseo Barnes    Thank you for referring Latricia Higgins to me for evaluation. Attached you will find relevant portions of my assessment and plan of care.    If you have questions, please do not hesitate to call me. I look forward to following Latricia Higgins along with you.    Sincerely,    Jose Cruz Dixon MD    Enclosure    If you would like to receive this communication electronically, please contact [email or web address] or [phone] to request [employer] Link access.    [employer] Link is a tool which provides read-only access to select patient information with whom you have a relationship. Its easy to use and provides real time access to review your patients record including encounter summaries, notes, results, and demographic information.    If you feel you have received this communication in error or would no longer like to receive these types of communications, please e-mail [email or web address]

## 2022-04-19 NOTE — PATIENT INSTRUCTIONS
You have just the right amount of fluid on you.  Please adhere to a low sodium diet (no more than 1.5 grams of sodium in 24h).  3.   Follow fluid restriction of  2. no more than 1.5 liters in 24 hours..   4. Please stop taking metoprolol xl.  5.  Increase CARVEDILOL to 6.25 mg BID.  6.  Agree with plans for Left Heart Cath.  7. Call us every 2 weeks for next upgrade on your medications.  8. Follow up in 2 months with labs.

## 2022-04-19 NOTE — PROGRESS NOTES
[facility] Follow up.      Attending Physician: Jose Cruz Dixon MD.  The patient's last visit with me was on Visit date not found.         HPI.  Latricia Higgins is a 66 y.o. y.o. female     Problems  HTN  Systolic dysfunction, EF 18% (normal in 9/2020, 45% last July 2021)  HLD, LDL 42  History of DVT on OAC  Aortic atherosclerosis  ESRD on HD 10/2020, delisted for tx at this time given reduced EF  Emphysema     Patient delisted for kidney transplant at this time since EF reduced 18%. PET stress 2021 without ischemia. Derrek CHF or anginal symptoms. To see HTS today.     Review of Systems   Constitutional: Negative for activity change, appetite change, chills, diaphoresis, fever and unexpected weight change.   HENT: Negative for nasal congestion, rhinorrhea and sore throat.    Eyes: Negative for visual disturbance.   Cardiovascular: Negative for chest pain, palpitations, leg swelling and claudication.   Gastrointestinal: Negative for abdominal distention, abdominal pain, diarrhea, nausea and vomiting.   Genitourinary: Negative for difficulty urinating, dysuria and hematuria.   Integumentary:  Negative for rash.   Neurological: Negative for seizures, syncope and light-headedness.   Psychiatric/Behavioral: Negative for agitation and hallucinations.        Past Medical History:   Diagnosis Date    Dialysis patient 10/05/2020    Emphysema of lung     ESRD (end stage renal disease)     Hepatitis 1980    HLD (hyperlipidemia)     Hypertension     Renal disorder         Past Surgical History:   Procedure Laterality Date    AORTOGRAPHY WITH SERIALOGRAPHY N/A 9/30/2020    Procedure: co2 renal angio;  Surgeon: Lance Bustamante MD;  [facility] CATH[facility]EP LAB;  Service: Vascular;  Laterality: N/A;    APPENDECTOMY      AV FISTULA PLACEMENT Right 12/9/2020    Procedure: CREATION, AV FISTULA;   Surgeon: Lance Bustamante MD;  Location: [facility] OR;  Service: General;  Laterality: Right;    COLONOSCOPY N/A 6/8/2021    Procedure: COLONOSCOPY;  Surgeon: Avila Smith III, MD;  Location: [facility] ENDO;  Service: Endoscopy;  Laterality: N/A;    COLONOSCOPY N/A 12/14/2021    Procedure: COLONOSCOPY;  Surgeon: Avila Smith III, MD;  Location: [facility] ENDO;  Service: Endoscopy;  Laterality: N/A;    COLONOSCOPY W/ POLYPECTOMY  06/08/2021    INSERTION OF TUNNELED CENTRAL VENOUS HEMODIALYSIS CATHETER  10/2/2020    Procedure: INSERTION, CATHETER, CENTRAL VENOUS, HEMODIALYSIS, TUNNELED;  Surgeon: Ali Khoobehi, MD;  Location: [facility] OR;  Service: Vascular;;    PLACEMENT OF DIALYSIS ACCESS Right 9/30/2020    Procedure: Insertion, Dialysis Access;  Surgeon: Lance Bustamante MD;  Location: [facility] CATH/EP LAB;  Service: Vascular;  Laterality: Right;    REMOVAL OF TUNNELED CENTRAL VENOUS CATHETER (CVC) N/A 12/16/2020    Procedure: REMOVAL, CATHETER, CENTRAL VENOUS, TUNNELED;  Surgeon: Ali Khoobehi, MD;  Location: [facility] OR;  Service: Vascular;  Laterality: N/A;    TONSILLECTOMY      TUBAL LIGATION          Family History   Problem Relation Age of Onset    Cancer Mother     Heart disease Father         Review of patient's allergies indicates:  No Known Allergies     Current Outpatient Medications   Medication Instructions    acetaminophen (TYLENOL) 325 mg, Oral, Every 6 hours PRN    albuterol (PROVENTIL/VENTOLIN HFA) 90 mcg/actuation inhaler 1-2 puffs, Inhalation, Every 6 hours PRN, Rescue    apixaban (ELIQUIS) 5 mg, Oral, 2 times daily    atorvastatin (LIPITOR) 40 mg, Oral, 2 times daily    carvediloL (COREG) 3.125 mg, Oral, 2 times daily    LIDOcaine-prilocaine (EMLA) cream APPLY TO FISTULA 1 HOUR PRIOR TO DIALYSIS TREATMENT 3 TIMES PER WEEK    metoprolol succinate (TOPROL-XL) 25 mg, Oral, Daily    midodrine (PROAMATINE) 10 MG tablet No dose, route, or frequency recorded.    ondansetron (ZOFRAN-ODT) 4 mg, Oral,  "Every 6 hours PRN    sacubitriL-valsartan (ENTRESTO) 24-26 mg per tablet 1 tablet, Oral, 2 times daily    sevelamer carbonate (RENVELA) 2,400 mg, Oral, 3 times daily with meals    sodium zirconium cyclosilicate (LOKELMA) 10 gram packet Take 1 packet by mouth once daily. Mix all of packet into glass with >3 tablespoons of water. Stir well & drink immediately. If powder remains in the glass, add water & repeat until no powder remains to ensure all is taken.    umeclidinium-vilanteroL (ANORO ELLIPTA) 62.5-25 mcg/actuation DsDv 1 puff, Inhalation, Daily, Controller    umeclidinium-vilanteroL (ANORO ELLIPTA) 62.5-25 mcg/actuation DsDv 1 puff, Inhalation, Daily, Controller    VELTASSA 8.4 gram PwPk No dose, route, or frequency recorded.    [employer] DELIV[employer] Lizbeth AS DIRECTED        There were no vitals filed for this visit.     Wt Readings from Last 3 Encounters:   04/19/22 57 kg (125 lb 10.6 oz)   03/08/22 53.1 kg (117 lb)   02/23/22 53.1 kg (117 lb)     Temp Readings from Last 3 Encounters:   01/20/22 98.2 °F (36.8 °C) (Oral)   01/16/22 97.9 °F (36.6 °C) (Oral)   01/02/22 98.3 °F (36.8 °C)     BP Readings from Last 3 Encounters:   04/19/22 120/70   04/19/22 (!) 100/57   02/23/22 115/78     Pulse Readings from Last 3 Encounters:   04/19/22 81   02/23/22 86   02/15/22 88        There is no height or weight on file to calculate BMI. Estimated body surface area is 1.6 meters squared as calculated from the following:    Height as of an earlier encounter on 4/19/22: 5' 4" (1.626 m).    Weight as of an earlier encounter on 4/19/22: 57 kg (125 lb 10.6 oz).     Physical Exam  Constitutional:       Appearance: She is well-developed.   HENT:      Head: Normocephalic and atraumatic.      Right Ear: External ear normal.      Left Ear: External ear normal.   Eyes:      Conjunctiva/sclera: Conjunctivae normal.      Pupils: Pupils are equal, round, and reactive to light.   Neck:      Vascular: No hepatojugular reflux " or JVD.   Cardiovascular:      Rate and Rhythm: Normal rate and regular rhythm.      Pulses: Intact distal pulses.      Heart sounds: S1 normal and S2 normal. No murmur heard.    No friction rub. No gallop.   Pulmonary:      Effort: Pulmonary effort is normal.      Breath sounds: Normal breath sounds.   Abdominal:      General: Bowel sounds are normal. There is no distension.      Palpations: Abdomen is soft.      Tenderness: There is no abdominal tenderness. There is no guarding or rebound.   Musculoskeletal:      Cervical back: Normal range of motion and neck supple.      Right lower leg: No edema.      Left lower leg: No edema.   Neurological:      Mental Status: She is alert and oriented to person, place, and time.          Lab Results   Component Value Date    BNP >4,500 (H) 01/15/2022     (L) 01/15/2022    K 5.3 (H) 01/15/2022    MG 2.4 01/15/2022    CL 87 (L) 01/15/2022    CO2 30 (H) 01/15/2022    BUN 56 (H) 01/15/2022    CREATININE 6.3 (H) 01/15/2022     (H) 01/15/2022    HGBA1C 4.5 11/16/2020    AST 76 (H) 01/15/2022    ALT 90 (H) 01/15/2022    ALBUMIN 3.4 (L) 01/15/2022    PROT 5.9 (L) 01/15/2022    BILITOT 1.0 01/15/2022    WBC 5.24 01/15/2022    HGB 12.2 01/15/2022    HCT 38.8 01/15/2022    HCT 26 (L) 05/24/2021     (L) 01/15/2022    INR 1.6 11/08/2021    INR 0.9 03/23/2021    TSH 1.920 01/15/2022    CHOL 121 03/23/2021    HDL 62 03/23/2021    LDLCALC 41.8 (L) 03/23/2021    TRIG 86 03/23/2021         Results for orders placed in visit on 03/08/22    Echo    Interpretation Summary  · The left ventricle is normal in size with mild concentric hypertrophy and severely decreased systolic function.  · The estimated ejection fraction is 18%.  · Grade III left ventricular diastolic dysfunction.  · Mild right ventricular enlargement with mildly to moderately reduced right ventricular systolic function.  · Mild left atrial enlargement.  · Moderate tricuspid regurgitation.  · There is mild to  moderate pulmonary hypertension.  · Moderate pulmonic regurgitation.  · Moderate-to-severe mitral regurgitation.        Results for orders placed during the hospital encounter of 09/27/20    Cardiac catheterization    Narrative  Procedure performed in the Invasive Lab  - See Procedure Log link below for nursing documentation  - See OpNote on Surgeries Tab for physician findings         Assessment and Plan:  Acute on chronic heart failure, unspecified heart failure type  -     Ambulatory referral/consult to Transplant, Heart  -     CBC Auto Differential; Future; Expected date: 05/19/2022  -     Comprehensive Metabolic Panel; Future; Expected date: 05/19/2022  -     NT-Pro Natriuretic Peptide; Future; Expected date: 05/19/2022    Pre-transplant evaluation for kidney transplant  -     Ambulatory referral/consult to Transplant, Heart    Organ transplant candidate  -     Ambulatory referral/consult to Transplant, Heart    Preop cardiovascular exam  -     Ambulatory referral/consult to Transplant, Heart    Pericardial effusion  -     Ambulatory referral/consult to Transplant, Heart    Pleural effusion  -     Ambulatory referral/consult to Transplant, Heart    ESRD (end stage renal disease)  -     Ambulatory referral/consult to Transplant, Heart    Primary hypertension    Pure hypercholesterolemia    Calcification of aorta    Systolic dysfunction    Other orders  -     Discontinue: carvediloL (COREG) 3.125 MG tablet; Take 1 tablet (3.125 mg total) by mouth 2 (two) times daily.  Dispense: 60 tablet; Refill: 3  -     carvediloL (COREG) 3.125 MG tablet; Take 2 tablets (6.25 mg total) by mouth 2 (two) times daily.  Dispense: 60 tablet; Refill: 3          1. Chronic systolic HF, NYHA class II, stage C.  Etiology: non ischemic vs ischemic. Cardiomyopathy of ESRD?  Devices: none, recent drop in EF to less than 35%.  Medications: sacubitril-valsartan, metoprolol succinate and carvedilol.  Hemodynamic status: warm, normotensive,  euvolemic. Normal JVP on exam. No congestive symptoms. Does not feel limited at all.  Clinical course:   Plan:  -patient to adhere to a fluid restriction of NMT 1.5 liters/24h.  -patient to adhere to  low sodium diet, NMT 1.5 grams of sodium in a day.  -stop metoprolol xl.  -Increase CARVEDILOL to 6.25 mg BID.  -Agree with plans for Left Heart Cath.  -patient was asked to call us every 2 weeks for next upgrade on her medications.  -Follow up in 2 months with labs.

## 2022-04-25 DIAGNOSIS — I70.0 CALCIFICATION OF AORTA: Primary | ICD-10-CM

## 2022-04-28 ENCOUNTER — LAB VISIT (OUTPATIENT)
Dept: LAB | Facility: HOSPITAL | Age: 67
End: 2022-04-28
Attending: INTERNAL MEDICINE
Payer: COMMERCIAL

## 2022-04-28 ENCOUNTER — OFFICE VISIT (OUTPATIENT)
Dept: CARDIOLOGY | Facility: CLINIC | Age: 67
End: 2022-04-28
Payer: COMMERCIAL

## 2022-04-28 ENCOUNTER — DOCUMENTATION ONLY (OUTPATIENT)
Dept: CARDIOLOGY | Facility: CLINIC | Age: 67
End: 2022-04-28
Payer: COMMERCIAL

## 2022-04-28 VITALS
HEIGHT: 64 IN | WEIGHT: 116.19 LBS | HEART RATE: 80 BPM | SYSTOLIC BLOOD PRESSURE: 118 MMHG | BODY MASS INDEX: 19.84 KG/M2 | DIASTOLIC BLOOD PRESSURE: 67 MMHG | OXYGEN SATURATION: 99 %

## 2022-04-28 DIAGNOSIS — I70.0 CALCIFICATION OF AORTA: ICD-10-CM

## 2022-04-28 DIAGNOSIS — I50.42 CHRONIC COMBINED SYSTOLIC AND DIASTOLIC HEART FAILURE: Primary | ICD-10-CM

## 2022-04-28 DIAGNOSIS — E78.00 PURE HYPERCHOLESTEROLEMIA: ICD-10-CM

## 2022-04-28 DIAGNOSIS — N18.6 ESRD (END STAGE RENAL DISEASE): ICD-10-CM

## 2022-04-28 DIAGNOSIS — I10 PRIMARY HYPERTENSION: ICD-10-CM

## 2022-04-28 DIAGNOSIS — I51.9 SYSTOLIC DYSFUNCTION: ICD-10-CM

## 2022-04-28 DIAGNOSIS — I82.722 CHRONIC DEEP VEIN THROMBOSIS (DVT) OF LEFT UPPER EXTREMITY, UNSPECIFIED VEIN: ICD-10-CM

## 2022-04-28 LAB
ABO + RH BLD: NORMAL
ANION GAP SERPL CALC-SCNC: 14 MMOL/L (ref 8–16)
BASOPHILS # BLD AUTO: 0.04 K/UL (ref 0–0.2)
BASOPHILS NFR BLD: 0.9 % (ref 0–1.9)
BLD GP AB SCN CELLS X3 SERPL QL: NORMAL
BUN SERPL-MCNC: 46 MG/DL (ref 8–23)
CALCIUM SERPL-MCNC: 10.1 MG/DL (ref 8.7–10.5)
CHLORIDE SERPL-SCNC: 88 MMOL/L (ref 95–110)
CO2 SERPL-SCNC: 33 MMOL/L (ref 23–29)
CREAT SERPL-MCNC: 7.8 MG/DL (ref 0.5–1.4)
DIFFERENTIAL METHOD: ABNORMAL
EOSINOPHIL # BLD AUTO: 0.2 K/UL (ref 0–0.5)
EOSINOPHIL NFR BLD: 3.2 % (ref 0–8)
ERYTHROCYTE [DISTWIDTH] IN BLOOD BY AUTOMATED COUNT: 13.6 % (ref 11.5–14.5)
EST. GFR  (AFRICAN AMERICAN): 5.7 ML/MIN/1.73 M^2
EST. GFR  (NON AFRICAN AMERICAN): 4.9 ML/MIN/1.73 M^2
GLUCOSE SERPL-MCNC: 79 MG/DL (ref 70–110)
HCT VFR BLD AUTO: 42.4 % (ref 37–48.5)
HGB BLD-MCNC: 13.8 G/DL (ref 12–16)
IMM GRANULOCYTES # BLD AUTO: 0.02 K/UL (ref 0–0.04)
IMM GRANULOCYTES NFR BLD AUTO: 0.4 % (ref 0–0.5)
LYMPHOCYTES # BLD AUTO: 1.1 K/UL (ref 1–4.8)
LYMPHOCYTES NFR BLD: 23.9 % (ref 18–48)
MCH RBC QN AUTO: 33.1 PG (ref 27–31)
MCHC RBC AUTO-ENTMCNC: 32.5 G/DL (ref 32–36)
MCV RBC AUTO: 102 FL (ref 82–98)
MONOCYTES # BLD AUTO: 0.7 K/UL (ref 0.3–1)
MONOCYTES NFR BLD: 13.9 % (ref 4–15)
NEUTROPHILS # BLD AUTO: 2.7 K/UL (ref 1.8–7.7)
NEUTROPHILS NFR BLD: 57.7 % (ref 38–73)
NRBC BLD-RTO: 0 /100 WBC
PLATELET # BLD AUTO: 155 K/UL (ref 150–450)
PMV BLD AUTO: 9.4 FL (ref 9.2–12.9)
POTASSIUM SERPL-SCNC: 4.6 MMOL/L (ref 3.5–5.1)
RBC # BLD AUTO: 4.17 M/UL (ref 4–5.4)
SODIUM SERPL-SCNC: 135 MMOL/L (ref 136–145)
WBC # BLD AUTO: 4.69 K/UL (ref 3.9–12.7)

## 2022-04-28 PROCEDURE — 85025 COMPLETE CBC W/AUTO DIFF WBC: CPT | Performed by: INTERNAL MEDICINE

## 2022-04-28 PROCEDURE — 80048 BASIC METABOLIC PNL TOTAL CA: CPT | Performed by: INTERNAL MEDICINE

## 2022-04-28 PROCEDURE — 99204 PR OFFICE/OUTPT VISIT, NEW, LEVL IV, 45-59 MIN: ICD-10-PCS | Mod: S$PBB,,, | Performed by: INTERNAL MEDICINE

## 2022-04-28 PROCEDURE — 99999 PR PBB SHADOW E&M-EST. PATIENT-LVL IV: ICD-10-PCS | Mod: PBBFAC,,, | Performed by: INTERNAL MEDICINE

## 2022-04-28 PROCEDURE — 36415 COLL VENOUS BLD VENIPUNCTURE: CPT | Performed by: INTERNAL MEDICINE

## 2022-04-28 PROCEDURE — 99204 OFFICE O/P NEW MOD 45 MIN: CPT | Mod: S$PBB,,, | Performed by: INTERNAL MEDICINE

## 2022-04-28 PROCEDURE — 86850 RBC ANTIBODY SCREEN: CPT | Performed by: INTERNAL MEDICINE

## 2022-04-28 PROCEDURE — 99999 PR PBB SHADOW E&M-EST. PATIENT-LVL IV: CPT | Mod: PBBFAC,,, | Performed by: INTERNAL MEDICINE

## 2022-04-28 RX ORDER — DIPHENHYDRAMINE HCL 50 MG
50 CAPSULE ORAL ONCE
Status: CANCELLED | OUTPATIENT
Start: 2022-04-28 | End: 2022-04-28

## 2022-04-28 NOTE — PROGRESS NOTES
"Subjective:    Patient ID:  Latricia Higgins is a 66 y.o. female who presents for evaluation of Congestive Heart Failure    Referring cardiologists: Erick Aguilar/ Cecilia    Mrs Higgins is a 66 year-old female with a history of HTN, Systolic dysfunction, EF 18% (normal in 9/2020, 45% last July 2021), HLD (LDL 42), History of DVT on Eliquis, Aortic atherosclerosis, ESRD on HD 10/2020, delisted for tx at this time given reduced EF, and Emphysema presenting as a referral from general cardiology for reduced EF.    Today, she states she's been feeling well. No chest pain, SOB, FRENCH, palps, orthopnea, leg swelling, weight gain, etc. She did have COVID 1/2022, but wasn't hospitalized. She has been on HD for 2 years and her ESRD is due to HTN. She has been on Midodrine on HD days due to low BP. Low BP has also limited her ability to tolerate GDMT for HFrEF.       Review of Systems   Constitutional: Negative.   HENT: Negative.    Eyes: Negative.    Cardiovascular: Negative.    Respiratory: Negative.    Endocrine: Negative.    Hematologic/Lymphatic: Negative.    Skin: Negative.    Musculoskeletal: Negative.    Gastrointestinal: Negative.    Genitourinary: Negative.    Neurological: Negative.    Psychiatric/Behavioral: Negative.    Allergic/Immunologic: Negative.         Objective:  Vitals:    04/28/22 1321   BP: 118/67   BP Location: Left arm   Patient Position: Sitting   BP Method: Large (Automatic)   Pulse: 80   SpO2: 99%   Weight: 52.7 kg (116 lb 2.9 oz)   Height: 5' 4" (1.626 m)         Physical Exam  Constitutional:       General: She is not in acute distress.     Appearance: She is not ill-appearing.      Comments: Pleasant middle age female   HENT:      Head: Normocephalic.      Mouth/Throat:      Mouth: Mucous membranes are moist.   Eyes:      Extraocular Movements: Extraocular movements intact.   Neck:      Comments: No JVD  Cardiovascular:      Rate and Rhythm: Normal rate and regular rhythm.      Pulses: Normal pulses. "           Radial pulses are 2+ on the right side and 2+ on the left side.        Femoral pulses are 2+ on the right side and 2+ on the left side.     Heart sounds: No murmur heard.     Comments: AVF in RUE with thrill and bruit  + Otilio's test in L hand  Pulmonary:      Effort: Pulmonary effort is normal. No respiratory distress.      Breath sounds: Normal breath sounds.   Abdominal:      General: Bowel sounds are normal. There is no distension.      Palpations: Abdomen is soft.      Tenderness: There is no abdominal tenderness.   Musculoskeletal:      Cervical back: Neck supple.      Right lower leg: No edema.      Left lower leg: No edema.   Skin:     General: Skin is warm.   Neurological:      General: No focal deficit present.      Mental Status: She is alert and oriented to person, place, and time.   Psychiatric:         Mood and Affect: Mood normal.         Behavior: Behavior normal.           Assessment:       1. Chronic combined systolic and diastolic heart failure    2. Systolic dysfunction    3. ESRD (end stage renal disease)    4. Primary hypertension    5. Pure hypercholesterolemia    6. Chronic deep vein thrombosis (DVT) of left upper extremity, unspecified vein         Plan:       Chronic Combined Systolic and Diastolic Heart Failure  Normal EF on TTE 9/2020, but has had sequentially decline in EF to 18% on most recent TTE from 3/22/2022. Cardiac PET 5/2021 also demonstrating reduced EF (24% at rest, 31% at stress) without any notable perfusion defects. No clinical signs of heart failure. FHx of heart disease in her father. Ischemic cardiac risk factors: HTN, ESRD, former tobacco use, and age.  - Given reduced EF of unclear etiology, will pursue left heart catheterization to assess for CAD  - Consented for left heart cath, emergent CABG, and blood transfusion in clinic today  - Positive Otilio's test in L wrist/hand and AVF in RUE, so will use R CFA access  - Continue GDMT with Coreg and Entresto;  managed by HTS  - Instructed to hold Eliquis 3 days prior to left heart cath    End-stage Renal Disease on Hemodialysis   - Continue home HD schedule; MWF  - Continue Midodrine PRN of HD days    Essential Hypertension  - Continue Coreg 6.25 mg BID and Entresto 24/26 mg BID    Hyperlipidemia  - Continue Atorvastatin 40 mg daily    Thrombosis of Left Internal Jugular and Left Cephalic Vein  - Continue Eliquis 5 mg BID  - Instructed to hold 3 days prior to left heart cath once scheduled    RTC PRN.    Case discussed with Dr Volodymyr Tyler MD.    Abhijeet Mitchell MD PGY4  Cardiovascular Medicine Fellow  [facility]  Pager: [phone]       I have personally taken the history and examined this patient and agree with the resident's note as stated above.  The patient has a cardiomyopathy of unknown etiology.  I discussed diagnostic cardiac catheterization as part of the evaluation of her cardiomyopathy.  SHe stated she would like to proceed. Plan as detailed above.  All of the patient's questions were answered.

## 2022-04-28 NOTE — PROGRESS NOTES
OUTPATIENT CATHETERIZATION INSTRUCTIONS    You have been scheduled for a procedure in the catheterization lab on Tuesday, May 17, 2022.     Please report to the Cardiology Waiting Area on the Third floor of the hospital and check in at 10 AM.   You will then be taken to the SSCU (Short Stay Cardiac Unit) and prepared for your procedure. Please be aware that this is not the time of your procedure but the time you are to arrive. The procedures are scheduled on an hourly basis; however, emergency cases take precedence over all other cases.       1. No solid foods 8 hours prior to your procedure.  You may have clear liquids until the time of your admission which should be 2 hours prior to your procedure.  You are encouraged to drink at least 8 ounces of clear liquids prior to your admission to SSCU.  Patients with gastric emptying issues should be fasting for 6- 8 hours prior to the procedure.  Clear liquids include water, black coffee, clear juices, and performance drinks - no pulp or milk.    2. Heart failure or dialysis patients will be limited to 8 ounces (1 cup) of clear liquids up until 2 hours of the procedure.    3.   You may take your regular morning medications with water. If there are any medications that you should not take you will be instructed to hold them that morning. If you         are diabetic and on Metformin (Glucophage) do not take it the day before, the day of, and for 2 days after your procedure.  4.   If you are on Pradaxa, Eliquis or Coumadin , you can hold them 3 days prior to your procedure.      The procedure will take 1-2 hours to perform. After the procedure, you will return to SSCU on the third floor of the hospital. You will need to lie still (or keep your arm still) for the next 4 to 6 hours to minimize bleeding from the puncture site. Your family may remain in the room with you during this time.       You may be able to be discharged home that same afternoon if there is someone to  drive you home and there were no complications. If you have one of the balloon, stent, or device procedures you may spend the night in the hospital. Your doctor will determine, based on your progress, the date and time of your discharge. The results of your procedure will be discussed with you before you are discharged. Any further testing or procedures will be scheduled for you either before you leave or you will be called with these appointments.       If you should have any questions, concerns, or need to change the date of your procedure, please call  LIBBY Cordero @ [phone]    Special Instructions:  Hold Eliquis Saturday, Sunday and Monday        THE ABOVE INSTRUCTIONS WERE GIVEN TO THE PATIENT VERBALLY AND THEY VERBALIZED UNDERSTANDING.  THEY DO NOT REQUIRE ANY SPECIAL NEEDS AND DO NOT HAVE ANY LEARNING BARRIERS.          Directions for Reporting to Cardiology Waiting Area in the Hospital  If you park in the Parking Garage:  Take elevators to the1st floor of the parking garage.  Continue past the gift shop, coffee shop, and piano.  Take a right and go to the gold elevators. (Elevator B)  Take the elevator to the 3rd floor.  Follow the arrow on the sign on the wall that says Cath Lab Registration/EP Lab Registration.  Follow the long hallway all the way around until you come to a big open area.  This is the registration area.  Check in at Reception Desk.    OR    If family is dropping you off:  Have them drop you off at the front of the Hospital under the green overhang.  Enter through the doors and take a right.  Take the E elevators to the 3rd floor Cardiology Waiting Area.  Check in at the Reception Desk in the waiting room.

## 2022-04-28 NOTE — H&P (VIEW-ONLY)
"Subjective:    Patient ID:  Latricia Higgins is a 66 y.o. female who presents for evaluation of Congestive Heart Failure    Referring cardiologists: Erick Aguilar/ Cecilia    Mrs Higgins is a 66 year-old female with a history of HTN, Systolic dysfunction, EF 18% (normal in 9/2020, 45% last July 2021), HLD (LDL 42), History of DVT on Eliquis, Aortic atherosclerosis, ESRD on HD 10/2020, delisted for tx at this time given reduced EF, and Emphysema presenting as a referral from general cardiology for reduced EF.    Today, she states she's been feeling well. No chest pain, SOB, FRENCH, palps, orthopnea, leg swelling, weight gain, etc. She did have COVID 1/2022, but wasn't hospitalized. She has been on HD for 2 years and her ESRD is due to HTN. She has been on Midodrine on HD days due to low BP. Low BP has also limited her ability to tolerate GDMT for HFrEF.       Review of Systems   Constitutional: Negative.   HENT: Negative.    Eyes: Negative.    Cardiovascular: Negative.    Respiratory: Negative.    Endocrine: Negative.    Hematologic/Lymphatic: Negative.    Skin: Negative.    Musculoskeletal: Negative.    Gastrointestinal: Negative.    Genitourinary: Negative.    Neurological: Negative.    Psychiatric/Behavioral: Negative.    Allergic/Immunologic: Negative.         Objective:  Vitals:    04/28/22 1321   BP: 118/67   BP Location: Left arm   Patient Position: Sitting   BP Method: Large (Automatic)   Pulse: 80   SpO2: 99%   Weight: 52.7 kg (116 lb 2.9 oz)   Height: 5' 4" (1.626 m)         Physical Exam  Constitutional:       General: She is not in acute distress.     Appearance: She is not ill-appearing.      Comments: Pleasant middle age female   HENT:      Head: Normocephalic.      Mouth/Throat:      Mouth: Mucous membranes are moist.   Eyes:      Extraocular Movements: Extraocular movements intact.   Neck:      Comments: No JVD  Cardiovascular:      Rate and Rhythm: Normal rate and regular rhythm.      Pulses: Normal pulses. "           Radial pulses are 2+ on the right side and 2+ on the left side.        Femoral pulses are 2+ on the right side and 2+ on the left side.     Heart sounds: No murmur heard.     Comments: AVF in RUE with thrill and bruit  + Otilio's test in L hand  Pulmonary:      Effort: Pulmonary effort is normal. No respiratory distress.      Breath sounds: Normal breath sounds.   Abdominal:      General: Bowel sounds are normal. There is no distension.      Palpations: Abdomen is soft.      Tenderness: There is no abdominal tenderness.   Musculoskeletal:      Cervical back: Neck supple.      Right lower leg: No edema.      Left lower leg: No edema.   Skin:     General: Skin is warm.   Neurological:      General: No focal deficit present.      Mental Status: She is alert and oriented to person, place, and time.   Psychiatric:         Mood and Affect: Mood normal.         Behavior: Behavior normal.           Assessment:       1. Chronic combined systolic and diastolic heart failure    2. Systolic dysfunction    3. ESRD (end stage renal disease)    4. Primary hypertension    5. Pure hypercholesterolemia    6. Chronic deep vein thrombosis (DVT) of left upper extremity, unspecified vein         Plan:       Chronic Combined Systolic and Diastolic Heart Failure  Normal EF on TTE 9/2020, but has had sequentially decline in EF to 18% on most recent TTE from 3/22/2022. Cardiac PET 5/2021 also demonstrating reduced EF (24% at rest, 31% at stress) without any notable perfusion defects. No clinical signs of heart failure. FHx of heart disease in her father. Ischemic cardiac risk factors: HTN, ESRD, former tobacco use, and age.  - Given reduced EF of unclear etiology, will pursue left heart catheterization to assess for CAD  - Consented for left heart cath, emergent CABG, and blood transfusion in clinic today  - Positive Otilio's test in L wrist/hand and AVF in RUE, so will use R CFA access  - Continue GDMT with Coreg and Entresto;  managed by HTS  - Instructed to hold Eliquis 3 days prior to left heart cath    End-stage Renal Disease on Hemodialysis   - Continue home HD schedule; MWF  - Continue Midodrine PRN of HD days    Essential Hypertension  - Continue Coreg 6.25 mg BID and Entresto 24/26 mg BID    Hyperlipidemia  - Continue Atorvastatin 40 mg daily    Thrombosis of Left Internal Jugular and Left Cephalic Vein  - Continue Eliquis 5 mg BID  - Instructed to hold 3 days prior to left heart cath once scheduled    RTC PRN.    Case discussed with Dr Volodymyr Tyler MD.    Abhijeet Mitchell MD PGY4  Cardiovascular Medicine Fellow  [facility]  Pager: [phone]       I have personally taken the history and examined this patient and agree with the resident's note as stated above.  The patient has a cardiomyopathy of unknown etiology.  I discussed diagnostic cardiac catheterization as part of the evaluation of her cardiomyopathy.  SHe stated she would like to proceed. Plan as detailed above.  All of the patient's questions were answered.

## 2022-05-17 ENCOUNTER — HOSPITAL ENCOUNTER (OUTPATIENT)
Facility: HOSPITAL | Age: 67
Discharge: HOME | End: 2022-05-17
Attending: INTERNAL MEDICINE | Admitting: INTERNAL MEDICINE
Payer: COMMERCIAL

## 2022-05-17 VITALS
WEIGHT: 127 LBS | HEIGHT: 64 IN | HEART RATE: 69 BPM | TEMPERATURE: 99 F | RESPIRATION RATE: 18 BRPM | BODY MASS INDEX: 21.68 KG/M2 | DIASTOLIC BLOOD PRESSURE: 63 MMHG | SYSTOLIC BLOOD PRESSURE: 131 MMHG | OXYGEN SATURATION: 96 %

## 2022-05-17 DIAGNOSIS — I50.42 CHRONIC COMBINED SYSTOLIC AND DIASTOLIC HEART FAILURE: ICD-10-CM

## 2022-05-17 DIAGNOSIS — I50.42 CHRONIC COMBINED SYSTOLIC (CONGESTIVE) AND DIASTOLIC (CONGESTIVE) HEART FAILURE: ICD-10-CM

## 2022-05-17 LAB
ABO + RH BLD: NORMAL
ANION GAP SERPL CALC-SCNC: 11 MMOL/L (ref 8–16)
BASOPHILS # BLD AUTO: 0.04 K/UL (ref 0–0.2)
BASOPHILS NFR BLD: 0.8 % (ref 0–1.9)
BLD GP AB SCN CELLS X3 SERPL QL: NORMAL
BUN SERPL-MCNC: 33 MG/DL (ref 8–23)
CALCIUM SERPL-MCNC: 9.2 MG/DL (ref 8.7–10.5)
CHLORIDE SERPL-SCNC: 94 MMOL/L (ref 95–110)
CO2 SERPL-SCNC: 33 MMOL/L (ref 23–29)
CREAT SERPL-MCNC: 6.9 MG/DL (ref 0.5–1.4)
DIFFERENTIAL METHOD: ABNORMAL
EOSINOPHIL # BLD AUTO: 0.1 K/UL (ref 0–0.5)
EOSINOPHIL NFR BLD: 2.3 % (ref 0–8)
ERYTHROCYTE [DISTWIDTH] IN BLOOD BY AUTOMATED COUNT: 12.3 % (ref 11.5–14.5)
EST. GFR  (AFRICAN AMERICAN): 6.6 ML/MIN/1.73 M^2
EST. GFR  (NON AFRICAN AMERICAN): 5.7 ML/MIN/1.73 M^2
GLUCOSE SERPL-MCNC: 90 MG/DL (ref 70–110)
HCT VFR BLD AUTO: 37.7 % (ref 37–48.5)
HGB BLD-MCNC: 12 G/DL (ref 12–16)
IMM GRANULOCYTES # BLD AUTO: 0.02 K/UL (ref 0–0.04)
IMM GRANULOCYTES NFR BLD AUTO: 0.4 % (ref 0–0.5)
LYMPHOCYTES # BLD AUTO: 0.9 K/UL (ref 1–4.8)
LYMPHOCYTES NFR BLD: 18.8 % (ref 18–48)
MCH RBC QN AUTO: 33 PG (ref 27–31)
MCHC RBC AUTO-ENTMCNC: 31.8 G/DL (ref 32–36)
MCV RBC AUTO: 104 FL (ref 82–98)
MONOCYTES # BLD AUTO: 0.5 K/UL (ref 0.3–1)
MONOCYTES NFR BLD: 9.9 % (ref 4–15)
NEUTROPHILS # BLD AUTO: 3.2 K/UL (ref 1.8–7.7)
NEUTROPHILS NFR BLD: 67.8 % (ref 38–73)
NRBC BLD-RTO: 0 /100 WBC
PLATELET # BLD AUTO: 143 K/UL (ref 150–450)
PMV BLD AUTO: 10 FL (ref 9.2–12.9)
POTASSIUM SERPL-SCNC: 4 MMOL/L (ref 3.5–5.1)
RBC # BLD AUTO: 3.64 M/UL (ref 4–5.4)
SODIUM SERPL-SCNC: 138 MMOL/L (ref 136–145)
WBC # BLD AUTO: 4.73 K/UL (ref 3.9–12.7)

## 2022-05-17 PROCEDURE — 93454 PR CATH PLACE/CORONARY ANGIO, IMG SUPER/INTERP: ICD-10-PCS | Mod: 26,,, | Performed by: INTERNAL MEDICINE

## 2022-05-17 PROCEDURE — 86901 BLOOD TYPING SEROLOGIC RH(D): CPT | Performed by: INTERNAL MEDICINE

## 2022-05-17 PROCEDURE — 25000003 PHARM REV CODE 250: Performed by: INTERNAL MEDICINE

## 2022-05-17 PROCEDURE — C1894 INTRO/SHEATH, NON-LASER: HCPCS | Performed by: INTERNAL MEDICINE

## 2022-05-17 PROCEDURE — 93010 EKG 12-LEAD: ICD-10-PCS | Mod: ,,, | Performed by: INTERNAL MEDICINE

## 2022-05-17 PROCEDURE — 99152 PR MOD CONSCIOUS SEDATION, SAME PHYS, 5+ YRS, FIRST 15 MIN: ICD-10-PCS | Mod: ,,, | Performed by: INTERNAL MEDICINE

## 2022-05-17 PROCEDURE — 93010 ELECTROCARDIOGRAM REPORT: CPT | Mod: ,,, | Performed by: INTERNAL MEDICINE

## 2022-05-17 PROCEDURE — 63600175 PHARM REV CODE 636 W HCPCS: Performed by: INTERNAL MEDICINE

## 2022-05-17 PROCEDURE — C1769 GUIDE WIRE: HCPCS | Performed by: INTERNAL MEDICINE

## 2022-05-17 PROCEDURE — 93454 CORONARY ARTERY ANGIO S&I: CPT | Mod: 26,,, | Performed by: INTERNAL MEDICINE

## 2022-05-17 PROCEDURE — 93454 CORONARY ARTERY ANGIO S&I: CPT | Performed by: INTERNAL MEDICINE

## 2022-05-17 PROCEDURE — 99152 MOD SED SAME PHYS/QHP 5/>YRS: CPT | Performed by: INTERNAL MEDICINE

## 2022-05-17 PROCEDURE — 80048 BASIC METABOLIC PNL TOTAL CA: CPT | Performed by: INTERNAL MEDICINE

## 2022-05-17 PROCEDURE — 85025 COMPLETE CBC W/AUTO DIFF WBC: CPT | Performed by: INTERNAL MEDICINE

## 2022-05-17 PROCEDURE — 25500020 PHARM REV CODE 255: Performed by: INTERNAL MEDICINE

## 2022-05-17 PROCEDURE — 93005 ELECTROCARDIOGRAM TRACING: CPT

## 2022-05-17 PROCEDURE — 99152 MOD SED SAME PHYS/QHP 5/>YRS: CPT | Mod: ,,, | Performed by: INTERNAL MEDICINE

## 2022-05-17 RX ORDER — FENTANYL CITRATE 50 UG/ML
INJECTION, SOLUTION INTRAMUSCULAR; INTRAVENOUS
Status: DISCONTINUED | OUTPATIENT
Start: 2022-05-17 | End: 2022-05-17 | Stop reason: HOSPADM

## 2022-05-17 RX ORDER — ONDANSETRON 8 MG/1
8 TABLET, ORALLY DISINTEGRATING ORAL EVERY 8 HOURS PRN
Status: DISCONTINUED | OUTPATIENT
Start: 2022-05-17 | End: 2022-05-17 | Stop reason: HOSPADM

## 2022-05-17 RX ORDER — ASPIRIN 81 MG/1
81 TABLET ORAL DAILY
Qty: 90 TABLET | Refills: 3 | Status: SHIPPED | OUTPATIENT
Start: 2022-05-17 | End: 2023-05-16

## 2022-05-17 RX ORDER — MIDAZOLAM HYDROCHLORIDE 1 MG/ML
INJECTION INTRAMUSCULAR; INTRAVENOUS
Status: DISCONTINUED | OUTPATIENT
Start: 2022-05-17 | End: 2022-05-17 | Stop reason: HOSPADM

## 2022-05-17 RX ORDER — DIPHENHYDRAMINE HCL 50 MG
50 CAPSULE ORAL ONCE
Status: COMPLETED | OUTPATIENT
Start: 2022-05-17 | End: 2022-05-17

## 2022-05-17 RX ORDER — HEPARIN SOD,PORCINE/0.9 % NACL 1000/500ML
INTRAVENOUS SOLUTION INTRAVENOUS
Status: DISCONTINUED | OUTPATIENT
Start: 2022-05-17 | End: 2022-05-17 | Stop reason: HOSPADM

## 2022-05-17 RX ORDER — LIDOCAINE HYDROCHLORIDE 20 MG/ML
INJECTION, SOLUTION INFILTRATION; PERINEURAL
Status: DISCONTINUED | OUTPATIENT
Start: 2022-05-17 | End: 2022-05-17 | Stop reason: HOSPADM

## 2022-05-17 RX ORDER — ACETAMINOPHEN 325 MG/1
650 TABLET ORAL EVERY 4 HOURS PRN
Status: DISCONTINUED | OUTPATIENT
Start: 2022-05-17 | End: 2022-05-17 | Stop reason: HOSPADM

## 2022-05-17 RX ADMIN — DIPHENHYDRAMINE HYDROCHLORIDE 50 MG: 50 CAPSULE ORAL at 10:05

## 2022-05-17 NOTE — DISCHARGE INSTRUCTIONS
1. Do not strain or lift anything greater than 5 lb for 1 week.   2. Do not drive or operate any dangerous machinery for 24 hours.   3. Keep the dressing on, clean, and dry for 24 hours.   4. After 24 hours, the dressing may be removed and a shower is allowed.   5. Clean the area with mild soap and water and then cover it with a bandage.   6. Once the skin has healed, bathing in a tub or swimming is allowed.   7. Inspect the groin site daily and report to the physician any swelling at the site that   cannot be controlled with manual pressure for 10 minutes, unusual pain at the   access site or affected extremity, unusual swelling at the access site, or signs or   symptoms of infection such as redness, pain, or fever.   Call 911 if you have:   Bleeding from the puncture site that you cannot stop by doing the following:   Relax and lie down right away. Keep your leg flat and apply firm pressure to the site using your fingers and a gauze pad. Keep the pressure on for 20 minutes. Continue this until the bleeding stops. This may take awhile. When bleeding stops, cover the site with a sterile bandage and keep your leg still as much as possible.

## 2022-05-17 NOTE — DISCHARGE SUMMARY
[facility] - Short Stay Cardiac Unit  Cardiology  Discharge Summary      Patient Name: Latricia Higgins  MRN: [id number]  Admission Date: 5/17/2022  Hospital Length of Stay: 0 days  Discharge Date and Time:  05/17/2022 1:20 PM  Attending Physician: Daniel Tyler MD    Discharging Provider: Abhijeet Mitchell MD  Primary Care Physician: Ralf Ham MD    HPI:   Latricia Higgins is a 66 y.o. female who presents for evaluation of Congestive Heart Failure     Referring cardiologists: Erick Aguilar/ Cecilia     Mrs Higgins is a 66 year-old female with a history of HTN, Systolic dysfunction, EF 18% (normal in 9/2020, 45% last July 2021), HLD (LDL 42), History of DVT on Eliquis, Aortic atherosclerosis, ESRD on HD 10/2020, delisted for tx at this time given reduced EF, and Emphysema presenting as a referral from general cardiology for reduced EF.     Today, she states she's been feeling well. No chest pain, SOB, FRENCH, palps, orthopnea, leg swelling, weight gain, etc. She did have COVID 1/2022, but wasn't hospitalized. She has been on HD for 2 years and her ESRD is due to HTN. She has been on Midodrine on HD days due to low BP. Low BP has also limited her ability to tolerate GDMT for HFrEF.     Patient presented for outpt diagnostic angiogram for further work-up of systolic heart failure.      Procedure(s) (LRB):  Left heart cath (Left)     Indwelling Lines/Drains at time of discharge:  Lines/Drains/Airways     Drain  Duration                Hemodialysis AV Fistula Right upper arm -- days                Hospital Course:  Successful diagnostic angiogram with mild coronary artery disease. CAD not severe enough to explain severely reduced EF with global hypokinesis. Will start ASA daily for medical mgmt and further work-up to be performed by outpt cardiologist. Patient uneventfully discharged home once bed rest requirement completed.     Goals of Care Treatment Preferences:  Code Status: Full Code      Significant Diagnostic  Studies: Mild coronary disease; refer to cath report for full details    Pending Diagnostic Studies:     None          Final Active Diagnoses:    Diagnosis Date Noted POA    Systolic dysfunction [I51.9] 03/28/2022 Yes    ESRD (end stage renal disease) [N18.6] 10/21/2020 Yes    HTN (hypertension) [I10] 09/27/2020 Yes    HLD (hyperlipidemia) [E78.5] 09/27/2020 Yes      Problems Resolved During this Admission:     No new Assessment & Plan notes have been filed under this hospital service since the last note was generated.  Service: Interventional Cardiology      Discharged Condition: stable    Disposition: Home or Self Care    Follow Up:  Follow-up with Dr Brooks     Patient Instructions:   Please start ASA 81 mg daily and monitor for signs of bleeding    Medications:  Reconciled Home Medications:      Medication List      START taking these medications    aspirin 81 MG EC tablet  Commonly known as: ECOTRIN  Take 1 tablet (81 mg total) by mouth once daily.        CONTINUE taking these medications    acetaminophen 325 MG tablet  Commonly known as: TYLENOL  Take 325 mg by mouth every 6 (six) hours as needed for Pain.     albuterol 90 mcg/actuation inhaler  Commonly known as: PROVENTIL/VENTOLIN HFA  Inhale 1-2 puffs into the lungs every 6 (six) hours as needed for Wheezing. Rescue     apixaban 5 mg Tab  Commonly known as: ELIQUIS  Take 1 tablet (5 mg total) by mouth 2 (two) times daily.     atorvastatin 40 MG tablet  Commonly known as: LIPITOR  Take 40 mg by mouth 2 (two) times a day.     carvediloL 3.125 MG tablet  Commonly known as: COREG  Take 2 tablets (6.25 mg total) by mouth 2 (two) times daily.     ENTRESTO 24-26 mg per tablet  Generic drug: sacubitriL-valsartan  Take 1 tablet by mouth 2 (two) times daily.     LIDOcaine-prilocaine cream  Commonly known as: EMLA  APPLY TO FISTULA 1 HOUR PRIOR TO DIALYSIS TREATMENT 3 TIMES PER WEEK     midodrine 10 MG tablet  Commonly known as: PROAMATINE  every Mon, Wed, Fri.  With dialysis     ondansetron 4 MG Tbdl  Commonly known as: ZOFRAN-ODT  Take 4 mg by mouth every 6 (six) hours as needed (nausea).     sevelamer carbonate 800 mg Tab  Commonly known as: RENVELA  Take 2,400 mg by mouth 3 (three) times daily with meals.     * umeclidinium-vilanteroL 62.5-25 mcg/actuation Dsdv  Commonly known as: ANORO ELLIPTA  Inhale 1 puff into the lungs once daily. Controller     * ANORO ELLIPTA 62.5-25 mcg/actuation Dsdv  Generic drug: umeclidinium-vilanteroL  Inhale 1 puff into the lungs once daily. Controller     VELTASSA 8.4 gram Pwpk  Generic drug: patiromer calcium sorbitex  Tues, thurs, sat, sun     VIOS AEROSOL DELIVERY SYSTEM Lizbeth  Generic drug: nebulizer and compressor  AS DIRECTED         * This list has 2 medication(s) that are the same as other medications prescribed for you. Read the directions carefully, and ask your doctor or other care provider to review them with you.                Time spent on the discharge of patient: 25 minutes    Abhijeet Mitchell MD  Cardiology  [facility] - Short Stay Cardiac Unit

## 2022-05-17 NOTE — INTERVAL H&P NOTE
The patient has been examined and the H&P has been reviewed:    I concur with the findings and no changes have occurred since H&P was written.    Procedure risks, benefits and alternative options discussed and understood by patient/family.    Patient feeling well this morning. Presenting for diagnostic angiogram. Last dose of Eliquis was 5/13. Access R CFA. No other questions and accompanied by her son this morning.       Active Hospital Problems    Diagnosis  POA    Systolic dysfunction [I51.9]  Yes    ESRD (end stage renal disease) [N18.6]  Yes    HTN (hypertension) [I10]  Yes    HLD (hyperlipidemia) [E78.5]  Yes      Resolved Hospital Problems   No resolved problems to display.

## 2022-05-17 NOTE — PROGRESS NOTES
Received pt back to SSCU 1 from Cath Lab via stretcher accompanied by RN. JUSTIN x 4.  Denies pain or discomfort. Respirations even and unlabored.  Dressing to the right groin clean, dry and intact.  No bleeding or hematoma noted.  Pt stable. Pt oriented to room and call bell placed within reach.  Notified Son in law of pt arriving back to floor.

## 2022-05-17 NOTE — Clinical Note
70 ml of contrast were injected throughout the case. 30 mL of contrast was the total wasted during the case. 100 mL was the total amount used during the case.

## 2022-05-17 NOTE — PROGRESS NOTES
Pt is AAOx3 and in no apparent distress.  Dressing to R groin is c/d/i without redness or swelling.  Provided a copy of discharge instructions.  Teaching performed.  Pt verbalized understanding and denied any questions.  Provided pt with prescription. PIV d/c catheter tip intact.  2x2 applied and no active bleeding noted.  Pt declined wheelchair and is walking out with family for transport home.

## 2022-05-17 NOTE — OP NOTE
Brief Operative Note:    : Daniel Tyler MD     Referring Physician: Elie Aguilar     All Operators: Surgeon(s):  MD Abhijeet Avitia MD     Preoperative Diagnosis: Chronic combined systolic and diastolic heart failure [I50.42]  Cardiomyopathy     Postop Diagnosis: Cardiomyopathy    Treatments/Procedures: Procedure(s) (LRB):  Left heart cath (Left)    Findings:Mild coronary disease is present. See catheterization report for full details.  30% proximal LAD stenosis  70% ostial OM1 stenosis  20% ostial RCA stenosis  Manual pressure held at R CFA with hemostasis achieved    Estimated Blood loss: 20 cc    Specimens removed: No    Recommendations:   - Routine post-cath care as per orders  - Continue medical management, Risk factor reduction, Follow-up with outpatient cardiologist   - Start ASA 81 mg daily for CAD  - Bed rest 6 hrs    Abhijeet Mitchell

## 2022-05-18 ENCOUNTER — PATIENT OUTREACH (OUTPATIENT)
Dept: EMERGENCY MEDICINE | Facility: HOSPITAL | Age: 67
End: 2022-05-18

## 2022-05-18 ENCOUNTER — HOSPITAL ENCOUNTER (OUTPATIENT)
Facility: HOSPITAL | Age: 67
Discharge: HOME | End: 2022-05-18
Attending: EMERGENCY MEDICINE | Admitting: FAMILY MEDICINE
Payer: COMMERCIAL

## 2022-05-18 VITALS
RESPIRATION RATE: 20 BRPM | TEMPERATURE: 98 F | WEIGHT: 130.06 LBS | DIASTOLIC BLOOD PRESSURE: 88 MMHG | HEIGHT: 64 IN | OXYGEN SATURATION: 100 % | SYSTOLIC BLOOD PRESSURE: 185 MMHG | BODY MASS INDEX: 22.2 KG/M2 | HEART RATE: 78 BPM

## 2022-05-18 DIAGNOSIS — T82.898A AV FISTULA OCCLUSION, INITIAL ENCOUNTER: Primary | ICD-10-CM

## 2022-05-18 DIAGNOSIS — N18.9 CHRONIC KIDNEY DISEASE: ICD-10-CM

## 2022-05-18 PROBLEM — I16.0 HYPERTENSIVE URGENCY: Status: RESOLVED | Noted: 2020-09-28 | Resolved: 2022-05-18

## 2022-05-18 PROBLEM — K92.1 BLOOD IN STOOL: Status: RESOLVED | Noted: 2020-09-27 | Resolved: 2022-05-18

## 2022-05-18 PROBLEM — K52.9 ACUTE COLITIS: Status: RESOLVED | Noted: 2020-09-27 | Resolved: 2022-05-18

## 2022-05-18 PROBLEM — N17.9 AKI (ACUTE KIDNEY INJURY): Status: RESOLVED | Noted: 2020-09-27 | Resolved: 2022-05-18

## 2022-05-18 PROBLEM — T82.590A DIALYSIS AV FISTULA MALFUNCTION: Status: ACTIVE | Noted: 2022-05-18

## 2022-05-18 LAB
ALBUMIN SERPL BCP-MCNC: 3.2 G/DL (ref 3.5–5.2)
ALP SERPL-CCNC: 122 U/L (ref 55–135)
ALT SERPL W/O P-5'-P-CCNC: 11 U/L (ref 10–44)
ANION GAP SERPL CALC-SCNC: 16 MMOL/L (ref 8–16)
APTT PPP: 26.4 SEC (ref 23.3–35.1)
AST SERPL-CCNC: 18 U/L (ref 10–40)
BASOPHILS # BLD AUTO: 0.05 K/UL (ref 0–0.2)
BASOPHILS NFR BLD: 1.1 % (ref 0–1.9)
BILIRUB SERPL-MCNC: 0.6 MG/DL (ref 0.1–1)
BUN SERPL-MCNC: 56 MG/DL (ref 8–23)
CALCIUM SERPL-MCNC: 9 MG/DL (ref 8.7–10.5)
CHLORIDE SERPL-SCNC: 91 MMOL/L (ref 95–110)
CO2 SERPL-SCNC: 28 MMOL/L (ref 23–29)
CREAT SERPL-MCNC: 8.5 MG/DL (ref 0.5–1.4)
DIFFERENTIAL METHOD: ABNORMAL
EOSINOPHIL # BLD AUTO: 0.1 K/UL (ref 0–0.5)
EOSINOPHIL NFR BLD: 2.9 % (ref 0–8)
ERYTHROCYTE [DISTWIDTH] IN BLOOD BY AUTOMATED COUNT: 12.5 % (ref 11.5–14.5)
EST. GFR  (AFRICAN AMERICAN): 5.1 ML/MIN/1.73 M^2
EST. GFR  (NON AFRICAN AMERICAN): 4.4 ML/MIN/1.73 M^2
GLUCOSE SERPL-MCNC: 90 MG/DL (ref 70–110)
HCT VFR BLD AUTO: 34.6 % (ref 37–48.5)
HGB BLD-MCNC: 11.5 G/DL (ref 12–16)
IMM GRANULOCYTES # BLD AUTO: 0.02 K/UL (ref 0–0.04)
IMM GRANULOCYTES NFR BLD AUTO: 0.4 % (ref 0–0.5)
INR PPP: 1
LYMPHOCYTES # BLD AUTO: 1 K/UL (ref 1–4.8)
LYMPHOCYTES NFR BLD: 20 % (ref 18–48)
MCH RBC QN AUTO: 33.2 PG (ref 27–31)
MCHC RBC AUTO-ENTMCNC: 33.2 G/DL (ref 32–36)
MCV RBC AUTO: 100 FL (ref 82–98)
MONOCYTES # BLD AUTO: 0.5 K/UL (ref 0.3–1)
MONOCYTES NFR BLD: 10.1 % (ref 4–15)
NEUTROPHILS # BLD AUTO: 3.1 K/UL (ref 1.8–7.7)
NEUTROPHILS NFR BLD: 65.5 % (ref 38–73)
NRBC BLD-RTO: 0 /100 WBC
PLATELET # BLD AUTO: 138 K/UL (ref 150–450)
PMV BLD AUTO: 9.6 FL (ref 9.2–12.9)
POTASSIUM SERPL-SCNC: 3.6 MMOL/L (ref 3.5–5.1)
PROT SERPL-MCNC: 6.4 G/DL (ref 6–8.4)
PROTHROMBIN TIME: 12.9 SEC (ref 11.4–13.7)
RBC # BLD AUTO: 3.46 M/UL (ref 4–5.4)
SARS-COV-2 RDRP RESP QL NAA+PROBE: NEGATIVE
SODIUM SERPL-SCNC: 135 MMOL/L (ref 136–145)
WBC # BLD AUTO: 4.76 K/UL (ref 3.9–12.7)

## 2022-05-18 PROCEDURE — 85730 THROMBOPLASTIN TIME PARTIAL: CPT | Performed by: EMERGENCY MEDICINE

## 2022-05-18 PROCEDURE — 80053 COMPREHEN METABOLIC PANEL: CPT | Performed by: EMERGENCY MEDICINE

## 2022-05-18 PROCEDURE — 63600175 PHARM REV CODE 636 W HCPCS: Performed by: SURGERY

## 2022-05-18 PROCEDURE — 25000003 PHARM REV CODE 250: Performed by: SURGERY

## 2022-05-18 PROCEDURE — C1887 CATHETER, GUIDING: HCPCS | Performed by: SURGERY

## 2022-05-18 PROCEDURE — 63600175 PHARM REV CODE 636 W HCPCS: Performed by: NURSE PRACTITIONER

## 2022-05-18 PROCEDURE — C1725 CATH, TRANSLUMIN NON-LASER: HCPCS | Performed by: SURGERY

## 2022-05-18 PROCEDURE — 96374 THER/PROPH/DIAG INJ IV PUSH: CPT

## 2022-05-18 PROCEDURE — C1894 INTRO/SHEATH, NON-LASER: HCPCS | Performed by: SURGERY

## 2022-05-18 PROCEDURE — U0002 COVID-19 LAB TEST NON-CDC: HCPCS | Performed by: EMERGENCY MEDICINE

## 2022-05-18 PROCEDURE — C1876 STENT, NON-COA/NON-COV W/DEL: HCPCS | Performed by: SURGERY

## 2022-05-18 PROCEDURE — G0378 HOSPITAL OBSERVATION PER HR: HCPCS

## 2022-05-18 PROCEDURE — 85025 COMPLETE CBC W/AUTO DIFF WBC: CPT | Performed by: EMERGENCY MEDICINE

## 2022-05-18 PROCEDURE — 99285 EMERGENCY DEPT VISIT HI MDM: CPT | Mod: 25

## 2022-05-18 PROCEDURE — C1757 CATH, THROMBECTOMY/EMBOLECT: HCPCS | Performed by: SURGERY

## 2022-05-18 PROCEDURE — 85610 PROTHROMBIN TIME: CPT | Performed by: EMERGENCY MEDICINE

## 2022-05-18 PROCEDURE — 36906 THRMBC/NFS DIALYSIS CIRCUIT: CPT | Performed by: SURGERY

## 2022-05-18 PROCEDURE — C1769 GUIDE WIRE: HCPCS | Performed by: SURGERY

## 2022-05-18 DEVICE — VIABAHN SX ENDO HEPARIN 18 RO 7MMX5CM 7FR 120CM CATH
Type: IMPLANTABLE DEVICE | Site: ARM | Status: FUNCTIONAL
Brand: GORE VIABAHN ENDOPROSTHESIS WITH HEPARIN

## 2022-05-18 RX ORDER — ASPIRIN 81 MG/1
81 TABLET ORAL DAILY
Status: CANCELLED | OUTPATIENT
Start: 2022-05-19

## 2022-05-18 RX ORDER — SODIUM CHLORIDE 9 MG/ML
INJECTION, SOLUTION INTRAVENOUS
Status: CANCELLED | OUTPATIENT
Start: 2022-05-18

## 2022-05-18 RX ORDER — HYDROCODONE BITARTRATE AND ACETAMINOPHEN 5; 325 MG/1; MG/1
1 TABLET ORAL EVERY 6 HOURS PRN
Status: DISCONTINUED | OUTPATIENT
Start: 2022-05-18 | End: 2022-05-18

## 2022-05-18 RX ORDER — ONDANSETRON 2 MG/ML
INJECTION INTRAMUSCULAR; INTRAVENOUS
Status: DISCONTINUED | OUTPATIENT
Start: 2022-05-18 | End: 2022-05-18 | Stop reason: HOSPADM

## 2022-05-18 RX ORDER — ATORVASTATIN CALCIUM 20 MG/1
40 TABLET, FILM COATED ORAL DAILY
Status: CANCELLED | OUTPATIENT
Start: 2022-05-19

## 2022-05-18 RX ORDER — NALOXONE HCL 0.4 MG/ML
0.02 VIAL (ML) INJECTION
Status: CANCELLED | OUTPATIENT
Start: 2022-05-18

## 2022-05-18 RX ORDER — CARVEDILOL 3.12 MG/1
3.12 TABLET ORAL 2 TIMES DAILY
Status: CANCELLED | OUTPATIENT
Start: 2022-05-18

## 2022-05-18 RX ORDER — AMOXICILLIN 250 MG
1 CAPSULE ORAL 2 TIMES DAILY
Status: CANCELLED | OUTPATIENT
Start: 2022-05-18

## 2022-05-18 RX ORDER — ONDANSETRON 2 MG/ML
4 INJECTION INTRAMUSCULAR; INTRAVENOUS EVERY 8 HOURS PRN
Status: DISCONTINUED | OUTPATIENT
Start: 2022-05-18 | End: 2022-05-18

## 2022-05-18 RX ORDER — ACETAMINOPHEN 325 MG/1
650 TABLET ORAL EVERY 4 HOURS PRN
Status: DISCONTINUED | OUTPATIENT
Start: 2022-05-18 | End: 2022-05-18

## 2022-05-18 RX ORDER — MIDODRINE HYDROCHLORIDE 5 MG/1
TABLET ORAL
COMMUNITY
Start: 2022-04-04 | End: 2022-05-18

## 2022-05-18 RX ORDER — TALC
6 POWDER (GRAM) TOPICAL NIGHTLY PRN
Status: CANCELLED | OUTPATIENT
Start: 2022-05-18

## 2022-05-18 RX ORDER — LIDOCAINE HYDROCHLORIDE 10 MG/ML
INJECTION, SOLUTION EPIDURAL; INFILTRATION; INTRACAUDAL; PERINEURAL
Status: DISCONTINUED | OUTPATIENT
Start: 2022-05-18 | End: 2022-05-18 | Stop reason: HOSPADM

## 2022-05-18 RX ORDER — MORPHINE SULFATE 2 MG/ML
2 INJECTION, SOLUTION INTRAMUSCULAR; INTRAVENOUS EVERY 4 HOURS PRN
Status: DISCONTINUED | OUTPATIENT
Start: 2022-05-18 | End: 2022-05-18

## 2022-05-18 RX ORDER — SODIUM CHLORIDE 0.9 % (FLUSH) 0.9 %
10 SYRINGE (ML) INJECTION EVERY 12 HOURS PRN
Status: CANCELLED | OUTPATIENT
Start: 2022-05-18

## 2022-05-18 RX ORDER — POLYETHYLENE GLYCOL 3350 17 G/17G
17 POWDER, FOR SOLUTION ORAL 2 TIMES DAILY PRN
Status: CANCELLED | OUTPATIENT
Start: 2022-05-18

## 2022-05-18 RX ORDER — SEVELAMER CARBONATE 800 MG/1
2400 TABLET, FILM COATED ORAL
Status: CANCELLED | OUTPATIENT
Start: 2022-05-18

## 2022-05-18 RX ORDER — MUPIROCIN 20 MG/G
OINTMENT TOPICAL 2 TIMES DAILY
Status: DISCONTINUED | OUTPATIENT
Start: 2022-05-18 | End: 2022-05-18

## 2022-05-18 RX ORDER — SODIUM CHLORIDE 9 MG/ML
INJECTION, SOLUTION INTRAVENOUS ONCE
Status: CANCELLED | OUTPATIENT
Start: 2022-05-18 | End: 2022-05-18

## 2022-05-18 RX ORDER — MIDODRINE HYDROCHLORIDE 2.5 MG/1
10 TABLET ORAL
Status: CANCELLED | OUTPATIENT
Start: 2022-05-18

## 2022-05-18 RX ADMIN — MORPHINE SULFATE 2 MG: 2 INJECTION, SOLUTION INTRAMUSCULAR; INTRAVENOUS at 03:05

## 2022-05-18 NOTE — Clinical Note
The right brachial was prepped. The site was prepped with ChloraPrep. The site was clipped. The patient was draped. The patient was positioned supine. The patient was secured using safety straps and to an armboard.

## 2022-05-18 NOTE — ED PROVIDER NOTES
Encounter Date: 5/18/2022       History     Chief Complaint   Patient presents with    Vascular Access Problem     Right arm fistula possibly clotted per dialysis nurse this am     66-year-old female who has a history of hypertension, hyperlipidemia, chronic kidney disease on dialysis Monday Wednesdays and Fridays, emphysema, hepatitis, suspected cardiomyopathy, presents with a history that had dialysis this morning her fistula was thought to have been clotted.  The fistula was initially placed by Dr. Bustamante.  Her nephrologist is Dr. Barnes.  No complaints at this time of shortness of breath or chest pain.  Patient is anuric.  Patient had recently undergone angiogram and yesterday was taken off of her anticoagulants.        Review of patient's allergies indicates:  No Known Allergies  Past Medical History:   Diagnosis Date    Dialysis patient 10/05/2020    Emphysema of lung     ESRD (end stage renal disease)     Hepatitis 1980    HLD (hyperlipidemia)     Hypertension     Renal disorder      Past Surgical History:   Procedure Laterality Date    AORTOGRAPHY WITH SERIALOGRAPHY N/A 9/30/2020    Procedure: co2 renal angio;  Surgeon: Lance Bustamante MD;  Location: [facility] CATH/EP LAB;  Service: Vascular;  Laterality: N/A;    APPENDECTOMY      AV FISTULA PLACEMENT Right 12/9/2020    Procedure: CREATION, AV FISTULA;  Surgeon: Lance Bustamante MD;  Location: [facility] OR;  Service: General;  Laterality: Right;    COLONOSCOPY N/A 6/8/2021    Procedure: COLONOSCOPY;  Surgeon: Avila Smith III, MD;  Location: [facility] ENDO;  Service: Endoscopy;  Laterality: N/A;    COLONOSCOPY N/A 12/14/2021    Procedure: COLONOSCOPY;  Surgeon: Avila Smith III, MD;  Location: [facility] ENDO;  Service: Endoscopy;  Laterality: N/A;    COLONOSCOPY W/ POLYPECTOMY  06/08/2021    INSERTION OF TUNNELED CENTRAL VENOUS HEMODIALYSIS CATHETER  10/2/2020    Procedure: INSERTION, CATHETER, CENTRAL VENOUS, HEMODIALYSIS, TUNNELED;  Surgeon: Melissa  Khoobehi, MD;  Location: [facility] OR;  Service: Vascular;;    LEFT HEART CATHETERIZATION Left 2022    Procedure: Left heart cath;  Surgeon: Daniel Tyler MD;  Location: [facility] CATH LAB;  Service: Cardiology;  Laterality: Left;    PLACEMENT OF DIALYSIS ACCESS Right 2020    Procedure: Insertion, Dialysis Access;  Surgeon: Lance Bustamante MD;  Location: [facility] CATH/EP LAB;  Service: Vascular;  Laterality: Right;    REMOVAL OF TUNNELED CENTRAL VENOUS CATHETER (CVC) N/A 2020    Procedure: REMOVAL, CATHETER, CENTRAL VENOUS, TUNNELED;  Surgeon: Ali Khoobehi, MD;  Location: [facility] OR;  Service: Vascular;  Laterality: N/A;    TONSILLECTOMY      TUBAL LIGATION       Family History   Problem Relation Age of Onset    Cancer Mother     Heart disease Father      Social History     Tobacco Use    Smoking status: Former Smoker     Packs/day: 0.25     Years: 20.00     Pack years: 5.00     Types: Cigarettes     Quit date: 2020     Years since quittin.6    Smokeless tobacco: Never Used   Substance Use Topics    Alcohol use: Not Currently    Drug use: Not Currently     Review of Systems   Constitutional: Negative for chills, diaphoresis and fever.   HENT: Negative.  Negative for trouble swallowing.    Respiratory: Negative for shortness of breath.    Cardiovascular: Negative for chest pain.   Gastrointestinal: Negative for abdominal pain, nausea and vomiting.   Genitourinary:        Anuric   Musculoskeletal: Negative for back pain.   Skin: Negative.  Negative for rash.   Neurological: Negative for dizziness and weakness.   All other systems reviewed and are negative.      Physical Exam     Initial Vitals [22 0603]   BP Pulse Resp Temp SpO2   (!) 177/93 77 16 98.4 °F (36.9 °C) 100 %      MAP       --         Physical Exam    Constitutional: She appears well-developed and well-nourished. She is not diaphoretic. No distress.   HENT:   Head: Normocephalic and atraumatic.   Nose: Nose normal.    Mouth/Throat: Oropharynx is clear and moist. No oropharyngeal exudate.   Eyes: Conjunctivae are normal. Pupils are equal, round, and reactive to light. Right eye exhibits no discharge. Left eye exhibits no discharge. No scleral icterus.   Neck: Neck supple. No JVD present.   Normal range of motion.  Cardiovascular: Normal rate, regular rhythm, normal heart sounds and intact distal pulses. Exam reveals no gallop and no friction rub.    No murmur heard.  Pulmonary/Chest: Breath sounds normal. No respiratory distress. She has no wheezes. She has no rhonchi. She has no rales. She exhibits no tenderness.   Abdominal: Abdomen is soft. Bowel sounds are normal. She exhibits no distension. There is no abdominal tenderness. There is no rebound and no guarding.   Musculoskeletal:         General: No tenderness or edema. Normal range of motion.      Cervical back: Normal range of motion and neck supple.      Comments: Fistula in the right upper arm, has no thrill     Lymphadenopathy:     She has no cervical adenopathy.   Neurological: She is alert and oriented to person, place, and time. She has normal strength. No cranial nerve deficit or sensory deficit. GCS score is 15. GCS eye subscore is 4. GCS verbal subscore is 5. GCS motor subscore is 6.   Skin: Skin is warm and dry. Capillary refill takes less than 2 seconds. No erythema. No pallor.   Psychiatric: She has a normal mood and affect. Her behavior is normal. Judgment and thought content normal.         ED Course   Procedures  Labs Reviewed   CBC W/ AUTO DIFFERENTIAL - Abnormal; Notable for the following components:       Result Value    RBC 3.46 (*)     Hemoglobin 11.5 (*)     Hematocrit 34.6 (*)      (*)     MCH 33.2 (*)     Platelets 138 (*)     All other components within normal limits   COMPREHENSIVE METABOLIC PANEL - Abnormal; Notable for the following components:    Sodium 135 (*)     Chloride 91 (*)     BUN 56 (*)     Creatinine 8.5 (*)     Albumin 3.2 (*)      eGFR if  5.1 (*)     eGFR if non  4.4 (*)     All other components within normal limits   APTT   PROTIME-INR          Imaging Results    None          Medications   mupirocin 2 % ointment (has no administration in time range)                Attending Attestation:             Attending ED Notes:   This 66-year-old female who has chronic kidney disease on hemodialysis presented with a clotted fistula.  She is due for dialysis today.  Patient is otherwise asymptomatic.  Screening labs obtained showed a normal potassium of 3.6.  The patient's BUN creatinine is 56 and 8.5.  H&H is 11 and 34.6.  Patient had an EKG done yesterday.    During the course I did speak to Dr. Bustamante who is planning on taking the patient for fistulogram but may not necessarily require hospitalization.    After several hours Dr. Bustamante was able to bring the patient to the cath lab and did a fistulogram and inserted a stent.  I was subsequently able to get in touch with Dr. Barnes who suggested the patient get an observation admission for the potential of flash pulmonary edema in light of the fact that she is aneuric.  The patient is in agreement with the plan management.  Hospital Medicine is consulted and will be admitting the patient primarily.                   Clinical Impression:   Final diagnoses:  [N18.9] Chronic kidney disease  [T82.898A] AV fistula occlusion, initial encounter (Primary)          ED Disposition Condition    Observation               Brady Pabon Jr., MD  05/18/22 [id number]

## 2022-05-18 NOTE — CONSULTS
Nephrology Consult Note        Patient Name: Latricia Higgins  MRN: [id number]    Patient Class: Emergency   Admission Date: 5/18/2022  Length of Stay: 0 days  Date of Service: 5/18/2022    Attending Physician: Brady Pabon Jr., *  Primary Care Provider: Ralf Ham MD    Reason for Consult: esrd/anemia/htn/shpt/clotted access    SUBJECTIVE:     HPI: 66F with ESRD on dialysis Monday Wednesdays and Fridays, presents with inability to do dialysis this morning - her fistula presumed clotted.  The fistula was initially placed by Dr. Bustamante and he will take her in for evalauation and treatment.    Had declot and stent placed. Will be dc home and call HD unit in AM for dialysis. D/w patient, hospital team and ER MD, as willi [facility] HD unit, Sonja.    Past Medical History:   Diagnosis Date    Dialysis patient 10/05/2020    Emphysema of lung     ESRD (end stage renal disease)     Hepatitis 1980    HLD (hyperlipidemia)     Hypertension     Renal disorder      Past Surgical History:   Procedure Laterality Date    AORTOGRAPHY WITH SERIALOGRAPHY N/A 9/30/2020    Procedure: co2 renal angio;  Surgeon: Lance Bustamante MD;  Location: [facility] CATH/EP LAB;  Service: Vascular;  Laterality: N/A;    APPENDECTOMY      AV FISTULA PLACEMENT Right 12/9/2020    Procedure: CREATION, AV FISTULA;  Surgeon: Lance Bustamante MD;  Location: [facility] OR;  Service: General;  Laterality: Right;    COLONOSCOPY N/A 6/8/2021    Procedure: COLONOSCOPY;  Surgeon: Avila Smith III, MD;  Location: [facility] ENDO;  Service: Endoscopy;  Laterality: N/A;    COLONOSCOPY N/A 12/14/2021    Procedure: COLONOSCOPY;  Surgeon: Avila Smith III, MD;  Location: [facility] ENDO;  Service: Endoscopy;  Laterality: N/A;    COLONOSCOPY W/ POLYPECTOMY  06/08/2021    INSERTION OF TUNNELED CENTRAL VENOUS HEMODIALYSIS CATHETER  10/2/2020    Procedure: INSERTION, CATHETER, CENTRAL VENOUS, HEMODIALYSIS, TUNNELED;  Surgeon: Ali Khoobehi, MD;  Location: [facility] OR;   Service: Vascular;;    LEFT HEART CATHETERIZATION Left 2022    Procedure: Left heart cath;  Surgeon: Daniel Tyler MD;  Location: [facility] CATH LAB;  Service: Cardiology;  Laterality: Left;    PLACEMENT OF DIALYSIS ACCESS Right 2020    Procedure: Insertion, Dialysis Access;  Surgeon: Lance Bustamante MD;  Location: [facility] CATH/EP LAB;  Service: Vascular;  Laterality: Right;    REMOVAL OF TUNNELED CENTRAL VENOUS CATHETER (CVC) N/A 2020    Procedure: REMOVAL, CATHETER, CENTRAL VENOUS, TUNNELED;  Surgeon: Ali Khoobehi, MD;  Location: [facility] OR;  Service: Vascular;  Laterality: N/A;    TONSILLECTOMY      TUBAL LIGATION       Family History   Problem Relation Age of Onset    Cancer Mother     Heart disease Father      Social History     Tobacco Use    Smoking status: Former Smoker     Packs/day: 0.25     Years: 20.00     Pack years: 5.00     Types: Cigarettes     Quit date: 2020     Years since quittin.6    Smokeless tobacco: Never Used   Substance Use Topics    Alcohol use: Not Currently    Drug use: Not Currently       Review of patient's allergies indicates:  No Known Allergies    Outpatient meds:  Current Facility-Administered Medications on File Prior to Encounter   Medication Dose Route Frequency Provider Last Rate Last Admin    [DISCONTINUED] acetaminophen tablet 650 mg  650 mg Oral Q4H PRN Abhijeet Mitchell MD        [DISCONTINUED] fentaNYL 50 mcg/mL injection    PRN Daniel Tyler MD   25 mcg at 22 1222    [DISCONTINUED] heparin infusion 1,000 units/500 ml in 0.9% NaCl (on sterile field)    PRN Daniel Tyler MD   1,000 mL at 22 1200    [DISCONTINUED] iohexoL (OMNIPAQUE 350) injection    PRN Daniel Tyler MD   70 mL at 22 1257    [DISCONTINUED] LIDOcaine HCL 20 mg/ml (2%) injection    PRN Daniel Tyler MD   10 mL at 22 1202    [DISCONTINUED] midazolam (PF) injection    PRN Daniel Tyler MD   1 mg at 22 1222     [DISCONTINUED] ondansetron disintegrating tablet 8 mg  8 mg Oral Q8H PRN Abhijeet Mitchell MD         Current Outpatient Medications on File Prior to Encounter   Medication Sig Dispense Refill    acetaminophen (TYLENOL) 325 MG tablet Take 325 mg by mouth every 6 (six) hours as needed for Pain.      albuterol (PROVENTIL/VENTOLIN HFA) 90 mcg/actuation inhaler Inhale 1-2 puffs into the lungs every 6 (six) hours as needed for Wheezing. Rescue 18 g 1    apixaban (ELIQUIS) 5 mg Tab Take 1 tablet (5 mg total) by mouth 2 (two) times daily. 60 tablet 6    aspirin (ECOTRIN) 81 MG EC tablet Take 1 tablet (81 mg total) by mouth once daily. 90 tablet 3    atorvastatin (LIPITOR) 40 MG tablet Take 40 mg by mouth 2 (two) times a day.      carvediloL (COREG) 3.125 MG tablet Take 2 tablets (6.25 mg total) by mouth 2 (two) times daily. 60 tablet 3    LIDOcaine-prilocaine (EMLA) cream APPLY TO FISTULA 1 HOUR PRIOR TO DIALYSIS TREATMENT 3 TIMES PER WEEK      midodrine (PROAMATINE) 10 MG tablet every Mon, Wed, Fri. With dialysis      midodrine (PROAMATINE) 5 MG Tab Take by mouth.      ondansetron (ZOFRAN-ODT) 4 MG TbDL Take 4 mg by mouth every 6 (six) hours as needed (nausea).      sacubitriL-valsartan (ENTRESTO) 24-26 mg per tablet Take 1 tablet by mouth 2 (two) times daily. 60 tablet 11    sevelamer carbonate (RENVELA) 800 mg Tab Take 2,400 mg by mouth 3 (three) times daily with meals.       umeclidinium-vilanteroL (ANORO ELLIPTA) 62.5-25 mcg/actuation DsDv Inhale 1 puff into the lungs once daily. Controller 1 each 0    umeclidinium-vilanteroL (ANORO ELLIPTA) 62.5-25 mcg/actuation DsDv Inhale 1 puff into the lungs once daily. Controller 1 each 5    VELTASSA 8.4 gram PwPk Tues, thurs, sat, sun      VIOS AEROSOL DELIVERY SYSTEM Lizbeth AS DIRECTED         Scheduled meds:      Infusions:      PRN meds:      Review of Systems:  Review of Systems   Constitutional: Negative for chills, fever, malaise/fatigue and weight loss.    HENT: Negative for hearing loss and nosebleeds.    Eyes: Negative for blurred vision, double vision and photophobia.   Respiratory: Negative for cough, shortness of breath and wheezing.    Cardiovascular: Negative for chest pain, palpitations and leg swelling.   Gastrointestinal: Negative for abdominal pain, constipation, diarrhea, heartburn, nausea and vomiting.   Genitourinary: Negative for dysuria, frequency and urgency.   Musculoskeletal: Negative for falls, joint pain and myalgias.   Skin: Negative for itching and rash.   Neurological: Negative for dizziness, speech change, focal weakness, loss of consciousness and headaches.   Endo/Heme/Allergies: Does not bruise/bleed easily.   Psychiatric/Behavioral: Negative for depression and substance abuse. The patient is not nervous/anxious.      OBJECTIVE:     Vital Signs and IO (Last 24H):  Temp:  [98.4 °F (36.9 °C)-99 °F (37.2 °C)]   Pulse:  [57-77]   Resp:  [16-18]   BP: ()/(53-93)   SpO2:  [96 %-100 %]   No intake/output data recorded.    Wt Readings from Last 5 Encounters:   05/18/22 59 kg (130 lb 1.1 oz)   05/17/22 57.6 kg (127 lb)   04/28/22 52.7 kg (116 lb 2.9 oz)   04/19/22 57.2 kg (126 lb 1.7 oz)   04/19/22 57 kg (125 lb 10.6 oz)     Physical Exam:  Physical Exam  Constitutional:       Appearance: She is well-developed. She is not diaphoretic.   HENT:      Head: Normocephalic and atraumatic.   Eyes:      General: No scleral icterus.     Pupils: Pupils are equal, round, and reactive to light.   Cardiovascular:      Rate and Rhythm: Normal rate and regular rhythm.   Pulmonary:      Effort: Pulmonary effort is normal. No respiratory distress.      Breath sounds: No stridor.   Abdominal:      General: There is no distension.      Palpations: Abdomen is soft.   Musculoskeletal:         General: No deformity. Normal range of motion.      Cervical back: Neck supple.   Skin:     General: Skin is warm and dry.      Findings: No erythema or rash.    Neurological:      Mental Status: She is alert and oriented to person, place, and time.      Cranial Nerves: No cranial nerve deficit.   Psychiatric:         Behavior: Behavior normal.         Body mass index is 22.33 kg/m².    Laboratory:  Recent Labs   Lab 05/17/22  0949 05/18/22  0707    135*   K 4.0 3.6   CL 94* 91*   CO2 33* 28   BUN 33* 56*   CREATININE 6.9* 8.5*   ESTGFRAFRICA 6.6* 5.1*   EGFRNONAA 5.7* 4.4*   GLU 90 90       Recent Labs   Lab 05/17/22  0949 05/18/22  0707   CALCIUM 9.2 9.0   ALBUMIN  --  3.2*       Recent Labs   Lab 03/23/21  0744   PTH, Intact 347.0 H       No results for input(s): POCTGLUCOSE in the last 168 hours.    Recent Labs   Lab 10/21/20  2030 11/16/20  1111   Hemoglobin A1C 5.0 4.5       Recent Labs   Lab 05/17/22  0949 05/18/22  0707   WBC 4.73 4.76   HGB 12.0 11.5*   HCT 37.7 34.6*   * 138*   * 100*   MCHC 31.8* 33.2   MONO 9.9  0.5 10.1  0.5       Recent Labs   Lab 05/18/22  0707   BILITOT 0.6   PROT 6.4   ALBUMIN 3.2*   ALKPHOS 122   ALT 11   AST 18       Recent Labs   Lab 09/29/20  0903   Color, UA Yellow   Appearance, UA Hazy A   pH, UA 7.0   Specific Gravity, UA 1.010   Protein, UA 3+ A   Glucose, UA Negative   Ketones, UA Negative   Urobilinogen, UA Negative   Bilirubin (UA) Negative   Occult Blood UA 3+ A   Nitrite, UA Negative   RBC, UA 26 H   WBC, UA >100 H   Bacteria Occasional   Hyaline Casts,  A       Recent Labs   Lab 05/24/21  0310 05/24/21  0523   POC PH 7.505 H 7.521 H   POC PCO2 34.5 L 35.3   POC HCO3 27.3 28.9 H   POC PO2 50 LL 73 L   POC SATURATED O2 89 L 96   POC BE 4 6   Sample ARTERIAL ARTERIAL       Microbiology Results (last 7 days)     ** No results found for the last 168 hours. **        ASSESSMENT/PLAN:     ESRD on HD MWF via AVF  Clotted access  Continue current dialysis prescription.  Next HD as needed.  Renal diet - low K, low phos.  No IVs or BP checks on access and/or non-dominant arm.  Dr. Bustamante did declot and placed  a stent, can be dc and have HD as outpatient.    Anemia of CKD  Hgb and HCT are acceptable. Monitor.  Will provide CAROLE and/or IV iron PRN.    MBD / Secondary HPT  Ca, phos, PTH and vitamin D levels are acceptable.   Phos binders, vitamin D analogues and calcimimetics as needed.    HTN  BP seem controlled.   Tolerate asymptomatic HTN up to -160.  Continue home meds.  Low sodium diet.    Thank you for allowing us to participate in the care of your patient!   We will follow the patient and provide recommendations as needed.    Patient care time was spent personally by me on the following activities:   · Obtaining a history.  · Examination of patient.  · Providing medical care at the patients bedside.  · Developing a treatment plan with patient or surrogate and bedside caregivers.  · Ordering and reviewing laboratory studies, radiographic studies, pulse oximetry.  · Ordering and performing treatments and interventions.  · Evaluation of patient's response to treatment.  · Discussions with consultants while on the unit and immediately available to the patient.  · Re-evaluation of the patient's condition.  · Documentation in the medical record.     Bijan Mcrgath MD    [facility]  [address] [place] [address]  [place] [address]    [phone] - tel  [phone] - fax    5/18/2022

## 2022-05-18 NOTE — ED NOTES
Bed: 24  Expected date: 5/18/22  Expected time: 12:19 PM  Means of arrival:   Comments:  PATIENT RETURNING

## 2022-05-18 NOTE — Clinical Note
Diagnosis: AV fistula occlusion, initial encounter [[id number]]   Future Attending Provider: HERBERT FULLER [[id number]]   Admitting Provider:: CHIO MALDONADO [[id number]]   Special Needs:: No Special Needs [1]

## 2022-05-18 NOTE — HPI
Ms. Higgins is a 66 year-old female with a history of HTN, Systolic dysfunction, EF 18% (normal in 9/2020, 45% last July 2021), HLD (LDL 42), History of DVT on Eliquis, Aortic atherosclerosis, ESRD on HD 10/2020, and Emphysema who presents today after a fistulogram and stent placement per nephrology recommending admission for HD. She had an angiogram yesterday and was supposed to have HD today, but her fistula would not work. She came to the ED, was eventually taken to the cath lab, and post-op nephrology was called and recommended admission for HD as there was concern she would be waiting days for HD. She complaints of a headache and rt arm pain. Denies fever, chills, chest pain, N/V/D, dizziness, or LOC.

## 2022-05-18 NOTE — H&P
[facility] Medicine  Consult     DOS: 05/18/2022  2:51 PM      Patient Name: Latricia Higgins  MRN: [id number]  Patient Class: OP- Observation  Admission Date: 5/18/2022  Attending Physician: Dr. Oswald  Primary Care Provider: aRlf Ham MD         Patient information was obtained from patient, past medical records and ER records.     Subjective:     Principal Problem:Dialysis AV fistula malfunction    Chief Complaint:   Chief Complaint   Patient presents with    Vascular Access Problem     Right arm fistula possibly clotted per dialysis nurse this am        HPI: Ms. Higgins is a 66 year-old female with a history of HTN, Systolic dysfunction, EF 18% (normal in 9/2020, 45% last July 2021), HLD (LDL 42), History of DVT on Eliquis, Aortic atherosclerosis, ESRD on HD 10/2020, and Emphysema who presents today after a fistulogram and stent placement per nephrology recommending admission for HD. She had an angiogram yesterday and was supposed to have HD today, but her fistula would not work. She came to the ED, was eventually taken to the cath lab, and post-op nephrology was called and recommended admission for HD as there was concern she would be waiting days for HD. She complaints of a headache and rt arm pain. Denies fever, chills, chest pain, N/V/D, dizziness, or LOC.       Past Medical History:   Diagnosis Date    Dialysis patient 10/05/2020    Emphysema of lung     ESRD (end stage renal disease)     Hepatitis 1980    HLD (hyperlipidemia)     Hypertension     Renal disorder        Past Surgical History:   Procedure Laterality Date    AORTOGRAPHY WITH SERIALOGRAPHY N/A 9/30/2020    Procedure: co2 renal angio;  Surgeon: Lance Bustamante MD;  Location: [facility] CATH/EP LAB;  Service: Vascular;  Laterality: N/A;    APPENDECTOMY      AV FISTULA PLACEMENT Right 12/9/2020    Procedure: CREATION, AV FISTULA;  Surgeon: Lance Bustamante MD;  Location: [facility] OR;  Service: General;  Laterality:  Right;    COLONOSCOPY N/A 6/8/2021    Procedure: COLONOSCOPY;  Surgeon: Avila Smith III, MD;  Location: [facility] ENDO;  Service: Endoscopy;  Laterality: N/A;    COLONOSCOPY N/A 12/14/2021    Procedure: COLONOSCOPY;  Surgeon: Avila Smith III, MD;  Location: [facility] ENDO;  Service: Endoscopy;  Laterality: N/A;    COLONOSCOPY W/ POLYPECTOMY  06/08/2021    INSERTION OF TUNNELED CENTRAL VENOUS HEMODIALYSIS CATHETER  10/2/2020    Procedure: INSERTION, CATHETER, CENTRAL VENOUS, HEMODIALYSIS, TUNNELED;  Surgeon: Ali Khoobehi, MD;  Location: [facility] OR;  Service: Vascular;;    LEFT HEART CATHETERIZATION Left 5/17/2022    Procedure: Left heart cath;  Surgeon: Daniel Tyler MD;  Location: [facility] CATH LAB;  Service: Cardiology;  Laterality: Left;    PLACEMENT OF DIALYSIS ACCESS Right 9/30/2020    Procedure: Insertion, Dialysis Access;  Surgeon: Lance Bustamante MD;  Location: [facility] CATH/EP LAB;  Service: Vascular;  Laterality: Right;    REMOVAL OF TUNNELED CENTRAL VENOUS CATHETER (CVC) N/A 12/16/2020    Procedure: REMOVAL, CATHETER, CENTRAL VENOUS, TUNNELED;  Surgeon: Ali Khoobehi, MD;  Location: [facility] OR;  Service: Vascular;  Laterality: N/A;    TONSILLECTOMY      TUBAL LIGATION         Review of patient's allergies indicates:  No Known Allergies    Current Facility-Administered Medications on File Prior to Encounter   Medication    [DISCONTINUED] acetaminophen tablet 650 mg    [DISCONTINUED] fentaNYL 50 mcg/mL injection    [DISCONTINUED] heparin infusion 1,000 units/500 ml in 0.9% NaCl (on sterile field)    [DISCONTINUED] iohexoL (OMNIPAQUE 350) injection    [DISCONTINUED] LIDOcaine HCL 20 mg/ml (2%) injection    [DISCONTINUED] midazolam (PF) injection    [DISCONTINUED] ondansetron disintegrating tablet 8 mg     Current Outpatient Medications on File Prior to Encounter   Medication Sig    acetaminophen (TYLENOL) 325 MG tablet Take 325 mg by mouth every 6 (six) hours as needed for Pain.    apixaban  (ELIQUIS) 5 mg Tab Take 1 tablet (5 mg total) by mouth 2 (two) times daily.    atorvastatin (LIPITOR) 40 MG tablet Take 40 mg by mouth once daily.    carvediloL (COREG) 3.125 MG tablet Take 2 tablets (6.25 mg total) by mouth 2 (two) times daily. (Patient taking differently: Take 3.125 mg by mouth 2 (two) times daily.)    midodrine (PROAMATINE) 10 MG tablet Take 10 mg by mouth every Mon, Wed, Fri. With dialysis    ondansetron (ZOFRAN-ODT) 4 MG TbDL Take 4 mg by mouth every 6 (six) hours as needed (nausea).    sacubitriL-valsartan (ENTRESTO) 24-26 mg per tablet Take 1 tablet by mouth 2 (two) times daily.    sevelamer carbonate (RENVELA) 800 mg Tab Take 2,400 mg by mouth 3 (three) times daily with meals.     umeclidinium-vilanteroL (ANORO ELLIPTA) 62.5-25 mcg/actuation DsDv Inhale 1 puff into the lungs once daily. Controller    VELTASSA 8.4 gram PwPk Take 8.4 g by mouth 4 (four) times a week. Tues, thurs, sat, sun    aspirin (ECOTRIN) 81 MG EC tablet Take 1 tablet (81 mg total) by mouth once daily.    LIDOcaine-prilocaine (EMLA) cream APPLY TO FISTULA 1 HOUR PRIOR TO DIALYSIS TREATMENT 3 TIMES PER WEEK    VIOS AEROSOL DELIVERY SYSTEM Lizbeth AS DIRECTED    [DISCONTINUED] albuterol (PROVENTIL/VENTOLIN HFA) 90 mcg/actuation inhaler Inhale 1-2 puffs into the lungs every 6 (six) hours as needed for Wheezing. Rescue    [DISCONTINUED] midodrine (PROAMATINE) 5 MG Tab Take by mouth.    [DISCONTINUED] umeclidinium-vilanteroL (ANORO ELLIPTA) 62.5-25 mcg/actuation DsDv Inhale 1 puff into the lungs once daily. Controller     Family History       Problem Relation (Age of Onset)    Cancer Mother    Heart disease Father          Tobacco Use    Smoking status: Former Smoker     Packs/day: 0.25     Years: 20.00     Pack years: 5.00     Types: Cigarettes     Quit date: 2020     Years since quittin.6    Smokeless tobacco: Never Used   Substance and Sexual Activity    Alcohol use: Not Currently    Drug use: Not  Currently    Sexual activity: Not on file     Review of Systems   Constitutional:  Negative for activity change, appetite change, chills, diaphoresis, fatigue, fever and unexpected weight change.   HENT:  Negative for congestion, ear pain, facial swelling, hearing loss, sore throat and trouble swallowing.    Eyes:  Negative for pain and discharge.   Respiratory:  Negative for cough, chest tightness, shortness of breath and wheezing.    Cardiovascular:  Negative for chest pain, palpitations and leg swelling.   Gastrointestinal:  Negative for abdominal pain, blood in stool, diarrhea, nausea and vomiting.   Endocrine: Negative for polydipsia, polyphagia and polyuria.   Genitourinary:  Negative for difficulty urinating, dysuria, flank pain, frequency and urgency.   Musculoskeletal:  Negative for arthralgias, back pain, joint swelling, neck pain and neck stiffness.   Skin:  Negative for rash and wound.   Allergic/Immunologic: Negative for environmental allergies and immunocompromised state.   Neurological:  Positive for headaches. Negative for dizziness, seizures, syncope, speech difficulty, weakness, light-headedness and numbness.   Hematological:  Negative for adenopathy.   Psychiatric/Behavioral:  Negative for sleep disturbance and suicidal ideas. The patient is not nervous/anxious.    All other systems reviewed and are negative.  Objective:     Vital Signs (Most Recent):  Temp: 98.4 °F (36.9 °C) (05/18/22 0603)  Pulse: 77 (05/18/22 0603)  Resp: 16 (05/18/22 0603)  BP: (!) 177/93 (05/18/22 0603)  SpO2: 100 % (05/18/22 0603)   Vital Signs (24h Range):  Temp:  [98.4 °F (36.9 °C)] 98.4 °F (36.9 °C)  Pulse:  [57-77] 77  Resp:  [16] 16  SpO2:  [100 %] 100 %  BP: (115-177)/(56-93) 177/93     Weight: 59 kg (130 lb 1.1 oz)  Body mass index is 22.33 kg/m².    Physical Exam  Vitals and nursing note reviewed.   Constitutional:       Appearance: She is ill-appearing (chronically ill appearing).   HENT:      Head: Normocephalic  and atraumatic.      Nose: Nose normal.      Mouth/Throat:      Mouth: Mucous membranes are dry.      Pharynx: Oropharynx is clear.   Eyes:      Extraocular Movements: Extraocular movements intact.      Pupils: Pupils are equal, round, and reactive to light.   Cardiovascular:      Rate and Rhythm: Normal rate and regular rhythm.      Pulses: Normal pulses.      Heart sounds: Murmur heard.   Pulmonary:      Effort: Pulmonary effort is normal.      Breath sounds: Normal breath sounds.   Abdominal:      General: Bowel sounds are normal.      Palpations: Abdomen is soft.   Musculoskeletal:         General: Normal range of motion.      Cervical back: Normal range of motion and neck supple.      Comments: Rt arm +T&B   Skin:     General: Skin is warm and dry.      Capillary Refill: Capillary refill takes less than 2 seconds.      Findings: Bruising present.      Comments: Rt arm bruising, dressing x2 clean dry and intact   Neurological:      General: No focal deficit present.      Mental Status: She is alert and oriented to person, place, and time.   Psychiatric:         Mood and Affect: Mood normal.         Behavior: Behavior normal.         CRANIAL NERVES     CN III, IV, VI   Pupils are equal, round, and reactive to light.     Significant Labs: All pertinent labs within the past 24 hours have been reviewed.  CBC:   Recent Labs   Lab 05/17/22  0949 05/18/22  0707   WBC 4.73 4.76   HGB 12.0 11.5*   HCT 37.7 34.6*   * 138*     CMP:   Recent Labs   Lab 05/17/22  0949 05/18/22  0707    135*   K 4.0 3.6   CL 94* 91*   CO2 33* 28   GLU 90 90   BUN 33* 56*   CREATININE 6.9* 8.5*   CALCIUM 9.2 9.0   PROT  --  6.4   ALBUMIN  --  3.2*   BILITOT  --  0.6   ALKPHOS  --  122   AST  --  18   ALT  --  11   ANIONGAP 11 16   EGFRNONAA 5.7* 4.4*       Significant Imaging: N/A    Assessment/Plan:     * Dialysis AV fistula malfunction  S/p fistulogram with stent placement       Systolic dysfunction  Daily wt   Accurate  I&O      ESRD (end stage renal disease)  Consulted nephrology in ED, recommended admission for HD given angiogram yesterday and fistulogram today, pt is anuric and there was concern she may not be able to get to HD for a few days   HD management per nephrology      HTN (hypertension)  Continue appropriate home meds      Addendum: Initial plan was to admit for HD as above, but Dr. Mcgrath was able to arrange for outpatient HD tomorrow morning and patient will be discharged home from the ED.   VTE Risk Mitigation (From admission, onward)         Ordered     apixaban tablet 5 mg  2 times daily         05/18/22 1533                   Caro Yuen NP  Department of Hospital Medicine   [facility]

## 2022-05-18 NOTE — SUBJECTIVE & OBJECTIVE
Past Medical History:   Diagnosis Date    Dialysis patient 10/05/2020    Emphysema of lung     ESRD (end stage renal disease)     Hepatitis 1980    HLD (hyperlipidemia)     Hypertension     Renal disorder        Past Surgical History:   Procedure Laterality Date    AORTOGRAPHY WITH SERIALOGRAPHY N/A 9/30/2020    Procedure: co2 renal angio;  Surgeon: Lance Bustamante MD;  Location: [facility] CATH/EP LAB;  Service: Vascular;  Laterality: N/A;    APPENDECTOMY      AV FISTULA PLACEMENT Right 12/9/2020    Procedure: CREATION, AV FISTULA;  Surgeon: Lance Bustamante MD;  Location: [facility] OR;  Service: General;  Laterality: Right;    COLONOSCOPY N/A 6/8/2021    Procedure: COLONOSCOPY;  Surgeon: Avila Smith III, MD;  Location: [facility] ENDO;  Service: Endoscopy;  Laterality: N/A;    COLONOSCOPY N/A 12/14/2021    Procedure: COLONOSCOPY;  Surgeon: Avila Smith III, MD;  Location: [facility] ENDO;  Service: Endoscopy;  Laterality: N/A;    COLONOSCOPY W/ POLYPECTOMY  06/08/2021    INSERTION OF TUNNELED CENTRAL VENOUS HEMODIALYSIS CATHETER  10/2/2020    Procedure: INSERTION, CATHETER, CENTRAL VENOUS, HEMODIALYSIS, TUNNELED;  Surgeon: Ali Khoobehi, MD;  Location: [facility] OR;  Service: Vascular;;    LEFT HEART CATHETERIZATION Left 5/17/2022    Procedure: Left heart cath;  Surgeon: Daniel Tyler MD;  Location: [facility] CATH LAB;  Service: Cardiology;  Laterality: Left;    PLACEMENT OF DIALYSIS ACCESS Right 9/30/2020    Procedure: Insertion, Dialysis Access;  Surgeon: Lance Bustamante MD;  Location: [facility] CATH/EP LAB;  Service: Vascular;  Laterality: Right;    REMOVAL OF TUNNELED CENTRAL VENOUS CATHETER (CVC) N/A 12/16/2020    Procedure: REMOVAL, CATHETER, CENTRAL VENOUS, TUNNELED;  Surgeon: Ali Khoobehi, MD;  Location: [facility] OR;  Service: Vascular;  Laterality: N/A;    TONSILLECTOMY      TUBAL LIGATION         Review of patient's allergies indicates:  No Known Allergies    Current Facility-Administered Medications on File Prior to  Encounter   Medication    [DISCONTINUED] acetaminophen tablet 650 mg    [DISCONTINUED] fentaNYL 50 mcg/mL injection    [DISCONTINUED] heparin infusion 1,000 units/500 ml in 0.9% NaCl (on sterile field)    [DISCONTINUED] iohexoL (OMNIPAQUE 350) injection    [DISCONTINUED] LIDOcaine HCL 20 mg/ml (2%) injection    [DISCONTINUED] midazolam (PF) injection    [DISCONTINUED] ondansetron disintegrating tablet 8 mg     Current Outpatient Medications on File Prior to Encounter   Medication Sig    acetaminophen (TYLENOL) 325 MG tablet Take 325 mg by mouth every 6 (six) hours as needed for Pain.    apixaban (ELIQUIS) 5 mg Tab Take 1 tablet (5 mg total) by mouth 2 (two) times daily.    atorvastatin (LIPITOR) 40 MG tablet Take 40 mg by mouth once daily.    carvediloL (COREG) 3.125 MG tablet Take 2 tablets (6.25 mg total) by mouth 2 (two) times daily. (Patient taking differently: Take 3.125 mg by mouth 2 (two) times daily.)    midodrine (PROAMATINE) 10 MG tablet Take 10 mg by mouth every Mon, Wed, Fri. With dialysis    ondansetron (ZOFRAN-ODT) 4 MG TbDL Take 4 mg by mouth every 6 (six) hours as needed (nausea).    sacubitriL-valsartan (ENTRESTO) 24-26 mg per tablet Take 1 tablet by mouth 2 (two) times daily.    sevelamer carbonate (RENVELA) 800 mg Tab Take 2,400 mg by mouth 3 (three) times daily with meals.     umeclidinium-vilanteroL (ANORO ELLIPTA) 62.5-25 mcg/actuation DsDv Inhale 1 puff into the lungs once daily. Controller    VELTASSA 8.4 gram PwPk Take 8.4 g by mouth 4 (four) times a week. Tues, thurs, sat, sun    aspirin (ECOTRIN) 81 MG EC tablet Take 1 tablet (81 mg total) by mouth once daily.    LIDOcaine-prilocaine (EMLA) cream APPLY TO FISTULA 1 HOUR PRIOR TO DIALYSIS TREATMENT 3 TIMES PER WEEK    VIOS AEROSOL DELIVERY SYSTEM Lizbeth AS DIRECTED    [DISCONTINUED] albuterol (PROVENTIL/VENTOLIN HFA) 90 mcg/actuation inhaler Inhale 1-2 puffs into the lungs every 6 (six) hours as needed for Wheezing. Rescue    [DISCONTINUED]  No midodrine (PROAMATINE) 5 MG Tab Take by mouth.    [DISCONTINUED] umeclidinium-vilanteroL (ANORO ELLIPTA) 62.5-25 mcg/actuation DsDv Inhale 1 puff into the lungs once daily. Controller     Family History       Problem Relation (Age of Onset)    Cancer Mother    Heart disease Father          Tobacco Use    Smoking status: Former Smoker     Packs/day: 0.25     Years: 20.00     Pack years: 5.00     Types: Cigarettes     Quit date: 2020     Years since quittin.6    Smokeless tobacco: Never Used   Substance and Sexual Activity    Alcohol use: Not Currently    Drug use: Not Currently    Sexual activity: Not on file     Review of Systems   Constitutional:  Negative for activity change, appetite change, chills, diaphoresis, fatigue, fever and unexpected weight change.   HENT:  Negative for congestion, ear pain, facial swelling, hearing loss, sore throat and trouble swallowing.    Eyes:  Negative for pain and discharge.   Respiratory:  Negative for cough, chest tightness, shortness of breath and wheezing.    Cardiovascular:  Negative for chest pain, palpitations and leg swelling.   Gastrointestinal:  Negative for abdominal pain, blood in stool, diarrhea, nausea and vomiting.   Endocrine: Negative for polydipsia, polyphagia and polyuria.   Genitourinary:  Negative for difficulty urinating, dysuria, flank pain, frequency and urgency.   Musculoskeletal:  Negative for arthralgias, back pain, joint swelling, neck pain and neck stiffness.   Skin:  Negative for rash and wound.   Allergic/Immunologic: Negative for environmental allergies and immunocompromised state.   Neurological:  Positive for headaches. Negative for dizziness, seizures, syncope, speech difficulty, weakness, light-headedness and numbness.   Hematological:  Negative for adenopathy.   Psychiatric/Behavioral:  Negative for sleep disturbance and suicidal ideas. The patient is not nervous/anxious.    All other systems reviewed and are negative.  Objective:      Vital Signs (Most Recent):  Temp: 98.4 °F (36.9 °C) (05/18/22 0603)  Pulse: 77 (05/18/22 0603)  Resp: 16 (05/18/22 0603)  BP: (!) 177/93 (05/18/22 0603)  SpO2: 100 % (05/18/22 0603)   Vital Signs (24h Range):  Temp:  [98.4 °F (36.9 °C)] 98.4 °F (36.9 °C)  Pulse:  [57-77] 77  Resp:  [16] 16  SpO2:  [100 %] 100 %  BP: (115-177)/(56-93) 177/93     Weight: 59 kg (130 lb 1.1 oz)  Body mass index is 22.33 kg/m².    Physical Exam  Vitals and nursing note reviewed.   Constitutional:       Appearance: She is ill-appearing (chronically ill appearing).   HENT:      Head: Normocephalic and atraumatic.      Nose: Nose normal.      Mouth/Throat:      Mouth: Mucous membranes are dry.      Pharynx: Oropharynx is clear.   Eyes:      Extraocular Movements: Extraocular movements intact.      Pupils: Pupils are equal, round, and reactive to light.   Cardiovascular:      Rate and Rhythm: Normal rate and regular rhythm.      Pulses: Normal pulses.      Heart sounds: Murmur heard.   Pulmonary:      Effort: Pulmonary effort is normal.      Breath sounds: Normal breath sounds.   Abdominal:      General: Bowel sounds are normal.      Palpations: Abdomen is soft.   Musculoskeletal:         General: Normal range of motion.      Cervical back: Normal range of motion and neck supple.      Comments: Rt arm +T&B   Skin:     General: Skin is warm and dry.      Capillary Refill: Capillary refill takes less than 2 seconds.      Findings: Bruising present.      Comments: Rt arm bruising, dressing x2 clean dry and intact   Neurological:      General: No focal deficit present.      Mental Status: She is alert and oriented to person, place, and time.   Psychiatric:         Mood and Affect: Mood normal.         Behavior: Behavior normal.         CRANIAL NERVES     CN III, IV, VI   Pupils are equal, round, and reactive to light.     Significant Labs: All pertinent labs within the past 24 hours have been reviewed.  CBC:   Recent Labs   Lab  05/17/22  0949 05/18/22  0707   WBC 4.73 4.76   HGB 12.0 11.5*   HCT 37.7 34.6*   * 138*     CMP:   Recent Labs   Lab 05/17/22  0949 05/18/22  0707    135*   K 4.0 3.6   CL 94* 91*   CO2 33* 28   GLU 90 90   BUN 33* 56*   CREATININE 6.9* 8.5*   CALCIUM 9.2 9.0   PROT  --  6.4   ALBUMIN  --  3.2*   BILITOT  --  0.6   ALKPHOS  --  122   AST  --  18   ALT  --  11   ANIONGAP 11 16   EGFRNONAA 5.7* 4.4*       Significant Imaging: N/A

## 2022-05-18 NOTE — ASSESSMENT & PLAN NOTE
Consulted nephrology in ED, recommended admission for HD given angiogram yesterday and fistulogram today, pt is anuric and there was concern she may not be able to get to HD for a few days   HD management per nephrology

## 2022-05-19 NOTE — OP NOTE
[facility] - Emergency Dept  Surgery Department  Operative Note    SUMMARY     Date of Procedure: 5/18/2022     Procedure: Procedure(s) (LRB):  Fistulogram (Bilateral)  THROMBECTOMY, GRAFT, OPEN  Stent, Fistula     Surgeon(s) and Role:     * Lance Bustamante MD - Primary    Assisting Surgeon: None    Pre-Operative Diagnosis: Chronic kidney disease [N18.9]    Post-Operative Diagnosis: Post-Op Diagnosis Codes:     * Chronic kidney disease [N18.9]    Anesthesia: Local    Operative Findings (including complications, if any):  Thrombosed right upper extremity cephalic brachial AV fistula with severe distal cephalic vein stenosis    Description of Technical Procedures:  Right upper extremity AV fistulogram with Angiojet thrombectomy and stent angioplasty with 7 x 5 via Bon of a distal cephalic focal occlusion.    Right upper extremity AV fistula was accessed in antegrade and retrograde fashion.  With some difficulty were able to get a wire to pass through the distal cephalic vein occlusion and angiogram of the arm demonstrated that the majority of the proximal cephalic vein was patent with some thrombus which appeared fresh.  Distally the anastomosis was widely patent with some thrombus within the pseudoaneurysm and then in the intervening segment there was a short-segment occlusion.  Angiojet thrombectomy was passed proximally and distally and removed the vast majority of the thrombus.  We still had a severe focal stenosis which we ballooned with a 7 mm common Quest balloon however this did not resolve the residual stenosis and we placed a 7 x 5 [address]val[address] and at the completion had excellent flow through the fistula with no residual thrombus and no central vein stenosis.  Sheath were pulled and stitch closed.    Significant Surgical Tasks Conducted by the Assistant(s), if Applicable:     Estimated Blood Loss (EBL): * No values recorded between 5/18/2022 12:24 PM and 5/18/2022  1:30 PM *           Implants:    Implant Name Type Inv. Item Serial No.  Lot No. LRB No. Used Action   STENT VIABAHN  7MM X 5CM - [id number] stent peripheral covered- self expanding STENT VIABAHN  7MM X 5CM     1 Implanted       Specimens:   Specimen (24h ago, onward)            None                  Condition: Good    Disposition: PACU - hemodynamically stable.    Attestation: I was present and scrubbed for the entire procedure.

## 2022-05-30 ENCOUNTER — PATIENT MESSAGE (OUTPATIENT)
Dept: TRANSPLANT | Facility: CLINIC | Age: 67
End: 2022-05-30
Payer: COMMERCIAL

## 2022-05-31 ENCOUNTER — OFFICE VISIT (OUTPATIENT)
Dept: CARDIOLOGY | Facility: CLINIC | Age: 67
End: 2022-05-31
Payer: COMMERCIAL

## 2022-05-31 VITALS
HEIGHT: 64 IN | WEIGHT: 130.94 LBS | DIASTOLIC BLOOD PRESSURE: 56 MMHG | BODY MASS INDEX: 22.35 KG/M2 | SYSTOLIC BLOOD PRESSURE: 101 MMHG | HEART RATE: 86 BPM

## 2022-05-31 DIAGNOSIS — Z86.718 HISTORY OF DVT (DEEP VEIN THROMBOSIS): ICD-10-CM

## 2022-05-31 DIAGNOSIS — J43.2 CENTRILOBULAR EMPHYSEMA: ICD-10-CM

## 2022-05-31 DIAGNOSIS — N18.6 ESRD (END STAGE RENAL DISEASE): ICD-10-CM

## 2022-05-31 DIAGNOSIS — I42.8 NONISCHEMIC CARDIOMYOPATHY: Primary | ICD-10-CM

## 2022-05-31 DIAGNOSIS — E78.00 PURE HYPERCHOLESTEROLEMIA: ICD-10-CM

## 2022-05-31 DIAGNOSIS — I10 PRIMARY HYPERTENSION: ICD-10-CM

## 2022-05-31 DIAGNOSIS — I70.0 CALCIFICATION OF AORTA: ICD-10-CM

## 2022-05-31 DIAGNOSIS — I25.10 NONOBSTRUCTIVE ATHEROSCLEROSIS OF CORONARY ARTERY: ICD-10-CM

## 2022-05-31 DIAGNOSIS — I51.9 SYSTOLIC DYSFUNCTION: ICD-10-CM

## 2022-05-31 PROCEDURE — 4010F ACE/ARB THERAPY RXD/TAKEN: CPT | Mod: CPTII,S$GLB,, | Performed by: PHYSICIAN ASSISTANT

## 2022-05-31 PROCEDURE — 1101F PR PT FALLS ASSESS DOC 0-1 FALLS W/OUT INJ PAST YR: ICD-10-PCS | Mod: CPTII,S$GLB,, | Performed by: PHYSICIAN ASSISTANT

## 2022-05-31 PROCEDURE — 1159F MED LIST DOCD IN RCRD: CPT | Mod: CPTII,S$GLB,, | Performed by: PHYSICIAN ASSISTANT

## 2022-05-31 PROCEDURE — 3288F FALL RISK ASSESSMENT DOCD: CPT | Mod: CPTII,S$GLB,, | Performed by: PHYSICIAN ASSISTANT

## 2022-05-31 PROCEDURE — 99214 PR OFFICE/OUTPT VISIT, EST, LEVL IV, 30-39 MIN: ICD-10-PCS | Mod: S$GLB,,, | Performed by: PHYSICIAN ASSISTANT

## 2022-05-31 PROCEDURE — 3074F PR MOST RECENT SYSTOLIC BLOOD PRESSURE < 130 MM HG: ICD-10-PCS | Mod: CPTII,S$GLB,, | Performed by: PHYSICIAN ASSISTANT

## 2022-05-31 PROCEDURE — 1126F PR PAIN SEVERITY QUANTIFIED, NO PAIN PRESENT: ICD-10-PCS | Mod: CPTII,S$GLB,, | Performed by: PHYSICIAN ASSISTANT

## 2022-05-31 PROCEDURE — 3074F SYST BP LT 130 MM HG: CPT | Mod: CPTII,S$GLB,, | Performed by: PHYSICIAN ASSISTANT

## 2022-05-31 PROCEDURE — 1126F AMNT PAIN NOTED NONE PRSNT: CPT | Mod: CPTII,S$GLB,, | Performed by: PHYSICIAN ASSISTANT

## 2022-05-31 PROCEDURE — 1159F PR MEDICATION LIST DOCUMENTED IN MEDICAL RECORD: ICD-10-PCS | Mod: CPTII,S$GLB,, | Performed by: PHYSICIAN ASSISTANT

## 2022-05-31 PROCEDURE — 1160F RVW MEDS BY RX/DR IN RCRD: CPT | Mod: CPTII,S$GLB,, | Performed by: PHYSICIAN ASSISTANT

## 2022-05-31 PROCEDURE — 3078F PR MOST RECENT DIASTOLIC BLOOD PRESSURE < 80 MM HG: ICD-10-PCS | Mod: CPTII,S$GLB,, | Performed by: PHYSICIAN ASSISTANT

## 2022-05-31 PROCEDURE — 99214 OFFICE O/P EST MOD 30 MIN: CPT | Mod: S$GLB,,, | Performed by: PHYSICIAN ASSISTANT

## 2022-05-31 PROCEDURE — 3288F PR FALLS RISK ASSESSMENT DOCUMENTED: ICD-10-PCS | Mod: CPTII,S$GLB,, | Performed by: PHYSICIAN ASSISTANT

## 2022-05-31 PROCEDURE — 99999 PR PBB SHADOW E&M-EST. PATIENT-LVL V: CPT | Mod: PBBFAC,,, | Performed by: PHYSICIAN ASSISTANT

## 2022-05-31 PROCEDURE — 3008F PR BODY MASS INDEX (BMI) DOCUMENTED: ICD-10-PCS | Mod: CPTII,S$GLB,, | Performed by: PHYSICIAN ASSISTANT

## 2022-05-31 PROCEDURE — 3008F BODY MASS INDEX DOCD: CPT | Mod: CPTII,S$GLB,, | Performed by: PHYSICIAN ASSISTANT

## 2022-05-31 PROCEDURE — 4010F PR ACE/ARB THEARPY RXD/TAKEN: ICD-10-PCS | Mod: CPTII,S$GLB,, | Performed by: PHYSICIAN ASSISTANT

## 2022-05-31 PROCEDURE — 3078F DIAST BP <80 MM HG: CPT | Mod: CPTII,S$GLB,, | Performed by: PHYSICIAN ASSISTANT

## 2022-05-31 PROCEDURE — 1160F PR REVIEW ALL MEDS BY PRESCRIBER/CLIN PHARMACIST DOCUMENTED: ICD-10-PCS | Mod: CPTII,S$GLB,, | Performed by: PHYSICIAN ASSISTANT

## 2022-05-31 PROCEDURE — 99999 PR PBB SHADOW E&M-EST. PATIENT-LVL V: ICD-10-PCS | Mod: PBBFAC,,, | Performed by: PHYSICIAN ASSISTANT

## 2022-05-31 PROCEDURE — 1101F PT FALLS ASSESS-DOCD LE1/YR: CPT | Mod: CPTII,S$GLB,, | Performed by: PHYSICIAN ASSISTANT

## 2022-05-31 RX ORDER — CARVEDILOL 3.12 MG/1
6.25 TABLET ORAL 2 TIMES DAILY
Qty: 360 TABLET | Refills: 3 | Status: SHIPPED | OUTPATIENT
Start: 2022-05-31 | End: 2022-08-16

## 2022-05-31 NOTE — PATIENT INSTRUCTIONS
Try increasing Carvedilol to 6.25 mg (2 tabs) twice daily. If your pressure is getting too low, you may have to decrease back to once daily.

## 2022-05-31 NOTE — PROGRESS NOTES
General Cardiology Clinic Note  Reason for Visit: Follow up   Last Clinic Visit: 4/19/2022 with Dr. Aguilar    HPI:   Latricia Higgins is a 66 y.o. female who presents for follow up.    Problems:  HTN  Nonischemic cardiomyopathy, EF 18% (normal 9/2020)  Mod-severe MR   HLD, LDL 42  History of DVT on OAC  Aortic atherosclerosis  ESRD on HD 10/2020, delisted for tx at this time given reduced EF  Emphysema    Interval History   Since her last visit, patient underwent LHC for recent drop in LVEF and was found to have 70% OM2 stenosis and otherwise mild nonobstructive disease. Cardiomyopathy is out of proportion to degree of CAD.     Presents today for follow up. She states she feels well. She is able to do yard work, clean her house, go shopping. She feels no limitation to physical activity. She is tolerating Entresto and Coreg 3.125 mg bid. Her BP is running 90s-low 100s systolic. The highest it ever got was 120. She takes Midodrine prior to going to dialysis, and then 30-45 minutes prior to leaving dialysis. She has had just a couple episodes of feeling very lightheaded with standing. She denies CP, FRENCH, orthopnea, PND, edema, syncope, palpitations.     4/19/2022 HPI (Dr. Aguilar)  Patient delisted for kidney transplant at this time since EF reduced 18%. Started on Entresto and switched coreg to Toprol XL secondary to BP issues. On midodrine for hypotension issues per nephrology. PET stress 2021 without ischemia. Derrek CHF or anginal symptoms. To see HTS today.    ROS:      Review of Systems   Constitutional: Negative for diaphoresis, malaise/fatigue, weight gain and weight loss.   HENT: Negative for nosebleeds.    Eyes: Negative for vision loss in left eye, vision loss in right eye and visual disturbance.   Cardiovascular: Negative for chest pain, claudication, dyspnea on exertion, irregular heartbeat, leg swelling, near-syncope, orthopnea, palpitations, paroxysmal nocturnal dyspnea and syncope.   Respiratory:  Negative for cough, shortness of breath, sleep disturbances due to breathing, snoring and wheezing.    Hematologic/Lymphatic: Negative for bleeding problem. Does not bruise/bleed easily.   Skin: Negative for poor wound healing and rash.   Musculoskeletal: Negative for muscle cramps and myalgias.   Gastrointestinal: Negative for bloating, abdominal pain, diarrhea, heartburn, melena, nausea and vomiting.   Genitourinary: Negative for hematuria and nocturia.   Neurological: Positive for light-headedness. Negative for brief paralysis, dizziness, headaches, numbness and weakness.   Psychiatric/Behavioral: Negative for depression.   Allergic/Immunologic: Negative for hives.       PMH:     Past Medical History:   Diagnosis Date    Dialysis patient 10/05/2020    Emphysema of lung     ESRD (end stage renal disease)     Hepatitis 1980    HLD (hyperlipidemia)     Hypertension     Renal disorder      Past Surgical History:   Procedure Laterality Date    AORTOGRAPHY WITH SERIALOGRAPHY N/A 9/30/2020    Procedure: co2 renal angio;  Surgeon: Lance Bustamante MD;  Location: [facility] CATH/EP LAB;  Service: Vascular;  Laterality: N/A;    APPENDECTOMY      AV FISTULA PLACEMENT Right 12/9/2020    Procedure: CREATION, AV FISTULA;  Surgeon: Lance Bustamante MD;  Location: [facility] OR;  Service: General;  Laterality: Right;    COLONOSCOPY N/A 6/8/2021    Procedure: COLONOSCOPY;  Surgeon: Avila Smith III, MD;  Location: [facility] ENDO;  Service: Endoscopy;  Laterality: N/A;    COLONOSCOPY N/A 12/14/2021    Procedure: COLONOSCOPY;  Surgeon: Avila Smith III, MD;  Location: [facility] ENDO;  Service: Endoscopy;  Laterality: N/A;    COLONOSCOPY W/ POLYPECTOMY  06/08/2021    FISTULOGRAM Bilateral 5/18/2022    Procedure: Fistulogram;  Surgeon: Lance Bustamante MD;  Location: [facility] CATH/EP LAB;  Service: General;  Laterality: Bilateral;    INSERTION OF TUNNELED CENTRAL VENOUS HEMODIALYSIS CATHETER  10/2/2020    Procedure: INSERTION,  CATHETER, CENTRAL VENOUS, HEMODIALYSIS, TUNNELED;  Surgeon: Ali Khoobehi, MD;  Location: [facility] OR;  Service: Vascular;;    LEFT HEART CATHETERIZATION Left 5/17/2022    Procedure: Left heart cath;  Surgeon: Daniel Tyler MD;  Location: [facility] CATH LAB;  Service: Cardiology;  Laterality: Left;    OPEN THROMBECTOMY OF GRAFT  5/18/2022    Procedure: THROMBECTOMY, GRAFT, OPEN;  Surgeon: Lance Bustamante MD;  Location: [facility] CATH/EP LAB;  Service: General;;    PLACEMENT OF DIALYSIS ACCESS Right 9/30/2020    Procedure: Insertion, Dialysis Access;  Surgeon: Lance Bustamante MD;  Location: [facility] CATH/EP LAB;  Service: Vascular;  Laterality: Right;    REMOVAL OF TUNNELED CENTRAL VENOUS CATHETER (CVC) N/A 12/16/2020    Procedure: REMOVAL, CATHETER, CENTRAL VENOUS, TUNNELED;  Surgeon: Ali Khoobehi, MD;  Location: [facility] OR;  Service: Vascular;  Laterality: N/A;    TONSILLECTOMY      TUBAL LIGATION       Allergies:   Review of patient's allergies indicates:  No Known Allergies  Medications:     Current Outpatient Medications on File Prior to Visit   Medication Sig Dispense Refill    acetaminophen (TYLENOL) 325 MG tablet Take 325 mg by mouth every 6 (six) hours as needed for Pain.      apixaban (ELIQUIS) 5 mg Tab Take 1 tablet (5 mg total) by mouth 2 (two) times daily. 60 tablet 6    aspirin (ECOTRIN) 81 MG EC tablet Take 1 tablet (81 mg total) by mouth once daily. 90 tablet 3    atorvastatin (LIPITOR) 40 MG tablet Take 40 mg by mouth once daily.      carvediloL (COREG) 3.125 MG tablet Take 2 tablets (6.25 mg total) by mouth 2 (two) times daily. (Patient taking differently: Take 3.125 mg by mouth 2 (two) times daily.) 60 tablet 3    LIDOcaine-prilocaine (EMLA) cream APPLY TO FISTULA 1 HOUR PRIOR TO DIALYSIS TREATMENT 3 TIMES PER WEEK      midodrine (PROAMATINE) 10 MG tablet Take 10 mg by mouth every Mon, Wed, Fri. With dialysis      ondansetron (ZOFRAN-ODT) 4 MG TbDL Take 4 mg by mouth every 6 (six) hours as needed  "(nausea).      sacubitriL-valsartan (ENTRESTO) 24-26 mg per tablet Take 1 tablet by mouth 2 (two) times daily. 60 tablet 11    sevelamer carbonate (RENVELA) 800 mg Tab Take 2,400 mg by mouth 3 (three) times daily with meals.       umeclidinium-vilanteroL (ANORO ELLIPTA) 62.5-25 mcg/actuation DsDv Inhale 1 puff into the lungs once daily. Controller 1 each 5    VELTASSA 8.4 gram PwPk Take 8.4 g by mouth 4 (four) times a week. Tues, thurs, sat, sun      VIOS AEROSOL DELIVERY SYSTEM Lizbeth AS DIRECTED       No current facility-administered medications on file prior to visit.     Social History:     Social History     Tobacco Use    Smoking status: Former Smoker     Packs/day: 0.25     Years: 20.00     Pack years: 5.00     Types: Cigarettes     Quit date: 2020     Years since quittin.6    Smokeless tobacco: Never Used   Substance Use Topics    Alcohol use: Not Currently     Family History:     Family History   Problem Relation Age of Onset    Cancer Mother     Heart disease Father      Physical Exam:   BP (!) 101/56 (BP Location: Left arm, Patient Position: Sitting, BP Method: Medium (Automatic))   Pulse 86   Ht 5' 4" (1.626 m)   Wt 59.4 kg (130 lb 15.3 oz)   BMI 22.48 kg/m²        Physical Exam  Vitals and nursing note reviewed.   Constitutional:       General: She is not in acute distress.     Appearance: Normal appearance. She is not ill-appearing.   HENT:      Head: Normocephalic and atraumatic.   Eyes:      General: No scleral icterus.     Conjunctiva/sclera: Conjunctivae normal.   Neck:      Thyroid: No thyromegaly.      Vascular: No carotid bruit or JVD.   Cardiovascular:      Rate and Rhythm: Normal rate and regular rhythm.      Pulses: Normal pulses.      Heart sounds: Normal heart sounds. No murmur heard.    No friction rub. No gallop.   Pulmonary:      Effort: Pulmonary effort is normal.      Breath sounds: Normal breath sounds. No wheezing, rhonchi or rales.   Chest:      Chest wall: No " tenderness.   Abdominal:      General: Bowel sounds are normal. There is no distension.      Palpations: Abdomen is soft.      Tenderness: There is no abdominal tenderness.   Musculoskeletal:         General: No swelling.      Cervical back: Neck supple.      Right lower leg: No edema.      Left lower leg: No edema.   Skin:     General: Skin is warm and dry.      Coloration: Skin is not pale.      Findings: No erythema or rash.      Nails: There is no clubbing.   Neurological:      Mental Status: She is alert and oriented to person, place, and time. Mental status is at baseline.   Psychiatric:         Mood and Affect: Mood and affect normal.         Behavior: Behavior normal.          Labs:     Lab Results   Component Value Date     (L) 05/18/2022    K 3.6 05/18/2022    CL 91 (L) 05/18/2022    CO2 28 05/18/2022    BUN 56 (H) 05/18/2022    CREATININE 8.5 (H) 05/18/2022    ANIONGAP 16 05/18/2022     Lab Results   Component Value Date    HGBA1C 4.5 11/16/2020     Lab Results   Component Value Date     (H) 04/19/2022    BNP >4,500 (H) 01/15/2022    BNP >4,500 (H) 11/08/2021    Lab Results   Component Value Date    WBC 4.76 05/18/2022    HGB 11.5 (L) 05/18/2022    HCT 34.6 (L) 05/18/2022    HCT 26 (L) 05/24/2021     (L) 05/18/2022    GRAN 3.1 05/18/2022    GRAN 65.5 05/18/2022     Lab Results   Component Value Date    CHOL 121 03/23/2021    HDL 62 03/23/2021    LDLCALC 41.8 (L) 03/23/2021    TRIG 86 03/23/2021          Imaging:   Echocardiograms:   TTE 3/8/22  · The left ventricle is normal in size with mild concentric hypertrophy and severely decreased systolic function.  · The estimated ejection fraction is 18%.  · Grade III left ventricular diastolic dysfunction.  · Mild right ventricular enlargement with mildly to moderately reduced right ventricular systolic function.  · Mild left atrial enlargement.  · Moderate tricuspid regurgitation.  · There is mild to moderate pulmonary  hypertension.  · Moderate pulmonic regurgitation.  · Moderate-to-severe mitral regurgitation.    TTE 7/20/2021  · The estimated PA systolic pressure is 33 mmHg.  · The left ventricle is normal in size with mild eccentric hypertrophy and mildly decreased systolic function.  · The estimated ejection fraction is 45%.  · Grade I left ventricular diastolic dysfunction.  · Normal right ventricular size with normal right ventricular systolic function.  · Moderate left atrial enlargement.  · Mild-to-moderate mitral regurgitation.  · There is left ventricular global hypokinesis.  · Mild to moderate tricuspid regurgitation.    TTE 9/29/2020  · The estimated PA systolic pressure is 24 mmHg.  · There is mild left ventricular concentric hypertrophy.  · The left ventricle is normal in size with normal systolic function. The estimated ejection fraction is 70%.  · Indeterminate diastolic function.  · Normal right ventricular systolic function.  · Mild left atrial enlargement.      Stress Tests:   PET Stress Test 5/18/2021    The relative PET images are normal showing no clinically significant regional resting or stress induced perfusion defects.    The whole heart absolute myocardial perfusion values averaged 1.13 cc/min/g at rest, which is elevated; 1.72 cc/min/g at stress, which is mildly reduced; and CFR is 1.53 , which is moderately reduced in part due to elevated resting flow.    The gated perfusion images showed an ejection fraction of 24% at rest and 30% during stress. A normal ejection fraction is greater than 51%.    There is moderate global hypokinesis at rest and during stress.    The LV cavity size is moderately enlarged at rest and stress.    The EKG portion of this study is negative for ischemia.    During stress, rare PACs and frequent PVCs are noted.    The patient reported no chest pain during the stress test.    There are no prior studies for comparison.    SPECT Stress Test 4/13/21    Abnormal  myocardial perfusion scan.    There is a moderate intensity, small to moderate sized, fixed defect consistent with scar in the mid to apical anterior wall(s) in the typical distribution of the LAD territory.    The gated perfusion images showed an ejection fraction of 48% at rest. The gated perfusion images showed an ejection fraction of 46% post stress. Normal ejection fraction is greater than 51%.    There is mild global hypokinesis at rest and stress.    LV cavity size is mildly enlarged at rest and mildly enlarged at stress.    The EKG portion of this study is negative for ischemia.    The patient reported no chest pain during the stress test.    During stress, rare PACs are noted. , During stress, occasional PVCs are noted.    There are no prior studies for comparison.    Caths:   Left heart cath 5/17/22  Left Main   Exhibits minimal luminal irregularities.   Left Anterior Descending   There is mild diffuse disease throughout the vessel.   First Diagonal Branch   1st Diag lesion was 30% stenosed.   Lateral First Diagonal Branch   There is mild diffuse disease throughout the vessel.   Second Diagonal Branch   The vessel exhibits minimal luminal irregularities.   Left Circumflex   Mid Cx to Dist Cx lesion was 30% stenosed.   First Obtuse Marginal Branch   The vessel exhibits minimal luminal irregularities.   Second Obtuse Marginal Branch   2nd Mrg lesion was 70% stenosed.   Third Obtuse Marginal Branch   The vessel exhibits minimal luminal irregularities.   Fourth Obtuse Marginal Branch   The vessel exhibits minimal luminal irregularities.   Right Coronary Artery   Ost RCA to Prox RCA lesion was 20% stenosed.   Right Posterior Descending Artery   The vessel is angiographically normal.   Right Posterior Atrioventricular Artery   The vessel is angiographically normal.   First Right Posterolateral Branch   The vessel is angiographically normal.   Second Right Posterolateral Branch   The vessel is  angiographically normal.       EKG 5/17/2022: NSR, inferolateral TWIs.     Assessment:     1. Nonischemic cardiomyopathy    2. Systolic dysfunction    3. Nonobstructive atherosclerosis of coronary artery    4. Primary hypertension    5. Pure hypercholesterolemia    6. ESRD (end stage renal disease)    7. History of DVT (deep vein thrombosis)    8. Calcification of aorta    9. Centrilobular emphysema      Plan:     Nonischemic cardiomyopathy, LVEF 18%  Following with HTS  She has been taking Coreg 3.125 mg bid instead of 6.25 mg bid as prescribed by Dr. Dixon. Advised her to try increasing to 6.25 bid. Discussed that if she has worsening lightheadedness, she may have to decrease back to 3.125 mg.   Continue Entresto 24-26 mg bid  Contraindication to SGLT-2 inh given renal dysfunction  HD for volume removal    Nonobstructive CAD  Asymptomatic.   Continue ASA and high intensity statin    Hypertension  SBP running 90s-low 100s but patient mostly asymptomatic. Discussed importance of continuing GDMT unless lightheadedness becomes regular occurrence.   On Midodrine 10 mg on dialysis days    Hyperlipidemia  LDL at goal on statin     History of DVT  Continue Eliquis     Follow up in 4 months.     Signed:  Dayan Jose PA-C  Cardiology   [phone] - General  5/31/2022 8:57 AM

## 2022-07-05 ENCOUNTER — TELEPHONE (OUTPATIENT)
Dept: TRANSPLANT | Facility: CLINIC | Age: 67
End: 2022-07-05
Payer: COMMERCIAL

## 2022-07-05 ENCOUNTER — TELEPHONE (OUTPATIENT)
Dept: CARDIOLOGY | Facility: CLINIC | Age: 67
End: 2022-07-05
Payer: COMMERCIAL

## 2022-07-05 NOTE — TELEPHONE ENCOUNTER
Confirmed w. HF team that pt is a HF pt (spoke kathi Tapia) and sent message previously to [facility] staff.

## 2022-07-05 NOTE — TELEPHONE ENCOUNTER
----- Message from Indira Vicente sent at 7/5/2022 12:57 PM CDT -----  Regarding: Concerns  Pt [phone] says she's having swelling in her ankles, numbness/tingling in fingers for the last couple of weeks.    LOV 3/24/22 Dr. Brooks    Thanks

## 2022-07-05 NOTE — TELEPHONE ENCOUNTER
----- Message from Lucie Dia RN sent at 7/5/2022  1:58 PM CDT -----  Regarding: Swelling + tingling/numbness    ----- Message -----  From: Indira Vicente  Sent: 7/5/2022  12:59 PM CDT  To: Lucie Dia RN  Subject: Concerns                                         Pt [phone] says she's having swelling in her ankles, numbness/tingling in fingers for the last couple of weeks.    LOV 3/24/22 Dr. Brooks    Thanks

## 2022-08-16 ENCOUNTER — LAB VISIT (OUTPATIENT)
Dept: LAB | Facility: HOSPITAL | Age: 67
End: 2022-08-16
Attending: INTERNAL MEDICINE
Payer: MEDICARE

## 2022-08-16 ENCOUNTER — OFFICE VISIT (OUTPATIENT)
Dept: TRANSPLANT | Facility: CLINIC | Age: 67
End: 2022-08-16
Payer: MEDICARE

## 2022-08-16 VITALS
DIASTOLIC BLOOD PRESSURE: 78 MMHG | SYSTOLIC BLOOD PRESSURE: 156 MMHG | HEIGHT: 64 IN | HEART RATE: 80 BPM | BODY MASS INDEX: 22.96 KG/M2 | WEIGHT: 134.5 LBS

## 2022-08-16 DIAGNOSIS — I10 PRIMARY HYPERTENSION: ICD-10-CM

## 2022-08-16 DIAGNOSIS — E78.2 MIXED HYPERLIPIDEMIA: ICD-10-CM

## 2022-08-16 DIAGNOSIS — I50.42 CHRONIC COMBINED SYSTOLIC AND DIASTOLIC HEART FAILURE: ICD-10-CM

## 2022-08-16 DIAGNOSIS — I51.9 SYSTOLIC DYSFUNCTION: ICD-10-CM

## 2022-08-16 DIAGNOSIS — N18.6 ESRD (END STAGE RENAL DISEASE): ICD-10-CM

## 2022-08-16 DIAGNOSIS — Z76.82 ORGAN TRANSPLANT CANDIDATE: Primary | ICD-10-CM

## 2022-08-16 DIAGNOSIS — I50.9 ACUTE ON CHRONIC HEART FAILURE, UNSPECIFIED HEART FAILURE TYPE: ICD-10-CM

## 2022-08-16 LAB
ALBUMIN SERPL BCP-MCNC: 3 G/DL (ref 3.5–5.2)
ALP SERPL-CCNC: 127 U/L (ref 55–135)
ALT SERPL W/O P-5'-P-CCNC: 5 U/L (ref 10–44)
ANION GAP SERPL CALC-SCNC: 13 MMOL/L (ref 8–16)
AST SERPL-CCNC: 14 U/L (ref 10–40)
BASOPHILS # BLD AUTO: 0.04 K/UL (ref 0–0.2)
BASOPHILS NFR BLD: 0.8 % (ref 0–1.9)
BILIRUB SERPL-MCNC: 0.5 MG/DL (ref 0.1–1)
BUN SERPL-MCNC: 35 MG/DL (ref 8–23)
CALCIUM SERPL-MCNC: 9.4 MG/DL (ref 8.7–10.5)
CHLORIDE SERPL-SCNC: 92 MMOL/L (ref 95–110)
CO2 SERPL-SCNC: 33 MMOL/L (ref 23–29)
CREAT SERPL-MCNC: 7.8 MG/DL (ref 0.5–1.4)
DIFFERENTIAL METHOD: ABNORMAL
EOSINOPHIL # BLD AUTO: 0.1 K/UL (ref 0–0.5)
EOSINOPHIL NFR BLD: 2.7 % (ref 0–8)
ERYTHROCYTE [DISTWIDTH] IN BLOOD BY AUTOMATED COUNT: 12.7 % (ref 11.5–14.5)
EST. GFR  (NO RACE VARIABLE): 5.2 ML/MIN/1.73 M^2
GLUCOSE SERPL-MCNC: 102 MG/DL (ref 70–110)
HCT VFR BLD AUTO: 32.8 % (ref 37–48.5)
HGB BLD-MCNC: 10.5 G/DL (ref 12–16)
IMM GRANULOCYTES # BLD AUTO: 0.02 K/UL (ref 0–0.04)
IMM GRANULOCYTES NFR BLD AUTO: 0.4 % (ref 0–0.5)
LYMPHOCYTES # BLD AUTO: 1.1 K/UL (ref 1–4.8)
LYMPHOCYTES NFR BLD: 21.8 % (ref 18–48)
MCH RBC QN AUTO: 33.8 PG (ref 27–31)
MCHC RBC AUTO-ENTMCNC: 32 G/DL (ref 32–36)
MCV RBC AUTO: 106 FL (ref 82–98)
MONOCYTES # BLD AUTO: 0.5 K/UL (ref 0.3–1)
MONOCYTES NFR BLD: 10.5 % (ref 4–15)
NEUTROPHILS # BLD AUTO: 3.1 K/UL (ref 1.8–7.7)
NEUTROPHILS NFR BLD: 63.8 % (ref 38–73)
NRBC BLD-RTO: 0 /100 WBC
PLATELET # BLD AUTO: 201 K/UL (ref 150–450)
PMV BLD AUTO: 10 FL (ref 9.2–12.9)
POTASSIUM SERPL-SCNC: 4.1 MMOL/L (ref 3.5–5.1)
PROT SERPL-MCNC: 6.6 G/DL (ref 6–8.4)
RBC # BLD AUTO: 3.11 M/UL (ref 4–5.4)
SODIUM SERPL-SCNC: 138 MMOL/L (ref 136–145)
WBC # BLD AUTO: 4.86 K/UL (ref 3.9–12.7)

## 2022-08-16 PROCEDURE — 99214 PR OFFICE/OUTPT VISIT, EST, LEVL IV, 30-39 MIN: ICD-10-PCS | Mod: S$PBB,,, | Performed by: INTERNAL MEDICINE

## 2022-08-16 PROCEDURE — 80053 COMPREHEN METABOLIC PANEL: CPT | Performed by: INTERNAL MEDICINE

## 2022-08-16 PROCEDURE — 99214 OFFICE O/P EST MOD 30 MIN: CPT | Mod: PBBFAC | Performed by: INTERNAL MEDICINE

## 2022-08-16 PROCEDURE — 99999 PR PBB SHADOW E&M-EST. PATIENT-LVL IV: ICD-10-PCS | Mod: PBBFAC,,, | Performed by: INTERNAL MEDICINE

## 2022-08-16 PROCEDURE — 85025 COMPLETE CBC W/AUTO DIFF WBC: CPT | Performed by: INTERNAL MEDICINE

## 2022-08-16 PROCEDURE — 83880 ASSAY OF NATRIURETIC PEPTIDE: CPT | Performed by: INTERNAL MEDICINE

## 2022-08-16 PROCEDURE — 99999 PR PBB SHADOW E&M-EST. PATIENT-LVL IV: CPT | Mod: PBBFAC,,, | Performed by: INTERNAL MEDICINE

## 2022-08-16 PROCEDURE — 99214 OFFICE O/P EST MOD 30 MIN: CPT | Mod: S$PBB,,, | Performed by: INTERNAL MEDICINE

## 2022-08-16 RX ORDER — CARVEDILOL 12.5 MG/1
12.5 TABLET ORAL 2 TIMES DAILY WITH MEALS
Qty: 60 TABLET | Refills: 3 | Status: SHIPPED | OUTPATIENT
Start: 2022-08-16 | End: 2022-12-21

## 2022-08-16 NOTE — PROGRESS NOTES
Advanced Heart Failure [facility] Transplantation Clinic Follow up.      Attending Physician: Jose Cruz Dixon MD.  The patient's last visit with me was on Visit date not found.         HPI.  Latricia Higgins is a 66 y.o. y.o. female     Problems  HTN  Systolic dysfunction, EF 18% (normal in 9/2020, 45% last July 2021)  HLD, LDL 42  History of DVT on OAC  Aortic atherosclerosis  ESRD on HD 10/2020, delisted for tx at this time given reduced EF  Emphysema     Patient delisted for kidney transplant at this time since EF reduced 18%. PET stress 2021 without ischemia. Derrek CHF or anginal symptoms. To see HTS today.     Review of Systems   Constitutional: Negative for activity change, appetite change, chills, diaphoresis, fever and unexpected weight change.   HENT: Negative for nasal congestion, rhinorrhea and sore throat.    Eyes: Negative for visual disturbance.   Cardiovascular: Negative for chest pain, palpitations, leg swelling and claudication.   Gastrointestinal: Negative for abdominal distention, abdominal pain, diarrhea, nausea and vomiting.   Genitourinary: Negative for difficulty urinating, dysuria and hematuria.   Integumentary:  Negative for rash.   Neurological: Negative for seizures, syncope and light-headedness.   Psychiatric/Behavioral: Negative for agitation and hallucinations.        Past Medical History:   Diagnosis Date    Dialysis patient 10/05/2020    Emphysema of lung     ESRD (end stage renal disease)     Hepatitis 1980    HLD (hyperlipidemia)     Hypertension     Renal disorder         Past Surgical History:   Procedure Laterality Date    AORTOGRAPHY WITH SERIALOGRAPHY N/A 9/30/2020    Procedure: co2 renal angio;  Surgeon: Lance Bustamante MD;  Location: [facility] CATH/EP LAB;  Service: Vascular;  Laterality: N/A;    APPENDECTOMY      AV FISTULA PLACEMENT Right 12/9/2020    Procedure: CREATION, AV FISTULA;   Surgeon: Lance Bustamante MD;  Location: [facility] OR;  Service: General;  Laterality: Right;    COLONOSCOPY N/A 6/8/2021    Procedure: COLONOSCOPY;  Surgeon: Avila Smith III, MD;  Location: [facility] ENDO;  Service: Endoscopy;  Laterality: N/A;    COLONOSCOPY N/A 12/14/2021    Procedure: COLONOSCOPY;  Surgeon: Avila Smith III, MD;  Location: [facility] ENDO;  Service: Endoscopy;  Laterality: N/A;    COLONOSCOPY W/ POLYPECTOMY  06/08/2021    FISTULOGRAM Bilateral 5/18/2022    Procedure: Fistulogram;  Surgeon: Lance Bustamante MD;  Location: [facility] CATH/EP LAB;  Service: General;  Laterality: Bilateral;    INSERTION OF TUNNELED CENTRAL VENOUS HEMODIALYSIS CATHETER  10/2/2020    Procedure: INSERTION, CATHETER, CENTRAL VENOUS, HEMODIALYSIS, TUNNELED;  Surgeon: Ali Khoobehi, MD;  Location: [facility] OR;  Service: Vascular;;    LEFT HEART CATHETERIZATION Left 5/17/2022    Procedure: Left heart cath;  Surgeon: Daniel Tyler MD;  Location: [facility] CATH LAB;  Service: Cardiology;  Laterality: Left;    OPEN THROMBECTOMY OF GRAFT  5/18/2022    Procedure: THROMBECTOMY, GRAFT, OPEN;  Surgeon: Lance Bustamante MD;  Location: [facility] CATH/EP LAB;  Service: General;;    PLACEMENT OF DIALYSIS ACCESS Right 9/30/2020    Procedure: Insertion, Dialysis Access;  Surgeon: Lance Bustamante MD;  Location: [facility] CATH/EP LAB;  Service: Vascular;  Laterality: Right;    REMOVAL OF TUNNELED CENTRAL VENOUS CATHETER (CVC) N/A 12/16/2020    Procedure: REMOVAL, CATHETER, CENTRAL VENOUS, TUNNELED;  Surgeon: Ali Khoobehi, MD;  Location: [facility] OR;  Service: Vascular;  Laterality: N/A;    TONSILLECTOMY      TUBAL LIGATION          Family History   Problem Relation Age of Onset    Cancer Mother     Heart disease Father         Review of patient's allergies indicates:  No Known Allergies     Current Outpatient Medications   Medication Instructions    acetaminophen (TYLENOL) 325 mg, Oral, Every 6 hours PRN    apixaban (ELIQUIS) 5 mg, Oral, 2 times  "daily    aspirin (ECOTRIN) 81 mg, Oral, Daily    atorvastatin (LIPITOR) 40 mg, Oral, Daily    carvediloL (COREG) 6.25 mg, Oral, 2 times daily    LIDOcaine-prilocaine (EMLA) cream APPLY TO FISTULA 1 HOUR PRIOR TO DIALYSIS TREATMENT 3 TIMES PER WEEK    midodrine (PROAMATINE) 10 mg, Oral, Every Mon, Wed, Fri, With dialysis    ondansetron (ZOFRAN-ODT) 4 mg, Oral, Every 6 hours PRN    sacubitriL-valsartan (ENTRESTO) 24-26 mg per tablet 1 tablet, Oral, 2 times daily    sevelamer carbonate (RENVELA) 2,400 mg, Oral, 3 times daily with meals    umeclidinium-vilanteroL (ANORO ELLIPTA) 62.5-25 mcg/actuation DsDv 1 puff, Inhalation, Daily, Controller    VELTASSA 8.4 g, Oral, Four times weekly, Tues, thurs, sat, sun    VIOS AEROSOL DELIVERY SYSTEM Lizbeth AS DIRECTED        Vitals:    08/16/22 1309   BP: (!) 156/78   Pulse: 80        Wt Readings from Last 3 Encounters:   08/16/22 61 kg (134 lb 7.7 oz)   05/31/22 59.4 kg (130 lb 15.3 oz)   05/18/22 59 kg (130 lb 1.1 oz)     Temp Readings from Last 3 Encounters:   05/18/22 98 °F (36.7 °C)   05/17/22 99 °F (37.2 °C) (Temporal)   01/20/22 98.2 °F (36.8 °C) (Oral)     BP Readings from Last 3 Encounters:   08/16/22 (!) 156/78   05/31/22 (!) 101/56   05/18/22 (!) 185/88     Pulse Readings from Last 3 Encounters:   08/16/22 80   05/31/22 86   05/18/22 78        Body mass index is 23.08 kg/m². Estimated body surface area is 1.66 meters squared as calculated from the following:    Height as of this encounter: 5' 4" (1.626 m).    Weight as of this encounter: 61 kg (134 lb 7.7 oz).     Physical Exam  Constitutional:       Appearance: She is well-developed.   HENT:      Head: Normocephalic and atraumatic.      Right Ear: External ear normal.      Left Ear: External ear normal.   Eyes:      Conjunctiva/sclera: Conjunctivae normal.      Pupils: Pupils are equal, round, and reactive to light.   Neck:      Vascular: No hepatojugular reflux or JVD.   Cardiovascular:      Rate and Rhythm: " Normal rate and regular rhythm.      Pulses: Intact distal pulses.      Heart sounds: S1 normal and S2 normal. No murmur heard.    No friction rub. No gallop.   Pulmonary:      Effort: Pulmonary effort is normal.      Breath sounds: Normal breath sounds.   Abdominal:      General: Bowel sounds are normal. There is no distension.      Palpations: Abdomen is soft.      Tenderness: There is no abdominal tenderness. There is no guarding or rebound.   Musculoskeletal:      Cervical back: Normal range of motion and neck supple.      Right lower leg: No edema.      Left lower leg: No edema.   Neurological:      Mental Status: She is alert and oriented to person, place, and time.          Lab Results   Component Value Date     (H) 04/19/2022     08/16/2022    K 4.1 08/16/2022    MG 2.4 04/19/2022    CL 92 (L) 08/16/2022    CO2 33 (H) 08/16/2022    BUN 35 (H) 08/16/2022    CREATININE 7.8 (H) 08/16/2022     08/16/2022    HGBA1C 4.5 11/16/2020    AST 14 08/16/2022    ALT 5 (L) 08/16/2022    ALBUMIN 3.0 (L) 08/16/2022    PROT 6.6 08/16/2022    BILITOT 0.5 08/16/2022    WBC 4.86 08/16/2022    HGB 10.5 (L) 08/16/2022    HCT 32.8 (L) 08/16/2022    HCT 26 (L) 05/24/2021     08/16/2022    INR 1.0 05/18/2022    INR 0.9 03/23/2021    TSH 1.278 04/19/2022    CHOL 121 03/23/2021    HDL 62 03/23/2021    LDLCALC 41.8 (L) 03/23/2021    TRIG 86 03/23/2021         Results for orders placed in visit on 03/08/22    Echo    Interpretation Summary  · The left ventricle is normal in size with mild concentric hypertrophy and severely decreased systolic function.  · The estimated ejection fraction is 18%.  · Grade III left ventricular diastolic dysfunction.  · Mild right ventricular enlargement with mildly to moderately reduced right ventricular systolic function.  · Mild left atrial enlargement.  · Moderate tricuspid regurgitation.  · There is mild to moderate pulmonary hypertension.  · Moderate pulmonic  regurgitation.  · Moderate-to-severe mitral regurgitation.        Results for orders placed during the hospital encounter of 09/27/20    Cardiac catheterization    Narrative  Procedure performed in the Invasive Lab  - See Procedure Log link below for nursing documentation  - See OpNote on Surgeries Tab for physician findings         Assessment and Plan:  There are no diagnoses linked to this encounter.      1. Chronic systolic HF, NYHA class II, stage C.  Etiology: non ischemic vs ischemic. Cardiomyopathy of ESRD?  Devices: none, recent drop in EF to less than 35%.  Medications: sacubitril-valsartan, metoprolol succinate and carvedilol.  Hemodynamic status: warm, normotensive, euvolemic. Normal JVP on exam. No congestive symptoms. Does not feel limited at all.  Clinical course:   Plan:  -patient to adhere to a fluid restriction of NMT 1.5 liters/24h.  -patient to adhere to  low sodium diet, NMT 1.5 grams of sodium in a day.  -stop metoprolol xl.  -Increase CARVEDILOL to 6.25 mg BID.  -Agree with plans for Left Heart Cath.  -patient was asked to call us every 2 weeks for next upgrade on her medications.  -Follow up in 2 months with labs.

## 2022-08-16 NOTE — LETTER
August 16, 2022        Eliseo Barnes  [address] [facility] [address]  Phone: [phone]  Fax: [phone]             [id number]  [address]  [facility] [address]  Phone: [phone]   Patient: Latricia Higgins   MR Number: [id number]   YOB: 1955   Date of Visit: 8/16/2022       Dear Dr. Eliseo Barnes    Thank you for referring Latricia Higgins to me for evaluation. Attached you will find relevant portions of my assessment and plan of care.    If you have questions, please do not hesitate to call me. I look forward to following Latricia Higgins along with you.    Sincerely,    Jose Cruz Dixon MD    Enclosure    If you would like to receive this communication electronically, please contact [email or web address] or [phone] to request [employer] Link access.    [employer] Link is a tool which provides read-only access to select patient information with whom you have a relationship. Its easy to use and provides real time access to review your patients record including encounter summaries, notes, results, and demographic information.    If you feel you have received this communication in error or would no longer like to receive these types of communications, please e-mail [email or web address]

## 2022-08-16 NOTE — PROGRESS NOTES
Advanced Heart Failure and Transplantation Clinic Follow up.        Attending Physician: Jose Cruz Dixon MD.  The patient's last visit with me was on 4/19/2022.         HPI.  Latricia Higgins is a 66 y.o. y.o. female     Problems  HTN  Systolic dysfunction, EF 18% (normal in 9/2020, 45% last July 2021)  HLD, LDL 42  History of DVT on OAC  Aortic atherosclerosis  ESRD on HD 10/2020, delisted for tx at this time given reduced EF  Emphysema     Patient delisted for kidney transplant after EF reduced 18%. PET stress 2021 without ischemia.     Since last appointment, she underwent LHC that found minimal CAD, out of proportion to heart dysfunction. She continues to feel well and denies any limiting symptoms. No orthopnea, PND, weight gain, edema.  She has HD M-W-F. Her BP is usually normal at home.    Review of Systems   Constitutional: Negative for activity change, appetite change, chills, diaphoresis, fatigue, fever and unexpected weight change.   HENT: Negative for nasal congestion, rhinorrhea and sore throat.    Eyes: Negative for discharge and visual disturbance.   Respiratory: Negative for apnea, cough, choking, chest tightness and shortness of breath.    Cardiovascular: Negative for chest pain, palpitations and leg swelling.   Gastrointestinal: Negative for abdominal distention, abdominal pain, diarrhea, nausea and vomiting.   Genitourinary: Negative for difficulty urinating, dysuria and hematuria.   Integumentary:  Negative for rash.   Neurological: Negative for seizures, syncope and light-headedness.   Psychiatric/Behavioral: Negative for agitation and hallucinations.        Past Medical History:   Diagnosis Date    Dialysis patient 10/05/2020    Emphysema of lung     ESRD (end stage renal disease)     Hepatitis 1980    HLD (hyperlipidemia)     Hypertension     Renal disorder         Past Surgical History:   Procedure  Left message for patient to return call to [phone].   Re: Follow up: CHF, CKD, CDC Guidelines for COVID-19, Review POC, Education, Goals Laterality Date    AORTOGRAPHY WITH SERIALOGRAPHY N/A 9/30/2020    Procedure: co2 renal angio;  Surgeon: Lance Bustamante MD;  Location: [facility] CATH/EP LAB;  Service: Vascular;  Laterality: N/A;    APPENDECTOMY      AV FISTULA PLACEMENT Right 12/9/2020    Procedure: CREATION, AV FISTULA;  Surgeon: Lance Bustamante MD;  Location: [facility] OR;  Service: General;  Laterality: Right;    COLONOSCOPY N/A 6/8/2021    Procedure: COLONOSCOPY;  Surgeon: Avila Smith III, MD;  Location: [facility] ENDO;  Service: Endoscopy;  Laterality: N/A;    COLONOSCOPY N/A 12/14/2021    Procedure: COLONOSCOPY;  Surgeon: Avila Smith III, MD;  Location: [facility] ENDO;  Service: Endoscopy;  Laterality: N/A;    COLONOSCOPY W/ POLYPECTOMY  06/08/2021    FISTULOGRAM Bilateral 5/18/2022    Procedure: Fistulogram;  Surgeon: Lance Bustamante MD;  Location: [facility] CATH/EP LAB;  Service: General;  Laterality: Bilateral;    INSERTION OF TUNNELED CENTRAL VENOUS HEMODIALYSIS CATHETER  10/2/2020    Procedure: INSERTION, CATHETER, CENTRAL VENOUS, HEMODIALYSIS, TUNNELED;  Surgeon: Ali Khoobehi, MD;  Location: [facility] OR;  Service: Vascular;;    LEFT HEART CATHETERIZATION Left 5/17/2022    Procedure: Left heart cath;  Surgeon: Daniel Tyler MD;  Location: [facility] CATH LAB;  Service: Cardiology;  Laterality: Left;    OPEN THROMBECTOMY OF GRAFT  5/18/2022    Procedure: THROMBECTOMY, GRAFT, OPEN;  Surgeon: Lance Bustamante MD;  Location: [facility] CATH/EP LAB;  Service: General;;    PLACEMENT OF DIALYSIS ACCESS Right 9/30/2020    Procedure: Insertion, Dialysis Access;  Surgeon: Lance Bustamante MD;  Location: [facility] CATH/EP LAB;  Service: Vascular;  Laterality: Right;    REMOVAL OF TUNNELED CENTRAL VENOUS CATHETER (CVC) N/A 12/16/2020    Procedure: REMOVAL, CATHETER, CENTRAL VENOUS, TUNNELED;  Surgeon: Ali Khoobehi, MD;  Location: [facility] OR;  Service: Vascular;  Laterality: N/A;    TONSILLECTOMY      TUBAL LIGATION          Family History   Problem Relation  "Age of Onset    Cancer Mother     Heart disease Father         Review of patient's allergies indicates:  No Known Allergies     Current Outpatient Medications   Medication Instructions    acetaminophen (TYLENOL) 325 mg, Oral, Every 6 hours PRN    apixaban (ELIQUIS) 5 mg, Oral, 2 times daily    aspirin (ECOTRIN) 81 mg, Oral, Daily    atorvastatin (LIPITOR) 40 mg, Oral, Daily    carvediloL (COREG) 6.25 mg, Oral, 2 times daily    LIDOcaine-prilocaine (EMLA) cream APPLY TO FISTULA 1 HOUR PRIOR TO DIALYSIS TREATMENT 3 TIMES PER WEEK    midodrine (PROAMATINE) 10 mg, Oral, Every Mon, Wed, Fri, With dialysis    ondansetron (ZOFRAN-ODT) 4 mg, Oral, Every 6 hours PRN    sacubitriL-valsartan (ENTRESTO) 24-26 mg per tablet 1 tablet, Oral, 2 times daily    sevelamer carbonate (RENVELA) 2,400 mg, Oral, 3 times daily with meals    umeclidinium-vilanteroL (ANORO ELLIPTA) 62.5-25 mcg/actuation DsDv 1 puff, Inhalation, Daily, Controller    VELTASSA 8.4 g, Oral, Four times weekly, Tues, thurs, sat, sun    VIOS AEROSOL DELIVERY SYSTEM Lizbeth AS DIRECTED        There were no vitals filed for this visit.     Wt Readings from Last 3 Encounters:   05/31/22 59.4 kg (130 lb 15.3 oz)   05/18/22 59 kg (130 lb 1.1 oz)   05/17/22 57.6 kg (127 lb)     Temp Readings from Last 3 Encounters:   05/18/22 98 °F (36.7 °C)   05/17/22 99 °F (37.2 °C) (Temporal)   01/20/22 98.2 °F (36.8 °C) (Oral)     BP Readings from Last 3 Encounters:   05/31/22 (!) 101/56   05/18/22 (!) 185/88   05/17/22 131/63     Pulse Readings from Last 3 Encounters:   05/31/22 86   05/18/22 78   05/17/22 69        There is no height or weight on file to calculate BMI. Estimated body surface area is 1.64 meters squared as calculated from the following:    Height as of 5/31/22: 5' 4" (1.626 m).    Weight as of 5/31/22: 59.4 kg (130 lb 15.3 oz).     Physical Exam  Constitutional:       Appearance: She is well-developed.   HENT:      Head: Normocephalic and atraumatic.      " Right Ear: External ear normal.      Left Ear: External ear normal.   Eyes:      Conjunctiva/sclera: Conjunctivae normal.      Pupils: Pupils are equal, round, and reactive to light.   Neck:      Vascular: No hepatojugular reflux or JVD.   Cardiovascular:      Rate and Rhythm: Normal rate and regular rhythm.      Pulses: Intact distal pulses.      Heart sounds: S1 normal and S2 normal. No murmur heard.    No friction rub. No gallop.   Pulmonary:      Effort: Pulmonary effort is normal.      Breath sounds: Normal breath sounds.   Abdominal:      General: Bowel sounds are normal. There is no distension.      Palpations: Abdomen is soft.      Tenderness: There is no abdominal tenderness. There is no guarding or rebound.   Musculoskeletal:      Cervical back: Normal range of motion and neck supple.      Right lower leg: No edema.      Left lower leg: No edema.   Neurological:      Mental Status: She is alert and oriented to person, place, and time.          Lab Results   Component Value Date     (H) 04/19/2022     (L) 05/18/2022    K 3.6 05/18/2022    MG 2.4 04/19/2022    CL 91 (L) 05/18/2022    CO2 28 05/18/2022    BUN 56 (H) 05/18/2022    CREATININE 8.5 (H) 05/18/2022    GLU 90 05/18/2022    HGBA1C 4.5 11/16/2020    AST 18 05/18/2022    ALT 11 05/18/2022    ALBUMIN 3.2 (L) 05/18/2022    PROT 6.4 05/18/2022    BILITOT 0.6 05/18/2022    WBC 4.76 05/18/2022    HGB 11.5 (L) 05/18/2022    HCT 34.6 (L) 05/18/2022    HCT 26 (L) 05/24/2021     (L) 05/18/2022    INR 1.0 05/18/2022    INR 0.9 03/23/2021    TSH 1.278 04/19/2022    CHOL 121 03/23/2021    HDL 62 03/23/2021    LDLCALC 41.8 (L) 03/23/2021    TRIG 86 03/23/2021       Results for orders placed in visit on 03/08/22    Echo    Interpretation Summary  · The left ventricle is normal in size with mild concentric hypertrophy and severely decreased systolic function.  · The estimated ejection fraction is 18%.  · Grade III left ventricular diastolic  dysfunction.  · Mild right ventricular enlargement with mildly to moderately reduced right ventricular systolic function.  · Mild left atrial enlargement.  · Moderate tricuspid regurgitation.  · There is mild to moderate pulmonary hypertension.  · Moderate pulmonic regurgitation.  · Moderate-to-severe mitral regurgitation.        Results for orders placed during the hospital encounter of 05/18/22    Cardiac catheterization    Narrative  Procedure performed in the Invasive Lab  - See Procedure Log link below for nursing documentation  - See OpNote on Surgeries Tab for physician findings  - See Imaging Tab for radiologist dictation         Assessment and Plan:  Organ transplant candidate  -     Comprehensive Metabolic Panel; Future; Expected date: 11/16/2022  -     BNP; Future; Expected date: 11/16/2022  -     Echo; Future; Expected date: 11/16/2022    ESRD (end stage renal disease): stable. On HD.    Mixed hyperlipidemia: stable. On statins.    Primary hypertension: high today but usually well controlled per home readings.    Systolic dysfunction: will check echo next appointment.    Chronic combined systolic and diastolic heart failure    Other orders  -     carvediloL (COREG) 12.5 MG tablet; Take 1 tablet (12.5 mg total) by mouth 2 (two) times daily with meals.  Dispense: 60 tablet; Refill: 3          1. Chronic systolic HF, NYHA class I-II, stage C.  Etiology: non ischemic cardiomyopathy. LHC with only 70% of small OM2.  Devices: none, recent drop in EF to less than 35%.  Medications: sacubitril-valsartan and carvedilol.  Hemodynamic status: warm, hypertensive, euvolemic. Normal JVP on exam. No congestive symptoms. Does not feel limited at all.  Clinical course: no HF ER visits or hospitalizations.  Plan:  -patient to adhere to a fluid restriction of NMT 1.5 liters/24h.  -patient to adhere to  low sodium diet, NMT 1.5 grams of sodium in a day.  -increase carvedilol from 6.25 mg to 12.5 mg twice a day.  -patient  was asked to call us in 2 weeks to increase carvedilol further.   -follow up in 3 months with labs and echo.

## 2022-08-17 LAB — NT-PROBNP SERPL IA-MCNC: ABNORMAL PG/ML

## 2022-08-27 ENCOUNTER — HOSPITAL ENCOUNTER (OUTPATIENT)
Facility: HOSPITAL | Age: 67
Discharge: HOME | End: 2022-08-29
Attending: EMERGENCY MEDICINE | Admitting: FAMILY MEDICINE
Payer: MEDICARE

## 2022-08-27 DIAGNOSIS — I95.1 ORTHOSTATIC HYPOTENSION: Primary | ICD-10-CM

## 2022-08-27 DIAGNOSIS — I50.9 CHF (CONGESTIVE HEART FAILURE): ICD-10-CM

## 2022-08-27 DIAGNOSIS — N18.6 END STAGE RENAL DISEASE: ICD-10-CM

## 2022-08-27 DIAGNOSIS — R55 SYNCOPE, UNSPECIFIED SYNCOPE TYPE: ICD-10-CM
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
## 2022-08-27 DIAGNOSIS — N18.6 ESRD (END STAGE RENAL DISEASE): ICD-10-CM

## 2022-08-27 DIAGNOSIS — R55 SYNCOPE: ICD-10-CM

## 2022-08-27 DIAGNOSIS — R07.9 CHEST PAIN: ICD-10-CM

## 2022-08-27 LAB
ALBUMIN SERPL BCP-MCNC: 3.3 G/DL (ref 3.5–5.2)
ALP SERPL-CCNC: 119 U/L (ref 55–135)
ALT SERPL W/O P-5'-P-CCNC: 13 U/L (ref 10–44)
ANION GAP SERPL CALC-SCNC: 14 MMOL/L (ref 8–16)
AST SERPL-CCNC: 18 U/L (ref 10–40)
BASOPHILS # BLD AUTO: 0.03 K/UL (ref 0–0.2)
BASOPHILS NFR BLD: 0.4 % (ref 0–1.9)
BILIRUB SERPL-MCNC: 0.5 MG/DL (ref 0.1–1)
BNP SERPL-MCNC: 491 PG/ML (ref 0–99)
BUN SERPL-MCNC: 40 MG/DL (ref 8–23)
CALCIUM SERPL-MCNC: 9.6 MG/DL (ref 8.7–10.5)
CHLORIDE SERPL-SCNC: 90 MMOL/L (ref 95–110)
CO2 SERPL-SCNC: 34 MMOL/L (ref 23–29)
CREAT SERPL-MCNC: 7.8 MG/DL (ref 0.5–1.4)
DIFFERENTIAL METHOD: ABNORMAL
EOSINOPHIL # BLD AUTO: 0.1 K/UL (ref 0–0.5)
EOSINOPHIL NFR BLD: 1.8 % (ref 0–8)
ERYTHROCYTE [DISTWIDTH] IN BLOOD BY AUTOMATED COUNT: 12.4 % (ref 11.5–14.5)
EST. GFR  (NO RACE VARIABLE): 5.2 ML/MIN/1.73 M^2
GLUCOSE SERPL-MCNC: 143 MG/DL (ref 70–110)
HCT VFR BLD AUTO: 35.2 % (ref 37–48.5)
HGB BLD-MCNC: 11.5 G/DL (ref 12–16)
IMM GRANULOCYTES # BLD AUTO: 0.03 K/UL (ref 0–0.04)
IMM GRANULOCYTES NFR BLD AUTO: 0.4 % (ref 0–0.5)
INR PPP: 1.4
LYMPHOCYTES # BLD AUTO: 0.9 K/UL (ref 1–4.8)
LYMPHOCYTES NFR BLD: 12.8 % (ref 18–48)
MCH RBC QN AUTO: 33 PG (ref 27–31)
MCHC RBC AUTO-ENTMCNC: 32.7 G/DL (ref 32–36)
MCV RBC AUTO: 101 FL (ref 82–98)
MONOCYTES # BLD AUTO: 0.5 K/UL (ref 0.3–1)
MONOCYTES NFR BLD: 7.3 % (ref 4–15)
NEUTROPHILS # BLD AUTO: 5.3 K/UL (ref 1.8–7.7)
NEUTROPHILS NFR BLD: 77.3 % (ref 38–73)
NRBC BLD-RTO: 0 /100 WBC
PLATELET # BLD AUTO: 209 K/UL (ref 150–450)
PMV BLD AUTO: 9.6 FL (ref 9.2–12.9)
POTASSIUM SERPL-SCNC: 3.2 MMOL/L (ref 3.5–5.1)
PROT SERPL-MCNC: 7.2 G/DL (ref 6–8.4)
PROTHROMBIN TIME: 16.5 SEC (ref 11.4–13.7)
RBC # BLD AUTO: 3.49 M/UL (ref 4–5.4)
SODIUM SERPL-SCNC: 138 MMOL/L (ref 136–145)
TROPONIN I SERPL DL<=0.01 NG/ML-MCNC: <0.03 NG/ML
WBC # BLD AUTO: 6.81 K/UL (ref 3.9–12.7)

## 2022-08-27 PROCEDURE — 96374 THER/PROPH/DIAG INJ IV PUSH: CPT

## 2022-08-27 PROCEDURE — 99285 EMERGENCY DEPT VISIT HI MDM: CPT | Mod: 25

## 2022-08-27 PROCEDURE — 93010 ELECTROCARDIOGRAM REPORT: CPT | Mod: ,,, | Performed by: INTERNAL MEDICINE

## 2022-08-27 PROCEDURE — 93010 EKG 12-LEAD: ICD-10-PCS | Mod: ,,, | Performed by: INTERNAL MEDICINE

## 2022-08-27 PROCEDURE — 96375 TX/PRO/DX INJ NEW DRUG ADDON: CPT

## 2022-08-27 PROCEDURE — 83880 ASSAY OF NATRIURETIC PEPTIDE: CPT | Performed by: EMERGENCY MEDICINE

## 2022-08-27 PROCEDURE — 93005 ELECTROCARDIOGRAM TRACING: CPT | Performed by: INTERNAL MEDICINE

## 2022-08-27 PROCEDURE — 84484 ASSAY OF TROPONIN QUANT: CPT | Performed by: EMERGENCY MEDICINE

## 2022-08-27 PROCEDURE — 85025 COMPLETE CBC W/AUTO DIFF WBC: CPT | Performed by: EMERGENCY MEDICINE

## 2022-08-27 PROCEDURE — 85610 PROTHROMBIN TIME: CPT | Performed by: EMERGENCY MEDICINE

## 2022-08-27 PROCEDURE — 80053 COMPREHEN METABOLIC PANEL: CPT | Performed by: EMERGENCY MEDICINE

## 2022-08-28 ENCOUNTER — CLINICAL SUPPORT (OUTPATIENT)
Dept: CARDIOLOGY | Facility: HOSPITAL | Age: 67
End: 2022-08-28
Attending: FAMILY MEDICINE
Payer: MEDICARE

## 2022-08-28 VITALS — HEIGHT: 64 IN | BODY MASS INDEX: 23.22 KG/M2 | WEIGHT: 136 LBS

## 2022-08-28 PROBLEM — R55 SYNCOPE: Status: ACTIVE | Noted: 2022-08-28

## 2022-08-28 LAB
ANION GAP SERPL CALC-SCNC: 12 MMOL/L (ref 8–16)
BASOPHILS # BLD AUTO: 0.02 K/UL (ref 0–0.2)
BASOPHILS NFR BLD: 0.5 % (ref 0–1.9)
BUN SERPL-MCNC: 45 MG/DL (ref 8–23)
CALCIUM SERPL-MCNC: 8.8 MG/DL (ref 8.7–10.5)
CHLORIDE SERPL-SCNC: 94 MMOL/L (ref 95–110)
CO2 SERPL-SCNC: 34 MMOL/L (ref 23–29)
CREAT SERPL-MCNC: 8.2 MG/DL (ref 0.5–1.4)
DIFFERENTIAL METHOD: ABNORMAL
EOSINOPHIL # BLD AUTO: 0.1 K/UL (ref 0–0.5)
EOSINOPHIL NFR BLD: 2 % (ref 0–8)
ERYTHROCYTE [DISTWIDTH] IN BLOOD BY AUTOMATED COUNT: 12.3 % (ref 11.5–14.5)
EST. GFR  (NO RACE VARIABLE): 4.9 ML/MIN/1.73 M^2
ESTIMATED AVG GLUCOSE: 97 MG/DL (ref 68–131)
GLUCOSE SERPL-MCNC: 100 MG/DL (ref 70–110)
HBA1C MFR BLD: 5 % (ref 4.5–6.2)
HCT VFR BLD AUTO: 29.9 % (ref 37–48.5)
HGB BLD-MCNC: 9.7 G/DL (ref 12–16)
IMM GRANULOCYTES # BLD AUTO: 0.02 K/UL (ref 0–0.04)
IMM GRANULOCYTES NFR BLD AUTO: 0.5 % (ref 0–0.5)
LYMPHOCYTES # BLD AUTO: 0.8 K/UL (ref 1–4.8)
LYMPHOCYTES NFR BLD: 20.2 % (ref 18–48)
MAGNESIUM SERPL-MCNC: 1.9 MG/DL (ref 1.6–2.6)
MCH RBC QN AUTO: 33.3 PG (ref 27–31)
MCHC RBC AUTO-ENTMCNC: 32.4 G/DL (ref 32–36)
MCV RBC AUTO: 103 FL (ref 82–98)
MONOCYTES # BLD AUTO: 0.5 K/UL (ref 0.3–1)
MONOCYTES NFR BLD: 11.4 % (ref 4–15)
NEUTROPHILS # BLD AUTO: 2.6 K/UL (ref 1.8–7.7)
NEUTROPHILS NFR BLD: 65.4 % (ref 38–73)
NRBC BLD-RTO: 0 /100 WBC
PLATELET # BLD AUTO: 168 K/UL (ref 150–450)
PMV BLD AUTO: 9.8 FL (ref 9.2–12.9)
POTASSIUM SERPL-SCNC: 4.3 MMOL/L (ref 3.5–5.1)
RBC # BLD AUTO: 2.91 M/UL (ref 4–5.4)
SARS-COV-2 RDRP RESP QL NAA+PROBE: NEGATIVE
SODIUM SERPL-SCNC: 140 MMOL/L (ref 136–145)
TROPONIN I SERPL DL<=0.01 NG/ML-MCNC: 0.05 NG/ML
TROPONIN I SERPL DL<=0.01 NG/ML-MCNC: 0.06 NG/ML
WBC # BLD AUTO: 3.96 K/UL (ref 3.9–12.7)

## 2022-08-28 PROCEDURE — 99900031 HC PATIENT EDUCATION (STAT)

## 2022-08-28 PROCEDURE — 83036 HEMOGLOBIN GLYCOSYLATED A1C: CPT | Performed by: FAMILY MEDICINE

## 2022-08-28 PROCEDURE — 94761 N-INVAS EAR/PLS OXIMETRY MLT: CPT

## 2022-08-28 PROCEDURE — 93306 ECHO (CUPID ONLY): ICD-10-PCS | Mod: 26,,, | Performed by: INTERNAL MEDICINE

## 2022-08-28 PROCEDURE — 83735 ASSAY OF MAGNESIUM: CPT | Performed by: FAMILY MEDICINE

## 2022-08-28 PROCEDURE — 93306 TTE W/DOPPLER COMPLETE: CPT

## 2022-08-28 PROCEDURE — G0378 HOSPITAL OBSERVATION PER HR: HCPCS

## 2022-08-28 PROCEDURE — 99900035 HC TECH TIME PER 15 MIN (STAT)

## 2022-08-28 PROCEDURE — 80048 BASIC METABOLIC PNL TOTAL CA: CPT | Performed by: FAMILY MEDICINE

## 2022-08-28 PROCEDURE — 84484 ASSAY OF TROPONIN QUANT: CPT | Performed by: FAMILY MEDICINE

## 2022-08-28 PROCEDURE — U0002 COVID-19 LAB TEST NON-CDC: HCPCS | Performed by: EMERGENCY MEDICINE

## 2022-08-28 PROCEDURE — 36415 COLL VENOUS BLD VENIPUNCTURE: CPT | Performed by: FAMILY MEDICINE

## 2022-08-28 PROCEDURE — 25000003 PHARM REV CODE 250: Performed by: FAMILY MEDICINE

## 2022-08-28 PROCEDURE — 96361 HYDRATE IV INFUSION ADD-ON: CPT

## 2022-08-28 PROCEDURE — 93306 TTE W/DOPPLER COMPLETE: CPT | Mod: 26,,, | Performed by: INTERNAL MEDICINE

## 2022-08-28 PROCEDURE — 99220 PR INITIAL OBSERVATION CARE,LEVL III: CPT | Mod: 25,,, | Performed by: INTERNAL MEDICINE

## 2022-08-28 PROCEDURE — 63600175 PHARM REV CODE 636 W HCPCS: Performed by: EMERGENCY MEDICINE

## 2022-08-28 PROCEDURE — 25000242 PHARM REV CODE 250 ALT 637 W/ HCPCS: Performed by: FAMILY MEDICINE

## 2022-08-28 PROCEDURE — 94640 AIRWAY INHALATION TREATMENT: CPT

## 2022-08-28 PROCEDURE — 85025 COMPLETE CBC W/AUTO DIFF WBC: CPT | Performed by: FAMILY MEDICINE

## 2022-08-28 PROCEDURE — 99220 PR INITIAL OBSERVATION CARE,LEVL III: ICD-10-PCS | Mod: 25,,, | Performed by: INTERNAL MEDICINE

## 2022-08-28 PROCEDURE — 25000003 PHARM REV CODE 250: Performed by: INTERNAL MEDICINE

## 2022-08-28 PROCEDURE — 25000003 PHARM REV CODE 250: Performed by: EMERGENCY MEDICINE

## 2022-08-28 RX ORDER — DIPHENHYDRAMINE HYDROCHLORIDE 50 MG/ML
12.5 INJECTION INTRAMUSCULAR; INTRAVENOUS
Status: COMPLETED | OUTPATIENT
Start: 2022-08-28 | End: 2022-08-28

## 2022-08-28 RX ORDER — SIMETHICONE 80 MG
1 TABLET,CHEWABLE ORAL 4 TIMES DAILY PRN
Status: DISCONTINUED | OUTPATIENT
Start: 2022-08-28 | End: 2022-08-29 | Stop reason: HOSPADM

## 2022-08-28 RX ORDER — POLYETHYLENE GLYCOL 3350 17 G/17G
17 POWDER, FOR SOLUTION ORAL 2 TIMES DAILY PRN
Status: DISCONTINUED | OUTPATIENT
Start: 2022-08-28 | End: 2022-08-29 | Stop reason: HOSPADM

## 2022-08-28 RX ORDER — IPRATROPIUM BROMIDE AND ALBUTEROL SULFATE 2.5; .5 MG/3ML; MG/3ML
3 SOLUTION RESPIRATORY (INHALATION) EVERY 4 HOURS PRN
Status: DISCONTINUED | OUTPATIENT
Start: 2022-08-28 | End: 2022-08-29 | Stop reason: HOSPADM

## 2022-08-28 RX ORDER — METOCLOPRAMIDE HYDROCHLORIDE 5 MG/ML
10 INJECTION INTRAMUSCULAR; INTRAVENOUS
Status: COMPLETED | OUTPATIENT
Start: 2022-08-28 | End: 2022-08-28

## 2022-08-28 RX ORDER — SODIUM CHLORIDE 0.9 % (FLUSH) 0.9 %
10 SYRINGE (ML) INJECTION
Status: DISCONTINUED | OUTPATIENT
Start: 2022-08-28 | End: 2022-08-29 | Stop reason: HOSPADM

## 2022-08-28 RX ORDER — MIDODRINE HYDROCHLORIDE 2.5 MG/1
10 TABLET ORAL
Status: DISCONTINUED | OUTPATIENT
Start: 2022-08-29 | End: 2022-08-29 | Stop reason: HOSPADM

## 2022-08-28 RX ORDER — TALC
6 POWDER (GRAM) TOPICAL NIGHTLY PRN
Status: DISCONTINUED | OUTPATIENT
Start: 2022-08-28 | End: 2022-08-29 | Stop reason: HOSPADM

## 2022-08-28 RX ORDER — ONDANSETRON 2 MG/ML
4 INJECTION INTRAMUSCULAR; INTRAVENOUS EVERY 6 HOURS PRN
Status: DISCONTINUED | OUTPATIENT
Start: 2022-08-28 | End: 2022-08-29 | Stop reason: HOSPADM

## 2022-08-28 RX ORDER — NALOXONE HCL 0.4 MG/ML
0.02 VIAL (ML) INJECTION
Status: DISCONTINUED | OUTPATIENT
Start: 2022-08-28 | End: 2022-08-29 | Stop reason: HOSPADM

## 2022-08-28 RX ORDER — SEVELAMER CARBONATE 800 MG/1
2400 TABLET, FILM COATED ORAL
Status: DISCONTINUED | OUTPATIENT
Start: 2022-08-28 | End: 2022-08-29 | Stop reason: HOSPADM

## 2022-08-28 RX ORDER — MORPHINE SULFATE 4 MG/ML
4 INJECTION, SOLUTION INTRAMUSCULAR; INTRAVENOUS EVERY 4 HOURS PRN
Status: DISCONTINUED | OUTPATIENT
Start: 2022-08-28 | End: 2022-08-29 | Stop reason: HOSPADM

## 2022-08-28 RX ORDER — IBUPROFEN 200 MG
24 TABLET ORAL
Status: DISCONTINUED | OUTPATIENT
Start: 2022-08-28 | End: 2022-08-29 | Stop reason: HOSPADM

## 2022-08-28 RX ORDER — ATORVASTATIN CALCIUM 40 MG/1
40 TABLET, FILM COATED ORAL DAILY
Status: DISCONTINUED | OUTPATIENT
Start: 2022-08-28 | End: 2022-08-29 | Stop reason: HOSPADM

## 2022-08-28 RX ORDER — MUPIROCIN 20 MG/G
OINTMENT TOPICAL 2 TIMES DAILY
Status: DISCONTINUED | OUTPATIENT
Start: 2022-08-28 | End: 2022-08-29 | Stop reason: HOSPADM

## 2022-08-28 RX ORDER — HYDROCODONE BITARTRATE AND ACETAMINOPHEN 5; 325 MG/1; MG/1
1 TABLET ORAL EVERY 4 HOURS PRN
Status: DISCONTINUED | OUTPATIENT
Start: 2022-08-28 | End: 2022-08-29 | Stop reason: HOSPADM

## 2022-08-28 RX ORDER — AMOXICILLIN 250 MG
1 CAPSULE ORAL 2 TIMES DAILY PRN
Status: DISCONTINUED | OUTPATIENT
Start: 2022-08-28 | End: 2022-08-29 | Stop reason: HOSPADM

## 2022-08-28 RX ORDER — ASPIRIN 81 MG/1
81 TABLET ORAL DAILY
Status: DISCONTINUED | OUTPATIENT
Start: 2022-08-28 | End: 2022-08-29 | Stop reason: HOSPADM

## 2022-08-28 RX ORDER — GLUCAGON 1 MG
1 KIT INJECTION
Status: DISCONTINUED | OUTPATIENT
Start: 2022-08-28 | End: 2022-08-29 | Stop reason: HOSPADM

## 2022-08-28 RX ORDER — ACETAMINOPHEN 325 MG/1
650 TABLET ORAL EVERY 8 HOURS PRN
Status: DISCONTINUED | OUTPATIENT
Start: 2022-08-28 | End: 2022-08-29 | Stop reason: HOSPADM

## 2022-08-28 RX ORDER — IBUPROFEN 200 MG
16 TABLET ORAL
Status: DISCONTINUED | OUTPATIENT
Start: 2022-08-28 | End: 2022-08-29 | Stop reason: HOSPADM

## 2022-08-28 RX ORDER — IPRATROPIUM BROMIDE 0.5 MG/2.5ML
0.5 SOLUTION RESPIRATORY (INHALATION) EVERY 6 HOURS
Status: DISCONTINUED | OUTPATIENT
Start: 2022-08-28 | End: 2022-08-29 | Stop reason: HOSPADM

## 2022-08-28 RX ORDER — SODIUM CHLORIDE 9 MG/ML
INJECTION, SOLUTION INTRAVENOUS ONCE
Status: DISCONTINUED | OUTPATIENT
Start: 2022-08-28 | End: 2022-08-29

## 2022-08-28 RX ADMIN — IPRATROPIUM BROMIDE 0.5 MG: 0.5 SOLUTION RESPIRATORY (INHALATION) at 02:08

## 2022-08-28 RX ADMIN — IPRATROPIUM BROMIDE 0.5 MG: 0.5 SOLUTION RESPIRATORY (INHALATION) at 08:08

## 2022-08-28 RX ADMIN — ASPIRIN 81 MG: 81 TABLET, COATED ORAL at 08:08

## 2022-08-28 RX ADMIN — SEVELAMER CARBONATE 2400 MG: 800 TABLET, FILM COATED ORAL at 11:08

## 2022-08-28 RX ADMIN — ACETAMINOPHEN 650 MG: 325 TABLET ORAL at 02:08

## 2022-08-28 RX ADMIN — DIPHENHYDRAMINE HYDROCHLORIDE 12.5 MG: 50 INJECTION, SOLUTION INTRAMUSCULAR; INTRAVENOUS at 12:08

## 2022-08-28 RX ADMIN — SODIUM CHLORIDE 250 ML: 0.9 INJECTION, SOLUTION INTRAVENOUS at 12:08

## 2022-08-28 RX ADMIN — APIXABAN 5 MG: 5 TABLET, FILM COATED ORAL at 08:08

## 2022-08-28 RX ADMIN — SEVELAMER CARBONATE 2400 MG: 800 TABLET, FILM COATED ORAL at 04:08

## 2022-08-28 RX ADMIN — SEVELAMER CARBONATE 2400 MG: 800 TABLET, FILM COATED ORAL at 07:08

## 2022-08-28 RX ADMIN — IPRATROPIUM BROMIDE 0.5 MG: 0.5 SOLUTION RESPIRATORY (INHALATION) at 07:08

## 2022-08-28 RX ADMIN — SODIUM CHLORIDE 250 ML: 0.9 INJECTION, SOLUTION INTRAVENOUS at 02:08

## 2022-08-28 RX ADMIN — MUPIROCIN 1 G: 20 OINTMENT TOPICAL at 08:08

## 2022-08-28 RX ADMIN — METOCLOPRAMIDE 10 MG: 5 INJECTION, SOLUTION INTRAMUSCULAR; INTRAVENOUS at 12:08

## 2022-08-28 RX ADMIN — ATORVASTATIN CALCIUM 40 MG: 40 TABLET, FILM COATED ORAL at 08:08

## 2022-08-28 NOTE — ED NOTES
Patient states that she had dialysis yesterday. Dizziness onset today. Syncopal episode today. Unable to recall if she hit her head, unwitnessed fall. Does not take daily blood thinners.

## 2022-08-28 NOTE — H&P
[facility] Medicine   History & Physical   Patient Name: Latricia Higgins  MRN: [id number]  Admission Date: 8/27/2022  9:24 PM  Attending Physician: Yajaira Oswald MD  Primary Care Provider: Ralf Ham MD  Face-to-Face encounter date: 08/28/2022    Patient information was obtained from patient, past medical records, ER physician, and ER records.     HISTORY OF PRESENT ILLNESS:     Latricia Higgins is a 67 y.o. White female   With PMH of HFrEF 18% per echo 2/21/22,  Previous VTE, now on eliquis,  ESRD on HD, HTN, HLD,  who presents with syncope.    Onset this evening  She woke up to use the bathroom  Passed out when she ambulated  Woke up on bathroom floor  She had to crawl to the toilet  Then vomited x 2, no bleeding  Had difficulty speaking for a while + confusion  Aphasia, confusion, dizziness and nausea resolved in ER    Denies head trauma  However fall was unwitnessed  Pt is also on eliquis  +R elbow pain  +R buttock pain  No fever or chills  No CP  No SOB  She takes midodrine M,W,F (HD days)  Last HD was Friday  She reports they had to increase her dry weight recently  She has having dizziness previously as well    Presented to ER with BP 80s/56  Also positive in ER for orthostatic hypotension    REVIEW OF SYSTEMS:     All systems reviewed and are negative except as noted per above.    PAST MEDICAL HISTORY:     Past Medical History:   Diagnosis Date    Dialysis patient 10/05/2020    Emphysema of lung     ESRD (end stage renal disease)     Hepatitis 1980    HLD (hyperlipidemia)     Hypertension     Renal disorder        PAST SURGICAL HISTORY:     Past Surgical History:   Procedure Laterality Date    AORTOGRAPHY WITH SERIALOGRAPHY N/A 9/30/2020    Procedure: co2 renal angio;  Surgeon: Lance Bustamante MD;  Location: [facility] CATH/EP LAB;  Service: Vascular;  Laterality: N/A;    APPENDECTOMY      AV FISTULA PLACEMENT Right 12/9/2020    Procedure: CREATION, AV FISTULA;  Surgeon: Lance Bustamante  MD;  Location: [facility] OR;  Service: General;  Laterality: Right;    COLONOSCOPY N/A 6/8/2021    Procedure: COLONOSCOPY;  Surgeon: Avila Smith III, MD;  Location: [facility] ENDO;  Service: Endoscopy;  Laterality: N/A;    COLONOSCOPY N/A 12/14/2021    Procedure: COLONOSCOPY;  Surgeon: Avila Smith III, MD;  Location: [facility] ENDO;  Service: Endoscopy;  Laterality: N/A;    COLONOSCOPY W/ POLYPECTOMY  06/08/2021    FISTULOGRAM Bilateral 5/18/2022    Procedure: Fistulogram;  Surgeon: Lance Bustamante MD;  Location: [facility] CATH/EP LAB;  Service: General;  Laterality: Bilateral;    INSERTION OF TUNNELED CENTRAL VENOUS HEMODIALYSIS CATHETER  10/2/2020    Procedure: INSERTION, CATHETER, CENTRAL VENOUS, HEMODIALYSIS, TUNNELED;  Surgeon: Ali Khoobehi, MD;  Location: [facility];  Service: Vascular;;    LEFT HEART CATHETERIZATION Left 5/17/2022    Procedure: Left heart cath;  Surgeon: Daniel Tyler MD;  Location: [facility] CATH LAB;  Service: Cardiology;  Laterality: Left;    OPEN THROMBECTOMY OF GRAFT  5/18/2022    Procedure: THROMBECTOMY, GRAFT, OPEN;  Surgeon: Lance Bustamante MD;  Location: [facility] CATH/EP LAB;  Service: General;;    PLACEMENT OF DIALYSIS ACCESS Right 9/30/2020    Procedure: Insertion, Dialysis Access;  Surgeon: Lance Bustamante MD;  Location: [facility] CATH/EP LAB;  Service: Vascular;  Laterality: Right;    REMOVAL OF TUNNELED CENTRAL VENOUS CATHETER (CVC) N/A 12/16/2020    Procedure: REMOVAL, CATHETER, CENTRAL VENOUS, TUNNELED;  Surgeon: Ali Khoobehi, MD;  Location: [facility] OR;  Service: Vascular;  Laterality: N/A;    TONSILLECTOMY      TUBAL LIGATION         ALLERGIES:   Patient has no known allergies.    FAMILY HISTORY:     Family History   Problem Relation Age of Onset    Cancer Mother     Heart disease Father        SOCIAL HISTORY:     Social History     Tobacco Use    Smoking status: Former     Packs/day: 0.25     Years: 20.00     Pack years: 5.00     Types: Cigarettes     Quit date: 9/27/2020     Years since  quittin.9    Smokeless tobacco: Never   Substance Use Topics    Alcohol use: Not Currently        Social History     Substance and Sexual Activity   Sexual Activity Not on file        HOME MEDICATIONS:     Prior to Admission medications    Medication Sig Start Date End Date Taking? Authorizing Provider   apixaban (ELIQUIS) 5 mg Tab Take 1 tablet (5 mg total) by mouth 2 (two) times daily. 21  Yes Nicole Gamino MD   aspirin (ECOTRIN) 81 MG EC tablet Take 1 tablet (81 mg total) by mouth once daily. 22 Yes Abhijeet Mitchell MD   atorvastatin (LIPITOR) 40 MG tablet Take 40 mg by mouth once daily.   Yes Historical Provider   carvediloL (COREG) 12.5 MG tablet Take 1 tablet (12.5 mg total) by mouth 2 (two) times daily with meals. 22 Yes Jose Cruz Dioxn MD   midodrine (PROAMATINE) 10 MG tablet Take 10 mg by mouth every Mon, Wed, Fri. With dialysis 22  Yes Historical Provider   sacubitriL-valsartan (ENTRESTO) 24-26 mg per tablet Take 1 tablet by mouth 2 (two) times daily. 3/11/22  Yes Elie Aguilar MD   sevelamer carbonate (RENVELA) 800 mg Tab Take 2,400 mg by mouth 3 (three) times daily with meals.  10/9/20  Yes Historical Provider   umeclidinium-vilanteroL (ANORO ELLIPTA) 62.5-25 mcg/actuation DsDv Inhale 1 puff into the lungs once daily. Controller 3/15/22  Yes NUNO Morgan   VELTASSA 8.4 gram PwPk Take 8.4 g by mouth 4 (four) times a week. Tues, thurs, sat, sun 22  Yes Historical Provider   VIOS AEROSOL DELIVERY SYSTEM Lizbeth AS DIRECTED 22   Historical Provider   acetaminophen (TYLENOL) 325 MG tablet Take 325 mg by mouth every 6 (six) hours as needed for Pain.  22  Historical Provider   LIDOcaine-prilocaine (EMLA) cream APPLY TO FISTULA 1 HOUR PRIOR TO DIALYSIS TREATMENT 3 TIMES PER WEEK 21  Historical Provider   ondansetron (ZOFRAN-ODT) 4 MG TbDL Take 4 mg by mouth every 6 (six) hours as needed (nausea).  22  Historical  "Provider       PHYSICAL EXAM:     /70 (BP Location: Left arm, Patient Position: Lying)   Pulse 72   Temp 98 °F (36.7 °C) (Oral)   Resp 18   Ht 5' 4" (1.626 m)   Wt 61.7 kg (136 lb 0.4 oz)   SpO2 100%   BMI 23.35 kg/m²     Gen: alert, responsive  HEENT:  Eyes - no pallor  External ears with no lesions  Nares patent  Mouth - lips chapped  CV: RRR  Lungs: CTA B/L  Abd: +BS, soft, NT, ND  Ext: no atrophy or edema  Skin: warm, dry  Neuro: A&Ox4, no slurred speech, no aphasia  Psych: pleasant     LABS AND IMAGING:     Labs Reviewed   CBC W/ AUTO DIFFERENTIAL - Abnormal; Notable for the following components:       Result Value    RBC 3.49 (*)     Hemoglobin 11.5 (*)     Hematocrit 35.2 (*)      (*)     MCH 33.0 (*)     Lymph # 0.9 (*)     Gran % 77.3 (*)     Lymph % 12.8 (*)     All other components within normal limits   COMPREHENSIVE METABOLIC PANEL - Abnormal; Notable for the following components:    Potassium 3.2 (*)     Chloride 90 (*)     CO2 34 (*)     Glucose 143 (*)     BUN 40 (*)     Creatinine 7.8 (*)     Albumin 3.3 (*)     eGFR 5.2 (*)     All other components within normal limits   B-TYPE NATRIURETIC PEPTIDE - Abnormal; Notable for the following components:     (*)     All other components within normal limits   PROTIME-INR - Abnormal; Notable for the following components:    PT 16.5 (*)     All other components within normal limits   TROPONIN I   SARS-COV-2 RNA AMPLIFICATION, QUAL   TROPONIN I     Imaging Results              X-Ray Pelvis Routine AP (In process)                      X-Ray Elbow Complete Right (In process)                      CT Head Without Contrast (Final result)  Result time 08/27/22 23:26:33      Final result by Brittney Roth MD (08/27/22 23:26:33)                   Narrative:    CT HEAD WITHOUT CONTRAST    CLINICAL HISTORY: Head trauma, moderate-severe    COMPARISON: [None.]    TECHNIQUE:  Axial unenhanced CT imaging of the brain. Reformatted coronal and " sagittal images obtained.    This examination was performed according to our departmental dose optimization program, which includes automated exposure control, adjustment of the mA and/or kV according to patient size and/or use of iterative reconstruction technique.    FINDINGS:  Moderate age-related cortical volume loss. There is mild decreased attenuation of the white matter due to chronic microvascular ischemic change. No intracranial bleed. No edema, mass or midline shift. No acute infarction or hyperdense vessel seen. Atherosclerosis is noted within the cavernous segments of the internal carotid arteries. Unremarkable cerebellum and vermis. Fourth ventricle is midline. Prepontine cisterns are not effaced. Normal sella contents.    Clear paranasal sinuses. Intraorbital contents appear normal. Mastoid air cells are clear. Intact skull base, imaged facial bones and calvarium. Normal scalp soft tissues.    IMPRESSION:  1. Moderate senescent brain changes. No intracranial hemorrhage or skull fracture.    Electronically signed by:  Brittney Roth DO  8/27/2022 11:26 PM CDT Workstation: [id number]                                     X-Ray Chest 1 View (Preliminary result)  Result time 08/28/22 00:13:49      ED Interpretation by Juve Youssef DO (08/28/22 00:13:49, [facility] Emergency Dept, Emergency Medicine)    No acute abnormality                                     X-Ray Sacrum And Coccyx (Preliminary result)  Result time 08/28/22 00:13:55      ED Interpretation by Juve Youssef DO (08/28/22 00:13:55, [facility] Emergency Dept, Emergency Medicine)    No acute abnormality                                      ASSESSMENT & PLAN:   Latricia Higgins is a 67 y.o. female admitted for    Active Hospital Problems    Diagnosis  POA    *Syncope [R55]  Yes    Chronic combined systolic and diastolic heart failure [I50.42]  Yes    Nonobstructive atherosclerosis of coronary artery [I25.10]  Yes     History of DVT (deep vein thrombosis) [Z86.718]  Not Applicable    ESRD (end stage renal disease) [N18.6]  Yes    HTN (hypertension) [I10]  Yes      Resolved Hospital Problems   No resolved problems to display.        Plan    Syncope  Complicated by HFrEF 18%  CAD  Previous DVT, now on eliquis  - positive for orthostatic VS  - noted Rx for midodrine; pt had HD Friday  - EKG  - trending troponin  - telemetry  - echo  - cardiology consulted, thank you    ESRD on HD  - trending BMP  - nephrology consulted, thank you    Chronic conditions as noted above/below; home medications reviewed personally by me and restarted as appropriate  Electrolyte derangement:  Trending BMP; Mg; replacement prn  DVT ppx: home eliquis  FULL CODE    Yajaira Oswald MD  [facility] Hospitalist  08/28/2022

## 2022-08-28 NOTE — PROGRESS NOTES
Automatic Inhaler to Nebulizer Interchange    Tiotropium (Spiriva Respimat) 5 mcg changed to Ipratropium 0.5 mg every 6 hours per [facility] Automatic Therapeutic Sustitutions Protocol.    Please contact pharmacy at extension [phone] with any questions.     Thank you,   Olivia Kasper

## 2022-08-28 NOTE — ED NOTES
"Orthostatic VS completed as per MD order.     Lying - 107/65 HR 79  Sitting - 108/65 HR 79  Standing - 89/53 HR 86 "feeling a little lightheaded"    MD notified of positive orthostatic results.   "

## 2022-08-28 NOTE — PROGRESS NOTES
67-year-old female with multiple medical comorbidities including but not limited to ESRD on hemodialysis, chronic combined CHF and chronic hypotension on midodrine presented to the ED after suffering an unwitnessed syncopal episode.    Seen and admitted by overnight provider earlier today.  Admission H&P reviewed.  Patient seen and examined at bedside.  Orthostatics positive which could likely be the etiology of syncope.  She reports improvement in her symptoms.  Await recommendations from Cardiology.  Await repeat echocardiogram.    Johnson Gamez MD  Internal Medicine

## 2022-08-28 NOTE — CONSULTS
Nephrology Consult Note        Patient Name: Latricia Higgins  MRN: [id number]    Patient Class: OP- Observation   Admission Date: 8/27/2022  Length of Stay: 0 days  Date of Service: 8/28/2022    Attending Physician: Johnson Gamez MD  Primary Care Provider: Ralf Ham MD    Reason for Consult: esrd/anemia/hypotension/shpt/chf/syncope    SUBJECTIVE:     HPI: 67F with CHF, ESRD on HD MWF with known hypotension on midodrine and Entresto admitted with syncope at home with unwitnessed fall in the bathroom, vomiting x1 and some confusion/dizziness taht resolved by the time she came to ER. Last HD Friday, uneventful. BP in ER was 80's and she was orthostatic per report. Now BP much better.    Past Medical History:   Diagnosis Date    Dialysis patient 10/05/2020    Emphysema of lung     ESRD (end stage renal disease)     Hepatitis 1980    HLD (hyperlipidemia)     Hypertension     Renal disorder      Past Surgical History:   Procedure Laterality Date    AORTOGRAPHY WITH SERIALOGRAPHY N/A 9/30/2020    Procedure: co2 renal angio;  Surgeon: Lance Bustamante MD;  Location: [facility] CATH/EP LAB;  Service: Vascular;  Laterality: N/A;    APPENDECTOMY      AV FISTULA PLACEMENT Right 12/9/2020    Procedure: CREATION, AV FISTULA;  Surgeon: Lance Bustamante MD;  Location: [facility] OR;  Service: General;  Laterality: Right;    COLONOSCOPY N/A 6/8/2021    Procedure: COLONOSCOPY;  Surgeon: Avila Smith III, MD;  Location: [facility] ENDO;  Service: Endoscopy;  Laterality: N/A;    COLONOSCOPY N/A 12/14/2021    Procedure: COLONOSCOPY;  Surgeon: Avila Smith III, MD;  Location: [facility] ENDO;  Service: Endoscopy;  Laterality: N/A;    COLONOSCOPY W/ POLYPECTOMY  06/08/2021    FISTULOGRAM Bilateral 5/18/2022    Procedure: Fistulogram;  Surgeon: Lance Bustamante MD;  Location: [facility] CATH/EP LAB;  Service: General;  Laterality: Bilateral;    INSERTION OF TUNNELED CENTRAL VENOUS HEMODIALYSIS CATHETER  10/2/2020    Procedure: INSERTION, CATHETER,  CENTRAL VENOUS, HEMODIALYSIS, TUNNELED;  Surgeon: Ali Khoobehi, MD;  Location: [facility] OR;  Service: Vascular;;    LEFT HEART CATHETERIZATION Left 2022    Procedure: Left heart cath;  Surgeon: Daniel Tyler MD;  Location: [facility] CATH LAB;  Service: Cardiology;  Laterality: Left;    OPEN THROMBECTOMY OF GRAFT  2022    Procedure: THROMBECTOMY, GRAFT, OPEN;  Surgeon: Lance Bustamante MD;  Location: [facility] CATH/EP LAB;  Service: General;;    PLACEMENT OF DIALYSIS ACCESS Right 2020    Procedure: Insertion, Dialysis Access;  Surgeon: Lance Bustamante MD;  Location: [facility] CATH/EP LAB;  Service: Vascular;  Laterality: Right;    REMOVAL OF TUNNELED CENTRAL VENOUS CATHETER (CVC) N/A 2020    Procedure: REMOVAL, CATHETER, CENTRAL VENOUS, TUNNELED;  Surgeon: Ali Khoobehi, MD;  Location: [facility] OR;  Service: Vascular;  Laterality: N/A;    TONSILLECTOMY      TUBAL LIGATION       Family History   Problem Relation Age of Onset    Cancer Mother     Heart disease Father      Social History     Tobacco Use    Smoking status: Former     Packs/day: 0.25     Years: 20.00     Pack years: 5.00     Types: Cigarettes     Quit date: 2020     Years since quittin.9    Smokeless tobacco: Never   Substance Use Topics    Alcohol use: Not Currently    Drug use: Not Currently       Review of patient's allergies indicates:  No Known Allergies    Outpatient meds:  No current facility-administered medications on file prior to encounter.     Current Outpatient Medications on File Prior to Encounter   Medication Sig Dispense Refill    apixaban (ELIQUIS) 5 mg Tab Take 1 tablet (5 mg total) by mouth 2 (two) times daily. 60 tablet 6    aspirin (ECOTRIN) 81 MG EC tablet Take 1 tablet (81 mg total) by mouth once daily. 90 tablet 3    atorvastatin (LIPITOR) 40 MG tablet Take 40 mg by mouth once daily.      carvediloL (COREG) 12.5 MG tablet Take 1 tablet (12.5 mg total) by mouth 2 (two) times daily with meals. 60 tablet 3    midodrine  (PROAMATINE) 10 MG tablet Take 10 mg by mouth every Mon, Wed, Fri. With dialysis      sacubitriL-valsartan (ENTRESTO) 24-26 mg per tablet Take 1 tablet by mouth 2 (two) times daily. 60 tablet 11    sevelamer carbonate (RENVELA) 800 mg Tab Take 2,400 mg by mouth 3 (three) times daily with meals.       umeclidinium-vilanteroL (ANORO ELLIPTA) 62.5-25 mcg/actuation DsDv Inhale 1 puff into the lungs once daily. Controller 1 each 5    VELTASSA 8.4 gram PwPk Take 8.4 g by mouth 4 (four) times a week. Tues, thurs, sat, sun      VIOS AEROSOL DELIVERY SYSTEM Lizbeth AS DIRECTED      [DISCONTINUED] acetaminophen (TYLENOL) 325 MG tablet Take 325 mg by mouth every 6 (six) hours as needed for Pain.      [DISCONTINUED] LIDOcaine-prilocaine (EMLA) cream APPLY TO FISTULA 1 HOUR PRIOR TO DIALYSIS TREATMENT 3 TIMES PER WEEK      [DISCONTINUED] ondansetron (ZOFRAN-ODT) 4 MG TbDL Take 4 mg by mouth every 6 (six) hours as needed (nausea).         Scheduled meds:   apixaban  5 mg Oral BID    aspirin  81 mg Oral Daily    atorvastatin  40 mg Oral Daily    ipratropium  0.5 mg Nebulization Q6H    [START ON 8/29/2022] midodrine  10 mg Oral Every Mon, Wed, Fri    sevelamer carbonate  2,400 mg Oral TID WM       Infusions:      PRN meds:  acetaminophen, albuterol-ipratropium, dextrose 50%, dextrose 50%, glucagon (human recombinant), glucose, glucose, HYDROcodone-acetaminophen, melatonin, morphine, naloxone, ondansetron, polyethylene glycol, senna-docusate 8.6-50 mg, simethicone, sodium chloride 0.9%    Review of Systems:  Review of Systems   Constitutional:  Negative for chills, fever, malaise/fatigue and weight loss.   HENT:  Negative for hearing loss and nosebleeds.    Eyes:  Negative for blurred vision, double vision and photophobia.   Respiratory:  Negative for cough, shortness of breath and wheezing.    Cardiovascular:  Negative for chest pain, palpitations and leg swelling.   Gastrointestinal:  Negative for abdominal pain, constipation,  diarrhea, heartburn, nausea and vomiting.   Genitourinary:  Negative for dysuria, frequency and urgency.   Musculoskeletal:  Negative for falls, joint pain and myalgias.   Skin:  Negative for itching and rash.   Neurological:  Negative for dizziness, speech change, focal weakness, loss of consciousness and headaches.   Endo/Heme/Allergies:  Does not bruise/bleed easily.   Psychiatric/Behavioral:  Negative for depression and substance abuse. The patient is not nervous/anxious.      OBJECTIVE:     Vital Signs and IO (Last 24H):  Temp:  [97.9 °F (36.6 °C)-98.5 °F (36.9 °C)]   Pulse:  [66-91]   Resp:  [14-18]   BP: ()/(53-78)   SpO2:  [95 %-100 %]   No intake/output data recorded.    Wt Readings from Last 5 Encounters:   08/28/22 61.7 kg (136 lb 0.4 oz)   08/16/22 61 kg (134 lb 7.7 oz)   05/31/22 59.4 kg (130 lb 15.3 oz)   05/18/22 59 kg (130 lb 1.1 oz)   05/17/22 57.6 kg (127 lb)         Physical Exam:  Physical Exam  Constitutional:       General: She is not in acute distress.     Appearance: She is well-developed. She is not diaphoretic.   HENT:      Head: Normocephalic and atraumatic.      Mouth/Throat:      Mouth: Mucous membranes are moist.   Eyes:      General: No scleral icterus.     Pupils: Pupils are equal, round, and reactive to light.   Cardiovascular:      Rate and Rhythm: Normal rate and regular rhythm.   Pulmonary:      Effort: Pulmonary effort is normal. No respiratory distress.      Breath sounds: No stridor.   Abdominal:      General: There is no distension.      Palpations: Abdomen is soft.   Musculoskeletal:         General: No deformity. Normal range of motion.      Cervical back: Neck supple.   Skin:     General: Skin is warm and dry.      Findings: No erythema or rash.   Neurological:      Mental Status: She is alert and oriented to person, place, and time.      Cranial Nerves: No cranial nerve deficit.   Psychiatric:         Behavior: Behavior normal.       Body mass index is 23.35  kg/m².    Laboratory:  Recent Labs   Lab 08/27/22 2135 08/28/22  0309    140   K 3.2* 4.3   CL 90* 94*   CO2 34* 34*   BUN 40* 45*   CREATININE 7.8* 8.2*   * 100       Recent Labs   Lab 08/27/22 2135 08/28/22  0309   CALCIUM 9.6 8.8   ALBUMIN 3.3*  --    MG  --  1.9       Recent Labs   Lab 03/23/21  0744   PTH, Intact 347.0 H       No results for input(s): POCTGLUCOSE in the last 168 hours.    Recent Labs   Lab 10/21/20  2030 11/16/20  1111   Hemoglobin A1C 5.0 4.5       Recent Labs   Lab 08/27/22 2135 08/28/22  0309   WBC 6.81 3.96   HGB 11.5* 9.7*   HCT 35.2* 29.9*    168   * 103*   MCHC 32.7 32.4   MONO 7.3  0.5 11.4  0.5       Recent Labs   Lab 08/27/22 2135   BILITOT 0.5   PROT 7.2   ALBUMIN 3.3*   ALKPHOS 119   ALT 13   AST 18       Recent Labs   Lab 09/29/20  0903   Color, UA Yellow   Appearance, UA Hazy A   pH, UA 7.0   Specific Gravity, UA 1.010   Protein, UA 3+ A   Glucose, UA Negative   Ketones, UA Negative   Urobilinogen, UA Negative   Bilirubin (UA) Negative   Occult Blood UA 3+ A   Nitrite, UA Negative   RBC, UA 26 H   WBC, UA >100 H   Bacteria Occasional   Hyaline Casts,  A       Recent Labs   Lab 05/24/21 0310 05/24/21  0523   POC PH 7.505 H 7.521 H   POC PCO2 34.5 L 35.3   POC HCO3 27.3 28.9 H   POC PO2 50 LL 73 L   POC SATURATED O2 89 L 96   POC BE 4 6   Sample ARTERIAL ARTERIAL       Microbiology Results (last 7 days)       ** No results found for the last 168 hours. **          ASSESSMENT/PLAN:     ESRD on HD MWF via AVF  Continue current dialysis prescription.  Next HD per rodrick.  Renal diet - low K, low phos.  No IVs or BP checks on access and/or non-dominant arm.    Anemia of CKD  Hgb and HCT are acceptable. Monitor.  Will provide CAROLE and/or IV iron PRN.    MBD / Secondary HPT  Ca, phos, PTH and vitamin D levels are acceptable.   Phos binders, vitamin D analogues and calcimimetics as needed.    Hypotension  CHF  Syncope with fall  BP seem controlled  now.   Tolerate asymptomatic HTN up to -160.    Thank you for allowing us to participate in the care of your patient!   We will follow the patient and provide recommendations as needed.    Patient care time was spent personally by me on the following activities:   Obtaining a history.  Examination of patient.  Providing medical care at the patients bedside.  Developing a treatment plan with patient or surrogate and bedside caregivers.  Ordering and reviewing laboratory studies, radiographic studies, pulse oximetry.  Ordering and performing treatments and interventions.  Evaluation of patient's response to treatment.  Discussions with consultants while on the unit and immediately available to the patient.  Re-evaluation of the patient's condition.  Documentation in the medical record.     Bijan Mcgrath MD    [facility]  [address] [place] [address], [place] [address]    [phone] - tel  [phone] - fax    8/28/2022

## 2022-08-28 NOTE — CONSULTS
[facility]  Department of Cardiology  Consult Note      PATIENT NAME: Latricia Higgins    MRN: [id number]  TODAY'S DATE: 08/28/2022  ADMIT DATE: 8/27/2022                          CONSULT REQUESTED BY: Johnson Gamez MD    SUBJECTIVE     PRINCIPAL PROBLEM: Syncope  Yajaira Oswald MD  Hospital Medicine  Latricia Higgins is a 67 y.o. White female   With PMH of HFrEF 18% per echo 2/21/22,  Previous VTE, now on eliquis,  ESRD on HD, HTN, HLD,  who presents with syncope.     Onset this evening  She woke up to use the bathroom  Passed out when she ambulated  Woke up on bathroom floor  She had to crawl to the toilet  Then vomited x 2, no bleeding  Had difficulty speaking for a while + confusion  Aphasia, confusion, dizziness and nausea resolved in ER     Denies head trauma  However fall was unwitnessed  Pt is also on eliquis  +R elbow pain  +R buttock pain  No fever or chills  No CP  No SOB  She takes midodrine M,W,F (HD days)  Last HD was Friday  She reports they had to increase her dry weight recently  She has having dizziness previously as well     Presented to ER with BP 80s/56  Also positive in ER for orthostatic hypotension       REASON FOR CONSULT:  Near-syncope      HPI:  Patient is a 67-year-old lady with known history of significant LV dysfunction and heart failure with reduced ejection fraction of 18% with prior thromboembolism on Eliquis.  Patient has underlying end-stage renal disease and on hemodialysis.  Patient stated that she has been having episodes of near-syncope after hemodialysis.  And at times she does have problem during the dialysis.  Patient was started on midodrine prior to and after hemodialysis.  However on other days patient does feel she has dizziness and lightheadedness on standing for periods of time and it is worse when she is in a sitting position or supine position and standing up.  She feels unsteady at times and also feels fatigued.  Denies any chest pain or tightness  heaviness denies any dizziness or lightheadedness or loss of consciousness.        Review of patient's allergies indicates:  No Known Allergies    Past Medical History:   Diagnosis Date    Dialysis patient 10/05/2020    Emphysema of lung     ESRD (end stage renal disease)     Hepatitis 1980    HLD (hyperlipidemia)     Hypertension     Renal disorder      Past Surgical History:   Procedure Laterality Date    AORTOGRAPHY WITH SERIALOGRAPHY N/A 9/30/2020    Procedure: co2 renal angio;  Surgeon: Lance Bustamante MD;  Location: [facility] CATH/EP LAB;  Service: Vascular;  Laterality: N/A;    APPENDECTOMY      AV FISTULA PLACEMENT Right 12/9/2020    Procedure: CREATION, AV FISTULA;  Surgeon: Lance Bustamante MD;  Location: [facility] OR;  Service: General;  Laterality: Right;    COLONOSCOPY N/A 6/8/2021    Procedure: COLONOSCOPY;  Surgeon: Avila Smith III, MD;  Location: [facility] ENDO;  Service: Endoscopy;  Laterality: N/A;    COLONOSCOPY N/A 12/14/2021    Procedure: COLONOSCOPY;  Surgeon: Avila Smith III, MD;  Location: [facility] ENDO;  Service: Endoscopy;  Laterality: N/A;    COLONOSCOPY W/ POLYPECTOMY  06/08/2021    FISTULOGRAM Bilateral 5/18/2022    Procedure: Fistulogram;  Surgeon: Lance Bustamante MD;  Location: [facility] CATH/EP LAB;  Service: General;  Laterality: Bilateral;    INSERTION OF TUNNELED CENTRAL VENOUS HEMODIALYSIS CATHETER  10/2/2020    Procedure: INSERTION, CATHETER, CENTRAL VENOUS, HEMODIALYSIS, TUNNELED;  Surgeon: Ali Khoobehi, MD;  Location: [facility] OR;  Service: Vascular;;    LEFT HEART CATHETERIZATION Left 5/17/2022    Procedure: Left heart cath;  Surgeon: Daniel Tyler MD;  Location: [facility] CATH LAB;  Service: Cardiology;  Laterality: Left;    OPEN THROMBECTOMY OF GRAFT  5/18/2022    Procedure: THROMBECTOMY, GRAFT, OPEN;  Surgeon: Lance Bustamante MD;  Location: [facility] CATH/EP LAB;  Service: General;;    PLACEMENT OF DIALYSIS ACCESS Right 9/30/2020    Procedure: Insertion, Dialysis Access;  Surgeon:  Lance Bustamante MD;  Location: [facility] CATH/EP LAB;  Service: Vascular;  Laterality: Right;    REMOVAL OF TUNNELED CENTRAL VENOUS CATHETER (CVC) N/A 2020    Procedure: REMOVAL, CATHETER, CENTRAL VENOUS, TUNNELED;  Surgeon: Ali Khoobehi, MD;  Location: [facility] OR;  Service: Vascular;  Laterality: N/A;    TONSILLECTOMY      TUBAL LIGATION       Social History     Tobacco Use    Smoking status: Former     Packs/day: 0.25     Years: 20.00     Pack years: 5.00     Types: Cigarettes     Quit date: 2020     Years since quittin.9    Smokeless tobacco: Never   Substance Use Topics    Alcohol use: Not Currently    Drug use: Not Currently        REVIEW OF SYSTEMS  CONSTITUTIONAL: Negative for chills, fatigue and fever.   Review of Systems   Constitution: Negative for diaphoresis and fever.   HENT: Negative for nosebleeds.    Cardiovascular: Negative for chest pain, dyspnea on exertion, leg swelling and palpitations.   Respiratory: Negative for shortness of breath and wheezing.    Hematologic/Lymphatic: Negative for bleeding problem. Does not bruise/bleed easily.   Skin: Negative for color change and rash.   Musculoskeletal: Negative for falls and myalgias.   Gastrointestinal: Negative for hematemesis and hematochezia.   Genitourinary: Negative for hematuria.   Neurological:  Positive for dizziness and light-headedness.  Syncope and near-syncope.  Psychiatric/Behavioral: Negative for altered mental status and memory loss.       OBJECTIVE     VITAL SIGNS (Most Recent)  Temp: 98.3 °F (36.8 °C) (22 1130)  Pulse: (!) 54 (22 1414)  Resp: 16 (22 1414)  BP: (!) 145/63 (22 1130)  SpO2: 99 % (22 1414)    VENTILATION STATUS  Resp: 16 (22 1414)  SpO2: 99 % (22 1414)       I & O (Last 24H):  Intake/Output Summary (Last 24 hours) at 2022 1510  Last data filed at 2022 0830  Gross per 24 hour   Intake 210 ml   Output --   Net 210 ml       WEIGHTS  Wt Readings from Last 1  Encounters:   08/28/22 0147 61.7 kg (136 lb 0.4 oz)   08/27/22 2127 59.9 kg (132 lb)       PHYSICAL EXAM  GENERAL: well built, well nourished, well-developed in no apparent distress alert and oriented.   CONSTITUTIONAL: No fever, no chills  HEENT: Normocephalic, atraumatic,pupils reactive to light                 NECK:  No JVD no carotid bruit  CVS: S1S2+, RRR, systolic murmurs,   LUNGS: Clear  ABDOMEN: Soft, NT, BS+  EXTREMITIES: No cyanosis, edema  : No ojeda catheter  NEURO: AAO X 3  PSY: Normal affect    HOME MEDICATIONS:  No current facility-administered medications on file prior to encounter.     Current Outpatient Medications on File Prior to Encounter   Medication Sig Dispense Refill    apixaban (ELIQUIS) 5 mg Tab Take 1 tablet (5 mg total) by mouth 2 (two) times daily. 60 tablet 6    aspirin (ECOTRIN) 81 MG EC tablet Take 1 tablet (81 mg total) by mouth once daily. 90 tablet 3    atorvastatin (LIPITOR) 40 MG tablet Take 40 mg by mouth once daily.      carvediloL (COREG) 12.5 MG tablet Take 1 tablet (12.5 mg total) by mouth 2 (two) times daily with meals. 60 tablet 3    midodrine (PROAMATINE) 10 MG tablet Take 10 mg by mouth every Mon, Wed, Fri. With dialysis      sacubitriL-valsartan (ENTRESTO) 24-26 mg per tablet Take 1 tablet by mouth 2 (two) times daily. 60 tablet 11    sevelamer carbonate (RENVELA) 800 mg Tab Take 2,400 mg by mouth 3 (three) times daily with meals.       umeclidinium-vilanteroL (ANORO ELLIPTA) 62.5-25 mcg/actuation DsDv Inhale 1 puff into the lungs once daily. Controller 1 each 5    VELTASSA 8.4 gram PwPk Take 8.4 g by mouth 4 (four) times a week. Tues, thurs, sat, sun      VIOS AEROSOL DELIVERY SYSTEM Lizbeth AS DIRECTED      [DISCONTINUED] acetaminophen (TYLENOL) 325 MG tablet Take 325 mg by mouth every 6 (six) hours as needed for Pain.      [DISCONTINUED] LIDOcaine-prilocaine (EMLA) cream APPLY TO FISTULA 1 HOUR PRIOR TO DIALYSIS TREATMENT 3 TIMES PER WEEK      [DISCONTINUED]  ondansetron (ZOFRAN-ODT) 4 MG TbDL Take 4 mg by mouth every 6 (six) hours as needed (nausea).         SCHEDULED MEDS:   sodium chloride 0.9%   Intravenous Once    apixaban  5 mg Oral BID    aspirin  81 mg Oral Daily    atorvastatin  40 mg Oral Daily    ipratropium  0.5 mg Nebulization Q6H    [START ON 8/29/2022] midodrine  10 mg Oral Every Mon, Wed, Fri    mupirocin   Nasal BID    sevelamer carbonate  2,400 mg Oral TID WM       CONTINUOUS INFUSIONS:    PRN MEDS:acetaminophen, albuterol-ipratropium, dextrose 50%, dextrose 50%, glucagon (human recombinant), glucose, glucose, HYDROcodone-acetaminophen, melatonin, morphine, naloxone, ondansetron, polyethylene glycol, senna-docusate 8.6-50 mg, simethicone, sodium chloride 0.9%, sodium chloride 0.9%    LABS AND DIAGNOSTICS     CBC LAST 3 DAYS  Recent Labs   Lab 08/27/22 2135 08/28/22  0309   WBC 6.81 3.96   RBC 3.49* 2.91*   HGB 11.5* 9.7*   HCT 35.2* 29.9*   * 103*   MCH 33.0* 33.3*   MCHC 32.7 32.4   RDW 12.4 12.3    168   MPV 9.6 9.8   GRAN 77.3*  5.3 65.4  2.6   LYMPH 12.8*  0.9* 20.2  0.8*   MONO 7.3  0.5 11.4  0.5   BASO 0.03 0.02   NRBC 0 0       COAGULATION LAST 3 DAYS  Recent Labs   Lab 08/27/22 2135   LABPT 16.5*   INR 1.4       CHEMISTRY LAST 3 DAYS  Recent Labs   Lab 08/27/22 2135 08/28/22  0309    140   K 3.2* 4.3   CL 90* 94*   CO2 34* 34*   ANIONGAP 14 12   BUN 40* 45*   CREATININE 7.8* 8.2*   * 100   CALCIUM 9.6 8.8   MG  --  1.9   ALBUMIN 3.3*  --    PROT 7.2  --    ALKPHOS 119  --    ALT 13  --    AST 18  --    BILITOT 0.5  --        CARDIAC PROFILE LAST 3 DAYS  Recent Labs   Lab 08/27/22 2135 08/28/22  0309 08/28/22  0813   *  --   --    TROPONINI <0.030 0.049* 0.058*       ENDOCRINE LAST 3 DAYS  No results for input(s): TSH, PROCAL in the last 168 hours.    LAST ARTERIAL BLOOD GAS  ABG  No results for input(s): PH, PO2, PCO2, HCO3, BE in the last 168 hours.    LAST 7 DAYS MICROBIOLOGY   Microbiology Results  (last 7 days)       ** No results found for the last 168 hours. **            MOST RECENT IMAGING  X-Ray Chest 1 View  Reason: Fall.    FINDINGS:    PA and lateral chest with comparison chest x-ray January 15, 2022 show normal cardiomediastinal silhouette.  There is blunting of the bilateral costophrenic angles with bibasilar linear and hazy lung opacities, more so on the right. Pulmonary vasculature is normal. No acute osseous abnormality.    IMPRESSION:    Blunting of the bilateral costophrenic angles with bibasilar linear and hazy lung opacities is felt to reflect small pleural effusions with overlying compressive atelectasis, scarring or infiltrate.    Electronically signed by:  Joey Westfall DO  8/28/2022 6:15 AM CDT Workstation: [id number]  X-Ray Elbow Complete Right  Reason: Loss of consciousness.    FINDINGS:    Four views of the right elbow show no fracture, dislocation, or destructive osseous lesion. Vascular stent graft and surgical clips noted. Soft tissues otherwise unremarkable.    IMPRESSION:    No acute osseous abnormality.    Electronically signed by:  Joey Westfall DO  8/28/2022 6:14 AM CDT Workstation: [id number]  X-Ray Pelvis Routine AP  Reason: fall    FINDINGS:    Two AP views of the pelvis show no fracture, dislocation, or destructive osseous lesion. There are mild degenerative changes of the bilateral hip joints. Soft tissues are unremarkable.    IMPRESSION:    No acute osseous abnormality.    Electronically signed by:  Joey Westfall DO  8/28/2022 6:12 AM CDT Workstation: [id number]  X-Ray Sacrum And Coccyx  Reason: Fall.    FINDINGS:    AP and lateral views of the sacrum and coccyx show no fracture, dislocation, or destructive osseous lesion. Soft tissues are unremarkable.    IMPRESSION:    No acute osseous abnormality.    Electronically signed by:  Joey Westfall DO  8/28/2022 6:12 AM CDT Workstation: [id number]      ECHOCARDIOGRAM RESULTS (last 5)  Results for orders placed in visit  on 03/08/22    Echo    Interpretation Summary  · The left ventricle is normal in size with mild concentric hypertrophy and severely decreased systolic function.  · The estimated ejection fraction is 18%.  · Grade III left ventricular diastolic dysfunction.  · Mild right ventricular enlargement with mildly to moderately reduced right ventricular systolic function.  · Mild left atrial enlargement.  · Moderate tricuspid regurgitation.  · There is mild to moderate pulmonary hypertension.  · Moderate pulmonic regurgitation.  · Moderate-to-severe mitral regurgitation.      Results for orders placed in visit on 07/20/21    Echo    Interpretation Summary  · The estimated PA systolic pressure is 33 mmHg.  · The left ventricle is normal in size with mild eccentric hypertrophy and mildly decreased systolic function.  · The estimated ejection fraction is 45%.  · Grade I left ventricular diastolic dysfunction.  · Normal right ventricular size with normal right ventricular systolic function.  · Moderate left atrial enlargement.  · Mild-to-moderate mitral regurgitation.  · There is left ventricular global hypokinesis.  · Mild to moderate tricuspid regurgitation.      Results for orders placed during the hospital encounter of 09/27/20    Echo Color Flow Doppler? Yes    Interpretation Summary  · The estimated PA systolic pressure is 24 mmHg.  · There is mild left ventricular concentric hypertrophy.  · The left ventricle is normal in size with normal systolic function. The estimated ejection fraction is 70%.  · Indeterminate diastolic function.  · Normal right ventricular systolic function.  · Mild left atrial enlargement.      CURRENT/PREVIOUS VISIT EKG  Results for orders placed or performed during the hospital encounter of 08/27/22   EKG 12-lead    Collection Time: 08/27/22  9:39 PM    Narrative    Test Reason : R07.9,    Vent. Rate : 100 BPM     Atrial Rate : 100 BPM     P-R Int : 184 ms          QRS Dur : 074 ms      QT Int :  384 ms       P-R-T Axes : 012 014 034 degrees     QTc Int : 495 ms    Sinus rhythm with Premature atrial complexes with Aberrant conduction  Nonspecific T wave abnormality  Prolonged QT  Abnormal ECG  When compared with ECG of 17-MAY-2022 09:54,  Aberrant conduction is now Present  Non-specific change in ST segment in Lateral leads  T wave inversion no longer evident in Lateral leads    Referred By: [email or web address]   SELF           Confirmed By:            ASSESSMENT/PLAN:     Active Hospital Problems    Diagnosis    *Syncope    Chronic combined systolic and diastolic heart failure    Nonobstructive atherosclerosis of coronary artery    History of DVT (deep vein thrombosis)    ESRD (end stage renal disease)    HTN (hypertension)       ASSESSMENT & PLAN:   1.  Orthostatic hypotension/syncope/near-syncope  2.  End-stage renal disease on hemodialysis  3.  Post hemodialysis fatigue tiredness and low blood pressure  4.  Heart failure with reduced ejection fraction of 18%  5.  History of venous thromboembolism  6.  On anticoagulation      RECOMMENDATIONS:  1.  Patient does take midodrine 10 mg prior to hemodialysis and 10 mg after hemodialysis.  And she does this on Monday Wednesday and Friday on her dialysis days.  2.  Patient to resume taking midodrine 5 mg p.o. b.i.d. at 7:00 a.m. in the morning and 2:00 p.m. in the evening on Tuesday Thursday and Saturday and Sunday as this would help to prevent a drop in the blood pressure when she stands up.  3.  Had a lengthy discussion with the importance of wearing compression stockings.  4.  Patient has heart failure with reduced ejection fraction and he she is currently on carvedilol 12.5 mg p.o. b.i.d. continue the same and Entresto 24-26 mg p.o. b.i.d. continue the same.  5.  Currently on anticoagulation with aspirin and Eliquis 5 mg p.o. b.i.d. continue the same.  6.  Patient to follow-up with the primary cardiologist Dr. CAN on discharge.  Thank you for the  consultation.        Faraz Griffith MD  [facility]  Department of Cardiology  Date of Service: 08/28/2022  3:10 PM

## 2022-08-28 NOTE — PLAN OF CARE
[facility]  Initial Discharge Assessment       Primary Care Provider: Ralf Ham MD    Admission Diagnosis: Syncope, unspecified syncope type [R55]    Admission Date: 8/27/2022  Expected Discharge Date:  TBD    Patient completes activities of daily living independently without adaptive equipment or assistance. She drives herself to dialysis at [facility] on [address] MWF 530am. She elects to schedule her own PCP follow up appt with Dr. Gomez after discharge. No needs anticipated.         Payor: MEDICARE / Plan: MEDICARE PART A & B / Product Type: Government /     Extended Emergency Contact Information  Primary Emergency Contact: Jordan Gallegos  Home Phone: [phone]  Mobile Phone: [phone]  Relation: Son   needed? No  Secondary Emergency Contact: brigette gallegos  Mobile Phone: [phone]  Relation: Relative   needed? No    Discharge Plan A: Home  Discharge Plan B: Home      [id number] - [place] - [address].  [address] [place] [address]  [place] [address]  Phone: [phone] Fax: [phone]      Initial Assessment (most recent)       Adult Discharge Assessment - 08/28/22 1516          Discharge Assessment    Assessment Type Discharge Planning Assessment     Confirmed/corrected address, phone number and insurance Yes     Confirmed Demographics Correct on Facesheet     Source of Information patient     Communicated ANGELITA with patient/caregiver Date not available/Unable to determine     Reason For Admission Syncope     Lives With alone     Do you expect to return to your current living situation? Yes     Do you have help at home or someone to help you manage your care at home? No     Prior to hospitilization cognitive status: Alert/Oriented     Current cognitive status: Alert/Oriented     Walking or Climbing Stairs Difficulty none     Equipment Currently Used at Home none     Readmission within 30 days? No     Patient currently being followed by outpatient  case management? No     Do you currently have service(s) that help you manage your care at home? No     Do you take prescription medications? Yes     Do you have prescription coverage? Yes     Do you have any problems affording any of your prescribed medications? No     Is the patient taking medications as prescribed? yes     How do you get to doctors appointments? car, drives self     Are you on dialysis? Yes     Dialysis Name and Scheduled days [facility] on [address] MWF at 530am     Do you take coumadin? No     Discharge Plan A Home     Discharge Plan B Home     DME Needed Upon Discharge  none     Discharge Plan discussed with: Patient

## 2022-08-28 NOTE — ED PROVIDER NOTES
"Encounter Date: 8/27/2022       History     Chief Complaint   Patient presents with    Loss of Consciousness     Woke up to go to bathroom and then woke up supine on laminate floor. A/o x4. Pt states she "feels like she can't her thoughts right since falling"     67-year-old female past medical history of hypertension, hyperlipidemia, end-stage renal disease on dialysis, presents emergency department with syncopal episode, vomiting.  Patient says that she was on her way to the bathroom and thinks that she passed out.  When she went to the bathroom she had a normal bowel movement.  She says after she got off the toilet she vomited twice, nonbloody nonbilious.  She says that when she passed out she does not think that she hit her head.  She does complain of pain to the right elbow and right buttock region.  She is on Eliquis.  She did get her dialysis yesterday.  She denies any recent illnesses or any sick contacts.  No fever chills reported.    Review of patient's allergies indicates:  No Known Allergies  Past Medical History:   Diagnosis Date    Dialysis patient 10/05/2020    Emphysema of lung     ESRD (end stage renal disease)     Hepatitis 1980    HLD (hyperlipidemia)     Hypertension     Renal disorder      Past Surgical History:   Procedure Laterality Date    AORTOGRAPHY WITH SERIALOGRAPHY N/A 9/30/2020    Procedure: co2 renal angio;  Surgeon: Lance Bustamante MD;  Location: [facility] CATH/EP LAB;  Service: Vascular;  Laterality: N/A;    APPENDECTOMY      AV FISTULA PLACEMENT Right 12/9/2020    Procedure: CREATION, AV FISTULA;  Surgeon: Lance Bustamante MD;  Location: [facility] OR;  Service: General;  Laterality: Right;    COLONOSCOPY N/A 6/8/2021    Procedure: COLONOSCOPY;  Surgeon: Avila Smith III, MD;  Location: [facility] ENDO;  Service: Endoscopy;  Laterality: N/A;    COLONOSCOPY N/A 12/14/2021    Procedure: COLONOSCOPY;  Surgeon: Avila Smith III, MD;  Location: [facility] ENDO;  Service: Endoscopy;  Laterality: " N/A;    COLONOSCOPY W/ POLYPECTOMY  2021    FISTULOGRAM Bilateral 2022    Procedure: Fistulogram;  Surgeon: Lance Bustamante MD;  Location: [facility] CATH/EP LAB;  Service: General;  Laterality: Bilateral;    INSERTION OF TUNNELED CENTRAL VENOUS HEMODIALYSIS CATHETER  10/2/2020    Procedure: INSERTION, CATHETER, CENTRAL VENOUS, HEMODIALYSIS, TUNNELED;  Surgeon: Ali Khoobehi, MD;  Location: [facility] OR;  Service: Vascular;;    LEFT HEART CATHETERIZATION Left 2022    Procedure: Left heart cath;  Surgeon: Daniel Tyler MD;  Location: [facility] CATH LAB;  Service: Cardiology;  Laterality: Left;    OPEN THROMBECTOMY OF GRAFT  2022    Procedure: THROMBECTOMY, GRAFT, OPEN;  Surgeon: Lance Bustamante MD;  Location: [facility] CATH/EP LAB;  Service: General;;    PLACEMENT OF DIALYSIS ACCESS Right 2020    Procedure: Insertion, Dialysis Access;  Surgeon: Lance Bustamante MD;  Location: [facility] CATH/EP LAB;  Service: Vascular;  Laterality: Right;    REMOVAL OF TUNNELED CENTRAL VENOUS CATHETER (CVC) N/A 2020    Procedure: REMOVAL, CATHETER, CENTRAL VENOUS, TUNNELED;  Surgeon: Ali Khoobehi, MD;  Location: [facility] OR;  Service: Vascular;  Laterality: N/A;    TONSILLECTOMY      TUBAL LIGATION       Family History   Problem Relation Age of Onset    Cancer Mother     Heart disease Father      Social History     Tobacco Use    Smoking status: Former     Packs/day: 0.25     Years: 20.00     Pack years: 5.00     Types: Cigarettes     Quit date: 2020     Years since quittin.9    Smokeless tobacco: Never   Substance Use Topics    Alcohol use: Not Currently    Drug use: Not Currently     Review of Systems   Constitutional:  Negative for fever.   HENT:  Negative for sore throat.    Respiratory:  Negative for shortness of breath.    Cardiovascular:  Negative for chest pain.   Gastrointestinal:  Positive for nausea and vomiting. Negative for abdominal pain.   Genitourinary:  Negative for dysuria.   Musculoskeletal:   Negative for back pain.   Skin:  Negative for rash.   Neurological:  Positive for syncope. Negative for weakness.   Hematological:  Does not bruise/bleed easily.   All other systems reviewed and are negative.    Physical Exam     Initial Vitals [08/27/22 2127]   BP Pulse Resp Temp SpO2   (!) 80/56 91 16 97.9 °F (36.6 °C) 96 %      MAP       --         Physical Exam    Nursing note and vitals reviewed.  Constitutional: She appears well-developed and well-nourished.   HENT:   Head: Normocephalic and atraumatic.   Mouth/Throat: No oropharyngeal exudate.   Eyes: Conjunctivae and EOM are normal. Pupils are equal, round, and reactive to light.   Neck: Neck supple. No tracheal deviation present.   Cardiovascular:  Normal rate, regular rhythm, normal heart sounds and intact distal pulses.           No murmur heard.  Pulmonary/Chest: Breath sounds normal. No stridor. No respiratory distress. She has no wheezes. She has no rhonchi. She has no rales.   Abdominal: Abdomen is soft. She exhibits no distension. There is no abdominal tenderness. There is no rebound and no guarding.   Musculoskeletal:         General: No tenderness or edema. Normal range of motion.      Cervical back: Neck supple.      Comments: Right upper arm: Dialysis fistula present with a palpable thrill and bruit.     Lymphadenopathy:     She has no cervical adenopathy.   Neurological: She is alert and oriented to person, place, and time. She has normal strength. No cranial nerve deficit or sensory deficit. GCS score is 15. GCS eye subscore is 4. GCS verbal subscore is 5. GCS motor subscore is 6.   Skin: Skin is warm and dry. Capillary refill takes less than 2 seconds. No rash noted. No erythema. No pallor.   Psychiatric: She has a normal mood and affect. Her behavior is normal. Judgment and thought content normal.       ED Course   Procedures  Labs Reviewed   CBC W/ AUTO DIFFERENTIAL - Abnormal; Notable for the following components:       Result Value    RBC  3.49 (*)     Hemoglobin 11.5 (*)     Hematocrit 35.2 (*)      (*)     MCH 33.0 (*)     Lymph # 0.9 (*)     Gran % 77.3 (*)     Lymph % 12.8 (*)     All other components within normal limits   COMPREHENSIVE METABOLIC PANEL - Abnormal; Notable for the following components:    Potassium 3.2 (*)     Chloride 90 (*)     CO2 34 (*)     Glucose 143 (*)     BUN 40 (*)     Creatinine 7.8 (*)     Albumin 3.3 (*)     eGFR 5.2 (*)     All other components within normal limits   B-TYPE NATRIURETIC PEPTIDE - Abnormal; Notable for the following components:     (*)     All other components within normal limits   PROTIME-INR - Abnormal; Notable for the following components:    PT 16.5 (*)     All other components within normal limits   TROPONIN I          Imaging Results              X-Ray Pelvis Routine AP (In process)                      X-Ray Elbow Complete Right (In process)                      CT Head Without Contrast (Final result)  Result time 08/27/22 23:26:33      Final result by Brittney Roth MD (08/27/22 23:26:33)                   Narrative:    CT HEAD WITHOUT CONTRAST    CLINICAL HISTORY: Head trauma, moderate-severe    COMPARISON: [None.]    TECHNIQUE:  Axial unenhanced CT imaging of the brain. Reformatted coronal and sagittal images obtained.    This examination was performed according to our departmental dose optimization program, which includes automated exposure control, adjustment of the mA and/or kV according to patient size and/or use of iterative reconstruction technique.    FINDINGS:  Moderate age-related cortical volume loss. There is mild decreased attenuation of the white matter due to chronic microvascular ischemic change. No intracranial bleed. No edema, mass or midline shift. No acute infarction or hyperdense vessel seen. Atherosclerosis is noted within the cavernous segments of the internal carotid arteries. Unremarkable cerebellum and vermis. Fourth ventricle is midline. Prepontine  cisterns are not effaced. Normal sella contents.    Clear paranasal sinuses. Intraorbital contents appear normal. Mastoid air cells are clear. Intact skull base, imaged facial bones and calvarium. Normal scalp soft tissues.    IMPRESSION:  1. Moderate senescent brain changes. No intracranial hemorrhage or skull fracture.    Electronically signed by:  Brittney Roth DO  8/27/2022 11:26 PM CDT Workstation: [id number]                                     X-Ray Chest 1 View (Preliminary result)  Result time 08/28/22 00:13:49      ED Interpretation by Juve Youssef DO (08/28/22 00:13:49, [facility] Dept, Emergency Medicine)    No acute abnormality                                     X-Ray Sacrum And Coccyx (Preliminary result)  Result time 08/28/22 00:13:55      ED Interpretation by Juve Youssef DO (08/28/22 00:13:55, [facility] Emergency Dept, Emergency Medicine)    No acute abnormality                                     Medications   apixaban tablet 5 mg (has no administration in time range)   aspirin EC tablet 81 mg (has no administration in time range)   atorvastatin tablet 40 mg (has no administration in time range)   midodrine tablet 10 mg (has no administration in time range)   sevelamer carbonate tablet 2,400 mg (has no administration in time range)   sodium chloride 0.9% flush 10 mL (has no administration in time range)   albuterol-ipratropium 2.5 mg-0.5 mg/3 mL nebulizer solution 3 mL (has no administration in time range)   melatonin tablet 6 mg (has no administration in time range)   ondansetron injection 4 mg (has no administration in time range)   polyethylene glycol packet 17 g (has no administration in time range)   senna-docusate 8.6-50 mg per tablet 1 tablet (has no administration in time range)   acetaminophen tablet 650 mg (has no administration in time range)   simethicone chewable tablet 80 mg (has no administration in time range)    HYDROcodone-acetaminophen 5-325 mg per tablet 1 tablet (has no administration in time range)   morphine injection 4 mg (has no administration in time range)   naloxone 0.4 mg/mL injection 0.02 mg (has no administration in time range)   glucose chewable tablet 16 g (has no administration in time range)   glucose chewable tablet 24 g (has no administration in time range)   dextrose 50% injection 12.5 g (has no administration in time range)   dextrose 50% injection 25 g (has no administration in time range)   glucagon (human recombinant) injection 1 mg (has no administration in time range)   ipratropium 0.02 % nebulizer solution 0.5 mg (has no administration in time range)   metoclopramide HCl injection 10 mg (10 mg Intravenous Given 8/28/22 0044)   diphenhydrAMINE injection 12.5 mg (12.5 mg Intravenous Given 8/28/22 0044)   sodium chloride 0.9% bolus 250 mL (0 mLs Intravenous Stopped 8/28/22 0144)   sodium chloride 0.9% bolus 250 mL (0 mLs Intravenous Stopped 8/28/22 0306)     Medical Decision Making:   ED Management:  67-year-old female presents emergency department syncopal episode hypertension.  Patient found to be orthostatic.  Has been given IV fluids emergency department.  Patient does have a low EF CHF, EF of 20%.  Potentially could have been an arrhythmia which could have led to the syncope as well.  Patient has multiple chronic comorbidities.  Discussed case with hospitalist who will admit the patient for observation further evaluation of syncope.           ED Course as of 08/28/22 0417   Sat Aug 27, 2022   2247 EKG normal sinus rhythm rate of 100.  Occasional premature atrial complexes.  Borderline 1st degree AV block.  Prolonged QT interval. [JR]   Susan Aug 28, 2022   0047 Case discussed with Dr. Pardo who will admit the patient to the hospital. [JR]      ED Course User Index  [JR] Juve Youssef DO             Clinical Impression:   Final diagnoses:  [R07.9] Chest pain  [W19.XXXA] Fall  [R55]  Syncope, unspecified syncope type (Primary)  [I95.1] Orthostatic hypotension  [N18.6] End stage renal disease        ED Disposition Condition    Observation                 Juve Youssef DO  08/28/22 0418

## 2022-08-28 NOTE — CARE UPDATE
08/28/22 0842   Patient Assessment/Suction   Level of Consciousness (AVPU) alert   Respiratory Effort Normal;Unlabored   Expansion/Accessory Muscles/Retractions no use of accessory muscles   All Lung Fields Breath Sounds clear   Rhythm/Pattern, Respiratory unlabored;pattern regular   PRE-TX-O2   O2 Device (Oxygen Therapy) room air   SpO2 99 %   Pulse Oximetry Type Intermittent   $ Pulse Oximetry - Multiple Charge Pulse Oximetry - Multiple   Pulse 67   Resp 12   Aerosol Therapy   $ Aerosol Therapy Charges Aerosol Treatment   Daily Review of Necessity (SVN) completed   Respiratory Treatment Status (SVN) given   Treatment Route (SVN) mouthpiece;oxygen   Patient Position (SVN) HOB elevated   Post Treatment Assessment (SVN) breath sounds unchanged;vital signs unchanged   Signs of Intolerance (SVN) none   Education   $ Education Bronchodilator;15 min   Respiratory Evaluation   $ Care Plan Tech Time 15 min

## 2022-08-29 VITALS
TEMPERATURE: 99 F | HEIGHT: 64 IN | WEIGHT: 132.25 LBS | BODY MASS INDEX: 22.58 KG/M2 | RESPIRATION RATE: 16 BRPM | HEART RATE: 78 BPM | DIASTOLIC BLOOD PRESSURE: 90 MMHG | SYSTOLIC BLOOD PRESSURE: 175 MMHG | OXYGEN SATURATION: 100 %

## 2022-08-29 PROBLEM — R55 SYNCOPE: Status: RESOLVED | Noted: 2022-08-28 | Resolved: 2022-08-29

## 2022-08-29 LAB
ANION GAP SERPL CALC-SCNC: 11 MMOL/L (ref 8–16)
AV INDEX (PROSTH): 0.9
AV MEAN GRADIENT: 5 MMHG
AV VALVE AREA: 3.07 CM2
BASOPHILS # BLD AUTO: 0.02 K/UL (ref 0–0.2)
BASOPHILS NFR BLD: 0.5 % (ref 0–1.9)
BSA FOR ECHO PROCEDURE: 1.67 M2
BUN SERPL-MCNC: 62 MG/DL (ref 8–23)
CALCIUM SERPL-MCNC: 8.3 MG/DL (ref 8.7–10.5)
CHLORIDE SERPL-SCNC: 90 MMOL/L (ref 95–110)
CO2 SERPL-SCNC: 31 MMOL/L (ref 23–29)
CREAT SERPL-MCNC: 9.8 MG/DL (ref 0.5–1.4)
CV ECHO LV RWT: 0.34 CM
DIFFERENTIAL METHOD: ABNORMAL
DOP CALC AO VTI: 26.77 CM
DOP CALC LVOT AREA: 3.4 CM2
DOP CALC LVOT DIAMETER: 2.09 CM
DOP CALC LVOT PEAK VEL: 99 M/S
DOP CALC LVOT STROKE VOLUME: 82.19 CM3
DOP CALCLVOT PEAK VEL VTI: 23.97 CM
E WAVE DECELERATION TIME: 339.8 MSEC
E/A RATIO: 0.85
E/E' RATIO: 8.1 M/S
ECHO LV POSTERIOR WALL: 0.88 CM (ref 0.6–1.1)
EJECTION FRACTION: 55 %
EOSINOPHIL # BLD AUTO: 0.1 K/UL (ref 0–0.5)
EOSINOPHIL NFR BLD: 3.6 % (ref 0–8)
ERYTHROCYTE [DISTWIDTH] IN BLOOD BY AUTOMATED COUNT: 12.2 % (ref 11.5–14.5)
EST. GFR  (NO RACE VARIABLE): 4 ML/MIN/1.73 M^2
FRACTIONAL SHORTENING: 35 % (ref 28–44)
GLUCOSE SERPL-MCNC: 84 MG/DL (ref 70–110)
HCT VFR BLD AUTO: 28.1 % (ref 37–48.5)
HGB BLD-MCNC: 9.4 G/DL (ref 12–16)
IMM GRANULOCYTES # BLD AUTO: 0.01 K/UL (ref 0–0.04)
IMM GRANULOCYTES NFR BLD AUTO: 0.3 % (ref 0–0.5)
INTERVENTRICULAR SEPTUM: 0.81 CM (ref 0.6–1.1)
LEFT INTERNAL DIMENSION IN SYSTOLE: 3.31 CM (ref 2.1–4)
LEFT VENTRICLE DIASTOLIC VOLUME INDEX: 81.78 ML/M2
LEFT VENTRICLE DIASTOLIC VOLUME: 134.12 ML
LEFT VENTRICLE MASS INDEX: 92 G/M2
LEFT VENTRICLE SYSTOLIC VOLUME INDEX: 25.5 ML/M2
LEFT VENTRICLE SYSTOLIC VOLUME: 41.86 ML
LEFT VENTRICULAR INTERNAL DIMENSION IN DIASTOLE: 5.12 CM (ref 3.5–6)
LEFT VENTRICULAR MASS: 151.69 G
LV LATERAL E/E' RATIO: 6.75 M/S
LV SEPTAL E/E' RATIO: 10.13 M/S
LYMPHOCYTES # BLD AUTO: 1 K/UL (ref 1–4.8)
LYMPHOCYTES NFR BLD: 26.3 % (ref 18–48)
MAGNESIUM SERPL-MCNC: 2.1 MG/DL (ref 1.6–2.6)
MCH RBC QN AUTO: 33.6 PG (ref 27–31)
MCHC RBC AUTO-ENTMCNC: 33.5 G/DL (ref 32–36)
MCV RBC AUTO: 100 FL (ref 82–98)
MONOCYTES # BLD AUTO: 0.4 K/UL (ref 0.3–1)
MONOCYTES NFR BLD: 11.2 % (ref 4–15)
MV PEAK A VEL: 0.95 M/S
MV PEAK E VEL: 0.81 M/S
NEUTROPHILS # BLD AUTO: 2.1 K/UL (ref 1.8–7.7)
NEUTROPHILS NFR BLD: 58.1 % (ref 38–73)
NRBC BLD-RTO: 0 /100 WBC
PISA TR MAX VEL: 2.98 M/S
PLATELET # BLD AUTO: 150 K/UL (ref 150–450)
PMV BLD AUTO: 9.6 FL (ref 9.2–12.9)
POTASSIUM SERPL-SCNC: 3.4 MMOL/L (ref 3.5–5.1)
RBC # BLD AUTO: 2.8 M/UL (ref 4–5.4)
RIGHT VENTRICULAR END-DIASTOLIC DIMENSION: 2.63 CM
SODIUM SERPL-SCNC: 132 MMOL/L (ref 136–145)
TDI LATERAL: 0.12 M/S
TDI SEPTAL: 0.08 M/S
TDI: 0.1 M/S
TR MAX PG: 36 MMHG
TRICUSPID ANNULAR PLANE SYSTOLIC EXCURSION: 1.76 CM
WBC # BLD AUTO: 3.65 K/UL (ref 3.9–12.7)

## 2022-08-29 PROCEDURE — 36415 COLL VENOUS BLD VENIPUNCTURE: CPT | Performed by: FAMILY MEDICINE

## 2022-08-29 PROCEDURE — 85025 COMPLETE CBC W/AUTO DIFF WBC: CPT | Performed by: FAMILY MEDICINE

## 2022-08-29 PROCEDURE — 80048 BASIC METABOLIC PNL TOTAL CA: CPT | Performed by: FAMILY MEDICINE

## 2022-08-29 PROCEDURE — 83735 ASSAY OF MAGNESIUM: CPT | Performed by: FAMILY MEDICINE

## 2022-08-29 PROCEDURE — 94640 AIRWAY INHALATION TREATMENT: CPT

## 2022-08-29 PROCEDURE — 25000242 PHARM REV CODE 250 ALT 637 W/ HCPCS: Performed by: FAMILY MEDICINE

## 2022-08-29 PROCEDURE — 25000003 PHARM REV CODE 250: Performed by: FAMILY MEDICINE

## 2022-08-29 PROCEDURE — G0378 HOSPITAL OBSERVATION PER HR: HCPCS

## 2022-08-29 PROCEDURE — 99900035 HC TECH TIME PER 15 MIN (STAT)

## 2022-08-29 PROCEDURE — 94761 N-INVAS EAR/PLS OXIMETRY MLT: CPT

## 2022-08-29 PROCEDURE — 99900031 HC PATIENT EDUCATION (STAT)

## 2022-08-29 PROCEDURE — 90935 HEMODIALYSIS ONE EVALUATION: CPT

## 2022-08-29 RX ORDER — MIDODRINE HYDROCHLORIDE 5 MG/1
5 TABLET ORAL
Qty: 48 TABLET | Refills: 3
Start: 2022-08-30 | End: 2023-08-30

## 2022-08-29 RX ADMIN — ASPIRIN 81 MG: 81 TABLET, COATED ORAL at 01:08

## 2022-08-29 RX ADMIN — APIXABAN 5 MG: 5 TABLET, FILM COATED ORAL at 01:08

## 2022-08-29 RX ADMIN — SEVELAMER CARBONATE 2400 MG: 800 TABLET, FILM COATED ORAL at 07:08

## 2022-08-29 RX ADMIN — IPRATROPIUM BROMIDE 0.5 MG: 0.5 SOLUTION RESPIRATORY (INHALATION) at 08:08

## 2022-08-29 NOTE — CARE UPDATE
08/28/22 1917   Patient Assessment/Suction   Level of Consciousness (AVPU) alert   Respiratory Effort Normal;Unlabored   Expansion/Accessory Muscles/Retractions no use of accessory muscles   All Lung Fields Breath Sounds clear;diminished   Cough Frequency no cough   PRE-TX-O2   SpO2 99 %   Pulse 62   Resp 16   Aerosol Therapy   $ Aerosol Therapy Charges Aerosol Treatment   Daily Review of Necessity (SVN) completed   Respiratory Treatment Status (SVN) given   Treatment Route (SVN) mouthpiece   Patient Position (SVN) semi-Verma's   Post Treatment Assessment (SVN) increased aeration;breath sounds unchanged   Signs of Intolerance (SVN) none   Breath Sounds Post-Respiratory Treatment   Throughout All Fields Post-Treatment All Fields   Throughout All Fields Post-Treatment aeration increased   Post-treatment Heart Rate (beats/min) 61   Post-treatment Resp Rate (breaths/min) 20   Continue tx. As ordered

## 2022-08-29 NOTE — CONSULTS
Nephrology Progress Note        Patient Name: Latricia Higgins  MRN: [id number]    Patient Class: OP- Observation   Admission Date: 8/27/2022  Length of Stay: 0 days  Date of Service: 8/29/2022    Attending Physician: Johnson Gamez MD  Primary Care Provider: Ralf Ham MD    Reason for Consult: esrd/anemia/hypotension/shpt/chf/syncope    SUBJECTIVE:     HPI: 67F with CHF, ESRD on HD MWF with known hypotension on midodrine and Entresto admitted with syncope at home with unwitnessed fall in the bathroom, vomiting x1 and some confusion/dizziness taht resolved by the time she came to ER. Last HD Friday, uneventful. BP in ER was 80's and she was orthostatic per report. Now BP much better.    8/29  seen today in dialysis.  No complaints, tolerating treatment well.    Past Medical History:   Diagnosis Date    Dialysis patient 10/05/2020    Emphysema of lung     ESRD (end stage renal disease)     Hepatitis 1980    HLD (hyperlipidemia)     Hypertension     Renal disorder      Past Surgical History:   Procedure Laterality Date    AORTOGRAPHY WITH SERIALOGRAPHY N/A 9/30/2020    Procedure: co2 renal angio;  Surgeon: Lance Bustamante MD;  Location: [facility] CATH/EP LAB;  Service: Vascular;  Laterality: N/A;    APPENDECTOMY      AV FISTULA PLACEMENT Right 12/9/2020    Procedure: CREATION, AV FISTULA;  Surgeon: Lance Bustamante MD;  Location: [facility] OR;  Service: General;  Laterality: Right;    COLONOSCOPY N/A 6/8/2021    Procedure: COLONOSCOPY;  Surgeon: Avila Smith III, MD;  Location: [facility] ENDO;  Service: Endoscopy;  Laterality: N/A;    COLONOSCOPY N/A 12/14/2021    Procedure: COLONOSCOPY;  Surgeon: Avila Smith III, MD;  Location: [facility] ENDO;  Service: Endoscopy;  Laterality: N/A;    COLONOSCOPY W/ POLYPECTOMY  06/08/2021    FISTULOGRAM Bilateral 5/18/2022    Procedure: Fistulogram;  Surgeon: Lance Bustamante MD;  Location: [facility] CATH/EP LAB;  Service: General;  Laterality: Bilateral;    INSERTION OF TUNNELED CENTRAL  VENOUS HEMODIALYSIS CATHETER  10/2/2020    Procedure: INSERTION, CATHETER, CENTRAL VENOUS, HEMODIALYSIS, TUNNELED;  Surgeon: Ali Khoobehi, MD;  Location: [facility] OR;  Service: Vascular;;    LEFT HEART CATHETERIZATION Left 2022    Procedure: Left heart cath;  Surgeon: Daniel Tyler MD;  Location: [facility] CATH LAB;  Service: Cardiology;  Laterality: Left;    OPEN THROMBECTOMY OF GRAFT  2022    Procedure: THROMBECTOMY, GRAFT, OPEN;  Surgeon: Lance Bustamante MD;  Location: [facility] CATH/EP LAB;  Service: General;;    PLACEMENT OF DIALYSIS ACCESS Right 2020    Procedure: Insertion, Dialysis Access;  Surgeon: Lance Bustamante MD;  Location: [facility] CATH/EP LAB;  Service: Vascular;  Laterality: Right;    REMOVAL OF TUNNELED CENTRAL VENOUS CATHETER (CVC) N/A 2020    Procedure: REMOVAL, CATHETER, CENTRAL VENOUS, TUNNELED;  Surgeon: Ali Khoobehi, MD;  Location: [facility] OR;  Service: Vascular;  Laterality: N/A;    TONSILLECTOMY      TUBAL LIGATION       Family History   Problem Relation Age of Onset    Cancer Mother     Heart disease Father      Social History     Tobacco Use    Smoking status: Former     Packs/day: 0.25     Years: 20.00     Pack years: 5.00     Types: Cigarettes     Quit date: 2020     Years since quittin.9    Smokeless tobacco: Never   Substance Use Topics    Alcohol use: Not Currently    Drug use: Not Currently       Review of patient's allergies indicates:  No Known Allergies    Outpatient meds:  No current facility-administered medications on file prior to encounter.     Current Outpatient Medications on File Prior to Encounter   Medication Sig Dispense Refill    apixaban (ELIQUIS) 5 mg Tab Take 1 tablet (5 mg total) by mouth 2 (two) times daily. 60 tablet 6    aspirin (ECOTRIN) 81 MG EC tablet Take 1 tablet (81 mg total) by mouth once daily. 90 tablet 3    atorvastatin (LIPITOR) 40 MG tablet Take 40 mg by mouth once daily.      carvediloL (COREG) 12.5 MG tablet Take 1 tablet (12.5  mg total) by mouth 2 (two) times daily with meals. 60 tablet 3    midodrine (PROAMATINE) 10 MG tablet Take 10 mg by mouth every Mon, Wed, Fri. With dialysis      sacubitriL-valsartan (ENTRESTO) 24-26 mg per tablet Take 1 tablet by mouth 2 (two) times daily. 60 tablet 11    sevelamer carbonate (RENVELA) 800 mg Tab Take 2,400 mg by mouth 3 (three) times daily with meals.       umeclidinium-vilanteroL (ANORO ELLIPTA) 62.5-25 mcg/actuation DsDv Inhale 1 puff into the lungs once daily. Controller 1 each 5    VELTASSA 8.4 gram PwPk Take 8.4 g by mouth 4 (four) times a week. Tues, thurs, sat, sun      VIOS AEROSOL DELIVERY SYSTEM Lizbeth AS DIRECTED         Scheduled meds:   sodium chloride 0.9%   Intravenous Once    apixaban  5 mg Oral BID    aspirin  81 mg Oral Daily    atorvastatin  40 mg Oral Daily    ipratropium  0.5 mg Nebulization Q6H    midodrine  10 mg Oral Every Mon, Wed, Fri    mupirocin   Nasal BID    sevelamer carbonate  2,400 mg Oral TID WM       Infusions:      PRN meds:  acetaminophen, albuterol-ipratropium, dextrose 50%, dextrose 50%, glucagon (human recombinant), glucose, glucose, HYDROcodone-acetaminophen, melatonin, morphine, naloxone, ondansetron, polyethylene glycol, senna-docusate 8.6-50 mg, simethicone, sodium chloride 0.9%, sodium chloride 0.9%    Review of Systems:  Review of Systems   Constitutional:  Negative for chills, fever, malaise/fatigue and weight loss.   HENT:  Negative for hearing loss and nosebleeds.    Eyes:  Negative for blurred vision, double vision and photophobia.   Respiratory:  Negative for cough, shortness of breath and wheezing.    Cardiovascular:  Negative for chest pain, palpitations and leg swelling.   Gastrointestinal:  Negative for abdominal pain, constipation, diarrhea, heartburn, nausea and vomiting.   Genitourinary:  Negative for dysuria, frequency and urgency.   Musculoskeletal:  Negative for falls, joint pain and myalgias.   Skin:  Negative for itching and rash.    Neurological:  Negative for dizziness, speech change, focal weakness, loss of consciousness and headaches.   Endo/Heme/Allergies:  Does not bruise/bleed easily.   Psychiatric/Behavioral:  Negative for depression and substance abuse. The patient is not nervous/anxious.      OBJECTIVE:     Vital Signs and IO (Last 24H):  Temp:  [98 °F (36.7 °C)-98.5 °F (36.9 °C)]   Pulse:  [54-76]   Resp:  [14-20]   BP: (138-182)/(63-80)   SpO2:  [95 %-100 %]   I/O last 3 completed shifts:  In: 422 [P.O.:422]  Out: -     Wt Readings from Last 5 Encounters:   08/29/22 60 kg (132 lb 4.4 oz)   08/28/22 61.7 kg (136 lb)   08/16/22 61 kg (134 lb 7.7 oz)   05/31/22 59.4 kg (130 lb 15.3 oz)   05/18/22 59 kg (130 lb 1.1 oz)         Physical Exam:  Physical Exam  Constitutional:       General: She is not in acute distress.     Appearance: She is well-developed. She is not diaphoretic.   HENT:      Head: Normocephalic and atraumatic.      Mouth/Throat:      Mouth: Mucous membranes are moist.   Eyes:      General: No scleral icterus.     Pupils: Pupils are equal, round, and reactive to light.   Cardiovascular:      Rate and Rhythm: Normal rate and regular rhythm.   Pulmonary:      Effort: Pulmonary effort is normal. No respiratory distress.      Breath sounds: No stridor.   Abdominal:      General: There is no distension.      Palpations: Abdomen is soft.   Musculoskeletal:         General: No deformity. Normal range of motion.      Cervical back: Neck supple.   Skin:     General: Skin is warm and dry.      Findings: No erythema or rash.   Neurological:      Mental Status: She is alert and oriented to person, place, and time.      Cranial Nerves: No cranial nerve deficit.   Psychiatric:         Behavior: Behavior normal.       Body mass index is 22.71 kg/m².    Laboratory:  Recent Labs   Lab 08/27/22  2135 08/28/22  0309 08/29/22  0311    140 132*   K 3.2* 4.3 3.4*   CL 90* 94* 90*   CO2 34* 34* 31*   BUN 40* 45* 62*   CREATININE 7.8*  8.2* 9.8*   * 100 84         Recent Labs   Lab 08/27/22 2135 08/28/22  0309 08/29/22  0311   CALCIUM 9.6 8.8 8.3*   ALBUMIN 3.3*  --   --    MG  --  1.9 2.1         Recent Labs   Lab 03/23/21  0744   PTH, Intact 347.0 H         No results for input(s): POCTGLUCOSE in the last 168 hours.    Recent Labs   Lab 10/21/20  2030 11/16/20  1111 08/28/22  0309   Hemoglobin A1C 5.0 4.5 5.0         Recent Labs   Lab 08/27/22 2135 08/28/22 0309 08/29/22  0311   WBC 6.81 3.96 3.65*   HGB 11.5* 9.7* 9.4*   HCT 35.2* 29.9* 28.1*    168 150   * 103* 100*   MCHC 32.7 32.4 33.5   MONO 7.3  0.5 11.4  0.5 11.2  0.4         Recent Labs   Lab 08/27/22 2135   BILITOT 0.5   PROT 7.2   ALBUMIN 3.3*   ALKPHOS 119   ALT 13   AST 18         Recent Labs   Lab 09/29/20  0903   Color, UA Yellow   Appearance, UA Hazy A   pH, UA 7.0   Specific Gravity, UA 1.010   Protein, UA 3+ A   Glucose, UA Negative   Ketones, UA Negative   Urobilinogen, UA Negative   Bilirubin (UA) Negative   Occult Blood UA 3+ A   Nitrite, UA Negative   RBC, UA 26 H   WBC, UA >100 H   Bacteria Occasional   Hyaline Casts,  A         Recent Labs   Lab 05/24/21 0310 05/24/21  0523   POC PH 7.505 H 7.521 H   POC PCO2 34.5 L 35.3   POC HCO3 27.3 28.9 H   POC PO2 50 LL 73 L   POC SATURATED O2 89 L 96   POC BE 4 6   Sample ARTERIAL ARTERIAL         Microbiology Results (last 7 days)       ** No results found for the last 168 hours. **          ASSESSMENT/PLAN:     ESRD on HD MWF via AVF  Continue current dialysis prescription.  Next HD per rodrick.  Renal diet - low K, low phos.  No IVs or BP checks on access and/or non-dominant arm.    Anemia of CKD  Hgb and HCT are acceptable. Monitor.  Will provide CAROLE and/or IV iron PRN.    MBD / Secondary HPT  Ca, phos, PTH and vitamin D levels are acceptable.   Phos binders, vitamin D analogues and calcimimetics as needed.    Hypotension  CHF  Syncope with fall  BP seem controlled now.   Tolerate asymptomatic  HTN up to -160.    Thank you for allowing us to participate in the care of your patient!   We will follow the patient and provide recommendations as needed.    Patient care time was spent personally by me on the following activities:   Obtaining a history.  Examination of patient.  Providing medical care at the patients bedside.  Developing a treatment plan with patient or surrogate and bedside caregivers.  Ordering and reviewing laboratory studies, radiographic studies, pulse oximetry.  Ordering and performing treatments and interventions.  Evaluation of patient's response to treatment.  Discussions with consultants while on the unit and immediately available to the patient.  Re-evaluation of the patient's condition.  Documentation in the medical record.     Safia Gibbons NP      [facility]  [address] [place] [address]  [place], [place] [address] [phone] - tel  [phone] - fax    8/29/2022

## 2022-08-29 NOTE — PLAN OF CARE
Problem: Adult Inpatient Plan of Care  Goal: Plan of Care Review  Outcome: Met  Goal: Patient-Specific Goal (Individualized)  Outcome: Met  Goal: Absence of Hospital-Acquired Illness or Injury  Outcome: Met  Goal: Optimal Comfort and Wellbeing  Outcome: Met  Goal: Readiness for Transition of Care  Outcome: Met     Problem: Fall Injury Risk  Goal: Absence of Fall and Fall-Related Injury  Outcome: Met     Problem: Skin Injury Risk Increased  Goal: Skin Health and Integrity  Outcome: Met     Problem: Device-Related Complication Risk (Hemodialysis)  Goal: Safe, Effective Therapy Delivery  Outcome: Met     Problem: Hemodynamic Instability (Hemodialysis)  Goal: Effective Tissue Perfusion  Outcome: Met     Problem: Infection (Hemodialysis)  Goal: Absence of Infection Signs and Symptoms  Outcome: Met

## 2022-08-29 NOTE — NURSING
Pt to dialysis via bed, pt denies any discomfort at this time. Pt oriented to dialysis room, safety maintained.

## 2022-08-29 NOTE — CARE UPDATE
08/29/22 0808   Patient Assessment/Suction   Level of Consciousness (AVPU) alert   Respiratory Effort Unlabored   Expansion/Accessory Muscles/Retractions no use of accessory muscles   All Lung Fields Breath Sounds equal bilaterally   ILLIA Breath Sounds clear   LLL Breath Sounds clear   RUL Breath Sounds coarse   RML Breath Sounds diminished   RLL Breath Sounds clear;diminished   Rhythm/Pattern, Respiratory pattern regular   Cough Frequency infrequent   Cough Type no productive sputum   PRE-TX-O2   O2 Device (Oxygen Therapy) room air   SpO2 96 %   Pulse Oximetry Type Intermittent   $ Pulse Oximetry - Multiple Charge Pulse Oximetry - Multiple   Pulse 76   Resp 14   Aerosol Therapy   $ Aerosol Therapy Charges Aerosol Treatment   Daily Review of Necessity (SVN) completed   Respiratory Treatment Status (SVN) given   Treatment Route (SVN) mouthpiece   Patient Position (SVN) Verma's   Post Treatment Assessment (SVN) increased aeration   Signs of Intolerance (SVN) none   Education   $ Education Bronchodilator;15 min   Respiratory Evaluation   $ Care Plan Tech Time 15 min

## 2022-08-29 NOTE — HOSPITAL COURSE
67-year-old female with multiple medical comorbidities including but not limited to ESRD on hemodialysis, chronic combined CHF (non-ischemic cardiomyopathy) and chronic hypotension on midodrine presented to the ED after suffering an unwitnessed syncopal episode.    Orthostatic hypotension noted on  workup.  Evaluated by Cardiology.  No further symptoms since admission.  During her recent evaluation by outpatient Cardiology with Dr. Dixon about 2 weeks ago, her carvedilol dose has been increased  which could be contributing to the  presentation.    She has been initiated on midodrine 5 mg on non dialysis days.  Advised to maintain a log of blood pressure and follow-up with PCP/ Cardiology.  Return precautions and discharge instructions reviewed in detail.    I have seen the patient on the day of discharge and reviewed the discharge instructions as outlined below. Patient verbalized understanding and is aware to contact primary care physician or return to ED if new or worsening symptoms.

## 2022-08-29 NOTE — DISCHARGE SUMMARY
[facility] Medicine  Discharge Summary      Patient Name: Latricia Higgins  MRN: [id number]  Patient Class: OP- Observation  Admission Date: 8/27/2022  Hospital Length of Stay: 0 days  Discharge Date and Time:  08/29/2022 10:31 AM  Attending Physician: Johnson Gamez MD   Discharging Provider: Johnson Gamez MD  Primary Care Provider: Ralf Ham MD      Hospital Course:   67-year-old female with multiple medical comorbidities including but not limited to ESRD on hemodialysis, chronic combined CHF (non-ischemic cardiomyopathy) and chronic hypotension on midodrine presented to the ED after suffering an unwitnessed syncopal episode.    Orthostatic hypotension noted on  workup.  Evaluated by Cardiology.  No further symptoms since admission.  During her recent evaluation by outpatient Cardiology with Dr. Dixon about 2 weeks ago, her carvedilol dose has been increased  which could be contributing to the  presentation.    She has been initiated on midodrine 5 mg on non dialysis days.  Advised to maintain a log of blood pressure and follow-up with PCP/ Cardiology.  Return precautions and discharge instructions reviewed in detail.    I have seen the patient on the day of discharge and reviewed the discharge instructions as outlined below. Patient verbalized understanding and is aware to contact primary care physician or return to ED if new or worsening symptoms.           Goals of Care Treatment Preferences:  Code Status: Full Code      Consults:   Consults (From admission, onward)        Status Ordering Provider     Inpatient consult to Nephrology  Once        Provider:  Bijan Mcgrath MD    Completed YAJAIRA OSWALD     Inpatient consult to Cardiology  Once        Provider:  Faraz Griffith MD    Acknowledged YAJAIRA OSWALD     Inpatient consult to Hospitalist  Once        Provider:  Yajaira Oswald MD    Acknowledged YAJAIRA OSWALD          No new Assessment & Plan notes have  been filed under this hospital service since the last note was generated.  Service: Hospital Medicine    Final Active Diagnoses:    Diagnosis Date Noted POA    Chronic combined systolic and diastolic heart failure [I50.42] 08/16/2022 Yes    Nonobstructive atherosclerosis of coronary artery [I25.10] 05/31/2022 Yes    History of DVT (deep vein thrombosis) [Z86.718] 05/31/2022 Not Applicable    ESRD (end stage renal disease) [N18.6] 10/21/2020 Yes    HTN (hypertension) [I10] 09/27/2020 Yes      Problems Resolved During this Admission:    Diagnosis Date Noted Date Resolved POA    PRINCIPAL PROBLEM:  Syncope [R55] 08/28/2022 08/29/2022 Yes       Discharged Condition: stable    Disposition: Home or Self Care    Follow Up:   Follow-up Information     Ralf Ham MD Follow up in 4 week(s).    Specialty: Hospitalist  Why: As needed  Contact information:  [address]  [place] [address] [phone]                       Patient Instructions:      Diet renal     Notify your health care provider if you experience any of the following:  difficulty breathing or increased cough     Notify your health care provider if you experience any of the following:  persistent dizziness, light-headedness, or visual disturbances     Notify your health care provider if you experience any of the following:   Order Comments: Worsening or recurrent symptoms     Notify your health care provider if you experience any of the following:  increased confusion or weakness     Activity as tolerated       Significant Diagnostic Studies: Labs:   CMP   Recent Labs   Lab 08/27/22  2135 08/28/22  0309 08/29/22  0311    140 132*   K 3.2* 4.3 3.4*   CL 90* 94* 90*   CO2 34* 34* 31*   * 100 84   BUN 40* 45* 62*   CREATININE 7.8* 8.2* 9.8*   CALCIUM 9.6 8.8 8.3*   PROT 7.2  --   --    ALBUMIN 3.3*  --   --    BILITOT 0.5  --   --    ALKPHOS 119  --   --    AST 18  --   --    ALT 13  --   --    ANIONGAP 14 12 11    and  CBC   Recent Labs   Lab 08/27/22  2135 08/28/22  0309 08/29/22  0311   WBC 6.81 3.96 3.65*   HGB 11.5* 9.7* 9.4*   HCT 35.2* 29.9* 28.1*    168 150       Pending Diagnostic Studies:     Procedure Component Value Units Date/Time    Echo [[id number]] Resulted: 08/28/22 1137    Order Status: Sent Lab Status: In process Updated: 08/28/22 1137     BSA 1.67 m2      TDI SEPTAL 0.08 m/s      LV LATERAL E/E' RATIO 6.75 m/s      LV SEPTAL E/E' RATIO 10.13 m/s      TDI LATERAL 0.12 m/s      LVIDd 5.12 cm      IVS 0.81 cm      Posterior Wall 0.88 cm      LVIDs 3.31 cm      FS 35 %      LV mass 151.69 g      RVDD 2.63 cm      TAPSE 1.76 cm      Left Ventricle Relative Wall Thickness 0.34 cm      AV mean gradient 5 mmHg      AV valve area 3.07 cm2      AV index (prosthetic) 0.90     E/A ratio 0.85     Mean e' 0.10 m/s      E wave deceleration time 339.80 msec      LVOT diameter 2.09 cm      LVOT area 3.4 cm2      LVOT peak sergio 99.00 m/s      LVOT peak VTI 23.97 cm      Ao VTI 26.77 cm      LVOT stroke volume 82.19 cm3      E/E' ratio 8.10 m/s      MV Peak E Sergio 0.81 m/s      TR Max Sergio 2.98 m/s      MV Peak A Sergio 0.95 m/s      LV Systolic Volume 41.86 mL      LV Systolic Volume Index 25.2 mL/m2      LV Diastolic Volume 134.12 mL      LV Diastolic Volume Index 80.80 mL/m2      LV Mass Index 91 g/m2      Triscuspid Valve Regurgitation Peak Gradient 36 mmHg          Medications:  Reconciled Home Medications:      Medication List      CHANGE how you take these medications    * midodrine 10 MG tablet  Commonly known as: PROAMATINE  Take 10 mg by mouth every Mon, Wed, Fri. With dialysis  What changed: Another medication with the same name was added. Make sure you understand how and when to take each.     * midodrine 5 MG Tab  Commonly known as: PROAMATINE  Take 1 tablet (5 mg total) by mouth every Tuesday, Thursday, Saturday, Sunday.  Start taking on: August 30, 2022  What changed: You were already taking a medication with the  same name, and this prescription was added. Make sure you understand how and when to take each.         * This list has 2 medication(s) that are the same as other medications prescribed for you. Read the directions carefully, and ask your doctor or other care provider to review them with you.            CONTINUE taking these medications    ANORO ELLIPTA 62.5-25 mcg/actuation Dsdv  Generic drug: umeclidinium-vilanteroL  Inhale 1 puff into the lungs once daily. Controller     apixaban 5 mg Tab  Commonly known as: ELIQUIS  Take 1 tablet (5 mg total) by mouth 2 (two) times daily.     aspirin 81 MG EC tablet  Commonly known as: ECOTRIN  Take 1 tablet (81 mg total) by mouth once daily.     atorvastatin 40 MG tablet  Commonly known as: LIPITOR  Take 40 mg by mouth once daily.     carvediloL 12.5 MG tablet  Commonly known as: COREG  Take 1 tablet (12.5 mg total) by mouth 2 (two) times daily with meals.     ENTRESTO 24-26 mg per tablet  Generic drug: sacubitriL-valsartan  Take 1 tablet by mouth 2 (two) times daily.     sevelamer carbonate 800 mg Tab  Commonly known as: RENVELA  Take 2,400 mg by mouth 3 (three) times daily with meals.     VELTASSA 8.4 gram Pwpk  Generic drug: patiromer calcium sorbitex  Take 8.4 g by mouth 4 (four) times a week. Tues, thurs, sat, sun     VIOS AEROSOL DELIVERY SYSTEM Lizbeth  Generic drug: nebulizer and compressor  AS DIRECTED        STOP taking these medications    acetaminophen 325 MG tablet  Commonly known as: TYLENOL     LIDOcaine-prilocaine cream  Commonly known as: EMLA     ondansetron 4 MG Tbdl  Commonly known as: ZOFRAN-ODT            Indwelling Lines/Drains at time of discharge:   Lines/Drains/Airways     Drain  Duration                Hemodialysis AV Fistula Right upper arm -- days                Time spent on the discharge of patient: 35 minutes         Johnson Gamez MD  Department of Hospital Medicine  [facility]

## 2022-08-29 NOTE — PLAN OF CARE
Patient cleared to discharge by case management.   Patient discharging home self care.  No needs. Patient elected to scheduled her own pcp appointment with doctor Ham upon discharge.       08/29/22 1028   Final Note   Assessment Type Final Discharge Note   Anticipated Discharge Disposition Home   What phone number can be called within the next 1-3 days to see how you are doing after discharge? [phone]

## 2022-10-03 PROBLEM — I51.9 SYSTOLIC DYSFUNCTION: Status: RESOLVED | Noted: 2022-03-28 | Resolved: 2022-10-03

## 2022-11-08 ENCOUNTER — PATIENT MESSAGE (OUTPATIENT)
Dept: TRANSPLANT | Facility: CLINIC | Age: 67
End: 2022-11-08
Payer: MEDICARE

## 2022-11-08 ENCOUNTER — OFFICE VISIT (OUTPATIENT)
Dept: TRANSPLANT | Facility: CLINIC | Age: 67
End: 2022-11-08
Payer: MEDICARE

## 2022-11-08 ENCOUNTER — HOSPITAL ENCOUNTER (OUTPATIENT)
Dept: CARDIOLOGY | Facility: HOSPITAL | Age: 67
Discharge: HOME | End: 2022-11-08
Attending: INTERNAL MEDICINE
Payer: MEDICARE

## 2022-11-08 VITALS
DIASTOLIC BLOOD PRESSURE: 67 MMHG | WEIGHT: 132.69 LBS | BODY MASS INDEX: 22.65 KG/M2 | WEIGHT: 132 LBS | SYSTOLIC BLOOD PRESSURE: 138 MMHG | SYSTOLIC BLOOD PRESSURE: 125 MMHG | HEIGHT: 64 IN | BODY MASS INDEX: 22.53 KG/M2 | HEIGHT: 64 IN | HEART RATE: 64 BPM | HEART RATE: 68 BPM | DIASTOLIC BLOOD PRESSURE: 80 MMHG

## 2022-11-08 DIAGNOSIS — Z76.82 ORGAN TRANSPLANT CANDIDATE: ICD-10-CM

## 2022-11-08 DIAGNOSIS — I50.42 CHRONIC COMBINED SYSTOLIC AND DIASTOLIC HEART FAILURE: ICD-10-CM

## 2022-11-08 DIAGNOSIS — E78.00 PURE HYPERCHOLESTEROLEMIA: ICD-10-CM

## 2022-11-08 DIAGNOSIS — I51.9 SYSTOLIC DYSFUNCTION: Primary | ICD-10-CM

## 2022-11-08 DIAGNOSIS — J43.2 CENTRILOBULAR EMPHYSEMA: ICD-10-CM

## 2022-11-08 DIAGNOSIS — I42.8 NONISCHEMIC CARDIOMYOPATHY: ICD-10-CM

## 2022-11-08 DIAGNOSIS — I70.0 CALCIFICATION OF AORTA: ICD-10-CM

## 2022-11-08 DIAGNOSIS — I10 PRIMARY HYPERTENSION: ICD-10-CM

## 2022-11-08 DIAGNOSIS — N18.6 ESRD (END STAGE RENAL DISEASE): ICD-10-CM

## 2022-11-08 LAB
ASCENDING AORTA: 3.28 CM
AV INDEX (PROSTH): 0.77
AV MEAN GRADIENT: 2 MMHG
AV PEAK GRADIENT: 4 MMHG
AV VALVE AREA: 3.08 CM2
AV VELOCITY RATIO: 0.69
BSA FOR ECHO PROCEDURE: 1.64 M2
CV ECHO LV RWT: 0.38 CM
DOP CALC AO PEAK VEL: 1.03 M/S
DOP CALC AO VTI: 16.97 CM
DOP CALC LVOT AREA: 4 CM2
DOP CALC LVOT DIAMETER: 2.26 CM
DOP CALC LVOT PEAK VEL: 0.71 M/S
DOP CALC LVOT STROKE VOLUME: 52.2 CM3
DOP CALCLVOT PEAK VEL VTI: 13.02 CM
E WAVE DECELERATION TIME: 317.94 MSEC
E/A RATIO: 0.63
E/E' RATIO: 9.23 M/S
ECHO LV POSTERIOR WALL: 0.93 CM (ref 0.6–1.1)
EJECTION FRACTION: 55 %
FRACTIONAL SHORTENING: 26 % (ref 28–44)
INTERVENTRICULAR SEPTUM: 0.74 CM (ref 0.6–1.1)
LA MAJOR: 4.27 CM
LA MINOR: 4.39 CM
LA WIDTH: 3.53 CM
LEFT ATRIUM SIZE: 3.75 CM
LEFT ATRIUM VOLUME INDEX MOD: 22 ML/M2
LEFT ATRIUM VOLUME INDEX: 29.7 ML/M2
LEFT ATRIUM VOLUME MOD: 36.01 CM3
LEFT ATRIUM VOLUME: 48.71 CM3
LEFT INTERNAL DIMENSION IN SYSTOLE: 3.65 CM (ref 2.1–4)
LEFT VENTRICLE DIASTOLIC VOLUME INDEX: 70.87 ML/M2
LEFT VENTRICLE DIASTOLIC VOLUME: 116.23 ML
LEFT VENTRICLE MASS INDEX: 86 G/M2
LEFT VENTRICLE SYSTOLIC VOLUME INDEX: 34.3 ML/M2
LEFT VENTRICLE SYSTOLIC VOLUME: 56.3 ML
LEFT VENTRICULAR INTERNAL DIMENSION IN DIASTOLE: 4.96 CM (ref 3.5–6)
LEFT VENTRICULAR MASS: 141.55 G
LV LATERAL E/E' RATIO: 8.57 M/S
LV SEPTAL E/E' RATIO: 10 M/S
MV PEAK A VEL: 0.95 M/S
MV PEAK E VEL: 0.6 M/S
MV STENOSIS PRESSURE HALF TIME: 92.2 MS
MV VALVE AREA P 1/2 METHOD: 2.39 CM2
RA MAJOR: 4.62 CM
RA PRESSURE: 3 MMHG
RA WIDTH: 3.6 CM
RIGHT VENTRICULAR END-DIASTOLIC DIMENSION: 3.36 CM
RV TISSUE DOPPLER FREE WALL SYSTOLIC VELOCITY 1 (APICAL 4 CHAMBER VIEW): 6.2 CM/S
SINUS: 3.13 CM
STJ: 2.94 CM
TDI LATERAL: 0.07 M/S
TDI SEPTAL: 0.06 M/S
TDI: 0.07 M/S
TRICUSPID ANNULAR PLANE SYSTOLIC EXCURSION: 1.62 CM

## 2022-11-08 PROCEDURE — 99999 PR PBB SHADOW E&M-EST. PATIENT-LVL IV: CPT | Mod: PBBFAC,,, | Performed by: INTERNAL MEDICINE

## 2022-11-08 PROCEDURE — 99214 OFFICE O/P EST MOD 30 MIN: CPT | Mod: S$PBB,,, | Performed by: INTERNAL MEDICINE

## 2022-11-08 PROCEDURE — 93306 ECHO (CUPID ONLY): ICD-10-PCS | Mod: 26,,, | Performed by: INTERNAL MEDICINE

## 2022-11-08 PROCEDURE — 93306 TTE W/DOPPLER COMPLETE: CPT | Mod: 26,,, | Performed by: INTERNAL MEDICINE

## 2022-11-08 PROCEDURE — 99999 PR PBB SHADOW E&M-EST. PATIENT-LVL IV: ICD-10-PCS | Mod: PBBFAC,,, | Performed by: INTERNAL MEDICINE

## 2022-11-08 PROCEDURE — 93306 TTE W/DOPPLER COMPLETE: CPT

## 2022-11-08 PROCEDURE — 99214 PR OFFICE/OUTPT VISIT, EST, LEVL IV, 30-39 MIN: ICD-10-PCS | Mod: S$PBB,,, | Performed by: INTERNAL MEDICINE

## 2022-11-08 PROCEDURE — 99214 OFFICE O/P EST MOD 30 MIN: CPT | Mod: PBBFAC,25 | Performed by: INTERNAL MEDICINE

## 2022-11-08 NOTE — LETTER
November 8, 2022        Eliseo Barnes  [address] [facility] [address]  Phone: [phone]  Fax: [phone]             [id number]  [address]  [facility] [address]  Phone: [phone]   Patient: Latricia Higgins   MR Number: [id number]   YOB: 1955   Date of Visit: 11/8/2022       Dear Dr. Eliseo Barnes    Thank you for referring Latricia Higgins to me for evaluation. Attached you will find relevant portions of my assessment and plan of care.    If you have questions, please do not hesitate to call me. I look forward to following Latricia Higgins along with you.    Sincerely,    Jose Cruz Dixon MD    Enclosure    If you would like to receive this communication electronically, please contact [email or web address] or [phone] to request [employer] access.    [employer] is a tool which provides read-only access to select patient information with whom you have a relationship. Its easy to use and provides real time access to review your patients record including encounter summaries, notes, results, and demographic information.    If you feel you have received this communication in error or would no longer like to receive these types of communications, please e-mail [email or web address]

## 2022-11-08 NOTE — PATIENT INSTRUCTIONS
You have just the right amount of fluid on you.  Please adhere to a low sodium diet (no more than 1.5 grams of sodium in 24h).  3.   Follow fluid restriction of  1. no more than 2 liters in 24 hours..   4. No changes on medications today.  5. Follow up in 6 months with labs.

## 2022-11-08 NOTE — PROGRESS NOTES
Advanced Heart Failure and Transplantation Clinic Follow up.      Attending Physician: Jose Cruz Dixon MD.  The patient's last visit with me was on 8/16/2022.         HPI.  HPI.  Latricia Higgins is a 66 y.o. y.o. female     Problems  HTN  Systolic dysfunction, EF 18% (normal in 9/2020, 45% last July 2021, 18% February 2022)  HLD, LDL 42  History of DVT on OAC  Aortic atherosclerosis  ESRD on HD 10/2020, delisted for tx at this time given reduced EF  Emphysema     Patient delisted for kidney transplant after EF reduced 18% February 2022. PET stress 2021 without ischemia. She underwent LHC that found minimal CAD, out of proportion to heart dysfunction.     Last visit we increased her dose of carvedilol. She went some days after to ER with orthostatic hypotension. She had repeated echo then that found recovered EF.    Today, on same dose of carvedilol and entresto, she has no dizziness or orthostatic symptoms. No CV symptoms.        Review of Systems   Constitutional:  Negative for activity change, appetite change, chills, diaphoresis, fatigue, fever and unexpected weight change.   HENT:  Negative for nasal congestion, rhinorrhea and sore throat.    Eyes:  Negative for visual disturbance.   Respiratory:  Negative for apnea, chest tightness and shortness of breath.    Cardiovascular:  Negative for chest pain, palpitations and leg swelling.   Gastrointestinal:  Negative for abdominal pain, diarrhea, nausea and vomiting.   Genitourinary:  Negative for difficulty urinating, dysuria and hematuria.   Integumentary:  Negative for rash.   Neurological:  Negative for seizures, syncope and light-headedness.   Psychiatric/Behavioral:  Negative for agitation and hallucinations.       Past Medical History:   Diagnosis Date    Dialysis patient 10/05/2020    Emphysema of lung     ESRD (end stage renal disease)     Hepatitis 1980    HLD  (hyperlipidemia)     Hypertension     Renal disorder         Past Surgical History:   Procedure Laterality Date    AORTOGRAPHY WITH SERIALOGRAPHY N/A 9/30/2020    Procedure: co2 renal angio;  Surgeon: Lance Bustamante MD;  Location: [facility] CATH/EP LAB;  Service: Vascular;  Laterality: N/A;    APPENDECTOMY      AV FISTULA PLACEMENT Right 12/9/2020    Procedure: CREATION, AV FISTULA;  Surgeon: Lance Bustamante MD;  Location: [facility] OR;  Service: General;  Laterality: Right;    COLONOSCOPY N/A 6/8/2021    Procedure: COLONOSCOPY;  Surgeon: Avila Smith III, MD;  Location: [facility] ENDO;  Service: Endoscopy;  Laterality: N/A;    COLONOSCOPY N/A 12/14/2021    Procedure: COLONOSCOPY;  Surgeon: Avila Smith III, MD;  Location: [facility] ENDO;  Service: Endoscopy;  Laterality: N/A;    COLONOSCOPY W/ POLYPECTOMY  06/08/2021    FISTULOGRAM Bilateral 5/18/2022    Procedure: Fistulogram;  Surgeon: Lance Bustamante MD;  Location: [facility] CATH/EP LAB;  Service: General;  Laterality: Bilateral;    INSERTION OF TUNNELED CENTRAL VENOUS HEMODIALYSIS CATHETER  10/2/2020    Procedure: INSERTION, CATHETER, CENTRAL VENOUS, HEMODIALYSIS, TUNNELED;  Surgeon: Ali Khoobehi, MD;  Location: [facility] OR;  Service: Vascular;;    LEFT HEART CATHETERIZATION Left 5/17/2022    Procedure: Left heart cath;  Surgeon: Daniel Tyler MD;  Location: [facility] CATH LAB;  Service: Cardiology;  Laterality: Left;    OPEN THROMBECTOMY OF GRAFT  5/18/2022    Procedure: THROMBECTOMY, GRAFT, OPEN;  Surgeon: Lance Bustamante MD;  Location: [facility] CATH/EP LAB;  Service: General;;    PLACEMENT OF DIALYSIS ACCESS Right 9/30/2020    Procedure: Insertion, Dialysis Access;  Surgeon: Lance Bustamante MD;  Location: [facility] CATH/EP LAB;  Service: Vascular;  Laterality: Right;    REMOVAL OF TUNNELED CENTRAL VENOUS CATHETER (CVC) N/A 12/16/2020    Procedure: REMOVAL, CATHETER, CENTRAL VENOUS, TUNNELED;  Surgeon: Ali Khoobehi, MD;  Location: [facility] OR;  Service: Vascular;  Laterality:  "N/A;    TONSILLECTOMY      TUBAL LIGATION          Family History   Problem Relation Age of Onset    Cancer Mother     Heart disease Father         Review of patient's allergies indicates:  No Known Allergies     Current Outpatient Medications   Medication Instructions    apixaban (ELIQUIS) 5 mg, Oral, 2 times daily    aspirin (ECOTRIN) 81 mg, Oral, Daily    atorvastatin (LIPITOR) 40 mg, Oral, Daily    carvediloL (COREG) 12.5 mg, Oral, 2 times daily with meals    midodrine (PROAMATINE) 10 mg, Oral, Every Mon, Wed, Fri, With dialysis    midodrine (PROAMATINE) 5 mg, Oral, Every Tues, Thurs, Sat, Sun    sacubitriL-valsartan (ENTRESTO) 24-26 mg per tablet 1 tablet, Oral, 2 times daily    sevelamer carbonate (RENVELA) 2,400 mg, Oral, 3 times daily with meals    umeclidinium-vilanteroL (ANORO ELLIPTA) 62.5-25 mcg/actuation DsDv 1 puff, Inhalation, Daily, Controller    VELTASSA 8.4 g, Oral, Four times weekly, Tues, thurs, sat, sun    VIOS AEROSOL DELIVERY SYSTEM Lizbeth AS DIRECTED        Vitals:    11/08/22 1033   BP: 138/67   Pulse: 64        Wt Readings from Last 3 Encounters:   11/08/22 60.2 kg (132 lb 11.5 oz)   11/08/22 59.9 kg (132 lb)   08/29/22 60 kg (132 lb 4.4 oz)     Temp Readings from Last 3 Encounters:   08/29/22 98.8 °F (37.1 °C) (Oral)   05/18/22 98 °F (36.7 °C)   05/17/22 99 °F (37.2 °C) (Temporal)     BP Readings from Last 3 Encounters:   11/08/22 138/67   11/08/22 125/80   08/29/22 (!) 175/90     Pulse Readings from Last 3 Encounters:   11/08/22 64   11/08/22 68   08/29/22 78        Body mass index is 22.78 kg/m². Estimated body surface area is 1.65 meters squared as calculated from the following:    Height as of this encounter: 5' 4" (1.626 m).    Weight as of this encounter: 60.2 kg (132 lb 11.5 oz).     Physical Exam  Constitutional:       Appearance: She is well-developed.   HENT:      Head: Normocephalic and atraumatic.      Right Ear: External ear normal.      Left Ear: External ear normal.   Eyes:    "   Conjunctiva/sclera: Conjunctivae normal.      Pupils: Pupils are equal, round, and reactive to light.   Neck:      Vascular: No hepatojugular reflux or JVD.   Cardiovascular:      Rate and Rhythm: Normal rate and regular rhythm.      Pulses: Intact distal pulses.      Heart sounds: S1 normal and S2 normal. No murmur heard.    No friction rub. No gallop.   Pulmonary:      Effort: Pulmonary effort is normal.      Breath sounds: Normal breath sounds.   Abdominal:      General: Bowel sounds are normal. There is no distension.      Palpations: Abdomen is soft.      Tenderness: There is no abdominal tenderness. There is no guarding or rebound.   Musculoskeletal:      Cervical back: Normal range of motion and neck supple.      Right lower leg: No edema.      Left lower leg: No edema.   Neurological:      Mental Status: She is alert and oriented to person, place, and time.        Lab Results   Component Value Date     (H) 08/27/2022     (L) 08/29/2022    K 3.4 (L) 08/29/2022    MG 2.1 08/29/2022    CL 90 (L) 08/29/2022    CO2 31 (H) 08/29/2022    BUN 62 (H) 08/29/2022    CREATININE 9.8 (H) 08/29/2022    GLU 84 08/29/2022    HGBA1C 5.0 08/28/2022    AST 18 08/27/2022    ALT 13 08/27/2022    ALBUMIN 3.3 (L) 08/27/2022    PROT 7.2 08/27/2022    BILITOT 0.5 08/27/2022    WBC 4.70 11/08/2022    HGB 11.6 (L) 11/08/2022    HCT 35.3 (L) 11/08/2022    HCT 26 (L) 05/24/2021     11/08/2022    INR 1.4 08/27/2022    INR 0.9 03/23/2021    TSH 1.278 04/19/2022    CHOL 121 03/23/2021    HDL 62 03/23/2021    LDLCALC 41.8 (L) 03/23/2021    TRIG 86 03/23/2021       Results for orders placed during the hospital encounter of 08/27/22    Echo    Interpretation Summary  · The left ventricle is normal in size with normal systolic function.  · The estimated ejection fraction is 55%.  · Normal left ventricular diastolic function.  · Normal right ventricular size with normal right ventricular systolic function.        Results  for orders placed during the hospital encounter of 05/18/22    Cardiac catheterization    Narrative  Procedure performed in the Invasive Lab  - See Procedure Log link below for nursing documentation  - See OpNote on Surgeries Tab for physician findings  - See Imaging Tab for radiologist dictation         Assessment and Plan:  Systolic dysfunction  -     CBC Auto Differential; Future; Expected date: 05/08/2023  -     Comprehensive Metabolic Panel; Future; Expected date: 05/08/2023  -     BNP; Future; Expected date: 05/08/2023    Primary hypertension    Pure hypercholesterolemia    Chronic combined systolic and diastolic heart failure    Calcification of aorta    Centrilobular emphysema    ESRD (end stage renal disease)    Nonischemic cardiomyopathy           Chronic systolic HF with recovered EF 55%, NYHA class I, stage C.  Etiology: non ischemic cardiomyopathy. LHC with only 70% of small OM2.  Devices: none  Medications: sacubitril-valsartan and carvedilol.  Hemodynamic status: warm, hypertensive, euvolemic.   No changes on medications today.    2. Cardiac risk for non cardiac surgery.  No CV symptoms.  Excellent functional capacity.  Normal LVEF was noticed.  No contraindication for planned surgery from CV stand point. Low risk for intermediate risk surgery.

## 2022-11-11 ENCOUNTER — TELEPHONE (OUTPATIENT)
Dept: TRANSPLANT | Facility: CLINIC | Age: 67
End: 2022-11-11
Payer: MEDICARE

## 2022-11-11 NOTE — TELEPHONE ENCOUNTER
Contacted patient to review initial intake information. Patient reports the followin. Can you walk up a flight of stairs without getting short of breath or stopping? Yes   2. Can you walk one block without getting short of breath or having to stop? Yes   3. Do you use oxygen? No   4. Do you use a cane, walker, or wheel chair to assist in mobility? No   5. Have you been hospitalized or had recent surgery in the last 6 months? No    A. Stroke   B. Heart surgery or heart catheterization   C. Broken bone  6. Do you have any cuts, open sores (ulcers), or wounds anywhere on your body? No   7. Do you go to dialysis? Yes What days? M,W,F  8. Preferred appointment day?   9. Caregiver? Son (Laci) Daughter (Barbara) and son in law (Xander)    Patient is aware the next steps will include completing records and compliance verification. Patient is aware once provider review and insurance authorization is received we will contact patient to schedule initial visit. Patient is aware that initial visit will begin prior to / at 7 am and will conclude at approximately 3 pm on date of appointment. All questions answered at this time.

## 2022-11-11 NOTE — LETTER
November 11, 2022    Eliseo Barnes  [address] NEPHROLOGY [facility] [address]  Phone: [phone]  Fax: [phone]    Dear Dr. Eliseo Barnes:    Patient: Latricia Higgins  MR Number [id number]  Date of Birth 1955    Thank you for your referral of Latricia Higgins to our transplant program.      Your patient's information will be screened by our Referral Nurse Coordinators to determine initial medical candidacy and if any further records are required. We will contact you if further information is needed to process the referral.     Once all needed information is received it will be forwarded to our Transplant Financial Coordinators who will obtain insurance authorization. Upon insurance approval, your patient will be contacted by our  to schedule their appointments with the transplant team. When appointments are made you will receive a copy of the appointment letter that your patient will receive.     This process may take two to six weeks to complete.     In the event your patient is not a candidate a copy of our transplant programs opinion including reasons will be returned to you to comply with your yearly review regulations. A copy of that letter will also be sent to the patient.     The following information is needed to process a referral:  Medicare form 2728 for all patients on dialysis.  Dental clearance is required if your patient has primary Medicaid.  Current immunization record.  This information can be faxed to [phone].  Current Dietician evaluation can be faxed to [phone].    Thank you again for your trust in our program and the care of your patient.  If there is anything we can do for you or your staff, please feel free to contact us at [phone].    Sincerely,   [facility]  Kidney & Kidney/Pancreas Transplant Team  [facility] [place] [address]  [phone]

## 2022-11-17 ENCOUNTER — TELEPHONE (OUTPATIENT)
Dept: TRANSPLANT | Facility: CLINIC | Age: 67
End: 2022-11-17
Payer: MEDICARE

## 2022-11-30 ENCOUNTER — PATIENT MESSAGE (OUTPATIENT)
Dept: TRANSPLANT | Facility: CLINIC | Age: 67
End: 2022-11-30
Payer: MEDICARE

## 2022-11-30 DIAGNOSIS — Z76.82 ORGAN TRANSPLANT CANDIDATE: Primary | ICD-10-CM

## 2022-12-20 ENCOUNTER — TELEPHONE (OUTPATIENT)
Dept: TRANSPLANT | Facility: CLINIC | Age: 67
End: 2022-12-20
Payer: MEDICARE

## 2023-01-06 ENCOUNTER — TELEPHONE (OUTPATIENT)
Dept: TRANSPLANT | Facility: CLINIC | Age: 68
End: 2023-01-06
Payer: MEDICARE

## 2023-01-09 ENCOUNTER — TELEPHONE (OUTPATIENT)
Dept: TRANSPLANT | Facility: CLINIC | Age: 68
End: 2023-01-09
Payer: MEDICARE

## 2023-01-09 NOTE — TELEPHONE ENCOUNTER
MA notes per Leah (RN) with Becky     FOR THE PAST THREE MONTHS:    0-AMA's  0-No-shows    No concerns with care giving, transportation, or mental health    Treatment log brought over to clinic to be scanned in.    Lani More  Abdominal Transplant MA

## 2023-01-10 ENCOUNTER — TELEPHONE (OUTPATIENT)
Dept: TRANSPLANT | Facility: CLINIC | Age: 68
End: 2023-01-10
Payer: MEDICARE

## 2023-01-19 ENCOUNTER — HOSPITAL ENCOUNTER (OUTPATIENT)
Dept: RADIOLOGY | Facility: HOSPITAL | Age: 68
Discharge: HOME | End: 2023-01-19
Attending: NURSE PRACTITIONER
Payer: MEDICARE

## 2023-01-19 ENCOUNTER — DOCUMENTATION ONLY (OUTPATIENT)
Dept: TRANSPLANT | Facility: CLINIC | Age: 68
End: 2023-01-19
Payer: MEDICARE

## 2023-01-19 ENCOUNTER — OFFICE VISIT (OUTPATIENT)
Dept: TRANSPLANT | Facility: CLINIC | Age: 68
End: 2023-01-19
Payer: MEDICARE

## 2023-01-19 VITALS
HEIGHT: 65 IN | BODY MASS INDEX: 22.22 KG/M2 | HEART RATE: 74 BPM | WEIGHT: 133.38 LBS | OXYGEN SATURATION: 96 % | SYSTOLIC BLOOD PRESSURE: 124 MMHG | TEMPERATURE: 97 F | DIASTOLIC BLOOD PRESSURE: 57 MMHG | RESPIRATION RATE: 16 BRPM

## 2023-01-19 DIAGNOSIS — Z01.818 PRE-TRANSPLANT EVALUATION FOR KIDNEY TRANSPLANT: Primary | ICD-10-CM

## 2023-01-19 DIAGNOSIS — I10 PRIMARY HYPERTENSION: ICD-10-CM

## 2023-01-19 DIAGNOSIS — N18.6 ESRD (END STAGE RENAL DISEASE): ICD-10-CM

## 2023-01-19 DIAGNOSIS — I42.8 NONISCHEMIC CARDIOMYOPATHY: ICD-10-CM

## 2023-01-19 DIAGNOSIS — Z76.82 ORGAN TRANSPLANT CANDIDATE: ICD-10-CM

## 2023-01-19 DIAGNOSIS — I50.42 CHRONIC COMBINED SYSTOLIC AND DIASTOLIC HEART FAILURE: ICD-10-CM

## 2023-01-19 DIAGNOSIS — E78.00 PURE HYPERCHOLESTEROLEMIA: ICD-10-CM

## 2023-01-19 PROCEDURE — 93978 VASCULAR STUDY: CPT | Mod: 26,TXP,, | Performed by: RADIOLOGY

## 2023-01-19 PROCEDURE — 71046 X-RAY EXAM CHEST 2 VIEWS: CPT | Mod: 26,TXP,, | Performed by: RADIOLOGY

## 2023-01-19 PROCEDURE — 93978 VASCULAR STUDY: CPT | Mod: TC,TXP

## 2023-01-19 PROCEDURE — 71046 XR CHEST PA AND LATERAL: ICD-10-PCS | Mod: 26,TXP,, | Performed by: RADIOLOGY

## 2023-01-19 PROCEDURE — 93978 US DOPP ILIACS BILATERAL: ICD-10-PCS | Mod: 26,TXP,, | Performed by: RADIOLOGY

## 2023-01-19 PROCEDURE — 76770 US RETROPERITONEAL COMPLETE: ICD-10-PCS | Mod: 26,TXP,, | Performed by: RADIOLOGY

## 2023-01-19 PROCEDURE — 72170 X-RAY EXAM OF PELVIS: CPT | Mod: 26,TXP,, | Performed by: RADIOLOGY

## 2023-01-19 PROCEDURE — 99999 PR PBB SHADOW E&M-EST. PATIENT-LVL III: CPT | Mod: PBBFAC,TXP,, | Performed by: NURSE PRACTITIONER

## 2023-01-19 PROCEDURE — 99999 PR PBB SHADOW E&M-EST. PATIENT-LVL III: ICD-10-PCS | Mod: PBBFAC,TXP,, | Performed by: NURSE PRACTITIONER

## 2023-01-19 PROCEDURE — 71046 X-RAY EXAM CHEST 2 VIEWS: CPT | Mod: TC,TXP

## 2023-01-19 PROCEDURE — 72170 XR PELVIS ROUTINE AP: ICD-10-PCS | Mod: 26,TXP,, | Performed by: RADIOLOGY

## 2023-01-19 PROCEDURE — 76770 US EXAM ABDO BACK WALL COMP: CPT | Mod: TC,TXP

## 2023-01-19 PROCEDURE — 99205 OFFICE O/P NEW HI 60 MIN: CPT | Mod: S$PBB,TXP,, | Performed by: NURSE PRACTITIONER

## 2023-01-19 PROCEDURE — 76770 US EXAM ABDO BACK WALL COMP: CPT | Mod: 26,TXP,, | Performed by: RADIOLOGY

## 2023-01-19 PROCEDURE — 99213 OFFICE O/P EST LOW 20 MIN: CPT | Mod: PBBFAC,25,TXP | Performed by: NURSE PRACTITIONER

## 2023-01-19 PROCEDURE — 72170 X-RAY EXAM OF PELVIS: CPT | Mod: TC,TXP

## 2023-01-19 PROCEDURE — 99205 PR OFFICE/OUTPT VISIT, NEW, LEVL V, 60-74 MIN: ICD-10-PCS | Mod: S$PBB,TXP,, | Performed by: NURSE PRACTITIONER

## 2023-01-19 RX ORDER — ONDANSETRON 4 MG/1
4 TABLET, FILM COATED ORAL EVERY 8 HOURS PRN
COMMUNITY

## 2023-01-19 NOTE — PROGRESS NOTES
Transplant Nephrology  Kidney Transplant Recipient Evaluation    Referring Physician: Eliseo Barnes  Current Nephrologist: Eliseo Barnes    Subjective:   CC:  Initial evaluation of kidney transplant candidacy.    HPI:  Ms. Higgins is a 67 y.o. year old White female who has presented to be evaluated as a potential kidney transplant recipient.  She has ESRD secondary to HTN.  Patient is currently on hemodialysis started on 10/1/2020. Patient is dialyzing on MWF schedule.  Patient reports that she is not tolerating dialysis well.. She has a RUE AV fistula for dialysis access.     Body mass index is 22.49 kg/m².    Here today for re-evaluation for kidney transplant. She was declined due to Systolic Heart failure with a depressed EF of 18%. Since then she has been working with heart failure clinic. She is on entresto and coreg with improved EF of 55%. Nonischemic PET in 2021 and Left heart cath with minimal CAD, out of proportion to heart dysfunction. She was given clearance in Nov for surgery from heart failure team. She is on midodrine 10mg before and after dialysis. She is taking Midodrine 5mg on non dialysis days. She has had 1 fall in 8/2022 due to hypotension. She was diagnosed wt HTN in 2005. Smoked 1/4 PPD x 40 years, quit 9/27/20. Reports hepatitis in 1970s. She does have COPD and seen pulmonary in the past. DVT in the past.     Denies DM, liver disease, stroke, chronic wounds/infections/amputations.    Functional Status:  Reports gets around well. He does all of her every day routine. She even tends to her garden and yard without issues. Is not frail.     Previous Transplant: no  Previous Blood Transfusion: no  Previous neurogenic bladder/ urine incontinence does not urinated  Anticoagulation/ antiplatelet therapy and reason: ASA 81   Potential Donor: yes  High KDPI candidate: yes  Meets center eligibility for accepting HCV+ donor offer: yes      Current Outpatient Medications:     apixaban (ELIQUIS) 5 mg Tab,  Take 1 tablet (5 mg total) by mouth 2 (two) times daily., Disp: 60 tablet, Rfl: 6    aspirin (ECOTRIN) 81 MG EC tablet, Take 1 tablet (81 mg total) by mouth once daily., Disp: 90 tablet, Rfl: 3    atorvastatin (LIPITOR) 40 MG tablet, Take 40 mg by mouth once daily., Disp: , Rfl:     carvediloL (COREG) 12.5 MG tablet, TAKE 1 TABLET BY MOUTH TWICE DAILY WITH MEALS, Disp: 60 tablet, Rfl: 1    midodrine (PROAMATINE) 10 MG tablet, Take 10 mg by mouth every Mon, Wed, Fri. With dialysis, Disp: , Rfl:     midodrine (PROAMATINE) 5 MG Tab, Take 1 tablet (5 mg total) by mouth every Tuesday, Thursday, Saturday, Sunday., Disp: 48 tablet, Rfl: 3    ondansetron (ZOFRAN) 4 MG tablet, Take 4 mg by mouth every 8 (eight) hours as needed for Nausea., Disp: , Rfl:     sacubitriL-valsartan (ENTRESTO) 24-26 mg per tablet, Take 1 tablet by mouth 2 (two) times daily., Disp: 60 tablet, Rfl: 11    sevelamer carbonate (RENVELA) 800 mg Tab, Take 2,400 mg by mouth 3 (three) times daily with meals. , Disp: , Rfl:     umeclidinium-vilanteroL (ANORO ELLIPTA) 62.5-25 mcg/actuation DsDv, Inhale 1 puff into the lungs once daily. Controller, Disp: 1 each, Rfl: 5    VELTASSA 8.4 gram PwPk, Take 8.4 g by mouth 4 (four) times a week. Tues, thurs, sat, sun, Disp: , Rfl:     VIOS AEROSOL DELIVERY SYSTEM Lizbeth, AS DIRECTED, Disp: , Rfl:       Past Medical and Surgical History: Ms. Higgins  has a past medical history of Dialysis patient, Emphysema of lung, ESRD (end stage renal disease), Hepatitis, HLD (hyperlipidemia), Hypertension, and Renal disorder.  She has a past surgical history that includes Aortography with serialography (N/A, 9/30/2020); Placement of dialysis access (Right, 9/30/2020); Insertion of tunneled central venous hemodialysis catheter (10/2/2020); Tubal ligation; Appendectomy; AV fistula placement (Right, 12/9/2020); Tonsillectomy; Removal of tunneled central venous catheter (CVC) (N/A, 12/16/2020); Colonoscopy w/ polypectomy (06/08/2021);  "Colonoscopy (N/A, 6/8/2021); Colonoscopy (N/A, 12/14/2021); Left heart catheterization (Left, 5/17/2022); Fistulogram (Bilateral, 5/18/2022); and Open thrombectomy of graft (5/18/2022).    Past Social and Family History: Ms. Higgins reports that she quit smoking about 2 years ago. Her smoking use included cigarettes. She has a 5.00 pack-year smoking history. She has never used smokeless tobacco. She reports that she does not currently use alcohol. She reports that she does not currently use drugs. Her family history includes Cancer in her mother; Heart disease in her father.    Review of Systems   Constitutional:  Positive for fatigue. Negative for appetite change, chills and fever.   HENT:  Negative for trouble swallowing.    Respiratory:  Positive for shortness of breath. Negative for cough, chest tightness and wheezing.    Cardiovascular:  Negative for chest pain, palpitations and leg swelling.   Gastrointestinal:  Negative for abdominal pain, constipation, diarrhea and nausea.   Genitourinary:  Positive for decreased urine volume.   Musculoskeletal:  Negative for arthralgias and myalgias.   Skin:  Negative for rash.   Neurological:  Negative for dizziness, weakness, light-headedness and headaches.   Psychiatric/Behavioral:  Negative for sleep disturbance.      Objective:   Blood pressure (!) 124/57, pulse 74, temperature 97.2 °F (36.2 °C), temperature source Tympanic, resp. rate 16, height 5' 4.57" (1.64 m), weight 60.5 kg (133 lb 6.1 oz), SpO2 96 %.body mass index is 22.49 kg/m².    Physical Exam  Constitutional:       General: She is not in acute distress.     Appearance: She is well-developed. She is not diaphoretic.   Cardiovascular:      Rate and Rhythm: Normal rate and regular rhythm.      Heart sounds: Normal heart sounds. No murmur heard.    No friction rub. No gallop.   Pulmonary:      Effort: Pulmonary effort is normal. No respiratory distress.      Breath sounds: Normal breath sounds. No wheezing or " rales.   Abdominal:      General: Bowel sounds are normal. There is no distension.      Palpations: Abdomen is soft.      Tenderness: There is no abdominal tenderness.   Musculoskeletal:         General: No tenderness. Normal range of motion.   Skin:     General: Skin is warm and dry.      Findings: No rash.      Nails: There is no clubbing.          Neurological:      Mental Status: She is alert and oriented to person, place, and time.   Psychiatric:         Behavior: Behavior normal.       Labs:  Lab Results   Component Value Date    WBC 4.81 01/19/2023    HGB 12.3 01/19/2023    HCT 38.6 01/19/2023     01/19/2023    K 3.7 01/19/2023    CL 91 (L) 01/19/2023    CO2 32 (H) 01/19/2023    BUN 33 (H) 01/19/2023    CREATININE 7.0 (H) 01/19/2023    EGFRNONAA 4.4 (A) 05/18/2022    CALCIUM 9.9 01/19/2023    PHOS 3.7 01/19/2023    MG 2.1 08/29/2022    ALBUMIN 3.7 01/19/2023    AST 20 01/19/2023    ALT 10 01/19/2023    .2 (H) 01/19/2023       Lab Results   Component Value Date    BILIRUBINUA Negative 09/29/2020    AMYLASE 115 (H) 09/27/2020    LIPASE 33 11/08/2021    PROTEINUA 3+ (A) 09/29/2020    NITRITE Negative 09/29/2020    RBCUA 26 (H) 09/29/2020    WBCUA >100 (H) 09/29/2020       No results found for: HLAABCTYPE    Labs were reviewed with the patient.    Assessment:     1. Pre-transplant evaluation for kidney transplant    2. ESRD (end stage renal disease)    3. Primary hypertension    4. Nonischemic cardiomyopathy    5. Chronic combined systolic and diastolic heart failure    6. Pure hypercholesterolemia        Plan:   Ms. Higgins is a 67 y.o. year old White female who has presented to be evaluated as a potential kidney transplant recipient.  She has ESRD secondary to HTN.  Patient is currently on hemodialysis started on 10/1/2020. Hx of Systolic heart failure, HTN, COPD, HLD    Prior to Listing, will need the following items to be completed:  1. Standard serologies, cardiac and imaging studies   2. Obtain  BP log from dialysis center-- patient on midodrine  3. Overdue for repeat noncon Ct chest  4. Needs to f/u with Pulmonary for COPD. Last seen 1 year ago    Transplant Candidacy:   Based on available information, Ms. iHggins is a high-risk kidney transplant candidate.   Meets center eligibility for accepting HCV+ donor offer - yes.  Patient educated on HCV+ donors. Latricia is willing to accept HCV+ donor offer - yes   Patient is a candidate for KDPI > 85 kidney donor offer - yes.  Final determination of transplant candidacy will be made once workup is complete and reviewed by the selection committee.    Patient advised that it is recommended that all transplant candidates, and their close contacts and household members receive Covid vaccination.    Dinora Graves NP       Counseling:  Exercise: reminded patient of the importance of regular exercise for weight management, blood sugar and blood pressure management.  I also explained exercise has been shown to improve cardiovascular health, energy level, and sleep hygiene.  Lastly, I advised her that cardiovascular complications are leading cause of death for renal transplant recipients, and regular exercise can help lower this risk.    We discussed various aspects of kidney transplantation including transplant surgery, immunosuppressive medications and the need for close follow up. We also discussed side effects of immunosuppression including weight gain, hypertension, hyperlipidemia, new-onset diabetes after transplantation, infections and malignancies, especially skin cancers and lymphomas. I also reviewed the risk of acute rejection, vascular thrombosis, recurrent disease and potential transmission of infections such as hepatitis and HIV. I informed the patient that the average time on the wait list in the [facility] is between 5 to 7 years.       UNOS Patient Status  Functional Status: 60% - Requires occasional assistance but is able to care for  needs  Physical Capacity: No Limitations

## 2023-01-19 NOTE — PROGRESS NOTES
INITIAL PATIENT EDUCATION NOTE    Ms. Latricia Higgins was seen in pre-kidney transplant clinic for evaluation for kidney, kidney/pancreas or pancreas only transplant.  The patient attended a an individual video education session that discussed/reviewed the following aspects of transplantation: evaluation including diagnostic and laboratory testing,( Chemistries, Hematology, Serologies including HIV and Hepatitis and HLA) required for transplantation and selection committee process, UNOS waitlist management/multiple listings, types of organs offered (KDPI < 85%, KDPI > 85%, PHS risk, DCD, HCV+, HIV+ for HIV+ recipients and enbloc/dual), financial aspects, surgical procedures, dietary instruction pre- and post-transplant, health maintenance pre- and post-transplant, post-transplant hospitalization and outpatient follow-up, potential to participate in a research protocol, and medication management and side effects.  A question and answer session was provided after the presentation.    The patient was seen by all members of the multi-disciplinary team to include: Nephrologist/ZUHAIR, Surgeon, , Transplant Coordinator, , Pharmacist and Dietician (if applicable).    The patient reviewed and signed all consents for evaluation which were witnessed and sent to scanning into the [facility] chart.    The patient was given an education book and plan for further evaluation based on her individual assessment.      Reviewed program requirement for complete COVID vaccination with documentation prior to listing.  COVID education information reviewed with patient. Patient encouraged to be up to date on all vaccinations.       The patient was informed that the transplant team would manage immediate post op pain. If the patient requires long term pain management, they will need to have that pain management addressed by their PCP or previous provider who wrote for long term pain medicines.    The patient was  encouraged to call with any questions or concerns.   [No Acute Distress] : no acute distress [Normal Oropharynx] : normal oropharynx [Normal Appearance] : normal appearance [No Neck Mass] : no neck mass [Normal Rate/Rhythm] : normal rate/rhythm [Normal S1, S2] : normal s1, s2 [No Murmurs] : no murmurs [No Resp Distress] : no resp distress [Clear to Auscultation Bilaterally] : clear to auscultation bilaterally [No Abnormalities] : no abnormalities [Benign] : benign [Normal Gait] : normal gait [No Clubbing] : no clubbing [No Cyanosis] : no cyanosis [No Edema] : no edema [FROM] : FROM [Normal Color/ Pigmentation] : normal color/ pigmentation [No Focal Deficits] : no focal deficits [Oriented x3] : oriented x3 [Normal Affect] : normal affect

## 2023-01-19 NOTE — PROGRESS NOTES
PHARM.D. PRE-TRANSPLANT NOTE:    This patient's medication therapy was evaluated as part of her pre-transplant evaluation.      The following general pharmacologic concerns were noted: Patient currently on apixaban (DVT).    The following concerns for post-operative pain management were noted: None    The following pharmacologic concerns related to HCV therapy were noted: None      This patient's medication profile was reviewed for considerations for DAA Hepatitis C therapy:    [X]  No current inducers of CYP 3A4 or PGP  [X]  No amiodarone on this patient's EMR profile in the last 24 months  [X]  No past or current atrial fibrillation on this patient's EMR profile       Current Outpatient Medications   Medication Sig Dispense Refill    apixaban (ELIQUIS) 5 mg Tab Take 1 tablet (5 mg total) by mouth 2 (two) times daily. 60 tablet 6    aspirin (ECOTRIN) 81 MG EC tablet Take 1 tablet (81 mg total) by mouth once daily. 90 tablet 3    atorvastatin (LIPITOR) 40 MG tablet Take 40 mg by mouth once daily.      carvediloL (COREG) 12.5 MG tablet TAKE 1 TABLET BY MOUTH TWICE DAILY WITH MEALS 60 tablet 1    midodrine (PROAMATINE) 10 MG tablet Take 10 mg by mouth every Mon, Wed, Fri. With dialysis      midodrine (PROAMATINE) 5 MG Tab Take 1 tablet (5 mg total) by mouth every Tuesday, Thursday, Saturday, Sunday. 48 tablet 3    ondansetron (ZOFRAN) 4 MG tablet Take 4 mg by mouth every 8 (eight) hours as needed for Nausea.      sacubitriL-valsartan (ENTRESTO) 24-26 mg per tablet Take 1 tablet by mouth 2 (two) times daily. 60 tablet 11    sevelamer carbonate (RENVELA) 800 mg Tab Take 2,400 mg by mouth 3 (three) times daily with meals.       umeclidinium-vilanteroL (ANORO ELLIPTA) 62.5-25 mcg/actuation DsDv Inhale 1 puff into the lungs once daily. Controller 1 each 5    VELTASSA 8.4 gram PwPk Take 8.4 g by mouth 4 (four) times a week. Tues, thurs, sat, sun      [facility] AER[facility] DELIVERY SYSTEM Lizbeth AS DIRECTED       No current  facility-administered medications for this visit.           I am available for consultation and can be contacted, as needed by the other members of the Transplant team.

## 2023-01-19 NOTE — PROGRESS NOTES
Transplant Surgery  Kidney Transplant Recipient Evaluation    Referring Physician: Eliseo Barnes  Current Nephrologist: Eliseo Barnes    Subjective:     Reason for Visit: evaluate transplant candidacy    History of Present Illness: Latricia Higgins is a 67 y.o. year old female undergoing transplant evaluation.    Dialysis History: Latricia is on hemodialysis.      Transplant History: N/A    Etiology of Renal Disease: Malignant Hypertension (based on medical records from referral).    External provider notes reviewed: Yes    Review of Systems   Constitutional:  Negative for activity change and chills.   HENT:  Negative for congestion and sore throat.    Eyes:  Negative for discharge and redness.   Respiratory:  Negative for cough and shortness of breath.    Cardiovascular:  Negative for chest pain and palpitations.   Gastrointestinal:  Negative for nausea and vomiting.   Endocrine: Negative for polydipsia and polyuria.   Genitourinary:  Negative for dysuria and frequency.   Musculoskeletal:  Negative for arthralgias and gait problem.   Skin:  Negative for pallor and rash.   Neurological:  Negative for dizziness and light-headedness.   Hematological:  Negative for adenopathy. Does not bruise/bleed easily.   Psychiatric/Behavioral:  Negative for agitation and suicidal ideas.    Objective:     Physical Exam:  Constitutional:   Vitals reviewed: yes   Well-nourished and well-groomed: yes  Eyes:   Sclerae icteric: no   Extraocular movements intact: yes  GI:    Bowel sounds normal: yes   Tenderness: no    If yes, quadrant/location: not applicable   Palpable masses: no    If yes, quadrant/location: not applicable   Hepatosplenomegaly: no   Ascites: no   Hernia: no    If yes, type/location: not applicable   Surgical scars: yes    If yes, type/location: appendectomy  Resp:   Effort normal: yes   Breath sounds normal: yes    CV:   Regular rate and rhythm: yes   Heart sounds normal: yes   Femoral pulses normal: yes   Extremities  edematous: no  Skin:   Rashes or lesions present: no    If yes, describe:not applicable   Jaundice:: no    Musculoskeletal:   Gait normal: yes   Strength normal: yes  Psych:   Oriented to person, place, and time: yes   Affect and mood normal: yes    Additional comments: not applicable    Diagnostics:  The following labs have been reviewed: CBC  BMP  The following radiology images have been independently reviewed and interpreted:     Counseling: We provided Latricia Higgins with a group education session today.  We discussed kidney transplantation at length with her, including risks, potential complications, and alternatives in the management of her renal failure.  The discussion included complications related to anesthesia, bleeding, infection, primary nonfunction, and ATN.  I discussed the typical postoperative course, length of hospitalization, the need for long-term immunosuppression, and the need for long-term routine follow-up.  I discussed living-donor and -donor transplantation and the relative advantages and disadvantages of each.  I also discussed average waiting times for both living donation and  donation.  I discussed national and center-specific survival rates.  I also mentioned the potential benefit of multicenter listing to candidates listed with centers within more than one organ procurement organization.  All questions were answered.    Patient advised that it is recommended that all transplant candidates, and their close contacts and household members receive Covid vaccination.    Final determination of transplant candidacy will be made once evaluation is complete and reviewed by the Kidney & Kidney/Pancreas Selection Committee.    Coronavirus disease (COVID-19) caused by severe acute respiratory virus coronavirus 2 (SARS-C0V 2) is associated with increased mortality in solid organ transplant recipients (SOT) compared to non-transplant patients. Vaccine responses to vaccination are  depressed against SARS-CoV2 compared to normal individuals but improve with third vaccination doses. Vaccination prior to SOT provides both the best opportunity for transplant candidates to develop protective immunity and to reduce the risk of serious COVID19 infections post transplantation. Organ transplant candidates at [facility] will be required to receive SARS-CoV-2 vaccination prior to being listed with a an active status, whenever possible. Exceptions will be made for disability related reasons or for sincerely held Advent beliefs.          Transplant Surgery - Candidacy   Assessment/Plan:   Latricia Higgins has end stage renal disease (ESRD) on dialysis. I see no surgical contraindication to placing a kidney transplant. Based on available information, Latricia Higgins is a suitable kidney transplant candidate.     Additional testing to be completed according to the Written Order Guidelines for Adult Pre-kidney and Pancreas Transplant Evaluation (KI-02).  Interpretation of tests and discussion of patient management involves all members of the multidisciplinary transplant team.    Jai White MD

## 2023-01-19 NOTE — LETTER
January 23, 2023        Eliseo Barnes  [address] [facility] NEPHROLOGY [facility] [place] [address]  Phone: [phone]  Fax: [phone]             [facility]- Transplant 1st Fl  [address]  [facility] [address]  Phone: [phone]   Patient: Latricia Higgins   MR Number: [id number]   YOB: 1955   Date of Visit: 1/19/2023       Dear Dr. Eliseo Barnes    Thank you for referring Latricia Higgins to me for evaluation. Attached you will find relevant portions of my assessment and plan of care.    If you have questions, please do not hesitate to call me. I look forward to following Latricia Higgins along with you.    Sincerely,    Dinora Graves, NP    Enclosure    If you would like to receive this communication electronically, please contact [email or web address] or [phone] to request [employer] Link access.    [employer] Link is a tool which provides read-only access to select patient information with whom you have a relationship. Its easy to use and provides real time access to review your patients record including encounter summaries, notes, results, and demographic information.    If you feel you have received this communication in error or would no longer like to receive these types of communications, please e-mail [email or web address]

## 2023-01-20 NOTE — PROGRESS NOTES
"Transplant Recipient Adult Psychosocial Assessment    SW completed assessment with patient and pt's son Jordan Gallegos.    Latricia Higgins  [address]  [place] [address]  Telephone Information:   Mobile [phone]   Home  [phone] (home)  Work  There is no work phone number on file.  E-mail  [email or web address]    Sex: female  YOB: 1955  Age: 67 y.o.    Encounter Date: 2023  [place] Citizen: yes  Primary Language: English   Needed: no    Emergency Contact:  Name: Jordan Gallegos  Relationship: son  Address:  [address]; [place] [address] (around the corner from pt)  Phone Numbers: [phone] (mobile)    Family/Social Support:   Number of dependents/: Pt denies having any dependents.  Marital history: Patient reports 2 previous marriages with last marriage ending in divorce > 21 years ago.  Other family dynamics: Patient's parents and only sister are .  Pt reports having one brother who lives in [place] and reports "not really close" with brother. Pt presents with son.    Household Composition:  Patient lives alone.    Do you and your caregivers have access to reliable transportation? yes  PRIMARY CAREGIVER: Barbara Cabrera, pt's daughter, will be primary caregiver, phone number [phone].   Barbara (nurse) and her  Micky  [phone] (in ) live in [place].      provided in-depth information to patient and caregiver regarding pre- and post-transplant caregiver role.   strongly encourages patient and caregiver to have concrete plan regarding post-transplant care giving, including back-up caregiver(s) to ensure care giving needs are met as needed.    Patient and Caregiver states understanding all aspects of caregiver role/commitment and is able/willing/committed to being caregiver to the fullest extent necessary.    Patient and Caregiver verbalizes understanding of the education provided today and caregiver " responsibilities.         remains available. Patient and Caregiver agree to contact  in a timely manner if concerns arise.      Able to take time off work without financial concerns: yes.     Additional Significant Others who will Assist with Transplant:  Name: Micky Cabrera ([phone])  Age: unknown  City: [place] State: [place]  Relationship:  son-in-law  Does person drive? yes    Name: Jordan Gallegos ([phone])  Age: 48  City: [place] State: [place]  Relationship: son  Does person drive? yes     Pt's friends of 30 years So Tavarez [phone] and Rubens Garcia [phone] will also be available as back up caregiver if needed.     Living Will: no  Healthcare Power of : no Pt reports trusting adult children with medical decisions.    Advance Directives on file: <<no information> per medical record.  Verbally reviewed LW/HCPA information.   provided patient with copy of LW/HCPA documents and provided education on completion of forms.    Living Donors: Education and resource information given to patient. Pt's daughter Barbara and son-in-law Micky interested in living donor evaluation.      Highest Education Level: High School (9-12) or GED  Reading Ability: 12th grade  Reports difficulty with: seeing and wears prescription eyeglasses  Learns Best By:  Hands-on learning     Status: no  [place] Benefits: no     Working for Income: No  If no, reason not working: Disability  Patient is disabled.  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  Patient reports working > 45 years prior to quitting in September 2020 and going on disability.    Spouse/Significant Other Employment: N/A    Disabled: yes: date disability began: October 2020, due to: ESRD.    Monthly Income:  SS Reitrement: $1,600  Other: $1,800 (LTD policy)  - Patient reports starting to receive LTD income on 2/1/21 and will end in Feb 2023  Able to afford all  costs now and if transplanted, including medications: yes  Patient and Caregiver verbalizes understanding of personal responsibilities related to transplant costs and the importance of having a financial plan to ensure that patients transplant costs are fully covered.      provided fundraising information/education.  Patient and Caregiver verbalizes understanding.   remains available.    Insurance:   Payer/Plan Subscr  Sex Relation Sub. Ins. ID Effective Group Num   1. MEDICARE - MEEDUAR SUTTON 1955 Female Self [id number] 20                                    [address]   2. [employer]* DOUGLAS,EDUAR 1955 Female Self [id number]                                    [address]         Primary Insurance (for UNOS reporting): Public Insurance - Medicare FFS (Fee For Service) - SW informed pt of AK[employer] premium payment assistance discontinuing after transplant.  Secondary Insurance (for UNOS reporting): Private Insurance -  [employer] [place]. [employer] assisting with premium. Pt verbalized benefit will cease 1yr post transplant.       Patient verbalized understanding of same.    Patient and Caregiver verbalizes clear understanding that patient may experience difficulty obtaining and/or be denied insurance coverage post-surgery. This includes and is not limited to disability insurance, life insurance, health insurance, burial insurance, long term care insurance, and other insurances.    Patient and Caregiver also reports understanding that future health concerns related to or unrelated to transplantation may not be covered by patient's insurance.  Resources and information provided and reviewed.      Patient and Caregiver provides verbal permission to release any necessary information to outside resources for patient care and discharge planning.  Resources and information provided are reviewed.      Dialysis Adherence:  Patient reports currently receiving  hemodialysis in-center and reports maintaining good adherence to HD treatment regimen.    Infusion Service: patient utilizing? no  Home Health: patient utilizing? no  DME: yes [facility]  Pulmonary/Cardiac Rehab: no   ADLS:  Pt reports being independent with all ADLs and mobility and driving herself.    Adherence:  Patient reports pt has exhibited good adherence to medication regimen, appointment schedule, and medical recommendations in the past.  Adherence education and counseling provided.     Per History Section:  Past Medical History:   Diagnosis Date    Dialysis patient 10/05/2020    Emphysema of lung     ESRD (end stage renal disease)     Hepatitis 1980    HLD (hyperlipidemia)     Hypertension     Renal disorder      Social History     Tobacco Use    Smoking status: Former     Packs/day: 0.25     Years: 20.00     Pack years: 5.00     Types: Cigarettes     Quit date: 2020     Years since quittin.3    Smokeless tobacco: Never   Substance Use Topics    Alcohol use: Not Currently     Social History     Substance and Sexual Activity   Drug Use Not Currently     Social History     Substance and Sexual Activity   Sexual Activity Not Currently       Per Today's Psychosocial:  Tobacco:  Patient reports quitting continuous cigarette smoking in 2020 and reports smoking ~ 2 packs per week prior to quitting.  Pt  reports last cigarette smoked was on 2020 .  Alcohol: none, patient denies any use.  Illicit Drugs/Non-prescribed Medications: none, patient denies any use.    Patient and Caregiver states clear understanding of the potential impact of substance use as it relates to transplant candidacy and is aware of possible random substance screening.  Substance abstinence/cessation counseling, education and resources provided and reviewed.     Arrests/DWI/Treatment/Rehab: patient denies    Psychiatric History:    Mental Health: Pt denies any past and/or present mental health symptoms and/or diagnoses. Pt  "reports "I am ready for this" .  Psychiatrist/Counselor: Pt denies ever receiving care from psychiatrist/counselor.  Medications:  Pt denies ever being prescribed medications for mental health care.  Suicide/Homicide Issues: Pt denies any past and/or present SI/HI.   Safety at home: Pt denies having any past or present concerns regarding safety at home including but not limited to physical/emotional/sexual abuse, neglect, or exploitation.    Knowledge: Patient and Caregiver states having clear understanding and realistic expectations regarding the potential risks and potential benefits of organ transplantation and organ donation, agrees to discuss with health care team members and support system members and to utilize available resources and express questions and/or concerns in order to further facilitate the pt informed decision-making.  Resources and information provided and reviewed.     Patient and Caregiver is aware of [facility] affiliation and/or partnership with agencies in home health care, LTAC, SNF, [facility], and other hospitals and clinics.    Understanding: Patient and Caregiver reports having a clear understanding of the many lifetime commitments involved with being a transplant recipient, including costs, compliance, medications, lab work, procedures, appointments, concrete and financial planning, preparedness, timely and appropriate communication of concerns, abstinence (ETOH, tobacco, illicit non-prescribed drugs), adherence to all health care team recommendations, support system and caregiver involvement, appropriate and timely resource utilization and follow-through, mental health counseling as needed/recommended, and patient and caregiver responsibilities.  Social Service Handbook, resources and detailed educational information provided and reviewed.  Educational information provided.    Patient and Caregiver also reports current and expected compliance with health care regime and states having a clear " "understanding of the importance of compliance.      Patient and Caregiver reports a clear understanding that risks and benefits may be involved with organ transplantation and with organ donation.      Patient and Caregiver also reports clear understanding that psychosocial risk factors may affect patient, and include but are not limited to feelings of depression, generalized anxiety, anxiety regarding dependence on others, post traumatic stress disorder, feelings of guilt and other emotional and/or mental concerns, and/or exacerbation of existing mental health concerns.  Detailed resources provided and discussed.     Patient and Caregiver agrees to access appropriate resources in a timely manner as needed and/or as recommended, and to communicate concerns appropriately.  Patient and Caregiver also reports a clear understanding of treatment options available.      reviewed education, provided additional information, and answered questions.    Feelings or Concerns: Pt expresses motivation to continue pursuing transplant and denies any transplant related concerns at this time.    Coping: Identify Patient & Caregiver Strategies to Bryce:   1. Currently & Pre-transplant - spending time with family (including grandchildren)   2. At the time of surgery - family support   3. During post-Transplant & Recovery Period - family support    Goals: Patient reports goals of transplant are to "get rid of dialysis" and go back to work.  Patient referred to Vocational Rehabilitation.    Interview Behavior: Patient and Caregiver presents as alert and oriented x 4, pleasant, good eye contact, well groomed, recall good, concentration/judgement good, average intelligence, calm, communicative, cooperative and asking and answering questions appropriately.          Transplant Social Work - Candidacy  Assessment/Plan:     Psychosocial Suitability: Patient presents as  low risk  candidate for kidney transplant at this time. Based " on psychosocial risk factors, patient presents as  low risk  due to established caregiver plan, supportive family system, no reported history of substance abuse and/or mental health concerns, adequate insurance coverage and personal finances to cover transplant cost.    Recommendations/Additional Comments: SW recommends that pt conduct fundraising to assist pt with pay for cost of medications, food, gas, and other transplant related needs.  SW recommends that pt remain aware of potential mental health concerns and contact the team if any concerns arise.  SW recommends that pt remain abstinent from tobacco, ETOH, and drug use.  SW supports pt's continued adherence. SW remains available to answer any questions or concerns that arise as the pt moves through the transplant process.     Ruddy Mccord, ABDELRAHMAN, LMSW

## 2023-01-25 ENCOUNTER — TELEPHONE (OUTPATIENT)
Dept: TRANSPLANT | Facility: CLINIC | Age: 68
End: 2023-01-25
Payer: MEDICARE

## 2023-01-25 NOTE — TELEPHONE ENCOUNTER
----- Message from Vero Garcia RN sent at 1/24/2023  4:47 PM CST -----  Regarding: FW: Speak to Nurse    ----- Message -----  From: Luann Hernánedz  Sent: 1/24/2023   1:58 PM CST  To: [facility] Pre-Kidney Transplant Clinical  Subject: Speak to Nurse                                   The patient called requesting to speak to coordinator  No further info provided       Patient can be contacted @# [phone] ([place])

## 2023-01-30 ENCOUNTER — TELEPHONE (OUTPATIENT)
Dept: TRANSPLANT | Facility: CLINIC | Age: 68
End: 2023-01-30
Payer: MEDICARE

## 2023-01-30 DIAGNOSIS — R91.1 SOLITARY PULMONARY NODULE: ICD-10-CM

## 2023-01-30 DIAGNOSIS — Z76.82 ORGAN TRANSPLANT CANDIDATE: Primary | ICD-10-CM

## 2023-01-30 NOTE — TELEPHONE ENCOUNTER
Notified the pt she will need hem/onc consult d/t heterozygous factor V Leiden and explained what that is. Also, reviewed pending tests/consults that we will sched: chest ct, gyn,hem/onc and pulm. The pt voiced understanding and all questions were answered.

## 2023-02-03 ENCOUNTER — TELEPHONE (OUTPATIENT)
Dept: TRANSPLANT | Facility: CLINIC | Age: 68
End: 2023-02-03
Payer: MEDICARE

## 2023-02-03 ENCOUNTER — EPISODE CHANGES (OUTPATIENT)
Dept: TRANSPLANT | Facility: CLINIC | Age: 68
End: 2023-02-03

## 2023-02-03 NOTE — TELEPHONE ENCOUNTER
Spoke to the patient; Scheduled CT Chest, Labs, Gyn, and Hem/Onc ((2/16 [facility])  Pulmonary (3/2 [facility]); Pt confirmed appointment date time and location; The patient have no further questions or concern at this time; Reminder letter sent

## 2023-02-07 ENCOUNTER — TELEPHONE (OUTPATIENT)
Dept: TRANSPLANT | Facility: CLINIC | Age: 68
End: 2023-02-07
Payer: MEDICARE

## 2023-02-07 NOTE — TELEPHONE ENCOUNTER
Returned the patient call; the patient was thinking to reschedule her 2/16 appointments d/t her daughter coming in town in for [place]; Pt decided to keep them as is since the last appointment is at 11:30 am. The patient have no further questions or concerns at this time.     ----- Message from Tiffanie Drew RN sent at 2/7/2023  8:31 AM CST -----  Regarding: FW: speak to staff    ----- Message -----  From: Claudio Pena MA  Sent: 2/7/2023   6:29 AM CST  To: Tiffanie Drew RN, #  Subject: FW: speak to staff                               This isn't my pt   ----- Message -----  From: Tiffanie Drew RN  Sent: 2/6/2023   3:20 PM CST  To: Claudio Pena MA  Subject: FW: speak to staff                                 ----- Message -----  From: Tiffanie Drew RN  Sent: 2/3/2023   4:22 PM CST  To: Tiffanie Drew RN  Subject: FW: speak to staff                                 ----- Message -----  From: Lorraine Akers RN  Sent: 2/3/2023   4:02 PM CST  To: Tiffanie Drew RN  Subject: FW: speak to staff                                 ----- Message -----  From: Bia Bang  Sent: 2/3/2023   4:01 PM CST  To: [facility] Staff  Subject: speak to staff                                   Pt is calling to speak to staff in regards to getting appt scheduled for 02/16/2023 for another day.   Anything after 02/16/2023 is fine.  Please advise.        [phone] (home)

## 2023-02-09 ENCOUNTER — TELEPHONE (OUTPATIENT)
Dept: TRANSPLANT | Facility: CLINIC | Age: 68
End: 2023-02-09
Payer: MEDICARE

## 2023-02-13 ENCOUNTER — TELEPHONE (OUTPATIENT)
Dept: PULMONOLOGY | Facility: CLINIC | Age: 68
End: 2023-02-13

## 2023-02-13 NOTE — TELEPHONE ENCOUNTER
----- Message from Gurvinder Howard sent at 2/10/2023 10:05 AM CST -----  Regarding: Possible sooner appointment  The patient called stating she needed an appointment with Dr. Schuster for follow up. She was scheduled on May 4, 2023, next available.  She was last seen 2/2022. Please advise if the patient needs a sooner appointment and we will accommodate.  Thanks in advance for your assistance.

## 2023-02-13 NOTE — TELEPHONE ENCOUNTER
Patient had several other appointments between 2/22 till now that she cancelled. Looks like she cancelled a bronch that was supposed to be done last march as well.  She has a CT scheduled this week from another provider.  Can schedule appointment later this month so that way we have her CT as well.

## 2023-02-16 ENCOUNTER — HOSPITAL ENCOUNTER (OUTPATIENT)
Dept: RADIOLOGY | Facility: HOSPITAL | Age: 68
Discharge: HOME | End: 2023-02-16
Attending: STUDENT IN AN ORGANIZED HEALTH CARE EDUCATION/TRAINING PROGRAM
Payer: MEDICARE

## 2023-02-16 ENCOUNTER — OFFICE VISIT (OUTPATIENT)
Dept: HEMATOLOGY/ONCOLOGY | Facility: CLINIC | Age: 68
End: 2023-02-16
Payer: MEDICARE

## 2023-02-16 ENCOUNTER — HOSPITAL ENCOUNTER (OUTPATIENT)
Dept: RADIOLOGY | Facility: HOSPITAL | Age: 68
Discharge: HOME | End: 2023-02-16
Attending: NURSE PRACTITIONER
Payer: MEDICARE

## 2023-02-16 ENCOUNTER — OFFICE VISIT (OUTPATIENT)
Dept: OBSTETRICS AND GYNECOLOGY | Facility: CLINIC | Age: 68
End: 2023-02-16
Payer: MEDICARE

## 2023-02-16 VITALS
DIASTOLIC BLOOD PRESSURE: 52 MMHG | HEIGHT: 64 IN | RESPIRATION RATE: 10 BRPM | HEART RATE: 67 BPM | BODY MASS INDEX: 23.18 KG/M2 | OXYGEN SATURATION: 99 % | TEMPERATURE: 97 F | WEIGHT: 135.81 LBS | SYSTOLIC BLOOD PRESSURE: 101 MMHG

## 2023-02-16 VITALS
SYSTOLIC BLOOD PRESSURE: 105 MMHG | BODY MASS INDEX: 23.14 KG/M2 | DIASTOLIC BLOOD PRESSURE: 57 MMHG | WEIGHT: 135.56 LBS | HEART RATE: 62 BPM | HEIGHT: 64 IN

## 2023-02-16 DIAGNOSIS — Z86.718 HISTORY OF DVT (DEEP VEIN THROMBOSIS): Primary | ICD-10-CM

## 2023-02-16 DIAGNOSIS — Z12.4 SCREENING FOR MALIGNANT NEOPLASM OF CERVIX: ICD-10-CM

## 2023-02-16 DIAGNOSIS — Z76.82 ORGAN TRANSPLANT CANDIDATE: ICD-10-CM

## 2023-02-16 DIAGNOSIS — Z01.419 WELL WOMAN EXAM WITH ROUTINE GYNECOLOGICAL EXAM: Primary | ICD-10-CM

## 2023-02-16 DIAGNOSIS — N95.2 VAGINAL ATROPHY: ICD-10-CM

## 2023-02-16 DIAGNOSIS — Z78.0 POSTMENOPAUSAL STATE: ICD-10-CM

## 2023-02-16 DIAGNOSIS — N36.2 URETHRAL CARUNCLE: ICD-10-CM

## 2023-02-16 PROBLEM — Z12.11 ENCOUNTER FOR SCREENING COLONOSCOPY: Status: RESOLVED | Noted: 2021-06-08 | Resolved: 2023-02-16

## 2023-02-16 PROBLEM — Z01.818 PREOP EXAMINATION: Status: RESOLVED | Noted: 2020-11-16 | Resolved: 2023-02-16

## 2023-02-16 PROCEDURE — 87624 HPV HI-RISK TYP POOLED RSLT: CPT | Performed by: STUDENT IN AN ORGANIZED HEALTH CARE EDUCATION/TRAINING PROGRAM

## 2023-02-16 PROCEDURE — 99215 OFFICE O/P EST HI 40 MIN: CPT | Mod: PBBFAC,PO | Performed by: INTERNAL MEDICINE

## 2023-02-16 PROCEDURE — 99214 OFFICE O/P EST MOD 30 MIN: CPT | Mod: PBBFAC,27,PO | Performed by: STUDENT IN AN ORGANIZED HEALTH CARE EDUCATION/TRAINING PROGRAM

## 2023-02-16 PROCEDURE — 99213 OFFICE O/P EST LOW 20 MIN: CPT | Mod: S$PBB,,, | Performed by: INTERNAL MEDICINE

## 2023-02-16 PROCEDURE — 99999 PR PBB SHADOW E&M-EST. PATIENT-LVL V: CPT | Mod: PBBFAC,,, | Performed by: INTERNAL MEDICINE

## 2023-02-16 PROCEDURE — 77080 DXA BONE DENSITY AXIAL: CPT | Mod: TC,PO

## 2023-02-16 PROCEDURE — 99999 PR PBB SHADOW E&M-EST. PATIENT-LVL IV: ICD-10-PCS | Mod: PBBFAC,,, | Performed by: STUDENT IN AN ORGANIZED HEALTH CARE EDUCATION/TRAINING PROGRAM

## 2023-02-16 PROCEDURE — 99999 PR PBB SHADOW E&M-EST. PATIENT-LVL IV: CPT | Mod: PBBFAC,,, | Performed by: STUDENT IN AN ORGANIZED HEALTH CARE EDUCATION/TRAINING PROGRAM

## 2023-02-16 PROCEDURE — 99213 PR OFFICE/OUTPT VISIT, EST, LEVL III, 20-29 MIN: ICD-10-PCS | Mod: S$PBB,,, | Performed by: INTERNAL MEDICINE

## 2023-02-16 PROCEDURE — G0101 PR CA SCREEN;PELVIC/BREAST EXAM: ICD-10-PCS | Mod: GZ,S$PBB,, | Performed by: STUDENT IN AN ORGANIZED HEALTH CARE EDUCATION/TRAINING PROGRAM

## 2023-02-16 PROCEDURE — 99999 PR PBB SHADOW E&M-EST. PATIENT-LVL V: ICD-10-PCS | Mod: PBBFAC,,, | Performed by: INTERNAL MEDICINE

## 2023-02-16 PROCEDURE — G0101 CA SCREEN;PELVIC/BREAST EXAM: HCPCS | Mod: GZ,S$PBB,, | Performed by: STUDENT IN AN ORGANIZED HEALTH CARE EDUCATION/TRAINING PROGRAM

## 2023-02-16 PROCEDURE — 71250 CT THORAX DX C-: CPT | Mod: TC,PO

## 2023-02-16 PROCEDURE — 88175 CYTOPATH C/V AUTO FLUID REDO: CPT | Performed by: STUDENT IN AN ORGANIZED HEALTH CARE EDUCATION/TRAINING PROGRAM

## 2023-02-16 RX ORDER — ESTRADIOL 0.1 MG/G
1 CREAM VAGINAL DAILY
Qty: 30 G | Refills: 1 | Status: SHIPPED | OUTPATIENT
Start: 2023-02-16 | End: 2023-02-20 | Stop reason: SDUPTHER

## 2023-02-16 NOTE — PROGRESS NOTES
[facility]    Subjective:   Chief Complaint:    Chief Complaint   Patient presents with    Annual Exam       No LMP recorded. Patient is postmenopausal.  Contraception: Postmenopausal.  HRT: None.    Latricia Higgins is a 67 y.o.  who presents for an annual exam.  The patient has no GYN complaints today.  She participates in regular exercise: no.  She does not smoke.  She wears seatbelts.  She is not taking a multivitamin, vitamin D, and calcium.  She denies any domestic violence.    She presents today to have a pap smear as part of her pre-kidney clearance as she is planning to have a kidney transplant soon.     Last Pap: 1 year ago. Denies any history of abnormal pap smears.  Last mammogram: 23 at [facility] ([facility]). Normal results. Repeat in 1 year.    FH:  Breast cancer: none.  Colon cancer: mother ( from other causes).  Endometrial cancer: none.  Ovarian cancer: none.      OB History    Para Term  AB Living   2 2 2     2   SAB IAB Ectopic Multiple Live Births           2      # Outcome Date GA Lbr Jose/2nd Weight Sex Delivery Anes PTL Lv   2 Term 85   2.892 kg (6 lb 6 oz) F Vag-Spont EPI N FELICIA      Complications: Hemorrhage   1 Term 74   4.082 kg (9 lb) M Vag-Spont EPI N FELICIA      Obstetric Comments   Vaginal delivery x 2.        Past Medical History:   Diagnosis Date    Dialysis patient 10/05/2020    Emphysema of lung     ESRD (end stage renal disease)     Hepatitis     HLD (hyperlipidemia)     Hypertension     Renal disorder      Past Surgical History:   Procedure Laterality Date    AORTOGRAPHY WITH SERIALOGRAPHY N/A 2020    Procedure: co2 renal angio;  Surgeon: Lance Bustamante MD;  Location: [facility] CATH/EP LAB;  Service: Vascular;  Laterality: N/A;    APPENDECTOMY      Age 16    AV FISTULA PLACEMENT Right 2020    Procedure: CREATION, AV FISTULA;  Surgeon: Lance Bustamante MD;  Location: [facility] OR;  Service: General;   Laterality: Right;    COLONOSCOPY N/A 2021    Procedure: COLONOSCOPY;  Surgeon: Avila Smith III, MD;  Location: [facility] ENDO;  Service: Endoscopy;  Laterality: N/A;    COLONOSCOPY N/A 2021    Procedure: COLONOSCOPY;  Surgeon: Avila Smith III, MD;  Location: [facility] ENDO;  Service: Endoscopy;  Laterality: N/A;    COLONOSCOPY W/ POLYPECTOMY  2021    FISTULOGRAM Bilateral 2022    Procedure: Fistulogram;  Surgeon: Lance Bustamante MD;  Location: [facility] CATH/EP LAB;  Service: General;  Laterality: Bilateral;    INSERTION OF TUNNELED CENTRAL VENOUS HEMODIALYSIS CATHETER  10/02/2020    Procedure: INSERTION, CATHETER, CENTRAL VENOUS, HEMODIALYSIS, TUNNELED;  Surgeon: Ali Khoobehi, MD;  Location: [facility] OR;  Service: Vascular;;    LEFT HEART CATHETERIZATION Left 2022    Procedure: Left heart cath;  Surgeon: Daniel Tyler MD;  Location: [facility] CATH LAB;  Service: Cardiology;  Laterality: Left;    OPEN THROMBECTOMY OF GRAFT  2022    Procedure: THROMBECTOMY, GRAFT, OPEN;  Surgeon: Lance Bustamante MD;  Location: [facility] CATH/EP LAB;  Service: General;;    PLACEMENT OF DIALYSIS ACCESS Right 2020    Procedure: Insertion, Dialysis Access;  Surgeon: Lance Bustamante MD;  Location: [facility] CATH/EP LAB;  Service: Vascular;  Laterality: Right;    REMOVAL OF TUNNELED CENTRAL VENOUS CATHETER (CVC) N/A 2020    Procedure: REMOVAL, CATHETER, CENTRAL VENOUS, TUNNELED;  Surgeon: Ali Khoobehi, MD;  Location: [facility] OR;  Service: Vascular;  Laterality: N/A;    TONSILLECTOMY      TUBAL LIGATION       Review of patient's allergies indicates:  No Known Allergies    Social History     Socioeconomic History    Marital status:     Number of children: 2   Occupational History    Occupation: Insurance    Tobacco Use    Smoking status: Former     Packs/day: 0.25     Years: 20.00     Pack years: 5.00     Types: Cigarettes     Quit date: 2020     Years since quittin.3    Smokeless  tobacco: Never   Substance and Sexual Activity    Alcohol use: Not Currently    Drug use: Not Currently    Sexual activity: Not Currently   Social History Narrative    Caregiver Friend So Raygoza       Family History   Problem Relation Age of Onset    Cancer Mother     Heart disease Father        Medications:  Current Outpatient Medications on File Prior to Visit   Medication Sig Dispense Refill Last Dose    apixaban (ELIQUIS) 5 mg Tab Take 1 tablet (5 mg total) by mouth 2 (two) times daily. 60 tablet 6 Taking    aspirin (ECOTRIN) 81 MG EC tablet Take 1 tablet (81 mg total) by mouth once daily. 90 tablet 3 Taking    atorvastatin (LIPITOR) 40 MG tablet Take 40 mg by mouth once daily.   Taking    carvediloL (COREG) 12.5 MG tablet TAKE 1 TABLET BY MOUTH TWICE DAILY WITH MEALS 60 tablet 1 Taking    midodrine (PROAMATINE) 10 MG tablet Take 10 mg by mouth every Mon, Wed, Fri. With dialysis   Taking    midodrine (PROAMATINE) 5 MG Tab Take 1 tablet (5 mg total) by mouth every Tuesday, Thursday, Saturday, Sunday. 48 tablet 3 Taking    ondansetron (ZOFRAN) 4 MG tablet Take 4 mg by mouth every 8 (eight) hours as needed for Nausea.   Taking    sacubitriL-valsartan (ENTRESTO) 24-26 mg per tablet Take 1 tablet by mouth 2 (two) times daily. 60 tablet 11 Taking    sevelamer carbonate (RENVELA) 800 mg Tab Take 2,400 mg by mouth 3 (three) times daily with meals.    Taking    umeclidinium-vilanteroL (ANORO ELLIPTA) 62.5-25 mcg/actuation DsDv Inhale 1 puff into the lungs once daily. Controller 1 each 5 Taking    VELTASSA 8.4 gram PwPk Take 8.4 g by mouth 4 (four) times a week. Tues, thurs, sat, sun   Taking    VIOS AEROSOL DELIVERY SYSTEM Lizbeth AS DIRECTED   Not Taking       Review of Systems   Constitutional: Negative for appetite change, fever and unexpected weight change.   ENT: Negative for ear pain, tinnitus, sinus congestion or trouble swallowing.   Respiratory: Negative for cough and shortness of breath.    Cardiovascular: Negative  "for chest pain and palpitations.   Gastrointestinal: Negative for abdominal distention, constipation, nausea and vomiting.   Genitourinary: Negative for dyspareunia, dysuria, hematuria and pelvic pain.        GYN ROS per HPI.   Musculoskeletal: Negative for gait problem and myalgias.   Skin: Negative for rash.   Neurological: Negative for dizziness, light-headedness and headaches.   Psychiatric/Behavioral: The patient is not nervous/anxious.      Objective:   BP (!) 105/57 (BP Location: Left arm, Patient Position: Sitting, BP Method: Medium (Automatic))   Pulse 62   Ht 5' 4" (1.626 m)   Wt 61.5 kg (135 lb 9.3 oz)   BMI 23.27 kg/m²     Physical Exam  Vitals reviewed. Exam conducted with a chaperone present.   HENT:      Head: Normocephalic and atraumatic.   Cardiovascular:      Rate and Rhythm: Normal rate and regular rhythm.   Pulmonary:      Effort: Pulmonary effort is normal. No accessory muscle usage or respiratory distress.   Chest:      Chest wall: No tenderness.   Breasts:     Breasts are symmetrical.      Right: No inverted nipple, mass, nipple discharge, skin change or tenderness.      Left: No inverted nipple, mass, nipple discharge, skin change or tenderness.   Abdominal:      General: Abdomen is flat.      Tenderness: There is no abdominal tenderness. There is no guarding.   Genitourinary:     General: Normal vulva.      Pubic Area: No rash.       Labia:         Right: No rash or tenderness.         Left: No rash or tenderness.       Urethra: Urethral lesion (Urethral caruncle) present. No prolapse.      Vagina: Normal. No tenderness.      Cervix: No cervical motion tenderness, erythema or cervical bleeding.      Uterus: Not tender.       Adnexa:         Right: No mass or tenderness.          Left: No mass or tenderness.        Comments: Vaginal atrophy.  Musculoskeletal:      Right lower leg: No edema.      Left lower leg: No edema.   Lymphadenopathy:      Upper Body:      Right upper body: No " axillary adenopathy.      Left upper body: No axillary adenopathy.   Neurological:      General: No focal deficit present.      Mental Status: She is alert. Mental status is at baseline.        Assessment:     1. Well woman exam with routine gynecological exam    2. Organ transplant candidate    3. Screening for malignant neoplasm of cervix    4. Postmenopausal state    5. Vaginal atrophy    6. Urethral caruncle      Plan:     67 y.o. female presents today for annual pap smear and exam.     1. Well woman exam with routine gynecological exam    2. Organ transplant candidate  - Ambulatory referral/consult to Gynecology    3. Screening for malignant neoplasm of cervix  - Liquid-Based Pap Smear, Screening  - HPV High Risk Genotypes, PCR    4. Postmenopausal state  - DXA Bone Density Axial Skeleton 1 or more sites; Future    5. Vaginal atrophy  - estradioL (ESTRACE) 0.01 % (0.1 mg/gram) vaginal cream; Place 1 g vaginally once daily.  Dispense: 30 g; Refill: 1    6. Urethral caruncle  - estradioL (ESTRACE) 0.01 % (0.1 mg/gram) vaginal cream; Place 1 g vaginally once daily.  Dispense: 30 g; Refill: 1      Follow up in about 3 months (around 5/16/2023).    The above was reviewed and discussed with the patient.    Annual exam and screening issues based on the patient's age and family history were discussed.     We discussed the etiology of atrophic vaginitis and potential issues including vaginal discomfort and potential findings of hematuria on urinalysis if inappropriate clean-catch was not performed.  Conservative versus medical management discussed and at this time, she declined further intervention.    Discussed physical exam findings with the patient.  Explained that a urethral caruncle typically does not cause any symptoms.  Since patient is a possible kidney transplant candidate, I have corresponded with the transplant team and hematology and transplant team informed me that this issue would need to be treated prior  to transplant. Hematology state that the vaginal estrogen should be safe to use since the medication is working primarily in one area. Patient elects to go ahead and start the vaginal estradiol for treatment. The pros, cons, risks, benefits, alternatives and indications of the medication(s) prescribed, as well as appropriate use discussed.     - Pap and HPV testing performed.   - Mammogram 1-16-23 at [place] ([facility]). Repeat in 1 year..  - Colonoscopy 12-14-21. Repeat in 3 years.   - Tobacco cessation N/A.  - DEXA ordered.  - Counseled to take daily multivitamin. If patient is of reproductive age and not on contraception, to take prenatal vitamin. Patient has been counseled on the vitamin D and calcium requirements per ACOG recommendations.    Age    Calcium(mg/day)    Vitamin D (IU/day)  9-18     1300                       600  19-50   1000                       600  51-70   1200                       600  >70       1200                       800      The patient's questions were answered, and she agrees with the current plan.     The patient was counseled today on the new ACS guidelines for cervical cytology screening as well as the current recommendations for breast cancer screening. She was counseled to follow up with her PCP for other routine health maintenance.       Faye Reis PA-C  02/16/2023

## 2023-02-16 NOTE — PROGRESS NOTES
Service Date:  2/16/23    Chief Complaint:  History of DVT     Latricia Higgins is a 67 y.o. female on dialysis, a candidate for kidney transplant, referred to me with a history of DVT.  She had a DVT in October of 2020 which showed a left upper extremity clot from the mid humerus to the antecubital fossa.  She has been on anticoagulation ever since with Eliquis.  Workup at that time revealed a protein C deficiency which has since resolved.  I think this was likely due to being on Eliquis.  She also had factor 5, but this was heterozygous.  It was decided that given her need for catheters for dialysis and that this was a catheter associated clot, it was best to continue anticoagulation lifelong.  She is here for preop clearance as she may have a kidney transplant soon.    Review of Systems   Constitutional: Negative.    HENT: Negative.     Eyes: Negative.    Respiratory: Negative.     Cardiovascular: Negative.    Gastrointestinal: Negative.    Endocrine: Negative.    Genitourinary: Negative.    Musculoskeletal: Negative.    Integumentary:  Negative.   Neurological: Negative.    Hematological: Negative.    Psychiatric/Behavioral: Negative.        Current Outpatient Medications   Medication Instructions    apixaban (ELIQUIS) 5 mg, Oral, 2 times daily    aspirin (ECOTRIN) 81 mg, Oral, Daily    atorvastatin (LIPITOR) 40 mg, Oral, Daily    carvediloL (COREG) 12.5 MG tablet TAKE 1 TABLET BY MOUTH TWICE DAILY WITH MEALS    midodrine (PROAMATINE) 10 mg, Oral, Every Mon, Wed, Fri, With dialysis    midodrine (PROAMATINE) 5 mg, Oral, Every Tues, Thurs, Sat, Sun    ondansetron (ZOFRAN) 4 mg, Oral, Every 8 hours PRN    sacubitriL-valsartan (ENTRESTO) 24-26 mg per tablet 1 tablet, Oral, 2 times daily    sevelamer carbonate (RENVELA) 2,400 mg, Oral, 3 times daily with meals    umeclidinium-vilanteroL (ANORO ELLIPTA) 62.5-25 mcg/actuation DsDv 1 puff, Inhalation, Daily, Controller    VELTASSA 8.4 g, Oral, Four times weekly, Tues,  thurs, sat, sun    VIOS AEROSOL DELIVERY SYSTEM Lizbeth AS DIRECTED        Past Medical History:   Diagnosis Date    Dialysis patient 10/05/2020    Emphysema of lung     ESRD (end stage renal disease)     Hepatitis 1980    HLD (hyperlipidemia)     Hypertension     Renal disorder         Past Surgical History:   Procedure Laterality Date    AORTOGRAPHY WITH SERIALOGRAPHY N/A 9/30/2020    Procedure: co2 renal angio;  Surgeon: Lance Bustamante MD;  Location: [facility] CATH/EP LAB;  Service: Vascular;  Laterality: N/A;    APPENDECTOMY      AV FISTULA PLACEMENT Right 12/9/2020    Procedure: CREATION, AV FISTULA;  Surgeon: Lance Bustamante MD;  Location: [facility] OR;  Service: General;  Laterality: Right;    COLONOSCOPY N/A 6/8/2021    Procedure: COLONOSCOPY;  Surgeon: Avila Smith III, MD;  Location: [facility] ENDO;  Service: Endoscopy;  Laterality: N/A;    COLONOSCOPY N/A 12/14/2021    Procedure: COLONOSCOPY;  Surgeon: Avila Smith III, MD;  Location: [facility] ENDO;  Service: Endoscopy;  Laterality: N/A;    COLONOSCOPY W/ POLYPECTOMY  06/08/2021    FISTULOGRAM Bilateral 5/18/2022    Procedure: Fistulogram;  Surgeon: Lance Bustamante MD;  Location: [facility] CATH/EP LAB;  Service: General;  Laterality: Bilateral;    INSERTION OF TUNNELED CENTRAL VENOUS HEMODIALYSIS CATHETER  10/2/2020    Procedure: INSERTION, CATHETER, CENTRAL VENOUS, HEMODIALYSIS, TUNNELED;  Surgeon: Ali Khoobehi, MD;  Location: [facility] OR;  Service: Vascular;;    LEFT HEART CATHETERIZATION Left 5/17/2022    Procedure: Left heart cath;  Surgeon: Daniel Tyler MD;  Location: [facility] CATH LAB;  Service: Cardiology;  Laterality: Left;    OPEN THROMBECTOMY OF GRAFT  5/18/2022    Procedure: THROMBECTOMY, GRAFT, OPEN;  Surgeon: Lance Bustamante MD;  Location: [facility] CATH/EP LAB;  Service: General;;    PLACEMENT OF DIALYSIS ACCESS Right 9/30/2020    Procedure: Insertion, Dialysis Access;  Surgeon: Lance Bustamante MD;  Location: [facility] CATH/EP LAB;  Service: Vascular;   "Laterality: Right;    REMOVAL OF TUNNELED CENTRAL VENOUS CATHETER (CVC) N/A 2020    Procedure: REMOVAL, CATHETER, CENTRAL VENOUS, TUNNELED;  Surgeon: Ali Khoobehi, MD;  Location: [facility] OR;  Service: Vascular;  Laterality: N/A;    TONSILLECTOMY      TUBAL LIGATION          Family History   Problem Relation Age of Onset    Cancer Mother     Heart disease Father        Social History     Tobacco Use    Smoking status: Former     Packs/day: 0.25     Years: 20.00     Pack years: 5.00     Types: Cigarettes     Quit date: 2020     Years since quittin.3    Smokeless tobacco: Never   Substance Use Topics    Alcohol use: Not Currently    Drug use: Not Currently         Vitals:    23 0812   BP: (!) 101/52   Pulse: 67   Resp: 10   Temp: 96.7 °F (35.9 °C)        Physical Exam:  BP (!) 101/52 (BP Location: Left arm, Patient Position: Sitting, BP Method: Medium (Automatic))   Pulse 67   Temp 96.7 °F (35.9 °C) (Temporal)   Resp 10   Ht 5' 4" (1.626 m)   Wt 61.6 kg (135 lb 12.9 oz)   SpO2 99%   BMI 23.31 kg/m²     Physical Exam  Constitutional:       Appearance: Normal appearance.   HENT:      Head: Normocephalic and atraumatic.      Nose: Nose normal.      Mouth/Throat:      Mouth: Mucous membranes are moist.      Pharynx: Oropharynx is clear.   Eyes:      Conjunctiva/sclera: Conjunctivae normal.   Cardiovascular:      Rate and Rhythm: Normal rate and regular rhythm.      Heart sounds: Normal heart sounds.   Pulmonary:      Effort: Pulmonary effort is normal.      Breath sounds: Normal breath sounds.   Abdominal:      General: Abdomen is flat. Bowel sounds are normal.      Palpations: Abdomen is soft.   Musculoskeletal:         General: Normal range of motion.      Cervical back: Normal range of motion and neck supple.   Skin:     General: Skin is warm and dry.   Neurological:      General: No focal deficit present.      Mental Status: She is alert and oriented to person, place, and time. Mental status is " at baseline.   Psychiatric:         Mood and Affect: Mood normal.        Labs:  Lab Results   Component Value Date    WBC 4.81 01/19/2023    RBC 3.63 (L) 01/19/2023    HGB 12.3 01/19/2023    HCT 38.6 01/19/2023     (H) 01/19/2023    MCH 33.9 (H) 01/19/2023    MCHC 31.9 (L) 01/19/2023    RDW 12.9 01/19/2023     01/19/2023    MPV 10.6 01/19/2023    GRAN 3.4 01/19/2023    GRAN 70.3 01/19/2023    LYMPH 0.8 (L) 01/19/2023    LYMPH 17.3 (L) 01/19/2023    MONO 0.4 01/19/2023    MONO 8.1 01/19/2023    EOS 0.1 01/19/2023    BASO 0.05 01/19/2023    EOSINOPHIL 2.9 01/19/2023    BASOPHIL 1.0 01/19/2023     Sodium   Date Value Ref Range Status   01/19/2023 137 136 - 145 mmol/L Final     Potassium   Date Value Ref Range Status   01/19/2023 3.7 3.5 - 5.1 mmol/L Final     Chloride   Date Value Ref Range Status   01/19/2023 91 (L) 95 - 110 mmol/L Final     CO2   Date Value Ref Range Status   01/19/2023 32 (H) 23 - 29 mmol/L Final     Glucose   Date Value Ref Range Status   01/19/2023 88 70 - 110 mg/dL Final     BUN   Date Value Ref Range Status   01/19/2023 33 (H) 8 - 23 mg/dL Final     Creatinine   Date Value Ref Range Status   01/19/2023 7.0 (H) 0.5 - 1.4 mg/dL Final     Calcium   Date Value Ref Range Status   01/19/2023 9.9 8.7 - 10.5 mg/dL Final     Total Protein   Date Value Ref Range Status   01/19/2023 7.3 6.0 - 8.4 g/dL Final     Albumin   Date Value Ref Range Status   01/19/2023 3.7 3.5 - 5.2 g/dL Final     Total Bilirubin   Date Value Ref Range Status   01/19/2023 0.8 0.1 - 1.0 mg/dL Final     Comment:     For infants and newborns, interpretation of results should be based  on gestational age, weight and in agreement with clinical  observations.    Premature Infant recommended reference ranges:  Up to 24 hours.............<8.0 mg/dL  Up to 48 hours............<12.0 mg/dL  3-5 days..................<15.0 mg/dL  6-29 days.................<15.0 mg/dL       Alkaline Phosphatase   Date Value Ref Range Status    01/19/2023 163 (H) 55 - 135 U/L Final     AST   Date Value Ref Range Status   01/19/2023 20 10 - 40 U/L Final     ALT   Date Value Ref Range Status   01/19/2023 10 10 - 44 U/L Final     Anion Gap   Date Value Ref Range Status   01/19/2023 14 8 - 16 mmol/L Final     eGFR if    Date Value Ref Range Status   05/18/2022 5.1 (A) >60 mL/min/1.73 m^2 Final     eGFR if non    Date Value Ref Range Status   05/18/2022 4.4 (A) >60 mL/min/1.73 m^2 Final     Comment:     Calculation used to obtain the estimated glomerular filtration  rate (eGFR) is the CKD-EPI equation.          A/P:    History of DVT  -currently on anticoagulation with Eliquis since 2020.  It was recommended that time for patient to be on lifelong anticoagulation.  -she is heterozygous for factor 5, so I do not believe that this is an issue.  I would only recommend lifelong in that case if it was homozygous.  Her anticoagulation is more for her need for catheters and to help with any kidney transplant clotting issues.    -I do not believe she needs a bridge prior to any transplant surgery.  I would recommend only to discontinue Eliquis for 2 days prior to surgery and 1 day after surgery.  After that she can resume the Eliquis as previously taken.  -return to clinic as needed, as her Eliquis is being managed by her cardiologist.  If any further recommendations are needed please not hesitate to refer patient back to clinic.      Aurash Khoobehi, MD  Hematology and Oncology

## 2023-02-17 ENCOUNTER — TELEPHONE (OUTPATIENT)
Dept: OBSTETRICS AND GYNECOLOGY | Facility: CLINIC | Age: 68
End: 2023-02-17
Payer: MEDICARE

## 2023-02-17 NOTE — TELEPHONE ENCOUNTER
----- Message from Debra Garcia sent at 2/17/2023  9:30 AM CST -----  Contact: pt  Type:  Patient Returning Call    Who Called:  pt   Who Left Message for Patient:  n/a   Does the patient know what this is regarding?:  n/a   Best Call Back Number:  [phone]    Additional Information:  pt is trying to return a call. Please advise.

## 2023-02-17 NOTE — TELEPHONE ENCOUNTER
Pt stated she was returning a call she missed. I informed her Faye called this morning, but she she didn't get an answer, she sent a message on the patient portal. Pt voiced understanding.

## 2023-02-20 ENCOUNTER — TELEPHONE (OUTPATIENT)
Dept: OBSTETRICS AND GYNECOLOGY | Facility: CLINIC | Age: 68
End: 2023-02-20
Payer: MEDICARE

## 2023-02-20 DIAGNOSIS — N36.2 URETHRAL CARUNCLE: Primary | ICD-10-CM

## 2023-02-20 NOTE — TELEPHONE ENCOUNTER
PA denied for Estradiol cream. Contacted [facility] and the preferred medication for this is the Estring or the Premarin cream. Please advise.

## 2023-02-20 NOTE — TELEPHONE ENCOUNTER
Contacted pt and notified her that Premarin cream has been sent to pharmacy and she voiced understanding.

## 2023-02-22 ENCOUNTER — OFFICE VISIT (OUTPATIENT)
Dept: PULMONOLOGY | Facility: CLINIC | Age: 68
End: 2023-02-22
Payer: MEDICARE

## 2023-02-22 VITALS
BODY MASS INDEX: 22.9 KG/M2 | TEMPERATURE: 98 F | DIASTOLIC BLOOD PRESSURE: 64 MMHG | HEART RATE: 80 BPM | WEIGHT: 133.38 LBS | SYSTOLIC BLOOD PRESSURE: 118 MMHG | OXYGEN SATURATION: 99 %

## 2023-02-22 DIAGNOSIS — J44.9 CHRONIC OBSTRUCTIVE PULMONARY DISEASE, UNSPECIFIED COPD TYPE: ICD-10-CM

## 2023-02-22 DIAGNOSIS — R91.8 PULMONARY NODULES: ICD-10-CM

## 2023-02-22 DIAGNOSIS — N18.6 ESRD (END STAGE RENAL DISEASE): Primary | ICD-10-CM

## 2023-02-22 DIAGNOSIS — Z76.82 ORGAN TRANSPLANT CANDIDATE: ICD-10-CM

## 2023-02-22 PROCEDURE — 99213 OFFICE O/P EST LOW 20 MIN: CPT | Mod: S$GLB,,, | Performed by: NURSE PRACTITIONER

## 2023-02-22 PROCEDURE — 99213 PR OFFICE/OUTPT VISIT, EST, LEVL III, 20-29 MIN: ICD-10-PCS | Mod: S$GLB,,, | Performed by: NURSE PRACTITIONER

## 2023-02-22 NOTE — PROGRESS NOTES
SUBJECTIVE:    Patient ID: Latricia Higgins is a 67 y.o. female.    Chief Complaint: Follow-up (Follow up)    HPI    Patient here today for follow up since last year. She is trying to get back on renal transplant list.  Last year she had to cancel her bronch due to her EF being so low, but she states this year it is better since being on Entresto.  She uses Anoro daily.  Her recent CT showed New 7 mm groundglass opacity medially in right lung apexAdditional bilateral pulmonary nodules are unchanged dating back to at least 9/28/2021. Resolution of prior subpleural consolidative opacities with significant interval improvement of subpleural reticular opacities and interlobular septal thickening since 2/22/2022. She denies cough and dyspnea at the present time.   Past Medical History:   Diagnosis Date    Dialysis patient 10/05/2020    Emphysema of lung     ESRD (end stage renal disease)     Hepatitis 1980    HLD (hyperlipidemia)     Hypertension     Renal disorder      Past Surgical History:   Procedure Laterality Date    AORTOGRAPHY WITH SERIALOGRAPHY N/A 09/30/2020    Procedure: co2 renal angio;  Surgeon: Lance Bustamante MD;  Location: [facility] CATH/EP LAB;  Service: Vascular;  Laterality: N/A;    APPENDECTOMY      Age 16    AV FISTULA PLACEMENT Right 12/09/2020    Procedure: CREATION, AV FISTULA;  Surgeon: Lance Bustamante MD;  Location: [facility] OR;  Service: General;  Laterality: Right;    COLONOSCOPY N/A 06/08/2021    Procedure: COLONOSCOPY;  Surgeon: Avila Smith III, MD;  Location: [facility] ENDO;  Service: Endoscopy;  Laterality: N/A;    COLONOSCOPY N/A 12/14/2021    Procedure: COLONOSCOPY;  Surgeon: Avila Smith III, MD;  Location: [facility] ENDO;  Service: Endoscopy;  Laterality: N/A;    COLONOSCOPY W/ POLYPECTOMY  06/08/2021    FISTULOGRAM Bilateral 05/18/2022    Procedure: Fistulogram;  Surgeon: Lance Bustamante MD;  Location: [facility] CATH/EP LAB;  Service: General;  Laterality: Bilateral;    INSERTION OF TUNNELED  CENTRAL VENOUS HEMODIALYSIS CATHETER  10/02/2020    Procedure: INSERTION, CATHETER, CENTRAL VENOUS, HEMODIALYSIS, TUNNELED;  Surgeon: Ali Khoobehi, MD;  Location: [facility] OR;  Service: Vascular;;    LEFT HEART CATHETERIZATION Left 05/17/2022    Procedure: Left heart cath;  Surgeon: Daniel Tyler MD;  Location: [facility] CATH LAB;  Service: Cardiology;  Laterality: Left;    OPEN THROMBECTOMY OF GRAFT  05/18/2022    Procedure: THROMBECTOMY, GRAFT, OPEN;  Surgeon: Lance Bustamante MD;  Location: [facility] CATH/EP LAB;  Service: General;;    PLACEMENT OF DIALYSIS ACCESS Right 09/30/2020    Procedure: Insertion, Dialysis Access;  Surgeon: Lance Bustamante MD;  Location: [facility] CATH/EP LAB;  Service: Vascular;  Laterality: Right;    REMOVAL OF TUNNELED CENTRAL VENOUS CATHETER (CVC) N/A 12/16/2020    Procedure: REMOVAL, CATHETER, CENTRAL VENOUS, TUNNELED;  Surgeon: Ali Khoobehi, MD;  Location: [facility] OR;  Service: Vascular;  Laterality: N/A;    TONSILLECTOMY      TUBAL LIGATION       Family History   Problem Relation Age of Onset    Cancer Mother     Heart disease Father         Social History:   Marital Status:   Occupation: Data Unavailable  Alcohol History:  reports that she does not currently use alcohol.  Tobacco History:  reports that she quit smoking about 2 years ago. Her smoking use included cigarettes. She has a 5.00 pack-year smoking history. She has never used smokeless tobacco.  Drug History:  reports that she does not currently use drugs.    Review of patient's allergies indicates:  No Known Allergies    Current Outpatient Medications   Medication Sig Dispense Refill    apixaban (ELIQUIS) 5 mg Tab Take 1 tablet (5 mg total) by mouth 2 (two) times daily. 60 tablet 6    aspirin (ECOTRIN) 81 MG EC tablet Take 1 tablet (81 mg total) by mouth once daily. 90 tablet 3    atorvastatin (LIPITOR) 40 MG tablet Take 40 mg by mouth once daily.      carvediloL (COREG) 12.5 MG tablet TAKE 1 TABLET BY MOUTH TWICE DAILY WITH  MEALS 60 tablet 1    conjugated estrogens (PREMARIN) vaginal cream Place 1 g vaginally once daily. 1 applicator 2    midodrine (PROAMATINE) 10 MG tablet Take 10 mg by mouth every Mon, Wed, Fri. With dialysis      midodrine (PROAMATINE) 5 MG Tab Take 1 tablet (5 mg total) by mouth every Tuesday, Thursday, Saturday, Sunday. 48 tablet 3    ondansetron (ZOFRAN) 4 MG tablet Take 4 mg by mouth every 8 (eight) hours as needed for Nausea.      sacubitriL-valsartan (ENTRESTO) 24-26 mg per tablet Take 1 tablet by mouth 2 (two) times daily. 60 tablet 11    sevelamer carbonate (RENVELA) 800 mg Tab Take 2,400 mg by mouth 3 (three) times daily with meals.       umeclidinium-vilanteroL (ANORO ELLIPTA) 62.5-25 mcg/actuation DsDv Inhale 1 puff into the lungs once daily. Controller 1 each 5    VELTASSA 8.4 gram PwPk Take 8.4 g by mouth 4 (four) times a week. Tues, thurs, sat, sun      VIOS AEROSOL DELIVERY SYSTEM Lizbeth AS DIRECTED       No current facility-administered medications for this visit.       Last CT:02/16/2023  IMPRESSION:     1. New 7 mm groundglass opacity medially in right lung apex. This could be of infectious or inflammatory etiology. CT thorax without IV contrast follow-up is recommended in 3 months to evaluate for persistence.  2. Additional bilateral pulmonary nodules are unchanged dating back to at least 9/28/2021.  3. Resolution of prior subpleural consolidative opacities with significant interval improvement of subpleural reticular opacities and interlobular septal thickening since 2/22/2022.  4. Unchanged dilated ascending aorta reaching 4.2 cm in diameter.  5. Unchanged mild emphysema.  6. Unchanged coronary artery calcification.      Review of Systems  General: Feeling Well.  Eyes: Vision is good.  ENT:  No sinusitis or pharyngitis.   Heart:: No chest pain or palpitations.  Lungs: No cough, sputum, or wheezing.  GI: No Nausea, vomiting, constipation, diarrhea, or reflux.  : dialysis three days a week    Musculoskeletal: No joint pain or myalgias.  Skin: No lesions or rashes.  Neuro: No headaches or neuropathy.  Lymph: No edema or adenopathy.  Psych: No anxiety or depression.  Endo: weight stable     OBJECTIVE:      /64 (BP Location: Left arm, Patient Position: Sitting, BP Method: Medium (Manual))   Pulse 80   Temp 97.9 °F (36.6 °C)   Wt 60.5 kg (133 lb 6.4 oz)   SpO2 99%   BMI 22.90 kg/m²     Physical Exam  GENERAL: Older patient in no distress.  HEENT: Pupils equal and reactive. Extraocular movements intact. Nose intact.      Pharynx moist.  NECK: Supple.   HEART: Regular rate and rhythm. No murmur or gallop auscultated.  LUNGS: Clear to auscultation and percussion. Lung excursion symmetrical. No change in fremitus. No adventitial noises.  ABDOMEN: Bowel sounds present. Non-tender, no masses palpated.  EXTREMITIES: Normal muscle tone and joint movement, no cyanosis or clubbing.   LYMPHATICS: No adenopathy palpated, no edema.  SKIN: Dry, intact, no lesions.   NEURO: Cranial nerves II-XII intact. Motor strength 5/5 bilaterally, upper and lower extremities.  PSYCH: Appropriate affect.    Assessment:       1. ESRD (end stage renal disease)    2. Chronic obstructive pulmonary disease, unspecified COPD type    3. Pulmonary nodules          Plan:       ESRD (end stage renal disease)    Chronic obstructive pulmonary disease, unspecified COPD type  -     Complete PFT with bronchodilator; Future    Pulmonary nodules         Follow up in about 6 months (around 8/22/2023).    PFT  Repeat Ct in 3 months to follow up on new area of ground glass

## 2023-02-22 NOTE — Clinical Note
Please look at CT. Last year you were going to bronch her evaluate ground glass and nodules since she was trying to get on transplant list. CT shows stable nodules, and new groundglass area.

## 2023-02-24 ENCOUNTER — TELEPHONE (OUTPATIENT)
Dept: PULMONOLOGY | Facility: CLINIC | Age: 68
End: 2023-02-24

## 2023-02-24 NOTE — TELEPHONE ENCOUNTER
Message left informing patient that CT was reviewed with Dr. Schuster looks better then previous no need for bronch

## 2023-02-24 NOTE — TELEPHONE ENCOUNTER
----- Message from Tatyana Schuster MD sent at 2/23/2023  4:27 PM CST -----  CT looks better.  Would not bronch at this time  ----- Message -----  From: NUNO Morgan  Sent: 2/22/2023  12:37 PM CST  To: Tatyana Schuster MD    Please look at CT. Last year you were going to bronch her evaluate ground glass and nodules since she was trying to get on transplant list. CT shows stable nodules, and new groundglass area.

## 2023-03-07 ENCOUNTER — HOSPITAL ENCOUNTER (OUTPATIENT)
Dept: PULMONOLOGY | Facility: HOSPITAL | Age: 68
Discharge: HOME | End: 2023-03-07
Attending: NURSE PRACTITIONER
Payer: MEDICARE

## 2023-03-07 ENCOUNTER — TELEPHONE (OUTPATIENT)
Dept: PULMONOLOGY | Facility: CLINIC | Age: 68
End: 2023-03-07

## 2023-03-07 DIAGNOSIS — J44.9 CHRONIC OBSTRUCTIVE PULMONARY DISEASE, UNSPECIFIED COPD TYPE: ICD-10-CM

## 2023-03-07 PROCEDURE — 94010 BREATHING CAPACITY TEST: CPT | Mod: XB

## 2023-03-07 PROCEDURE — 94729 DIFFUSING CAPACITY: CPT

## 2023-03-07 PROCEDURE — 94727 GAS DIL/WSHOT DETER LNG VOL: CPT

## 2023-03-07 PROCEDURE — 94060 EVALUATION OF WHEEZING: CPT

## 2023-03-07 NOTE — TELEPHONE ENCOUNTER
PFT with no obstruction, no restriction, moderate diffusion defect.    Patient aware.  She is trying to get on kidney transplant list. She should be good for surgery.

## 2023-03-16 ENCOUNTER — TELEPHONE (OUTPATIENT)
Dept: TRANSPLANT | Facility: CLINIC | Age: 68
End: 2023-03-16
Payer: MEDICARE

## 2023-03-17 ENCOUNTER — TELEPHONE (OUTPATIENT)
Dept: TRANSPLANT | Facility: CLINIC | Age: 68
End: 2023-03-17
Payer: MEDICARE

## 2023-03-24 ENCOUNTER — EPISODE CHANGES (OUTPATIENT)
Dept: TRANSPLANT | Facility: HOSPITAL | Age: 68
End: 2023-03-24

## 2023-03-24 ENCOUNTER — TELEPHONE (OUTPATIENT)
Dept: TRANSPLANT | Facility: CLINIC | Age: 68
End: 2023-03-24
Payer: MEDICARE

## 2023-03-24 ENCOUNTER — COMMITTEE REVIEW (OUTPATIENT)
Dept: TRANSPLANT | Facility: CLINIC | Age: 68
End: 2023-03-24
Payer: MEDICARE

## 2023-03-24 NOTE — COMMITTEE REVIEW
Native Organ Dx: Malignant Hypertension      SELECTION COMMITTEE NOTE    Latricia Higgins was presented at selection committee on 3/24/23.  Patient met selection criteria for kidney transplant related to ESRD due to  Malignant Hypertension.  No absolute contraindications to transplant at this time.  Patient will be placed on the cadaveric wait list pending final financial approval from insurance company.  Patient will return to clinic for routine appointment in 6 month(s). Patient does not meet criteria for High KDPI kidney offer. Patient does meet HCV+ donor offer. Patient does not meet criteria for dual/enbloc, due to per committee due to heart history. OK to list.    Repeat chest CT    Note written by ALVIN Orozco,RN    ===============================================    I was present at the meeting and attest to the decision of the committee.    Raji Rodriguez  03/24/2023

## 2023-03-24 NOTE — LETTER
March 24, 2023    Eliseo Barnes MD  [address]  [facility] NEPHROLOGY [place]  [place] [address]  Phone: [phone]  Fax: [phone]     Dear Dr. Barnes:    Patient: Latricia Higgins   MR Number: [id number]   YOB: 1955     Your patient, Latricia Higgins, was recently discussed at the [facility] Selection Committee meeting on 3/24/2023. I am happy to inform you that Latricia has been approved for transplantation.  She has met selection criteria for a kidney transplant related to ESRD secondary to primary diagnosis of Malignant Hypertension. Your patient will be placed on the cadaveric wait list pending final financial approval from insurance company.     We appreciate your confidence in allowing us to participate in your patients care.  If you have any questions or concerns, please do not hesitate to contact me.    Sincerely,      Tatum Cox MD  Medical Director, Kidney & Kidney/Pancreas Transplantation  [facility]  CC:  Latricia Higgins (patient)          Maren Kwok.

## 2023-03-24 NOTE — TELEPHONE ENCOUNTER
Notified the patient of the committee's decision. The pt voiced understanding and stated she would rather call her pulmonologist to schedule the repeat ct of the chest because she was not aware she  needed and she will be out of town in May.This Rn explained that is ok but make sure it is done when recommended to monitor the pulmonary nodules. The pt agreed.

## 2023-03-27 DIAGNOSIS — I50.20 HFREF (HEART FAILURE WITH REDUCED EJECTION FRACTION): ICD-10-CM

## 2023-03-28 ENCOUNTER — TELEPHONE (OUTPATIENT)
Dept: PULMONOLOGY | Facility: CLINIC | Age: 68
End: 2023-03-28

## 2023-03-28 ENCOUNTER — TELEPHONE (OUTPATIENT)
Dept: TRANSPLANT | Facility: CLINIC | Age: 68
End: 2023-03-28
Payer: MEDICARE

## 2023-03-28 DIAGNOSIS — R91.8 PULMONARY NODULES: ICD-10-CM

## 2023-03-28 DIAGNOSIS — R91.8 GROUND GLASS OPACITY PRESENT ON IMAGING OF LUNG: Primary | ICD-10-CM

## 2023-03-28 DIAGNOSIS — Z76.82 ORGAN TRANSPLANT CANDIDATE: Primary | ICD-10-CM

## 2023-03-28 RX ORDER — SACUBITRIL AND VALSARTAN 24; 26 MG/1; MG/1
1 TABLET, FILM COATED ORAL 2 TIMES DAILY
Qty: 60 TABLET | Refills: 11 | Status: SHIPPED | OUTPATIENT
Start: 2023-03-28 | End: 2024-03-06 | Stop reason: SDUPTHER

## 2023-03-28 NOTE — TELEPHONE ENCOUNTER
----- Message from Edmar Napier MA sent at 3/27/2023  9:49 AM CDT -----  Regarding: ct in early may  Pt knows she needs a ct done in may but she will be out of town towards the end of may so can we get her orders in to where she can have it in the beginning of may (per pts request) I told her we ordered it but the hospital will call and schedule it with her.

## 2023-03-28 NOTE — TELEPHONE ENCOUNTER
Spoke with [facility] nurse.She stated she will draw HLA on 4/3. Attempted to notify the patient she is active on the [facility] waitlist as of today. No answer. Left detailed VM regarding purpose of call.

## 2023-03-28 NOTE — LETTER
2023    Latricia Higgins  [address] [facility] [address]    Dear Latricia Higgins:  MRN: [id number]    Congratulations! On 3/28/2023, you were placed on  the waiting list for a  donor transplant.    Your candidacy for kidney transplant is based on the following criteria:ESRD due to  Malignant Hypertension    Your transplant coordinator while on the waiting list is Meilnda Wang RN. They can be reached at [phone] or [phone] with any questions.      What to do now?    Ask your living donors to call and begin testing  Give your donors our phone number, [phone]  Make sure your donors have your name and date of birth when they call  You will get transplanted much faster if you have a living donor    Have your blood sent to our Transplant Lab every month  If you are on dialysis - our Transplant Lab will work with your dialysis unit to send your blood every month  If you are not on dialysis   If you live near an [facility] lab, we will schedule you to have blood drawn every month  If you do not live near an [facility] lab, you will be sent blood kits in the mail. You will need to take a kit to your local lab or doctor to have your blood drawn every month and mail to the Transplant Lab.     Call us with ANY CHANGES  Phone numbers - we must be able to reach you anytime of the day or night when a kidney is available  Address  Insurance coverage  Dialysis unit or kidney doctor  Gabriel: if you have surgery, stay in the hospital, have to get blood, or have an infection    Review your Kidney/Kidney-Pancreas Transplant Guide   This will give you detailed information about what happens when  you are on the waiting list   you are called when a kidney is available    The [facility] has a transplant surgeon and physician available 365 days a year, 24 hours a day to coordinate organ acceptance, procurement, surgical placement and to address urgent patient care issues.  You will  be notified in writing of any changes to our Transplant Centers staffing plan that would impact your ability to receive a transplant.    Attached is a letter from the [employer] for Organ Sharing (UNOS). It describes the services and information offered to patients by UNOS and the Organ Procurement and Transplant Network. We look forward to working with you while on the waiting list.     Congratulations,    Your Transplant Partner  [facility]   [address] [place] [address]  [phone]  lh/enclosure  CC: Dr. Eliseo Engel  [facility].                                                                                             The Organ Procurement and Transplantation Network   Toll-free patient services line: Your resource for organ transplant information     If you have a question regarding your own medical care, you always should call your transplant hospital first. However, for general organ transplant-related information, you can call the Organ Procurement and Transplantation Network (OPTN) toll-free patient services line at [phone].     Anyone, including potential transplant candidates, candidates, recipients, family members, friends, living donors, and donor family members, can call this number to:     Talk about organ donation, living donation, the transplant process, the donation process, and transplant policies.   Get a free patient information kit with helpful booklets, waiting list and transplant information, and a list of all transplant hospitals.   Ask questions about the OPTN website (https://optn.transplant.hrsa.gov/), the [employer] for Organ Sharings (UNOS) website (https://unos.org/), or the UNOS website for living donors and transplant recipients. (https://www.transplantliving.org/).   Learn how the OPTN can help you.   Talk about any concerns that you may have with a transplant hospital.     The nations  transplant system, the OPTN, is managed under federal contract by the [employer] for Organ Sharing (UNOS), which is a non-profit charitable organization. The OPTN helps create and define organ sharing policies that make the best use of donated organs. This process continuously evaluating new advances and discoveries so policies can be adapted to best serve patients waiting for transplants. To do so, the OPTN works closely with transplant professionals, transplant patients, transplant candidates, donor families, living donors, and the public. All transplant programs and organ procurement organizations throughout the country are OPTN members and are obligated to follow the policies the OPTN creates for allocating organs.     The OPTN also is responsible for:   Providing educational material for patients, the public, and professionals.   Raising awareness of the need for donated organs and tissue.   Coordinating organ procurement, matching, and placement.   Collecting information about every organ transplant and donation that occurs in the [place].     Remember, you should contact your transplant hospital directly if you have questions or concerns about your own medical care including medical records, work-up progress, and test results.     We are not your transplant hospital, and our staff will not be able to answer questions about your case, so please keep your transplant hospitals phone number handy.   However, while you research your transplant needs and learn as much as you can about transplantation and donation, we welcome your call to our toll-free patient services line at [phone].

## 2023-03-28 NOTE — TELEPHONE ENCOUNTER
"  KIDNEY WAIT LISTING NOTE    Date of Financial clearance to list: 3/24/23    SSN/[facility]:     Organ: Kidney    Last Name: Gisela  First Name: Latricia    : 1955       Gender: female        MRN#: [id number]                                   State of Permanent Residence: [address] [place] [id number]    Ethnicity: Not  or /a   Race:      White    CLINICAL INFORMATION   Candidate Medical Urgency Status: Active (1)  Number of Previous Kidney Transplants: 0  Number of Previous Solid Organ Transplants: 0  Did you enter number of previous kidney or other solid organ transplants? yes  Is this Candidate a Prior Living Donor: no  (If yes, please generate letter to UNOS with patient's date of donation, recipient SSN, signed by Surgical Director after patient is listed in order to receive priority points).      ABO  ABO Blood Group:   O POS     ABO Confirmation: (THESE DATES MUST BE PRIOR TO THE LIST DATE AND SUPPORTED BY SEPARATE LAB REPORTS)    Internal Results    Lab Results   Component Value Date    GROUPTRH O POS 2023    GROUPTRH O POS 2023     No results found for: ABO    External Results    ABO Date 1:    ABO Date 2  Are either of these ABO results based on External Labs? no  (If Yes, STOP and go to source document in Media Tab for verification).    VITALS  Height: 5'4"   Weight:  60.5kg  (Use height from Transplant clinic visits only).  Did you enter height/weight? Yes    HLA    Class I:  Lab Results   Component Value Date    ORMK2LZ 3 2021    SWGU8WA 33 2021    JRJV9OT 44 2021    EIQJ2NP 58 2021    YTMVI1WB 4 2021    FKCWH0XS XX 2021    MPHNT4OC 10 2021    SOMBJ3TE 16 2021       Class II:  Lab Results   Component Value Date    BJWKOT77XZ 7 2021    TOOHCK96KG 13 2021    GNJVBQ233ZU 52 2021    PMEGXK0996 53 2021    DHLYC4OY 2 2021    PGZIX7IZ 6 2021       Tested for HLA Antibodies: Yes, no " "antibodies detected     If result is "Positive" antibodies are detected     If result is "Negative or questionable" no antibodies detected    Lab Results   Component Value Date    CIPRAS Negative 01/19/2023    CIIPRAS Negative 01/19/2023       DIALYSIS INFORMATION  Is patient Pre-Dialysis: No     GFR Information  Report GFR being used as the criteria for placement on the kidney list. If not, leave blank  GFR < or = 20 ml/min? n/a  If Yes, Specify value  ___   ml/min     Initial date GFR became 20 or less:   Is GFR obtained from an Outside lab Result? n/a  (If YES verify with source document scanned into media)    If patient on Dialysis:    Is candidate currently on dialysis for ESRD? Yes  If Yes,  Date Chronic Dialysis Started:   10/1/2020  (verify with source document in Media Tab)   Dialysis Unit Name: [facility]  [address] [place] [id number]                        Physician Name:  Dr. Tatum Avendaño  NPI#: [id number]    DIABETES INFORMATION  Primary Native Kidney Diagnosis: Malignant Hypertension  C-Peptide Value - No results found for: CPEPTIDE  Current Diabetes Status: None    FOR NON-KIDNEY DEPARTMENT USE ONLY:  Additional Organs Registered? none    Maximum Acceptable Number of HLA Mismatches  ABDR:     6      (0-6)               AB:               (0-4)  ADR:   _____  (0-4)              BDR: _____ (0-4)  A:        _____  (0-2)              B:      _____ (0-2)          DR: ______ (0-2)    Will Recipient Accept?   Accept HBcAB Positive Organ:            Yes  Accept HBV ANDRE Positive Organ:        no  Accept HCV Antibody Positive Organ: yes   Accept HCV ANDRE Positive Organ: yes    Dual Kidney and En Bloc Opt In : No  Dual  Local:   No  Dual Import:   No  En Bloc Local:   No  En Bloc Import: No     Accept KDPI > 85: Single: No     Local: No     Import: No  Accept KDPI > 85: Dual: No     Local: No     Import: No    ### NURSE TO VERIFY CONSENT AND MAKE ANY NECESSARY CHANGES NEEDED IN " UNET AT THE TIME OF VERIFICATION ###    Unacceptible Antigens  If yes, list     No results found for: HQ7CZXI, CIABCLM, CIIAB    ### DO NOT LIST IF ANTIGEN VALUE WEAK ###    Hep B Vaccine series completed: unknown    Blood Type x2,, serologies and 2728 verified by myself and JOSY Keita RN.  Blood type determination and reporting was completed according to the programs protocols and OPTN requirements.

## 2023-04-04 DIAGNOSIS — R91.1 SOLITARY PULMONARY NODULE: Primary | ICD-10-CM

## 2023-04-04 DIAGNOSIS — Z76.82 AWAITING ORGAN TRANSPLANT STATUS: Primary | ICD-10-CM

## 2023-04-04 DIAGNOSIS — Z76.82 ORGAN TRANSPLANT CANDIDATE: ICD-10-CM

## 2023-04-10 ENCOUNTER — TELEPHONE (OUTPATIENT)
Dept: TRANSPLANT | Facility: CLINIC | Age: 68
End: 2023-04-10
Payer: MEDICARE

## 2023-05-15 ENCOUNTER — LAB VISIT (OUTPATIENT)
Dept: LAB | Facility: HOSPITAL | Age: 68
End: 2023-05-15
Attending: INTERNAL MEDICINE
Payer: MEDICARE

## 2023-05-15 DIAGNOSIS — I51.9 SYSTOLIC DYSFUNCTION: ICD-10-CM

## 2023-05-15 LAB
ALBUMIN SERPL BCP-MCNC: 3.8 G/DL (ref 3.5–5.2)
ALP SERPL-CCNC: 128 U/L (ref 55–135)
ALT SERPL W/O P-5'-P-CCNC: 12 U/L (ref 10–44)
ANION GAP SERPL CALC-SCNC: 9 MMOL/L (ref 8–16)
AST SERPL-CCNC: 21 U/L (ref 10–40)
BASOPHILS # BLD AUTO: 0.03 K/UL (ref 0–0.2)
BASOPHILS NFR BLD: 0.6 % (ref 0–1.9)
BILIRUB SERPL-MCNC: 0.9 MG/DL (ref 0.1–1)
BNP SERPL-MCNC: 454 PG/ML (ref 0–99)
BUN SERPL-MCNC: 25 MG/DL (ref 8–23)
CALCIUM SERPL-MCNC: 8.7 MG/DL (ref 8.7–10.5)
CHLORIDE SERPL-SCNC: 92 MMOL/L (ref 95–110)
CO2 SERPL-SCNC: 35 MMOL/L (ref 23–29)
CREAT SERPL-MCNC: 4.8 MG/DL (ref 0.5–1.4)
DIFFERENTIAL METHOD: ABNORMAL
EOSINOPHIL # BLD AUTO: 0.1 K/UL (ref 0–0.5)
EOSINOPHIL NFR BLD: 2.9 % (ref 0–8)
ERYTHROCYTE [DISTWIDTH] IN BLOOD BY AUTOMATED COUNT: 12.5 % (ref 11.5–14.5)
EST. GFR  (NO RACE VARIABLE): 9.4 ML/MIN/1.73 M^2
GLUCOSE SERPL-MCNC: 109 MG/DL (ref 70–110)
HCT VFR BLD AUTO: 34.6 % (ref 37–48.5)
HGB BLD-MCNC: 11.5 G/DL (ref 12–16)
IMM GRANULOCYTES # BLD AUTO: 0.03 K/UL (ref 0–0.04)
IMM GRANULOCYTES NFR BLD AUTO: 0.6 % (ref 0–0.5)
LYMPHOCYTES # BLD AUTO: 1.2 K/UL (ref 1–4.8)
LYMPHOCYTES NFR BLD: 23.6 % (ref 18–48)
MCH RBC QN AUTO: 34.3 PG (ref 27–31)
MCHC RBC AUTO-ENTMCNC: 33.2 G/DL (ref 32–36)
MCV RBC AUTO: 103 FL (ref 82–98)
MONOCYTES # BLD AUTO: 0.4 K/UL (ref 0.3–1)
MONOCYTES NFR BLD: 7.3 % (ref 4–15)
NEUTROPHILS # BLD AUTO: 3.2 K/UL (ref 1.8–7.7)
NEUTROPHILS NFR BLD: 65 % (ref 38–73)
NRBC BLD-RTO: 0 /100 WBC
PLATELET # BLD AUTO: 173 K/UL (ref 150–450)
PMV BLD AUTO: 10.5 FL (ref 9.2–12.9)
POTASSIUM SERPL-SCNC: 4 MMOL/L (ref 3.5–5.1)
PROT SERPL-MCNC: 7.2 G/DL (ref 6–8.4)
RBC # BLD AUTO: 3.35 M/UL (ref 4–5.4)
SODIUM SERPL-SCNC: 136 MMOL/L (ref 136–145)
WBC # BLD AUTO: 4.91 K/UL (ref 3.9–12.7)

## 2023-05-15 PROCEDURE — 36415 COLL VENOUS BLD VENIPUNCTURE: CPT | Performed by: INTERNAL MEDICINE

## 2023-05-15 PROCEDURE — 85025 COMPLETE CBC W/AUTO DIFF WBC: CPT | Performed by: INTERNAL MEDICINE

## 2023-05-15 PROCEDURE — 83880 ASSAY OF NATRIURETIC PEPTIDE: CPT | Performed by: INTERNAL MEDICINE

## 2023-05-15 PROCEDURE — 80053 COMPREHEN METABOLIC PANEL: CPT | Performed by: INTERNAL MEDICINE

## 2023-05-16 ENCOUNTER — OFFICE VISIT (OUTPATIENT)
Dept: TRANSPLANT | Facility: CLINIC | Age: 68
End: 2023-05-16
Payer: MEDICARE

## 2023-05-16 VITALS
HEART RATE: 68 BPM | HEIGHT: 64 IN | DIASTOLIC BLOOD PRESSURE: 56 MMHG | SYSTOLIC BLOOD PRESSURE: 102 MMHG | BODY MASS INDEX: 23.53 KG/M2 | WEIGHT: 137.81 LBS

## 2023-05-16 DIAGNOSIS — I25.10 NONOBSTRUCTIVE ATHEROSCLEROSIS OF CORONARY ARTERY: ICD-10-CM

## 2023-05-16 DIAGNOSIS — I10 PRIMARY HYPERTENSION: ICD-10-CM

## 2023-05-16 DIAGNOSIS — I50.42 CHRONIC COMBINED SYSTOLIC AND DIASTOLIC HEART FAILURE: Primary | ICD-10-CM

## 2023-05-16 DIAGNOSIS — N18.6 ESRD (END STAGE RENAL DISEASE): ICD-10-CM

## 2023-05-16 DIAGNOSIS — I42.8 NONISCHEMIC CARDIOMYOPATHY: ICD-10-CM

## 2023-05-16 PROCEDURE — 99214 OFFICE O/P EST MOD 30 MIN: CPT | Mod: PBBFAC,TXP | Performed by: INTERNAL MEDICINE

## 2023-05-16 PROCEDURE — 99999 PR PBB SHADOW E&M-EST. PATIENT-LVL IV: CPT | Mod: PBBFAC,TXP,, | Performed by: INTERNAL MEDICINE

## 2023-05-16 PROCEDURE — 99214 OFFICE O/P EST MOD 30 MIN: CPT | Mod: S$PBB,TXP,, | Performed by: INTERNAL MEDICINE

## 2023-05-16 PROCEDURE — 99999 PR PBB SHADOW E&M-EST. PATIENT-LVL IV: ICD-10-PCS | Mod: PBBFAC,TXP,, | Performed by: INTERNAL MEDICINE

## 2023-05-16 PROCEDURE — 99214 PR OFFICE/OUTPT VISIT, EST, LEVL IV, 30-39 MIN: ICD-10-PCS | Mod: S$PBB,TXP,, | Performed by: INTERNAL MEDICINE

## 2023-05-16 RX ORDER — CHOLECALCIFEROL (VITAMIN D3) 25 MCG
1000 TABLET ORAL DAILY
COMMUNITY

## 2023-05-16 RX ORDER — CALCIUM CARBONATE 600 MG
600 TABLET ORAL 2 TIMES DAILY WITH MEALS
COMMUNITY

## 2023-05-16 NOTE — PROGRESS NOTES
Advanced Heart Failure and Transplantation Clinic Follow up.        Attending Physician: Jose Cruz Dixon MD.  The patient's last visit with me was on 11/8/2022.         HPI.  Latricia Higgins is a 66 y.o. y.o. female     Problems  HTN  Systolic dysfunction, EF 18% (normal in 9/2020, 45% last July 2021, 18% February 2022)  HLD, LDL 42  History of DVT on OAC  Aortic atherosclerosis  ESRD on HD 10/2020, delisted for tx at this time given reduced EF  Emphysema     Patient delisted for kidney transplant after EF reduced 18% February 2022. PET stress 2021 without ischemia. She underwent LHC that found minimal CAD, out of proportion to heart dysfunction.      Last visit we increased her dose of carvedilol. She went some days after to ER with orthostatic hypotension. She had repeated echo then that found recovered EF.     Today, May 16, 2023, she comes in good spirit. No CV symptoms. No ER visits or hospitalizations.       Review of Systems   Constitutional:  Negative for activity change, appetite change, chills, diaphoresis and fever.   HENT:  Negative for nasal congestion, rhinorrhea and sore throat.    Eyes:  Negative for visual disturbance.   Respiratory:  Negative for cough, choking and shortness of breath.    Cardiovascular:  Negative for chest pain.   Gastrointestinal:  Negative for abdominal pain, diarrhea, nausea and vomiting.   Genitourinary:  Negative for difficulty urinating, dysuria and hematuria.   Integumentary:  Negative for rash.   Neurological:  Negative for seizures, syncope and light-headedness.   Psychiatric/Behavioral:  Negative for agitation and hallucinations.       Past Medical History:   Diagnosis Date    Dialysis patient 10/05/2020    Emphysema of lung     ESRD (end stage renal disease)     Hepatitis 1980    HLD (hyperlipidemia)     Hypertension     Renal disorder         Past Surgical History:   Procedure  Laterality Date    AORTOGRAPHY WITH SERIALOGRAPHY N/A 09/30/2020    Procedure: co2 renal angio;  Surgeon: Lance Bustamante MD;  Location: [facility] CATH/EP LAB;  Service: Vascular;  Laterality: N/A;    APPENDECTOMY      Age 16    AV FISTULA PLACEMENT Right 12/09/2020    Procedure: CREATION, AV FISTULA;  Surgeon: Lance Bustamante MD;  Location: [facility] OR;  Service: General;  Laterality: Right;    COLONOSCOPY N/A 06/08/2021    Procedure: COLONOSCOPY;  Surgeon: Avila Smith III, MD;  Location: [facility] ENDO;  Service: Endoscopy;  Laterality: N/A;    COLONOSCOPY N/A 12/14/2021    Procedure: COLONOSCOPY;  Surgeon: Avila Smith III, MD;  Location: [facility] ENDO;  Service: Endoscopy;  Laterality: N/A;    COLONOSCOPY W/ POLYPECTOMY  06/08/2021    FISTULOGRAM Bilateral 05/18/2022    Procedure: Fistulogram;  Surgeon: Lance Bustamante MD;  Location: [facility] CATH/EP LAB;  Service: General;  Laterality: Bilateral;    INSERTION OF TUNNELED CENTRAL VENOUS HEMODIALYSIS CATHETER  10/02/2020    Procedure: INSERTION, CATHETER, CENTRAL VENOUS, HEMODIALYSIS, TUNNELED;  Surgeon: Ali Khoobehi, MD;  Location: [facility] OR;  Service: Vascular;;    LEFT HEART CATHETERIZATION Left 05/17/2022    Procedure: Left heart cath;  Surgeon: Daniel Tyler MD;  Location: [facility] CATH LAB;  Service: Cardiology;  Laterality: Left;    OPEN THROMBECTOMY OF GRAFT  05/18/2022    Procedure: THROMBECTOMY, GRAFT, OPEN;  Surgeon: Lance Bustamante MD;  Location: [facility] CATH/EP LAB;  Service: General;;    PLACEMENT OF DIALYSIS ACCESS Right 09/30/2020    Procedure: Insertion, Dialysis Access;  Surgeon: Lance Bustamante MD;  Location: [facility] CATH/EP LAB;  Service: Vascular;  Laterality: Right;    REMOVAL OF TUNNELED CENTRAL VENOUS CATHETER (CVC) N/A 12/16/2020    Procedure: REMOVAL, CATHETER, CENTRAL VENOUS, TUNNELED;  Surgeon: Ali Khoobehi, MD;  Location: [facility] OR;  Service: Vascular;  Laterality: N/A;    TONSILLECTOMY      TUBAL LIGATION          Family History   Problem  "Relation Age of Onset    Cancer Mother     Heart disease Father         Review of patient's allergies indicates:  No Known Allergies     Current Outpatient Medications   Medication Instructions    apixaban (ELIQUIS) 5 mg, Oral, 2 times daily    atorvastatin (LIPITOR) 40 mg, Oral, Daily    calcium carbonate (OS-MARLY) 600 mg, Oral, 2 times daily with meals    carvediloL (COREG) 12.5 MG tablet TAKE 1 TABLET BY MOUTH TWICE DAILY WITH MEALS    conjugated estrogens (PREMARIN) 1 g, Vaginal, Daily    midodrine (PROAMATINE) 10 mg, Oral, Every Mon, Wed, Fri, With dialysis    midodrine (PROAMATINE) 5 mg, Oral, Every Tues, Thurs, Sat, Sun    ondansetron (ZOFRAN) 4 mg, Oral, Every 8 hours PRN    sacubitriL-valsartan (ENTRESTO) 24-26 mg per tablet 1 tablet, Oral, 2 times daily    sevelamer carbonate (RENVELA) 2,400 mg, Oral, 3 times daily with meals    umeclidinium-vilanteroL (ANORO ELLIPTA) 62.5-25 mcg/actuation DsDv 1 puff, Inhalation, Daily, Controller    VELTASSA 8.4 g, Oral, Four times weekly, Tues, thurs, sat, sun    VIOS AEROSOL DELIVERY SYSTEM Lizbeth AS DIRECTED    vitamin D (VITAMIN D3) 1,000 Units, Oral, Daily        Vitals:    05/16/23 0812   BP: (!) 102/56   Pulse: 68        Wt Readings from Last 3 Encounters:   05/16/23 62.5 kg (137 lb 12.6 oz)   02/22/23 60.5 kg (133 lb 6.4 oz)   02/16/23 61.5 kg (135 lb 9.3 oz)     Temp Readings from Last 3 Encounters:   02/22/23 97.9 °F (36.6 °C)   02/16/23 96.7 °F (35.9 °C) (Temporal)   01/19/23 97.2 °F (36.2 °C) (Tympanic)     BP Readings from Last 3 Encounters:   05/16/23 (!) 102/56   02/22/23 118/64   02/16/23 (!) 105/57     Pulse Readings from Last 3 Encounters:   05/16/23 68   02/22/23 80   02/16/23 62        Body mass index is 23.65 kg/m². Estimated body surface area is 1.68 meters squared as calculated from the following:    Height as of this encounter: 5' 4" (1.626 m).    Weight as of this encounter: 62.5 kg (137 lb 12.6 oz).     Physical Exam  Constitutional:       " Appearance: She is well-developed.   HENT:      Head: Normocephalic and atraumatic.      Right Ear: External ear normal.      Left Ear: External ear normal.   Eyes:      Conjunctiva/sclera: Conjunctivae normal.      Pupils: Pupils are equal, round, and reactive to light.   Neck:      Vascular: No hepatojugular reflux or JVD.   Cardiovascular:      Rate and Rhythm: Normal rate and regular rhythm.      Pulses: Intact distal pulses.      Heart sounds: S1 normal and S2 normal. No murmur heard.    No friction rub. No gallop.   Pulmonary:      Effort: Pulmonary effort is normal.      Breath sounds: Normal breath sounds.   Abdominal:      General: Bowel sounds are normal. There is no distension.      Palpations: Abdomen is soft.      Tenderness: There is no abdominal tenderness. There is no guarding or rebound.   Musculoskeletal:      Cervical back: Normal range of motion and neck supple.      Right lower leg: No edema.      Left lower leg: No edema.   Neurological:      Mental Status: She is alert and oriented to person, place, and time.        Lab Results   Component Value Date     (H) 05/15/2023     05/15/2023    K 4.0 05/15/2023    MG 2.1 08/29/2022    CL 92 (L) 05/15/2023    CO2 35 (H) 05/15/2023    BUN 25 (H) 05/15/2023    CREATININE 4.8 (H) 05/15/2023     05/15/2023    HGBA1C 5.0 08/28/2022    AST 21 05/15/2023    ALT 12 05/15/2023    ALBUMIN 3.8 05/15/2023    PROT 7.2 05/15/2023    BILITOT 0.9 05/15/2023    WBC 4.91 05/15/2023    HGB 11.5 (L) 05/15/2023    HCT 34.6 (L) 05/15/2023    HCT 26 (L) 05/24/2021     05/15/2023    INR 1.1 01/19/2023    TSH 1.662 11/08/2022    CHOL 109 (L) 01/19/2023    HDL 55 01/19/2023    LDLCALC 40.0 (L) 01/19/2023    TRIG 70 01/19/2023    FREET4 0.89 11/08/2022         Results for orders placed during the hospital encounter of 11/08/22    Echo    Interpretation Summary  · The left ventricle is normal in size with normal systolic function. The estimated ejection  fraction is 55%.  · Normal right ventricular size with normal right ventricular systolic function.  · Normal left ventricular diastolic function.  · Normal central venous pressure (3 mmHg).        Results for orders placed during the hospital encounter of 05/18/22    Cardiac catheterization    Narrative  Procedure performed in the Invasive Lab  - See Procedure Log link below for nursing documentation  - See OpNote on Surgeries Tab for physician findings  - See Imaging Tab for radiologist dictation         Assessment and Plan:  Chronic combined systolic and diastolic heart failure    Primary hypertension    Nonischemic cardiomyopathy    Nonobstructive atherosclerosis of coronary artery    ESRD (end stage renal disease)          Chronic systolic HF with recovered EF 55%, NYHA class I, stage C.  Etiology: non ischemic cardiomyopathy. C with only 70% of small OM2.  Devices: none  Medications: sacubitril-valsartan and carvedilol.  Hemodynamic status: warm, normotensive, euvolemic.   No changes on medications today.     2. Cardiac risk for non cardiac surgery.  No CV symptoms.  Excellent functional capacity.  Normal LVEF was noticed.  No contraindication for planned surgery from CV stand point. Low risk for intermediate risk surgery.

## 2023-05-16 NOTE — LETTER
May 16, 2023        Eliseo Barnes  [address] [facility] [place] [address]  Phone: [phone]  Fax: [phone]             [id number]  [address]  [facility] [address]  Phone: [phone]   Patient: Latricia Higgins   MR Number: [id number]   YOB: 1955   Date of Visit: 5/16/2023       Dear Dr. Eliseo Barnes    Thank you for referring Latricia Higgins to me for evaluation. Attached you will find relevant portions of my assessment and plan of care.    If you have questions, please do not hesitate to call me. I look forward to following Latricia Higgins along with you.    Sincerely,    Jose Cruz Dixon MD    Enclosure    If you would like to receive this communication electronically, please contact [email or web address] or [phone] to request [email or web address] Link access.    [email or web address] Link is a tool which provides read-only access to select patient information with whom you have a relationship. Its easy to use and provides real time access to review your patients record including encounter summaries, notes, results, and demographic information.    If you feel you have received this communication in error or would no longer like to receive these types of communications, please e-mail [email or web address]

## 2023-05-16 NOTE — PATIENT INSTRUCTIONS
You have just the right amount of fluid on you.  Please adhere to a low sodium diet (no more than 1.5 grams of sodium in 24h).  3.   Follow fluid restriction of  1. no more than 2 liters in 24 hours..   4.  No changes on medications today.  5. Follow up with PCP.

## 2023-05-16 NOTE — PATIENT INSTRUCTIONS
You have just the right amount of fluid on you.  Please adhere to a low sodium diet (no more than 1.5 grams of sodium in 24h).  3.   Follow fluid restriction of  1. no more than 2 liters in 24 hours..  4. Please increase carvedilol from 6.25 mg to 12.5 mg twice a day.  5. Call us in 2 weeks to increase carvedilol further. Please be ready to provide BP and heart rate.  6.  Follow up in 3 months with labs and echo.    PGY-1 Progress Note discussed with attending    PLEASE CONTACT ON CALL TEAM:  - On Call Team (Please refer to Red) FROM 5:00 PM - 8:30PM  - Nightfloat Team FROM 8:30 -7:30 AM    INTERVAL HPI/OVERNIGHT EVENTS: No acute events overnight. On RA, states that his breathing and his cough continues to improve compared to yesterday. Denies any other complaints.     MEDICATIONS  (STANDING):  albuterol    90 MICROgram(s) HFA Inhaler 2 Puff(s) Inhalation every 6 hours  aspirin  chewable 81 milliGRAM(s) Oral daily  atorvastatin 40 milliGRAM(s) Oral at bedtime  buMETAnide 1 milliGRAM(s) Oral daily  dextrose 5%. 1000 milliLiter(s) (100 mL/Hr) IV Continuous <Continuous>  dextrose 5%. 1000 milliLiter(s) (50 mL/Hr) IV Continuous <Continuous>  dextrose 50% Injectable 25 Gram(s) IV Push once  dextrose 50% Injectable 12.5 Gram(s) IV Push once  dextrose 50% Injectable 25 Gram(s) IV Push once  finasteride 5 milliGRAM(s) Oral daily  fluticasone propionate 50 MICROgram(s)/spray Nasal Spray 1 Spray(s) Both Nostrils two times a day  glucagon  Injectable 1 milliGRAM(s) IntraMuscular once  guaiFENesin ER 1200 milliGRAM(s) Oral every 12 hours  heparin   Injectable 5000 Unit(s) SubCutaneous every 12 hours  hydrALAZINE 10 milliGRAM(s) Oral three times a day  insulin lispro (ADMELOG) corrective regimen sliding scale   SubCutaneous three times a day before meals  insulin lispro (ADMELOG) corrective regimen sliding scale   SubCutaneous at bedtime  insulin lispro Injectable (ADMELOG) 2 Unit(s) SubCutaneous three times a day before meals  isosorbide   mononitrate ER Tablet (IMDUR) 30 milliGRAM(s) Oral daily  lactulose Syrup 20 Gram(s) Oral at bedtime  melatonin 5 milliGRAM(s) Oral at bedtime  metoprolol tartrate 12.5 milliGRAM(s) Oral two times a day  montelukast 10 milliGRAM(s) Oral at bedtime  multivitamin 1 Tablet(s) Oral daily  OLANZapine 10 milliGRAM(s) Oral at bedtime  tamsulosin 0.4 milliGRAM(s) Oral at bedtime  tiotropium 2.5 MICROgram(s) Inhaler 2 Puff(s) Inhalation daily  valproic  acid Syrup 250 milliGRAM(s) Oral daily  valproic  acid Syrup 250 milliGRAM(s) Oral at bedtime    MEDICATIONS  (PRN):  acetaminophen     Tablet .. 650 milliGRAM(s) Oral every 6 hours PRN Temp greater or equal to 38C (100.4F), Mild Pain (1 - 3)  dextrose Oral Gel 15 Gram(s) Oral once PRN Blood Glucose LESS THAN 70 milliGRAM(s)/deciliter      REVIEW OF SYSTEMS:  CONSTITUTIONAL: No fever, weight loss, or fatigue  RESPIRATORY: No cough, wheezing, chills or hemoptysis; No shortness of breath  CARDIOVASCULAR: No chest pain, palpitations, dizziness, or leg swelling  GASTROINTESTINAL: No abdominal pain. No nausea, vomiting, or hematemesis; No diarrhea or constipation. No melena or hematochezia.  GENITOURINARY: No dysuria or hematuria, urinary frequency  NEUROLOGICAL: No headaches, memory loss, loss of strength, numbness, or tremors  SKIN: No itching, burning, rashes, or lesions     Vital Signs Last 24 Hrs  T(C): 36.9 (15 May 2023 11:57), Max: 36.9 (15 May 2023 11:57)  T(F): 98.4 (15 May 2023 11:57), Max: 98.4 (15 May 2023 11:57)  HR: 87 (15 May 2023 11:57) (80 - 87)  BP: 124/88 (15 May 2023 11:57) (107/68 - 136/78)  BP(mean): --  RR: 18 (15 May 2023 11:57) (18 - 20)  SpO2: 95% (15 May 2023 11:57) (95% - 100%)    Parameters below as of 15 May 2023 11:57  Patient On (Oxygen Delivery Method): nasal cannula  O2 Flow (L/min): 3      PHYSICAL EXAMINATION:  GENERAL: NAD, well built  HEAD:  Atraumatic, Normocephalic  EYES:  conjunctiva and sclera clear  NECK: Supple, No JVD, Normal thyroid  CHEST/LUNG: Clear to auscultation. Clear to percussion bilaterally; No rales, rhonchi, wheezing, or rubs  HEART: Regular rate and rhythm; No murmurs, rubs, or gallops  ABDOMEN: Soft, Nontender, Nondistended; Bowel sounds present  NERVOUS SYSTEM:  Alert & Oriented X3,  no focal neuro deficits   EXTREMITIES:  2+ Peripheral Pulses, No clubbing, cyanosis, or edema  SKIN: warm dry                          10.6   7.98  )-----------( 172      ( 15 May 2023 06:01 )             35.5     05-15    144  |  111<H>  |  84<H>  ----------------------------<  170<H>  4.6   |  28  |  4.27<H>    Ca    10.0      15 May 2023 06:01  Phos  3.6     05-15  Mg     3.4     05-15                CAPILLARY BLOOD GLUCOSE      RADIOLOGY & ADDITIONAL TESTS:

## 2023-05-17 NOTE — OP NOTE
[facility]  Vascular Surgery  Operative Note    SUMMARY     Date of Procedure: 10/2/2020     Procedure: Procedure(s):  INSERTION, CATHETER, CENTRAL VENOUS, HEMODIALYSIS, TUNNELED   US guided    Surgeon(s) and Role:     * Ali Khoobehi, MD - Primary    Assisting Surgeon: None    Pre-Operative Diagnosis: Acute renal failure, unspecified acute renal failure type [N17.9]    Post-Operative Diagnosis: Post-Op Diagnosis Codes:     * Acute renal failure, unspecified acute renal failure type [N17.9]    Anesthesia: * No anesthesia type entered *    Technical Procedures Used:   I discussed with the patient indications, risks, and benefits of PermCath placement.  Risks discussed included bleeding, infection, DVT, myocardial infarction, pneumothorax, cardiac arrhythmia, death.  Patient was agreeable and gave consent.    Patient was brought to the operating theater and placed under sedation.  Right chest and neck were prepped and draped in a sterile fashion.     Under ultrasound images of the right IJ were taken. The vessel is patent and free of thrombus. Using ultrasound guidance right IJ was visualized and punctured with introducer needle.  Wire was passed proximally.  Fluoroscopy was used to confirm the correct position of the wire.  Wire tract was dilated with a 12 Lithuanian dilator followed by 14 Lithuanian dilator followed by 16 Lithuanian peel-away sheath.  23 centimeter (cuff to tip) catheter was tunneled from the right chest incision to the right neck incision.  Catheter was introduced into the sheath, and sheath was peeled off.  With the catheter completely within the patient, fluoro was used to confirm the tip at the right atriocaval junction.  Both ports were aspirated and flushed and noted to have brisk flow.  Catheter was sutured in place, neck incision closed.  The patient was brought to recovery room in stable condition.    Description of the Findings of the Procedure: n/a    Significant Surgical Tasks  Conducted by the Assistant(s), if Applicable: n/a    Complications: No    Estimated Blood Loss (EBL): * No values recorded between 10/2/2020 12:36 PM and 10/2/2020  1:09 PM *           Implants:   Implant Name Type Inv. Item Serial No.  Lot No. LRB No. Used Action   CATHETER HEMOSPLIT 23CM [id number] - [id number]  CATHETER HEMOSPLIT 23CM [id number]   [id number] 1 Implanted       Specimens:   Specimen (12h ago, onward)    None                  Condition: Good    Disposition: floor    Attestation: I performed the procedure.   Additional Safety/Bands: Home

## 2023-05-18 ENCOUNTER — TELEPHONE (OUTPATIENT)
Dept: PULMONOLOGY | Facility: CLINIC | Age: 68
End: 2023-05-18

## 2023-05-18 ENCOUNTER — HOSPITAL ENCOUNTER (OUTPATIENT)
Dept: RADIOLOGY | Facility: HOSPITAL | Age: 68
Discharge: HOME | End: 2023-05-18
Attending: NURSE PRACTITIONER
Payer: MEDICARE

## 2023-05-18 DIAGNOSIS — R91.8 GROUND GLASS OPACITY PRESENT ON IMAGING OF LUNG: ICD-10-CM

## 2023-05-18 DIAGNOSIS — R91.8 PULMONARY NODULES: ICD-10-CM

## 2023-05-18 PROCEDURE — 71250 CT THORAX DX C-: CPT | Mod: TC,PO

## 2023-05-18 NOTE — TELEPHONE ENCOUNTER
CT chest  1. No acute cardiac or pulmonary process.  2. No new concerning pulmonary nodule or mass with additional pulmonary nodules outlined above. Consider short-term interval follow-up in 3-6 months to document interval stability.  3. Additional incidental findings as noted above, similar to the previous exam.

## 2023-05-24 ENCOUNTER — TELEPHONE (OUTPATIENT)
Dept: TRANSPLANT | Facility: CLINIC | Age: 68
End: 2023-05-24
Payer: MEDICARE

## 2023-05-24 NOTE — TELEPHONE ENCOUNTER
"----- Message from Humza Tabareson sent at 5/24/2023  2:00 PM CDT -----  Consult/Advisory:          Name Of Caller: Self      Contact Preference?: [phone]       What is the nature of the call?: Calling to speak w/ Melinda about current status. Also requesting confirmation if her [facility] medication insurance card is still good           Additional Notes:  "Thank you for all that you do for our patients"      "

## 2023-05-24 NOTE — TELEPHONE ENCOUNTER
Spoke to patient, informed her that she is active on the wait list. We discussed average wait time, etc. Patient states she has a living donor but that living donor said they have not heard anything. In encouraged her to have her potential LD call the donor coordinators for information on their workup. I explained I do not have that info due to HIPPA laws.     Message also sent to Girma for insurance question.

## 2023-05-26 NOTE — TELEPHONE ENCOUNTER
Spoke with the patient she is aware. Will not eat or drink at least an hour before bed, elevate head of bed. She does not feel that she has reflux or aspirates

## 2023-05-30 RX ORDER — CARVEDILOL 12.5 MG/1
TABLET ORAL
Qty: 60 TABLET | Refills: 3 | Status: SHIPPED | OUTPATIENT
Start: 2023-05-30 | End: 2023-09-25

## 2023-06-06 ENCOUNTER — TELEPHONE (OUTPATIENT)
Dept: TRANSPLANT | Facility: CLINIC | Age: 68
End: 2023-06-06
Payer: MEDICARE

## 2023-06-06 NOTE — TELEPHONE ENCOUNTER
Spoke to patient, inquired again about her living donors. Encouraged her to have her potential living donors call the donor coordinators. I emphasized that due to HIPPA, any potential donors need to be proactive and call to follow up with the LD Coordinators on the status of their workup.

## 2023-06-06 NOTE — TELEPHONE ENCOUNTER
----- Message from Bia Bang sent at 6/6/2023 12:38 PM CDT -----  Regarding: Speak to coordinator  Pt calling to speak to coordinator regarding, pt status Please advise. Requesting a call back.        [phone] (home)

## 2023-06-27 ENCOUNTER — TELEPHONE (OUTPATIENT)
Dept: CARDIOLOGY | Facility: HOSPITAL | Age: 68
End: 2023-06-27
Payer: MEDICARE

## 2023-06-27 ENCOUNTER — TELEPHONE (OUTPATIENT)
Dept: TRANSPLANT | Facility: CLINIC | Age: 68
End: 2023-06-27
Payer: MEDICARE

## 2023-06-27 NOTE — TELEPHONE ENCOUNTER
MA notes per adherence form     FOR THE PAST THREE MONTHS:    0-AMA's  0-No-shows    No concerns with care giving, transportation, or mental health    Brought over to clinic to be scanned in.    Lani More  Abdominal Transplant MA

## 2023-06-29 ENCOUNTER — HOSPITAL ENCOUNTER (OUTPATIENT)
Dept: CARDIOLOGY | Facility: HOSPITAL | Age: 68
Discharge: HOME | End: 2023-06-29
Attending: NURSE PRACTITIONER
Payer: MEDICARE

## 2023-06-29 ENCOUNTER — OFFICE VISIT (OUTPATIENT)
Dept: TRANSPLANT | Facility: CLINIC | Age: 68
End: 2023-06-29
Attending: NURSE PRACTITIONER
Payer: MEDICARE

## 2023-06-29 VITALS
HEIGHT: 64 IN | WEIGHT: 137 LBS | SYSTOLIC BLOOD PRESSURE: 132 MMHG | BODY MASS INDEX: 23.39 KG/M2 | DIASTOLIC BLOOD PRESSURE: 75 MMHG | HEART RATE: 66 BPM

## 2023-06-29 VITALS
TEMPERATURE: 98 F | DIASTOLIC BLOOD PRESSURE: 67 MMHG | HEART RATE: 71 BPM | WEIGHT: 135.56 LBS | HEIGHT: 65 IN | SYSTOLIC BLOOD PRESSURE: 121 MMHG | RESPIRATION RATE: 18 BRPM | OXYGEN SATURATION: 99 % | BODY MASS INDEX: 22.59 KG/M2

## 2023-06-29 DIAGNOSIS — Z76.82 PATIENT ON WAITING LIST FOR KIDNEY TRANSPLANT: Primary | ICD-10-CM

## 2023-06-29 DIAGNOSIS — I42.8 NONISCHEMIC CARDIOMYOPATHY: ICD-10-CM

## 2023-06-29 DIAGNOSIS — Z76.82 ORGAN TRANSPLANT CANDIDATE: ICD-10-CM

## 2023-06-29 DIAGNOSIS — E78.00 PURE HYPERCHOLESTEROLEMIA: ICD-10-CM

## 2023-06-29 DIAGNOSIS — I10 PRIMARY HYPERTENSION: ICD-10-CM

## 2023-06-29 LAB
CV PHARM DOSE: 0.4 MG
CV STRESS BASE HR: 66 BPM
DIASTOLIC BLOOD PRESSURE: 75 MMHG
EJECTION FRACTION- HIGH: 65 %
END DIASTOLIC INDEX-HIGH: 153 ML/M2
END DIASTOLIC INDEX-LOW: 93 ML/M2
END SYSTOLIC INDEX-HIGH: 71 ML/M2
END SYSTOLIC INDEX-LOW: 31 ML/M2
OHS CV CPX 1 MINUTE RECOVERY HEART RATE: 90 BPM
OHS CV CPX 85 PERCENT MAX PREDICTED HEART RATE MALE: 125
OHS CV CPX MAX PREDICTED HEART RATE: 147
OHS CV CPX PATIENT IS FEMALE: 1
OHS CV CPX PATIENT IS MALE: 0
OHS CV CPX PEAK DIASTOLIC BLOOD PRESSURE: 54 MMHG
OHS CV CPX PEAK HEAR RATE: 61 BPM
OHS CV CPX PEAK RATE PRESSURE PRODUCT: 5795
OHS CV CPX PEAK SYSTOLIC BLOOD PRESSURE: 95 MMHG
OHS CV CPX PERCENT MAX PREDICTED HEART RATE ACHIEVED: 41
OHS CV CPX RATE PRESSURE PRODUCT PRESENTING: 8712
RETIRED EF AND QEF - SEE NOTES: 53 %
SYSTOLIC BLOOD PRESSURE: 132 MMHG

## 2023-06-29 PROCEDURE — A9502 TC99M TETROFOSMIN: HCPCS | Mod: TXP

## 2023-06-29 PROCEDURE — 93016 NUCLEAR STRESS - CARDIOLOGY INTERPRETED (CUPID ONLY): ICD-10-PCS | Mod: TXP,,, | Performed by: INTERNAL MEDICINE

## 2023-06-29 PROCEDURE — 99999 PR PBB SHADOW E&M-EST. PATIENT-LVL V: ICD-10-PCS | Mod: PBBFAC,TXP,, | Performed by: NURSE PRACTITIONER

## 2023-06-29 PROCEDURE — 63600175 PHARM REV CODE 636 W HCPCS: Mod: TXP | Performed by: NURSE PRACTITIONER

## 2023-06-29 PROCEDURE — 99215 PR OFFICE/OUTPT VISIT, EST, LEVL V, 40-54 MIN: ICD-10-PCS | Mod: S$PBB,TXP,, | Performed by: NURSE PRACTITIONER

## 2023-06-29 PROCEDURE — 93018 NUCLEAR STRESS - CARDIOLOGY INTERPRETED (CUPID ONLY): ICD-10-PCS | Mod: TXP,,, | Performed by: INTERNAL MEDICINE

## 2023-06-29 PROCEDURE — 78452 NUCLEAR STRESS - CARDIOLOGY INTERPRETED (CUPID ONLY): ICD-10-PCS | Mod: 26,TXP,, | Performed by: INTERNAL MEDICINE

## 2023-06-29 PROCEDURE — 99215 OFFICE O/P EST HI 40 MIN: CPT | Mod: PBBFAC,25,TXP | Performed by: NURSE PRACTITIONER

## 2023-06-29 PROCEDURE — 78452 HT MUSCLE IMAGE SPECT MULT: CPT | Mod: 26,TXP,, | Performed by: INTERNAL MEDICINE

## 2023-06-29 PROCEDURE — 78452 HT MUSCLE IMAGE SPECT MULT: CPT | Mod: TXP

## 2023-06-29 PROCEDURE — 93018 CV STRESS TEST I&R ONLY: CPT | Mod: TXP,,, | Performed by: INTERNAL MEDICINE

## 2023-06-29 PROCEDURE — 99215 OFFICE O/P EST HI 40 MIN: CPT | Mod: S$PBB,TXP,, | Performed by: NURSE PRACTITIONER

## 2023-06-29 PROCEDURE — 93016 CV STRESS TEST SUPVJ ONLY: CPT | Mod: TXP,,, | Performed by: INTERNAL MEDICINE

## 2023-06-29 PROCEDURE — 99999 PR PBB SHADOW E&M-EST. PATIENT-LVL V: CPT | Mod: PBBFAC,TXP,, | Performed by: NURSE PRACTITIONER

## 2023-06-29 RX ORDER — REGADENOSON 0.08 MG/ML
0.4 INJECTION, SOLUTION INTRAVENOUS
Status: COMPLETED | OUTPATIENT
Start: 2023-06-29 | End: 2023-06-29

## 2023-06-29 RX ORDER — AMINOPHYLLINE 25 MG/ML
75 INJECTION, SOLUTION INTRAVENOUS ONCE
Status: COMPLETED | OUTPATIENT
Start: 2023-06-29 | End: 2023-06-29

## 2023-06-29 RX ADMIN — REGADENOSON 0.4 MG: 0.08 INJECTION, SOLUTION INTRAVENOUS at 09:06

## 2023-06-29 RX ADMIN — AMINOPHYLLINE 75 MG: 25 INJECTION, SOLUTION INTRAVENOUS at 09:06

## 2023-06-29 NOTE — LETTER
July 11, 2023        Eliseo Barnes  [address] NEPHROLOGY [place] [address]  Phone: [phone]  Fax: [phone]             [facility]- Transplant 1st Fl  [address]  [facility] [address]  Phone: [phone]   Patient: Latricia Higgins   MR Number: [id number]   YOB: 1955   Date of Visit: 6/29/2023       Dear Dr. Eliseo Barnes    Thank you for referring Latricia Higgins to me for evaluation. Attached you will find relevant portions of my assessment and plan of care.    If you have questions, please do not hesitate to call me. I look forward to following Latricia Higgins along with you.    Sincerely,    Dinora Graves, NP    Enclosure    If you would like to receive this communication electronically, please contact [email or web address] or [phone] to request [employer] Link access.    [employer] Link is a tool which provides read-only access to select patient information with whom you have a relationship. Its easy to use and provides real time access to review your patients record including encounter summaries, notes, results, and demographic information.    If you feel you have received this communication in error or would no longer like to receive these types of communications, please e-mail [email or web address]

## 2023-06-29 NOTE — PROGRESS NOTES
Kidney Transplant Recipient Reevalulation    Referring Physician: Eliseo Barnes  Current Nephrologist: Eliseo Barnes  Waitlist Status: active  Dialysis Start Date: 10/1/2020    Subjective:     CC:  Annual reassessment of kidney transplant candidacy.    HPI:  Ms. Higgins is a 67 y.o. year old White female with ESRD secondary to HTN.  She has been on the wait list for a kidney transplant at [facility] since 10/1/2020. Patient is currently on hemodialysis started on 10/1/2020. Patient is dialyzing on MWF schedule.  Patient reports that she is tolerating dialysis well.. She has a dialysis catheter. Patient denies any recent hospitalizations or ED visits.    Hospitalizations/ED visits:      Interval History:   Reports doing well. No complaints. Stays active .     CXR: 1/19/2023 Remaining COPD with some chronic interstitial fibrosis particularly right lower lung zone.  CT chest: pulmonary nodules following with pulmonary---per repeat in 6 months  PXR: 1/19/23 per surgeon test result is favorable for transplant  Renal US: 1/19/23 Left simple renal cyst. Hypoechoic lesion superior to the left kidney, likely correlates to enlarged adrenal gland seen on multiple prior CTs.  Iliacs : 1/19/23 per surgeon test result is favorable for transplant  Echo:  Results for orders placed during the hospital encounter of 11/08/22    Echo    Interpretation Summary  · The left ventricle is normal in size with normal systolic function. The estimated ejection fraction is 55%.  · Normal right ventricular size with normal right ventricular systolic function.  · Normal left ventricular diastolic function.  · Normal central venous pressure (3 mmHg).      Stress:  Results for orders placed during the hospital encounter of 06/29/23    Lexiscan Card Interp    Interpretation Summary    Equivocal myocardial perfusion scan.    There is a moderate intensity, small sized, equivocal perfusion abnormality that is mostly reversible with small fixed area in the mid  to distal anteroseptal wall(s) in the typical distribution of the LAD territory. This finding is equivocal due to a breast shadow overlying the myocardium.    There are no other significant perfusion abnormalities.    The visually estimated ejection fraction is normal at rest and normal during stress.    There is normal wall motion at rest and post stress.    LV cavity size is normal at rest and normal at stress.    The ECG portion of the study is negative for ischemia.    The patient reported no chest pain during the stress test.    There were no arrhythmias during stress.    A PET stress exam is recommended if clinically indicated.    When compared to the previous study from 2021, there are significant changes.  Breast attenuation remains. There are now reversible components to the defect.    Colonoscopy: 2021 repeat in 3 years  PTH:  Lab Results   Component Value Date    .2 (H) 2023    CALCIUM 8.7 05/15/2023    PHOS 3.7 2023     PAP: 23 Negative for intraepithelial lesion or malignancy  MM2023 neg      Current Outpatient Medications:     apixaban (ELIQUIS) 5 mg Tab, Take 1 tablet (5 mg total) by mouth 2 (two) times daily., Disp: 60 tablet, Rfl: 6    atorvastatin (LIPITOR) 40 MG tablet, Take 40 mg by mouth once daily., Disp: , Rfl:     calcium carbonate (OS-MARLY) 600 mg calcium (1,500 mg) Tab, Take 600 mg by mouth 2 (two) times daily with meals., Disp: , Rfl:     carvediloL (COREG) 12.5 MG tablet, TAKE 1 TABLET BY MOUTH TWICE DAILY WITH MEALS, Disp: 60 tablet, Rfl: 3    midodrine (PROAMATINE) 10 MG tablet, Take 10 mg by mouth every Mon, Wed, Fri. With dialysis, Disp: , Rfl:     midodrine (PROAMATINE) 5 MG Tab, Take 1 tablet (5 mg total) by mouth every Tuesday, Thursday, Saturday, ., Disp: 48 tablet, Rfl: 3    ondansetron (ZOFRAN) 4 MG tablet, Take 4 mg by mouth every 8 (eight) hours as needed for Nausea., Disp: , Rfl:     sacubitriL-valsartan (ENTRESTO) 24-26 mg per  tablet, Take 1 tablet by mouth 2 (two) times daily., Disp: 60 tablet, Rfl: 11    sevelamer carbonate (RENVELA) 800 mg Tab, Take 2,400 mg by mouth 3 (three) times daily with meals. , Disp: , Rfl:     umeclidinium-vilanteroL (ANORO ELLIPTA) 62.5-25 mcg/actuation DsDv, Inhale 1 puff into the lungs once daily. Controller, Disp: 1 each, Rfl: 5    VIOS AEROSOL DELIVERY SYSTEM Lizbeth, AS DIRECTED, Disp: , Rfl:     vitamin D (VITAMIN D3) 1000 units Tab, Take 1,000 Units by mouth once daily., Disp: , Rfl:     conjugated estrogens (PREMARIN) vaginal cream, Place 1 g vaginally once daily., Disp: 1 applicator, Rfl: 2    VELTASSA 8.4 gram PwPk, Take 8.4 g by mouth 4 (four) times a week. Tues, thurs, sat, sun, Disp: , Rfl:       Past Medical History:   Diagnosis Date    Dialysis patient 10/05/2020    Emphysema of lung     ESRD (end stage renal disease)     Hepatitis 1980    HLD (hyperlipidemia)     Hypertension     Renal disorder        Past Surgical History:   Procedure Laterality Date    AORTOGRAPHY WITH SERIALOGRAPHY N/A 09/30/2020    Procedure: co2 renal angio;  Surgeon: Lance Bustamante MD;  Location: [facility] CATH/EP LAB;  Service: Vascular;  Laterality: N/A;    APPENDECTOMY      Age 16    AV FISTULA PLACEMENT Right 12/09/2020    Procedure: CREATION, AV FISTULA;  Surgeon: Lance Bustamante MD;  Location: [facility] OR;  Service: General;  Laterality: Right;    COLONOSCOPY N/A 06/08/2021    Procedure: COLONOSCOPY;  Surgeon: Avila Smith III, MD;  Location: [facility] ENDO;  Service: Endoscopy;  Laterality: N/A;    COLONOSCOPY N/A 12/14/2021    Procedure: COLONOSCOPY;  Surgeon: Avila Smith III, MD;  Location: [facility] ENDO;  Service: Endoscopy;  Laterality: N/A;    COLONOSCOPY W/ POLYPECTOMY  06/08/2021    FISTULOGRAM Bilateral 05/18/2022    Procedure: Fistulogram;  Surgeon: Lance Bustamante MD;  Location: [facility] CATH/EP LAB;  Service: General;  Laterality: Bilateral;    INSERTION OF TUNNELED CENTRAL VENOUS HEMODIALYSIS CATHETER   "10/02/2020    Procedure: INSERTION, CATHETER, CENTRAL VENOUS, HEMODIALYSIS, TUNNELED;  Surgeon: Ali Khoobehi, MD;  Location: [facility] OR;  Service: Vascular;;    LEFT HEART CATHETERIZATION Left 05/17/2022    Procedure: Left heart cath;  Surgeon: Daniel Tyler MD;  Location: [facility] CATH LAB;  Service: Cardiology;  Laterality: Left;    OPEN THROMBECTOMY OF GRAFT  05/18/2022    Procedure: THROMBECTOMY, GRAFT, OPEN;  Surgeon: Lance uBstamante MD;  Location: [facility] CATH/EP LAB;  Service: General;;    PLACEMENT OF DIALYSIS ACCESS Right 09/30/2020    Procedure: Insertion, Dialysis Access;  Surgeon: Lance Bustamante MD;  Location: [facility] CATH/EP LAB;  Service: Vascular;  Laterality: Right;    REMOVAL OF TUNNELED CENTRAL VENOUS CATHETER (CVC) N/A 12/16/2020    Procedure: REMOVAL, CATHETER, CENTRAL VENOUS, TUNNELED;  Surgeon: Ali Khoobehi, MD;  Location: [facility] OR;  Service: Vascular;  Laterality: N/A;    TONSILLECTOMY      TUBAL LIGATION               Review of Systems   Constitutional:  Positive for fatigue. Negative for appetite change, chills and fever.   HENT:  Negative for trouble swallowing.    Respiratory:  Positive for shortness of breath. Negative for cough, chest tightness and wheezing.    Cardiovascular:  Negative for chest pain, palpitations and leg swelling.   Gastrointestinal:  Negative for abdominal pain, constipation, diarrhea and nausea.   Genitourinary:  Positive for decreased urine volume.   Musculoskeletal:  Negative for arthralgias and myalgias.   Skin:  Negative for rash.   Neurological:  Negative for dizziness, weakness, light-headedness and headaches.   Psychiatric/Behavioral:  Negative for sleep disturbance.      Objective:   body mass index is 22.73 kg/m².  /67 (BP Location: Right arm, Patient Position: Sitting, BP Method: Medium (Automatic))   Pulse 71   Temp 97.7 °F (36.5 °C) (Tympanic)   Resp 18   Ht 5' 4.76" (1.645 m)   Wt 61.5 kg (135 lb 9.3 oz)   SpO2 99%   BMI 22.73 kg/m² "       Physical Exam  Constitutional:       General: She is not in acute distress.     Appearance: She is well-developed. She is not diaphoretic.   Cardiovascular:      Rate and Rhythm: Normal rate and regular rhythm.      Heart sounds: Normal heart sounds. No murmur heard.    No friction rub. No gallop.   Pulmonary:      Effort: Pulmonary effort is normal. No respiratory distress.      Breath sounds: Normal breath sounds. No wheezing or rales.   Abdominal:      General: Bowel sounds are normal. There is no distension.      Palpations: Abdomen is soft.      Tenderness: There is no abdominal tenderness.   Musculoskeletal:         General: No tenderness. Normal range of motion.   Skin:     General: Skin is warm and dry.      Findings: No rash.      Nails: There is no clubbing.          Neurological:      Mental Status: She is alert and oriented to person, place, and time.   Psychiatric:         Behavior: Behavior normal.       Labs:  Lab Results   Component Value Date    WBC 4.91 05/15/2023    HGB 11.5 (L) 05/15/2023    HCT 34.6 (L) 05/15/2023     05/15/2023    K 4.0 05/15/2023    CL 92 (L) 05/15/2023    CO2 35 (H) 05/15/2023    BUN 25 (H) 05/15/2023    CREATININE 4.8 (H) 05/15/2023    EGFRNORACEVR 9.4 (A) 05/15/2023    CALCIUM 8.7 05/15/2023    PHOS 3.7 01/19/2023    MG 2.1 08/29/2022    ALBUMIN 3.8 05/15/2023    AST 21 05/15/2023    ALT 12 05/15/2023    .2 (H) 01/19/2023       Lab Results   Component Value Date    BILIRUBINUA Negative 09/29/2020    AMYLASE 115 (H) 09/27/2020    LIPASE 33 11/08/2021    PROTEINUA 3+ (A) 09/29/2020    NITRITE Negative 09/29/2020    RBCUA 26 (H) 09/29/2020    WBCUA >100 (H) 09/29/2020       No results found for: HLAABCTYPE    Lab Results   Component Value Date    CPRA 0 06/05/2023    KS9GXHP B73 06/05/2023    CIIAB Negative 06/05/2023    ABCMT WEAK----DQA1*06:01, DQA1*05:05 04/03/2023       Labs were reviewed with the patient.    Pre-transplant Workup:   Reviewed with the  patient.    Assessment:     1. Patient on waiting list for kidney transplant    2. Primary hypertension    3. Pure hypercholesterolemia    4. Nonischemic cardiomyopathy        Plan:   Equivocal stress. Needs PET and follow-up with cardiologist     Transplant Candidacy:   Ms. Higgins is a high-risk kidney transplant candidate.  Meets center eligibility for accepting HCV+ donor offer - Yes.  Patient educated on HCV+ donors. Latricia is willing  to accept HCV+ donor offer -  Yes   Patient is a candidate for KDPI > 85 kidney donor offer - Yes.  She remains in overall stable health, and will remain active on the transplant list.    Patient advised that it is recommended that all transplant candidates, and their close contacts and household members receive Covid vaccination.    Dinora Graves NP       Follow-up:   In addition to the tests noted in the plan, Ms. Higgins will continue to have reevaluation as per the standing pre-kidney transplant protocol:  Monthly blood for PRA  Annual return to clinic, except HIV positive, > 65 years of age, or pancreas transplant candidates who will be scheduled to see transplant every 6 months while in pre-transplant phase  Annual re-testing: CXR, EKG, yearly mammograms for women over 40 and PSA for males over 40, cardiology follow-up as recommended by initial cardiology pre-transplant evaluation  Renal ultrasound every 2 years  Baseline colonoscopy after age 50 and repeated as recommended    UNOS Patient Status  Functional Status: 70% - Cares for self: unable to carry on normal activity or active work  Physical Capacity: No Limitations

## 2023-06-29 NOTE — PROGRESS NOTES
LISTED PATIENT EDUCATION NOTE    Ms. Latricia Higgisn was seen in pre-kidney transplant clinic for evaluation for kidney, kidney/pancreas or pancreas only transplant.  The patient attended a an individual video education session that discussed/reviewed the following aspects of transplantation: evaluation including diagnostic and laboratory testing,( Chemistries, Hematology, Serologies including HIV and Hepatitis and HLA) required for transplantation and selection committee process, UNOS waitlist management/multiple listings, types of organs offered (KDPI < 85%, KDPI > 85%, PHS risk, DCD, HCV+, HIV+ for HIV+ recipients and enbloc/dual), financial aspects, surgical procedures, dietary instruction pre- and post-transplant, health maintenance pre- and post-transplant, post-transplant hospitalization and outpatient follow-up, potential to participate in a research protocol, and medication management and side effects.  A question and answer session was provided after the presentation.    The patient was seen by all members of the multi-disciplinary team to include: Nephrologist/ZUHAIR, Surgeon, , Transplant Coordinator, , Pharmacist and Dietician (if applicable).    The patient reviewed and signed all consents for evaluation which were witnessed and sent to scanning into the [facility] chart.    The patient was given an education book and plan for further evaluation based on her individual assessment.      Reviewed program requirement for complete COVID vaccination with documentation prior to listing.  COVID education information reviewed with patient. Patient encouraged to be up to date on all vaccinations.       The patient was informed that the transplant team would manage immediate post op pain. If the patient requires long term pain management, they will need to have that pain management addressed by their PCP or previous provider who wrote for long term pain medicines.    The patient was  encouraged to call with any questions or concerns.

## 2023-06-30 ENCOUNTER — TELEPHONE (OUTPATIENT)
Dept: TRANSPLANT | Facility: CLINIC | Age: 68
End: 2023-06-30
Payer: MEDICARE

## 2023-06-30 DIAGNOSIS — Z76.82 ORGAN TRANSPLANT CANDIDATE: Primary | ICD-10-CM

## 2023-06-30 NOTE — PROGRESS NOTES
YEARLY LIST MANAGEMENT NOTE    Latricia Higgins's kidney transplant listing status reviewed.  Patient is due for follow-up appointments on 12/2023.  Appointments will be scheduled per protocol. Pet stress ordered and 6 month follow-up CT chest.

## 2023-06-30 NOTE — TELEPHONE ENCOUNTER
Spoke to pt confirming PET stress test appt on 08/29/2023. Appt reminders were mailed on 06/30/2023.

## 2023-06-30 NOTE — TELEPHONE ENCOUNTER
Spoke to pt to get her scheduled for her PET stress test at main campus. Pt needs to speak to coordinator about being scheduled for test in [place]. I did inform pt PET stress tests are done only at main campus, but she insisted she speak with coordinator to have it scheduled in [place].

## 2023-07-05 NOTE — PROGRESS NOTES
Transplant Psychosocial Evaluation Update:  Last psychosocial evaluation for transplant was completed on 1/20/2023 by KYLER Mccord LMSW    Pt presents today in company of pt's son/primary caregiver, Jordan Gallegos. Pt and caregiver present as alert, oriented to person, place, time, purpose of visit. Pt and caregiver deny concerns about going through with transplant and state motivation and sense of preparedness for transplantation, caregiver role, psychosocial risk factors, medical risk factors, financial aspects, and lifetime commitments. All concerns and education points as per transplant psychosocial evaluation process addressed (also refer to psychosocial dated 1/20/2023). Pt actively participated in today's interview, with pt's son, Jordan Gallegos, participating as caregiver. Pt asking questions appropriately and denies changes to previous psychosocial evaluation for transplantation which we reviewed line by line with pt and, per pt choice, with pts caregiver today.    Primary Caregivers and Transportation for Transplant:  1. Jordan Gallegos, 50 yo son, [place], [place] (around the corner from pt), self employed , [address], [phone]  2. Rubens Garcia, long time friend, neighbor of pt, [address], [phone]  3. So Tavarez, friend of 30 years, [address], neighbor of pt, [phone]    Both pt and caregiver/s plan to stay locally at home - information reviewed in depth. Caregivers plan to stay at hospital as appropriate for transplant and post-transplant process.    Pts Vocation: Pt no longer working. Pt previously worked at [employer] for 45 years and quit in 2020. Pt reports currently receiving $1,885/month in SSI.     DME: [facility].     ADLS:  Pt reports independent with all ADLS, drives, and handles own medication management.      Household and Dependents: pt resides alone and has no dependents at this time.    Income: Pt states ability to afford all monthly expenses and costs  including for medications now and if transplanted based on income and on savings and assets.    Dialysis Adherence: [facility] ([phone]) MWF schedule. Compliance reports states in last 3 months pt had 0 shortened treatments, 0 missed treatments, and no concerns with caregiving, transportation, or mental health.     Payer/Plan Subscr  Sex Relation Sub. Ins. ID Effective Group Num   1. MEDICARE - ME* DOUGLASEDUAR 1955 Female Self [id number] 20                                    [address]     **Pt reports [facility] assists with paying for Medicare supplement.     Pt and son deny any additional concerns, stating preparedness and motivation to proceed with organ transplant.     Suitability for Transplant:   Pt remains low risk for transplant at this time based on psychosocial risk factors and strengths.    Pt ashia well overall with health needs and life in general, has stable supports, adequate insurance, financial stability, transportation and caregiver plans in place, and is using no substances unless prescribed. Pt denies overwhelming mental health concerns, SI, or HI.    Daphnie Garcia LMSW

## 2023-07-25 ENCOUNTER — TELEPHONE (OUTPATIENT)
Dept: PULMONOLOGY | Facility: CLINIC | Age: 68
End: 2023-07-25

## 2023-07-25 DIAGNOSIS — J90 PLEURAL EFFUSION: ICD-10-CM

## 2023-07-25 RX ORDER — UMECLIDINIUM BROMIDE AND VILANTEROL TRIFENATATE 62.5; 25 UG/1; UG/1
1 POWDER RESPIRATORY (INHALATION) DAILY
Qty: 1 EACH | Refills: 5 | Status: SHIPPED | OUTPATIENT
Start: 2023-07-25

## 2023-07-25 NOTE — TELEPHONE ENCOUNTER
----- Message from Mateo Shrestha sent at 7/25/2023 11:28 AM CDT -----  Regarding: Rx Refill  Patient called and would like to get a refill on Anoro  inhaler to [facility]  pharmacy [facility]  She can be reached at [phone]

## 2023-08-22 ENCOUNTER — OFFICE VISIT (OUTPATIENT)
Dept: PULMONOLOGY | Facility: CLINIC | Age: 68
End: 2023-08-22
Payer: MEDICARE

## 2023-08-22 VITALS
WEIGHT: 136 LBS | OXYGEN SATURATION: 97 % | HEIGHT: 65 IN | SYSTOLIC BLOOD PRESSURE: 90 MMHG | DIASTOLIC BLOOD PRESSURE: 60 MMHG | BODY MASS INDEX: 22.66 KG/M2 | HEART RATE: 82 BPM

## 2023-08-22 DIAGNOSIS — R91.8 PULMONARY NODULES: ICD-10-CM

## 2023-08-22 DIAGNOSIS — N18.6 ESRD (END STAGE RENAL DISEASE): ICD-10-CM

## 2023-08-22 DIAGNOSIS — J43.9 PULMONARY EMPHYSEMA, UNSPECIFIED EMPHYSEMA TYPE: Primary | ICD-10-CM

## 2023-08-22 PROCEDURE — 99213 PR OFFICE/OUTPT VISIT, EST, LEVL III, 20-29 MIN: ICD-10-PCS | Mod: S$GLB,,, | Performed by: NURSE PRACTITIONER

## 2023-08-22 PROCEDURE — 99213 OFFICE O/P EST LOW 20 MIN: CPT | Mod: S$GLB,,, | Performed by: NURSE PRACTITIONER

## 2023-08-22 NOTE — PROGRESS NOTES
SUBJECTIVE:    Patient ID: Latricia Higgins is a 68 y.o. female.    Chief Complaint: Follow-up    HPI  Patient here today feeling well. She is using her Anoro daily and feels benefit from it.  Her PFT no obstruction, no restriction, moderate diffusion defect, Previous PFT with air trapping and mild diffusion defect. She is on kidney transplant list, states her daughter is the donor. She states She has to have a scan of her heart before approved for transplant.  Ct scan showed unchanged nodules and emphysema.  Her RLL shows honeycombing.    Past Medical History:   Diagnosis Date    Dialysis patient 10/05/2020    Emphysema of lung     ESRD (end stage renal disease)     Hepatitis 1980    HLD (hyperlipidemia)     Hypertension     Renal disorder      Past Surgical History:   Procedure Laterality Date    AORTOGRAPHY WITH SERIALOGRAPHY N/A 09/30/2020    Procedure: co2 renal angio;  Surgeon: Lance Bustamante MD;  Location: [facility] CATH/EP LAB;  Service: Vascular;  Laterality: N/A;    APPENDECTOMY      Age 16    AV FISTULA PLACEMENT Right 12/09/2020    Procedure: CREATION, AV FISTULA;  Surgeon: Lance Bustamante MD;  Location: [facility] OR;  Service: General;  Laterality: Right;    COLONOSCOPY N/A 06/08/2021    Procedure: COLONOSCOPY;  Surgeon: Avila Smith III, MD;  Location: [facility] ENDO;  Service: Endoscopy;  Laterality: N/A;    COLONOSCOPY N/A 12/14/2021    Procedure: COLONOSCOPY;  Surgeon: Avila Smith III, MD;  Location: [facility] ENDO;  Service: Endoscopy;  Laterality: N/A;    COLONOSCOPY W/ POLYPECTOMY  06/08/2021    FISTULOGRAM Bilateral 05/18/2022    Procedure: Fistulogram;  Surgeon: Lance Bustamante MD;  Location: [facility] CATH/EP LAB;  Service: General;  Laterality: Bilateral;    INSERTION OF TUNNELED CENTRAL VENOUS HEMODIALYSIS CATHETER  10/02/2020    Procedure: INSERTION, CATHETER, CENTRAL VENOUS, HEMODIALYSIS, TUNNELED;  Surgeon: Ali Khoobehi, MD;  Location: [facility] OR;  Service: Vascular;;    LEFT HEART CATHETERIZATION  Left 05/17/2022    Procedure: Left heart cath;  Surgeon: Daniel Tyler MD;  Location: [facility] CATH LAB;  Service: Cardiology;  Laterality: Left;    OPEN THROMBECTOMY OF GRAFT  05/18/2022    Procedure: THROMBECTOMY, GRAFT, OPEN;  Surgeon: Lance Bustamante MD;  Location: [facility] CATH/EP LAB;  Service: General;;    PLACEMENT OF DIALYSIS ACCESS Right 09/30/2020    Procedure: Insertion, Dialysis Access;  Surgeon: Lance Bustamante MD;  Location: [facility] CATH/EP LAB;  Service: Vascular;  Laterality: Right;    REMOVAL OF TUNNELED CENTRAL VENOUS CATHETER (CVC) N/A 12/16/2020    Procedure: REMOVAL, CATHETER, CENTRAL VENOUS, TUNNELED;  Surgeon: Ali Khoobehi, MD;  Location: [facility] OR;  Service: Vascular;  Laterality: N/A;    TONSILLECTOMY      TUBAL LIGATION       Family History   Problem Relation Age of Onset    Cancer Mother     Heart disease Father         Social History:   Marital Status:   Occupation: Data Unavailable  Alcohol History:  reports that she does not currently use alcohol.  Tobacco History:  reports that she quit smoking about 2 years ago. Her smoking use included cigarettes. She started smoking about 22 years ago. She has a 5.0 pack-year smoking history. She has never used smokeless tobacco.  Drug History:  reports that she does not currently use drugs.    Review of patient's allergies indicates:  No Known Allergies    Current Outpatient Medications   Medication Sig Dispense Refill    apixaban (ELIQUIS) 5 mg Tab Take 1 tablet (5 mg total) by mouth 2 (two) times daily. 60 tablet 6    atorvastatin (LIPITOR) 40 MG tablet Take 40 mg by mouth once daily.      calcium carbonate (OS-MARLY) 600 mg calcium (1,500 mg) Tab Take 600 mg by mouth 2 (two) times daily with meals.      carvediloL (COREG) 12.5 MG tablet TAKE 1 TABLET BY MOUTH TWICE DAILY WITH MEALS 60 tablet 3    midodrine (PROAMATINE) 10 MG tablet Take 10 mg by mouth every Mon, Wed, Fri. With dialysis      midodrine (PROAMATINE) 5 MG Tab Take 1 tablet (5 mg  total) by mouth every Tuesday, Thursday, Saturday, Sunday. 48 tablet 3    ondansetron (ZOFRAN) 4 MG tablet Take 4 mg by mouth every 8 (eight) hours as needed for Nausea.      sacubitriL-valsartan (ENTRESTO) 24-26 mg per tablet Take 1 tablet by mouth 2 (two) times daily. 60 tablet 11    sevelamer carbonate (RENVELA) 800 mg Tab Take 2,400 mg by mouth 3 (three) times daily with meals.       umeclidinium-vilanteroL (ANORO ELLIPTA) 62.5-25 mcg/actuation DsDv Inhale 1 puff into the lungs once daily. Controller 1 each 5    VELTASSA 8.4 gram PwPk Take 8.4 g by mouth 4 (four) times a week. Tues, thurs, sat, sun      VIOS AEROSOL DELIVERY SYSTEM Lizbeth AS DIRECTED      vitamin D (VITAMIN D3) 1000 units Tab Take 1,000 Units by mouth once daily.      conjugated estrogens (PREMARIN) vaginal cream Place 1 g vaginally once daily. 1 applicator 2     No current facility-administered medications for this visit.       Last CT of chest 05/18/2023    IMPRESSION:  1. No acute cardiac or pulmonary process.  2. No new concerning pulmonary nodule or mass with additional pulmonary nodules outlined above. Consider short-term interval follow-up in 3-6 months to document interval stability.  3. Additional incidental findings as noted above, similar to the previous exam.      PFT 03/2023  PFT with no obstruction, no restriction, moderate diffusion defect  Previous PFT with air trapping and mild diffusion defect.       Review of Systems  General: Feeling Well.  Eyes: Vision is good.  ENT:  No sinusitis or pharyngitis.   Heart:: No chest pain or palpitations.  Lungs: No cough, sputum, or wheezing.  GI: No Nausea, vomiting, constipation, diarrhea, or reflux.  : dialysis three days a week   Musculoskeletal: No joint pain or myalgias.  Skin: No lesions or rashes.  Neuro: No headaches or neuropathy.  Lymph: No edema or adenopathy.  Psych: No anxiety or depression.  Endo: weight stable     OBJECTIVE:      BP 90/60 (BP Location: Left arm, Patient  "Position: Sitting, BP Method: Medium (Manual))   Pulse 82   Ht 5' 4.76" (1.645 m)   Wt 61.7 kg (136 lb)   SpO2 97%   BMI 22.80 kg/m²     Physical Exam  GENERAL: Older patient in no distress.  HEENT: Pupils equal and reactive. Extraocular movements intact. Nose intact.      Pharynx moist.  NECK: Supple.   HEART: Regular rate and rhythm. No murmur or gallop auscultated.  LUNGS: Clear to auscultation and percussion. Lung excursion symmetrical. No change in fremitus. No adventitial noises.  ABDOMEN: Bowel sounds present. Non-tender, no masses palpated.  EXTREMITIES: Normal muscle tone and joint movement, no cyanosis or clubbing.   LYMPHATICS: No adenopathy palpated, no edema.  SKIN: Dry, intact, no lesions.   NEURO: Cranial nerves II-XII intact. Motor strength 5/5 bilaterally, upper and lower extremities.  PSYCH: Appropriate affect.    Assessment:       1. Pulmonary emphysema, unspecified emphysema type    2. Pulmonary nodules    3. ESRD (end stage renal disease)            Plan:       Pulmonary emphysema, unspecified emphysema type    Pulmonary nodules    ESRD (end stage renal disease)      Continue the Anoro   Repeat Ct in November   Continue to follow reflux precautions   Follow up in about 3 months (around 11/22/2023).        "

## 2023-08-25 ENCOUNTER — TELEPHONE (OUTPATIENT)
Dept: CARDIOLOGY | Facility: HOSPITAL | Age: 68
End: 2023-08-25
Payer: MEDICARE

## 2023-08-29 ENCOUNTER — HOSPITAL ENCOUNTER (OUTPATIENT)
Dept: CARDIOLOGY | Facility: HOSPITAL | Age: 68
Discharge: HOME | End: 2023-08-29
Attending: NURSE PRACTITIONER
Payer: MEDICARE

## 2023-08-29 VITALS
SYSTOLIC BLOOD PRESSURE: 109 MMHG | BODY MASS INDEX: 23.9 KG/M2 | WEIGHT: 140 LBS | RESPIRATION RATE: 16 BRPM | HEART RATE: 82 BPM | HEIGHT: 64 IN | DIASTOLIC BLOOD PRESSURE: 63 MMHG

## 2023-08-29 DIAGNOSIS — Z76.82 ORGAN TRANSPLANT CANDIDATE: ICD-10-CM

## 2023-08-29 LAB
CFR FLOW - ANTERIOR: 1.8
CFR FLOW - INFERIOR: 1.97
CFR FLOW - LATERAL: 1.76
CFR FLOW - MAX: 2.3
CFR FLOW - MIN: 1.41
CFR FLOW - SEPTAL: 1.74
CFR FLOW - WHOLE HEART: 1.82
CV STRESS BASE HR: 70 BPM
DIASTOLIC BLOOD PRESSURE: 84 MMHG
EJECTION FRACTION- HIGH: 59 %
END DIASTOLIC INDEX-HIGH: 155 ML/M2
END DIASTOLIC INDEX-LOW: 91 ML/M2
END SYSTOLIC INDEX-HIGH: 78 ML/M2
END SYSTOLIC INDEX-LOW: 40 ML/M2
NUC REST DIASTOLIC VOLUME INDEX: 68
NUC REST EJECTION FRACTION: 70
NUC REST SYSTOLIC VOLUME INDEX: 20
NUC STRESS DIASTOLIC VOLUME INDEX: 65
NUC STRESS EJECTION FRACTION: 72 %
NUC STRESS SYSTOLIC VOLUME INDEX: 18
OHS CV CPX 1 MINUTE RECOVERY HEART RATE: 92 BPM
OHS CV CPX 85 PERCENT MAX PREDICTED HEART RATE MALE: 124
OHS CV CPX MAX PREDICTED HEART RATE: 146
OHS CV CPX PATIENT IS FEMALE: 1
OHS CV CPX PATIENT IS MALE: 0
OHS CV CPX PEAK DIASTOLIC BLOOD PRESSURE: 89 MMHG
OHS CV CPX PEAK HEAR RATE: 64 BPM
OHS CV CPX PEAK RATE PRESSURE PRODUCT: 8192
OHS CV CPX PEAK SYSTOLIC BLOOD PRESSURE: 128 MMHG
OHS CV CPX PERCENT MAX PREDICTED HEART RATE ACHIEVED: 44
OHS CV CPX RATE PRESSURE PRODUCT PRESENTING: 9310
REST FLOW - ANTERIOR: 0.96 CC/MIN/G
REST FLOW - INFERIOR: 0.84 CC/MIN/G
REST FLOW - LATERAL: 1.07 CC/MIN/G
REST FLOW - MAX: 1.33 CC/MIN/G
REST FLOW - MIN: 0.37 CC/MIN/G
REST FLOW - SEPTAL: 0.85 CC/MIN/G
REST FLOW - WHOLE HEART: 0.93 CC/MIN/G
RETIRED EF AND QEF - SEE NOTES: 47 %
STRESS FLOW - ANTERIOR: 1.71 CC/MIN/G
STRESS FLOW - INFERIOR: 1.64 CC/MIN/G
STRESS FLOW - LATERAL: 1.88 CC/MIN/G
STRESS FLOW - MAX: 2.16 CC/MIN/G
STRESS FLOW - MIN: 0.55 CC/MIN/G
STRESS FLOW - SEPTAL: 1.49 CC/MIN/G
STRESS FLOW - WHOLE HEART: 1.68 CC/MIN/G
SYSTOLIC BLOOD PRESSURE: 133 MMHG

## 2023-08-29 PROCEDURE — 93018 CV STRESS TEST I&R ONLY: CPT | Mod: TXP,,, | Performed by: INTERNAL MEDICINE

## 2023-08-29 PROCEDURE — 93018 CARDIAC PET SCAN STRESS (CUPID ONLY): ICD-10-PCS | Mod: TXP,,, | Performed by: INTERNAL MEDICINE

## 2023-08-29 PROCEDURE — 78434 AQMBF PET REST & RX STRESS: CPT | Mod: 26,TXP,, | Performed by: INTERNAL MEDICINE

## 2023-08-29 PROCEDURE — 78434 AQMBF PET REST & RX STRESS: CPT | Mod: TXP

## 2023-08-29 PROCEDURE — 63600175 PHARM REV CODE 636 W HCPCS: Mod: TXP | Performed by: NURSE PRACTITIONER

## 2023-08-29 PROCEDURE — 93016 CARDIAC PET SCAN STRESS (CUPID ONLY): ICD-10-PCS | Mod: TXP,,, | Performed by: INTERNAL MEDICINE

## 2023-08-29 PROCEDURE — 78491 MYOCRD IMG PET 1STD RST/STRS: CPT | Mod: PBBFAC

## 2023-08-29 PROCEDURE — 78431 MYOCRD IMG PET RST&STRS CT: CPT | Mod: 26,TXP,, | Performed by: INTERNAL MEDICINE

## 2023-08-29 PROCEDURE — 78431 CARDIAC PET SCAN STRESS (CUPID ONLY): ICD-10-PCS | Mod: 26,TXP,, | Performed by: INTERNAL MEDICINE

## 2023-08-29 PROCEDURE — 93016 CV STRESS TEST SUPVJ ONLY: CPT | Mod: TXP,,, | Performed by: INTERNAL MEDICINE

## 2023-08-29 PROCEDURE — 78431 MYOCRD IMG PET RST&STRS CT: CPT | Mod: NTX

## 2023-08-29 PROCEDURE — 78434 CARDIAC PET SCAN STRESS (CUPID ONLY): ICD-10-PCS | Mod: 26,TXP,, | Performed by: INTERNAL MEDICINE

## 2023-08-29 RX ORDER — AMINOPHYLLINE 25 MG/ML
75 INJECTION, SOLUTION INTRAVENOUS ONCE
Status: COMPLETED | OUTPATIENT
Start: 2023-08-29 | End: 2023-08-29

## 2023-08-29 RX ORDER — REGADENOSON 0.08 MG/ML
0.4 INJECTION, SOLUTION INTRAVENOUS ONCE
Status: COMPLETED | OUTPATIENT
Start: 2023-08-29 | End: 2023-08-29

## 2023-08-29 RX ORDER — REGADENOSON 0.08 MG/ML
0.4 INJECTION, SOLUTION INTRAVENOUS ONCE
Status: DISCONTINUED | OUTPATIENT
Start: 2023-08-29 | End: 2023-08-30 | Stop reason: HOSPADM

## 2023-08-29 RX ORDER — AMINOPHYLLINE 25 MG/ML
75 INJECTION, SOLUTION INTRAVENOUS ONCE
Status: DISCONTINUED | OUTPATIENT
Start: 2023-08-29 | End: 2023-08-30 | Stop reason: HOSPADM

## 2023-08-29 RX ADMIN — REGADENOSON 0.4 MG: 0.08 INJECTION, SOLUTION INTRAVENOUS at 09:08

## 2023-08-29 RX ADMIN — AMINOPHYLLINE 75 MG: 25 INJECTION, SOLUTION INTRAVENOUS at 09:08

## 2023-09-25 RX ORDER — CARVEDILOL 12.5 MG/1
TABLET ORAL
Qty: 180 TABLET | Refills: 1 | Status: SHIPPED | OUTPATIENT
Start: 2023-09-25

## 2023-10-03 DIAGNOSIS — R91.8 OTHER NONSPECIFIC ABNORMAL FINDING OF LUNG FIELD: ICD-10-CM

## 2023-10-03 DIAGNOSIS — Z76.82 ORGAN TRANSPLANT CANDIDATE: ICD-10-CM

## 2023-10-03 DIAGNOSIS — R91.1 SOLITARY PULMONARY NODULE: Primary | ICD-10-CM

## 2023-10-12 ENCOUNTER — TELEPHONE (OUTPATIENT)
Dept: TRANSPLANT | Facility: CLINIC | Age: 68
End: 2023-10-12
Payer: MEDICARE

## 2023-10-12 NOTE — TELEPHONE ENCOUNTER
Spoke to pt confirming appts on 12/19/2023. Appt reminders were mailed on 10/12/2023 and pt is aware to bring care giver.

## 2023-11-27 ENCOUNTER — TELEPHONE (OUTPATIENT)
Dept: PULMONOLOGY | Facility: CLINIC | Age: 68
End: 2023-11-27

## 2023-11-27 NOTE — TELEPHONE ENCOUNTER
----- Message from Drake Cheney sent at 11/27/2023  1:06 PM CST -----  Regarding: CT scan  Ms. Higgins called regarding her appointment tomorrow on 11/28. She did not have the CT Scan, because no one called to schedule it. She wants to know wether to keep her appointment or to cancel it to after the scan.  Thanks

## 2023-12-12 ENCOUNTER — TELEPHONE (OUTPATIENT)
Dept: TRANSPLANT | Facility: CLINIC | Age: 68
End: 2023-12-12
Payer: MEDICARE

## 2023-12-13 NOTE — TELEPHONE ENCOUNTER
MA notes per Adherence form     FOR THE PAST THREE MONTHS:    0-AMA's  0-No-shows    No concerns with care giving, transportation, or mental health    Scanned in pt's media    Lani More  Abdominal Transplant MA

## 2023-12-18 ENCOUNTER — TELEPHONE (OUTPATIENT)
Dept: TRANSPLANT | Facility: CLINIC | Age: 68
End: 2023-12-18
Payer: MEDICARE

## 2023-12-18 NOTE — TELEPHONE ENCOUNTER
Spoke to pt confirming rescheduled appts on 01/04/2024(Echo & imaging) and 01/16/2024 (labs & clinic visit). Appt reminders were mailed on 12/18/2023 and pt is aware to bring care giver.    ----- Message from Sylvie Chambers RN sent at 12/18/2023  9:40 AM CST -----  Regarding: FW: reschedule work up appt  Contact: Latricia    ----- Message -----  From: Bia Bang  Sent: 12/18/2023   9:39 AM CST  To: Kidney Waitlisted Coordinator  Subject: reschedule work up appt                            Caller: Latricia    Contact: [phone] (home)           Pt calling to reschedule work up appointments for tomorrow, 12/19/2023, caregiver is not available. Please advise. Requesting a call back.

## 2024-01-04 ENCOUNTER — HOSPITAL ENCOUNTER (OUTPATIENT)
Dept: RADIOLOGY | Facility: HOSPITAL | Age: 69
Discharge: HOME | End: 2024-01-04
Attending: NURSE PRACTITIONER
Payer: MEDICARE

## 2024-01-04 ENCOUNTER — HOSPITAL ENCOUNTER (OUTPATIENT)
Dept: CARDIOLOGY | Facility: HOSPITAL | Age: 69
Discharge: HOME | End: 2024-01-04
Attending: NURSE PRACTITIONER
Payer: MEDICARE

## 2024-01-04 VITALS
WEIGHT: 134 LBS | BODY MASS INDEX: 22.88 KG/M2 | HEIGHT: 64 IN | SYSTOLIC BLOOD PRESSURE: 120 MMHG | DIASTOLIC BLOOD PRESSURE: 70 MMHG | HEART RATE: 62 BPM

## 2024-01-04 DIAGNOSIS — Z76.82 ORGAN TRANSPLANT CANDIDATE: ICD-10-CM

## 2024-01-04 DIAGNOSIS — R91.8 OTHER NONSPECIFIC ABNORMAL FINDING OF LUNG FIELD: ICD-10-CM

## 2024-01-04 DIAGNOSIS — R91.1 SOLITARY PULMONARY NODULE: ICD-10-CM

## 2024-01-04 LAB
ASCENDING AORTA: 3.5 CM
AV INDEX (PROSTH): 0.84
AV MEAN GRADIENT: 3 MMHG
AV PEAK GRADIENT: 6 MMHG
AV VALVE AREA BY VELOCITY RATIO: 2.43 CM²
AV VALVE AREA: 2.91 CM²
AV VELOCITY RATIO: 0.7
BSA FOR ECHO PROCEDURE: 1.66 M2
CV ECHO LV RWT: 0.33 CM
DOP CALC AO PEAK VEL: 1.24 M/S
DOP CALC AO VTI: 26.06 CM
DOP CALC LVOT AREA: 3.5 CM2
DOP CALC LVOT DIAMETER: 2.1 CM
DOP CALC LVOT PEAK VEL: 0.87 M/S
DOP CALC LVOT STROKE VOLUME: 75.92 CM3
DOP CALCLVOT PEAK VEL VTI: 21.93 CM
E WAVE DECELERATION TIME: 213.91 MSEC
E/A RATIO: 0.68
E/E' RATIO: 8.4 M/S
ECHO LV POSTERIOR WALL: 0.8 CM (ref 0.6–1.1)
FRACTIONAL SHORTENING: 29 % (ref 28–44)
INTERVENTRICULAR SEPTUM: 0.9 CM (ref 0.6–1.1)
LA MAJOR: 4.67 CM
LA MINOR: 4.72 CM
LA WIDTH: 3.19 CM
LEFT ATRIUM SIZE: 3.49 CM
LEFT ATRIUM VOLUME INDEX MOD: 25.4 ML/M2
LEFT ATRIUM VOLUME INDEX: 26.9 ML/M2
LEFT ATRIUM VOLUME MOD: 41.99 CM3
LEFT ATRIUM VOLUME: 44.43 CM3
LEFT INTERNAL DIMENSION IN SYSTOLE: 3.4 CM (ref 2.1–4)
LEFT VENTRICLE DIASTOLIC VOLUME INDEX: 50.52 ML/M2
LEFT VENTRICLE DIASTOLIC VOLUME: 83.35 ML
LEFT VENTRICLE MASS INDEX: 83 G/M2
LEFT VENTRICLE SYSTOLIC VOLUME INDEX: 24.2 ML/M2
LEFT VENTRICLE SYSTOLIC VOLUME: 39.87 ML
LEFT VENTRICULAR INTERNAL DIMENSION IN DIASTOLE: 4.8 CM (ref 3.5–6)
LEFT VENTRICULAR MASS: 137.08 G
LV LATERAL E/E' RATIO: 7 M/S
LV SEPTAL E/E' RATIO: 10.5 M/S
MV PEAK A VEL: 0.93 M/S
MV PEAK E VEL: 0.63 M/S
MV STENOSIS PRESSURE HALF TIME: 62.03 MS
MV VALVE AREA P 1/2 METHOD: 3.55 CM2
PISA TR MAX VEL: 2.22 M/S
RA MAJOR: 4.1 CM
RA PRESSURE ESTIMATED: 3 MMHG
RA WIDTH: 3.15 CM
RIGHT VENTRICULAR END-DIASTOLIC DIMENSION: 2.96 CM
RV TB RVSP: 5 MMHG
SINUS: 3.27 CM
STJ: 2.74 CM
TDI LATERAL: 0.09 M/S
TDI SEPTAL: 0.06 M/S
TDI: 0.08 M/S
TR MAX PG: 20 MMHG
TRICUSPID ANNULAR PLANE SYSTOLIC EXCURSION: 1.47 CM
TV REST PULMONARY ARTERY PRESSURE: 23 MMHG
Z-SCORE OF LEFT VENTRICULAR DIMENSION IN END DIASTOLE: 0.36
Z-SCORE OF LEFT VENTRICULAR DIMENSION IN END SYSTOLE: 1.34

## 2024-01-04 PROCEDURE — 71271 CT THORAX LUNG CANCER SCR C-: CPT | Mod: 26,TXP,, | Performed by: RADIOLOGY

## 2024-01-04 PROCEDURE — 76770 US EXAM ABDO BACK WALL COMP: CPT | Mod: TC,TXP

## 2024-01-04 PROCEDURE — 93306 TTE W/DOPPLER COMPLETE: CPT | Mod: 26,TXP,, | Performed by: INTERNAL MEDICINE

## 2024-01-04 PROCEDURE — 76770 US EXAM ABDO BACK WALL COMP: CPT | Mod: 26,TXP,, | Performed by: RADIOLOGY

## 2024-01-04 PROCEDURE — 71271 CT THORAX LUNG CANCER SCR C-: CPT | Mod: TC,TXP

## 2024-01-04 PROCEDURE — 93306 TTE W/DOPPLER COMPLETE: CPT | Mod: TXP

## 2024-01-05 ENCOUNTER — TELEPHONE (OUTPATIENT)
Dept: TRANSPLANT | Facility: CLINIC | Age: 69
End: 2024-01-05
Payer: MEDICARE

## 2024-01-05 DIAGNOSIS — E27.9 ADRENAL ABNORMALITY: Primary | ICD-10-CM

## 2024-01-05 NOTE — TELEPHONE ENCOUNTER
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

## 2024-01-10 ENCOUNTER — TELEPHONE (OUTPATIENT)
Dept: PULMONOLOGY | Facility: CLINIC | Age: 69
End: 2024-01-10

## 2024-01-11 ENCOUNTER — TELEPHONE (OUTPATIENT)
Dept: TRANSPLANT | Facility: CLINIC | Age: 69
End: 2024-01-11
Payer: MEDICARE

## 2024-01-11 NOTE — TELEPHONE ENCOUNTER
I called patient and left a detailed VMM requesting a return call to discuss scheduling endocrinology appointment. Contact information provided in email

## 2024-01-11 NOTE — TELEPHONE ENCOUNTER
Spoke to pt confirming lab and clinic visit appt on 02/08/2024. Appt reminders were mailed on 01/11/2024 and pt is aware to bring care giver.    Message was sent to [facility] clinical staff to scheduled pt for appt.

## 2024-01-12 ENCOUNTER — TELEPHONE (OUTPATIENT)
Dept: TRANSPLANT | Facility: CLINIC | Age: 69
End: 2024-01-12
Payer: MEDICARE

## 2024-01-18 ENCOUNTER — OFFICE VISIT (OUTPATIENT)
Dept: PULMONOLOGY | Facility: CLINIC | Age: 69
End: 2024-01-18
Payer: MEDICARE

## 2024-01-18 VITALS
SYSTOLIC BLOOD PRESSURE: 130 MMHG | BODY MASS INDEX: 23.22 KG/M2 | HEART RATE: 80 BPM | WEIGHT: 136 LBS | OXYGEN SATURATION: 100 % | DIASTOLIC BLOOD PRESSURE: 80 MMHG | HEIGHT: 64 IN

## 2024-01-18 DIAGNOSIS — N18.6 ESRD (END STAGE RENAL DISEASE): ICD-10-CM

## 2024-01-18 DIAGNOSIS — R91.8 PULMONARY NODULES: ICD-10-CM

## 2024-01-18 DIAGNOSIS — J43.9 PULMONARY EMPHYSEMA, UNSPECIFIED EMPHYSEMA TYPE: Primary | ICD-10-CM

## 2024-01-18 DIAGNOSIS — J84.9 ILD (INTERSTITIAL LUNG DISEASE): ICD-10-CM

## 2024-01-18 PROCEDURE — 99213 OFFICE O/P EST LOW 20 MIN: CPT | Mod: S$GLB,,, | Performed by: NURSE PRACTITIONER

## 2024-01-18 RX ORDER — SODIUM ZIRCONIUM CYCLOSILICATE 5 G/5G
5 POWDER, FOR SUSPENSION ORAL DAILY
COMMUNITY
Start: 2024-01-11

## 2024-01-18 NOTE — Clinical Note
An angiography was performed of the AV fistula via hand injection with 15 mL of contrast  [Hypertension] : hypertension

## 2024-01-18 NOTE — PROGRESS NOTES
SUBJECTIVE:    Patient ID: Latricia Higgins is a 68 y.o. female.    Chief Complaint: Follow-up    HPI  Patient here today for followup. She had a CT scan of her chest recently which showed stable nodules, emphysema, and ILD.  The patient denies dyspnea and cough.  She has been using Anoro most days but does forget, her last set of PFT's showed no obstruction and no air trapping.  She is still trying to get a renal transplant, states her daughter can no longer be a donate. She get dialysis 3 days a week.      Past Medical History:   Diagnosis Date    Dialysis patient 10/05/2020    Emphysema of lung     ESRD (end stage renal disease)     Hepatitis 1980    HLD (hyperlipidemia)     Hypertension     Renal disorder      Past Surgical History:   Procedure Laterality Date    AORTOGRAPHY WITH SERIALOGRAPHY N/A 09/30/2020    Procedure: co2 renal angio;  Surgeon: Lance Bustamante MD;  Location: [facility] CATH/EP LAB;  Service: Vascular;  Laterality: N/A;    APPENDECTOMY      Age 16    AV FISTULA PLACEMENT Right 12/09/2020    Procedure: CREATION, AV FISTULA;  Surgeon: Lance Bustamante MD;  Location: [facility] OR;  Service: General;  Laterality: Right;    COLONOSCOPY N/A 06/08/2021    Procedure: COLONOSCOPY;  Surgeon: Avila Smith III, MD;  Location: [facility] ENDO;  Service: Endoscopy;  Laterality: N/A;    COLONOSCOPY N/A 12/14/2021    Procedure: COLONOSCOPY;  Surgeon: Avila Smith III, MD;  Location: [facility] ENDO;  Service: Endoscopy;  Laterality: N/A;    COLONOSCOPY W/ POLYPECTOMY  06/08/2021    FISTULOGRAM Bilateral 05/18/2022    Procedure: Fistulogram;  Surgeon: Lance Bustamante MD;  Location: [facility] CATH/EP LAB;  Service: General;  Laterality: Bilateral;    INSERTION OF TUNNELED CENTRAL VENOUS HEMODIALYSIS CATHETER  10/02/2020    Procedure: INSERTION, CATHETER, CENTRAL VENOUS, HEMODIALYSIS, TUNNELED;  Surgeon: Ali Khoobehi, MD;  Location: [facility] OR;  Service: Vascular;;    LEFT HEART CATHETERIZATION Left 05/17/2022    Procedure:  Left heart cath;  Surgeon: Daniel Tyler MD;  Location: [facility] CATH LAB;  Service: Cardiology;  Laterality: Left;    OPEN THROMBECTOMY OF GRAFT  05/18/2022    Procedure: THROMBECTOMY, GRAFT, OPEN;  Surgeon: Lance Bustamante MD;  Location: [facility] CATH/EP LAB;  Service: General;;    PLACEMENT OF DIALYSIS ACCESS Right 09/30/2020    Procedure: Insertion, Dialysis Access;  Surgeon: Lance Bustamante MD;  Location: [facility] CATH/EP LAB;  Service: Vascular;  Laterality: Right;    REMOVAL OF TUNNELED CENTRAL VENOUS CATHETER (CVC) N/A 12/16/2020    Procedure: REMOVAL, CATHETER, CENTRAL VENOUS, TUNNELED;  Surgeon: Ali Khoobehi, MD;  Location: [facility] OR;  Service: Vascular;  Laterality: N/A;    TONSILLECTOMY      TUBAL LIGATION       Family History   Problem Relation Age of Onset    Cancer Mother     Heart disease Father         Social History:   Marital Status:   Occupation: Data Unavailable  Alcohol History:  reports that she does not currently use alcohol.  Tobacco History:  reports that she quit smoking about 3 years ago. Her smoking use included cigarettes. She started smoking about 23 years ago. She has a 5.0 pack-year smoking history. She has never used smokeless tobacco.  Drug History:  reports that she does not currently use drugs.    Review of patient's allergies indicates:  No Known Allergies    Current Outpatient Medications   Medication Sig Dispense Refill    apixaban (ELIQUIS) 5 mg Tab Take 1 tablet (5 mg total) by mouth 2 (two) times daily. 60 tablet 6    atorvastatin (LIPITOR) 40 MG tablet Take 40 mg by mouth once daily.      calcium carbonate (OS-MARLY) 600 mg calcium (1,500 mg) Tab Take 600 mg by mouth 2 (two) times daily with meals.      carvediloL (COREG) 12.5 MG tablet TAKE 1 TABLET BY MOUTH TWICE DAILY WITH MEALS 180 tablet 1    LOKELMA 5 gram packet Take 5 g by mouth once daily.      midodrine (PROAMATINE) 10 MG tablet Take 10 mg by mouth every Mon, Wed, Fri. With dialysis      ondansetron (ZOFRAN) 4  MG tablet Take 4 mg by mouth every 8 (eight) hours as needed for Nausea.      sacubitriL-valsartan (ENTRESTO) 24-26 mg per tablet Take 1 tablet by mouth 2 (two) times daily. 60 tablet 11    sevelamer carbonate (RENVELA) 800 mg Tab Take 2,400 mg by mouth 3 (three) times daily with meals.       umeclidinium-vilanteroL (ANORO ELLIPTA) 62.5-25 mcg/actuation DsDv Inhale 1 puff into the lungs once daily. Controller 1 each 5    VELTASSA 8.4 gram PwPk Take 8.4 g by mouth 4 (four) times a week. Tues, thurs, sat, sun      VIOS AEROSOL DELIVERY SYSTEM Lizbeth AS DIRECTED      vitamin D (VITAMIN D3) 1000 units Tab Take 1,000 Units by mouth once daily.      conjugated estrogens (PREMARIN) vaginal cream Place 1 g vaginally once daily. 1 applicator 2     No current facility-administered medications for this visit.       Last CT of chest 01/04/2023    Impression:     Lung-RADS Category:  2 - Benign Appearance or Behavior - continue annual screening with LDCT in 12 months.     Clinically or potentially clinically significant non lung cancer finding:  S - Significant.  Interlobular septal thickening with traction bronchiectasis and fibrotic changes with a lower lobe and peripheral predominance similar to prior exam which may be seen with interstitial lung disease such as UIP or NSIP.     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     Diffuse enlargement of the bilateral adrenal gland similar to multiple prior exams    PFT 03/2023  PFT with no obstruction, no restriction, moderate diffusion defect  Previous PFT with air trapping and mild diffusion defect.       Review of Systems  General: Feeling Well.  Eyes: Vision is good.  ENT:  No sinusitis or pharyngitis.   Heart:: No chest pain or palpitations.  Lungs: No cough, sputum, or wheezing.  GI: No Nausea, vomiting, constipation, diarrhea, or reflux.  : dialysis three days a week   Musculoskeletal: No joint pain or myalgias.  Skin: No lesions or  "rashes.  Neuro: No headaches or neuropathy.  Lymph: No edema or adenopathy.  Psych: No anxiety or depression.  Endo: weight stable     OBJECTIVE:      /80 (BP Location: Left arm, Patient Position: Sitting, BP Method: Medium (Manual))   Pulse 80   Ht 5' 4" (1.626 m)   Wt 61.7 kg (136 lb)   SpO2 100%   BMI 23.34 kg/m²     Physical Exam  GENERAL: Older patient in no distress.  HEENT: Pupils equal and reactive. Extraocular movements intact. Nose intact.      Pharynx moist.  NECK: Supple.   HEART: Regular rate and rhythm. No murmur or gallop auscultated.  LUNGS: Clear to auscultation and percussion. Lung excursion symmetrical. No change in fremitus. No adventitial noises.  ABDOMEN: Bowel sounds present. Non-tender, no masses palpated.  EXTREMITIES: Normal muscle tone and joint movement, no cyanosis or clubbing.   LYMPHATICS: No adenopathy palpated, no edema.  SKIN: Dry, intact, no lesions.   NEURO: Cranial nerves II-XII intact. Motor strength 5/5 bilaterally, upper and lower extremities.  PSYCH: Appropriate affect.    Assessment:       1. Pulmonary emphysema, unspecified emphysema type    2. ESRD (end stage renal disease)    3. Pulmonary nodules    4. ILD (interstitial lung disease)              Plan:       Pulmonary emphysema, unspecified emphysema type    ESRD (end stage renal disease)    Pulmonary nodules    ILD (interstitial lung disease)          Continue to follow reflux precautions  Can stop the Anoro, last PFT not obstructed, no air trapping. Please let me know if you feel your breathing gets worse.   Will have Dr. Schuster review Ct and call you next week  PFT in March.     Follow up in about 6 months (around 7/18/2024).        "

## 2024-01-19 NOTE — PATIENT INSTRUCTIONS
Continue to follow reflux precautions  Can stop the Anoro, last PFT not obstructed, no air trapping. Please let me know if you feel your breathing gets worse.   Will have Dr. Schuster review Ct and call you next week  PFT in March.

## 2024-01-29 ENCOUNTER — TELEPHONE (OUTPATIENT)
Dept: PULMONOLOGY | Facility: CLINIC | Age: 69
End: 2024-01-29

## 2024-01-29 NOTE — TELEPHONE ENCOUNTER
The patient's CT looks stable.  A little bit of ILD in the bases.  Her nodules are stable.  Told her everything looks good.

## 2024-02-08 ENCOUNTER — OFFICE VISIT (OUTPATIENT)
Dept: TRANSPLANT | Facility: CLINIC | Age: 69
End: 2024-02-08
Payer: MEDICARE

## 2024-02-08 ENCOUNTER — LAB VISIT (OUTPATIENT)
Dept: LAB | Facility: HOSPITAL | Age: 69
End: 2024-02-08
Attending: NURSE PRACTITIONER
Payer: MEDICARE

## 2024-02-08 VITALS
BODY MASS INDEX: 23.22 KG/M2 | HEART RATE: 73 BPM | HEIGHT: 64 IN | DIASTOLIC BLOOD PRESSURE: 60 MMHG | OXYGEN SATURATION: 97 % | RESPIRATION RATE: 16 BRPM | TEMPERATURE: 98 F | WEIGHT: 136 LBS | SYSTOLIC BLOOD PRESSURE: 107 MMHG

## 2024-02-08 DIAGNOSIS — Z76.82 PATIENT ON WAITING LIST FOR KIDNEY TRANSPLANT: Primary | ICD-10-CM

## 2024-02-08 DIAGNOSIS — N18.6 ESRD (END STAGE RENAL DISEASE): ICD-10-CM

## 2024-02-08 DIAGNOSIS — I10 PRIMARY HYPERTENSION: ICD-10-CM

## 2024-02-08 DIAGNOSIS — Z76.82 ORGAN TRANSPLANT CANDIDATE: ICD-10-CM

## 2024-02-08 PROCEDURE — 99215 OFFICE O/P EST HI 40 MIN: CPT | Mod: S$PBB,TXP,, | Performed by: NURSE PRACTITIONER

## 2024-02-08 PROCEDURE — 36415 COLL VENOUS BLD VENIPUNCTURE: CPT | Mod: TXP | Performed by: NURSE PRACTITIONER

## 2024-02-08 PROCEDURE — 99999 PR PBB SHADOW E&M-EST. PATIENT-LVL III: CPT | Mod: PBBFAC,TXP,, | Performed by: NURSE PRACTITIONER

## 2024-02-08 PROCEDURE — 86706 HEP B SURFACE ANTIBODY: CPT | Mod: TXP | Performed by: NURSE PRACTITIONER

## 2024-02-08 PROCEDURE — 99213 OFFICE O/P EST LOW 20 MIN: CPT | Mod: PBBFAC,TXP | Performed by: NURSE PRACTITIONER

## 2024-02-08 NOTE — PROGRESS NOTES
Kidney Transplant Recipient Reevalulation    Referring Physician: Eliseo Barnes  Current Nephrologist: Eliseo Barnes  Waitlist Status: active  Dialysis Start Date: 10/1/2020    Subjective:     CC:  Annual reassessment of kidney transplant candidacy.    HPI:  Ms. Higgins is a 68 y.o. year old White female with ESRD secondary to HTN.  She has been on the wait list for a kidney transplant at [facility] since 10/1/2020. Patient is currently on hemodialysis started on 10/1/2020. Patient is dialyzing on MWF schedule.  Patient reports that she is tolerating dialysis well.. She has a dialysis catheter. Patient denies any recent hospitalizations or ED visits.    Hospitalizations/ED visits:  none    Interval History:   Reports doing well. No complaints. Stays active .     CXR: 1/19/2023 Remaining COPD with some chronic interstitial fibrosis particularly right lower lung zone.  CT chest: 1/4/24  Lung-RADS Category:  2 - Benign Appearance or Behavior - continue annual screening with LDCT in 12 months. Interlobular septal thickening with traction bronchiectasis and fibrotic changes with a lower lobe and peripheral predominance similar to prior exam which may be seen with interstitial lung disease such as UIP or NSIP.   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   Diffuse enlargement of the bilateral adrenal gland similar to multiple prior exams.     PXR: 1/19/23 per surgeon test result is favorable for transplant  Renal US: 2/8/24 Shrunken atrophic kidneys bilaterally consistent with medical renal disease.   Iliacs : 1/19/23 per surgeon test result is favorable for transplant  Echo:  Results for orders placed during the hospital encounter of 01/04/24    Echo    Interpretation Summary    Left Ventricle: The left ventricle is normal in size. Normal wall thickness. Normal wall motion. There is normal systolic function with a visually estimated ejection fraction of 55 - 60%. There is normal  diastolic function.    Right Ventricle: Normal right ventricular cavity size. Wall thickness is normal. Right ventricle wall motion  is normal. Systolic function is normal.    Pulmonary Artery: The estimated pulmonary artery systolic pressure is 23 mmHg.    IVC/SVC: Normal venous pressure at 3 mmHg.        Stress:  cardiac PET      2023    The myocardial perfusion images are normal without evidence of ischemia or scar.    The whole heart absolute myocardial perfusion values averaged 0.93 cc/min/g at rest, which is normal; 1.68 cc/min/g at stress, which is mildly reduced; and CFR is 1.82 , which is mildly reduced.    CT attenuation images demonstrate moderate diffuse coronary calcifications in the LAD and LCX territory and moderate diffuse aortic calcifications in the descending aorta.    The gated perfusion images showed an ejection fraction of 70% at rest and 72% during stress. A normal ejection fraction is greater than 47%.    The wall motion is normal at rest and during stress.    The LV cavity size is normal at rest and stress.    The ECG portion of the study is negative for ischemia.    There were no arrhythmias during stress.    The patient reported no chest pain during the stress test.    When compared to the previous study from 2021, there are no significant changes on perfusion images. The left ventricular cavity appears significant reduced in size compared to prior study.       Colonoscopy: 2021 repeat in 3 years  PTH:  Lab Results   Component Value Date    .2 (H) 2023    CALCIUM 8.7 05/15/2023    PHOS 3.7 2023     PAP: 23 Negative for intraepithelial lesion or malignancy  MM24 neg      Current Outpatient Medications:     apixaban (ELIQUIS) 5 mg Tab, Take 1 tablet (5 mg total) by mouth 2 (two) times daily., Disp: 60 tablet, Rfl: 6    atorvastatin (LIPITOR) 40 MG tablet, Take 40 mg by mouth once daily., Disp: , Rfl:     calcium carbonate (OS-MARLY) 600 mg calcium  (1,500 mg) Tab, Take 600 mg by mouth 2 (two) times daily with meals., Disp: , Rfl:     carvediloL (COREG) 12.5 MG tablet, TAKE 1 TABLET BY MOUTH TWICE DAILY WITH MEALS, Disp: 180 tablet, Rfl: 1    LOKELMA 5 gram packet, Take 5 g by mouth once daily., Disp: , Rfl:     midodrine (PROAMATINE) 10 MG tablet, Take 10 mg by mouth every Mon, Wed, Fri. With dialysis, Disp: , Rfl:     ondansetron (ZOFRAN) 4 MG tablet, Take 4 mg by mouth every 8 (eight) hours as needed for Nausea., Disp: , Rfl:     sacubitriL-valsartan (ENTRESTO) 24-26 mg per tablet, Take 1 tablet by mouth 2 (two) times daily., Disp: 60 tablet, Rfl: 11    sevelamer carbonate (RENVELA) 800 mg Tab, Take 2,400 mg by mouth 3 (three) times daily with meals. , Disp: , Rfl:     umeclidinium-vilanteroL (ANORO ELLIPTA) 62.5-25 mcg/actuation DsDv, Inhale 1 puff into the lungs once daily. Controller, Disp: 1 each, Rfl: 5    VIOS AEROSOL DELIVERY SYSTEM Lizbeth, AS DIRECTED, Disp: , Rfl:     vitamin D (VITAMIN D3) 1000 units Tab, Take 1,000 Units by mouth once daily., Disp: , Rfl:     conjugated estrogens (PREMARIN) vaginal cream, Place 1 g vaginally once daily., Disp: 1 applicator, Rfl: 2    VELTASSA 8.4 gram PwPk, Take 8.4 g by mouth 4 (four) times a week. nell Myrick, sat, sun, Disp: , Rfl:       Past Medical History:   Diagnosis Date    Dialysis patient 10/05/2020    Emphysema of lung     ESRD (end stage renal disease)     Hepatitis 1980    HLD (hyperlipidemia)     Hypertension     Renal disorder        Past Surgical History:   Procedure Laterality Date    AORTOGRAPHY WITH SERIALOGRAPHY N/A 09/30/2020    Procedure: co2 renal angio;  Surgeon: Lance Bustamante MD;  Location: [facility] CATH/EP LAB;  Service: Vascular;  Laterality: N/A;    APPENDECTOMY      Age 16    AV FISTULA PLACEMENT Right 12/09/2020    Procedure: CREATION, AV FISTULA;  Surgeon: Lance Bustamante MD;  Location: [facility] OR;  Service: General;  Laterality: Right;    COLONOSCOPY N/A 06/08/2021    Procedure:  COLONOSCOPY;  Surgeon: Avila Smith III, MD;  Location: [facility] ENDO;  Service: Endoscopy;  Laterality: N/A;    COLONOSCOPY N/A 12/14/2021    Procedure: COLONOSCOPY;  Surgeon: Avila Smith III, MD;  Location: [facility] ENDO;  Service: Endoscopy;  Laterality: N/A;    COLONOSCOPY W/ POLYPECTOMY  06/08/2021    FISTULOGRAM Bilateral 05/18/2022    Procedure: Fistulogram;  Surgeon: Lance Bustamante MD;  Location: [facility] CATH/EP LAB;  Service: General;  Laterality: Bilateral;    INSERTION OF TUNNELED CENTRAL VENOUS HEMODIALYSIS CATHETER  10/02/2020    Procedure: INSERTION, CATHETER, CENTRAL VENOUS, HEMODIALYSIS, TUNNELED;  Surgeon: Ali Khoobehi, MD;  Location: [facility] OR;  Service: Vascular;;    LEFT HEART CATHETERIZATION Left 05/17/2022    Procedure: Left heart cath;  Surgeon: Daniel Tyler MD;  Location: [facility] CATH LAB;  Service: Cardiology;  Laterality: Left;    OPEN THROMBECTOMY OF GRAFT  05/18/2022    Procedure: THROMBECTOMY, GRAFT, OPEN;  Surgeon: Lance Bustamante MD;  Location: [facility] CATH/EP LAB;  Service: General;;    PLACEMENT OF DIALYSIS ACCESS Right 09/30/2020    Procedure: Insertion, Dialysis Access;  Surgeon: Lance Bustaamnte MD;  Location: [facility] CATH/EP LAB;  Service: Vascular;  Laterality: Right;    REMOVAL OF TUNNELED CENTRAL VENOUS CATHETER (CVC) N/A 12/16/2020    Procedure: REMOVAL, CATHETER, CENTRAL VENOUS, TUNNELED;  Surgeon: Ali Khoobehi, MD;  Location: [facility];  Service: Vascular;  Laterality: N/A;    TONSILLECTOMY      TUBAL LIGATION               Review of Systems   Constitutional:  Positive for fatigue. Negative for appetite change, chills and fever.   HENT:  Negative for trouble swallowing.    Respiratory:  Positive for shortness of breath. Negative for cough, chest tightness and wheezing.    Cardiovascular:  Negative for chest pain, palpitations and leg swelling.   Gastrointestinal:  Negative for abdominal pain, constipation, diarrhea and nausea.   Genitourinary:  Positive for decreased  "urine volume.   Musculoskeletal:  Negative for arthralgias and myalgias.   Skin:  Negative for rash.   Neurological:  Negative for dizziness, weakness, light-headedness and headaches.   Psychiatric/Behavioral:  Negative for sleep disturbance.        Objective:   body mass index is 23.35 kg/m².  /60 (BP Location: Right arm, Patient Position: Sitting, BP Method: Small (Manual))   Pulse 73   Temp 97.7 °F (36.5 °C) (Temporal)   Resp 16   Ht 5' 4" (1.626 m)   Wt 61.7 kg (136 lb 0.4 oz)   SpO2 97%   BMI 23.35 kg/m²       Physical Exam  Constitutional:       General: She is not in acute distress.     Appearance: She is well-developed. She is not diaphoretic.   Cardiovascular:      Rate and Rhythm: Normal rate and regular rhythm.      Heart sounds: Normal heart sounds. No murmur heard.     No friction rub. No gallop.   Pulmonary:      Effort: Pulmonary effort is normal. No respiratory distress.      Breath sounds: Normal breath sounds. No wheezing or rales.   Abdominal:      General: Bowel sounds are normal. There is no distension.      Palpations: Abdomen is soft.      Tenderness: There is no abdominal tenderness.   Musculoskeletal:         General: No tenderness. Normal range of motion.   Skin:     General: Skin is warm and dry.      Findings: No rash.      Nails: There is no clubbing.          Neurological:      Mental Status: She is alert and oriented to person, place, and time.   Psychiatric:         Behavior: Behavior normal.         Labs:  Lab Results   Component Value Date    WBC 4.91 05/15/2023    HGB 11.5 (L) 05/15/2023    HCT 34.6 (L) 05/15/2023     05/15/2023    K 4.0 05/15/2023    CL 92 (L) 05/15/2023    CO2 35 (H) 05/15/2023    BUN 25 (H) 05/15/2023    CREATININE 4.8 (H) 05/15/2023    EGFRNORACEVR 9.4 (A) 05/15/2023    CALCIUM 8.7 05/15/2023    PHOS 3.7 01/19/2023    MG 2.1 08/29/2022    ALBUMIN 3.8 05/15/2023    AST 21 05/15/2023    ALT 12 05/15/2023    .2 (H) 01/19/2023       Lab " "Results   Component Value Date    BILIRUBINUA Negative 09/29/2020    AMYLASE 115 (H) 09/27/2020    LIPASE 33 11/08/2021    PROTEINUA 3+ (A) 09/29/2020    NITRITE Negative 09/29/2020    RBCUA 26 (H) 09/29/2020    WBCUA >100 (H) 09/29/2020       No results found for: "HLAABCTYPE"    Lab Results   Component Value Date    CPRA 0 10/02/2023    XH9BZUN B73 10/02/2023    CIIAB Negative 06/05/2023    ABCMT WEAK DQ7, DQ9 10/02/2023       Labs were reviewed with the patient.    Pre-transplant Workup:   Reviewed with the patient.    Assessment:     1. Patient on waiting list for kidney transplant    2. ESRD (end stage renal disease)    3. Primary hypertension        Plan:   Pulmonary for ILD disease. Seen in Jan 2024 with follow-up in 7/2024. Ct reviewed by dr. Schuster on 1/29/24: The patient's CT looks stable.  A little bit of ILD in the bases.  Her nodules are stable.       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 Higgins is a high-risk kidney transplant candidate.  Meets center eligibility for accepting HCV+ donor offer - Yes.  Patient educated on HCV+ donors. Latricia is willing  to accept HCV+ donor offer -  Yes   Patient is a candidate for KDPI > 85 kidney donor offer - Yes.  She remains in overall stable health, and will remain active on the transplant list.    Patient advised that it is recommended that all transplant candidates, and their close contacts and household members receive Covid vaccination.    Dinora Graves NP       Follow-up:   In addition to the tests noted in the plan, Ms. Higgins will continue to have reevaluation as per the standing pre-kidney transplant protocol:  Monthly blood for PRA  Annual return to clinic, except HIV positive, > 65 years of age, or pancreas transplant candidates who will be scheduled to see transplant every 6 months while " in pre-transplant phase  Annual re-testing: CXR, EKG, yearly mammograms for women over 40 and PSA for males over 40, cardiology follow-up as recommended by initial cardiology pre-transplant evaluation  Renal ultrasound every 2 years  Baseline colonoscopy after age 50 and repeated as recommended    UNOS Patient Status  Functional Status: 70% - Cares for self: unable to carry on normal activity or active work  Physical Capacity: No Limitations

## 2024-02-08 NOTE — PROGRESS NOTES
YEARLY LIST MANAGEMENT NOTE    Latricia Higgins's kidney transplant listing status reviewed.  Patient is due for follow-up appointments on 8/2024.  Appointments will be scheduled per protocol. Patient is scheduled with endocrinology in May 2024, pulmonary -regular follow-up with CT chest q6 months. I re-sent the CT to Dr. Estrada to comment.

## 2024-02-08 NOTE — PROGRESS NOTES
WAITLIST  PATIENT EDUCATION NOTE    Ms. Latricia Higgins was seen in pre-kidney transplant clinic for evaluation for kidney, kidney/pancreas or pancreas only transplant.  The patient attended a group education session that discussed/reviewed the following aspects of transplantation: evaluation and selection committee process, [facility] waitlist management/multiple listings, types of organs offered (KDPI < 85%, KDPI > 85%, PHS risk, DCD, HCV+, HIV+ for HIV+ recipients and enbloc/dual), financial aspects, surgical procedures, dietary instruction pre- and post-transplant, health maintenance pre- and post-transplant, post-transplant hospitalization and outpatient follow-up, potential to participate in a research protocol, and medication management and side effects.  A question and answer session was provided after the presentation.    The patient was seen by all members of the multi-disciplinary team to include: Nephrologist/PA, Surgeon, , Transplant Coordinator, , Pharmacist and Dietician (if applicable).    The patient reviewed and signed all consents for evaluation which were witnessed and sent to scanning into the EPIC chart.    The patient was given an education book and plan for further evaluation based on her individual assessment.      Reviewed program requirement for complete COVID vaccination with documentation prior to listing. COVID education information reviewed with patient. Patient encouraged to be up to date on all vaccinations.     The patient was encouraged to call with any questions or concerns.

## 2024-02-08 NOTE — LETTER
February 12, 2024        Eliseo Barnes  [address] [facility] NEPHROLOGY [facility] [place] [address]  Phone: [phone]  Fax: [phone]             [facility]- Transplant 1st Fl  [address]  [facility] [address]  Phone: [phone]   Patient: Latricia Higgins   MR Number: [id number]   YOB: 1955   Date of Visit: 2/8/2024       Dear Dr. Eliseo Barnes    Thank you for referring Latricia Higgins to me for evaluation. Attached you will find relevant portions of my assessment and plan of care.    If you have questions, please do not hesitate to call me. I look forward to following Latricia Higgins along with you.    Sincerely,    Dinora Graves, NP    Enclosure    If you would like to receive this communication electronically, please contact [email or web address] or [phone] to request Walk Score Link access.    [employer] Link is a tool which provides read-only access to select patient information with whom you have a relationship. Its easy to use and provides real time access to review your patients record including encounter summaries, notes, results, and demographic information.    If you feel you have received this communication in error or would no longer like to receive these types of communications, please e-mail [email or web address]

## 2024-02-12 LAB
HBV SURFACE AB SER QL IA: POSITIVE
HBV SURFACE AB SERPL IA-ACNC: 16 MIU/ML

## 2024-02-14 ENCOUNTER — TELEPHONE (OUTPATIENT)
Dept: TRANSPLANT | Facility: CLINIC | Age: 69
End: 2024-02-14
Payer: MEDICARE

## 2024-02-14 NOTE — TELEPHONE ENCOUNTER
----- Message from Jose Cruz Dixon MD sent at 2/8/2024  4:15 PM CST -----  I can not comment based solely on this study that was a non contrast CT and was not gated to look at the aorta.  She would need MR angiography or, if not possible, MDCT angiography of the entire aorta to more accurately evaluate size, concurrent lesions and any associated complications.  Can you get one of those tests done?  Thx.      ----- Message -----  From: Melinda Wang RN  Sent: 2/8/2024   2:44 PM CST  To: Jose Cruz Dixon MD    Please review CT chest report  attached regarding aneurysmal dilatation of the aorta and comment on if patient is okay for kidney transplant. She is active on the kidney transplant list.  Melinda Ramos

## 2024-02-14 NOTE — PROGRESS NOTES
Transplant Psychosocial Evaluation Update:  Last psychosocial evaluation for transplant was completed on 6/29/2023 by DAVIAN Wells LMSW.    Pt presents today in company of son, Jordan Gallegos. Pt and son present as alert, oriented to person, place, time, purpose of visit. Pt and son deny concerns about going through with transplant and state motivation and sense of preparedness for transplantation, caregiver role, psychosocial risk factors, medical risk factors, financial aspects, and lifetime commitments. All concerns and education points as per transplant psychosocial evaluation process addressed (also refer to psychosocial dated 6/29/2023). Pt actively participated in today's interview, with son, Jordan Gallegos, participating as caregiver. Pt asking questions appropriately and denies changes to previous psychosocial evaluation for transplantation which we reviewed line by line with pt and, per pt choice, with pts caregiver today.    Primary Caregivers and Transportation for Transplant:  1. Jordan Gallegos ([phone]) 50 y/o son; resides in [place], [place] and drives reliable vehicle.   2. So Tavarez ([phone]) 68 y/o friend and neighbor; resides in [place], [place] and drives reliable vehicle.  3. Barbara Cabrera ([phone]) 37 y/o daughter; resides in [facility]. and drives reliable vehicle.    Both pt and caregiver/s plan to stay locally at home - information reviewed in depth. Caregivers plan to stay at hospital as appropriate for transplant and post-transplant process.    Pts Vocation: Pt has been retired since 2020 and collects $1885 monthly from [employer].    DME: [facility].     ADLS:  Pt reports independent with all ADLS, drives, and handles own medication management.     Household and Dependents: pt resides alone and has no dependents at this time.    Income: Pt states ability to afford all monthly expenses and costs including for medications now and if transplanted based on income and on savings and  assets.    Dialysis Adherence: DU reports 0 AMA's and 0 no-shows.    Pt and son deny any additional concerns, stating preparedness and motivation to proceed with organ transplant.     Suitability for Transplant:   Pt remains low for transplant at this time based on psychosocial risk factors and strengths. Pt and caregiver present without acute mental health concerns at time of assessment.    Pt ashia well overall with health needs and life in general, has stable supports, adequate insurance, financial stability, transportation and caregiver plans in place, and is using no substances unless prescribed.

## 2024-02-20 DIAGNOSIS — Z76.82 AWAITING ORGAN TRANSPLANT STATUS: Primary | ICD-10-CM

## 2024-02-20 DIAGNOSIS — I71.9 AORTIC ANEURYSM, UNSPECIFIED PORTION OF AORTA, UNSPECIFIED WHETHER RUPTURED: ICD-10-CM

## 2024-02-22 ENCOUNTER — TELEPHONE (OUTPATIENT)
Dept: TRANSPLANT | Facility: CLINIC | Age: 69
End: 2024-02-22
Payer: MEDICARE

## 2024-03-06 DIAGNOSIS — I50.20 HFREF (HEART FAILURE WITH REDUCED EJECTION FRACTION): ICD-10-CM

## 2024-03-06 RX ORDER — SACUBITRIL AND VALSARTAN 24; 26 MG/1; MG/1
1 TABLET, FILM COATED ORAL 2 TIMES DAILY
Qty: 60 TABLET | Refills: 11 | Status: SHIPPED | OUTPATIENT
Start: 2024-03-06

## 2024-04-01 PROBLEM — I82.722: Status: ACTIVE | Noted: 2024-04-01

## 2024-04-01 NOTE — PROGRESS NOTES
General Cardiology Clinic Note  Reason for Visit: Follow up   Last Clinic Visit: 5/31/2022    HPI:   Latricia Higgins is a 68 y.o. female who presents for follow up.    Problems:  HTN  Nonischemic cardiomyopathy, (EF recovered to 55-60%)  Mod-severe MR (resolved with recovery of EF)  Ascending aorta 4.4 cm  HLD, LDL 42  History of DVT on OAC  Aortic atherosclerosis  ESRD on HD 10/2020  Emphysema    Interval History   Patient presents for follow up. She is also following with Dr. Dixon in \[facility] [place] [facility]\"". She denies symptoms of CAD, CHF, arrhythmia, hypotension, TIA, claudication. BP runs 110s-120s/70s. She does not do regimented exercise, but does stay active. She gardens, weed eats, walks her puppy, cleans the house, etc. She rarely uses her inhaler.     5/31/2022  Since her last visit, patient underwent LHC for recent drop in LVEF and was found to have 70% OM2 stenosis and otherwise mild nonobstructive disease. Cardiomyopathy is out of proportion to degree of CAD.     Presents today for follow up. She states she feels well. She is able to do yard work, clean her house, go shopping. She feels no limitation to physical activity. She is tolerating Entresto and Coreg 3.125 mg bid. Her BP is running 90s-low 100s systolic. The highest it ever got was 120. She takes Midodrine prior to going to dialysis, and then 30-45 minutes prior to leaving dialysis. She has had just a couple episodes of feeling very lightheaded with standing. She denies CP, FRENCH, orthopnea, PND, edema, syncope, palpitations.     4/19/2022 HPI (Dr. Aguilar)  Patient delisted for kidney transplant at this time since EF reduced 18%. Started on Entresto and switched coreg to Toprol XL secondary to BP issues. On midodrine for hypotension issues per nephrology. PET stress 2021 without ischemia. Derrek CHF or anginal symptoms. To see HTS today.    ROS:      Review of Systems   Constitutional: Negative for diaphoresis, malaise/fatigue, weight gain and weight loss.   HENT:   Negative for nosebleeds.    Eyes:  Negative for vision loss in left eye, vision loss in right eye and visual disturbance.   Cardiovascular:  Negative for chest pain, claudication, dyspnea on exertion, irregular heartbeat, leg swelling, near-syncope, orthopnea, palpitations, paroxysmal nocturnal dyspnea and syncope.   Respiratory:  Negative for cough, shortness of breath, sleep disturbances due to breathing, snoring and wheezing.    Hematologic/Lymphatic: Negative for bleeding problem. Does not bruise/bleed easily.   Skin:  Negative for poor wound healing and rash.   Musculoskeletal:  Negative for muscle cramps and myalgias.   Gastrointestinal:  Negative for bloating, abdominal pain, diarrhea, heartburn, melena, nausea and vomiting.   Genitourinary:  Negative for hematuria and nocturia.   Neurological:  Positive for light-headedness. Negative for brief paralysis, dizziness, headaches, numbness and weakness.   Psychiatric/Behavioral:  Negative for depression.    Allergic/Immunologic: Negative for hives.       PMH:     Past Medical History:   Diagnosis Date    Dialysis patient 10/05/2020    Emphysema of lung     ESRD (end stage renal disease)     Hepatitis 1980    HLD (hyperlipidemia)     Hypertension     Renal disorder      Past Surgical History:   Procedure Laterality Date    AORTOGRAPHY WITH SERIALOGRAPHY N/A 09/30/2020    Procedure: co2 renal angio;  Surgeon: Lance Bustamante MD;  Location: [facility] CATH/EP LAB;  Service: Vascular;  Laterality: N/A;    APPENDECTOMY      Age 16    AV FISTULA PLACEMENT Right 12/09/2020    Procedure: CREATION, AV FISTULA;  Surgeon: Lance Bustamante MD;  Location: [facility] OR;  Service: General;  Laterality: Right;    COLONOSCOPY N/A 06/08/2021    Procedure: COLONOSCOPY;  Surgeon: Avila Smith III, MD;  Location: [facility] ENDO;  Service: Endoscopy;  Laterality: N/A;    COLONOSCOPY N/A 12/14/2021    Procedure: COLONOSCOPY;  Surgeon: Avila Smith III, MD;  Location: [facility] ENDO;   Service: Endoscopy;  Laterality: N/A;    COLONOSCOPY W/ POLYPECTOMY  06/08/2021    FISTULOGRAM Bilateral 05/18/2022    Procedure: Fistulogram;  Surgeon: Lance Bustamante MD;  Location: [facility] CATH/EP LAB;  Service: General;  Laterality: Bilateral;    INSERTION OF TUNNELED CENTRAL VENOUS HEMODIALYSIS CATHETER  10/02/2020    Procedure: INSERTION, CATHETER, CENTRAL VENOUS, HEMODIALYSIS, TUNNELED;  Surgeon: Ali Khoobehi, MD;  Location: [facility] OR;  Service: Vascular;;    LEFT HEART CATHETERIZATION Left 05/17/2022    Procedure: Left heart cath;  Surgeon: Daniel Tyler MD;  Location: [facility] CATH LAB;  Service: Cardiology;  Laterality: Left;    OPEN THROMBECTOMY OF GRAFT  05/18/2022    Procedure: THROMBECTOMY, GRAFT, OPEN;  Surgeon: Lance Bustamante MD;  Location: [facility] CATH/EP LAB;  Service: General;;    PLACEMENT OF DIALYSIS ACCESS Right 09/30/2020    Procedure: Insertion, Dialysis Access;  Surgeon: Lance Bustamante MD;  Location: [facility] CATH/EP LAB;  Service: Vascular;  Laterality: Right;    REMOVAL OF TUNNELED CENTRAL VENOUS CATHETER (CVC) N/A 12/16/2020    Procedure: REMOVAL, CATHETER, CENTRAL VENOUS, TUNNELED;  Surgeon: Ali Khoobehi, MD;  Location: [facility] OR;  Service: Vascular;  Laterality: N/A;    TONSILLECTOMY      TUBAL LIGATION       Allergies:   Review of patient's allergies indicates:  No Known Allergies  Medications:     Current Outpatient Medications on File Prior to Visit   Medication Sig Dispense Refill    apixaban (ELIQUIS) 5 mg Tab Take 1 tablet (5 mg total) by mouth 2 (two) times daily. 60 tablet 6    atorvastatin (LIPITOR) 40 MG tablet Take 40 mg by mouth once daily.      calcium carbonate (OS-MARLY) 600 mg calcium (1,500 mg) Tab Take 600 mg by mouth 2 (two) times daily with meals.      carvediloL (COREG) 12.5 MG tablet TAKE 1 TABLET BY MOUTH TWICE DAILY WITH MEALS 180 tablet 1    conjugated estrogens (PREMARIN) vaginal cream Place 1 g vaginally once daily. 1 applicator 2    LOKELMA 5 gram packet Take 5 g by  "mouth once daily.      midodrine (PROAMATINE) 10 MG tablet Take 10 mg by mouth every Mon, Wed, Fri. With dialysis      ondansetron (ZOFRAN) 4 MG tablet Take 4 mg by mouth every 8 (eight) hours as needed for Nausea.      sacubitriL-valsartan (ENTRESTO) 24-26 mg per tablet Take 1 tablet by mouth 2 (two) times daily. 60 tablet 11    sevelamer carbonate (RENVELA) 800 mg Tab Take 2,400 mg by mouth 3 (three) times daily with meals.       umeclidinium-vilanteroL (ANORO ELLIPTA) 62.5-25 mcg/actuation DsDv Inhale 1 puff into the lungs once daily. Controller 1 each 5    VELTASSA 8.4 gram PwPk Take 8.4 g by mouth 4 (four) times a week. Tues, thurs, sat, sun      VIOS AEROSOL DELIVERY SYSTEM [facility] AS DIRECTED      vitamin D (VITAMIN D3) 1000 units Tab Take 1,000 Units by mouth once daily.       No current facility-administered medications on file prior to visit.     Social History:     Social History     Tobacco Use    Smoking status: Former     Current packs/day: 0.00     Average packs/day: 0.3 packs/day for 20.0 years (5.0 ttl pk-yrs)     Types: Cigarettes     Start date: 9/27/2000     Quit date: 9/27/2020     Years since quitting: 3.5    Smokeless tobacco: Never   Substance Use Topics    Alcohol use: Not Currently     Family History:     Family History   Problem Relation Age of Onset    Cancer Mother     Heart disease Father      Physical Exam:   BP (!) 157/72   Pulse 62   Ht 5' 4" (1.626 m)   Wt 62.5 kg (137 lb 12.6 oz)   SpO2 98%   BMI 23.65 kg/m²        Physical Exam  Vitals and nursing note reviewed.   Constitutional:       General: She is not in acute distress.     Appearance: Normal appearance. She is not ill-appearing.   HENT:      Head: Normocephalic and atraumatic.   Eyes:      General: No scleral icterus.     Conjunctiva/sclera: Conjunctivae normal.   Neck:      Thyroid: No thyromegaly.      Vascular: No carotid bruit or JVD.   Cardiovascular:      Rate and Rhythm: Normal rate and regular rhythm.      Pulses: " Normal pulses.      Heart sounds: Normal heart sounds. No murmur heard.     No friction rub. No gallop.   Pulmonary:      Effort: Pulmonary effort is normal.      Breath sounds: Normal breath sounds. No wheezing, rhonchi or rales.   Chest:      Chest wall: No tenderness.   Abdominal:      General: Bowel sounds are normal. There is no distension.      Palpations: Abdomen is soft.      Tenderness: There is no abdominal tenderness.   Musculoskeletal:         General: No swelling.      Cervical back: Neck supple.      Right lower leg: No edema.      Left lower leg: No edema.   Skin:     General: Skin is warm and dry.      Coloration: Skin is not pale.      Findings: No erythema or rash.      Nails: There is no clubbing.   Neurological:      Mental Status: She is alert and oriented to person, place, and time. Mental status is at baseline.   Psychiatric:         Mood and Affect: Mood and affect normal.         Behavior: Behavior normal.          Labs:     Lab Results   Component Value Date     05/15/2023    K 4.0 05/15/2023    CL 92 (L) 05/15/2023    CO2 35 (H) 05/15/2023    BUN 25 (H) 05/15/2023    CREATININE 4.8 (H) 05/15/2023    ANIONGAP 9 05/15/2023     Lab Results   Component Value Date    HGBA1C 5.0 08/28/2022     Lab Results   Component Value Date     (H) 05/15/2023     (H) 11/08/2022     (H) 08/27/2022    Lab Results   Component Value Date    WBC 4.91 05/15/2023    HGB 11.5 (L) 05/15/2023    HCT 34.6 (L) 05/15/2023    HCT 26 (L) 05/24/2021     05/15/2023    GRAN 3.2 05/15/2023    GRAN 65.0 05/15/2023     Lab Results   Component Value Date    CHOL 109 (L) 01/19/2023    HDL 55 01/19/2023    LDLCALC 40.0 (L) 01/19/2023    TRIG 70 01/19/2023          Imaging:   Echocardiograms:   TTE 1/4/2024    Left Ventricle: The left ventricle is normal in size. Normal wall thickness. Normal wall motion. There is normal systolic function with a visually estimated ejection fraction of 55 - 60%.  There is normal diastolic function.    Right Ventricle: Normal right ventricular cavity size. Wall thickness is normal. Right ventricle wall motion  is normal. Systolic function is normal.    Pulmonary Artery: The estimated pulmonary artery systolic pressure is 23 mmHg.    IVC/SVC: Normal venous pressure at 3 mmHg.    TTE 11/8/2022  The left ventricle is normal in size with normal systolic function. The estimated ejection fraction is 55%.  Normal right ventricular size with normal right ventricular systolic function.  Normal left ventricular diastolic function.  Normal central venous pressure (3 mmHg).    TTE 3/8/22  The left ventricle is normal in size with mild concentric hypertrophy and severely decreased systolic function.  The estimated ejection fraction is 18%.  Grade III left ventricular diastolic dysfunction.  Mild right ventricular enlargement with mildly to moderately reduced right ventricular systolic function.  Mild left atrial enlargement.  Moderate tricuspid regurgitation.  There is mild to moderate pulmonary hypertension.  Moderate pulmonic regurgitation.  Moderate-to-severe mitral regurgitation.    TTE 11/8/2022  The left ventricle is normal in size with normal systolic function. The estimated ejection fraction is 55%.  Normal right ventricular size with normal right ventricular systolic function.  Normal left ventricular diastolic function.  Normal central venous pressure (3 mmHg).    TTE 7/20/2021  The estimated PA systolic pressure is 33 mmHg.  The left ventricle is normal in size with mild eccentric hypertrophy and mildly decreased systolic function.  The estimated ejection fraction is 45%.  Grade I left ventricular diastolic dysfunction.  Normal right ventricular size with normal right ventricular systolic function.  Moderate left atrial enlargement.  Mild-to-moderate mitral regurgitation.  There is left ventricular global hypokinesis.  Mild to moderate tricuspid regurgitation.    TTE  9/29/2020  The estimated PA systolic pressure is 24 mmHg.  There is mild left ventricular concentric hypertrophy.  The left ventricle is normal in size with normal systolic function. The estimated ejection fraction is 70%.  Indeterminate diastolic function.  Normal right ventricular systolic function.  Mild left atrial enlargement.      Stress Tests:   PET Stress Test 8/29/2023    The myocardial perfusion images are normal without evidence of ischemia or scar.    The whole heart absolute myocardial perfusion values averaged 0.93 cc/min/g at rest, which is normal; 1.68 cc/min/g at stress, which is mildly reduced; and CFR is 1.82 , which is mildly reduced.    CT attenuation images demonstrate moderate diffuse coronary calcifications in the LAD and LCX territory and moderate diffuse aortic calcifications in the descending aorta.    The gated perfusion images showed an ejection fraction of 70% at rest and 72% during stress. A normal ejection fraction is greater than 47%.    The wall motion is normal at rest and during stress.    The LV cavity size is normal at rest and stress.    The ECG portion of the study is negative for ischemia.    There were no arrhythmias during stress.    The patient reported no chest pain during the stress test.    When compared to the previous study from 5/18/2021, there are no significant changes on perfusion images. The left ventricular cavity appears significant reduced in size compared to prior study.    [facility] 6/29/2023    Equivocal myocardial perfusion scan.    There is a moderate intensity, small sized, equivocal perfusion abnormality that is mostly reversible with small fixed area in the mid to distal anteroseptal wall(s) in the typical distribution of the LAD territory. This finding is equivocal due to a breast shadow overlying the myocardium.    There are no other significant perfusion abnormalities.    The visually estimated ejection fraction is normal at rest and normal during  stress.    There is normal wall motion at rest and post stress.    LV cavity size is normal at rest and normal at stress.    The ECG portion of the study is negative for ischemia.    The patient reported no chest pain during the stress test.    There were no arrhythmias during stress.    A PET stress exam is recommended if clinically indicated.    When compared to the previous study from 4/13/2021, there are significant changes.  Breast attenuation remains. There are now reversible components to the defect.    PET Stress Test 5/18/2021    The relative PET images are normal showing no clinically significant regional resting or stress induced perfusion defects.    The whole heart absolute myocardial perfusion values averaged 1.13 cc/min/g at rest, which is elevated; 1.72 cc/min/g at stress, which is mildly reduced; and CFR is 1.53 , which is moderately reduced in part due to elevated resting flow.    The gated perfusion images showed an ejection fraction of 24% at rest and 30% during stress. A normal ejection fraction is greater than 51%.    There is moderate global hypokinesis at rest and during stress.    The LV cavity size is moderately enlarged at rest and stress.    The EKG portion of this study is negative for ischemia.    During stress, rare PACs and frequent PVCs are noted.    The patient reported no chest pain during the stress test.    There are no prior studies for comparison.    SPECT Stress Test 4/13/21    Abnormal myocardial perfusion scan.    There is a moderate intensity, small to moderate sized, fixed defect consistent with scar in the mid to apical anterior wall(s) in the typical distribution of the LAD territory.    The gated perfusion images showed an ejection fraction of 48% at rest. The gated perfusion images showed an ejection fraction of 46% post stress. Normal ejection fraction is greater than 51%.    There is mild global hypokinesis at rest and stress.    LV cavity size is mildly enlarged at  rest and mildly enlarged at stress.    The EKG portion of this study is negative for ischemia.    The patient reported no chest pain during the stress test.    During stress, rare PACs are noted. , During stress, occasional PVCs are noted.    There are no prior studies for comparison.    Caths:   Left heart cath 5/17/22  Left Main   Exhibits minimal luminal irregularities.   Left Anterior Descending   There is mild diffuse disease throughout the vessel.   First Diagonal Branch   1st Diag lesion was 30% stenosed.   Lateral First Diagonal Branch   There is mild diffuse disease throughout the vessel.   Second Diagonal Branch   The vessel exhibits minimal luminal irregularities.   Left Circumflex   Mid Cx to Dist Cx lesion was 30% stenosed.   First Obtuse Marginal Branch   The vessel exhibits minimal luminal irregularities.   Second Obtuse Marginal Branch   2nd Mrg lesion was 70% stenosed.   Third Obtuse Marginal Branch   The vessel exhibits minimal luminal irregularities.   Fourth Obtuse Marginal Branch   The vessel exhibits minimal luminal irregularities.   Right Coronary Artery   Ost RCA to Prox RCA lesion was 20% stenosed.   Right Posterior Descending Artery   The vessel is angiographically normal.   Right Posterior Atrioventricular Artery   The vessel is angiographically normal.   First Right Posterolateral Branch   The vessel is angiographically normal.   Second Right Posterolateral Branch   The vessel is angiographically normal.     Other:  Chest CT 1/4/2024  Stable fuse form aneurysmal dilatation of the ascending aorta measuring up to 4.4 cm     Chest CT 5/18/2023  Cardiovascular: The heart is normal in size. There is no pericardial effusion. There is ectasia of the ascending thoracic aorta measuring up to 4.3 cm in diameter with calcified plaques at the aortic arch and in the coronary arteries (3 vessel disease).     Chest CT 2/16/2023  Heart remains enlarged with coronary artery calcification. Ascending aorta  diameter 4.2 cm has not significantly changed. Mild calcified plaque affects the thoracic aorta.     Chest CT 3/29/2021  Heart size is normal. There is a small pericardial effusion, similar  to the prior study. Multifocal coronary artery atherosclerotic  calcification is noted. The ascending aorta is mildly dilated at 4.1  cm in transverse diameter, stable. Aortic arch and descending thoracic  aorta are normal in caliber, with mild multifocal atherosclerotic  calcification. Small mediastinal and bilateral hilar lymph nodes are  unchanged.    Chest CTA 10/21/2020  The heart is at the upper end of normal range in size. There is a  small amount of pericardial fluid. There is fusiform ectasia of the  ascending aorta measuring up to 4.3 cm transversely. Scattered  atheromatous changes are noted in the aorta and branch vessels  including the coronary arteries. A right-sided central venous catheter  via RIJ approach has its tip within the superior vena cava.    EKG 5/17/2022: NSR, inferolateral TWIs.     Assessment:     1. Pre-operative cardiovascular examination    2. Nonischemic cardiomyopathy    3. Chronic combined systolic and diastolic heart failure    4. Nonobstructive atherosclerosis of coronary artery    5. Chronic deep vein thrombosis (DVT) of left upper extremity, unspecified vein    6. Calcification of aorta    7. Primary hypertension    8. Pure hypercholesterolemia    9. Ascending aorta dilation        Plan:     Pre-op kidney transplant  Pt has no active cardiac condition (ACS/USA, decompenstated CHF, significant arrhythmias or severe valvular disease) and can easily achieve 4 METS. PET stress test last year was negative for significant ischemia and TTE shows full recovery of  LVEF. As such, pt does not require further cardiac evaluation prior to undergoing surgery.  Low risk for intermediate risk surgery.       Nonischemic cardiomyopathy, LVEF recovered to 55-60%  Following with HTS  NYHA Class 1. Cardiac  function recovered to normal  Continue Entresto 24-26 mg bid  Continue Coreg 12.5 mg bid   Contraindication to SGLT-2 inh given renal dysfunction  HD for volume removal    Nonobstructive CAD  Asymptomatic. Negative PET stress test last year   Continue high intensity statin    Hypertension  Well controlled   Continue Entresto and Coreg   On Midodrine 10 mg on dialysis days    Hyperlipidemia  LDL at goal on Lipitor 40 mg      History of DVT  Continue Eliquis     Ascending aorta dilation  Mild, stable. Ascending aorta measured 4.4 on most recent chest CT. She has surveillance chest CT every 6 months with pulmonology.     Follow up in one year.     Signed:  Dayan Jose PA-C  Cardiology   [phone] - General  4/1/2024 8:57 AM

## 2024-04-02 ENCOUNTER — OFFICE VISIT (OUTPATIENT)
Dept: CARDIOLOGY | Facility: CLINIC | Age: 69
End: 2024-04-02
Payer: MEDICARE

## 2024-04-02 VITALS
OXYGEN SATURATION: 98 % | DIASTOLIC BLOOD PRESSURE: 72 MMHG | SYSTOLIC BLOOD PRESSURE: 157 MMHG | BODY MASS INDEX: 23.53 KG/M2 | HEIGHT: 64 IN | WEIGHT: 137.81 LBS | HEART RATE: 62 BPM

## 2024-04-02 DIAGNOSIS — I70.0 CALCIFICATION OF AORTA: ICD-10-CM

## 2024-04-02 DIAGNOSIS — I42.8 NONISCHEMIC CARDIOMYOPATHY: ICD-10-CM

## 2024-04-02 DIAGNOSIS — I50.42 CHRONIC COMBINED SYSTOLIC AND DIASTOLIC HEART FAILURE: ICD-10-CM

## 2024-04-02 DIAGNOSIS — I25.10 NONOBSTRUCTIVE ATHEROSCLEROSIS OF CORONARY ARTERY: ICD-10-CM

## 2024-04-02 DIAGNOSIS — Z76.82 AWAITING ORGAN TRANSPLANT STATUS: ICD-10-CM

## 2024-04-02 DIAGNOSIS — Z01.810 PRE-OPERATIVE CARDIOVASCULAR EXAMINATION: Primary | ICD-10-CM

## 2024-04-02 DIAGNOSIS — I77.810 ASCENDING AORTA DILATION: ICD-10-CM

## 2024-04-02 DIAGNOSIS — I10 PRIMARY HYPERTENSION: ICD-10-CM

## 2024-04-02 DIAGNOSIS — E78.00 PURE HYPERCHOLESTEROLEMIA: ICD-10-CM

## 2024-04-02 DIAGNOSIS — I71.9 AORTIC ANEURYSM, UNSPECIFIED PORTION OF AORTA, UNSPECIFIED WHETHER RUPTURED: ICD-10-CM

## 2024-04-02 DIAGNOSIS — I82.722 CHRONIC DEEP VEIN THROMBOSIS (DVT) OF LEFT UPPER EXTREMITY, UNSPECIFIED VEIN: ICD-10-CM

## 2024-04-02 PROCEDURE — 99214 OFFICE O/P EST MOD 30 MIN: CPT | Mod: S$PBB,TXP,, | Performed by: PHYSICIAN ASSISTANT

## 2024-04-02 PROCEDURE — 99999 PR PBB SHADOW E&M-EST. PATIENT-LVL IV: CPT | Mod: PBBFAC,TXP,, | Performed by: PHYSICIAN ASSISTANT

## 2024-04-02 PROCEDURE — 99214 OFFICE O/P EST MOD 30 MIN: CPT | Mod: PBBFAC,TXP | Performed by: PHYSICIAN ASSISTANT

## 2024-04-04 ENCOUNTER — TELEPHONE (OUTPATIENT)
Dept: PULMONOLOGY | Facility: CLINIC | Age: 69
End: 2024-04-04

## 2024-04-04 ENCOUNTER — HOSPITAL ENCOUNTER (OUTPATIENT)
Dept: PULMONOLOGY | Facility: HOSPITAL | Age: 69
Discharge: HOME | End: 2024-04-04
Attending: NURSE PRACTITIONER
Payer: MEDICARE

## 2024-04-04 DIAGNOSIS — J43.9 PULMONARY EMPHYSEMA, UNSPECIFIED EMPHYSEMA TYPE: ICD-10-CM

## 2024-04-04 PROCEDURE — 94729 DIFFUSING CAPACITY: CPT

## 2024-04-04 PROCEDURE — 94010 BREATHING CAPACITY TEST: CPT | Mod: XB

## 2024-04-04 PROCEDURE — 94060 EVALUATION OF WHEEZING: CPT

## 2024-04-04 PROCEDURE — 94727 GAS DIL/WSHOT DETER LNG VOL: CPT

## 2024-04-04 NOTE — TELEPHONE ENCOUNTER
Called patient let her know her PFT is pretty stable, she does not need any inhalers.  She said great has not used any inhalers in 3 months and feeling great.

## 2024-04-11 RX ORDER — CARVEDILOL 12.5 MG/1
12.5 TABLET ORAL 2 TIMES DAILY WITH MEALS
Qty: 180 TABLET | Refills: 3 | Status: SHIPPED | OUTPATIENT
Start: 2024-04-11 | End: 2025-04-06

## 2024-04-11 NOTE — TELEPHONE ENCOUNTER
----- Message from Indira Vicente sent at 4/11/2024  1:53 PM CDT -----  Regarding: Refill  Pt [phone] requesting refill for her Coreg 12.5 mg     [facility] [address] [place] [address].   Phone: [phone]  Fax: [phone]      Thanks

## 2024-05-02 ENCOUNTER — HOSPITAL ENCOUNTER (EMERGENCY)
Facility: HOSPITAL | Age: 69
Discharge: HOME | End: 2024-05-02
Attending: EMERGENCY MEDICINE
Payer: MEDICARE

## 2024-05-02 VITALS
BODY MASS INDEX: 23.52 KG/M2 | DIASTOLIC BLOOD PRESSURE: 84 MMHG | OXYGEN SATURATION: 98 % | RESPIRATION RATE: 16 BRPM | TEMPERATURE: 98 F | SYSTOLIC BLOOD PRESSURE: 154 MMHG | HEART RATE: 83 BPM | WEIGHT: 137 LBS

## 2024-05-02 DIAGNOSIS — S93.401A SPRAIN OF RIGHT ANKLE, UNSPECIFIED LIGAMENT, INITIAL ENCOUNTER: Primary | ICD-10-CM
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
## 2024-05-02 DIAGNOSIS — T07.XXXA MULTIPLE ABRASIONS: ICD-10-CM

## 2024-05-02 PROCEDURE — 99284 EMERGENCY DEPT VISIT MOD MDM: CPT | Mod: 25

## 2024-05-02 PROCEDURE — 25000003 PHARM REV CODE 250: Performed by: EMERGENCY MEDICINE

## 2024-05-02 RX ORDER — MUPIROCIN 20 MG/G
OINTMENT TOPICAL
Status: COMPLETED | OUTPATIENT
Start: 2024-05-02 | End: 2024-05-02

## 2024-05-02 RX ORDER — OXYCODONE AND ACETAMINOPHEN 5; 325 MG/1; MG/1
1 TABLET ORAL EVERY 6 HOURS PRN
Qty: 12 TABLET | Refills: 0 | Status: SHIPPED | OUTPATIENT
Start: 2024-05-02

## 2024-05-02 RX ORDER — OXYCODONE AND ACETAMINOPHEN 5; 325 MG/1; MG/1
2 TABLET ORAL
Status: DISCONTINUED | OUTPATIENT
Start: 2024-05-02 | End: 2024-05-02 | Stop reason: HOSPADM

## 2024-05-02 RX ORDER — MUPIROCIN 20 MG/G
OINTMENT TOPICAL 3 TIMES DAILY
Qty: 22 G | Refills: 1 | Status: SHIPPED | OUTPATIENT
Start: 2024-05-02 | End: 2024-05-09

## 2024-05-02 RX ADMIN — MUPIROCIN 1 G: 20 OINTMENT TOPICAL at 09:05

## 2024-05-02 NOTE — FIRST PROVIDER EVALUATION
Emergency Department TeleTriage Encounter Note      CHIEF COMPLAINT    Chief Complaint   Patient presents with    Fall     Fell at home - c/o pain in right ankle, visible skin tears on right arm, and abrasion under chin, denies headache - patient on blood thinners       VITAL SIGNS   Initial Vitals [05/02/24 1836]   BP Pulse Resp Temp SpO2   (!) 155/100 80 -- 98.3 °F (36.8 °C) 96 %      MAP       --            ALLERGIES    Review of patient's allergies indicates:  No Known Allergies    PROVIDER TRIAGE NOTE  This is a teletriage evaluation of a 68 y.o. female presenting to the ED complaining of right ankle pain after trip and fall today. Reports hitting chin. No LOC. Denies neck pain.  Has RUE abrasion.     Alert, no distress.     Initial orders will be placed and care will be transferred to an alternate provider when patient is roomed for a full evaluation. Any additional orders and the final disposition will be determined by that provider.         ORDERS  Labs Reviewed - No data to display    ED Orders (720h ago, onward)      Start Ordered     Status Ordering Provider    Unscheduled 05/02/24 1849  X-Ray Ankle Complete Right  1 time imaging         Ordered ABRIL VITAL              Virtual Visit Note: The provider triage portion of this emergency department evaluation and documentation was performed via [employer], a HIPAA-compliant telemedicine application, in concert with a tele-presenter in the room. A face to face patient evaluation with one of my colleagues will occur once the patient is placed in an emergency department room.      DISCLAIMER: This note was prepared with [employer] voice recognition transcription software. Garbled syntax, mangled pronouns, and other bizarre constructions may be attributed to that software system.

## 2024-05-03 NOTE — DISCHARGE INSTRUCTIONS
Rest, ice, elevation.  Return to emergency department for worsening symptoms or any problems.  Weight-bearing as tolerated.  Return for any problems.  Wound care.

## 2024-05-03 NOTE — ED PROVIDER NOTES
Encounter Date: 5/2/2024       History     Chief Complaint   Patient presents with    Fall     Fell at home - c/o pain in right ankle, visible skin tears on right arm, and abrasion under chin, denies headache - patient on blood thinners     68-year-old female presented emergency department after a fall.  Patient fell behind her house.  Patient said she stepped over her flip-flop and fell on her right side.  Patient has pain and swelling of the right ankle as she twisted her right ankle during the fall.  Patient has abrasions of right upper extremity and contusion on the chin.  Patient has a dialysis fistula on the right arm which is medial to the abrasion and not affected.  Patient did not hit her head and did not lose consciousness.  Patient on blood thinners.  Denies any chest pain or abdominal pain or weakness or numbness.      Review of patient's allergies indicates:  No Known Allergies  Past Medical History:   Diagnosis Date    Dialysis patient 10/05/2020    Emphysema of lung     ESRD (end stage renal disease)     Hepatitis 1980    HLD (hyperlipidemia)     Hypertension     Renal disorder      Past Surgical History:   Procedure Laterality Date    AORTOGRAPHY WITH SERIALOGRAPHY N/A 09/30/2020    Procedure: co2 renal angio;  Surgeon: Lance Bustamante MD;  Location: [facility] CATH/EP LAB;  Service: Vascular;  Laterality: N/A;    APPENDECTOMY      Age 16    AV FISTULA PLACEMENT Right 12/09/2020    Procedure: CREATION, AV FISTULA;  Surgeon: Lance Bustamante MD;  Location: [facility] OR;  Service: General;  Laterality: Right;    COLONOSCOPY N/A 06/08/2021    Procedure: COLONOSCOPY;  Surgeon: Avila Smith III, MD;  Location: [facility] ENDO;  Service: Endoscopy;  Laterality: N/A;    COLONOSCOPY N/A 12/14/2021    Procedure: COLONOSCOPY;  Surgeon: Avila Smith III, MD;  Location: [facility] ENDO;  Service: Endoscopy;  Laterality: N/A;    COLONOSCOPY W/ POLYPECTOMY  06/08/2021    FISTULOGRAM Bilateral 05/18/2022    Procedure:  Fistulogram;  Surgeon: Lance Bustamante MD;  Location: [facility] CATH/EP LAB;  Service: General;  Laterality: Bilateral;    INSERTION OF TUNNELED CENTRAL VENOUS HEMODIALYSIS CATHETER  10/02/2020    Procedure: INSERTION, CATHETER, CENTRAL VENOUS, HEMODIALYSIS, TUNNELED;  Surgeon: Ali Khoobehi, MD;  Location: [facility] OR;  Service: Vascular;;    LEFT HEART CATHETERIZATION Left 05/17/2022    Procedure: Left heart cath;  Surgeon: Daniel Tyler MD;  Location: [facility] CATH LAB;  Service: Cardiology;  Laterality: Left;    OPEN THROMBECTOMY OF GRAFT  05/18/2022    Procedure: THROMBECTOMY, GRAFT, OPEN;  Surgeon: Lance Bustamante MD;  Location: [facility] CATH/EP LAB;  Service: General;;    PLACEMENT OF DIALYSIS ACCESS Right 09/30/2020    Procedure: Insertion, Dialysis Access;  Surgeon: Lance Bustamante MD;  Location: [facility] CATH/EP LAB;  Service: Vascular;  Laterality: Right;    REMOVAL OF TUNNELED CENTRAL VENOUS CATHETER (CVC) N/A 12/16/2020    Procedure: REMOVAL, CATHETER, CENTRAL VENOUS, TUNNELED;  Surgeon: Ali Khoobehi, MD;  Location: [facility] OR;  Service: Vascular;  Laterality: N/A;    TONSILLECTOMY      TUBAL LIGATION       Family History   Problem Relation Name Age of Onset    Cancer Mother      Heart disease Father       Social History     Tobacco Use    Smoking status: Former     Current packs/day: 0.00     Average packs/day: 0.3 packs/day for 20.0 years (5.0 ttl pk-yrs)     Types: Cigarettes     Start date: 9/27/2000     Quit date: 9/27/2020     Years since quitting: 3.5    Smokeless tobacco: Never   Substance Use Topics    Alcohol use: Not Currently    Drug use: Not Currently     Review of Systems   Constitutional: Negative.    HENT: Negative.     Eyes: Negative.    Respiratory: Negative.     Cardiovascular: Negative.  Negative for chest pain.   Gastrointestinal: Negative.    Endocrine: Negative.    Genitourinary: Negative.    Musculoskeletal:  Positive for arthralgias.   Skin:  Positive for wound.   Allergic/Immunologic:  Negative.    Neurological: Negative.    Hematological: Negative.    Psychiatric/Behavioral: Negative.     All other systems reviewed and are negative.      Physical Exam     Initial Vitals   BP Pulse Resp Temp SpO2   05/02/24 1836 05/02/24 1836 05/02/24 1949 05/02/24 1836 05/02/24 1836   (!) 155/100 80 16 98.3 °F (36.8 °C) 96 %      MAP       --                Physical Exam    Nursing note and vitals reviewed.  Constitutional: She appears well-developed and well-nourished.   HENT:   Head: Normocephalic and atraumatic.   Nose: Nose normal.   Mouth/Throat: Oropharynx is clear and moist.   Has a contusion of the chin.  Has dentures in the mouth which are unaffected and no evidence of head injury otherwise.   Eyes: Conjunctivae and EOM are normal. Pupils are equal, round, and reactive to light.   Neck: Neck supple. No thyromegaly present. No tracheal deviation present. No JVD present.   Normal range of motion.  Cardiovascular:  Normal rate, regular rhythm, normal heart sounds and intact distal pulses.           No murmur heard.  Pulmonary/Chest: Breath sounds normal. No stridor. No respiratory distress. She has no wheezes. She has no rales.   Abdominal: Abdomen is soft. Bowel sounds are normal.   Musculoskeletal:         General: No edema. Normal range of motion.      Cervical back: Normal range of motion and neck supple.      Comments: Right ankle swollen and has diffuse tenderness.  Extremity otherwise neurovascularly intact.  Abrasions on the right arm noted and skin tears noted.  Dialysis fistula not affected or injured.     Neurological: She is alert and oriented to person, place, and time.   Skin: Skin is warm. Capillary refill takes less than 2 seconds.   Psychiatric: She has a normal mood and affect. Thought content normal.         ED Course   Procedures  Labs Reviewed - No data to display       Imaging Results              X-Ray Ankle Complete Right (Final result)  Result time 05/02/24 19:22:35      Final  result by Orion Almazan MD (05/02/24 19:22:35)                   Impression:      No radiographic evidence of acute displaced fracture or dislocation of the right ankle.      Electronically signed by: Orion Almazan MD  Date:    05/02/2024  Time:    19:22               Narrative:    EXAMINATION:  XR ANKLE COMPLETE 3 VIEW RIGHT    CLINICAL HISTORY:  Unspecified fall, initial encounter    TECHNIQUE:  AP, lateral, and oblique images of the right ankle were performed.    COMPARISON:  None    FINDINGS:  There is no radiographic evidence of acute displaced fracture or dislocation of the right ankle.  No abnormal widening of the ankle mortise appreciated.  Soft tissues are unremarkable.                                    X-Rays:   Independently Interpreted Readings:   Other Readings:  X-ray of right ankle negative for acute fracture or dislocation    Medications   oxyCODONE-acetaminophen 5-325 mg per tablet 2 tablet (has no administration in time range)   mupirocin 2 % ointment (1 g Topical (Top) Given 5/2/24 2113)     Medical Decision Making  68-year-old female with right ankle pain and sprain.  X-ray negative for fracture.  Ace wrap applied and extremities neurovascularly intact.  Advised nonweightbearing.  Patient also has injury to the chin but no head injury.  No headache also.  Patient has no indication for a CT of the brain as has no injury.  Patient does have abrasions of the right arm and wounds cleaned and dressed with Bactroban and dressings placed.  Patient discharged with return precautions and instructions and follow-up and advised weight-bearing only as tolerated and initially advised nonweightbearing until pain resolves.  Advised to use crutches.    Amount and/or Complexity of Data Reviewed  Radiology: ordered. Decision-making details documented in ED Course.    Risk  Prescription drug management.                                      Clinical Impression:  Final diagnoses:  [W19.XXXA] Fall  [S93.401A]  Sprain of right ankle, unspecified ligament, initial encounter (Primary)  [T07.XXXA] Multiple abrasions          ED Disposition Condition    Discharge Stable          ED Prescriptions       Medication Sig Dispense Start Date End Date Auth. Provider    oxyCODONE-acetaminophen (PERCOCET) 5-325 mg per tablet Take 1 tablet by mouth every 6 (six) hours as needed. 12 tablet 5/2/2024 -- Shaun Sam MD    mupirocin (BACTROBAN) 2 % ointment Apply topically 3 (three) times daily. for 7 days 22 g 5/2/2024 5/9/2024 Shaun Sam MD          Follow-up Information       Follow up With Specialties Details Why Contact Info    Ralf Ham MD Internal Medicine In 2 days  [address] [facility] [address] [phone]               Shaun Sam MD  05/02/24 1192

## 2024-05-22 ENCOUNTER — TELEPHONE (OUTPATIENT)
Dept: TRANSPLANT | Facility: CLINIC | Age: 69
End: 2024-05-22
Payer: MEDICARE

## 2024-05-27 NOTE — PROGRESS NOTES
"Endocrinology New Visit   05/28/2024      Subjective:      Chief Complaint:  Consult      History of Present Illness  Latricia Higgins is a 68 y.o. female with ESRD 2/2 HTN and HLD referred by Dinora Graves for evaluation of "adrenal abnormality".    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 Started on GDMT and EF and diastolic function have since recovered.  TTE 1/2024 with ED 55-6-% and normal diastolic function.    Started HD in September 2020. She is on the kidney transplant list.    Low dose chest CT for lung cancer screening on 1/4/2024 with incidental b/l adrenal abnormality;  Diffuse enlargement of the bilateral adrenal gland similar to multiple prior exams. Findings may represent hyperplasia versus underlying nodules.     No dedicated adrenal imaging has been done.    Aldosterone and renin have never been checked     Diagnosed with HTN around 2012 per the pt  States meds were changed frequently but BP control was variable.    Does not have DM. BMI is normal now.  She weighed 182lbs before 2020, since then wt has been 130-140.  HTN does run in her family    Now she is on Midodrine daily, but higher doses on HD days (MWF) along with Entresto and Carvedilol for CHF.    ROS:   As above    Objective:     /82 (BP Location: Left arm, Patient Position: Sitting, BP Method: Medium (Manual))   Pulse 71   Ht 5' 4" (1.626 m)   Wt 62.6 kg (138 lb 1.9 oz)   SpO2 100%   BMI 23.71 kg/m²   BP Readings from Last 3 Encounters:   05/28/24 132/82   05/02/24 (!) 154/84   04/02/24 (!) 157/72     Wt Readings from Last 1 Encounters:   05/28/24 1054 62.6 kg (138 lb 1.9 oz)     Body mass index is 23.71 kg/m².    Physical Exam  Vitals reviewed.   Constitutional:       General: She is not in acute distress.     Appearance: She is normal weight. She is not ill-appearing.   HENT:      Mouth/Throat:      Mouth: Mucous membranes are moist.   Eyes:      Conjunctiva/sclera: Conjunctivae normal.   Cardiovascular:      Rate " "and Rhythm: Normal rate.   Pulmonary:      Effort: Pulmonary effort is normal.   Musculoskeletal:      Cervical back: Normal range of motion and neck supple.   Neurological:      Mental Status: She is alert and oriented to person, place, and time.   Psychiatric:         Mood and Affect: Mood normal.         Behavior: Behavior normal.       Lab Review:   Lab Results   Component Value Date    HGBA1C 5.0 08/28/2022     Lab Results   Component Value Date    CHOL 109 (L) 01/19/2023    HDL 55 01/19/2023    LDLCALC 40.0 (L) 01/19/2023    TRIG 70 01/19/2023    CHOLHDL 50.5 (H) 01/19/2023     Lab Results   Component Value Date     05/15/2023    K 4.0 05/15/2023    CL 92 (L) 05/15/2023    CO2 35 (H) 05/15/2023     05/15/2023    BUN 25 (H) 05/15/2023    CREATININE 4.8 (H) 05/15/2023    CALCIUM 8.7 05/15/2023    PROT 7.2 05/15/2023    ALBUMIN 3.8 05/15/2023    BILITOT 0.9 05/15/2023    ALKPHOS 128 05/15/2023    AST 21 05/15/2023    ALT 12 05/15/2023    ANIONGAP 9 05/15/2023    ESTGFRAFRICA 5.1 (A) 05/18/2022    EGFRNONAA 4.4 (A) 05/18/2022    TSH 1.662 11/08/2022     No results found for: "[id number]"      All relevant labs and imaging reviewed.    Assessment and Plan     Adrenal hyperplasia  Imaging with b/l possibly hyperplasia unclear if also any nodularity dating back to 2020  Mostly described as b/l adrenal enlargement with some reports stating maybe nodularity but not clear  I reviewed most recent chest imaging and due to quality of the image unable to see adrenal glands well  All of this to say -- imaging may not make much difference for this pt. She has no clinical concern for hypercortisolism and I do not feel a DST is needed now.  However, she may have underlying hyperaldosteronism leading to previously difficult to control HTN and now ESRD due to HTN  This has never been evaluated. We discussed the utility of evaluating this now as quite low. At this point she no longer has difficult to control BP it " seems she is on Coreg and Imdur for CHF (now has had recovery of her EF) and having to be on midodrine daily with higher doses on HD days to prevent hypotension.  Hyperaldo workup often involves initial screening tests as well as confirmatory testing. Confirmatory testing involves either salt loading or volume loading and we would not do either in a pt with ESRD. Therefore the value in doing labs is low.  We would recommend considering a trial of Aldactone to manage HTN if BP worsens at any point (pre or post-transplant) as that is what our ultimate recommendation would be for hyperaldosteronism in a patient w/ bilateral disease. I will reach out the the transplant team but at this point adrenal gland abnormality on imaging should not be a barrier to kidney transplant.    HTN (hypertension)  On Coreg and Entresto (started when EF was reduced) as well as midodrine for low BP  Does not seem to have uncontrolled HTN at this time     ESRD (end stage renal disease)  Being followed by transplant team for possible kidney transplant         Kristin Graves MD  [facility], 6th Floor  [address] [place], [address]    Office: [phone]  Fax: [phone]

## 2024-05-28 ENCOUNTER — OFFICE VISIT (OUTPATIENT)
Dept: ENDOCRINOLOGY | Facility: CLINIC | Age: 69
End: 2024-05-28
Payer: MEDICARE

## 2024-05-28 ENCOUNTER — TELEPHONE (OUTPATIENT)
Dept: TRANSPLANT | Facility: CLINIC | Age: 69
End: 2024-05-28
Payer: MEDICARE

## 2024-05-28 VITALS
BODY MASS INDEX: 23.58 KG/M2 | HEART RATE: 71 BPM | HEIGHT: 64 IN | DIASTOLIC BLOOD PRESSURE: 82 MMHG | OXYGEN SATURATION: 100 % | WEIGHT: 138.13 LBS | SYSTOLIC BLOOD PRESSURE: 132 MMHG

## 2024-05-28 DIAGNOSIS — N18.6 ESRD (END STAGE RENAL DISEASE): ICD-10-CM

## 2024-05-28 DIAGNOSIS — I10 PRIMARY HYPERTENSION: Primary | ICD-10-CM

## 2024-05-28 DIAGNOSIS — E27.9 ADRENAL ABNORMALITY: ICD-10-CM

## 2024-05-28 DIAGNOSIS — E27.8 ADRENAL HYPERPLASIA: ICD-10-CM

## 2024-05-28 PROCEDURE — 99214 OFFICE O/P EST MOD 30 MIN: CPT | Mod: PBBFAC,NTX | Performed by: STUDENT IN AN ORGANIZED HEALTH CARE EDUCATION/TRAINING PROGRAM

## 2024-05-28 PROCEDURE — 99999 PR PBB SHADOW E&M-EST. PATIENT-LVL IV: CPT | Mod: PBBFAC,GC,TXP, | Performed by: STUDENT IN AN ORGANIZED HEALTH CARE EDUCATION/TRAINING PROGRAM

## 2024-05-28 RX ORDER — MIDODRINE HYDROCHLORIDE 5 MG/1
TABLET ORAL
COMMUNITY
Start: 2024-05-27

## 2024-05-28 NOTE — PATIENT INSTRUCTIONS
We will discuss with your transplant team before we do anything. I will let you know if we need to do additional imaging, labs, or nothing else.

## 2024-05-28 NOTE — Clinical Note
Isaac Farias, We saw this patient today and I wanted to reach out to find out what would be most helpful to y'all. We cannot see the adrenal glands well on chest imaging but not sure that dedicated imaging changes anything on our end. She certainly could have hyperaldosteronism but the workup involves confirmatory testing we would not do in a pt with ESRD (either salt or volume loading test). Additionally, it seems she is on GDMT and midodrine so resistant HTN not currently an issue. If she were to develop difficult to control HTN again, you could consider an empiric trial of aldactone without a workup (which is a treatment for hyperaldosteronism). She has no reason to suspect hypercortisolism which is the other thing we think about with adrenal nodules. From our standpoint, this should not delay or prevent her kidney transplant. Is this helpful at all? Are there other questions specifically that I can help answer?  Thanks! Kristin

## 2024-05-28 NOTE — ASSESSMENT & PLAN NOTE
On Coreg and Entresto (started when EF was reduced) as well as midodrine for low BP  Does not seem to have uncontrolled HTN at this time

## 2024-05-28 NOTE — TELEPHONE ENCOUNTER
----- Message from Tequila Clifton NP sent at 5/28/2024  3:12 PM CDT -----  Dinora Mcdonough is out of the office, but if you feel that the adrenal nodules are not a barrier to transplant then nothing additional needs to be done. We will include in her transplant work up.     Thank you  Tequila Clifton  ----- Message -----  From: Kristin Graves MD  Sent: 5/28/2024   2:50 PM CDT  To: JOSUE Montes,  We saw this patient today and I wanted to reach out to find out what would be most helpful to y'all. We cannot see the adrenal glands well on chest imaging but not sure that dedicated imaging changes anything on our end. She certainly could have hyperaldosteronism but the workup involves confirmatory testing we would not do in a pt with ESRD (either salt or volume loading test). Additionally, it seems she is on GDMT and midodrine so resistant HTN not currently an issue. If she were to develop difficult to control HTN again, you could consider an empiric trial of aldactone without a workup (which is a treatment for hyperaldosteronism). She has no reason to suspect hypercortisolism which is the other thing we think about with adrenal nodules. From our standpoint, this should not delay or prevent her kidney transplant. Is this helpful at all? Are there other questions specifically that I can help answer?   Thanks!  Kristin

## 2024-05-28 NOTE — ASSESSMENT & PLAN NOTE
Imaging with b/l possibly hyperplasia unclear if also any nodularity dating back to 2020  Mostly described as b/l adrenal enlargement with some reports stating maybe nodularity but not clear  I reviewed most recent chest imaging and due to quality of the image unable to see adrenal glands well  All of this to say -- imaging may not make much difference for this pt. She has no clinical concern for hypercortisolism and I do not feel a DST is needed now.  However, she may have underlying hyperaldosteronism leading to previously difficult to control HTN and now ESRD due to HTN  This has never been evaluated. We discussed the utility of evaluating this now as quite low. At this point she no longer has difficult to control BP it seems she is on Coreg and Imdur for CHF (now has had recovery of her EF) and having to be on midodrine daily with higher doses on HD days to prevent hypotension.  Hyperaldo workup often involves initial screening tests as well as confirmatory testing. Confirmatory testing involves either salt loading or volume loading and we would not do either in a pt with ESRD. Therefore the value in doing labs is low.  We would recommend considering a trial of Aldactone to manage HTN if BP worsens at any point (pre or post-transplant) as that is what our ultimate recommendation would be for hyperaldosteronism in a patient w/ bilateral disease. I will reach out the the transplant team but at this point adrenal gland abnormality on imaging should not be a barrier to kidney transplant.

## 2024-07-30 ENCOUNTER — TELEPHONE (OUTPATIENT)
Dept: TRANSPLANT | Facility: CLINIC | Age: 69
End: 2024-07-30
Payer: MEDICARE

## 2024-08-01 ENCOUNTER — OFFICE VISIT (OUTPATIENT)
Dept: PULMONOLOGY | Facility: CLINIC | Age: 69
End: 2024-08-01
Payer: MEDICARE

## 2024-08-01 VITALS
DIASTOLIC BLOOD PRESSURE: 70 MMHG | HEART RATE: 74 BPM | HEIGHT: 64 IN | BODY MASS INDEX: 23.22 KG/M2 | SYSTOLIC BLOOD PRESSURE: 120 MMHG | WEIGHT: 136 LBS | OXYGEN SATURATION: 99 %

## 2024-08-01 DIAGNOSIS — R91.8 PULMONARY NODULES: ICD-10-CM

## 2024-08-01 DIAGNOSIS — N18.6 ESRD (END STAGE RENAL DISEASE): Primary | ICD-10-CM

## 2024-08-01 PROCEDURE — 99999 PR PBB SHADOW E&M-EST. PATIENT-LVL III: CPT | Mod: PBBFAC,TXP,, | Performed by: NURSE PRACTITIONER

## 2024-08-01 PROCEDURE — 99213 OFFICE O/P EST LOW 20 MIN: CPT | Mod: PBBFAC,PN,TXP | Performed by: NURSE PRACTITIONER

## 2024-08-01 RX ORDER — FOLIC ACID/VIT B COMPLEX AND C 0.8 MG
1 TABLET ORAL
COMMUNITY
Start: 2024-07-23

## 2024-08-01 NOTE — PROGRESS NOTES
SUBJECTIVE:    Patient ID: Latricia Higgins is a 69 y.o. female.    Chief Complaint: Follow-up    Follow-up      Patient here today feeling well.  She is still getting dialysis 3 days a week.  She is not sure where she stands as far as the renal transplant, has a follow up next week.  She states her grandson was recently diagnosed with lymphoma and her son is her caretaker and her grandsons.  Her PFT shows no obstruction, no restriction, moderate diffusion defect. Her CT was stable.  She denies dyspnea, no cough and overall feels well from a pulmonary standpoint.     Past Medical History:   Diagnosis Date    Dialysis patient 10/05/2020    Emphysema of lung     ESRD (end stage renal disease)     Hepatitis 1980    HLD (hyperlipidemia)     Hypertension     Renal disorder      Past Surgical History:   Procedure Laterality Date    AORTOGRAPHY WITH SERIALOGRAPHY N/A 09/30/2020    Procedure: co2 renal angio;  Surgeon: Lance Bustamante MD;  Location: [facility] CATH/EP LAB;  Service: Vascular;  Laterality: N/A;    APPENDECTOMY      Age 16    AV FISTULA PLACEMENT Right 12/09/2020    Procedure: CREATION, AV FISTULA;  Surgeon: Lance Bustamante MD;  Location: [facility] OR;  Service: General;  Laterality: Right;    COLONOSCOPY N/A 06/08/2021    Procedure: COLONOSCOPY;  Surgeon: Avila Smith III, MD;  Location: [facility] ENDO;  Service: Endoscopy;  Laterality: N/A;    COLONOSCOPY N/A 12/14/2021    Procedure: COLONOSCOPY;  Surgeon: Avila Smith III, MD;  Location: [facility] ENDO;  Service: Endoscopy;  Laterality: N/A;    COLONOSCOPY W/ POLYPECTOMY  06/08/2021    FISTULOGRAM Bilateral 05/18/2022    Procedure: Fistulogram;  Surgeon: Lance Bustamante MD;  Location: [facility] CATH/EP LAB;  Service: General;  Laterality: Bilateral;    INSERTION OF TUNNELED CENTRAL VENOUS HEMODIALYSIS CATHETER  10/02/2020    Procedure: INSERTION, CATHETER, CENTRAL VENOUS, HEMODIALYSIS, TUNNELED;  Surgeon: Ali Khoobehi, MD;  Location: [facility] OR;  Service: Vascular;;     LEFT HEART CATHETERIZATION Left 05/17/2022    Procedure: Left heart cath;  Surgeon: Daniel Tyler MD;  Location: [facility] CATH LAB;  Service: Cardiology;  Laterality: Left;    OPEN THROMBECTOMY OF GRAFT  05/18/2022    Procedure: THROMBECTOMY, GRAFT, OPEN;  Surgeon: Lance Bustamante MD;  Location: [facility] CATH/EP LAB;  Service: General;;    PLACEMENT OF DIALYSIS ACCESS Right 09/30/2020    Procedure: Insertion, Dialysis Access;  Surgeon: Lance Bustamante MD;  Location: [facility] CATH/EP LAB;  Service: Vascular;  Laterality: Right;    REMOVAL OF TUNNELED CENTRAL VENOUS CATHETER (CVC) N/A 12/16/2020    Procedure: REMOVAL, CATHETER, CENTRAL VENOUS, TUNNELED;  Surgeon: Ali Khoobehi, MD;  Location: [facility] OR;  Service: Vascular;  Laterality: N/A;    TONSILLECTOMY      TUBAL LIGATION       Family History   Problem Relation Name Age of Onset    Cancer Mother      Heart disease Father          Social History:   Marital Status:   Occupation: Data Unavailable  Alcohol History:  reports that she does not currently use alcohol.  Tobacco History:  reports that she quit smoking about 3 years ago. Her smoking use included cigarettes. She started smoking about 23 years ago. She has a 5 pack-year smoking history. She has never used smokeless tobacco.  Drug History:  reports that she does not currently use drugs.    Review of patient's allergies indicates:  No Known Allergies    Current Outpatient Medications   Medication Sig Dispense Refill    apixaban (ELIQUIS) 5 mg Tab Take 1 tablet (5 mg total) by mouth 2 (two) times daily. 60 tablet 6    atorvastatin (LIPITOR) 40 MG tablet Take 40 mg by mouth once daily.      calcium carbonate (OS-MARLY) 600 mg calcium (1,500 mg) Tab Take 600 mg by mouth 2 (two) times daily with meals.      carvediloL (COREG) 12.5 MG tablet Take 1 tablet (12.5 mg total) by mouth 2 (two) times daily with meals. 180 tablet 3    LOKELMA 5 gram packet Take 5 g by mouth once daily.      midodrine (PROAMATINE) 10 MG  tablet Take 10 mg by mouth every Mon, Wed, Fri. With dialysis      midodrine (PROAMATINE) 5 MG Tab Take by mouth.      ondansetron (ZOFRAN) 4 MG tablet Take 4 mg by mouth every 8 (eight) hours as needed for Nausea.      MERVAT-STEPHAN 0.8 mg Tab Take 1 tablet by mouth.      sacubitriL-valsartan (ENTRESTO) 24-26 mg per tablet Take 1 tablet by mouth 2 (two) times daily. 60 tablet 11    sevelamer carbonate (RENVELA) 800 mg Tab Take 2,400 mg by mouth 3 (three) times daily with meals.       conjugated estrogens (PREMARIN) vaginal cream Place 1 g vaginally once daily. 1 applicator 2    oxyCODONE-acetaminophen (PERCOCET) 5-325 mg per tablet Take 1 tablet by mouth every 6 (six) hours as needed. (Patient not taking: Reported on 5/28/2024) 12 tablet 0    umeclidinium-vilanteroL (ANORO ELLIPTA) 62.5-25 mcg/actuation DsDv Inhale 1 puff into the lungs once daily. Controller (Patient not taking: Reported on 5/28/2024) 1 each 5    VELTASSA 8.4 gram PwPk Take 8.4 g by mouth 4 (four) times a week. Tues, thurs, sat, sun (Patient not taking: Reported on 5/28/2024)      VIOS AEROSOL DELIVERY SYSTEM Lizbeth AS DIRECTED (Patient not taking: Reported on 5/28/2024)      vitamin D (VITAMIN D3) 1000 units Tab Take 1,000 Units by mouth once daily. (Patient not taking: Reported on 5/28/2024)       No current facility-administered medications for this visit.       Last CT of chest 01/2024  mpression:     Lung-RADS Category:  2 - Benign Appearance or Behavior - continue annual screening with LDCT in 12 months.     Clinically or potentially clinically significant non lung cancer finding:  S - Significant.  Interlobular septal thickening with traction bronchiectasis and fibrotic changes with a lower lobe and peripheral predominance similar to prior exam which may be seen with interstitial lung disease such as UIP or NSIP.     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     Diffuse enlargement of the bilateral  "adrenal gland similar to multiple prior exams.  Findings may represent hyperplasia versus underlying nodules.     Atrophic native kidneys.     Cholelithiasis.     Prior Lung Cancer Modifier:  No history of prior lung cancer.        Last PFT 04/04/2024    PFT shows no obstruction, no restriction, moderate diffusion defect.   Review of Systems  General: Feeling Well.  Eyes: Vision is good.  ENT:  No sinusitis or pharyngitis.   Heart:: No chest pain or palpitations.  Lungs: No cough, sputum, or wheezing.  GI: No Nausea, vomiting, constipation, diarrhea, or reflux.  : dialysis three days a week   Musculoskeletal: No joint pain or myalgias.  Skin: No lesions or rashes.  Neuro: No headaches or neuropathy.  Lymph: No edema or adenopathy.  Psych: No anxiety or depression.  Endo: weight stable     OBJECTIVE:      /70 (BP Location: Left arm, Patient Position: Sitting, BP Method: Medium (Manual))   Pulse 74   Ht 5' 4" (1.626 m)   Wt 61.7 kg (136 lb)   SpO2 99%   BMI 23.34 kg/m²     Physical Exam  GENERAL: Older patient in no distress.  HEENT: Pupils equal and reactive. Extraocular movements intact. Nose intact.      Pharynx moist.  NECK: Supple.   HEART: Regular rate and rhythm. No murmur or gallop auscultated.  LUNGS: Clear to auscultation and percussion. Lung excursion symmetrical. No change in fremitus. No adventitial noises.  ABDOMEN: Bowel sounds present. Non-tender, no masses palpated.  EXTREMITIES: Normal muscle tone and joint movement, no cyanosis or clubbing.   LYMPHATICS: No adenopathy palpated, no edema.  SKIN: Dry, intact, no lesions.   NEURO: Cranial nerves II-XII intact. Motor strength 5/5 bilaterally, upper and lower extremities.  PSYCH: Appropriate affect.    Assessment:       1. ESRD (end stage renal disease)    2. Pulmonary nodules            Some ILD in bases    Plan:       ESRD (end stage renal disease)    Pulmonary nodules          rEpeat CT in January        Follow up in about 1 year (around " 8/1/2025).

## 2024-08-08 ENCOUNTER — OFFICE VISIT (OUTPATIENT)
Dept: TRANSPLANT | Facility: CLINIC | Age: 69
End: 2024-08-08
Payer: MEDICARE

## 2024-08-08 VITALS
DIASTOLIC BLOOD PRESSURE: 81 MMHG | OXYGEN SATURATION: 97 % | SYSTOLIC BLOOD PRESSURE: 173 MMHG | WEIGHT: 135.81 LBS | TEMPERATURE: 98 F | BODY MASS INDEX: 23.18 KG/M2 | HEART RATE: 79 BPM | RESPIRATION RATE: 18 BRPM | HEIGHT: 64 IN

## 2024-08-08 DIAGNOSIS — Z76.82 PATIENT ON WAITING LIST FOR KIDNEY TRANSPLANT: Primary | ICD-10-CM

## 2024-08-08 DIAGNOSIS — N18.6 ESRD (END STAGE RENAL DISEASE): ICD-10-CM

## 2024-08-08 DIAGNOSIS — I10 PRIMARY HYPERTENSION: ICD-10-CM

## 2024-08-08 DIAGNOSIS — I50.42 CHRONIC COMBINED SYSTOLIC AND DIASTOLIC HEART FAILURE: ICD-10-CM

## 2024-08-08 PROCEDURE — 99214 OFFICE O/P EST MOD 30 MIN: CPT | Mod: PBBFAC,TXP | Performed by: NURSE PRACTITIONER

## 2024-08-08 PROCEDURE — 99999 PR PBB SHADOW E&M-EST. PATIENT-LVL IV: CPT | Mod: PBBFAC,TXP,, | Performed by: NURSE PRACTITIONER

## 2024-08-08 PROCEDURE — 99215 OFFICE O/P EST HI 40 MIN: CPT | Mod: S$PBB,TXP,, | Performed by: NURSE PRACTITIONER

## 2024-08-08 NOTE — PROGRESS NOTES
Kidney Transplant Recipient Reevalulation    Referring Physician: Eliseo Barnes  Current Nephrologist: Eliseo Barnes  Waitlist Status: active  Dialysis Start Date: 10/1/2020    Subjective:     CC:  Annual reassessment of kidney transplant candidacy.    HPI:  Ms. Higgins is a 69 y.o. year old White female with ESRD secondary to HTN.  She has been on the wait list for a kidney transplant at [facility] since 10/1/2020. Patient is currently on hemodialysis started on 10/1/2020. Patient is dialyzing on MWF schedule.  Patient reports that she is tolerating dialysis well.. She has a dialysis catheter. Patient denies any recent hospitalizations or ED visits.    Hospitalizations/ED visits:  none    Interval History:   Reports doing well. No complaints. Stays active .   Last seen by cardiology 4/2024  Ascending aorta dilation  Mild, stable. Ascending aorta measured 4.4 on most recent chest CT. She has surveillance chest CT every 6 months with pulmonology.       CXR: 1/19/2023 Remaining COPD with some chronic interstitial fibrosis particularly right lower lung zone.  CT chest: 1/4/24  Lung-RADS Category:  2 - Benign Appearance or Behavior - continue annual screening with LDCT in 12 months. Interlobular septal thickening with traction bronchiectasis and fibrotic changes with a lower lobe and peripheral predominance similar to prior exam which may be seen with interstitial lung disease such as UIP or NSIP.   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   Diffuse enlargement of the bilateral adrenal gland similar to multiple prior exams.     PXR: 1/19/23 per surgeon test result is favorable for transplant  Renal US: 1/8/24 Shrunken atrophic kidneys bilaterally consistent with medical renal disease.   Iliacs : 1/19/23 per surgeon test result is favorable for transplant  Echo:  Results for orders placed during the hospital encounter of 01/04/24    Echo    Interpretation Summary    Left Ventricle:  The left ventricle is normal in size. Normal wall thickness. Normal wall motion. There is normal systolic function with a visually estimated ejection fraction of 55 - 60%. There is normal diastolic function.    Right Ventricle: Normal right ventricular cavity size. Wall thickness is normal. Right ventricle wall motion  is normal. Systolic function is normal.    Pulmonary Artery: The estimated pulmonary artery systolic pressure is 23 mmHg.    IVC/SVC: Normal venous pressure at 3 mmHg.        Stress:  cardiac PET      2023    The myocardial perfusion images are normal without evidence of ischemia or scar.    The whole heart absolute myocardial perfusion values averaged 0.93 cc/min/g at rest, which is normal; 1.68 cc/min/g at stress, which is mildly reduced; and CFR is 1.82 , which is mildly reduced.    CT attenuation images demonstrate moderate diffuse coronary calcifications in the LAD and LCX territory and moderate diffuse aortic calcifications in the descending aorta.    The gated perfusion images showed an ejection fraction of 70% at rest and 72% during stress. A normal ejection fraction is greater than 47%.    The wall motion is normal at rest and during stress.    The LV cavity size is normal at rest and stress.    The ECG portion of the study is negative for ischemia.    There were no arrhythmias during stress.    The patient reported no chest pain during the stress test.    When compared to the previous study from 2021, there are no significant changes on perfusion images. The left ventricular cavity appears significant reduced in size compared to prior study.       Colonoscopy: 2021 repeat in 3 years  PTH:  Lab Results   Component Value Date    .2 (H) 2023    CALCIUM 8.7 05/15/2023    PHOS 3.7 2023     PAP: 23 Negative for intraepithelial lesion or malignancy  MM24 neg      Current Outpatient Medications:     apixaban (ELIQUIS) 5 mg Tab, Take 1 tablet (5 mg total)  by mouth 2 (two) times daily., Disp: 60 tablet, Rfl: 6    carvediloL (COREG) 12.5 MG tablet, Take 1 tablet (12.5 mg total) by mouth 2 (two) times daily with meals., Disp: 180 tablet, Rfl: 3    LOKELMA 5 gram packet, Take 5 g by mouth once daily., Disp: , Rfl:     midodrine (PROAMATINE) 10 MG tablet, Take 10 mg by mouth every Mon, Wed, Fri. With dialysis, Disp: , Rfl:     midodrine (PROAMATINE) 5 MG Tab, Take by mouth., Disp: , Rfl:     ondansetron (ZOFRAN) 4 MG tablet, Take 4 mg by mouth every 8 (eight) hours as needed for Nausea., Disp: , Rfl:     MERVAT-STEPHAN 0.8 mg Tab, Take 1 tablet by mouth., Disp: , Rfl:     sacubitriL-valsartan (ENTRESTO) 24-26 mg per tablet, Take 1 tablet by mouth 2 (two) times daily., Disp: 60 tablet, Rfl: 11    sevelamer carbonate (RENVELA) 800 mg Tab, Take 2,400 mg by mouth 3 (three) times daily with meals. , Disp: , Rfl:     VIOS AEROSOL DELIVERY SYSTEM Lizbeth, , Disp: , Rfl:     atorvastatin (LIPITOR) 40 MG tablet, Take 40 mg by mouth once daily. (Patient not taking: Reported on 8/8/2024), Disp: , Rfl:     calcium carbonate (OS-MARLY) 600 mg calcium (1,500 mg) Tab, Take 600 mg by mouth 2 (two) times daily with meals. (Patient not taking: Reported on 8/8/2024), Disp: , Rfl:     conjugated estrogens (PREMARIN) vaginal cream, Place 1 g vaginally once daily., Disp: 1 applicator, Rfl: 2    oxyCODONE-acetaminophen (PERCOCET) 5-325 mg per tablet, Take 1 tablet by mouth every 6 (six) hours as needed. (Patient not taking: Reported on 5/28/2024), Disp: 12 tablet, Rfl: 0    umeclidinium-vilanteroL (ANORO ELLIPTA) 62.5-25 mcg/actuation DsDv, Inhale 1 puff into the lungs once daily. Controller (Patient not taking: Reported on 5/28/2024), Disp: 1 each, Rfl: 5    VELTASSA 8.4 gram PwPk, Take 8.4 g by mouth 4 (four) times a week. Tues, thurs, sat, sun (Patient not taking: Reported on 8/8/2024), Disp: , Rfl:     vitamin D (VITAMIN D3) 1000 units Tab, Take 1,000 Units by mouth once daily. (Patient not taking:  Reported on 5/28/2024), Disp: , Rfl:       Past Medical History:   Diagnosis Date    Dialysis patient 10/05/2020    Emphysema of lung     ESRD (end stage renal disease)     Hepatitis 1980    HLD (hyperlipidemia)     Hypertension     Renal disorder        Past Surgical History:   Procedure Laterality Date    AORTOGRAPHY WITH SERIALOGRAPHY N/A 09/30/2020    Procedure: co2 renal angio;  Surgeon: Lance Bustamante MD;  Location: [facility] CATH/EP LAB;  Service: Vascular;  Laterality: N/A;    APPENDECTOMY      Age 16    AV FISTULA PLACEMENT Right 12/09/2020    Procedure: CREATION, AV FISTULA;  Surgeon: Lance Bustamante MD;  Location: [facility] OR;  Service: General;  Laterality: Right;    COLONOSCOPY N/A 06/08/2021    Procedure: COLONOSCOPY;  Surgeon: Avila Smith III, MD;  Location: [facility] ENDO;  Service: Endoscopy;  Laterality: N/A;    COLONOSCOPY N/A 12/14/2021    Procedure: COLONOSCOPY;  Surgeon: Avila Smith III, MD;  Location: [facility] ENDO;  Service: Endoscopy;  Laterality: N/A;    COLONOSCOPY W/ POLYPECTOMY  06/08/2021    FISTULOGRAM Bilateral 05/18/2022    Procedure: Fistulogram;  Surgeon: Lance Bustamante MD;  Location: [facility] CATH/EP LAB;  Service: General;  Laterality: Bilateral;    INSERTION OF TUNNELED CENTRAL VENOUS HEMODIALYSIS CATHETER  10/02/2020    Procedure: INSERTION, CATHETER, CENTRAL VENOUS, HEMODIALYSIS, TUNNELED;  Surgeon: Ali Khoobehi, MD;  Location: [facility] OR;  Service: Vascular;;    LEFT HEART CATHETERIZATION Left 05/17/2022    Procedure: Left heart cath;  Surgeon: Daniel Tyler MD;  Location: [facility] CATH LAB;  Service: Cardiology;  Laterality: Left;    OPEN THROMBECTOMY OF GRAFT  05/18/2022    Procedure: THROMBECTOMY, GRAFT, OPEN;  Surgeon: Lance Bustamante MD;  Location: [facility] CATH/EP LAB;  Service: General;;    PLACEMENT OF DIALYSIS ACCESS Right 09/30/2020    Procedure: Insertion, Dialysis Access;  Surgeon: Lance Bustamante MD;  Location: [facility] CATH/EP LAB;  Service: Vascular;  Laterality:  "Right;    REMOVAL OF TUNNELED CENTRAL VENOUS CATHETER (CVC) N/A 12/16/2020    Procedure: REMOVAL, CATHETER, CENTRAL VENOUS, TUNNELED;  Surgeon: Ali Khoobehi, MD;  Location: [facility];  Service: Vascular;  Laterality: N/A;    TONSILLECTOMY      TUBAL LIGATION               Review of Systems   Constitutional:  Positive for fatigue. Negative for appetite change, chills and fever.   HENT:  Negative for trouble swallowing.    Respiratory:  Positive for shortness of breath. Negative for cough, chest tightness and wheezing.    Cardiovascular:  Negative for chest pain, palpitations and leg swelling.   Gastrointestinal:  Negative for abdominal pain, constipation, diarrhea and nausea.   Genitourinary:  Positive for decreased urine volume.   Musculoskeletal:  Negative for arthralgias and myalgias.   Skin:  Negative for rash.   Neurological:  Negative for dizziness, weakness, light-headedness and headaches.   Psychiatric/Behavioral:  Negative for sleep disturbance.        Objective:   body mass index is 23.07 kg/m².  BP (!) 173/81 (BP Location: Right arm, Patient Position: Sitting, BP Method: Medium (Automatic))   Pulse 79   Temp 97.7 °F (36.5 °C) (Tympanic)   Resp 18   Ht 5' 4.33" (1.634 m)   Wt 61.6 kg (135 lb 12.9 oz)   SpO2 97%   BMI 23.07 kg/m²       Physical Exam  Constitutional:       General: She is not in acute distress.     Appearance: She is well-developed. She is not diaphoretic.   Cardiovascular:      Rate and Rhythm: Normal rate and regular rhythm.      Heart sounds: Normal heart sounds. No murmur heard.     No friction rub. No gallop.   Pulmonary:      Effort: Pulmonary effort is normal. No respiratory distress.      Breath sounds: Normal breath sounds. No wheezing or rales.   Abdominal:      General: Bowel sounds are normal. There is no distension.      Palpations: Abdomen is soft.      Tenderness: There is no abdominal tenderness.   Musculoskeletal:         General: No tenderness. Normal range of motion. " "  Skin:     General: Skin is warm and dry.      Findings: No rash.      Nails: There is no clubbing.          Neurological:      Mental Status: She is alert and oriented to person, place, and time.   Psychiatric:         Behavior: Behavior normal.         Labs:  Lab Results   Component Value Date    WBC 4.91 05/15/2023    HGB 11.5 (L) 05/15/2023    HCT 34.6 (L) 05/15/2023     05/15/2023    K 4.0 05/15/2023    CL 92 (L) 05/15/2023    CO2 35 (H) 05/15/2023    BUN 25 (H) 05/15/2023    CREATININE 4.8 (H) 05/15/2023    EGFRNORACEVR 9.4 (A) 05/15/2023    CALCIUM 8.7 05/15/2023    PHOS 3.7 01/19/2023    MG 2.1 08/29/2022    ALBUMIN 3.8 05/15/2023    AST 21 05/15/2023    ALT 12 05/15/2023    .2 (H) 01/19/2023       Lab Results   Component Value Date    BILIRUBINUA Negative 09/29/2020    AMYLASE 115 (H) 09/27/2020    LIPASE 33 11/08/2021    PROTEINUA 3+ (A) 09/29/2020    NITRITE Negative 09/29/2020    RBCUA 26 (H) 09/29/2020    WBCUA >100 (H) 09/29/2020       No results found for: "HLAABCTYPE"    Lab Results   Component Value Date    CPRA 0 06/03/2024    MY9BNGX B73 06/03/2024    CIABCLM WEAK, B27 02/05/2024    CIIAB Negative 06/05/2023    ABCMT WEAK DQ7, DQ9 06/03/2024       Labs were reviewed with the patient.    Pre-transplant Workup:   Reviewed with the patient.    Assessment:     1. Patient on waiting list for kidney transplant    2. ESRD (end stage renal disease)    3. Primary hypertension    4. Chronic combined systolic and diastolic heart failure        Plan:     Transplant Candidacy:   Ms. Higgins is a high-risk kidney transplant candidate.  Meets center eligibility for accepting HCV+ donor offer - Yes.  Patient educated on HCV+ donors. Latricia is willing  to accept HCV+ donor offer -  Yes   Patient is a candidate for KDPI > 85 kidney donor offer - No and due to heart history per committee .  She remains in overall stable health, and will remain active on the transplant list.    Patient advised that it is " recommended that all transplant candidates, and their close contacts and household members receive Covid vaccination.    Dinora Graves NP       Follow-up:   In addition to the tests noted in the plan, Ms. Higgins will continue to have reevaluation as per the standing pre-kidney transplant protocol:  Monthly blood for PRA  Annual return to clinic, except HIV positive, > 65 years of age, or pancreas transplant candidates who will be scheduled to see transplant every 6 months while in pre-transplant phase  Annual re-testing: CXR, EKG, yearly mammograms for women over 40 and PSA for males over 40, cardiology follow-up as recommended by initial cardiology pre-transplant evaluation  Renal ultrasound every 2 years  Baseline colonoscopy after age 50 and repeated as recommended    UNOS Patient Status  Functional Status: 70% - Cares for self: unable to carry on normal activity or active work  Physical Capacity: No Limitations

## 2024-08-08 NOTE — LETTER
August 12, 2024        Eliseo Barnes  [address] [facility] NEPHROLOGY [facility] [place] [address]  Phone: [phone]  Fax: [phone]             [facility] Transplant 1st Fl  [address]  [facility] [address]  Phone: [phone]   Patient: Latricia Higgins   MR Number: [id number]   YOB: 1955   Date of Visit: 8/8/2024       Dear Dr. Eliseo Barnes    Thank you for referring Latricia Higgins to me for evaluation. Attached you will find relevant portions of my assessment and plan of care.    If you have questions, please do not hesitate to call me. I look forward to following Latricia Higgins along with you.    Sincerely,    Dinora Graves, NP    Enclosure    If you would like to receive this communication electronically, please contact [email or web address] or [phone] to request [employer] Link access.    [employer] Link is a tool which provides read-only access to select patient information with whom you have a relationship. Its easy to use and provides real time access to review your patients record including encounter summaries, notes, results, and demographic information.    If you feel you have received this communication in error or would no longer like to receive these types of communications, please e-mail [email or web address]

## 2024-08-10 NOTE — PROGRESS NOTES
YEARLY LIST MANAGEMENT NOTE    Latricialetitia Higgins's kidney transplant listing status reviewed.  Patient is due for follow-up appointments on 2/2025.  Appointments will be scheduled per protocol.

## 2024-08-21 NOTE — TELEPHONE ENCOUNTER
----- Message from Vero Garcia RN sent at 2/9/2023  2:43 PM CST -----  Regarding: FW: call back    ----- Message -----  From: Rolo Vance  Sent: 2/8/2023  12:52 PM CST  To: [facility] Pre-Kidney Transplant Clinical  Subject: call back                                        Pt call to speak with Tiffanie in regards to some question she has requesting call back    Call       
Returned pt's call. Pt stated she has an appt with dr Xavier pulmonologist and she already has a pulmonologist, Dr Schuster. This Rn explained that I do not see the appt with Dr Xavier but she does have an appt with Dr Schuster in May that she should see about getting sooner and she can cancel the other appt. Provided pt the pulm dept #. Pt voiced understanding and all questions were answered.  
MED

## 2024-11-13 ENCOUNTER — HOSPITAL ENCOUNTER (INPATIENT)
Facility: HOSPITAL | Age: 69
LOS: 1 days | Discharge: HOME | DRG: 193 | End: 2024-11-14
Attending: EMERGENCY MEDICINE | Admitting: INTERNAL MEDICINE
Payer: MEDICARE

## 2024-11-13 DIAGNOSIS — Z99.2 ESRD ON DIALYSIS: ICD-10-CM

## 2024-11-13 DIAGNOSIS — N18.6 ESRD ON DIALYSIS: ICD-10-CM

## 2024-11-13 DIAGNOSIS — I77.810 ASCENDING AORTA DILATION: ICD-10-CM

## 2024-11-13 DIAGNOSIS — J90 PLEURAL EFFUSION, BILATERAL: Primary | ICD-10-CM

## 2024-11-13 DIAGNOSIS — R07.9 CHEST PAIN: ICD-10-CM

## 2024-11-13 DIAGNOSIS — R06.02 SOB (SHORTNESS OF BREATH): ICD-10-CM

## 2024-11-13 DIAGNOSIS — N18.6 ESRD (END STAGE RENAL DISEASE): ICD-10-CM

## 2024-11-13 DIAGNOSIS — I50.43 ACUTE ON CHRONIC COMBINED SYSTOLIC AND DIASTOLIC HEART FAILURE: ICD-10-CM

## 2024-11-13 PROBLEM — I24.89 DEMAND ISCHEMIA: Status: ACTIVE | Noted: 2024-11-13

## 2024-11-13 LAB
ALBUMIN SERPL BCP-MCNC: 3.9 G/DL (ref 3.5–5.2)
ALP SERPL-CCNC: 55 U/L (ref 55–135)
ALT SERPL W/O P-5'-P-CCNC: 9 U/L (ref 10–44)
ANION GAP SERPL CALC-SCNC: 11 MMOL/L (ref 8–16)
AST SERPL-CCNC: 15 U/L (ref 10–40)
BASOPHILS # BLD AUTO: 0.04 K/UL (ref 0–0.2)
BASOPHILS NFR BLD: 0.7 % (ref 0–1.9)
BILIRUB SERPL-MCNC: 0.6 MG/DL (ref 0.1–1)
BNP SERPL-MCNC: >4700 PG/ML (ref 0–99)
BUN SERPL-MCNC: 26 MG/DL (ref 8–23)
CALCIUM SERPL-MCNC: 9.4 MG/DL (ref 8.7–10.5)
CHLORIDE SERPL-SCNC: 94 MMOL/L (ref 95–110)
CHOLEST SERPL-MCNC: 94 MG/DL (ref 120–199)
CHOLEST/HDLC SERPL: 1.6 {RATIO} (ref 2–5)
CO2 SERPL-SCNC: 32 MMOL/L (ref 23–29)
CREAT SERPL-MCNC: 4.8 MG/DL (ref 0.5–1.4)
DIFFERENTIAL METHOD BLD: ABNORMAL
EOSINOPHIL # BLD AUTO: 0.1 K/UL (ref 0–0.5)
EOSINOPHIL NFR BLD: 1.6 % (ref 0–8)
ERYTHROCYTE [DISTWIDTH] IN BLOOD BY AUTOMATED COUNT: 12.8 % (ref 11.5–14.5)
EST. GFR  (NO RACE VARIABLE): 9.3 ML/MIN/1.73 M^2
GLUCOSE SERPL-MCNC: 104 MG/DL (ref 70–110)
HCT VFR BLD AUTO: 31.8 % (ref 37–48.5)
HDLC SERPL-MCNC: 57 MG/DL (ref 40–75)
HDLC SERPL: 60.6 % (ref 20–50)
HGB BLD-MCNC: 10.3 G/DL (ref 12–16)
IMM GRANULOCYTES # BLD AUTO: 0.01 K/UL (ref 0–0.04)
IMM GRANULOCYTES NFR BLD AUTO: 0.2 % (ref 0–0.5)
INFLUENZA A, MOLECULAR: NEGATIVE
INFLUENZA B, MOLECULAR: NEGATIVE
LDLC SERPL CALC-MCNC: 24.4 MG/DL (ref 63–159)
LYMPHOCYTES # BLD AUTO: 0.6 K/UL (ref 1–4.8)
LYMPHOCYTES NFR BLD: 11.2 % (ref 18–48)
MCH RBC QN AUTO: 34.6 PG (ref 27–31)
MCHC RBC AUTO-ENTMCNC: 32.4 G/DL (ref 32–36)
MCV RBC AUTO: 107 FL (ref 82–98)
MONOCYTES # BLD AUTO: 0.5 K/UL (ref 0.3–1)
MONOCYTES NFR BLD: 8.8 % (ref 4–15)
NEUTROPHILS # BLD AUTO: 4.3 K/UL (ref 1.8–7.7)
NEUTROPHILS NFR BLD: 77.5 % (ref 38–73)
NONHDLC SERPL-MCNC: 37 MG/DL
NRBC BLD-RTO: 0 /100 WBC
PLATELET # BLD AUTO: 173 K/UL (ref 150–450)
PMV BLD AUTO: 11.1 FL (ref 9.2–12.9)
POTASSIUM SERPL-SCNC: 4 MMOL/L (ref 3.5–5.1)
PROT SERPL-MCNC: 6.7 G/DL (ref 6–8.4)
RBC # BLD AUTO: 2.98 M/UL (ref 4–5.4)
SARS-COV-2 RDRP RESP QL NAA+PROBE: NEGATIVE
SODIUM SERPL-SCNC: 137 MMOL/L (ref 136–145)
SPECIMEN SOURCE: NORMAL
TRIGL SERPL-MCNC: 63 MG/DL (ref 30–150)
TROPONIN I SERPL HS-MCNC: 27.8 PG/ML (ref 0–14.9)
TROPONIN I SERPL HS-MCNC: 28.7 PG/ML (ref 0–14.9)
TSH SERPL DL<=0.005 MIU/L-ACNC: 2.04 UIU/ML (ref 0.34–5.6)
WBC # BLD AUTO: 5.55 K/UL (ref 3.9–12.7)

## 2024-11-13 PROCEDURE — 94761 N-INVAS EAR/PLS OXIMETRY MLT: CPT

## 2024-11-13 PROCEDURE — 12000002 HC ACUTE/MED SURGE SEMI-PRIVATE ROOM

## 2024-11-13 PROCEDURE — 25000003 PHARM REV CODE 250: Performed by: NURSE PRACTITIONER

## 2024-11-13 PROCEDURE — 94799 UNLISTED PULMONARY SVC/PX: CPT

## 2024-11-13 PROCEDURE — 83036 HEMOGLOBIN GLYCOSYLATED A1C: CPT | Performed by: NURSE PRACTITIONER

## 2024-11-13 PROCEDURE — 80053 COMPREHEN METABOLIC PANEL: CPT | Performed by: EMERGENCY MEDICINE

## 2024-11-13 PROCEDURE — 84439 ASSAY OF FREE THYROXINE: CPT | Performed by: NURSE PRACTITIONER

## 2024-11-13 PROCEDURE — 63600175 PHARM REV CODE 636 W HCPCS: Performed by: EMERGENCY MEDICINE

## 2024-11-13 PROCEDURE — 93010 ELECTROCARDIOGRAM REPORT: CPT | Mod: ,,, | Performed by: INTERNAL MEDICINE

## 2024-11-13 PROCEDURE — 85025 COMPLETE CBC W/AUTO DIFF WBC: CPT | Performed by: EMERGENCY MEDICINE

## 2024-11-13 PROCEDURE — 99900031 HC PATIENT EDUCATION (STAT)

## 2024-11-13 PROCEDURE — 87635 SARS-COV-2 COVID-19 AMP PRB: CPT | Performed by: EMERGENCY MEDICINE

## 2024-11-13 PROCEDURE — 36415 COLL VENOUS BLD VENIPUNCTURE: CPT | Performed by: EMERGENCY MEDICINE

## 2024-11-13 PROCEDURE — 99285 EMERGENCY DEPT VISIT HI MDM: CPT | Mod: 25

## 2024-11-13 PROCEDURE — 93005 ELECTROCARDIOGRAM TRACING: CPT | Performed by: INTERNAL MEDICINE

## 2024-11-13 PROCEDURE — 99900035 HC TECH TIME PER 15 MIN (STAT)

## 2024-11-13 PROCEDURE — 25000242 PHARM REV CODE 250 ALT 637 W/ HCPCS: Performed by: EMERGENCY MEDICINE

## 2024-11-13 PROCEDURE — 84484 ASSAY OF TROPONIN QUANT: CPT | Performed by: EMERGENCY MEDICINE

## 2024-11-13 PROCEDURE — 80061 LIPID PANEL: CPT | Performed by: NURSE PRACTITIONER

## 2024-11-13 PROCEDURE — 87040 BLOOD CULTURE FOR BACTERIA: CPT | Performed by: EMERGENCY MEDICINE

## 2024-11-13 PROCEDURE — 36415 COLL VENOUS BLD VENIPUNCTURE: CPT | Performed by: NURSE PRACTITIONER

## 2024-11-13 PROCEDURE — 83880 ASSAY OF NATRIURETIC PEPTIDE: CPT | Performed by: EMERGENCY MEDICINE

## 2024-11-13 PROCEDURE — 94640 AIRWAY INHALATION TREATMENT: CPT

## 2024-11-13 PROCEDURE — 84484 ASSAY OF TROPONIN QUANT: CPT | Mod: 91 | Performed by: NURSE PRACTITIONER

## 2024-11-13 PROCEDURE — 96374 THER/PROPH/DIAG INJ IV PUSH: CPT

## 2024-11-13 PROCEDURE — 84443 ASSAY THYROID STIM HORMONE: CPT | Performed by: NURSE PRACTITIONER

## 2024-11-13 PROCEDURE — 87502 INFLUENZA DNA AMP PROBE: CPT | Performed by: EMERGENCY MEDICINE

## 2024-11-13 RX ORDER — GLUCAGON 1 MG
1 KIT INJECTION
Status: DISCONTINUED | OUTPATIENT
Start: 2024-11-13 | End: 2024-11-13

## 2024-11-13 RX ORDER — IPRATROPIUM BROMIDE AND ALBUTEROL SULFATE 2.5; .5 MG/3ML; MG/3ML
3 SOLUTION RESPIRATORY (INHALATION) EVERY 6 HOURS PRN
Status: DISCONTINUED | OUTPATIENT
Start: 2024-11-13 | End: 2024-11-14 | Stop reason: HOSPADM

## 2024-11-13 RX ORDER — ACETAMINOPHEN 325 MG/1
650 TABLET ORAL EVERY 4 HOURS PRN
Status: DISCONTINUED | OUTPATIENT
Start: 2024-11-13 | End: 2024-11-14 | Stop reason: HOSPADM

## 2024-11-13 RX ORDER — AMOXICILLIN 250 MG
1 CAPSULE ORAL DAILY PRN
Status: DISCONTINUED | OUTPATIENT
Start: 2024-11-13 | End: 2024-11-14 | Stop reason: HOSPADM

## 2024-11-13 RX ORDER — LANOLIN ALCOHOL/MO/W.PET/CERES
800 CREAM (GRAM) TOPICAL
Status: DISCONTINUED | OUTPATIENT
Start: 2024-11-13 | End: 2024-11-13

## 2024-11-13 RX ORDER — CARVEDILOL 12.5 MG/1
12.5 TABLET ORAL 2 TIMES DAILY WITH MEALS
Status: DISCONTINUED | OUTPATIENT
Start: 2024-11-14 | End: 2024-11-14 | Stop reason: HOSPADM

## 2024-11-13 RX ORDER — SODIUM,POTASSIUM PHOSPHATES 280-250MG
2 POWDER IN PACKET (EA) ORAL
Status: DISCONTINUED | OUTPATIENT
Start: 2024-11-13 | End: 2024-11-13

## 2024-11-13 RX ORDER — SODIUM CHLORIDE 0.9 % (FLUSH) 0.9 %
2 SYRINGE (ML) INJECTION
Status: DISCONTINUED | OUTPATIENT
Start: 2024-11-13 | End: 2024-11-14 | Stop reason: HOSPADM

## 2024-11-13 RX ORDER — IPRATROPIUM BROMIDE AND ALBUTEROL SULFATE 2.5; .5 MG/3ML; MG/3ML
3 SOLUTION RESPIRATORY (INHALATION) EVERY 6 HOURS
Status: DISCONTINUED | OUTPATIENT
Start: 2024-11-14 | End: 2024-11-14 | Stop reason: HOSPADM

## 2024-11-13 RX ORDER — ONDANSETRON 4 MG/1
4 TABLET, ORALLY DISINTEGRATING ORAL EVERY 6 HOURS PRN
COMMUNITY
Start: 2024-10-08

## 2024-11-13 RX ORDER — SEVELAMER CARBONATE 800 MG/1
2400 TABLET, FILM COATED ORAL
Status: DISCONTINUED | OUTPATIENT
Start: 2024-11-14 | End: 2024-11-14 | Stop reason: HOSPADM

## 2024-11-13 RX ORDER — ALUMINUM HYDROXIDE, MAGNESIUM HYDROXIDE, AND SIMETHICONE 1200; 120; 1200 MG/30ML; MG/30ML; MG/30ML
30 SUSPENSION ORAL 4 TIMES DAILY PRN
Status: DISCONTINUED | OUTPATIENT
Start: 2024-11-13 | End: 2024-11-14 | Stop reason: HOSPADM

## 2024-11-13 RX ORDER — ACETAMINOPHEN 325 MG/1
650 TABLET ORAL EVERY 8 HOURS PRN
Status: DISCONTINUED | OUTPATIENT
Start: 2024-11-13 | End: 2024-11-13

## 2024-11-13 RX ORDER — NALOXONE HCL 0.4 MG/ML
0.02 VIAL (ML) INJECTION
Status: DISCONTINUED | OUTPATIENT
Start: 2024-11-13 | End: 2024-11-14 | Stop reason: HOSPADM

## 2024-11-13 RX ORDER — TALC
6 POWDER (GRAM) TOPICAL NIGHTLY PRN
Status: DISCONTINUED | OUTPATIENT
Start: 2024-11-13 | End: 2024-11-14 | Stop reason: HOSPADM

## 2024-11-13 RX ORDER — CEFTRIAXONE 1 G/1
1 INJECTION, POWDER, FOR SOLUTION INTRAMUSCULAR; INTRAVENOUS
Status: DISCONTINUED | OUTPATIENT
Start: 2024-11-14 | End: 2024-11-14 | Stop reason: HOSPADM

## 2024-11-13 RX ORDER — AZITHROMYCIN 250 MG/1
250 TABLET, FILM COATED ORAL SEE ADMIN INSTRUCTIONS
Status: ON HOLD | COMMUNITY
Start: 2024-11-11 | End: 2024-11-14 | Stop reason: HOSPADM

## 2024-11-13 RX ORDER — IPRATROPIUM BROMIDE AND ALBUTEROL SULFATE 2.5; .5 MG/3ML; MG/3ML
3 SOLUTION RESPIRATORY (INHALATION)
Status: COMPLETED | OUTPATIENT
Start: 2024-11-13 | End: 2024-11-13

## 2024-11-13 RX ORDER — ASPIRIN 81 MG/1
81 TABLET ORAL DAILY
COMMUNITY

## 2024-11-13 RX ORDER — ATORVASTATIN CALCIUM 40 MG/1
40 TABLET, FILM COATED ORAL DAILY
Status: DISCONTINUED | OUTPATIENT
Start: 2024-11-14 | End: 2024-11-14 | Stop reason: HOSPADM

## 2024-11-13 RX ORDER — AZITHROMYCIN 250 MG/1
250 TABLET, FILM COATED ORAL DAILY
Status: DISCONTINUED | OUTPATIENT
Start: 2024-11-14 | End: 2024-11-14 | Stop reason: HOSPADM

## 2024-11-13 RX ORDER — ASPIRIN 81 MG/1
81 TABLET ORAL DAILY
Status: DISCONTINUED | OUTPATIENT
Start: 2024-11-14 | End: 2024-11-14 | Stop reason: HOSPADM

## 2024-11-13 RX ORDER — ONDANSETRON HYDROCHLORIDE 2 MG/ML
4 INJECTION, SOLUTION INTRAVENOUS EVERY 6 HOURS PRN
Status: DISCONTINUED | OUTPATIENT
Start: 2024-11-13 | End: 2024-11-14 | Stop reason: HOSPADM

## 2024-11-13 RX ORDER — HYDROCODONE BITARTRATE AND ACETAMINOPHEN 5; 325 MG/1; MG/1
1 TABLET ORAL EVERY 6 HOURS PRN
Status: DISCONTINUED | OUTPATIENT
Start: 2024-11-13 | End: 2024-11-14 | Stop reason: HOSPADM

## 2024-11-13 RX ORDER — MIDODRINE HYDROCHLORIDE 2.5 MG/1
5 TABLET ORAL
Status: DISCONTINUED | OUTPATIENT
Start: 2024-11-14 | End: 2024-11-13

## 2024-11-13 RX ORDER — GUAIFENESIN 600 MG/1
600 TABLET, EXTENDED RELEASE ORAL 2 TIMES DAILY
Status: DISCONTINUED | OUTPATIENT
Start: 2024-11-13 | End: 2024-11-14 | Stop reason: HOSPADM

## 2024-11-13 RX ORDER — CEFTRIAXONE 1 G/1
1 INJECTION, POWDER, FOR SOLUTION INTRAMUSCULAR; INTRAVENOUS
Status: COMPLETED | OUTPATIENT
Start: 2024-11-13 | End: 2024-11-13

## 2024-11-13 RX ORDER — BUDESONIDE 0.5 MG/2ML
0.5 INHALANT ORAL EVERY 12 HOURS
Status: DISCONTINUED | OUTPATIENT
Start: 2024-11-14 | End: 2024-11-14 | Stop reason: HOSPADM

## 2024-11-13 RX ORDER — IBUPROFEN 200 MG
24 TABLET ORAL
Status: DISCONTINUED | OUTPATIENT
Start: 2024-11-13 | End: 2024-11-13

## 2024-11-13 RX ORDER — IBUPROFEN 200 MG
16 TABLET ORAL
Status: DISCONTINUED | OUTPATIENT
Start: 2024-11-13 | End: 2024-11-13

## 2024-11-13 RX ADMIN — IPRATROPIUM BROMIDE AND ALBUTEROL SULFATE 3 ML: .5; 3 SOLUTION RESPIRATORY (INHALATION) at 07:11

## 2024-11-13 RX ADMIN — GUAIFENESIN 600 MG: 600 TABLET, EXTENDED RELEASE ORAL at 10:11

## 2024-11-13 RX ADMIN — CEFTRIAXONE 1 G: 1 INJECTION, POWDER, FOR SOLUTION INTRAMUSCULAR; INTRAVENOUS at 09:11

## 2024-11-14 ENCOUNTER — CLINICAL SUPPORT (OUTPATIENT)
Dept: CARDIOLOGY | Facility: HOSPITAL | Age: 69
End: 2024-11-14
Attending: EMERGENCY MEDICINE
Payer: MEDICARE

## 2024-11-14 VITALS
SYSTOLIC BLOOD PRESSURE: 169 MMHG | HEIGHT: 64 IN | WEIGHT: 131.81 LBS | RESPIRATION RATE: 18 BRPM | TEMPERATURE: 99 F | HEART RATE: 65 BPM | DIASTOLIC BLOOD PRESSURE: 87 MMHG | BODY MASS INDEX: 22.5 KG/M2 | OXYGEN SATURATION: 97 %

## 2024-11-14 PROBLEM — J18.9 COMMUNITY ACQUIRED PNEUMONIA: Status: ACTIVE | Noted: 2024-11-14

## 2024-11-14 LAB
ALBUMIN SERPL BCP-MCNC: 3.7 G/DL (ref 3.5–5.2)
ALP SERPL-CCNC: 48 U/L (ref 55–135)
ALT SERPL W/O P-5'-P-CCNC: 8 U/L (ref 10–44)
ANION GAP SERPL CALC-SCNC: 12 MMOL/L (ref 8–16)
AORTIC ROOT ANNULUS: 3.4 CM
AORTIC VALVE CUSP SEPERATION: 2.3 CM
APICAL FOUR CHAMBER EJECTION FRACTION: 36 %
APICAL TWO CHAMBER EJECTION FRACTION: 29 %
ASCENDING AORTA: 4.1 CM
AST SERPL-CCNC: 14 U/L (ref 10–40)
AV INDEX (PROSTH): 0.51
AV MEAN GRADIENT: 5 MMHG
AV PEAK GRADIENT: 9 MMHG
AV VALVE AREA BY VELOCITY RATIO: 1.8 CM²
AV VALVE AREA: 2 CM²
AV VELOCITY RATIO: 0.47
BASOPHILS # BLD AUTO: 0.04 K/UL (ref 0–0.2)
BASOPHILS NFR BLD: 0.8 % (ref 0–1.9)
BILIRUB SERPL-MCNC: 0.5 MG/DL (ref 0.1–1)
BSA FOR ECHO PROCEDURE: 1.64 M2
BUN SERPL-MCNC: 32 MG/DL (ref 8–23)
CALCIUM SERPL-MCNC: 9.2 MG/DL (ref 8.7–10.5)
CHLORIDE SERPL-SCNC: 94 MMOL/L (ref 95–110)
CO2 SERPL-SCNC: 32 MMOL/L (ref 23–29)
CREAT SERPL-MCNC: 5.7 MG/DL (ref 0.5–1.4)
CV ECHO LV RWT: 0.42 CM
DIFFERENTIAL METHOD BLD: ABNORMAL
DOP CALC AO PEAK VEL: 1.5 M/S
DOP CALC AO VTI: 29.6 CM
DOP CALC LVOT AREA: 3.8 CM2
DOP CALC LVOT DIAMETER: 2.2 CM
DOP CALC LVOT PEAK VEL: 0.7 M/S
DOP CALC LVOT STROKE VOLUME: 57.8 CM3
DOP CALC MV VTI: 33.2 CM
DOP CALCLVOT PEAK VEL VTI: 15.2 CM
E WAVE DECELERATION TIME: 146 MSEC
E/A RATIO: 1.25
E/E' RATIO: 12.94 M/S
ECHO LV POSTERIOR WALL: 1.1 CM (ref 0.6–1.1)
EOSINOPHIL # BLD AUTO: 0.1 K/UL (ref 0–0.5)
EOSINOPHIL NFR BLD: 1.8 % (ref 0–8)
ERYTHROCYTE [DISTWIDTH] IN BLOOD BY AUTOMATED COUNT: 12.9 % (ref 11.5–14.5)
EST. GFR  (NO RACE VARIABLE): 7.6 ML/MIN/1.73 M^2
ESTIMATED AVG GLUCOSE: 91 MG/DL (ref 68–131)
FRACTIONAL SHORTENING: 11.3 % (ref 28–44)
GLUCOSE SERPL-MCNC: 85 MG/DL (ref 70–110)
HBA1C MFR BLD: 4.8 % (ref 4.5–6.2)
HCT VFR BLD AUTO: 30.4 % (ref 37–48.5)
HGB BLD-MCNC: 9.8 G/DL (ref 12–16)
IMM GRANULOCYTES # BLD AUTO: 0.02 K/UL (ref 0–0.04)
IMM GRANULOCYTES NFR BLD AUTO: 0.4 % (ref 0–0.5)
INTERVENTRICULAR SEPTUM: 1.1 CM (ref 0.6–1.1)
IVC DIAMETER: 2 CM
LEFT ATRIUM AREA SYSTOLIC (APICAL 2 CHAMBER): 24.1 CM2
LEFT ATRIUM AREA SYSTOLIC (APICAL 4 CHAMBER): 27.3 CM2
LEFT ATRIUM VOLUME INDEX MOD: 50.4 ML/M2
LEFT ATRIUM VOLUME MOD: 82.6 ML
LEFT INTERNAL DIMENSION IN SYSTOLE: 4.7 CM (ref 2.1–4)
LEFT VENTRICLE DIASTOLIC VOLUME INDEX: 82.52 ML/M2
LEFT VENTRICLE DIASTOLIC VOLUME: 135.34 ML
LEFT VENTRICLE END DIASTOLIC VOLUME APICAL 2 CHAMBER: 163 ML
LEFT VENTRICLE END DIASTOLIC VOLUME APICAL 4 CHAMBER: 172 ML
LEFT VENTRICLE END SYSTOLIC VOLUME APICAL 2 CHAMBER: 75.1 ML
LEFT VENTRICLE END SYSTOLIC VOLUME APICAL 4 CHAMBER: 87.6 ML
LEFT VENTRICLE MASS INDEX: 138.9 G/M2
LEFT VENTRICLE SYSTOLIC VOLUME INDEX: 62.4 ML/M2
LEFT VENTRICLE SYSTOLIC VOLUME: 102.36 ML
LEFT VENTRICULAR INTERNAL DIMENSION IN DIASTOLE: 5.3 CM (ref 3.5–6)
LEFT VENTRICULAR MASS: 227.7 G
LV LATERAL E/E' RATIO: 12.22 M/S
LV SEPTAL E/E' RATIO: 13.75 M/S
LVED V (TEICH): 135.34 ML
LVES V (TEICH): 102.36 ML
LVOT MG: 1 MMHG
LVOT MV: 0.48 CM/S
LYMPHOCYTES # BLD AUTO: 0.5 K/UL (ref 1–4.8)
LYMPHOCYTES NFR BLD: 10.7 % (ref 18–48)
MAGNESIUM SERPL-MCNC: 2.2 MG/DL (ref 1.6–2.6)
MCH RBC QN AUTO: 32.9 PG (ref 27–31)
MCHC RBC AUTO-ENTMCNC: 32.2 G/DL (ref 32–36)
MCV RBC AUTO: 102 FL (ref 82–98)
MONOCYTES # BLD AUTO: 0.5 K/UL (ref 0.3–1)
MONOCYTES NFR BLD: 9.7 % (ref 4–15)
MR PISA EROA: 0.12 CM2
MV MEAN GRADIENT: 4 MMHG
MV PEAK A VEL: 0.88 M/S
MV PEAK E VEL: 1.1 M/S
MV PEAK GRADIENT: 7 MMHG
MV STENOSIS PRESSURE HALF TIME: 94 MS
MV VALVE AREA BY CONTINUITY EQUATION: 1.74 CM2
MV VALVE AREA P 1/2 METHOD: 2.34 CM2
NEUTROPHILS # BLD AUTO: 3.9 K/UL (ref 1.8–7.7)
NEUTROPHILS NFR BLD: 76.6 % (ref 38–73)
NRBC BLD-RTO: 0 /100 WBC
OHS LV EJECTION FRACTION SIMPSONS BIPLANE MOD: 30 %
PISA MRMAX VEL: 5.36 M/S
PISA RADIUS: 0.6 CM
PISA TR MAX VEL: 2.94 M/S
PISA VN NYQUIST MS: 0.29 M/S
PISA VN NYQUIST: 0.29 M/S
PLATELET # BLD AUTO: 180 K/UL (ref 150–450)
PMV BLD AUTO: 11 FL (ref 9.2–12.9)
POTASSIUM SERPL-SCNC: 4.5 MMOL/L (ref 3.5–5.1)
PROT SERPL-MCNC: 6.6 G/DL (ref 6–8.4)
PV MV: 0.98 M/S
PV PEAK GRADIENT: 8 MMHG
PV PEAK VELOCITY: 1.43 M/S
RA PRESSURE ESTIMATED: 15 MMHG
RBC # BLD AUTO: 2.98 M/UL (ref 4–5.4)
RIGHT ATRIUM VOLUME AREA LENGTH APICAL 4 CHAMBER: 39.6 ML
RV TB RVSP: 18 MMHG
RV TISSUE DOPPLER FREE WALL SYSTOLIC VELOCITY 1 (APICAL 4 CHAMBER VIEW): 11.6 CM/S
SINUS: 3.7 CM
SODIUM SERPL-SCNC: 138 MMOL/L (ref 136–145)
STJ: 3.1 CM
T4 FREE SERPL-MCNC: 1.21 NG/DL (ref 0.71–1.51)
TDI LATERAL: 0.09 M/S
TDI SEPTAL: 0.08 M/S
TDI: 0.09 M/S
TR MAX PG: 35 MMHG
TRICUSPID ANNULAR PLANE SYSTOLIC EXCURSION: 1.83 CM
TV REST PULMONARY ARTERY PRESSURE: 50 MMHG
WBC # BLD AUTO: 5.03 K/UL (ref 3.9–12.7)
Z-SCORE OF LEFT VENTRICULAR DIMENSION IN END DIASTOLE: 1.38
Z-SCORE OF LEFT VENTRICULAR DIMENSION IN END SYSTOLE: 3.92

## 2024-11-14 PROCEDURE — 83735 ASSAY OF MAGNESIUM: CPT | Performed by: NURSE PRACTITIONER

## 2024-11-14 PROCEDURE — 80053 COMPREHEN METABOLIC PANEL: CPT | Performed by: NURSE PRACTITIONER

## 2024-11-14 PROCEDURE — 25000242 PHARM REV CODE 250 ALT 637 W/ HCPCS: Performed by: INTERNAL MEDICINE

## 2024-11-14 PROCEDURE — 94640 AIRWAY INHALATION TREATMENT: CPT

## 2024-11-14 PROCEDURE — 63700000 PHARM REV CODE 250 ALT 637 W/O HCPCS: Performed by: INTERNAL MEDICINE

## 2024-11-14 PROCEDURE — 36415 COLL VENOUS BLD VENIPUNCTURE: CPT | Performed by: NURSE PRACTITIONER

## 2024-11-14 PROCEDURE — 94761 N-INVAS EAR/PLS OXIMETRY MLT: CPT

## 2024-11-14 PROCEDURE — 25000003 PHARM REV CODE 250: Performed by: NURSE PRACTITIONER

## 2024-11-14 PROCEDURE — 93306 TTE W/DOPPLER COMPLETE: CPT

## 2024-11-14 PROCEDURE — 25000003 PHARM REV CODE 250: Performed by: INTERNAL MEDICINE

## 2024-11-14 PROCEDURE — 90935 HEMODIALYSIS ONE EVALUATION: CPT

## 2024-11-14 PROCEDURE — 85025 COMPLETE CBC W/AUTO DIFF WBC: CPT | Performed by: NURSE PRACTITIONER

## 2024-11-14 PROCEDURE — 93306 TTE W/DOPPLER COMPLETE: CPT | Mod: 26,,, | Performed by: GENERAL PRACTICE

## 2024-11-14 PROCEDURE — 5A1D70Z PERFORMANCE OF URINARY FILTRATION, INTERMITTENT, LESS THAN 6 HOURS PER DAY: ICD-10-PCS | Performed by: INTERNAL MEDICINE

## 2024-11-14 RX ORDER — SODIUM CHLORIDE 9 MG/ML
INJECTION, SOLUTION INTRAVENOUS ONCE
Status: CANCELLED | OUTPATIENT
Start: 2024-11-14 | End: 2024-11-14

## 2024-11-14 RX ORDER — LEVOFLOXACIN 750 MG/1
750 TABLET ORAL DAILY
Qty: 4 TABLET | Refills: 0 | Status: SHIPPED | OUTPATIENT
Start: 2024-11-14

## 2024-11-14 RX ORDER — MUPIROCIN 20 MG/G
OINTMENT TOPICAL 2 TIMES DAILY
Status: CANCELLED | OUTPATIENT
Start: 2024-11-14 | End: 2024-11-19

## 2024-11-14 RX ADMIN — IPRATROPIUM BROMIDE AND ALBUTEROL SULFATE 3 ML: .5; 3 SOLUTION RESPIRATORY (INHALATION) at 02:11

## 2024-11-14 RX ADMIN — ATORVASTATIN CALCIUM 40 MG: 40 TABLET, FILM COATED ORAL at 08:11

## 2024-11-14 RX ADMIN — SACUBITRIL AND VALSARTAN 1 TABLET: 24; 26 TABLET, FILM COATED ORAL at 08:11

## 2024-11-14 RX ADMIN — GUAIFENESIN 600 MG: 600 TABLET, EXTENDED RELEASE ORAL at 08:11

## 2024-11-14 RX ADMIN — APIXABAN 2.5 MG: 2.5 TABLET, FILM COATED ORAL at 08:11

## 2024-11-14 RX ADMIN — BUDESONIDE INHALATION 0.5 MG: 0.5 SUSPENSION RESPIRATORY (INHALATION) at 07:11

## 2024-11-14 RX ADMIN — IPRATROPIUM BROMIDE AND ALBUTEROL SULFATE 3 ML: .5; 3 SOLUTION RESPIRATORY (INHALATION) at 12:11

## 2024-11-14 RX ADMIN — ASPIRIN 81 MG: 81 TABLET, COATED ORAL at 08:11

## 2024-11-14 RX ADMIN — CARVEDILOL 12.5 MG: 12.5 TABLET, FILM COATED ORAL at 04:11

## 2024-11-14 RX ADMIN — IPRATROPIUM BROMIDE AND ALBUTEROL SULFATE 3 ML: .5; 3 SOLUTION RESPIRATORY (INHALATION) at 07:11

## 2024-11-14 RX ADMIN — CARVEDILOL 12.5 MG: 12.5 TABLET, FILM COATED ORAL at 08:11

## 2024-11-14 RX ADMIN — SEVELAMER CARBONATE 2400 MG: 800 TABLET, FILM COATED ORAL at 08:11

## 2024-11-14 RX ADMIN — AZITHROMYCIN DIHYDRATE 250 MG: 250 TABLET ORAL at 08:11

## 2024-11-14 RX ADMIN — SEVELAMER CARBONATE 2400 MG: 800 TABLET, FILM COATED ORAL at 12:11

## 2024-11-14 NOTE — DISCHARGE SUMMARY
[facility]  Discharge Summary      Patient Name: Latricia Higgins  MRN: [id number]  ALE: [id number]  Patient Class: IP- Inpatient  Admission Date: 11/13/2024  Hospital Length of Stay: 1 days  Discharge Date and Time:  11/14/2024 4:43 PM  Attending Physician: Anitha Tucker MD   Discharging Provider: Anitha Tucker MD  Primary Care Provider: Ralf Ham MD    Primary Care Team: Networked reference to record PCT     HPI:   69-year-old female with pMHx of ESRD on HD, HLD, HTN, hepatitis, and emphysema continuing to smoke who presented to the ED with reports of shortness of breath and dry cough x5 days.  Patient endorses that she restarted smoking here in the last few months.  She is with complaints of a dry cough.  Patient reports being compliant with her medications; today 1.4 liters of fluid was removed at HD.  Blood work performed in the ED with hemoglobin 10.3, hematocrit 31.8, chloride 94, CO2 32, BUN 26, creatinine 4.8, troponin I 27.8, BNP greater than 4700.  Chest x-ray lungs demonstrate bilateral interstitial attenuation  increasing conspicuity from prior exam.  Blunting of the bilateral costophrenic angles and patchy bibasilar opacities.  CTA chest with smooth interlobular septal thickening throughout the lungs increased in conspicuity from prior examination new small bilateral pleural effusion with adjacent atelectasis.  Mildly enlarged mediastinal lymph nodes.  Stable mild fusiform aneurysmal dilatation of the ascending thoracic aorta measuring 4.4 cm.  Med cardiomegaly.  Three-vessel coronary artery calcification.  Mildly dilated main pulmonary artery nonspecific.  Patient is started on ceftriaxone post blood cultures in ED. we will continue as possible COPD but more likely acute on chronic combined systolic and diastolic heart failure.  Consult will be placed to Nephrology to assess for additional need for HD.    * No surgery found *      Hospital Course:    69-year-old female with pMHx of ESRD on HD, HLD, HTN, hepatitis, and emphysema continuing to smoke who presented to the ED with reports of shortness of breath and dry cough x5 days. Patient endorses that she restarted smoking here in the last few months. She is with complaints of a dry cough. Patient reports being compliant with her medications; today 1.4 liters of fluid was removed at HD. Blood work performed in the ED with hemoglobin 10.3, hematocrit 31.8, chloride 94, CO2 32, BUN 26, creatinine 4.8, troponin I 27.8, BNP greater than 4700. Chest x-ray lungs demonstrate bilateral interstitial attenuation increasing conspicuity from prior exam.  Patient was admitted for additional dialysis session.  She was never requiring nasal cannula to saturate.  She was afebrile and hemodynamically stable throughout her stay.  Although, given her worsening cough we will continue empiric therapy for community-acquired pneumonia.  Patient was sent to 4 days of levofloxacin to complete a 5 day course.  She received 3 hours of hemodialysis with 3 L UF.  She tolerated this well.  Instructions were given to patient to discuss the use of midodrine with her dialysis sessions as she has been persistently hypertensive.  Patient was seen and examined on the day of discharge as well as during dialysis in no acute distress.  She is appropriate for discharge home.  Patient ambulates and does all things around herself without any assistance.  Ambulatory referral for vascular surgery given 4.4 cm thoracic aortic aneurysm.     Goals of Care Treatment Preferences:  Code Status: Full Code         Consults:   Consults (From admission, onward)          Status Ordering Provider     Inpatient consult to Social Work/Case Management  Once        Provider:  (Not yet assigned)    Completed TOMMY RUSSELL     Inpatient consult to Registered Dietitian/Nutritionist  Once        Provider:  (Not yet assigned)    Completed TOMMY RUSSELL     Inpatient  consult to Nephrology  Once        Provider:  Ligia Raygoza MD    Completed TOMMY RUSSELL A            Pulmonary  Community acquired pneumonia  Patient with new nagging cough, chest x-ray consistent with pneumonia versus edema given patient's clinical picture we will treat empirically  Antibiotics (From admission, onward)      Start     Stop Route Frequency Ordered    11/14/24 2130  cefTRIAXone injection 1 g         -- IV Every 24 hours (non-standard times) 11/13/24 2126 11/14/24 0900  azithromycin tablet 250 mg         -- Oral Daily 11/13/24 2128            Microbiology Results (last 7 days)       Procedure Component Value Units Date/Time    Blood Culture #1 **CANNOT BE ORDERED STAT** [[id number]] Collected: 11/13/24 2157    Order Status: Completed Specimen: Blood Updated: 11/14/24 0517     Blood Culture, Routine No Growth to date    Narrative:      Collection has been rescheduled by CZB at 11/13/2024 21:25 Reason:   Done   Collection has been rescheduled by CZB at 11/13/2024 21:25 Reason:   Done     Blood Culture #2 **CANNOT BE ORDERED STAT** [[id number]] Collected: 11/13/24 2156    Order Status: Completed Specimen: Blood Updated: 11/14/24 0517     Blood Culture, Routine No Growth to date    Narrative:      Collection has been rescheduled by CZB at 11/13/2024 21:25 Reason:   Done   Collection has been rescheduled by CZB at 11/13/2024 21:25 Reason:   Done         Ceftriaxone azithromycin given the hospital, patient is in with 4 day course of levofloxacin to complete 5 day course    Pleural effusion  Patient found to have small pleural effusion on imaging. I have personally reviewed and interpreted the following imaging: Xray and CT. A thoracentesis was deferred. Most likely etiology includes Congestive Heart Failure and Renal Failure. Management to include Dialysis    Hemodialysis with 3 L UF 11/14      Cardiac/Vascular  * Acute on chronic combined systolic and diastolic heart failure  Patient is  identified as having Combined Systolic and Diastolic heart failure that is Acute on Chronic. CHF is currently uncontrolled due to volume overload due to: Continued edema of extremities and Pulmonary edema/pleural effusion on CXR. Latest ECHO performed and demonstrates- Results for orders placed during the hospital encounter of 01/04/24    Echo    Interpretation Summary    Left Ventricle: The left ventricle is normal in size. Normal wall thickness. Normal wall motion. There is normal systolic function with a visually estimated ejection fraction of 55 - 60%. There is normal diastolic function.    Right Ventricle: Normal right ventricular cavity size. Wall thickness is normal. Right ventricle wall motion  is normal. Systolic function is normal.    Pulmonary Artery: The estimated pulmonary artery systolic pressure is 23 mmHg.    IVC/SVC: Normal venous pressure at 3 mmHg.  . Continue Beta Blocker ACE/ARB Aldactone ARNI and monitor clinical status closely. Monitor on telemetry. Patient is on CHF pathway.  Monitor strict Is&Os and daily weights.  Place on fluid restriction of 1 L. Cardiology is not consulted. Continue to stress to patient importance of self efficacy and  on diet for CHF. Last BNP reviewed- and noted below   Recent Labs   Lab 11/13/24  1837   BNP >4,700*     .    Hemodialysis with 3 L UF 11/14      HTN (hypertension)  Patients blood pressure range in the last 24 hours was: BP  Min: 147/76  Max: 186/83.The patient's inpatient anti-hypertensive regimen is listed below:  Current Antihypertensives  carvediloL tablet 12.5 mg, 2 times daily with meals, Oral    Plan  - BP is controlled, no changes needed to their regimen    Patient needs to discuss the use of midodrine PCP and/or nephrologist, unclear if she was ever dropping her pressures during dialysis      Final Active Diagnoses:    Diagnosis Date Noted POA    PRINCIPAL PROBLEM:  Acute on chronic combined systolic and diastolic heart failure [I50.43]  08/16/2022 Yes    Community acquired pneumonia [J18.9] 11/14/2024 Unknown    Demand ischemia [I24.89] 11/13/2024 Yes    Pleural effusion [J90] 03/28/2021 Yes    ESRD (end stage renal disease) [N18.6] 10/21/2020 Yes    HTN (hypertension) [I10] 09/27/2020 Yes    HLD (hyperlipidemia) [E78.5] 09/27/2020 Yes      Problems Resolved During this Admission:       Discharged Condition: good    Disposition: Home or Self Care    Follow Up:   Follow-up Information       Ralf Ham MD Follow up.    Specialty: Internal Medicine  Why: Please address the ongoing use of midodrine, your blood pressure remained quite high throughout dialysis  Contact information:  [id number] [address] [id number] [phone]                           Patient Instructions:      Ambulatory referral/consult to Vascular Surgery   Standing Status: Future   Referral Priority: Routine Referral Type: Consultation   Referral Reason: Specialty Services Required   Requested Specialty: Vascular Surgery   Number of Visits Requested: 1     Diet Cardiac     Diet renal     Activity as tolerated       Significant Diagnostic Studies: Labs: BMP:   Recent Labs   Lab 11/13/24 1837 11/14/24  0751    85    138   K 4.0 4.5   CL 94* 94*   CO2 32* 32*   BUN 26* 32*   CREATININE 4.8* 5.7*   CALCIUM 9.4 9.2   MG  --  2.2    and CBC   Recent Labs   Lab 11/13/24 1837 11/14/24  0751   WBC 5.55 5.03   HGB 10.3* 9.8*   HCT 31.8* 30.4*    180       Pending Diagnostic Studies:       Procedure Component Value Units Date/Time    Echo [[id number]]  (Abnormal) Resulted: 11/14/24 1043    Order Status: Sent Lab Status: In process Updated: 11/14/24 1043     Vn Nyquist 0.29 m/s      TR Max Sergio 2.94 m/s      IVC diameter 2.00 cm      TAPSE 1.83 cm      TDI SEPTAL 0.08 m/s      MV valve area p 1/2 method 2.34 cm2      Ascending aorta 4.10 cm      STJ 3.10 cm      Sinus 3.70 cm      Radius 0.60 cm      Ao root annulus 3.40 cm      Pulmonary Valve  Mean Velocity 0.98 m/s      PV peak gradient 8 mmHg      PV PEAK VELOCITY 1.43 m/s      MV stenosis pressure 1/2 time 94.00 ms      Triscuspid Valve Regurgitation Peak Gradient 35 mmHg      Left Ventricular Outflow Tract Mean Gradient 1.00 mmHg      Left Ventricular Outflow Tract Mean Velocity 0.48 cm/s      LVOT peak scott 0.7 m/s      MV VTI 33.2 cm      MV valve area by continuity eq 1.74 cm2      MV peak gradient 7 mmHg      MV mean gradient 4 mmHg      MR PISA EROA 0.12 cm2      Mr max scott 5.36 m/s      XANDER by Velocity Ratio 1.8 cm²      AV index (prosthetic) 0.51     AV Velocity Ratio 0.47     AV valve area 2.0 cm²      LVOT peak VTI 15.2 cm      Ao VTI 29.6 cm      Ao peak scott 1.5 m/s      AV peak gradient 9.0 mmHg      AV mean gradient 5.0 mmHg      Vn Nyquist MS 0.29 m/s      RA area length vol 39.60 mL      ULI (MOD) 50.4 mL/m2      LA Vol (MOD) 82.60 mL      MV Peak A Scott 0.88 m/s      RV S' 11.60 cm/s      E/E' ratio 12.94 m/s      LV mass 227.7 g      LV LATERAL E/E' RATIO 12.22 m/s      LV SEPTAL E/E' RATIO 13.75 m/s      E wave deceleration time 146.00 msec      E/A ratio 1.25     TDI LATERAL 0.09 m/s      MV Peak E Scott 1.10 m/s      LV Mass Index 138.9 g/m2      PW 1.1 cm      Left Ventricle Relative Wall Thickness 0.42 cm      FS 11.3 %      LVOT area 3.8 cm2      LVOT diameter 2.2 cm      IVS 1.1 cm      Left Ventricular End Diastolic Volume by Teichholz Method 135.34 mL      Left Ventricular End Systolic Volume by Teichholz Method 102.36 mL      LV EDV A4C 172.00 mL      LV EDV A2C 163 mL      LV Diastolic Volume Index 82.52 mL/m2      LV ESV A4C 87.60 mL      LV Diastolic Volume 135.34 mL      LV ESV A2C 75.10 mL      LVIDs 4.7 cm      LV Systolic Volume Index 62.4 mL/m2      LV Systolic Volume 102.36 mL      LVIDd 5.3 cm      LVOT stroke volume 57.8 cm3      A4C EF 36 %      A2C EF 29 %      Oshea's Biplane MOD Ejection Fraction 30 %      Mean e' 0.09 m/s      ZLVIDS 3.92     ZLVIDD 1.38     LA  area A4C 27.30 cm2      LA area A2C 24.10 cm2      AORTIC VALVE CUSP SEPERATION 2.30 cm      BSA 1.64 m2            Medications:  Reconciled Home Medications:      Medication List        START taking these medications      levoFLOXacin 750 MG tablet  Commonly known as: LEVAQUIN  Take 1 tablet (750 mg total) by mouth once daily.            CONTINUE taking these medications      apixaban 5 mg Tab  Commonly known as: ELIQUIS  Take 1 tablet (5 mg total) by mouth 2 (two) times daily.     aspirin 81 MG EC tablet  Commonly known as: ECOTRIN  Take 81 mg by mouth once daily.     atorvastatin 40 MG tablet  Commonly known as: LIPITOR  Take 40 mg by mouth once daily.     carvediloL 12.5 MG tablet  Commonly known as: COREG  Take 1 tablet (12.5 mg total) by mouth 2 (two) times daily with meals.     ENTRESTO 24-26 mg per tablet  Generic drug: sacubitriL-valsartan  Take 1 tablet by mouth 2 (two) times daily.     LOKELMA 5 gram packet  Generic drug: sodium zirconium cyclosilicate  Take 5 g by mouth every Tuesday, Thursday, Saturday, Sunday.     * midodrine 10 MG tablet  Commonly known as: PROAMATINE  Take 10 mg by mouth every Mon, Wed, Fri. With dialysis     * midodrine 5 MG Tab  Commonly known as: PROAMATINE  Take 5 mg by mouth every Tuesday, Thursday, Saturday, Sunday.     ondansetron 4 MG Tbdl  Commonly known as: ZOFRAN-ODT  Take 4 mg by mouth every 6 (six) hours as needed.     MERVAT-STEPHAN 0.8 mg Tab  Generic drug: B complex-vitamin C-folic acid  Take 1 tablet by mouth once daily.     sevelamer carbonate 800 mg Tab  Commonly known as: RENVELA  Take 2,400 mg by mouth 3 (three) times daily with meals.           * This list has 2 medication(s) that are the same as other medications prescribed for you. Read the directions carefully, and ask your doctor or other care provider to review them with you.                STOP taking these medications      azithromycin 250 MG tablet  Commonly known as: Z-NATHALY              Indwelling  Lines/Drains at time of discharge:   Lines/Drains/Airways       Drain  Duration                  Hemodialysis AV Fistula Right upper arm -- days                    Time spent on the discharge of patient: 35 minutes         Anitha Tucker MD  Department of Hospital Medicine  [facility]

## 2024-11-14 NOTE — ASSESSMENT & PLAN NOTE
Patient found to have small pleural effusion on imaging. I have personally reviewed and interpreted the following imaging: Xray and CT. A thoracentesis was deferred. Most likely etiology includes Congestive Heart Failure and Renal Failure. Management to include Dialysis

## 2024-11-14 NOTE — ASSESSMENT & PLAN NOTE
Patient with new nagging cough, chest x-ray consistent with pneumonia versus edema given patient's clinical picture we will treat empirically  Antibiotics (From admission, onward)      Start     Stop Route Frequency Ordered    11/14/24 2130  cefTRIAXone injection 1 g         -- IV Every 24 hours (non-standard times) 11/13/24 2126 11/14/24 0900  azithromycin tablet 250 mg         -- Oral Daily 11/13/24 2128            Microbiology Results (last 7 days)       Procedure Component Value Units Date/Time    Blood Culture #1 **CANNOT BE ORDERED STAT** [[id number]] Collected: 11/13/24 2157    Order Status: Completed Specimen: Blood Updated: 11/14/24 0517     Blood Culture, Routine No Growth to date    Narrative:      Collection has been rescheduled by CZB at 11/13/2024 21:25 Reason:   Done   Collection has been rescheduled by CZB at 11/13/2024 21:25 Reason:   Done     Blood Culture #2 **CANNOT BE ORDERED STAT** [[id number]] Collected: 11/13/24 2156    Order Status: Completed Specimen: Blood Updated: 11/14/24 0517     Blood Culture, Routine No Growth to date    Narrative:      Collection has been rescheduled by CZB at 11/13/2024 21:25 Reason:   Done   Collection has been rescheduled by CZB at 11/13/2024 21:25 Reason:   Done         Ceftriaxone azithromycin given the hospital, patient is in with 4 day course of levofloxacin to complete 5 day course

## 2024-11-14 NOTE — PLAN OF CARE
Pt clear for DC from case management standpoint. Discharging to home and transporting herself.          11/14/24 1632   Final Note   Assessment Type Final Discharge Note   Anticipated Discharge Disposition Home

## 2024-11-14 NOTE — PROGRESS NOTES
"Pharmacist Renal Dose Adjustment Note    Latricia Higgins is a 69 y.o. female being treated with the medication Apixaban    Patient Data:    Vital Signs (Most Recent):  Temp: 98 °F (36.7 °C) (11/13/24 1809)  Pulse: 71 (11/13/24 2004)  Resp: 18 (11/13/24 2007)  BP: (!) 167/71 (11/13/24 2004)  SpO2: 95 % (11/13/24 2004) Vital Signs (72h Range):  Temp:  [98 °F (36.7 °C)]   Pulse:  [70-91]   Resp:  [18-20]   BP: (147-167)/(71-76)   SpO2:  [94 %-97 %]        Ht: 5' 4" (1.626 m)  Wt: 59.9 kg (132 lb)  Estimated Creatinine Clearance: 9.6 mL/min (A) (based on SCr of 4.8 mg/dL (H)).  Body mass index is 22.66 kg/m².    Per [facility] renal dosing protocol:     Previous Order: Apixaban 5 mg BiD    Will be changed to:     New Order: Apixaban 2.5 mg BiD,    Due to: Per Pharmacy Protocol    Renal dose adjustments performed as noted above.    We will continue monitoring and adjusting as necessary.    Pharmacist: Rodri Manning, PharmD  Ext: [phone]      "

## 2024-11-14 NOTE — SUBJECTIVE & OBJECTIVE
Past Medical History:   Diagnosis Date    Dialysis patient 10/05/2020    Emphysema of lung     ESRD (end stage renal disease)     Hepatitis 1980    HLD (hyperlipidemia)     Hypertension     Renal disorder        Past Surgical History:   Procedure Laterality Date    AORTOGRAPHY WITH SERIALOGRAPHY N/A 09/30/2020    Procedure: co2 renal angio;  Surgeon: Lance Bustamante MD;  Location: [facility] CATH/EP LAB;  Service: Vascular;  Laterality: N/A;    APPENDECTOMY      Age 16    AV FISTULA PLACEMENT Right 12/09/2020    Procedure: CREATION, AV FISTULA;  Surgeon: Lance Bustamante MD;  Location: [facility] OR;  Service: General;  Laterality: Right;    COLONOSCOPY N/A 06/08/2021    Procedure: COLONOSCOPY;  Surgeon: Avila Smith III, MD;  Location: [facility] ENDO;  Service: Endoscopy;  Laterality: N/A;    COLONOSCOPY N/A 12/14/2021    Procedure: COLONOSCOPY;  Surgeon: Avila Smith III, MD;  Location: [facility] ENDO;  Service: Endoscopy;  Laterality: N/A;    COLONOSCOPY W/ POLYPECTOMY  06/08/2021    FISTULOGRAM Bilateral 05/18/2022    Procedure: Fistulogram;  Surgeon: Lance Bustamante MD;  Location: [facility] CATH/EP LAB;  Service: General;  Laterality: Bilateral;    INSERTION OF TUNNELED CENTRAL VENOUS HEMODIALYSIS CATHETER  10/02/2020    Procedure: INSERTION, CATHETER, CENTRAL VENOUS, HEMODIALYSIS, TUNNELED;  Surgeon: Ali Khoobehi, MD;  Location: [facility] OR;  Service: Vascular;;    LEFT HEART CATHETERIZATION Left 05/17/2022    Procedure: Left heart cath;  Surgeon: Daniel Tyler MD;  Location: [facility] CATH LAB;  Service: Cardiology;  Laterality: Left;    OPEN THROMBECTOMY OF GRAFT  05/18/2022    Procedure: THROMBECTOMY, GRAFT, OPEN;  Surgeon: Lance Bustamante MD;  Location: [facility] CATH/EP LAB;  Service: General;;    PLACEMENT OF DIALYSIS ACCESS Right 09/30/2020    Procedure: Insertion, Dialysis Access;  Surgeon: Lance Bustamante MD;  Location: [facility] CATH/EP LAB;  Service: Vascular;  Laterality: Right;    REMOVAL OF TUNNELED CENTRAL VENOUS  CATHETER (CVC) N/A 12/16/2020    Procedure: REMOVAL, CATHETER, CENTRAL VENOUS, TUNNELED;  Surgeon: Ali Khoobehi, MD;  Location: [facility];  Service: Vascular;  Laterality: N/A;    TONSILLECTOMY      TUBAL LIGATION         Review of patient's allergies indicates:  No Known Allergies    No current facility-administered medications on file prior to encounter.     Current Outpatient Medications on File Prior to Encounter   Medication Sig    apixaban (ELIQUIS) 5 mg Tab Take 1 tablet (5 mg total) by mouth 2 (two) times daily.    aspirin (ECOTRIN) 81 MG EC tablet Take 81 mg by mouth once daily.    atorvastatin (LIPITOR) 40 MG tablet Take 40 mg by mouth once daily.    azithromycin (Z-NATHALY) 250 MG tablet Take 250 mg by mouth As instructed. Z-NATHALY    carvediloL (COREG) 12.5 MG tablet Take 1 tablet (12.5 mg total) by mouth 2 (two) times daily with meals.    LOKELMA 5 gram packet Take 5 g by mouth every Tuesday, Thursday, Saturday, Sunday.    midodrine (PROAMATINE) 10 MG tablet Take 10 mg by mouth every Mon, Wed, Fri. With dialysis    midodrine (PROAMATINE) 5 MG Tab Take 5 mg by mouth every Tuesday, Thursday, Saturday, Sunday.    ondansetron (ZOFRAN-ODT) 4 MG TbDL Take 4 mg by mouth every 6 (six) hours as needed.    MERVAT-STEPHAN 0.8 mg Tab Take 1 tablet by mouth once daily.    sacubitriL-valsartan (ENTRESTO) 24-26 mg per tablet Take 1 tablet by mouth 2 (two) times daily.    sevelamer carbonate (RENVELA) 800 mg Tab Take 2,400 mg by mouth 3 (three) times daily with meals.     [DISCONTINUED] calcium carbonate (OS-MARLY) 600 mg calcium (1,500 mg) Tab Take 600 mg by mouth 2 (two) times daily with meals. (Patient not taking: Reported on 8/8/2024)    [DISCONTINUED] conjugated estrogens (PREMARIN) vaginal cream Place 1 g vaginally once daily.    [DISCONTINUED] ondansetron (ZOFRAN) 4 MG tablet Take 4 mg by mouth every 8 (eight) hours as needed for Nausea.    [DISCONTINUED] oxyCODONE-acetaminophen (PERCOCET) 5-325 mg per tablet Take 1 tablet by  mouth every 6 (six) hours as needed. (Patient not taking: Reported on 2024)    [DISCONTINUED] umeclidinium-vilanteroL (ANORO ELLIPTA) 62.5-25 mcg/actuation DsDv Inhale 1 puff into the lungs once daily. Controller (Patient not taking: Reported on 2024)    [DISCONTINUED] VELTASSA 8.4 gram PwPk Take 8.4 g by mouth 4 (four) times a week. Tues, thurs, sat, sun (Patient not taking: Reported on 2024)    [DISCONTINUED] VIOS AEROSOL DELIVERY SYSTEM Lizbeht     [DISCONTINUED] vitamin D (VITAMIN D3) 1000 units Tab Take 1,000 Units by mouth once daily. (Patient not taking: Reported on 2024)     Family History       Problem Relation (Age of Onset)    Cancer Mother    Heart disease Father          Tobacco Use    Smoking status: Former     Current packs/day: 0.00     Average packs/day: 0.3 packs/day for 20.0 years (5.0 ttl pk-yrs)     Types: Cigarettes     Start date: 2000     Quit date: 2020     Years since quittin.1    Smokeless tobacco: Never   Substance and Sexual Activity    Alcohol use: Not Currently    Drug use: Not Currently    Sexual activity: Not Currently     Review of Systems   Respiratory:  Positive for cough and shortness of breath.    Neurological:  Positive for weakness.     Objective:     Vital Signs (Most Recent):  Temp: 98 °F (36.7 °C) (24 180)  Pulse: 71 (24)  Resp: 18 (24)  BP: (!) 167/71 (24)  SpO2: 95 % (24) Vital Signs (24h Range):  Temp:  [98 °F (36.7 °C)] 98 °F (36.7 °C)  Pulse:  [70-91] 71  Resp:  [18-20] 18  SpO2:  [94 %-97 %] 95 %  BP: (147-167)/(71-76) 167/71     Weight: 59.9 kg (132 lb)  Body mass index is 22.66 kg/m².     Physical Exam  Vitals reviewed.   Constitutional:       General: She is not in acute distress.     Appearance: Normal appearance. She is not toxic-appearing.   HENT:      Head: Normocephalic and atraumatic.      Mouth/Throat:      Mouth: Mucous membranes are moist.      Pharynx: Oropharynx is clear.    Eyes:      Extraocular Movements: Extraocular movements intact.      Pupils: Pupils are equal, round, and reactive to light.   Cardiovascular:      Rate and Rhythm: Normal rate and regular rhythm.      Pulses: Normal pulses.      Heart sounds: Normal heart sounds.   Pulmonary:      Effort: Pulmonary effort is normal.      Breath sounds: Wheezing and rhonchi present.      Comments: Dry cough heard  Abdominal:      General: Bowel sounds are normal. There is no distension.      Palpations: Abdomen is soft.      Tenderness: There is no abdominal tenderness.   Musculoskeletal:         General: No swelling. Normal range of motion.      Cervical back: Normal range of motion and neck supple.   Skin:     General: Skin is warm and dry.      Capillary Refill: Capillary refill takes less than 2 seconds.      Comments: Right upper extremity fistula thrill and bruit present   Neurological:      General: No focal deficit present.      Mental Status: She is alert and oriented to person, place, and time. Mental status is at baseline.   Psychiatric:         Mood and Affect: Mood normal.         Behavior: Behavior normal.         Judgment: Judgment normal.              CRANIAL NERVES     CN III, IV, VI   Pupils are equal, round, and reactive to light.       Significant Labs: All pertinent labs within the past 24 hours have been reviewed.  Recent Lab Results         11/13/24  1845   11/13/24  1844   11/13/24  1837        Influenza A, Molecular   Negative         Influenza B, Molecular   Negative         Albumin     3.9       ALP     55       ALT     9       Anion Gap     11       AST     15       Baso #     0.04       Basophil %     0.7       BILIRUBIN TOTAL     0.6  Comment: For infants and newborns, interpretation of results should be based  on gestational age, weight and in agreement with clinical  observations.    Premature Infant recommended reference ranges:  Up to 24 hours.............<8.0 mg/dL  Up to 48  hours............<12.0 mg/dL  3-5 days..................<15.0 mg/dL  6-29 days.................<15.0 mg/dL         BNP     >4,700  Comment: Values of less than 100 pg/ml are consistent with non-CHF populations.       BUN     26       Calcium     9.4       Chloride     94       CO2     32       Creatinine     4.8       Differential Method     Automated       eGFR     9.3       Eos #     0.1       Eos %     1.6       Flu A & B Source   Nasal swab         Glucose     104       Gran # (ANC)     4.3       Gran %     77.5       Hematocrit     31.8       Hemoglobin     10.3       Immature Grans (Abs)     0.01  Comment: Mild elevation in immature granulocytes is non specific and   can be seen in a variety of conditions including stress response,   acute inflammation, trauma and pregnancy. Correlation with other   laboratory and clinical findings is essential.         Immature Granulocytes     0.2       Lymph #     0.6       Lymph %     11.2       MCH     34.6       MCHC     32.4       MCV     107       Mono #     0.5       Mono %     8.8       MPV     11.1       nRBC     0       Platelet Count     173       Potassium     4.0       PROTEIN TOTAL     6.7       RBC     2.98       RDW     12.8       SARS-CoV-2 RNA, Amplification, Qual Negative  Comment: This test utilizes isothermal nucleic acid amplification technology   to   detect the SARS-CoV-2 RdRp nucleic acid segment. The analytical   sensitivity   (limit of detection) is 500 copies/swab.     A POSITIVE result is indicative of the presence of SARS-CoV-2 RNA;   clinical   correlation with patient history and other diagnostic information is   necessary to determine patient infection status.    A NEGATIVE result means that SARS-CoV-2 nucleic acids are not present   above   the limit of detection. A NEGATIVE result should be treated as   presumptive.   It does not rule out the possibility of COVID-19 and should not be   the sole   basis for treatment decisions. If COVID-19  is strongly suspected   based on   clinical and exposure history, re-testing using an alternate   molecular assay   should be considered.     This test is FDA approved.Performance characteristics of this test   has been   independently verified by [facility] in conjunction   with   [facility] Department of Pathology and Laboratory   Medicine.             Sodium     137       Troponin I High Sensitivity     27.8  Comment: Troponin results differ between methods. Do not use   results between Troponin methods interchangeably.    Alkaline Phospatase levels above 400 U/L may   cause false positive results.    Access hsTnI should not be used for patients taking   Asfotase tami (Strensiq).         WBC     5.55               Significant Imaging: I have reviewed all pertinent imaging results/findings within the past 24 hours.    CT Chest Without Contrast  Narrative: EXAMINATION:  CT CHEST WITHOUT CONTRAST    CLINICAL HISTORY:  Abnormal xray - interstitial infiltrates;    TECHNIQUE:  Low dose axial images, sagittal and coronal reformations were obtained from the thoracic inlet to the lung bases. Contrast was not administered.    COMPARISON:  Chest radiograph 11/13/2024, CT chest 01/04/2024    FINDINGS:  Examination of the vascular and soft tissue structures at the base of the neck is unremarkable.    There is mild fusiform aneurysmal dilatation of the ascending thoracic aorta measuring up to 4.4 cm in transverse diameter, similar to prior study of 01/04/2024.  There is moderate aortic atherosclerosis.  The heart is mildly enlarged.  There is prominent three-vessel coronary artery calcification.  There is trace pericardial fluid.  There is prominence of the main pulmonary artery measuring up to 4.0 cm in transverse diameter which is nonspecific although can be seen with pulmonary arterial hypertension.    The esophagus maintains a normal course and caliber. There is no bulky axillary lymph node  enlargement.  There are scattered mildly enlarged mediastinal lymph nodes.  For example, there is a 1.4 cm short axis precarinal lymph node.  Evaluation of the hilar region is limited by lack of IV contrast fluid.    Detailed evaluation of the lung parenchyma is limited by respiratory motion.  Trachea is midline and patent.  There is no pneumothorax.  There are emphysematous changes of the lungs.  There are scattered peripheral reticular opacities as seen on prior CT examination.  There is mild bronchiectasis.  There is smooth interlobular septal thickening which appears increased in conspicuity compared to prior examination which may reflect interstitial edema in the appropriate clinical setting.  There are small bilateral pleural effusions with associated lower lobe atelectasis.  There are few scattered pulmonary nodules throughout the lungs, similar to prior examination, for which continued follow-up is advised.    Limited views upper abdomen demonstrate a stable left hepatic lobe hypodensity, likely a cyst.  The partially visualized native kidneys appear atrophic.  There is no free intraperitoneal air within the upper abdomen.  There is previously demonstrated thickening of the adrenal glands.    Osseous structures demonstrate degenerative changes of the visualized spine.    There is mild diffuse body wall edema.  Impression: Smooth interlobular septal thickening throughout the lungs which appears increased in conspicuity from prior examination and may reflect interstitial edema in the appropriate clinical setting.  New small bilateral pleural effusions with adjacent atelectasis.    Mildly enlarged mediastinal lymph nodes, nonspecific, possibly reactive or secondary to elevated heart pressures.  Attention on follow-up imaging advised.    Stable mild fusiform aneurysmal dilatation of the ascending thoracic aorta measuring up to 4.4 cm.    Mild cardiomegaly.  Prominent three-vessel coronary artery  calcification.    Mildly dilated main pulmonary artery nonspecific although can be seen with pulmonary arterial hypertension.    Additional findings as above.    Electronically signed by: Orion Almazan MD  Date:    11/13/2024  Time:    20:19  X-Ray Chest AP Portable  Narrative: EXAMINATION:  XR CHEST AP PORTABLE    CLINICAL HISTORY:  sob/cough;    TECHNIQUE:  Single frontal view of the chest was performed.    COMPARISON:  Chest radiograph 01/19/2023, CT chest 01/04/2024    FINDINGS:  Cardiac monitoring leads overlie the chest.  Cardiac silhouette is stable in size and configuration.  There is atherosclerotic calcification of the thoracic aorta.  Lungs demonstrate bilateral increased interstitial attenuation, increased in conspicuity from prior examination, which may reflect interstitial edema, although atypical interstitial infection not excluded.  There is blunting of the bilateral costophrenic angles and patchy bibasilar opacities which may reflect small volume of pleural fluid with associated atelectasis and/or consolidation.  No evidence of pneumothorax.  Osseous structures demonstrate mild degenerative changes.  Impression: Abnormal chest radiograph as above.    Electronically signed by: Orion Almazan MD  Date:    11/13/2024  Time:    19:14

## 2024-11-14 NOTE — DISCHARGE INSTRUCTIONS
Please continue regular dialysis schedule.  Please discuss with your primary care provider of the ongoing use of midodrine. This increases blood pressure and you have had persistently elevated blood pressures during dialysis.  I have sent you four days of antibiotic called levofloxacin.  Finally, I have placed an outpatient referral for vascular surgery given the small outpouching on your aorta.  Thank you for allowing me to participate in your care.

## 2024-11-14 NOTE — CARE UPDATE
11/13/24 1924   Patient Assessment/Suction   Level of Consciousness (AVPU) alert   Respiratory Effort Unlabored;Short of breath   Expansion/Accessory Muscles/Retractions no use of accessory muscles   All Lung Fields Breath Sounds Anterior:;diminished   Rhythm/Pattern, Respiratory depth regular;pattern regular   Cough Frequency infrequent   Cough Type nonproductive   PRE-TX-O2   Device (Oxygen Therapy) room air   SpO2 97 %   Pulse Oximetry Type Continuous   $ Pulse Oximetry - Multiple Charge Pulse Oximetry - Multiple   Pulse 70   Resp 20   Aerosol Therapy   $ Aerosol Therapy Charges Aerosol Treatment   Daily Review of Necessity (SVN) completed   Respiratory Treatment Status (SVN) given   Treatment Route (SVN) mask   Patient Position HOB elevated   Post Treatment Assessment (SVN) increased aeration   Signs of Intolerance (SVN) none   Education   $ Education Bronchodilator;Oxygen;15 min   Tobacco Cessation Intervention   Do you use any type of tobacco product? No   Are you interested in quitting use of tobacco products? Not interested   Are you interested in Nicotine Replacement for symptom relief? No   Respiratory Evaluation   $ Care Plan Tech Time 15 min   $ Respiratory Evaluation Complete   Evaluation For New Orders   Admitting Diagnosis shortness of breath   Cardiac Diagnosis Chronic combined systolic and diastolic heart failure   Pulmonary Diagnosis Pulmonary nodules   Home Oxygen   Has Home Oxygen? No   Home Aerosol, MDI, DPI, and Other Treatments/Therapies   Home Respiratory Therapy Per Patient/Review of Chart Yes   DPI Home Meds/Freq umeclidinium-vilanteroL (ANORO ELLIPTA) 62.5-25 mcg/actuation DsDv   Oxygen Care Plan   Oxygen Care Plan Per Protocol   SPO2 Goal (%) 95% cardiac   Rationale SpO2 is <95% (Cardiac Pt.)   Bronchodilator Care Plan   Bronchodilator Care Plan DPI   DPI Meds w/ frequency   (umeclidinium-vilanteroL (ANORO ELLIPTA) 62.5-25 mcg/actuation DsDv)   Rationale Maintain home respiratory  medicine   Atelectasis Care Plan   Rationale No Rational Found   Airway Clearance Care Plan   Rationale No rationale found

## 2024-11-14 NOTE — PLAN OF CARE
Problem: Adult Inpatient Plan of Care  Goal: Absence of Hospital-Acquired Illness or Injury  Outcome: Progressing  Intervention: Identify and Manage Fall Risk  Flowsheets (Taken 11/14/2024 9333)  Safety Promotion/Fall Prevention:   assistive device/personal item within reach   Fall Risk reviewed with patient/family   high risk medications identified   instructed to call staff for mobility   medications reviewed   nonskid shoes/socks when out of bed   patient expresses understanding of fall risk and prevention   side rails raised x 2

## 2024-11-14 NOTE — ASSESSMENT & PLAN NOTE
Patients blood pressure range in the last 24 hours was: BP  Min: 147/76  Max: 167/71.The patient's inpatient anti-hypertensive regimen is listed below:  Current Antihypertensives       Plan  - BP is controlled, no changes needed to their regimen

## 2024-11-14 NOTE — H&P
[facility] - Emergency Dept  Hospital Medicine  History & Physical    Patient Name: Latricia Higgins  MRN: [id number]  Patient Class: IP- Inpatient  Admission Date: 11/13/2024  Attending Physician: Harjit Cardoza MD   Primary Care Provider: Ralf Ham MD         Patient information was obtained from patient and ER records.     Subjective:     Principal Problem:Acute on chronic combined systolic and diastolic heart failure    Chief Complaint:   Chief Complaint   Patient presents with    Shortness of Breath     Shortness of breath x 5 days, hx of COPD        HPI: 69-year-old female with pMHx of ESRD on HD, HLD, HTN, hepatitis, and emphysema continuing to smoke who presented to the ED with reports of shortness of breath and dry cough x5 days.  Patient endorses that she restarted smoking here in the last few months.  She is with complaints of a dry cough.  Patient reports being compliant with her medications; today 1.4 liters of fluid was removed at HD.  Blood work performed in the ED with hemoglobin 10.3, hematocrit 31.8, chloride 94, CO2 32, BUN 26, creatinine 4.8, troponin I 27.8, BNP greater than 4700.  Chest x-ray lungs demonstrate bilateral interstitial attenuation  increasing conspicuity from prior exam.  Blunting of the bilateral costophrenic angles and patchy bibasilar opacities.  CTA chest with smooth interlobular septal thickening throughout the lungs increased in conspicuity from prior examination new small bilateral pleural effusion with adjacent atelectasis.  Mildly enlarged mediastinal lymph nodes.  Stable mild fusiform aneurysmal dilatation of the ascending thoracic aorta measuring 4.4 cm.  Med cardiomegaly.  Three-vessel coronary artery calcification.  Mildly dilated main pulmonary artery nonspecific.  Patient is started on ceftriaxone post blood cultures in ED. we will continue as possible COPD but more likely acute on chronic combined systolic and diastolic heart failure.  Consult  will be placed to Nephrology to assess for additional need for HD.    Past Medical History:   Diagnosis Date    Dialysis patient 10/05/2020    Emphysema of lung     ESRD (end stage renal disease)     Hepatitis 1980    HLD (hyperlipidemia)     Hypertension     Renal disorder        Past Surgical History:   Procedure Laterality Date    AORTOGRAPHY WITH SERIALOGRAPHY N/A 09/30/2020    Procedure: co2 renal angio;  Surgeon: Lance Bustamante MD;  Location: [facility] CATH/EP LAB;  Service: Vascular;  Laterality: N/A;    APPENDECTOMY      Age 16    AV FISTULA PLACEMENT Right 12/09/2020    Procedure: CREATION, AV FISTULA;  Surgeon: Lance Bustamante MD;  Location: [facility] OR;  Service: General;  Laterality: Right;    COLONOSCOPY N/A 06/08/2021    Procedure: COLONOSCOPY;  Surgeon: Avila Smith III, MD;  Location: [facility] ENDO;  Service: Endoscopy;  Laterality: N/A;    COLONOSCOPY N/A 12/14/2021    Procedure: COLONOSCOPY;  Surgeon: Avila Smith III, MD;  Location: [facility] ENDO;  Service: Endoscopy;  Laterality: N/A;    COLONOSCOPY W/ POLYPECTOMY  06/08/2021    FISTULOGRAM Bilateral 05/18/2022    Procedure: Fistulogram;  Surgeon: Lance Bustamante MD;  Location: [facility] CATH/EP LAB;  Service: General;  Laterality: Bilateral;    INSERTION OF TUNNELED CENTRAL VENOUS HEMODIALYSIS CATHETER  10/02/2020    Procedure: INSERTION, CATHETER, CENTRAL VENOUS, HEMODIALYSIS, TUNNELED;  Surgeon: Ali Khoobehi, MD;  Location: [facility] OR;  Service: Vascular;;    LEFT HEART CATHETERIZATION Left 05/17/2022    Procedure: Left heart cath;  Surgeon: Daniel Tyler MD;  Location: [facility] CATH LAB;  Service: Cardiology;  Laterality: Left;    OPEN THROMBECTOMY OF GRAFT  05/18/2022    Procedure: THROMBECTOMY, GRAFT, OPEN;  Surgeon: Lance Bustamante MD;  Location: [facility] CATH/EP LAB;  Service: General;;    PLACEMENT OF DIALYSIS ACCESS Right 09/30/2020    Procedure: Insertion, Dialysis Access;  Surgeon: Lance Bustamante MD;  Location: [facility] CATH/EP LAB;  Service:  Vascular;  Laterality: Right;    REMOVAL OF TUNNELED CENTRAL VENOUS CATHETER (CVC) N/A 12/16/2020    Procedure: REMOVAL, CATHETER, CENTRAL VENOUS, TUNNELED;  Surgeon: Ali Khoobehi, MD;  Location: [facility] OR;  Service: Vascular;  Laterality: N/A;    TONSILLECTOMY      TUBAL LIGATION         Review of patient's allergies indicates:  No Known Allergies    No current facility-administered medications on file prior to encounter.     Current Outpatient Medications on File Prior to Encounter   Medication Sig    apixaban (ELIQUIS) 5 mg Tab Take 1 tablet (5 mg total) by mouth 2 (two) times daily.    aspirin (ECOTRIN) 81 MG EC tablet Take 81 mg by mouth once daily.    atorvastatin (LIPITOR) 40 MG tablet Take 40 mg by mouth once daily.    azithromycin (Z-NATHALY) 250 MG tablet Take 250 mg by mouth As instructed. Z-NATHALY    carvediloL (COREG) 12.5 MG tablet Take 1 tablet (12.5 mg total) by mouth 2 (two) times daily with meals.    LOKELMA 5 gram packet Take 5 g by mouth every Tuesday, Thursday, Saturday, Sunday.    midodrine (PROAMATINE) 10 MG tablet Take 10 mg by mouth every Mon, Wed, Fri. With dialysis    midodrine (PROAMATINE) 5 MG Tab Take 5 mg by mouth every Tuesday, Thursday, Saturday, Sunday.    ondansetron (ZOFRAN-ODT) 4 MG TbDL Take 4 mg by mouth every 6 (six) hours as needed.    MERVAT-STEPHAN 0.8 mg Tab Take 1 tablet by mouth once daily.    sacubitriL-valsartan (ENTRESTO) 24-26 mg per tablet Take 1 tablet by mouth 2 (two) times daily.    sevelamer carbonate (RENVELA) 800 mg Tab Take 2,400 mg by mouth 3 (three) times daily with meals.     [DISCONTINUED] calcium carbonate (OS-MARLY) 600 mg calcium (1,500 mg) Tab Take 600 mg by mouth 2 (two) times daily with meals. (Patient not taking: Reported on 8/8/2024)    [DISCONTINUED] conjugated estrogens (PREMARIN) vaginal cream Place 1 g vaginally once daily.    [DISCONTINUED] ondansetron (ZOFRAN) 4 MG tablet Take 4 mg by mouth every 8 (eight) hours as needed for Nausea.    [DISCONTINUED]  oxyCODONE-acetaminophen (PERCOCET) 5-325 mg per tablet Take 1 tablet by mouth every 6 (six) hours as needed. (Patient not taking: Reported on 2024)    [DISCONTINUED] umeclidinium-vilanteroL (ANORO ELLIPTA) 62.5-25 mcg/actuation DsDv Inhale 1 puff into the lungs once daily. Controller (Patient not taking: Reported on 2024)    [DISCONTINUED] VELTASSA 8.4 gram PwPk Take 8.4 g by mouth 4 (four) times a week. Tues, thurs, sat, sun (Patient not taking: Reported on 2024)    [DISCONTINUED] VIOS AEROSOL DELIVERY SYSTEM Lizbeth     [DISCONTINUED] vitamin D (VITAMIN D3) 1000 units Tab Take 1,000 Units by mouth once daily. (Patient not taking: Reported on 2024)     Family History       Problem Relation (Age of Onset)    Cancer Mother    Heart disease Father          Tobacco Use    Smoking status: Former     Current packs/day: 0.00     Average packs/day: 0.3 packs/day for 20.0 years (5.0 ttl pk-yrs)     Types: Cigarettes     Start date: 2000     Quit date: 2020     Years since quittin.1    Smokeless tobacco: Never   Substance and Sexual Activity    Alcohol use: Not Currently    Drug use: Not Currently    Sexual activity: Not Currently     Review of Systems   Respiratory:  Positive for cough and shortness of breath.    Neurological:  Positive for weakness.     Objective:     Vital Signs (Most Recent):  Temp: 98 °F (36.7 °C) (24 180)  Pulse: 71 (24)  Resp: 18 (24)  BP: (!) 167/71 (24)  SpO2: 95 % (24) Vital Signs (24h Range):  Temp:  [98 °F (36.7 °C)] 98 °F (36.7 °C)  Pulse:  [70-91] 71  Resp:  [18-20] 18  SpO2:  [94 %-97 %] 95 %  BP: (147-167)/(71-76) 167/71     Weight: 59.9 kg (132 lb)  Body mass index is 22.66 kg/m².     Physical Exam  Vitals reviewed.   Constitutional:       General: She is not in acute distress.     Appearance: Normal appearance. She is not toxic-appearing.   HENT:      Head: Normocephalic and atraumatic.      Mouth/Throat:       Mouth: Mucous membranes are moist.      Pharynx: Oropharynx is clear.   Eyes:      Extraocular Movements: Extraocular movements intact.      Pupils: Pupils are equal, round, and reactive to light.   Cardiovascular:      Rate and Rhythm: Normal rate and regular rhythm.      Pulses: Normal pulses.      Heart sounds: Normal heart sounds.   Pulmonary:      Effort: Pulmonary effort is normal.      Breath sounds: Wheezing and rhonchi present.      Comments: Dry cough heard  Abdominal:      General: Bowel sounds are normal. There is no distension.      Palpations: Abdomen is soft.      Tenderness: There is no abdominal tenderness.   Musculoskeletal:         General: No swelling. Normal range of motion.      Cervical back: Normal range of motion and neck supple.   Skin:     General: Skin is warm and dry.      Capillary Refill: Capillary refill takes less than 2 seconds.      Comments: Right upper extremity fistula thrill and bruit present   Neurological:      General: No focal deficit present.      Mental Status: She is alert and oriented to person, place, and time. Mental status is at baseline.   Psychiatric:         Mood and Affect: Mood normal.         Behavior: Behavior normal.         Judgment: Judgment normal.              CRANIAL NERVES     CN III, IV, VI   Pupils are equal, round, and reactive to light.       Significant Labs: All pertinent labs within the past 24 hours have been reviewed.  Recent Lab Results         11/13/24  1845   11/13/24  1844   11/13/24  1837        Influenza A, Molecular   Negative         Influenza B, Molecular   Negative         Albumin     3.9       ALP     55       ALT     9       Anion Gap     11       AST     15       Baso #     0.04       Basophil %     0.7       BILIRUBIN TOTAL     0.6  Comment: For infants and newborns, interpretation of results should be based  on gestational age, weight and in agreement with clinical  observations.    Premature Infant recommended reference  ranges:  Up to 24 hours.............<8.0 mg/dL  Up to 48 hours............<12.0 mg/dL  3-5 days..................<15.0 mg/dL  6-29 days.................<15.0 mg/dL         BNP     >4,700  Comment: Values of less than 100 pg/ml are consistent with non-CHF populations.       BUN     26       Calcium     9.4       Chloride     94       CO2     32       Creatinine     4.8       Differential Method     Automated       eGFR     9.3       Eos #     0.1       Eos %     1.6       Flu A & B Source   Nasal swab         Glucose     104       Gran # (ANC)     4.3       Gran %     77.5       Hematocrit     31.8       Hemoglobin     10.3       Immature Grans (Abs)     0.01  Comment: Mild elevation in immature granulocytes is non specific and   can be seen in a variety of conditions including stress response,   acute inflammation, trauma and pregnancy. Correlation with other   laboratory and clinical findings is essential.         Immature Granulocytes     0.2       Lymph #     0.6       Lymph %     11.2       MCH     34.6       MCHC     32.4       MCV     107       Mono #     0.5       Mono %     8.8       MPV     11.1       nRBC     0       Platelet Count     173       Potassium     4.0       PROTEIN TOTAL     6.7       RBC     2.98       RDW     12.8       SARS-CoV-2 RNA, Amplification, Qual Negative  Comment: This test utilizes isothermal nucleic acid amplification technology   to   detect the SARS-CoV-2 RdRp nucleic acid segment. The analytical   sensitivity   (limit of detection) is 500 copies/swab.     A POSITIVE result is indicative of the presence of SARS-CoV-2 RNA;   clinical   correlation with patient history and other diagnostic information is   necessary to determine patient infection status.    A NEGATIVE result means that SARS-CoV-2 nucleic acids are not present   above   the limit of detection. A NEGATIVE result should be treated as   presumptive.   It does not rule out the possibility of COVID-19 and should not be    the sole   basis for treatment decisions. If COVID-19 is strongly suspected   based on   clinical and exposure history, re-testing using an alternate   molecular assay   should be considered.     This test is FDA approved.Performance characteristics of this test   has been   independently verified by [facility] in conjunction   with   [facility] Department of Pathology and Laboratory   Medicine.             Sodium     137       Troponin I High Sensitivity     27.8  Comment: Troponin results differ between methods. Do not use   results between Troponin methods interchangeably.    Alkaline Phospatase levels above 400 U/L may   cause false positive results.    Access hsTnI should not be used for patients taking   Asfotase tami (Strensiq).         WBC     5.55               Significant Imaging: I have reviewed all pertinent imaging results/findings within the past 24 hours.    CT Chest Without Contrast  Narrative: EXAMINATION:  CT CHEST WITHOUT CONTRAST    CLINICAL HISTORY:  Abnormal xray - interstitial infiltrates;    TECHNIQUE:  Low dose axial images, sagittal and coronal reformations were obtained from the thoracic inlet to the lung bases. Contrast was not administered.    COMPARISON:  Chest radiograph 11/13/2024, CT chest 01/04/2024    FINDINGS:  Examination of the vascular and soft tissue structures at the base of the neck is unremarkable.    There is mild fusiform aneurysmal dilatation of the ascending thoracic aorta measuring up to 4.4 cm in transverse diameter, similar to prior study of 01/04/2024.  There is moderate aortic atherosclerosis.  The heart is mildly enlarged.  There is prominent three-vessel coronary artery calcification.  There is trace pericardial fluid.  There is prominence of the main pulmonary artery measuring up to 4.0 cm in transverse diameter which is nonspecific although can be seen with pulmonary arterial hypertension.    The esophagus maintains a normal  course and caliber. There is no bulky axillary lymph node enlargement.  There are scattered mildly enlarged mediastinal lymph nodes.  For example, there is a 1.4 cm short axis precarinal lymph node.  Evaluation of the hilar region is limited by lack of IV contrast fluid.    Detailed evaluation of the lung parenchyma is limited by respiratory motion.  Trachea is midline and patent.  There is no pneumothorax.  There are emphysematous changes of the lungs.  There are scattered peripheral reticular opacities as seen on prior CT examination.  There is mild bronchiectasis.  There is smooth interlobular septal thickening which appears increased in conspicuity compared to prior examination which may reflect interstitial edema in the appropriate clinical setting.  There are small bilateral pleural effusions with associated lower lobe atelectasis.  There are few scattered pulmonary nodules throughout the lungs, similar to prior examination, for which continued follow-up is advised.    Limited views upper abdomen demonstrate a stable left hepatic lobe hypodensity, likely a cyst.  The partially visualized native kidneys appear atrophic.  There is no free intraperitoneal air within the upper abdomen.  There is previously demonstrated thickening of the adrenal glands.    Osseous structures demonstrate degenerative changes of the visualized spine.    There is mild diffuse body wall edema.  Impression: Smooth interlobular septal thickening throughout the lungs which appears increased in conspicuity from prior examination and may reflect interstitial edema in the appropriate clinical setting.  New small bilateral pleural effusions with adjacent atelectasis.    Mildly enlarged mediastinal lymph nodes, nonspecific, possibly reactive or secondary to elevated heart pressures.  Attention on follow-up imaging advised.    Stable mild fusiform aneurysmal dilatation of the ascending thoracic aorta measuring up to 4.4 cm.    Mild  cardiomegaly.  Prominent three-vessel coronary artery calcification.    Mildly dilated main pulmonary artery nonspecific although can be seen with pulmonary arterial hypertension.    Additional findings as above.    Electronically signed by: Orion Almazan MD  Date:    11/13/2024  Time:    20:19  X-Ray Chest AP Portable  Narrative: EXAMINATION:  XR CHEST AP PORTABLE    CLINICAL HISTORY:  sob/cough;    TECHNIQUE:  Single frontal view of the chest was performed.    COMPARISON:  Chest radiograph 01/19/2023, CT chest 01/04/2024    FINDINGS:  Cardiac monitoring leads overlie the chest.  Cardiac silhouette is stable in size and configuration.  There is atherosclerotic calcification of the thoracic aorta.  Lungs demonstrate bilateral increased interstitial attenuation, increased in conspicuity from prior examination, which may reflect interstitial edema, although atypical interstitial infection not excluded.  There is blunting of the bilateral costophrenic angles and patchy bibasilar opacities which may reflect small volume of pleural fluid with associated atelectasis and/or consolidation.  No evidence of pneumothorax.  Osseous structures demonstrate mild degenerative changes.  Impression: Abnormal chest radiograph as above.    Electronically signed by: Orion Almazan MD  Date:    11/13/2024  Time:    19:14      Assessment/Plan:     * Acute on chronic combined systolic and diastolic heart failure  Patient is identified as having Combined Systolic and Diastolic heart failure that is Acute on Chronic. CHF is currently uncontrolled due to volume overload due to: Continued edema of extremities and Pulmonary edema/pleural effusion on CXR. Latest ECHO performed and demonstrates- Results for orders placed during the hospital encounter of 01/04/24    Echo    Interpretation Summary    Left Ventricle: The left ventricle is normal in size. Normal wall thickness. Normal wall motion. There is normal systolic function with a visually  estimated ejection fraction of 55 - 60%. There is normal diastolic function.    Right Ventricle: Normal right ventricular cavity size. Wall thickness is normal. Right ventricle wall motion  is normal. Systolic function is normal.    Pulmonary Artery: The estimated pulmonary artery systolic pressure is 23 mmHg.    IVC/SVC: Normal venous pressure at 3 mmHg.  . Continue Beta Blocker ACE/ARB Aldactone ARNI and monitor clinical status closely. Monitor on telemetry. Patient is on CHF pathway.  Monitor strict Is&Os and daily weights.  Place on fluid restriction of 1 L. Cardiology is not consulted. Continue to stress to patient importance of self efficacy and  on diet for CHF. Last BNP reviewed- and noted below   Recent Labs   Lab 11/13/24  1837   BNP >4,700*   .          Demand ischemia  Likely demand versus ischemia in HD patients      Pleural effusion  Patient found to have small pleural effusion on imaging. I have personally reviewed and interpreted the following imaging: Xray and CT. A thoracentesis was deferred. Most likely etiology includes Congestive Heart Failure and Renal Failure. Management to include Dialysis      ESRD (end stage renal disease)  Creatine stable for now. BMP reviewed- noted Estimated Creatinine Clearance: 9.6 mL/min (A) (based on SCr of 4.8 mg/dL (H)). according to latest data. Based on current GFR, CKD stage is end stage.  Monitor UOP and serial BMP and adjust therapy as needed. Renally dose meds. Avoid nephrotoxic medications and procedures.  Consult Nephrology for possible additional HD treatment.       Most Recent Today 1 yr ago 1 yr ago 2 yr ago 2 yr ago 2 yr ago 2 yr ago   CHEM PROFILE   BUN 26 High  (Today) 26 High  25 High  33 High  38 High  38 High  62 High  45 High    Creatinine 4.8 High  (Today) 4.8 High  4.8 High  7.0 High  8.7 High  8.7 High  9.8 High  8.2 High    eGFR if non  4.4 Abnormal   (2 yr ago)          eGFR if  5.1 Abnormal  (2 yr ago)                   HLD (hyperlipidemia)   Patient is chronically on statin.will continue for now. Monitor clinically. Last LDL was   Lab Results   Component Value Date    LDLCALC 40.0 (L) 01/19/2023          HTN (hypertension)  Patients blood pressure range in the last 24 hours was: BP  Min: 147/76  Max: 167/71.The patient's inpatient anti-hypertensive regimen is listed below:  Current Antihypertensives       Plan  - BP is controlled, no changes needed to their regimen        VTE Risk Mitigation (From admission, onward)           Ordered     apixaban tablet 5 mg  2 times daily         11/13/24 2128     Reason for No Pharmacological VTE Prophylaxis  Once        Question:  Reasons:  Answer:  Already adequately anticoagulated on oral Anticoagulants    11/13/24 2107     IP VTE HIGH RISK PATIENT  Once         11/13/24 2107     Place sequential compression device  Until discontinued         11/13/24 2107                                    Deepti Eldridge NP  Department of Hospital Medicine  [facility] - Emergency Dept

## 2024-11-14 NOTE — ASSESSMENT & PLAN NOTE
Patient is identified as having Combined Systolic and Diastolic heart failure that is Acute on Chronic. CHF is currently uncontrolled due to volume overload due to: Continued edema of extremities and Pulmonary edema/pleural effusion on CXR. Latest ECHO performed and demonstrates- Results for orders placed during the hospital encounter of 01/04/24    Echo    Interpretation Summary    Left Ventricle: The left ventricle is normal in size. Normal wall thickness. Normal wall motion. There is normal systolic function with a visually estimated ejection fraction of 55 - 60%. There is normal diastolic function.    Right Ventricle: Normal right ventricular cavity size. Wall thickness is normal. Right ventricle wall motion  is normal. Systolic function is normal.    Pulmonary Artery: The estimated pulmonary artery systolic pressure is 23 mmHg.    IVC/SVC: Normal venous pressure at 3 mmHg.  . Continue Beta Blocker ACE/ARB Aldactone ARNI and monitor clinical status closely. Monitor on telemetry. Patient is on CHF pathway.  Monitor strict Is&Os and daily weights.  Place on fluid restriction of 1 L. Cardiology is not consulted. Continue to stress to patient importance of self efficacy and  on diet for CHF. Last BNP reviewed- and noted below   Recent Labs   Lab 11/13/24  1837   BNP >4,700*   .

## 2024-11-14 NOTE — CONSULTS
"Consult for education on fluid and salt restriction. Patient admit for acute on chronic heart failure with history of ESRD.  Patient voiced understanding and stated she has been educated in the past.  Pt provided with education materials on "Heart Failure Nutrition Therapy" and a "Weight log". Pt was educated on the importance of decreasing sodium and high fat foods in their diet, managing body weight, and limiting fluid intake. Pt was educated on the importance of reading food labels and nutrient labels to ensure purchasing food items low in salt and fat. The pt was also instructed to avoid processed foods, to purchase canned vegetables with no salt added and frozen vegetables with no sauce or creams added. To limit processed meat such as sausage, sheets, and hot dogs and choosing leaner guts of meat. Pt was educated on cooking techniques to lower salt use. RD provided a list of Heart Healthy foods that are recommended and foods that are not recommended in a heart healthy diet. The patient was given a heart healthy sample menu. Lastly, the patient was given a weight log in order to log weight loss and intake. Pt understood the diet instructions and has already currently made changes in her diet at home.     If further education is needed, consult nutrition.    "

## 2024-11-14 NOTE — ASSESSMENT & PLAN NOTE
Patients blood pressure range in the last 24 hours was: BP  Min: 147/76  Max: 186/83.The patient's inpatient anti-hypertensive regimen is listed below:  Current Antihypertensives  carvediloL tablet 12.5 mg, 2 times daily with meals, Oral    Plan  - BP is controlled, no changes needed to their regimen    Patient needs to discuss the use of midodrine PCP and/or nephrologist, unclear if she was ever dropping her pressures during dialysis

## 2024-11-14 NOTE — PLAN OF CARE
[facility]  Initial Discharge Assessment       Primary Care Provider: Ralf Ham MD    Admission Diagnosis: Acute on chronic combined systolic and diastolic heart failure [I50.43]    Admission Date: 11/13/2024  Expected Discharge Date: 11/15/2024    Transition of Care Barriers: None    Payor: MEDICARE / Plan: MEDICARE PART A & B / Product Type: Government /     Extended Emergency Contact Information  Primary Emergency Contact: Carlitos Gallegos  Home Phone: [phone]  Mobile Phone: [phone]  Relation: Son   needed? No  Secondary Emergency Contact: brigette gallegos  Mobile Phone: [phone]  Relation: Relative   needed? No    Discharge Plan A: Home  Discharge Plan B: Home with family      [id number]  [address] - [address]  [address]  [place] [address]  Phone: [phone] Fax: [phone]    Assessment completed at bedside with patient. Patient lives in her own home, is independent at baseline and drives herself. Patient is active at [facility] for MWF dialysis.  Patient PCP Dr. Ham. Patient had nebulizer at home- no other equipment needed.  Patient will  herself home.     Initial Assessment (most recent)       Adult Discharge Assessment - 11/14/24 1231          Discharge Assessment    Assessment Type Discharge Planning Assessment     Confirmed/corrected address, phone number and insurance Yes     Confirmed Demographics Correct on Facesheet     Source of Information patient     When was your last doctors appointment? 11/04/24     Reason For Admission fluid overload     People in Home alone     Facility Arrived From: home     Do you expect to return to your current living situation? Yes     Do you have help at home or someone to help you manage your care at home? Yes     Who are your caregiver(s) and their phone number(s)? her son carlitos helps when needed-     Prior to hospitilization cognitive status: Alert/Oriented      Current cognitive status: Alert/Oriented     Walking or Climbing Stairs Difficulty no     Dressing/Bathing Difficulty no     Home Accessibility not wheelchair accessible     Equipment Currently Used at Home nebulizer     Readmission within 30 days? No     Patient currently being followed by outpatient case management? No     Do you currently have service(s) that help you manage your care at home? No     Do you take prescription medications? Yes     Do you have prescription coverage? Yes     Do you have any problems affording any of your prescribed medications? No     Is the patient taking medications as prescribed? yes     Who is going to help you get home at discharge? will drive self     Are you on dialysis? Yes     Dialysis Name and Scheduled days MWF Dialysis at [facility]     Do you take coumadin? No     Discharge Plan A Home     Discharge Plan B Home with family     DME Needed Upon Discharge  none     Discharge Plan discussed with: Patient     Transition of Care Barriers None

## 2024-11-14 NOTE — CONSULTS
Consult Note  Nephrology    Consult Requested By: Anitha Tucker MD    Reason for Consult: ESRD, dependent on dialysis    SUBJECTIVE:     History of Present Illness:   70 y/o female patient known to our practice, has out patient dialysis 3x wk on MWF schedule.  She did come to her regular tx yesterday, complained of s/sx URI--had begun taking antibiotics at home.  Presented to ER later in the day.  Blood work performed in the ED with hemoglobin 10.3, hematocrit 31.8, 94, CO2 32, BNP greater than 4700. Chest x-ray lungs demonstrate bilateral interstitial attenuation increasing conspicuity from prior exam. Blunting of the bilateral costophrenic angles and patchy bibasilar opacities. CTA chest with smooth interlobular septal thickening throughout the lungs increased in conspicuity from prior examination new small bilateral pleural effusion with adjacent atelectasis. Mildly dilated main pulmonary artery nonspecific. Patient is started on ceftriaxone post blood cultures in ED. we will continue as possible COPD but more likely acute on chronic combined systolic and diastolic heart failure. Nephrology is consulted for dialysis orders.    11/14  Pt to have additional HD tx today, will resume MWF schedule tomorrow.  Pt still w/constant, dry hacking cough.      Assessment/plan:    ESRD  FVO/CHF  SHPT  Anemia of chronic dz  HTN    --continue HD per pt's schedule, MWF.  Dose all medications for dialysis  --additional HD today, aim for 3L UF.    --cont sevelemer w/meals  --epo w/HD on MWF  --UF as tolerated, aim 1-3L    Past Medical History:   Diagnosis Date    Dialysis patient 10/05/2020    Emphysema of lung     ESRD (end stage renal disease)     Hepatitis 1980    HLD (hyperlipidemia)     Hypertension     Renal disorder      Past Surgical History:   Procedure Laterality Date    AORTOGRAPHY WITH SERIALOGRAPHY N/A 09/30/2020    Procedure: co2 renal angio;  Surgeon: Lance Bustamante MD;  Location: [facility] CATH/EP LAB;  Service:  Vascular;  Laterality: N/A;    APPENDECTOMY      Age 16    AV FISTULA PLACEMENT Right 12/09/2020    Procedure: CREATION, AV FISTULA;  Surgeon: Lance Bustamante MD;  Location: [facility] OR;  Service: General;  Laterality: Right;    COLONOSCOPY N/A 06/08/2021    Procedure: COLONOSCOPY;  Surgeon: Avila Smith III, MD;  Location: [facility] ENDO;  Service: Endoscopy;  Laterality: N/A;    COLONOSCOPY N/A 12/14/2021    Procedure: COLONOSCOPY;  Surgeon: Avila Smith III, MD;  Location: [facility] ENDO;  Service: Endoscopy;  Laterality: N/A;    COLONOSCOPY W/ POLYPECTOMY  06/08/2021    FISTULOGRAM Bilateral 05/18/2022    Procedure: Fistulogram;  Surgeon: Lance Bustamante MD;  Location: [facility] CATH/EP LAB;  Service: General;  Laterality: Bilateral;    INSERTION OF TUNNELED CENTRAL VENOUS HEMODIALYSIS CATHETER  10/02/2020    Procedure: INSERTION, CATHETER, CENTRAL VENOUS, HEMODIALYSIS, TUNNELED;  Surgeon: Ali Khoobehi, MD;  Location: [facility] OR;  Service: Vascular;;    LEFT HEART CATHETERIZATION Left 05/17/2022    Procedure: Left heart cath;  Surgeon: Daniel Tyler MD;  Location: [facility] CATH LAB;  Service: Cardiology;  Laterality: Left;    OPEN THROMBECTOMY OF GRAFT  05/18/2022    Procedure: THROMBECTOMY, GRAFT, OPEN;  Surgeon: Lance Bustamante MD;  Location: [facility] CATH/EP LAB;  Service: General;;    PLACEMENT OF DIALYSIS ACCESS Right 09/30/2020    Procedure: Insertion, Dialysis Access;  Surgeon: Lance Bustamante MD;  Location: [facility] CATH/EP LAB;  Service: Vascular;  Laterality: Right;    REMOVAL OF TUNNELED CENTRAL VENOUS CATHETER (CVC) N/A 12/16/2020    Procedure: REMOVAL, CATHETER, CENTRAL VENOUS, TUNNELED;  Surgeon: Ali Khoobehi, MD;  Location: [facility] OR;  Service: Vascular;  Laterality: N/A;    TONSILLECTOMY      TUBAL LIGATION       Family History   Problem Relation Name Age of Onset    Cancer Mother      Heart disease Father       Social History     Tobacco Use    Smoking status: Former     Current packs/day: 0.00     Average  packs/day: 0.3 packs/day for 20.0 years (5.0 ttl pk-yrs)     Types: Cigarettes     Start date: 2000     Quit date: 2020     Years since quittin.1    Smokeless tobacco: Never   Substance Use Topics    Alcohol use: Not Currently    Drug use: Not Currently       Review of patient's allergies indicates:  No Known Allergies     Review of Systems:  As above    OBJECTIVE:     Vital Signs Range (Last 24H):  Temp:  [98 °F (36.7 °C)-98.6 °F (37 °C)]   Pulse:  [66-91]   Resp:  [18-20]   BP: (147-174)/(71-82)   SpO2:  [93 %-97 %]     Physical Exam:  General- NAD noted  HEENT- WNL  Neck- supple  CV- Regular rate and rhythm  Resp-  No increased WOB  GI- Non tender/non-distended  Extrem- No cyanosis, clubbing, edema.  Derm- skin w/d  Neuro-  No flap.     Body mass index is 22.63 kg/m².    Laboratory:  CBC:   Recent Labs   Lab 24  0751   WBC 5.03   RBC 2.98*   HGB 9.8*   HCT 30.4*      *   MCH 32.9*   MCHC 32.2     CMP:   Recent Labs   Lab 24  0751   GLU 85   CALCIUM 9.2   ALBUMIN 3.7   PROT 6.6      K 4.5   CO2 32*   CL 94*   BUN 32*   CREATININE 5.7*   ALKPHOS 48*   ALT 8*   AST 14   BILITOT 0.5       Diagnostic Results:  Labs: Reviewed        ASSESSMENT/PLAN:     Active Hospital Problems    Diagnosis  POA    *Acute on chronic combined systolic and diastolic heart failure [I50.43]  Yes    Demand ischemia [I24.89]  Yes    Pleural effusion [J90]  Yes    ESRD (end stage renal disease) [N18.6]  Yes    HTN (hypertension) [I10]  Yes    HLD (hyperlipidemia) [E78.5]  Yes      Resolved Hospital Problems   No resolved problems to display.         Thank you for allowing us to participate in the care of your patient. We will follow the patient and provide recommendations as needed.      Time spent seeing patient( greater than 1/2 spent in direct contact) :     Safia Gibbons NP

## 2024-11-14 NOTE — ASSESSMENT & PLAN NOTE
Patient found to have small pleural effusion on imaging. I have personally reviewed and interpreted the following imaging: Xray and CT. A thoracentesis was deferred. Most likely etiology includes Congestive Heart Failure and Renal Failure. Management to include Dialysis    Hemodialysis with 3 L UF 11/14

## 2024-11-14 NOTE — ASSESSMENT & PLAN NOTE
Patient is identified as having Combined Systolic and Diastolic heart failure that is Acute on Chronic. CHF is currently uncontrolled due to volume overload due to: Continued edema of extremities and Pulmonary edema/pleural effusion on CXR. Latest ECHO performed and demonstrates- Results for orders placed during the hospital encounter of 01/04/24    Echo    Interpretation Summary    Left Ventricle: The left ventricle is normal in size. Normal wall thickness. Normal wall motion. There is normal systolic function with a visually estimated ejection fraction of 55 - 60%. There is normal diastolic function.    Right Ventricle: Normal right ventricular cavity size. Wall thickness is normal. Right ventricle wall motion  is normal. Systolic function is normal.    Pulmonary Artery: The estimated pulmonary artery systolic pressure is 23 mmHg.    IVC/SVC: Normal venous pressure at 3 mmHg.  . Continue Beta Blocker ACE/ARB Aldactone ARNI and monitor clinical status closely. Monitor on telemetry. Patient is on CHF pathway.  Monitor strict Is&Os and daily weights.  Place on fluid restriction of 1 L. Cardiology is not consulted. Continue to stress to patient importance of self efficacy and  on diet for CHF. Last BNP reviewed- and noted below   Recent Labs   Lab 11/13/24  1837   BNP >4,700*     .    Hemodialysis with 3 L UF 11/14

## 2024-11-14 NOTE — ASSESSMENT & PLAN NOTE
Creatine stable for now. BMP reviewed- noted Estimated Creatinine Clearance: 9.6 mL/min (A) (based on SCr of 4.8 mg/dL (H)). according to latest data. Based on current GFR, CKD stage is end stage.  Monitor UOP and serial BMP and adjust therapy as needed. Renally dose meds. Avoid nephrotoxic medications and procedures.  Consult Nephrology for possible additional HD treatment.       Most Recent Today 1 yr ago 1 yr ago 2 yr ago 2 yr ago 2 yr ago 2 yr ago   CHEM PROFILE   BUN 26 High  (Today) 26 High  25 High  33 High  38 High  38 High  62 High  45 High    Creatinine 4.8 High  (Today) 4.8 High  4.8 High  7.0 High  8.7 High  8.7 High  9.8 High  8.2 High    eGFR if non  4.4 Abnormal   (2 yr ago)          eGFR if  5.1 Abnormal  (2 yr ago)

## 2024-11-14 NOTE — ED PROVIDER NOTES
Encounter Date: 11/13/2024       History     Chief Complaint   Patient presents with    Shortness of Breath     Shortness of breath x 5 days, hx of COPD     HPI  Patient with PMH ESRD on HD MWF (for the last 4 years), nonobstructive CAD, nonischemic cardiomyopathy with recovered EF, HLD, DVT on Eliquis, HTN, COPD who presents to the ED with gradually worsening shortness a breath and dry cough increasing over the last 5 days.  She denies any fever or known sick contacts.  She does not feel like she is wheezing, but tried her home nebulizer treatments without any improvement.  She states she typically rarely ever even needs to use an inhaler.  She states she has not been able to do much activity without having to stop and catch her breath.  She does feel that her feet are mildly swollen.  She reports compliance with all medications and dialysis sessions.  She does admit that she started smoking again due to the stress of her 21-year-old grandson being diagnosed with cancer.  She denies any chest pain.  Dialysis center started the patient on a Z-Kenrick on Monday, 2 days ago.  She has noticed no change since starting this medication.      Review of patient's allergies indicates:  No Known Allergies  Past Medical History:   Diagnosis Date    Dialysis patient 10/05/2020    Emphysema of lung     ESRD (end stage renal disease)     Hepatitis 1980    HLD (hyperlipidemia)     Hypertension     Renal disorder      Past Surgical History:   Procedure Laterality Date    AORTOGRAPHY WITH SERIALOGRAPHY N/A 09/30/2020    Procedure: co2 renal angio;  Surgeon: Lance Bustamante MD;  Location: [facility] CATH/EP LAB;  Service: Vascular;  Laterality: N/A;    APPENDECTOMY      Age 16    AV FISTULA PLACEMENT Right 12/09/2020    Procedure: CREATION, AV FISTULA;  Surgeon: Lance Bustamante MD;  Location: [facility] OR;  Service: General;  Laterality: Right;    COLONOSCOPY N/A 06/08/2021    Procedure: COLONOSCOPY;  Surgeon: Avila Smith III, MD;   Location: [facility] ENDO;  Service: Endoscopy;  Laterality: N/A;    COLONOSCOPY N/A 2021    Procedure: COLONOSCOPY;  Surgeon: Avila Smith III, MD;  Location: [facility] ENDO;  Service: Endoscopy;  Laterality: N/A;    COLONOSCOPY W/ POLYPECTOMY  2021    FISTULOGRAM Bilateral 2022    Procedure: Fistulogram;  Surgeon: Lance Bustamante MD;  Location: [facility] CATH/EP LAB;  Service: General;  Laterality: Bilateral;    INSERTION OF TUNNELED CENTRAL VENOUS HEMODIALYSIS CATHETER  10/02/2020    Procedure: INSERTION, CATHETER, CENTRAL VENOUS, HEMODIALYSIS, TUNNELED;  Surgeon: Ali Khoobehi, MD;  Location: [facility] OR;  Service: Vascular;;    LEFT HEART CATHETERIZATION Left 2022    Procedure: Left heart cath;  Surgeon: Daniel Tyler MD;  Location: [facility] CATH LAB;  Service: Cardiology;  Laterality: Left;    OPEN THROMBECTOMY OF GRAFT  2022    Procedure: THROMBECTOMY, GRAFT, OPEN;  Surgeon: Lance Bustamante MD;  Location: [facility] CATH/EP LAB;  Service: General;;    PLACEMENT OF DIALYSIS ACCESS Right 2020    Procedure: Insertion, Dialysis Access;  Surgeon: Lance Bustamante MD;  Location: [facility] CATH/EP LAB;  Service: Vascular;  Laterality: Right;    REMOVAL OF TUNNELED CENTRAL VENOUS CATHETER (CVC) N/A 2020    Procedure: REMOVAL, CATHETER, CENTRAL VENOUS, TUNNELED;  Surgeon: Ali Khoobehi, MD;  Location: [facility] OR;  Service: Vascular;  Laterality: N/A;    TONSILLECTOMY      TUBAL LIGATION       Family History   Problem Relation Name Age of Onset    Cancer Mother      Heart disease Father       Social History     Tobacco Use    Smoking status: Former     Current packs/day: 0.00     Average packs/day: 0.3 packs/day for 20.0 years (5.0 ttl pk-yrs)     Types: Cigarettes     Start date: 2000     Quit date: 2020     Years since quittin.1    Smokeless tobacco: Never   Substance Use Topics    Alcohol use: Not Currently    Drug use: Not Currently     Review of Systems   Constitutional:  Negative  for fever.   HENT:  Negative for sore throat.    Respiratory:  Positive for cough and shortness of breath.    Cardiovascular:  Positive for leg swelling. Negative for chest pain.   Gastrointestinal:  Negative for nausea.   Genitourinary:  Negative for dysuria.   Musculoskeletal:  Negative for back pain.   Skin:  Negative for rash.   Neurological:  Negative for weakness.   Hematological:  Does not bruise/bleed easily.       Physical Exam     Initial Vitals [11/13/24 1809]   BP Pulse Resp Temp SpO2   (!) 147/76 91 18 98 °F (36.7 °C) (!) 94 %      MAP       --         Physical Exam    Nursing note and vitals reviewed.  Constitutional: She appears well-developed and well-nourished. No distress.   HENT:   Head: Normocephalic and atraumatic. Mouth/Throat: Oropharynx is clear and moist.   Eyes: EOM are normal. Pupils are equal, round, and reactive to light.   Neck: Neck supple.   Normal range of motion.  Cardiovascular:  Normal rate and regular rhythm.           Pulmonary/Chest: No respiratory distress.   Bibasilar crackles   Abdominal: Abdomen is soft. There is no abdominal tenderness. There is no rebound and no guarding.   Musculoskeletal:         General: No tenderness or edema. Normal range of motion.      Cervical back: Normal range of motion and neck supple.     Neurological: She is alert and oriented to person, place, and time. She has normal strength. No cranial nerve deficit. GCS score is 15. GCS eye subscore is 4. GCS verbal subscore is 5. GCS motor subscore is 6.   Skin: Skin is warm and dry. Capillary refill takes less than 2 seconds. No rash noted.   Psychiatric: She has a normal mood and affect. Thought content normal.         ED Course   Procedures  Labs Reviewed   CBC W/ AUTO DIFFERENTIAL - Abnormal       Result Value    WBC 5.55      RBC 2.98 (*)     Hemoglobin 10.3 (*)     Hematocrit 31.8 (*)      (*)     MCH 34.6 (*)     MCHC 32.4      RDW 12.8      Platelets 173      MPV 11.1      Immature  Granulocytes 0.2      Gran # (ANC) 4.3      Immature Grans (Abs) 0.01      Lymph # 0.6 (*)     Mono # 0.5      Eos # 0.1      Baso # 0.04      nRBC 0      Gran % 77.5 (*)     Lymph % 11.2 (*)     Mono % 8.8      Eosinophil % 1.6      Basophil % 0.7      Differential Method Automated     COMPREHENSIVE METABOLIC PANEL - Abnormal    Sodium 137      Potassium 4.0      Chloride 94 (*)     CO2 32 (*)     Glucose 104      BUN 26 (*)     Creatinine 4.8 (*)     Calcium 9.4      Total Protein 6.7      Albumin 3.9      Total Bilirubin 0.6      Alkaline Phosphatase 55      AST 15      ALT 9 (*)     eGFR 9.3 (*)     Anion Gap 11     TROPONIN I HIGH SENSITIVITY - Abnormal    Troponin I High Sensitivity 27.8 (*)    B-TYPE NATRIURETIC PEPTIDE - Abnormal    BNP >4,700 (*)    CULTURE, BLOOD   CULTURE, BLOOD   INFLUENZA A AND B ANTIGEN    Influenza A, Molecular Negative      Influenza B, Molecular Negative      Flu A & B Source Nasal swab      Narrative:     Specimen Source->Nasopharyngeal Swab   SARS-COV-2 RNA AMPLIFICATION, QUAL    SARS-CoV-2 RNA, Amplification, Qual Negative       EKG Readings: (Independently Interpreted)   NSR, HR 73, PACs, nonspecific T-wave abnormalities.  No STEMI.       Imaging Results              CT Chest Without Contrast (Final result)  Result time 11/13/24 20:19:04      Final result by Orion Almazan MD (11/13/24 20:19:04)                   Impression:      Smooth interlobular septal thickening throughout the lungs which appears increased in conspicuity from prior examination and may reflect interstitial edema in the appropriate clinical setting.  New small bilateral pleural effusions with adjacent atelectasis.    Mildly enlarged mediastinal lymph nodes, nonspecific, possibly reactive or secondary to elevated heart pressures.  Attention on follow-up imaging advised.    Stable mild fusiform aneurysmal dilatation of the ascending thoracic aorta measuring up to 4.4 cm.    Mild cardiomegaly.  Prominent  three-vessel coronary artery calcification.    Mildly dilated main pulmonary artery nonspecific although can be seen with pulmonary arterial hypertension.    Additional findings as above.      Electronically signed by: Orion Almazan MD  Date:    11/13/2024  Time:    20:19               Narrative:    EXAMINATION:  CT CHEST WITHOUT CONTRAST    CLINICAL HISTORY:  Abnormal xray - interstitial infiltrates;    TECHNIQUE:  Low dose axial images, sagittal and coronal reformations were obtained from the thoracic inlet to the lung bases. Contrast was not administered.    COMPARISON:  Chest radiograph 11/13/2024, CT chest 01/04/2024    FINDINGS:  Examination of the vascular and soft tissue structures at the base of the neck is unremarkable.    There is mild fusiform aneurysmal dilatation of the ascending thoracic aorta measuring up to 4.4 cm in transverse diameter, similar to prior study of 01/04/2024.  There is moderate aortic atherosclerosis.  The heart is mildly enlarged.  There is prominent three-vessel coronary artery calcification.  There is trace pericardial fluid.  There is prominence of the main pulmonary artery measuring up to 4.0 cm in transverse diameter which is nonspecific although can be seen with pulmonary arterial hypertension.    The esophagus maintains a normal course and caliber. There is no bulky axillary lymph node enlargement.  There are scattered mildly enlarged mediastinal lymph nodes.  For example, there is a 1.4 cm short axis precarinal lymph node.  Evaluation of the hilar region is limited by lack of IV contrast fluid.    Detailed evaluation of the lung parenchyma is limited by respiratory motion.  Trachea is midline and patent.  There is no pneumothorax.  There are emphysematous changes of the lungs.  There are scattered peripheral reticular opacities as seen on prior CT examination.  There is mild bronchiectasis.  There is smooth interlobular septal thickening which appears increased in  conspicuity compared to prior examination which may reflect interstitial edema in the appropriate clinical setting.  There are small bilateral pleural effusions with associated lower lobe atelectasis.  There are few scattered pulmonary nodules throughout the lungs, similar to prior examination, for which continued follow-up is advised.    Limited views upper abdomen demonstrate a stable left hepatic lobe hypodensity, likely a cyst.  The partially visualized native kidneys appear atrophic.  There is no free intraperitoneal air within the upper abdomen.  There is previously demonstrated thickening of the adrenal glands.    Osseous structures demonstrate degenerative changes of the visualized spine.    There is mild diffuse body wall edema.                                       X-Ray Chest AP Portable (Final result)  Result time 11/13/24 19:14:30      Final result by Orion Almazan MD (11/13/24 19:14:30)                   Impression:      Abnormal chest radiograph as above.      Electronically signed by: Orion Almazan MD  Date:    11/13/2024  Time:    19:14               Narrative:    EXAMINATION:  XR CHEST AP PORTABLE    CLINICAL HISTORY:  sob/cough;    TECHNIQUE:  Single frontal view of the chest was performed.    COMPARISON:  Chest radiograph 01/19/2023, CT chest 01/04/2024    FINDINGS:  Cardiac monitoring leads overlie the chest.  Cardiac silhouette is stable in size and configuration.  There is atherosclerotic calcification of the thoracic aorta.  Lungs demonstrate bilateral increased interstitial attenuation, increased in conspicuity from prior examination, which may reflect interstitial edema, although atypical interstitial infection not excluded.  There is blunting of the bilateral costophrenic angles and patchy bibasilar opacities which may reflect small volume of pleural fluid with associated atelectasis and/or consolidation.  No evidence of pneumothorax.  Osseous structures demonstrate mild  degenerative changes.                                       Medications   cefTRIAXone injection 1 g (has no administration in time range)   albuterol-ipratropium 2.5 mg-0.5 mg/3 mL nebulizer solution 3 mL (3 mLs Nebulization Given 11/13/24 1924)     Medical Decision Making  Amount and/or Complexity of Data Reviewed  External Data Reviewed: notes.  Labs: ordered. Decision-making details documented in ED Course.  Radiology: ordered and independent interpretation performed. Decision-making details documented in ED Course.  ECG/medicine tests: ordered and independent interpretation performed. Decision-making details documented in ED Course.    Risk  Prescription drug management.  Decision regarding hospitalization.                     Patient presents to ED as above.  Vitals stable.  Differential includes not limited to pneumonia, COPD exacerbation, CHF exacerbation, less likely PE.  Chest x-ray independently reviewed by me and shows Lungs demonstrate bilateral increased interstitial attenuation, increased in conspicuity from prior examination, which may reflect interstitial edema, although atypical interstitial infection not excluded. There is blunting of the bilateral costophrenic angles and patchy bibasilar opacities which may reflect small volume of pleural fluid with associated atelectasis and/or consolidation.   All labs reviewed by me and significant for normal WBC count, stable hemoglobin, stable electrolytes and renal function, high sensitivity troponin 27.8, BNP >4700 which is markedly above baseline.  Flu and COVID tests are negative.  CT chest without contrast obtained for further evaluation and per radiology read shows Smooth interlobular septal thickening throughout the lungs which appears increased in conspicuity from prior examination and may reflect interstitial edema in the appropriate clinical setting.  New small bilateral pleural effusions with adjacent atelectasis.  Mildly enlarged mediastinal lymph  nodes, nonspecific, possibly reactive or secondary to elevated heart pressures.  Stable mild fusiform aneurysmal dilatation of the ascending thoracic aorta measuring up to 4.4 cm.  Mild cardiomegaly.  Prominent three-vessel coronary artery calcification.  Mildly dilated main pulmonary artery nonspecific although can be seen with pulmonary arterial hypertension.  Patient no longer makes urine.  I have concern with the above CT and lab findings that she should be admitted for repeat echocardiogram given her history of markedly reduced EF in the past.  Will also cover for possible superimposed pneumonia and give a dose of Rocephin now.  Already on Zithromax.  Will admit to hospital medicine for further management and workup.                 Clinical Impression:  Final diagnoses:  [R06.02] SOB (shortness of breath)  [J90] Pleural effusion, bilateral (Primary)  [N18.6, Z99.2] ESRD on dialysis          ED Disposition Condition    Observation Stable                Charlette Alaniz MD  11/13/24 2051       Charlette Alaniz MD  11/13/24 2107

## 2024-11-14 NOTE — HPI
69-year-old female with pMHx of ESRD on HD, HLD, HTN, hepatitis, and emphysema continuing to smoke who presented to the ED with reports of shortness of breath and dry cough x5 days.  Patient endorses that she restarted smoking here in the last few months.  She is with complaints of a dry cough.  Patient reports being compliant with her medications; today 1.4 liters of fluid was removed at HD.  Blood work performed in the ED with hemoglobin 10.3, hematocrit 31.8, chloride 94, CO2 32, BUN 26, creatinine 4.8, troponin I 27.8, BNP greater than 4700.  Chest x-ray lungs demonstrate bilateral interstitial attenuation  increasing conspicuity from prior exam.  Blunting of the bilateral costophrenic angles and patchy bibasilar opacities.  CTA chest with smooth interlobular septal thickening throughout the lungs increased in conspicuity from prior examination new small bilateral pleural effusion with adjacent atelectasis.  Mildly enlarged mediastinal lymph nodes.  Stable mild fusiform aneurysmal dilatation of the ascending thoracic aorta measuring 4.4 cm.  Med cardiomegaly.  Three-vessel coronary artery calcification.  Mildly dilated main pulmonary artery nonspecific.  Patient is started on ceftriaxone post blood cultures in ED. we will continue as possible COPD but more likely acute on chronic combined systolic and diastolic heart failure.  Consult will be placed to Nephrology to assess for additional need for HD.

## 2024-11-14 NOTE — PROGRESS NOTES
Pharmacist Renal Dose Adjustment Note    Latricia Higgins is a 69 y.o. female being treated with the medication Apixaban    Patient Data:    Vital Signs (Most Recent):  Temp: 98.1 °F (36.7 °C) (11/14/24 0715)  Pulse: 79 (11/14/24 0745)  Resp: 20 (11/14/24 0745)  BP: (!) 174/82 (11/14/24 0715)  SpO2: 95 % (11/14/24 0745) Vital Signs (72h Range):  Temp:  [98 °F (36.7 °C)-98.6 °F (37 °C)]   Pulse:  [66-91]   Resp:  [18-20]   BP: (147-174)/(71-82)   SpO2:  [93 %-97 %]      Recent Labs   Lab 11/13/24 1837   CREATININE 4.8*     Serum creatinine: 4.8 mg/dL (H) 11/13/24 1837  Estimated creatinine clearance: 9.6 mL/min (A)    Apixaban 2.5 mg BID will be changed to Apixaban 5 mg BID per renal dose adjustment policy and history of DVT.    Pharmacist's Name: Alen Montes  Pharmacist's Extension: [phone]

## 2024-11-14 NOTE — ASSESSMENT & PLAN NOTE
Patient is chronically on statin.will continue for now. Monitor clinically. Last LDL was   Lab Results   Component Value Date    LDLCALC 40.0 (L) 01/19/2023

## 2024-11-15 ENCOUNTER — HOSPITAL ENCOUNTER (EMERGENCY)
Facility: HOSPITAL | Age: 69
Discharge: HOME | End: 2024-11-15
Attending: EMERGENCY MEDICINE
Payer: MEDICARE

## 2024-11-15 VITALS
DIASTOLIC BLOOD PRESSURE: 59 MMHG | HEIGHT: 64 IN | TEMPERATURE: 98 F | SYSTOLIC BLOOD PRESSURE: 114 MMHG | HEART RATE: 82 BPM | RESPIRATION RATE: 16 BRPM | BODY MASS INDEX: 20.83 KG/M2 | OXYGEN SATURATION: 98 % | WEIGHT: 122 LBS

## 2024-11-15 DIAGNOSIS — R05.9 COUGH: ICD-10-CM

## 2024-11-15 DIAGNOSIS — J18.9 PNEUMONIA DUE TO INFECTIOUS ORGANISM, UNSPECIFIED LATERALITY, UNSPECIFIED PART OF LUNG: Primary | ICD-10-CM

## 2024-11-15 LAB
ACINETOBACTER CALCOACETICUS/BAUMANNII COMPLEX: NOT DETECTED
ALBUMIN SERPL BCP-MCNC: 4.1 G/DL (ref 3.5–5.2)
ALP SERPL-CCNC: 58 U/L (ref 55–135)
ALT SERPL W/O P-5'-P-CCNC: 9 U/L (ref 10–44)
ANION GAP SERPL CALC-SCNC: 7 MMOL/L (ref 8–16)
AST SERPL-CCNC: 16 U/L (ref 10–40)
BACTEROIDES FRAGILIS: NOT DETECTED
BASOPHILS # BLD AUTO: 0.05 K/UL (ref 0–0.2)
BASOPHILS NFR BLD: 1.1 % (ref 0–1.9)
BILIRUB SERPL-MCNC: 0.6 MG/DL (ref 0.1–1)
BUN SERPL-MCNC: 9 MG/DL (ref 8–23)
CALCIUM SERPL-MCNC: 9.2 MG/DL (ref 8.7–10.5)
CANDIDA ALBICANS: NOT DETECTED
CANDIDA AURIS: NOT DETECTED
CANDIDA GLABRATA: NOT DETECTED
CANDIDA KRUSEI: NOT DETECTED
CANDIDA PARAPSILOSIS: NOT DETECTED
CANDIDA TROPICALIS: NOT DETECTED
CHLORIDE SERPL-SCNC: 94 MMOL/L (ref 95–110)
CO2 SERPL-SCNC: 36 MMOL/L (ref 23–29)
CREAT SERPL-MCNC: 2.1 MG/DL (ref 0.5–1.4)
CRYPTOCOCCUS NEOFORMANS/GATTII: NOT DETECTED
CTX-M GENE (ESBL PRODUCER): NORMAL
DIFFERENTIAL METHOD BLD: ABNORMAL
ENTEROBACTER CLOACAE COMPLEX: NOT DETECTED
ENTEROBACTERALES: NOT DETECTED
ENTEROCOCCUS FAECALIS: NOT DETECTED
ENTEROCOCCUS FAECIUM: NOT DETECTED
EOSINOPHIL # BLD AUTO: 0.1 K/UL (ref 0–0.5)
EOSINOPHIL NFR BLD: 2.4 % (ref 0–8)
ERYTHROCYTE [DISTWIDTH] IN BLOOD BY AUTOMATED COUNT: 12.5 % (ref 11.5–14.5)
ESCHERICHIA COLI: NOT DETECTED
EST. GFR  (NO RACE VARIABLE): 25 ML/MIN/1.73 M^2
GLUCOSE SERPL-MCNC: 94 MG/DL (ref 70–110)
HAEMOPHILUS INFLUENZAE: NOT DETECTED
HCT VFR BLD AUTO: 35 % (ref 37–48.5)
HGB BLD-MCNC: 11.8 G/DL (ref 12–16)
IMM GRANULOCYTES # BLD AUTO: 0.02 K/UL (ref 0–0.04)
IMM GRANULOCYTES NFR BLD AUTO: 0.4 % (ref 0–0.5)
IMP GENE (CARBAPENEM RESISTANT): NORMAL
KLEBSIELLA AEROGENES: NOT DETECTED
KLEBSIELLA OXYTOCA: NOT DETECTED
KLEBSIELLA PNEUMONIAE GROUP: NOT DETECTED
KPC RESISTANCE GENE (CARBAPENEM): NORMAL
LISTERIA MONOCYTOGENES: NOT DETECTED
LYMPHOCYTES # BLD AUTO: 0.8 K/UL (ref 1–4.8)
LYMPHOCYTES NFR BLD: 17.2 % (ref 18–48)
MAGNESIUM SERPL-MCNC: 1.9 MG/DL (ref 1.6–2.6)
MCH RBC QN AUTO: 34.7 PG (ref 27–31)
MCHC RBC AUTO-ENTMCNC: 33.7 G/DL (ref 32–36)
MCR-1: NORMAL
MCV RBC AUTO: 103 FL (ref 82–98)
MEC A/C AND MREJ (MRSA): NORMAL
MEC A/C: NORMAL
MONOCYTES # BLD AUTO: 0.4 K/UL (ref 0.3–1)
MONOCYTES NFR BLD: 8.6 % (ref 4–15)
NDM GENE (CARBAPENEM RESISTANT): NORMAL
NEISSERIA MENINGITIDIS: NOT DETECTED
NEUTROPHILS # BLD AUTO: 3.3 K/UL (ref 1.8–7.7)
NEUTROPHILS NFR BLD: 70.3 % (ref 38–73)
NRBC BLD-RTO: 0 /100 WBC
OXA-48-LIKE (CARBAPENEM RESISTANT): NORMAL
PLATELET # BLD AUTO: 212 K/UL (ref 150–450)
PMV BLD AUTO: 10.7 FL (ref 9.2–12.9)
POTASSIUM SERPL-SCNC: 3.1 MMOL/L (ref 3.5–5.1)
PROT SERPL-MCNC: 7.2 G/DL (ref 6–8.4)
PROTEUS SPECIES: NOT DETECTED
PSEUDOMONAS AERUGINOSA: NOT DETECTED
RBC # BLD AUTO: 3.4 M/UL (ref 4–5.4)
SALMONELLA SP: NOT DETECTED
SERRATIA MARCESCENS: NOT DETECTED
SODIUM SERPL-SCNC: 137 MMOL/L (ref 136–145)
STAPHYLOCOCCUS AUREUS: NOT DETECTED
STAPHYLOCOCCUS EPIDERMIDIS: NOT DETECTED
STAPHYLOCOCCUS LUGDUNESIS: NOT DETECTED
STAPHYLOCOCCUS SPECIES: NOT DETECTED
STENOTROPHOMONAS MALTOPHILIA: NOT DETECTED
STREPTOCOCCUS AGALACTIAE: NOT DETECTED
STREPTOCOCCUS PNEUMONIAE: NOT DETECTED
STREPTOCOCCUS PYOGENES: NOT DETECTED
STREPTOCOCCUS SPECIES: NOT DETECTED
VAN A/B (VRE GENE): NORMAL
VIM GENE (CARBAPENEM RESISTANT): NORMAL
WBC # BLD AUTO: 4.66 K/UL (ref 3.9–12.7)

## 2024-11-15 PROCEDURE — 80053 COMPREHEN METABOLIC PANEL: CPT | Performed by: EMERGENCY MEDICINE

## 2024-11-15 PROCEDURE — 83735 ASSAY OF MAGNESIUM: CPT | Performed by: EMERGENCY MEDICINE

## 2024-11-15 PROCEDURE — 36415 COLL VENOUS BLD VENIPUNCTURE: CPT | Performed by: EMERGENCY MEDICINE

## 2024-11-15 PROCEDURE — 25000003 PHARM REV CODE 250: Performed by: EMERGENCY MEDICINE

## 2024-11-15 PROCEDURE — 99284 EMERGENCY DEPT VISIT MOD MDM: CPT | Mod: 25

## 2024-11-15 PROCEDURE — 87040 BLOOD CULTURE FOR BACTERIA: CPT | Performed by: EMERGENCY MEDICINE

## 2024-11-15 PROCEDURE — 87154 CUL TYP ID BLD PTHGN 6+ TRGT: CPT | Performed by: EMERGENCY MEDICINE

## 2024-11-15 PROCEDURE — 85025 COMPLETE CBC W/AUTO DIFF WBC: CPT | Performed by: EMERGENCY MEDICINE

## 2024-11-15 RX ORDER — LEVOFLOXACIN 750 MG/1
750 TABLET ORAL
Status: COMPLETED | OUTPATIENT
Start: 2024-11-15 | End: 2024-11-15

## 2024-11-15 RX ADMIN — LEVOFLOXACIN 750 MG: 750 TABLET, FILM COATED ORAL at 10:11

## 2024-11-15 NOTE — PROGRESS NOTES
11/14/24 1800   Required for all Hemodialysis Patients   Hepatitis Status other (see comments)  (pt immune)   Handoff Report   Received From [employer]   Given Julio César Sotomayor   Treatment Type   Treatment Type Acute   Vital Signs   Temp 98.5 °F (36.9 °C)   Temp Source Oral   Pulse 65   Heart Rate Source Monitor   Resp 18   SpO2 97 %   Device (Oxygen Therapy) room air   BP (!) 169/87   BP Location Left arm   BP Method Automatic   Patient Position Lying   Assessments (Pre/Post)   Consent Obtained yes   Safety vein preservation armband present   Date Hepatitis Profile Obtained 10/28/24   Blood Liters Processed (BLP) 57.9   Transport Modality bed   Level of Consciousness (AVPU) alert   Dialyzer Clearance mildly streaked   Pain   Preferred Pain Scale number (Numeric Rating Pain Scale)   Pain Rating (0-10): Rest 0   Pain Rating (0-10): Activity 0   Pre-Hemodialysis Assessment   Patient Status Departed        Hemodialysis AV Fistula Right upper arm   No Placement Date or Time found.   Present Prior to Hospital Arrival?: Yes  Location: Right upper arm   Needle Size 16ga   Site Assessment Clean;Intact;No swelling;No redness;Dry   Patency Present;Thrill;Bruit   Status Deaccessed   Flows Good   Dressing Intervention First dressing   Dressing Status Clean;Dry;Intact   Site Condition No complications   Dressing Gauze   Post-Hemodialysis Assessment   Rinseback Volume (mL) 250 mL   Blood Volume Processed (Liters) 57.9 L   Dialyzer Clearance Lightly streaked   Duration of Treatment 180 minutes   Additional Fluid Intake (mL) 500 mL   Total UF (mL) 3500 mL   Net Fluid Removal 3000   Patient Response to Treatment tolerated well   Post-Treatment Weight 56.8 kg (125 lb 3.5 oz)   Treatment Weight Change -3   Arterial bleeding stop time (min) 10 min   Venous bleeding stop time (min) 10 min   Post-Hemodialysis Comments tx completed, pt stable, liines flushed, needles removed, pressure and bandage applied   Edema   Edema generalized

## 2024-11-15 NOTE — ED PROVIDER NOTES
Encounter Date: 11/15/2024       History     Chief Complaint   Patient presents with    Abnormal Lab     Called to come back for antibiotics due to abnormal blood culture       Emergent evaluation of a 69-year-old female with PMH ESRD on HD MWF (for the last 4 years- dialyzed this am 2 Liters, 1.7 liters on 11/13, 3 L 11/14), nonobstructive CAD, nonischemic cardiomyopathy with recovered EF, HLD, DVT on Eliquis, HTN, COPD was admitted overnight from 11/13-11/14 for COPD exacerbation, CHF exacerbation with overload and possible pneumonia had received Rocephin and azithromycin on the 13th discharged home yesterday was unable to  Levaquin last night due to pharmacy being closed was supposed to take Levaquin for 4 days called this morning due to 1 of 2 positive blood cultures with Gram-positive rods in 1 bottle.  Rapid Micro  PCR was negative.   She reports she was still does have a wet cough.  But he was improving in her shortness of breath he was also improving she reports she was able to 10 dialysis this morning they are removed another 2 L. she was her weight is currently 122.  She was not know what her dry weight is supposed to be.  No chest pain no fevers chills or sweats.  Normal vitals here             Review of patient's allergies indicates:  No Known Allergies  Past Medical History:   Diagnosis Date    Dialysis patient 10/05/2020    Emphysema of lung     ESRD (end stage renal disease)     Hepatitis 1980    HLD (hyperlipidemia)     Hypertension     Renal disorder      Past Surgical History:   Procedure Laterality Date    AORTOGRAPHY WITH SERIALOGRAPHY N/A 09/30/2020    Procedure: co2 renal angio;  Surgeon: Lance Bustamante MD;  Location: [facility] CATH[facility]EP LAB;  Service: Vascular;  Laterality: N/A;    APPENDECTOMY      Age 16    AV FISTULA PLACEMENT Right 12/09/2020    Procedure: CREATION, AV FISTULA;  Surgeon: Lance Bustamante MD;  Location: [facility] OR;  Service: General;  Laterality: Right;    COLONOSCOPY N/A  2021    Procedure: COLONOSCOPY;  Surgeon: Avila Smith III, MD;  Location: [facility] ENDO;  Service: Endoscopy;  Laterality: N/A;    COLONOSCOPY N/A 2021    Procedure: COLONOSCOPY;  Surgeon: Avila Smith III, MD;  Location: [facility] ENDO;  Service: Endoscopy;  Laterality: N/A;    COLONOSCOPY W/ POLYPECTOMY  2021    FISTULOGRAM Bilateral 2022    Procedure: Fistulogram;  Surgeon: Lance Bustamante MD;  Location: [facility] CATH/EP LAB;  Service: General;  Laterality: Bilateral;    INSERTION OF TUNNELED CENTRAL VENOUS HEMODIALYSIS CATHETER  10/02/2020    Procedure: INSERTION, CATHETER, CENTRAL VENOUS, HEMODIALYSIS, TUNNELED;  Surgeon: Ali Khoobehi, MD;  Location: [facility] OR;  Service: Vascular;;    LEFT HEART CATHETERIZATION Left 2022    Procedure: Left heart cath;  Surgeon: Daniel Tyler MD;  Location: [facility] CATH LAB;  Service: Cardiology;  Laterality: Left;    OPEN THROMBECTOMY OF GRAFT  2022    Procedure: THROMBECTOMY, GRAFT, OPEN;  Surgeon: Lance Bustamante MD;  Location: [facility] CATH/EP LAB;  Service: General;;    PLACEMENT OF DIALYSIS ACCESS Right 2020    Procedure: Insertion, Dialysis Access;  Surgeon: Lance Bustamante MD;  Location: [facility] CATH/EP LAB;  Service: Vascular;  Laterality: Right;    REMOVAL OF TUNNELED CENTRAL VENOUS CATHETER (CVC) N/A 2020    Procedure: REMOVAL, CATHETER, CENTRAL VENOUS, TUNNELED;  Surgeon: Ali Khoobehi, MD;  Location: [facility] OR;  Service: Vascular;  Laterality: N/A;    TONSILLECTOMY      TUBAL LIGATION       Family History   Problem Relation Name Age of Onset    Cancer Mother      Heart disease Father       Social History     Tobacco Use    Smoking status: Former     Current packs/day: 0.00     Average packs/day: 0.3 packs/day for 20.0 years (5.0 ttl pk-yrs)     Types: Cigarettes     Start date: 2000     Quit date: 2020     Years since quittin.1    Smokeless tobacco: Never   Substance Use Topics    Alcohol use: Not Currently     Drug use: Not Currently     Review of Systems   Constitutional:  Negative for activity change, appetite change, chills, diaphoresis, fatigue and fever.   HENT:  Negative for congestion, postnasal drip, rhinorrhea and sore throat.    Respiratory:  Positive for cough and shortness of breath. Negative for chest tightness.    Cardiovascular:  Negative for chest pain.   Gastrointestinal:  Negative for abdominal distention, abdominal pain, constipation, diarrhea, nausea and vomiting.   Genitourinary:         She does  not make urine   Musculoskeletal:  Negative for arthralgias, back pain, myalgias, neck pain and neck stiffness.   Skin:  Negative for rash.   Neurological:  Negative for dizziness, syncope, weakness, light-headedness and headaches.   Hematological:  Does not bruise/bleed easily.   Psychiatric/Behavioral:  Negative for confusion. The patient is not nervous/anxious.    All other systems reviewed and are negative.      Physical Exam     Initial Vitals [11/15/24 0911]   BP Pulse Resp Temp SpO2   131/70 78 18 97.8 °F (36.6 °C) 98 %      MAP       --         Physical Exam    Nursing note and vitals reviewed.  Constitutional: She appears well-developed and well-nourished. She is not diaphoretic. No distress.   HENT:   Head: Normocephalic and atraumatic.   Right Ear: External ear normal.   Left Ear: External ear normal.   Nose: Nose normal. Mouth/Throat: Oropharynx is clear and moist.   Eyes: Conjunctivae and EOM are normal. Pupils are equal, round, and reactive to light.   Neck: Neck supple. No tracheal deviation present.   Normal range of motion.  Cardiovascular:  Normal rate, regular rhythm, normal heart sounds and intact distal pulses.     Exam reveals no gallop and no friction rub.       No murmur heard.  131/70 pulse 78   Pulmonary/Chest: Breath sounds normal. No stridor. No respiratory distress. She has no wheezes. She has no rhonchi. She has no rales. She exhibits no tenderness.   90% on room air  respirations 18 clear breath sounds bilaterally no rales in bases.  Wet sounding cough no respiratory distress   Abdominal: Abdomen is soft. Bowel sounds are normal. She exhibits no distension and no mass. There is no abdominal tenderness. There is no rebound and no guarding.   Musculoskeletal:         General: No edema. Normal range of motion.      Cervical back: Normal range of motion and neck supple.     Neurological: She is alert and oriented to person, place, and time. She has normal strength. No cranial nerve deficit or sensory deficit.   Skin: Skin is warm and dry. No rash noted. No erythema. No pallor.   Psychiatric: She has a normal mood and affect. Her behavior is normal. Judgment and thought content normal.         ED Course   Procedures  Labs Reviewed   CBC W/ AUTO DIFFERENTIAL - Abnormal       Result Value    WBC 4.66      RBC 3.40 (*)     Hemoglobin 11.8 (*)     Hematocrit 35.0 (*)      (*)     MCH 34.7 (*)     MCHC 33.7      RDW 12.5      Platelets 212      MPV 10.7      Immature Granulocytes 0.4      Gran # (ANC) 3.3      Immature Grans (Abs) 0.02      Lymph # 0.8 (*)     Mono # 0.4      Eos # 0.1      Baso # 0.05      nRBC 0      Gran % 70.3      Lymph % 17.2 (*)     Mono % 8.6      Eosinophil % 2.4      Basophil % 1.1      Differential Method Automated     COMPREHENSIVE METABOLIC PANEL - Abnormal    Sodium 137      Potassium 3.1 (*)     Chloride 94 (*)     CO2 36 (*)     Glucose 94      BUN 9      Creatinine 2.1 (*)     Calcium 9.2      Total Protein 7.2      Albumin 4.1      Total Bilirubin 0.6      Alkaline Phosphatase 58      AST 16      ALT 9 (*)     eGFR 25.0 (*)     Anion Gap 7 (*)    CULTURE, BLOOD   CULTURE, BLOOD   MAGNESIUM    Magnesium 1.9            Imaging Results              X-Ray Chest PA And Lateral (Final result)  Result time 11/15/24 10:47:24      Final result by Joey Westfall DO (11/15/24 10:47:24)                   Impression:      No significant change of small  bilateral pleural effusions in bilateral diffuse interstitial lung opacities possibly reflecting pulmonary edema.      Electronically signed by: Joey Westfall  Date:    11/15/2024  Time:    10:47               Narrative:    EXAMINATION:  XR CHEST PA AND LATERAL    CLINICAL HISTORY:  Cough, unspecified    FINDINGS:  PA and lateral chest with comparison chest x-ray 11/13/2024.  Cardiomediastinal silhouette is within normal limits. Bilateral diffuse interstitial lung opacities are unchanged.  Blunting of the costophrenic angles with basilar hazy ill-defined opacities also noted and similar to previous exam.  Pulmonary vasculature is normal. No acute osseous abnormality.                                    X-Rays:   Independently Interpreted Readings:   Chest X-Ray: Normal heart size.  No infiltrates.  No acute abnormalities.     Medications   levoFLOXacin tablet 750 mg (750 mg Oral Given 11/15/24 1013)     Medical Decision Making    Emergent evaluation of a 69-year-old female with PMH ESRD on HD MWF (for the last 4 years- dialyzed this am 2 Liters, 1.7 liters on 11/13, 3 L 11/14), nonobstructive CAD, nonischemic cardiomyopathy with recovered EF, HLD, DVT on Eliquis, HTN, COPD was admitted overnight from 11/13-11/14 for COPD exacerbation, CHF exacerbation with overload and possible pneumonia had received Rocephin and azithromycin on the 13th discharged home yesterday was unable to  Levaquin last night due to pharmacy being closed was supposed to take Levaquin for 4 days called this morning due to 1 of 2 positive blood cultures with Gram-positive rods in 1 bottle.  Rapid Micro  PCR was negative.   She reports she was still does have a wet cough.  But he was improving in her shortness of breath he was also improving she reports she was able to 10 dialysis this morning they are removed another 2 L. she was her weight is currently 122.  She was not know what her dry weight is supposed to be.  No chest pain no fevers  chills or sweats.  Normal vitals here  On exam patient was in no distress blood pressure 131/70 pulse 78 temperature 97.8° sats 98% respirations 18 she does have a wet cough.  Clear breath sounds no peripheral edema soft nontender abdomen.  Moist mucous membranes awake alert oriented GCS 15 overall well-appearing  [facility]    Patient presents for emergent evaluation of acute cough, shortness of breath currently being treated for pneumonia was called due to a positive blood culture this morning that poses a threat to life and/or bodily function.   Differential diagnosis includes but was not limited to pneumonia, bacteremia, CHF exacerbation pulmonary edema.   In the ED patient found to have acute pneumonia.    I ordered labs and personally reviewed them.  Labs significant for see below  I ordered X-rays and personally reviewed them and reviewed the radiologist interpretation.  Xray significant for see above.     Discharge [facility]   I discussed with the patient that due to the 1 positive blood culture though she was Gram-positive rods with her complex past medical history we will repeat lab work blood cultures and a chest x-ray to ensure she was not have worsening of her chest x-ray findings or elevation in her white count.  She may or may not require admission and she understands this.  Esme Ghotra M.D.  10:04 AM 11/15/2024    A dictation software program was used for this note. Please expect some simple typographical errors in this note.     Patient was managed in the ED with no medications here patient was repeat blood cultures drawn which are pending at this time.  I talked to microbiology lab they reported the culture plate appears to be skin ruperto but can not confirm until they continue to results the plate over the next several days, Bio[employer] was negative.  White count he was normal.  Patient was be discharged home with return precautions  The response to treatment was good.    Patient was discharged in stable  condition.  Detailed return precautions discussed.  Patient was told to follow up with primary care physician or specialist based on their diagnosis  Esme Ghotra MD      Amount and/or Complexity of Data Reviewed  External Data Reviewed: radiology.     Details: 11/13/24      Smooth interlobular septal thickening throughout the lungs which appears increased in conspicuity from prior examination and may reflect interstitial edema in the appropriate clinical setting.  New small bilateral pleural effusions with adjacent atelectasis.     Mildly enlarged mediastinal lymph nodes, nonspecific, possibly reactive or secondary to elevated heart pressures.  Attention on follow-up imaging advised.     Stable mild fusiform aneurysmal dilatation of the ascending thoracic aorta measuring up to 4.4 cm.     Mild cardiomegaly.  Prominent three-vessel coronary artery calcification.     Mildly dilated main pulmonary artery nonspecific although can be seen with pulmonary arterial hypertension.     Additional findings as above.      Labs: ordered.     Details: White count 4.66 H&H 11.8 and 35 normal ANC of 3.3 normal platelets  Radiology: ordered.    Risk  Prescription drug management.               ED Course as of 11/15/24 1136   Fri Nov 15, 2024   1131  potassium 3.1 chloride 94  Creatinine 2.1 [RM]      ED Course User Index  [RM] Esme Ghotra MD                           Clinical Impression:  Final diagnoses:  [R05.9] Cough  [J18.9] Pneumonia due to infectious organism, unspecified laterality, unspecified part of lung (Primary)          ED Disposition Condition    Discharge Stable          ED Prescriptions    None       Follow-up Information       Follow up With Specialties Details Why Contact Info Additional Information    Ralf Ham MD Internal Medicine Schedule an appointment as soon as possible for a visit  If your symptoms do not improve [id number] [facility] [address]  [phone]        [facility] - Emergency Dept Emergency Medicine Go to  If symptoms worsen [address] [facility] [address]  [phone] 1st floor             Esme Ghotra MD  11/15/24 1138

## 2024-11-15 NOTE — DISCHARGE INSTRUCTIONS
Continue Levaquin as previously directed    We will contact you if the blood cultures drawn on November 13th or today become positive with a bacteria concerning for bloodstream infection

## 2024-11-17 LAB
BACTERIA BLD CULT: ABNORMAL

## 2024-11-18 LAB — BACTERIA BLD CULT: NORMAL

## 2024-11-20 LAB
BACTERIA BLD CULT: NORMAL
BACTERIA BLD CULT: NORMAL

## 2024-11-23 LAB
OHS QRS DURATION: 82 MS
OHS QTC CALCULATION: 486 MS

## 2024-11-25 DIAGNOSIS — Z76.82 ORGAN TRANSPLANT CANDIDATE: ICD-10-CM

## 2024-11-25 DIAGNOSIS — N18.6 END STAGE RENAL DISEASE: Primary | ICD-10-CM

## 2024-12-05 ENCOUNTER — TELEPHONE (OUTPATIENT)
Dept: TRANSPLANT | Facility: CLINIC | Age: 69
End: 2024-12-05
Payer: MEDICARE

## 2024-12-05 DIAGNOSIS — Z76.82 ORGAN TRANSPLANT CANDIDATE: ICD-10-CM

## 2024-12-05 DIAGNOSIS — N18.6 END STAGE RENAL DISEASE: Primary | ICD-10-CM

## 2024-12-05 NOTE — TELEPHONE ENCOUNTER
Spoke to pt confirming scheduled semi annual wait list appts on 02/18/2025. Appt reminders were mailed and pt is aware to bring care giver.

## 2025-01-25 DIAGNOSIS — I50.20 HFREF (HEART FAILURE WITH REDUCED EJECTION FRACTION): ICD-10-CM

## 2025-01-27 RX ORDER — SACUBITRIL AND VALSARTAN 24; 26 MG/1; MG/1
1 TABLET, FILM COATED ORAL 2 TIMES DAILY
Qty: 60 TABLET | Refills: 11 | Status: SHIPPED | OUTPATIENT
Start: 2025-01-27

## 2025-02-03 ENCOUNTER — TELEPHONE (OUTPATIENT)
Dept: VASCULAR SURGERY | Facility: CLINIC | Age: 70
End: 2025-02-03
Payer: MEDICARE

## 2025-02-03 NOTE — TELEPHONE ENCOUNTER
Contacted pt in reference to upcoming appt with Dr. Acosta for ascending aortic aneurysm and explained that Dr. Acosta does not see patients for this problem and that she should be seen by CV surgeon. Notified pt that appt will be cancelled, nurse will send message to referring doctor Dr. KYLER Tucker to request correct referral, and that message will be sent to CV surgery clinic staff requesting pt be contacted to schedule appt with CV surgeon. Pt verbalized understanding. Appt cancelled, messages sent.

## 2025-02-04 ENCOUNTER — TELEPHONE (OUTPATIENT)
Dept: TRANSPLANT | Facility: CLINIC | Age: 70
End: 2025-02-04
Payer: MEDICARE

## 2025-02-04 ENCOUNTER — TELEPHONE (OUTPATIENT)
Dept: CARDIOTHORACIC SURGERY | Facility: CLINIC | Age: 70
End: 2025-02-04
Payer: MEDICARE

## 2025-02-04 NOTE — TELEPHONE ENCOUNTER
Spoke to pt confirming rescheduled CT scan on 02/13/2025 ([place]). Pt is leaving labs and clinic visit appts on 02/18/2025 as is.     ----- Message from LIBBY Michelle sent at 2/4/2025  9:37 AM CST -----  You are very welcome!  Thank you!  ----- Message -----  From: Muna Grant MA  Sent: 2/4/2025   9:34 AM CST  To: Viviana Aggarwal RN    Good morning Viviana,    I will look at the schedule and reach out to Ms. Higgins.    Thank you.  ----- Message -----  From: Viviana Aggarwal RN  Sent: 2/4/2025   9:19 AM CST  To: DARVIN Forbes,    Ms. Higgins was referred to CTS for an Ascending Aortic Aneurysm.  She is scheduled for follow up with the kidney txp team on 2/18 and asked that her C[facility] consult be scheduled on the same day.  Unfortunately, we don't have availability that day.  Due to her HD and our availability, her CTS consult was scheduled on 3/18.  She inquired if her appts with your team on 2/18 could be rescheduled so that they also occur on 3/18.  I informed her that I would notify the kidney txp team, so that y'all could contact her to discuss (re)scheduling.     Thank you,    Viviana Aggarwal RN

## 2025-02-04 NOTE — TELEPHONE ENCOUNTER
Called pt RE: referral for TAA.  Informed pt that unfortunately CTS does not have an available appt on 2/18.  Pt offered next available appt which she declined.  Pt elected to schedule TAA consult on 3/18.  Pt provided with appt date, time, and location, which she verbalized understanding to.  Appt slip mailed to pt.    ----- Message from LIBBY Maya sent at 2/3/2025  4:37 PM CST -----  This patient was referred to vascular surgery incorrectly for an aneurysm of the ascending aorta. I have spoken to the patient to cancel and have contacted the referring doctor to request a new referral. Can you please contact her to schedule an appt ASAP? She will be coming from [place] and has other appts here on 2/18. She would like to be seen on this date if possible due to transportation difficulties.  Zulma

## 2025-02-12 ENCOUNTER — HOSPITAL ENCOUNTER (EMERGENCY)
Facility: HOSPITAL | Age: 70
Discharge: HOME | End: 2025-02-12
Attending: EMERGENCY MEDICINE
Payer: MEDICARE

## 2025-02-12 VITALS
HEART RATE: 86 BPM | RESPIRATION RATE: 16 BRPM | SYSTOLIC BLOOD PRESSURE: 160 MMHG | TEMPERATURE: 99 F | HEIGHT: 64 IN | DIASTOLIC BLOOD PRESSURE: 78 MMHG | WEIGHT: 126.75 LBS | OXYGEN SATURATION: 99 % | BODY MASS INDEX: 21.64 KG/M2

## 2025-02-12 DIAGNOSIS — S50.12XA HEMATOMA OF LEFT FOREARM: ICD-10-CM

## 2025-02-12 DIAGNOSIS — S51.812A SKIN TEAR OF LEFT FOREARM WITHOUT COMPLICATION, INITIAL ENCOUNTER: Primary | ICD-10-CM

## 2025-02-12 PROCEDURE — 90715 TDAP VACCINE 7 YRS/> IM: CPT

## 2025-02-12 PROCEDURE — 90471 IMMUNIZATION ADMIN: CPT

## 2025-02-12 PROCEDURE — 63600175 PHARM REV CODE 636 W HCPCS

## 2025-02-12 PROCEDURE — 25000003 PHARM REV CODE 250

## 2025-02-12 PROCEDURE — 99283 EMERGENCY DEPT VISIT LOW MDM: CPT | Mod: 25

## 2025-02-12 RX ORDER — BACITRACIN 500 [USP'U]/G
OINTMENT TOPICAL
Status: COMPLETED | OUTPATIENT
Start: 2025-02-12 | End: 2025-02-12

## 2025-02-12 RX ADMIN — CLOSTRIDIUM TETANI TOXOID ANTIGEN (FORMALDEHYDE INACTIVATED), CORYNEBACTERIUM DIPHTHERIAE TOXOID ANTIGEN (FORMALDEHYDE INACTIVATED), BORDETELLA PERTUSSIS TOXOID ANTIGEN (GLUTARALDEHYDE INACTIVATED), BORDETELLA PERTUSSIS FILAMENTOUS HEMAGGLUTININ ANTIGEN (FORMALDEHYDE INACTIVATED), BORDETELLA PERTUSSIS PERTACTIN ANTIGEN, AND BORDETELLA PERTUSSIS FIMBRIAE 2/3 ANTIGEN 0.5 ML: 5; 2; 2.5; 5; 3; 5 INJECTION, SUSPENSION INTRAMUSCULAR at 03:02

## 2025-02-12 RX ADMIN — BACITRACIN: 500 OINTMENT TOPICAL at 03:02

## 2025-02-12 NOTE — DISCHARGE INSTRUCTIONS
Take Tylenol as needed for pain.  Please keep the wounds clean and dry and apply topical antibacterial ointment to the wounds.  Wear the Ace wrap to help decrease swelling and pain.  Elevate the extremity at home and do ice to the area.  The hematoma should reabsorb and go down over time.  If the Ace bandage feels too tight you can loosen it.    Please return to the ED for worsening pain, worsening swelling, redness and warmth around the wounds, fever, pus drainage from the wounds, or any new or worsening concerns.

## 2025-02-12 NOTE — ED PROVIDER NOTES
Encounter Date: 2/12/2025       History     Chief Complaint   Patient presents with    Laceration     Hit arm against wooden fence, 2in skin tear with large hematoma to left fa.  Pt on eliquis.      Patient is a 69 y.o. female with past medical history of end-stage renal disorder on chronic dialysis, hypertension, on long-term anticoagulant use who presents to ED via self for concern for arm injury which began around 1:00 p.m..  Patient reports she was walking her dog when the dog ran out the fence which pulled her left arm in his hit the wood fence.  Patient reports swelling to the arm.  Patient reports she has a dialysis every Monday Wednesday Friday and went earlier that has morning.  Patient is unsure when her last tetanus immunization was.  Patient reports she is on the transplant list for her kidneys.  Patient denies falling to the ground or head injury.  Patient is awake and alert in no acute distress.      Review of patient's allergies indicates:  No Known Allergies  Past Medical History:   Diagnosis Date    Dialysis patient 10/05/2020    Emphysema of lung     ESRD (end stage renal disease)     Hepatitis 1980    HLD (hyperlipidemia)     Hypertension     Renal disorder      Past Surgical History:   Procedure Laterality Date    AORTOGRAPHY WITH SERIALOGRAPHY N/A 09/30/2020    Procedure: co2 renal angio;  Surgeon: Lance Bustamante MD;  Location: [facility] CATH/EP LAB;  Service: Vascular;  Laterality: N/A;    APPENDECTOMY      Age 16    AV FISTULA PLACEMENT Right 12/09/2020    Procedure: CREATION, AV FISTULA;  Surgeon: Lance Bustamante MD;  Location: [facility] OR;  Service: General;  Laterality: Right;    COLONOSCOPY N/A 06/08/2021    Procedure: COLONOSCOPY;  Surgeon: Avila Smith III, MD;  Location: [facility] ENDO;  Service: Endoscopy;  Laterality: N/A;    COLONOSCOPY N/A 12/14/2021    Procedure: COLONOSCOPY;  Surgeon: Avila Smith III, MD;  Location: [facility] ENDO;  Service: Endoscopy;  Laterality: N/A;     COLONOSCOPY W/ POLYPECTOMY  2021    FISTULOGRAM Bilateral 2022    Procedure: Fistulogram;  Surgeon: Lance Bustamante MD;  Location: [facility] CATH/EP LAB;  Service: General;  Laterality: Bilateral;    INSERTION OF TUNNELED CENTRAL VENOUS HEMODIALYSIS CATHETER  10/02/2020    Procedure: INSERTION, CATHETER, CENTRAL VENOUS, HEMODIALYSIS, TUNNELED;  Surgeon: Ali Khoobehi, MD;  Location: [facility] OR;  Service: Vascular;;    LEFT HEART CATHETERIZATION Left 2022    Procedure: Left heart cath;  Surgeon: Daniel Tyler MD;  Location: [facility] CATH LAB;  Service: Cardiology;  Laterality: Left;    OPEN THROMBECTOMY OF GRAFT  2022    Procedure: THROMBECTOMY, GRAFT, OPEN;  Surgeon: Lance Bustamante MD;  Location: [facility] CATH/EP LAB;  Service: General;;    PLACEMENT OF DIALYSIS ACCESS Right 2020    Procedure: Insertion, Dialysis Access;  Surgeon: Lance Bustamante MD;  Location: [facility] CATH/EP LAB;  Service: Vascular;  Laterality: Right;    REMOVAL OF TUNNELED CENTRAL VENOUS CATHETER (CVC) N/A 2020    Procedure: REMOVAL, CATHETER, CENTRAL VENOUS, TUNNELED;  Surgeon: Ali Khoobehi, MD;  Location: [facility] OR;  Service: Vascular;  Laterality: N/A;    TONSILLECTOMY      TUBAL LIGATION       Family History   Problem Relation Name Age of Onset    Cancer Mother      Heart disease Father       Social History     Tobacco Use    Smoking status: Former     Current packs/day: 0.00     Average packs/day: 0.3 packs/day for 20.0 years (5.0 ttl pk-yrs)     Types: Cigarettes     Start date: 2000     Quit date: 2020     Years since quittin.3    Smokeless tobacco: Never   Substance Use Topics    Alcohol use: Not Currently    Drug use: Not Currently     Review of Systems   Constitutional:  Negative for fever.   HENT: Negative.     Respiratory:  Negative for shortness of breath.    Cardiovascular:  Negative for chest pain.   Gastrointestinal: Negative.    Musculoskeletal:  Positive for arthralgias. Negative for back  pain, neck pain and neck stiffness.   Skin:  Positive for wound. Negative for color change, pallor and rash.   Neurological: Negative.  Negative for weakness.   Hematological:  Does not bruise/bleed easily.   Psychiatric/Behavioral: Negative.         Physical Exam     Initial Vitals [02/12/25 1418]   BP Pulse Resp Temp SpO2   (!) 179/98 77 18 98.3 °F (36.8 °C) 98 %      MAP       --         Physical Exam    Nursing note and vitals reviewed.  Constitutional: She appears well-developed and well-nourished. She is not diaphoretic. No distress.   HENT:   Head: Normocephalic and atraumatic.   Right Ear: External ear normal.   Left Ear: External ear normal.   Nose: Nose normal.   Eyes: EOM are normal.   Neck:   Normal range of motion.  Cardiovascular:  Normal rate, regular rhythm, normal heart sounds and intact distal pulses.     Exam reveals no gallop and no friction rub.       No murmur heard.  Pulmonary/Chest: Breath sounds normal. No respiratory distress. She has no wheezes. She has no rhonchi. She has no rales. She exhibits no tenderness.   Musculoskeletal:      Left shoulder: Normal.      Left upper arm: Normal.      Left elbow: Normal.      Left forearm: Swelling, laceration and tenderness present. No deformity or bony tenderness.      Left wrist: Normal.      Left hand: Normal. Normal strength. Normal sensation. Normal capillary refill.        Arms:       Cervical back: Normal range of motion.     Neurological: She is alert and oriented to person, place, and time. She has normal strength. GCS score is 15. GCS eye subscore is 4. GCS verbal subscore is 5. GCS motor subscore is 6.   Skin: Skin is warm and dry. Capillary refill takes less than 2 seconds.   Psychiatric: She has a normal mood and affect. Her behavior is normal. Judgment and thought content normal.         ED Course   Procedures  Labs Reviewed - No data to display       Imaging Results              X-Ray Forearm Left (Final result)  Result time 02/12/25  15:13:48      Final result by Brandon Scales MD (02/12/25 15:13:48)                   Impression:      1. Soft tissue swelling.  No osseous abnormalities.      Electronically signed by: Brandon Scales  Date:    02/12/2025  Time:    15:13               Narrative:    EXAMINATION:  XR FOREARM LEFT    CLINICAL HISTORY:  .  Unspecified injury of shoulder and upper arm, unspecified arm, initial encounter    COMPARISON:  None.    FINDINGS:  2 views are negative for fracture or osseous destructive process. Alignment at the wrist and elbow is normal.  There is pronounced soft tissue swelling over the dorsal aspect of the distal forearm.                                       Medications   Tdap vaccine injection 0.5 mL (0.5 mLs Intramuscular Given 2/12/25 7128)   bacitracin ointment ( Topical (Top) Given 2/12/25 3059)     Medical Decision Making  MDM    Patient presents for emergent evaluation of acute arm injury that poses a possible threat to life and/or bodily function.    Differential diagnosis included but not limited to fracture, dislocation, sprain, laceration, skin abrasion.  In the ED patient found to have acute clear lung sounds bilaterally with no increased work of breathing.  Patient has moderate soft tissue swelling to the left forearm on the dorsal aspect.  There are 3 small superficial skin tears noted to the dorsal aspect of the left forearm.  Patient has a normal pulses, cap refill, and sensation distally in the left wrist and hand.  Patient has normal strength of the left wrist and hand.  Patient has no other signs of injury or pain noted to the arm.  No signs of underlying infection or abscess, findings most consistent with hematoma.    X-ray significant for soft tissue swelling, no osseous abnormalities..      Wound cleaned with saline and topical antibacterial ointment applied to the wounds by RN.  Wounds were covered with nonadherent dressings and Ace bandage applied to the all forearm.  Discussed  with the patient to elevate the extremity home and do ice to their to help decrease swelling and decrease the hematoma size.  Patient verbalized understanding.  I offered to help patient get involved with wound care and patient declined stating she can take care of her wounds.    Discharge MDM  I discussed the patient presentation, X-rays, findings with ED attending Dr. Ramos who individually evaluated the patient.    Patient was managed in the ED with tetanus immunization.    The response to treatment was good.    Patient was discharged in stable condition with close follow up with primary care.  Detailed return precautions discussed to return to the ED for any new or worsening concerns.  Patient verbalizes understanding.    NP uses Epic and voice recognition software prone to occasional and minor errors that may persist in the medical record.      Amount and/or Complexity of Data Reviewed  Radiology: ordered.    Risk  OTC drugs.  Prescription drug management.                                      Clinical Impression:  Final diagnoses:  [S51.812A] Skin tear of left forearm without complication, initial encounter (Primary)  [S50.12XA] Hematoma of left forearm          ED Disposition Condition    Discharge Stable          ED Prescriptions    None       Follow-up Information       Follow up With Specialties Details Why Contact Info Additional Information    Ralf Ham MD Internal Medicine Schedule an appointment as soon as possible for a visit  For recheck/continuing care [address] [facility] [address]  [phone]       [facility] - Emergency Dept Emergency Medicine  If symptoms worsen [address] [facility] [id number] [phone] 1st floor             Gemma Odonnell NP  02/12/25 1483

## 2025-02-13 ENCOUNTER — HOSPITAL ENCOUNTER (OUTPATIENT)
Dept: RADIOLOGY | Facility: HOSPITAL | Age: 70
Discharge: HOME | End: 2025-02-13
Attending: NURSE PRACTITIONER
Payer: MEDICARE

## 2025-02-13 ENCOUNTER — TELEPHONE (OUTPATIENT)
Dept: TRANSPLANT | Facility: CLINIC | Age: 70
End: 2025-02-13
Payer: MEDICARE

## 2025-02-13 DIAGNOSIS — Z76.82 ORGAN TRANSPLANT CANDIDATE: ICD-10-CM

## 2025-02-13 PROCEDURE — 74176 CT ABD & PELVIS W/O CONTRAST: CPT | Mod: TC,PO

## 2025-02-13 NOTE — TELEPHONE ENCOUNTER
----- Message from Nona Dugan NP sent at 2/13/2025 12:57 PM CST -----  Results reviewed, action needed.   -Hyperdense hepatic parenchyma can be seen with passive congestion related to right heart failure  On today's TTE:  pulmonary artery systolic pressure is 50 mmHg.  -needs cardiology for further wk up --needs to be inactivated

## 2025-02-16 ENCOUNTER — RESULTS FOLLOW-UP (OUTPATIENT)
Dept: TRANSPLANT | Facility: HOSPITAL | Age: 70
End: 2025-02-16

## 2025-02-17 ENCOUNTER — TELEPHONE (OUTPATIENT)
Dept: TRANSPLANT | Facility: CLINIC | Age: 70
End: 2025-02-17
Payer: MEDICARE

## 2025-02-17 DIAGNOSIS — Z76.82 ORGAN TRANSPLANT CANDIDATE: ICD-10-CM

## 2025-02-17 DIAGNOSIS — Z76.82 AWAITING ORGAN TRANSPLANT STATUS: Primary | ICD-10-CM

## 2025-02-17 DIAGNOSIS — N18.6 END STAGE RENAL DISEASE: ICD-10-CM

## 2025-02-18 ENCOUNTER — OFFICE VISIT (OUTPATIENT)
Dept: TRANSPLANT | Facility: CLINIC | Age: 70
End: 2025-02-18
Payer: MEDICARE

## 2025-02-18 ENCOUNTER — LAB VISIT (OUTPATIENT)
Dept: LAB | Facility: HOSPITAL | Age: 70
End: 2025-02-18
Payer: MEDICARE

## 2025-02-18 VITALS
WEIGHT: 129.19 LBS | BODY MASS INDEX: 22.06 KG/M2 | OXYGEN SATURATION: 100 % | HEART RATE: 76 BPM | TEMPERATURE: 97 F | DIASTOLIC BLOOD PRESSURE: 77 MMHG | HEIGHT: 64 IN | RESPIRATION RATE: 17 BRPM | SYSTOLIC BLOOD PRESSURE: 175 MMHG

## 2025-02-18 DIAGNOSIS — N18.6 ANEMIA IN ESRD (END-STAGE RENAL DISEASE): ICD-10-CM

## 2025-02-18 DIAGNOSIS — N18.6 BENIGN HYPERTENSION WITH ESRD (END-STAGE RENAL DISEASE): ICD-10-CM

## 2025-02-18 DIAGNOSIS — I42.8 NONISCHEMIC CARDIOMYOPATHY: ICD-10-CM

## 2025-02-18 DIAGNOSIS — I25.10 NONOBSTRUCTIVE ATHEROSCLEROSIS OF CORONARY ARTERY: ICD-10-CM

## 2025-02-18 DIAGNOSIS — I12.0 BENIGN HYPERTENSION WITH ESRD (END-STAGE RENAL DISEASE): ICD-10-CM

## 2025-02-18 DIAGNOSIS — Z76.82 ORGAN TRANSPLANT CANDIDATE: ICD-10-CM

## 2025-02-18 DIAGNOSIS — N18.6 ESRD ON HEMODIALYSIS: ICD-10-CM

## 2025-02-18 DIAGNOSIS — Z99.2 ESRD ON HEMODIALYSIS: ICD-10-CM

## 2025-02-18 DIAGNOSIS — Z76.82 PATIENT ON WAITING LIST FOR KIDNEY TRANSPLANT: Primary | ICD-10-CM

## 2025-02-18 DIAGNOSIS — D63.1 ANEMIA IN ESRD (END-STAGE RENAL DISEASE): ICD-10-CM

## 2025-02-18 LAB — PTH-INTACT SERPL-MCNC: 479.3 PG/ML (ref 9–77)

## 2025-02-18 PROCEDURE — 99214 OFFICE O/P EST MOD 30 MIN: CPT | Mod: PBBFAC,TXP | Performed by: NURSE PRACTITIONER

## 2025-02-18 PROCEDURE — 86706 HEP B SURFACE ANTIBODY: CPT | Mod: TXP | Performed by: NURSE PRACTITIONER

## 2025-02-18 PROCEDURE — 99215 OFFICE O/P EST HI 40 MIN: CPT | Mod: S$PBB,TXP,, | Performed by: NURSE PRACTITIONER

## 2025-02-18 PROCEDURE — 83970 ASSAY OF PARATHORMONE: CPT | Mod: TXP | Performed by: NURSE PRACTITIONER

## 2025-02-18 NOTE — PROGRESS NOTES
YEARLY LIST MANAGEMENT NOTE    Latricia Higgins's kidney transplant listing status reviewed.  Patient is due for follow-up appointments on 8/2025.  Appointments will be scheduled per protocol.  Outside pulmonary progress note from 7/2024 with PFT's and cardiology clearance needed prior to re-activation per Nona Dugan.

## 2025-02-18 NOTE — PROGRESS NOTES
Kidney Transplant Recipient Reevalulation    Referring Physician: Eliseo Barnes  Current Nephrologist: Eliseo Barnes  Waitlist Status: inactive  Dialysis Start Date: 10/1/2020    Subjective:     CC:  Annual reassessment of kidney transplant candidacy.    HPI:  Ms. Higgins is a 69 y.o. year old White female with ESRD secondary to HTN.  She has been on the wait list for a kidney transplant at [facility] since 10/1/2020. Patient is currently on hemodialysis started on 10/1/2020. Patient is dialyzing on MWF schedule.  Patient reports that she is tolerating dialysis well.. She has a dialysis catheter. Patient denies any recent hospitalizations or ED visits.    Hospitalizations/ED visits:   [facility] --with dialysis   BP Readings from Last 3 Encounters:   02/18/25 (!) 175/77   02/12/25 (!) 160/78   11/15/24 (!) 114/59      -on anticoagulation: Eliquis (HX DVT) and AsA      LOV 8/8/24  Interval History:   Reports doing well. No complaints. Stays active      Pt reports having an arm injury ~ A week ago. She was walking her dog and the dog knocked her into a fence. She went to the ED and was given antibiotic ointment to apply to the wound.   -see pic below    Lab /diagnostic results /TXP [facility] reviewed      Last seen by cardiology 4/2024  Ascending aorta dilation  Mild, stable. Ascending aorta measured 4.4 on most recent chest CT.   -She has surveillance chest CT every 6 months with pulmonology.     2/16/25 CTA/P WO: -Hyperdense hepatic parenchyma can be seen with passive congestion related to right heart failure  -Right common iliac artery reaches 17 mm in diameter with bilateral common iliac arteries show mild diffuse calcified plaque. Mild calcified plaque is focal in proximal and mid segments of right external iliac artery, with mild calcified plaque also affecting the right common femoral and, to lesser extent, superficial femoral and profundus  femoral arteries. Left external iliac artery is free of calcified plaque while mild calcified plaque affects the left common femoral and, to lesser extent, superficial femoral and profundus femoral arteries. Mild diffuse calcified plaque affects bilateral internal iliac arteries.   -favorable for transplant - DS     2/11/25 MMG (-)   CXR:  11/15/24: No significant change of small bilateral pleural effusions in bilateral diffuse interstitial lung opacities possibly reflecting pulmonary edema.     Last Pulmonology 1/18/24: due for f/u 7/18/24 with PFTS    CT chest: 1/4/24  Lung-RADS Category:  2 - Benign Appearance or Behavior - continue annual screening with LDCT in 12 months. Interlobular septal thickening with traction bronchiectasis and fibrotic changes with a lower lobe and peripheral predominance similar to prior exam which may be seen with interstitial lung disease such as UIP or NSIP.   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   Diffuse enlargement of the bilateral adrenal gland similar to multiple prior exams.     5/28/24 endocrinology:   Diffuse enlargement of the bilateral adrenal gland similar--   endocrinology - no clinical concern for hypercortisolism and I do not feel a DST is needed now.   -at this point adrenal gland abnormality on imaging should not be a barrier to kidney transplant.     PXR: 1/19/23 per surgeon test result is favorable for transplant  Renal US: 1/8/24 Shrunken atrophic kidneys bilaterally consistent with medical renal disease.   Iliacs : 1/19/23 per surgeon test result is favorable for transplant  Echo: 11/14/24: EF 45-50%, PA 15 mmHg  cardiac PET      8/29/2023: The myocardial perfusion images are normal without evidence of ischemia or scar.        Colonoscopy: 12/2021 repeat in 3 years  PTH:  Lab Results   Component Value Date    .3 (H) 02/18/2025    CALCIUM 9.2 11/15/2024    PHOS 3.7 01/19/2023     PAP: 2/16/23 Negative for  intraepithelial lesion or malignancy  MM24 neg    Left forearm     Past Medical History:   Diagnosis Date    Dialysis patient 10/05/2020    Emphysema of lung     ESRD (end stage renal disease)     Hepatitis 1980    HLD (hyperlipidemia)     Hypertension     Renal disorder        Past Surgical History:   Procedure Laterality Date    AORTOGRAPHY WITH SERIALOGRAPHY N/A 2020    Procedure: co2 renal angio;  Surgeon: Lance Bustamante MD;  Location: [facility] CATH/EP LAB;  Service: Vascular;  Laterality: N/A;    APPENDECTOMY      Age 16    AV FISTULA PLACEMENT Right 2020    Procedure: CREATION, AV FISTULA;  Surgeon: Lance Bustamante MD;  Location: [facility] OR;  Service: General;  Laterality: Right;    COLONOSCOPY N/A 2021    Procedure: COLONOSCOPY;  Surgeon: Avila Smith III, MD;  Location: [facility] ENDO;  Service: Endoscopy;  Laterality: N/A;    COLONOSCOPY N/A 2021    Procedure: COLONOSCOPY;  Surgeon: Avila Smith III, MD;  Location: [facility] ENDO;  Service: Endoscopy;  Laterality: N/A;    COLONOSCOPY W/ POLYPECTOMY  2021    FISTULOGRAM Bilateral 2022    Procedure: Fistulogram;  Surgeon: Lance Bustamante MD;  Location: [facility] CATH/EP LAB;  Service: General;  Laterality: Bilateral;    INSERTION OF TUNNELED CENTRAL VENOUS HEMODIALYSIS CATHETER  10/02/2020    Procedure: INSERTION, CATHETER, CENTRAL VENOUS, HEMODIALYSIS, TUNNELED;  Surgeon: Ali Khoobehi, MD;  Location: [facility] OR;  Service: Vascular;;    LEFT HEART CATHETERIZATION Left 2022    Procedure: Left heart cath;  Surgeon: Daniel Tyler MD;  Location: [facility] CATH LAB;  Service: Cardiology;  Laterality: Left;    OPEN THROMBECTOMY OF GRAFT  2022    Procedure: THROMBECTOMY, GRAFT, OPEN;  Surgeon: Lance Bustamante MD;  Location: [facility] CATH/EP LAB;  Service: General;;    PLACEMENT OF DIALYSIS ACCESS Right 2020    Procedure: Insertion, Dialysis Access;  Surgeon: Lance Bustamante MD;  Location: [facility] CATH/EP LAB;   "Service: Vascular;  Laterality: Right;    REMOVAL OF TUNNELED CENTRAL VENOUS CATHETER (CVC) N/A 12/16/2020    Procedure: REMOVAL, CATHETER, CENTRAL VENOUS, TUNNELED;  Surgeon: Ali Khoobehi, MD;  Location: [facility] OR;  Service: Vascular;  Laterality: N/A;    TONSILLECTOMY      TUBAL LIGATION               Review of Systems   Constitutional:  Positive for fatigue. Negative for appetite change, chills and fever.   HENT:  Negative for trouble swallowing.    Eyes:  Positive for visual disturbance (wears glasses).   Respiratory:  Negative for cough, chest tightness and wheezing.    Cardiovascular:  Negative for chest pain, palpitations and leg swelling.   Gastrointestinal:  Negative for abdominal pain, constipation, diarrhea and nausea.   Genitourinary:  Positive for decreased urine volume.   Musculoskeletal:  Negative for arthralgias and myalgias.   Skin:  Positive for wound (left forearm wound). Negative for rash.   Neurological:  Negative for dizziness, weakness, light-headedness and headaches.   Hematological:  Bruises/bleeds easily (Eliquis).   Psychiatric/Behavioral:  Negative for sleep disturbance.        Objective:   body mass index is 22.16 kg/m².  BP (!) 175/77 (BP Location: Left arm, Patient Position: Sitting)   Pulse 76   Temp 97.3 °F (36.3 °C) (Temporal)   Resp 17   Ht 5' 4.02" (1.626 m)   Wt 58.6 kg (129 lb 3 oz)   SpO2 100%   BMI 22.16 kg/m²     Physical Exam  Constitutional:       General: She is not in acute distress.     Appearance: She is well-developed. She is not diaphoretic.   Cardiovascular:      Rate and Rhythm: Normal rate and regular rhythm.      Heart sounds: Normal heart sounds. No murmur heard.     No friction rub. No gallop.   Pulmonary:      Effort: Pulmonary effort is normal. No respiratory distress.      Breath sounds: Normal breath sounds. No wheezing or rales.   Abdominal:      General: Bowel sounds are normal. There is no distension.      Palpations: Abdomen is soft.      " "Tenderness: There is no abdominal tenderness.   Musculoskeletal:         General: No tenderness. Normal range of motion.   Skin:     General: Skin is warm and dry.      Findings: No rash.      Nails: There is no clubbing.          Neurological:      Mental Status: She is alert and oriented to person, place, and time.   Psychiatric:         Behavior: Behavior normal.         Labs:  Lab Results   Component Value Date    WBC 4.66 11/15/2024    HGB 11.8 (L) 11/15/2024    HCT 35.0 (L) 11/15/2024     11/15/2024    K 3.1 (L) 11/15/2024    CL 94 (L) 11/15/2024    CO2 36 (H) 11/15/2024    BUN 9 11/15/2024    CREATININE 2.1 (H) 11/15/2024    EGFRNORACEVR 25.0 (A) 11/15/2024    CALCIUM 9.2 11/15/2024    PHOS 3.7 01/19/2023    MG 1.9 11/15/2024    ALBUMIN 4.1 11/15/2024    AST 16 11/15/2024    ALT 9 (L) 11/15/2024    .3 (H) 02/18/2025       Lab Results   Component Value Date    BILIRUBINUA Negative 09/29/2020    AMYLASE 115 (H) 09/27/2020    LIPASE 33 11/08/2021    PROTEINUA 3+ (A) 09/29/2020    NITRITE Negative 09/29/2020    RBCUA 26 (H) 09/29/2020    WBCUA >100 (H) 09/29/2020       No results found for: "HLAABCTYPE"    Lab Results   Component Value Date    CPRA 0 12/02/2024    EC8JHUJ B73 12/02/2024    CIABCLM WEAK, B27 02/05/2024    CIIAB Negative 06/05/2023    ABCMT WEAK: DQ7 12/02/2024       Labs were reviewed with the patient.    Pre-transplant Workup:   Reviewed with the patient.    Assessment:     1. Patient on waiting list for kidney transplant    2. ESRD on hemodialysis    3. Benign hypertension with ESRD (end-stage renal disease)    4. Anemia in ESRD (end-stage renal disease)    5. Nonobstructive atherosclerosis of coronary artery    6. Nonischemic cardiomyopathy          Plan:   -discussed needing to f/u with PCP c/o arm wound to ensure there is no infection and it heals properly  -wound to left forearm--will need to confirm wound has healed   -see pic above    2/16/25 CTA/P WO: -Hyperdense hepatic " parenchyma can be seen with passive congestion related to right heart failure; inactive until cleared by cardiology   Repeat TTE  scheduled 3/25/25  -cardiology apt scheduled 3/18/25    -clarify midodrine use and get BP log from diallyls    fuse form aneurysmal dilatation of the ascending aorta measuring up to 4.4 cm--   She is Scheduled 3/18/25 with cardiovascular surgeon     Pulmonology f/u   Last Pulmonology 1/18/24:  f/u 7/18/24 with PFTS get outside office note from 7/2024        Transplant Candidacy:   Ms. Higgins is an unacceptable kidney transplant candidate, NEEDS utd PULMONOLOGY AND CARDIOLOGY CLEARANCE. And confirmation arm wound has healed   Meets center eligibility for accepting HCV+ donor offer - Yes.  Patient educated on HCV+ donors. Latricia is willing  to accept HCV+ donor offer -  Yes   Patient is a candidate for KDPI > 85 kidney donor offer - No and due to heart history per committee .  She has experienced health changes that warrant further investigation.  Patient will be placed in an inactive status at present.    Patient advised that it is recommended that all transplant candidates, and their close contacts and household members receive Covid vaccination.    Nona Dugan NP       Follow-up:   In addition to the tests noted in the plan, Ms. Higgins will continue to have reevaluation as per the standing pre-kidney transplant protocol:  Monthly blood for PRA  Annual return to clinic, except HIV positive, > 65 years of age, or pancreas transplant candidates who will be scheduled to see transplant every 6 months while in pre-transplant phase  Annual re-testing: CXR, EKG, yearly mammograms for women over 40 and PSA for males over 40, cardiology follow-up as recommended by initial cardiology pre-transplant evaluation  Renal ultrasound every 2 years  Baseline colonoscopy after age 50 and repeated as recommended    UNOS Patient Status  Functional Status: 70% - Cares for self: unable to carry on normal  activity or active work  Physical Capacity: No Limitations

## 2025-02-18 NOTE — LETTER
February 20, 2025        Eliseo Barnes  [address] [facility] NEPHROLOGY [facility] [place] [address]  Phone: [phone]  Fax: [phone]             [facility]- Transplant 1st Fl  [address]  [facility] [address]  Phone: [phone]   Patient: Latricia Higgins   MR Number: [id number]   YOB: 1955   Date of Visit: 2/18/2025       Dear Dr. Eliseo Barnes    Thank you for referring Latricia Higgins to me for evaluation. Attached you will find relevant portions of my assessment and plan of care.    If you have questions, please do not hesitate to call me. I look forward to following Latricia Higgins along with you.    Sincerely,    Nona Dugan, NP    Enclosure    If you would like to receive this communication electronically, please contact [email or web address] or [phone] to request [employer] access.    [employer] is a tool which provides read-only access to select patient information with whom you have a relationship. Its easy to use and provides real time access to review your patients record including encounter summaries, notes, results, and demographic information.    If you feel you have received this communication in error or would no longer like to receive these types of communications, please e-mail [email or web address]

## 2025-02-19 NOTE — PROGRESS NOTES
YEARLY LIST MANAGEMENT NOTE    Latricia Higgins's kidney transplant listing status reviewed.  Patient is due for follow-up appointments on 8/2025.  Appointments will be scheduled per protocol.  Cards appt 3/18/25, echo 3/25/25  Saw Pulm.

## 2025-02-22 LAB
HBV SURFACE AB SER QL IA: NEGATIVE
HBV SURFACE AB SERPL IA-ACNC: 3 MIU/ML

## 2025-02-24 ENCOUNTER — TELEPHONE (OUTPATIENT)
Dept: TRANSPLANT | Facility: CLINIC | Age: 70
End: 2025-02-24
Payer: MEDICARE

## 2025-02-24 NOTE — TELEPHONE ENCOUNTER
"[place] scheduling department was call. Pt is rescheduled for echo on 02/25/2025 ([place]). Appt reminder mailed.    ----- Message -----  From: Sylvie Chmabers RN  Sent: 2/24/2025   2:13 PM CST  To: Muna Grant MA  Subject: FW: call back                                      ----- Message -----  From: Rolo Vance  Sent: 2/24/2025   2:08 PM CST  To: Kidney Waitlisted Coordinator  Subject: call back                                          Reschedule Existing Appointment  Appt Date:03/25 Type of appt: All Appts Physician: Reason for rescheduling?  Requesting to r/s for soonest available in [place] Caller: Self Contact Preference:[phone]  Additional Information:"Thank you for all that you do for our patients"  "

## 2025-02-25 ENCOUNTER — HOSPITAL ENCOUNTER (OUTPATIENT)
Dept: CARDIOLOGY | Facility: HOSPITAL | Age: 70
Discharge: HOME | End: 2025-02-25
Attending: NURSE PRACTITIONER
Payer: MEDICARE

## 2025-02-25 VITALS — WEIGHT: 129.19 LBS | HEIGHT: 64 IN | BODY MASS INDEX: 22.06 KG/M2

## 2025-02-25 DIAGNOSIS — Z76.82 AWAITING ORGAN TRANSPLANT STATUS: ICD-10-CM

## 2025-02-25 LAB
AORTIC ROOT ANNULUS: 3.46 CM
AORTIC VALVE CUSP SEPERATION: 2.14 CM
APICAL FOUR CHAMBER EJECTION FRACTION: 55 %
APICAL TWO CHAMBER EJECTION FRACTION: 64 %
AV INDEX (PROSTH): 0.69
AV MEAN GRADIENT: 6 MMHG
AV PEAK GRADIENT: 12 MMHG
AV VALVE AREA BY VELOCITY RATIO: 1.8 CM²
AV VALVE AREA: 2.4 CM²
AV VELOCITY RATIO: 0.53
BSA FOR ECHO PROCEDURE: 1.63 M2
CV ECHO LV RWT: 0.53 CM
DOP CALC AO PEAK VEL: 1.7 M/S
DOP CALC AO VTI: 31.2 CM
DOP CALC LVOT AREA: 3.5 CM2
DOP CALC LVOT DIAMETER: 2.1 CM
DOP CALC LVOT PEAK VEL: 0.9 M/S
DOP CALC LVOT STROKE VOLUME: 74.8 CM3
DOP CALC MV VTI: 31.8 CM
DOP CALCLVOT PEAK VEL VTI: 21.6 CM
E WAVE DECELERATION TIME: 173 MSEC
E/A RATIO: 0.83
E/E' RATIO: 10 M/S
ECHO LV POSTERIOR WALL: 1.3 CM (ref 0.6–1.1)
FRACTIONAL SHORTENING: 28.6 % (ref 28–44)
INTERVENTRICULAR SEPTUM: 1.3 CM (ref 0.6–1.1)
IVC DIAMETER: 1.49 CM
IVRT: 77 MSEC
LA MAJOR: 6.3 CM
LA MINOR: 6.2 CM
LEFT ATRIUM SIZE: 4.2 CM
LEFT INTERNAL DIMENSION IN SYSTOLE: 3.5 CM (ref 2.1–4)
LEFT VENTRICLE DIASTOLIC VOLUME INDEX: 70.37 ML/M2
LEFT VENTRICLE DIASTOLIC VOLUME: 114 ML
LEFT VENTRICLE END DIASTOLIC VOLUME APICAL 2 CHAMBER: 116.33 ML
LEFT VENTRICLE END DIASTOLIC VOLUME APICAL 4 CHAMBER: 94.29 ML
LEFT VENTRICLE MASS INDEX: 156.6 G/M2
LEFT VENTRICLE SYSTOLIC VOLUME INDEX: 30.2 ML/M2
LEFT VENTRICLE SYSTOLIC VOLUME: 49 ML
LEFT VENTRICULAR INTERNAL DIMENSION IN DIASTOLE: 4.9 CM (ref 3.5–6)
LEFT VENTRICULAR MASS: 253.7 G
LV LATERAL E/E' RATIO: 7.1 M/S
LV SEPTAL E/E' RATIO: 18.4 M/S
LVED V (TEICH): 113.61 ML
LVES V (TEICH): 49.18 ML
LVOT MG: 2.04 MMHG
LVOT MV: 0.67 CM/S
MV A" WAVE DURATION": 124.17 MSEC
MV MEAN GRADIENT: 3 MMHG
MV PEAK A VEL: 1.11 M/S
MV PEAK E VEL: 0.92 M/S
MV PEAK GRADIENT: 7 MMHG
MV STENOSIS PRESSURE HALF TIME: 113.39 MS
MV VALVE AREA BY CONTINUITY EQUATION: 2.35 CM2
MV VALVE AREA P 1/2 METHOD: 1.94 CM2
OHS CV RV/LV RATIO: 0.49 CM
OHS LV EJECTION FRACTION SIMPSONS BIPLANE MOD: 61 %
PISA TR MAX VEL: 2.4 M/S
PV MV: 0.91 M/S
PV PEAK GRADIENT: 5 MMHG
PV PEAK VELOCITY: 1.15 M/S
RA MAJOR: 4.93 CM
RA PRESSURE ESTIMATED: 3 MMHG
RIGHT VENTRICLE DIASTOLIC BASEL DIMENSION: 2.4 CM
RIGHT VENTRICULAR END-DIASTOLIC DIMENSION: 2.38 CM
RV TB RVSP: 5 MMHG
RV TISSUE DOPPLER FREE WALL SYSTOLIC VELOCITY 1 (APICAL 4 CHAMBER VIEW): 10.29 CM/S
TDI LATERAL: 0.13 M/S
TDI SEPTAL: 0.05 M/S
TDI: 0.09 M/S
TRICUSPID ANNULAR PLANE SYSTOLIC EXCURSION: 2.05 CM
TV REST PULMONARY ARTERY PRESSURE: 26 MMHG
Z-SCORE OF LEFT VENTRICULAR DIMENSION IN END DIASTOLE: 0.67
Z-SCORE OF LEFT VENTRICULAR DIMENSION IN END SYSTOLE: 1.65

## 2025-02-25 PROCEDURE — 93306 TTE W/DOPPLER COMPLETE: CPT

## 2025-02-25 PROCEDURE — 93306 TTE W/DOPPLER COMPLETE: CPT | Mod: 26,,, | Performed by: INTERNAL MEDICINE

## 2025-02-26 ENCOUNTER — RESULTS FOLLOW-UP (OUTPATIENT)
Dept: TRANSPLANT | Facility: CLINIC | Age: 70
End: 2025-02-26

## 2025-03-17 ENCOUNTER — TELEPHONE (OUTPATIENT)
Dept: CARDIOTHORACIC SURGERY | Facility: CLINIC | Age: 70
End: 2025-03-17
Payer: MEDICARE

## 2025-03-17 NOTE — PROGRESS NOTES
Subjective:      Patient ID: Latricia Higgins is a 69 y.o. female.    Chief Complaint: No chief complaint on file.      HPI:  Latricia Higgins is a 69 y.o. female who presents for surgical evaluation of TAA. Medical conditions include ESRD on HD, in workup for transplant (reports currently inactive because of TAA), emphysema, hepatitis, HLD, HTN, history DVT. Was getting routine CT scans with pulmonology every 6 months and has had an ascending aortic aneurysm for many years. No family history of TAA but mother did have a brain aneurysm. No prior strokes, seizures, blood clots, or sternotomies. Former smoker. Does not drink. Had a stent placed in her dialysis fistula several years ago.     Family and social history reviewed    Review of patient's allergies indicates:  No Known Allergies  Past Medical History:   Diagnosis Date    Dialysis patient 10/05/2020    Emphysema of lung     ESRD (end stage renal disease)     Hepatitis 1980    HLD (hyperlipidemia)     Hypertension     Renal disorder      Past Surgical History:   Procedure Laterality Date    AORTOGRAPHY WITH SERIALOGRAPHY N/A 09/30/2020    Procedure: co2 renal angio;  Surgeon: Lance Bustamante MD;  Location: [facility] CATH/EP LAB;  Service: Vascular;  Laterality: N/A;    APPENDECTOMY      Age 16    AV FISTULA PLACEMENT Right 12/09/2020    Procedure: CREATION, AV FISTULA;  Surgeon: Lance Bustamante MD;  Location: [facility] OR;  Service: General;  Laterality: Right;    COLONOSCOPY N/A 06/08/2021    Procedure: COLONOSCOPY;  Surgeon: Avila Smith III, MD;  Location: [facility] ENDO;  Service: Endoscopy;  Laterality: N/A;    COLONOSCOPY N/A 12/14/2021    Procedure: COLONOSCOPY;  Surgeon: Avila Smith III, MD;  Location: [facility] ENDO;  Service: Endoscopy;  Laterality: N/A;    COLONOSCOPY W/ POLYPECTOMY  06/08/2021    FISTULOGRAM Bilateral 05/18/2022    Procedure: Fistulogram;  Surgeon: Lance Bustamante MD;  Location: [facility] CATH/EP LAB;  Service: General;  Laterality: Bilateral;     INSERTION OF TUNNELED CENTRAL VENOUS HEMODIALYSIS CATHETER  10/02/2020    Procedure: INSERTION, CATHETER, CENTRAL VENOUS, HEMODIALYSIS, TUNNELED;  Surgeon: Ali Khoobehi, MD;  Location: [facility] OR;  Service: Vascular;;    LEFT HEART CATHETERIZATION Left 05/17/2022    Procedure: Left heart cath;  Surgeon: Daniel Tyler MD;  Location: [facility] CATH LAB;  Service: Cardiology;  Laterality: Left;    OPEN THROMBECTOMY OF GRAFT  05/18/2022    Procedure: THROMBECTOMY, GRAFT, OPEN;  Surgeon: Lance Bustamante MD;  Location: [facility] CATH/EP LAB;  Service: General;;    PLACEMENT OF DIALYSIS ACCESS Right 09/30/2020    Procedure: Insertion, Dialysis Access;  Surgeon: Lance Bustamante MD;  Location: [facility] CATH/EP LAB;  Service: Vascular;  Laterality: Right;    REMOVAL OF TUNNELED CENTRAL VENOUS CATHETER (CVC) N/A 12/16/2020    Procedure: REMOVAL, CATHETER, CENTRAL VENOUS, TUNNELED;  Surgeon: Ali Khoobehi, MD;  Location: [facility] OR;  Service: Vascular;  Laterality: N/A;    TONSILLECTOMY      TUBAL LIGATION       Family History       Problem Relation (Age of Onset)    Cancer Mother    Heart disease Father          Social History[1]    Current medications Reviewed    Review of Systems   Constitutional:  Positive for fatigue.   HENT:  Negative for nosebleeds.    Respiratory:  Negative for shortness of breath.    Cardiovascular:  Negative for chest pain.   Gastrointestinal:  Negative for nausea.   Musculoskeletal:  Negative for gait problem.   Skin:  Positive for wound.   Neurological:  Negative for seizures.   Psychiatric/Behavioral:  Negative for sleep disturbance.      Objective:   Physical Exam  Constitutional:       General: She is not in acute distress.  HENT:      Head: Normocephalic and atraumatic.   Eyes:      Conjunctiva/sclera: Conjunctivae normal.   Cardiovascular:      Rate and Rhythm: Normal rate.   Pulmonary:      Effort: Pulmonary effort is normal. No respiratory distress.   Musculoskeletal:         General: Normal range of  motion.      Cervical back: Normal range of motion.   Skin:     Coloration: Skin is not pale.      Comments: Wound to left arm   Neurological:      General: No focal deficit present.      Mental Status: She is alert.   Psychiatric:         Mood and Affect: Mood normal.         Behavior: Behavior normal.         Diagnostic Results:   CT Chest 11/13/24  Smooth interlobular septal thickening throughout the lungs which appears increased in conspicuity from prior examination and may reflect interstitial edema in the appropriate clinical setting.  New small bilateral pleural effusions with adjacent atelectasis.     Mildly enlarged mediastinal lymph nodes, nonspecific, possibly reactive or secondary to elevated heart pressures.  Attention on follow-up imaging advised.     Stable mild fusiform aneurysmal dilatation of the ascending thoracic aorta measuring up to 4.4 cm.     Mild cardiomegaly.  Prominent three-vessel coronary artery calcification.     Mildly dilated main pulmonary artery nonspecific although can be seen with pulmonary arterial hypertension.    TTE 2/25/25    Left Ventricle: The left ventricle is normal in size. Normal wall thickness. Normal wall motion. There is normal systolic function with a visually estimated ejection fraction of 55 - 60%. There is normal diastolic function.    Right Ventricle: The right ventricle is normal in size. Wall thickness is normal. Systolic function is normal.    Mitral Valve: There is mild regurgitation with a centrally directed jet.    Tricuspid Valve: There is mild regurgitation with a centrally directed jet.    Pulmonary Artery: The estimated pulmonary artery systolic pressure is 26 mmHg.    IVC/SVC: Normal venous pressure at 3 mmHg.    CTA 2020  Emphysematous changes. There is a 6 mm noncalcified right upper lobe  pulmonary nodule (image 110). Developing primary neoplasm is not  excluded.     Mildly prominent mediastinal and hilar lymph nodes of uncertain  significance.  Short-term CT follow-up is recommended at 3 months to  assess stability and/or resolution.     Fusiform dilatation of the ascending aorta measuring a maximum of 4.3  cm.     Arteriosclerosis including coronary arterial calcification    Assessment:   TAA  Plan:     CTS Attending Note:    I have personally taken the history and examined this patient and agree with the ZUHAIR's note as stated above.  Very pleasant 69-year-old woman who is on the wait list for renal transplantation.  She has frequent CT scans of the chest.  She had 1 in November of this year that demonstrated a dilated ascending aorta.  I was able to compare that study from a CT scan done in .  Her aorta is stable in diameter at the level of the right pulmonary artery at 4.4 cm.  This does not represent a contraindication to renal transplantation.  As she has frequent CT scans of the chest, we will not schedule additional studies.  I will plan to see her back in 1 year, and at that time I will review her most recent CT scan and compare to the previous.       [1]   Social History  Socioeconomic History    Marital status:     Number of children: 2   Occupational History    Occupation: Insurance    Tobacco Use    Smoking status: Former     Current packs/day: 0.00     Average packs/day: 0.3 packs/day for 20.0 years (5.0 ttl pk-yrs)     Types: Cigarettes     Start date: 2000     Quit date: 2020     Years since quittin.4    Smokeless tobacco: Never   Substance and Sexual Activity    Alcohol use: Not Currently    Drug use: Not Currently    Sexual activity: Not Currently   Social History Narrative    Caregiver Friend So Raygoza

## 2025-03-17 NOTE — TELEPHONE ENCOUNTER
Spoke with pt and confirmed 3/18 appt with Dr. Mai. Pt verbalized understanding of appt time and location. Pt denies any questions at this time.

## 2025-03-18 ENCOUNTER — OFFICE VISIT (OUTPATIENT)
Dept: CARDIOTHORACIC SURGERY | Facility: CLINIC | Age: 70
End: 2025-03-18
Payer: MEDICARE

## 2025-03-18 VITALS
BODY MASS INDEX: 22.18 KG/M2 | HEIGHT: 64 IN | SYSTOLIC BLOOD PRESSURE: 203 MMHG | DIASTOLIC BLOOD PRESSURE: 88 MMHG | HEART RATE: 74 BPM | OXYGEN SATURATION: 99 % | WEIGHT: 129.94 LBS

## 2025-03-18 DIAGNOSIS — J90 PLEURAL EFFUSION, BILATERAL: ICD-10-CM

## 2025-03-18 DIAGNOSIS — I77.810 ASCENDING AORTA DILATION: ICD-10-CM

## 2025-03-18 PROCEDURE — 99999 PR PBB SHADOW E&M-EST. PATIENT-LVL IV: CPT | Mod: PBBFAC,TXP,, | Performed by: THORACIC SURGERY (CARDIOTHORACIC VASCULAR SURGERY)

## 2025-03-18 PROCEDURE — 99214 OFFICE O/P EST MOD 30 MIN: CPT | Mod: PBBFAC,TXP | Performed by: THORACIC SURGERY (CARDIOTHORACIC VASCULAR SURGERY)

## 2025-03-18 PROCEDURE — 99203 OFFICE O/P NEW LOW 30 MIN: CPT | Mod: S$PBB,NTX,, | Performed by: THORACIC SURGERY (CARDIOTHORACIC VASCULAR SURGERY)

## 2025-03-20 ENCOUNTER — TELEPHONE (OUTPATIENT)
Dept: TRANSPLANT | Facility: CLINIC | Age: 70
End: 2025-03-20
Payer: MEDICARE

## 2025-03-20 NOTE — TELEPHONE ENCOUNTER
I returned patient's call and informed her that she should take the Heb B booster. She verbalized understanding.  ----- Message from Melinda Wang sent at 3/20/2025 12:34 PM CDT -----  Regarding: FW: advised to get Hep B Booster - please advise if ok  Contact: Patient can be contacted @#  [phone]    ----- Message -----  From: Luann Hernández  Sent: 3/20/2025  11:45 AM CDT  To: Kidney Waitlisted Coordinator  Subject: advised to get Hep B Booster - please advise#    PT called to speak to Nurse Melinda . Hepatitis B Booster - please advise if PT can take it. Patient can be contacted @#  [phone]

## 2025-03-26 RX ORDER — CARVEDILOL 12.5 MG/1
12.5 TABLET ORAL 2 TIMES DAILY WITH MEALS
Qty: 180 TABLET | Refills: 3 | Status: SHIPPED | OUTPATIENT
Start: 2025-03-26 | End: 2026-03-21

## 2025-03-28 ENCOUNTER — TELEPHONE (OUTPATIENT)
Dept: TRANSPLANT | Facility: CLINIC | Age: 70
End: 2025-03-28
Payer: MEDICARE

## 2025-04-10 VITALS — WEIGHT: 128 LBS | BODY MASS INDEX: 21.85 KG/M2 | HEIGHT: 64 IN

## 2025-04-10 DIAGNOSIS — Z01.818 PRE-OP TESTING: Primary | ICD-10-CM

## 2025-04-25 NOTE — HOSPITAL COURSE
69-year-old female with pMHx of ESRD on HD, HLD, HTN, hepatitis, and emphysema continuing to smoke who presented to the ED with reports of shortness of breath and dry cough x5 days. Patient endorses that she restarted smoking here in the last few months. She is with complaints of a dry cough. Patient reports being compliant with her medications; today 1.4 liters of fluid was removed at HD. Blood work performed in the ED with hemoglobin 10.3, hematocrit 31.8, chloride 94, CO2 32, BUN 26, creatinine 4.8, troponin I 27.8, BNP greater than 4700. Chest x-ray lungs demonstrate bilateral interstitial attenuation increasing conspicuity from prior exam.  Patient was admitted for additional dialysis session.  She was never requiring nasal cannula to saturate.  She was afebrile and hemodynamically stable throughout her stay.  Although, given her worsening cough we will continue empiric therapy for community-acquired pneumonia.  Patient was sent to 4 days of levofloxacin to complete a 5 day course.  She received 3 hours of hemodialysis with 3 L UF.  She tolerated this well.  Instructions were given to patient to discuss the use of midodrine with her dialysis sessions as she has been persistently hypertensive.  Patient was seen and examined on the day of discharge as well as during dialysis in no acute distress.  She is appropriate for discharge home.  Patient ambulates and does all things around herself without any assistance.  Ambulatory referral for vascular surgery given 4.4 cm thoracic aortic aneurysm.   AF

## 2025-04-29 ENCOUNTER — ANESTHESIA EVENT (OUTPATIENT)
Dept: SURGERY | Facility: HOSPITAL | Age: 70
End: 2025-04-29
Payer: MEDICARE

## 2025-04-29 ENCOUNTER — ANESTHESIA (OUTPATIENT)
Dept: SURGERY | Facility: HOSPITAL | Age: 70
End: 2025-04-29
Payer: MEDICARE

## 2025-04-29 ENCOUNTER — HOSPITAL ENCOUNTER (OUTPATIENT)
Facility: HOSPITAL | Age: 70
Discharge: HOME | End: 2025-04-29
Attending: INTERNAL MEDICINE | Admitting: INTERNAL MEDICINE
Payer: MEDICARE

## 2025-04-29 VITALS
HEIGHT: 64 IN | HEART RATE: 68 BPM | WEIGHT: 125 LBS | SYSTOLIC BLOOD PRESSURE: 149 MMHG | DIASTOLIC BLOOD PRESSURE: 65 MMHG | BODY MASS INDEX: 21.34 KG/M2 | TEMPERATURE: 97 F | OXYGEN SATURATION: 100 % | RESPIRATION RATE: 16 BRPM

## 2025-04-29 DIAGNOSIS — Z86.0101 PERSONAL HISTORY OF ADENOMATOUS AND SERRATED COLON POLYPS: ICD-10-CM

## 2025-04-29 PROCEDURE — 37000009 HC ANESTHESIA EA ADD 15 MINS: Performed by: INTERNAL MEDICINE

## 2025-04-29 PROCEDURE — 63600175 PHARM REV CODE 636 W HCPCS

## 2025-04-29 PROCEDURE — 27201089 HC SNARE, DISP (ANY): Performed by: INTERNAL MEDICINE

## 2025-04-29 PROCEDURE — 37000008 HC ANESTHESIA 1ST 15 MINUTES: Performed by: INTERNAL MEDICINE

## 2025-04-29 PROCEDURE — 27201114 HC TRAP (ANY): Performed by: INTERNAL MEDICINE

## 2025-04-29 PROCEDURE — 88305 TISSUE EXAM BY PATHOLOGIST: CPT | Mod: TC,59 | Performed by: PATHOLOGY

## 2025-04-29 PROCEDURE — 45385 COLONOSCOPY W/LESION REMOVAL: CPT | Mod: PT | Performed by: INTERNAL MEDICINE

## 2025-04-29 RX ORDER — PROPOFOL 10 MG/ML
VIAL (ML) INTRAVENOUS
Status: DISCONTINUED | OUTPATIENT
Start: 2025-04-29 | End: 2025-04-29

## 2025-04-29 RX ADMIN — PROPOFOL 50 MG: 10 INJECTION, EMULSION INTRAVENOUS at 10:04

## 2025-04-29 NOTE — [employer] PATIENT INSTRUCTIONS
Discharge Summary/Instructions after an Endoscopic Procedure  Patient Name: Latricia Higgins  Patient MRN: [id number]  Patient YOB: 1955 Tuesday, April 29, 2025  Avila Smith III, MD  RESTRICTIONS:  During your procedure today, you received medications for sedation.  These   medications may affect your judgment, balance and coordination.  Therefore,   for 24 hours, you have the following restrictions:   - DO NOT drive a car, operate machinery, make legal/financial decisions,   sign important papers or drink alcohol.    ACTIVITY:  Today: no heavy lifting, straining or running due to procedural   sedation/anesthesia.  The following day: return to full activity including work.  DIET:  Eat and drink normally unless instructed otherwise.     TREATMENT FOR COMMON SIDE EFFECTS:  - Mild abdominal pain, nausea, belching, bloating or excessive gas:  rest,   eat lightly and use a heating pad.  - Sore Throat: treat with throat lozenges and/or gargle with warm salt   water.  - Because air was used during the procedure, expelling large amounts of air   from your rectum or belching is normal.  - If a bowel prep was taken, you may not have a bowel movement for 1-3 days.    This is normal.  SYMPTOMS TO WATCH FOR AND REPORT TO YOUR PHYSICIAN:  1. Abdominal pain or bloating, other than gas cramps.  2. Chest pain.  3. Back pain.  4. Signs of infection such as: chills or fever occurring within 24 hours   after the procedure.  5. Rectal bleeding, which would show as bright red, maroon, or black stools.   (A tablespoon of blood from the rectum is not serious, especially if   hemorrhoids are present.)  6. Vomiting.  7. Weakness or dizziness.  GO DIRECTLY TO THE NEAREST EMERGENCY ROOM IF YOU HAVE ANY OF THE FOLLOWING:      Difficulty breathing              Chills and/or fever over 101 F   Persistent vomiting and/or vomiting blood   Severe abdominal pain   Severe chest pain   Black, tarry stools   Bleeding- more than one  tablespoon   Any other symptom or condition that you feel may need urgent attention  Your doctor recommends these additional instructions:  If any biopsies were taken, your doctors clinic will contact you in 1 to 2   weeks with any results.  - Discharge patient to home (ambulatory).   - Patient has a contact number available for emergencies.  The signs and   symptoms of potential delayed complications were discussed with the   patient.  Return to normal activities tomorrow.  Written discharge   instructions were provided to the patient.   - Resume previous diet.   - Continue present medications.   - Repeat colonoscopy in 5 years for surveillance.  For questions, problems or results please call your physician - Avila Smith III, MD at Work:  [phone].  [facility], EMERGENCY ROOM PHONE NUMBER: [phone]  IF A COMPLICATION OR EMERGENCY SITUATION ARISES AND YOU ARE UNABLE TO REACH   YOUR PHYSICIAN - GO DIRECTLY TO THE EMERGENCY ROOM.  Avila Smith III, MD  4/29/2025 10:38:04 AM  This report has been verified and signed electronically.  Dear patient,  As a result of recent federal legislation (The Federal Cures Act), you may   receive lab or pathology results from your procedure in your [facility]   account before your physician is able to contact you. Your physician or   their representative will relay the results to you with their   recommendations at their soonest availability.  Thank you,  [employer]

## 2025-04-29 NOTE — ANESTHESIA PREPROCEDURE EVALUATION
04/29/2025  Latricia Higgins is a 69 y.o., female.      Problem List[1]    Past Surgical History:   Procedure Laterality Date    AORTOGRAPHY WITH SERIALOGRAPHY N/A 09/30/2020    Procedure: co2 renal angio;  Surgeon: Lance Bustamante MD;  Location: [facility] CATH/EP LAB;  Service: Vascular;  Laterality: N/A;    APPENDECTOMY      Age 16    AV FISTULA PLACEMENT Right 12/09/2020    Procedure: CREATION, AV FISTULA;  Surgeon: Lance Bustamante MD;  Location: [facility] OR;  Service: General;  Laterality: Right;    COLONOSCOPY N/A 06/08/2021    Procedure: COLONOSCOPY;  Surgeon: Avila Smith III, MD;  Location: [facility] ENDO;  Service: Endoscopy;  Laterality: N/A;    COLONOSCOPY N/A 12/14/2021    Procedure: COLONOSCOPY;  Surgeon: Avila Smith III, MD;  Location: [facility] ENDO;  Service: Endoscopy;  Laterality: N/A;    COLONOSCOPY W/ POLYPECTOMY  06/08/2021    FISTULOGRAM Bilateral 05/18/2022    Procedure: Fistulogram;  Surgeon: Lnace Bustamante MD;  Location: [facility] CATH/EP LAB;  Service: General;  Laterality: Bilateral;    INSERTION OF TUNNELED CENTRAL VENOUS HEMODIALYSIS CATHETER  10/02/2020    Procedure: INSERTION, CATHETER, CENTRAL VENOUS, HEMODIALYSIS, TUNNELED;  Surgeon: Ali Khoobehi, MD;  Location: [facility] OR;  Service: Vascular;;    LEFT HEART CATHETERIZATION Left 05/17/2022    Procedure: Left heart cath;  Surgeon: Daniel Tyler MD;  Location: [facility] CATH LAB;  Service: Cardiology;  Laterality: Left;    OPEN THROMBECTOMY OF GRAFT  05/18/2022    Procedure: THROMBECTOMY, GRAFT, OPEN;  Surgeon: Lance Bustamante MD;  Location: [facility] CATH/EP LAB;  Service: General;;    PLACEMENT OF DIALYSIS ACCESS Right 09/30/2020    Procedure: Insertion, Dialysis Access;  Surgeon: Lance Bustamante MD;  Location: [facility] CATH/EP LAB;  Service: Vascular;  Laterality: Right;    REMOVAL OF TUNNELED CENTRAL VENOUS CATHETER (CVC) N/A 12/16/2020     Procedure: REMOVAL, CATHETER, CENTRAL VENOUS, TUNNELED;  Surgeon: Ali Khoobehi, MD;  Location: [facility];  Service: Vascular;  Laterality: N/A;    TONSILLECTOMY      TUBAL LIGATION          Tobacco Use:  The patient  reports that she quit smoking about 4 years ago. Her smoking use included cigarettes. She started smoking about 24 years ago. She has a 5 pack-year smoking history. She has never used smokeless tobacco.     Results for orders placed or performed during the hospital encounter of 11/13/24   EKG 12-lead    Collection Time: 11/13/24  6:49 PM   Result Value Ref Range    QRS Duration 82 ms    OHS QTC Calculation 486 ms    Narrative    Test Reason : R06.02,    Vent. Rate :  73 BPM     Atrial Rate :  73 BPM     P-R Int : 176 ms          QRS Dur :  82 ms      QT Int : 442 ms       P-R-T Axes :  -5 -12  91 degrees    QTcB Int : 486 ms    Sinus rhythm with Premature atrial complexes with Aberrant conduction  Nonspecific T wave abnormality  Prolonged QT  Abnormal ECG  When compared with ECG of 29-Aug-2023 09:33,  Aberrant conduction is now Present  T wave inversion no longer evident in Inferior leads  Nonspecific T wave abnormality no longer evident in Anterior leads  Nonspecific T wave abnormality now evident in Lateral leads  Confirmed by Landen Griffith ([id number]) on 11/23/2024 4:02:58 PM    Referred By: AAAREFERRAL SELF           Confirmed By: Landen Griffith             Lab Results   Component Value Date    WBC 4.66 11/15/2024    HGB 11.8 (L) 11/15/2024    HCT 35.0 (L) 11/15/2024     (H) 11/15/2024     11/15/2024     BMP  Lab Results   Component Value Date     11/15/2024    K 3.1 (L) 11/15/2024    CL 94 (L) 11/15/2024    CO2 36 (H) 11/15/2024    BUN 9 11/15/2024    CREATININE 2.1 (H) 11/15/2024    CALCIUM 9.2 11/15/2024    ANIONGAP 7 (L) 11/15/2024    GLU 94 11/15/2024    GLU 85 11/14/2024     11/13/2024       Results for orders placed during the hospital encounter of  02/25/25    Echo    Interpretation Summary    Left Ventricle: The left ventricle is normal in size. Normal wall thickness. Normal wall motion. There is normal systolic function with a visually estimated ejection fraction of 55 - 60%. There is normal diastolic function.    Right Ventricle: The right ventricle is normal in size. Wall thickness is normal. Systolic function is normal.    Mitral Valve: There is mild regurgitation with a centrally directed jet.    Tricuspid Valve: There is mild regurgitation with a centrally directed jet.    Pulmonary Artery: The estimated pulmonary artery systolic pressure is 26 mmHg.    IVC/SVC: Normal venous pressure at 3 mmHg.            Pre-op Assessment    I have reviewed the Patient Summary Reports.     I have reviewed the Nursing Notes. I have reviewed the NPO Status.   I have reviewed the Medications.     Review of Systems  Anesthesia Hx:  No problems with previous Anesthesia             Denies Family Hx of Anesthesia complications.    Denies Personal Hx of Anesthesia complications.                    Social:  Former Smoker       Hematology/Oncology:    Oncology Normal    -- Anemia:                                  EENT/Dental:  EENT/Dental Normal           Cardiovascular:     Hypertension   CAD              ECG has been reviewed.      Coronary Artery Disease:                            Hypertension         Pulmonary:  Pneumonia COPD         Chronic Obstructive Pulmonary Disease (COPD):                  Pulmonary Infection:  Pneumonia.     Renal/:  Chronic Renal Disease, Dialysis        Kidney Function/Disease             Hepatic/GI:      Liver Disease, Hepatitis           Liver Disease, Hepatitis        Musculoskeletal:  Musculoskeletal Normal                Neurological:  Neurology Normal                                      Endocrine:  Endocrine Normal            Psych:  Psychiatric Normal                    Physical Exam  General: Well nourished, Cooperative, Alert and  Oriented    Airway:  Mallampati: II   Mouth Opening: Normal  TM Distance: Normal  Tongue: Normal  Neck ROM: Normal ROM    Dental:  Intact    Chest/Lungs:  Clear to auscultation    Heart:  Rate: Normal  Rhythm: Regular Rhythm  Sounds: Normal        Anesthesia Plan  Type of Anesthesia, risks & benefits discussed:    Anesthesia Type: Gen Natural Airway  Intra-op Monitoring Plan: Standard ASA Monitors  Induction:  IV  Informed Consent: Informed consent signed with the Patient and all parties understand the risks and agree with anesthesia plan.  All questions answered.   ASA Score: 3    Ready For Surgery From Anesthesia Perspective.     .           [1]   Patient Active Problem List  Diagnosis    Adrenal hyperplasia    Benign hypertension with ESRD (end-stage renal disease)    HLD (hyperlipidemia)    Colitis    Pleural effusion    Pulmonary nodules    Calcification of aorta    Centrilobular emphysema    Lymphadenopathy    Dialysis AV fistula malfunction    History of DVT (deep vein thrombosis)    Nonischemic cardiomyopathy    Nonobstructive atherosclerosis of coronary artery    Acute on chronic combined systolic and diastolic heart failure    Patient on waiting list for kidney transplant    Chronic deep vein thrombosis (DVT) of left upper extremity, unspecified vein    Ascending aorta dilation    Demand ischemia    Community acquired pneumonia

## 2025-04-29 NOTE — H&P
GASTROENTEROLOGY PRE-PROCEDURE H&P NOTE  Patient Name: Latricia Higgins  Patient MRN: [id number]  Patient : 1955    Service date: 2025    PCP: Ralf Ham MD    No chief complaint on file.      HPI: Patient is a 69 y.o. female with PMHx as below here for evaluation of h/o polyps; EMR in descending in past.     Past Medical History:  Past Medical History:   Diagnosis Date    Dialysis patient 10/05/2020    Emphysema of lung     ESRD (end stage renal disease)     Hepatitis     HLD (hyperlipidemia)     Hypertension     Renal disorder         Past Surgical History:  Past Surgical History:   Procedure Laterality Date    AORTOGRAPHY WITH SERIALOGRAPHY N/A 2020    Procedure: co2 renal angio;  Surgeon: Lance Bustamante MD;  Location: [facility] CATH/EP LAB;  Service: Vascular;  Laterality: N/A;    APPENDECTOMY      Age 16    AV FISTULA PLACEMENT Right 2020    Procedure: CREATION, AV FISTULA;  Surgeon: Lance Bustamante MD;  Location: [facility] OR;  Service: General;  Laterality: Right;    COLONOSCOPY N/A 2021    Procedure: COLONOSCOPY;  Surgeon: Avila Smith III, MD;  Location: [facility] ENDO;  Service: Endoscopy;  Laterality: N/A;    COLONOSCOPY N/A 2021    Procedure: COLONOSCOPY;  Surgeon: Avila Smith III, MD;  Location: [facility] ENDO;  Service: Endoscopy;  Laterality: N/A;    COLONOSCOPY W/ POLYPECTOMY  2021    FISTULOGRAM Bilateral 2022    Procedure: Fistulogram;  Surgeon: Lance Bustamante MD;  Location: [facility] CATH/EP LAB;  Service: General;  Laterality: Bilateral;    INSERTION OF TUNNELED CENTRAL VENOUS HEMODIALYSIS CATHETER  10/02/2020    Procedure: INSERTION, CATHETER, CENTRAL VENOUS, HEMODIALYSIS, TUNNELED;  Surgeon: Ali Khoobehi, MD;  Location: [facility] OR;  Service: Vascular;;    LEFT HEART CATHETERIZATION Left 2022    Procedure: Left heart cath;  Surgeon: Daniel Tyler MD;  Location: [facility] CATH LAB;  Service: Cardiology;  Laterality: Left;    OPEN  THROMBECTOMY OF GRAFT  2022    Procedure: THROMBECTOMY, GRAFT, OPEN;  Surgeon: Lance Bustamante MD;  Location: [facility] CATH/EP LAB;  Service: General;;    PLACEMENT OF DIALYSIS ACCESS Right 2020    Procedure: Insertion, Dialysis Access;  Surgeon: Lance Bustamante MD;  Location: [facility] CATH/EP LAB;  Service: Vascular;  Laterality: Right;    REMOVAL OF TUNNELED CENTRAL VENOUS CATHETER (CVC) N/A 2020    Procedure: REMOVAL, CATHETER, CENTRAL VENOUS, TUNNELED;  Surgeon: Ali Khoobehi, MD;  Location: [facility] OR;  Service: Vascular;  Laterality: N/A;    TONSILLECTOMY      TUBAL LIGATION          Home Medications:  Prescriptions Prior to Admission[1]            Review of patient's allergies indicates:  No Known Allergies    Social History:   Social History     Occupational History    Occupation: Insurance    Tobacco Use    Smoking status: Former     Current packs/day: 0.00     Average packs/day: 0.3 packs/day for 20.0 years (5.0 ttl pk-yrs)     Types: Cigarettes     Start date: 2000     Quit date: 2020     Years since quittin.5    Smokeless tobacco: Never   Substance and Sexual Activity    Alcohol use: Not Currently    Drug use: Not Currently    Sexual activity: Not Currently       Family History:   Family History   Problem Relation Name Age of Onset    Cancer Mother      Heart disease Father         Review of Systems:  A 10 point review of systems was performed and was normal, except as mentioned in the HPI, including constitutional, HEENT, heme, lymph, cardiovascular, respiratory, gastrointestinal, genitourinary, neurologic, endocrine, psychiatric and musculoskeletal.      OBJECTIVE:    Physical Exam:  24 Hour Vital Sign Ranges: Temp:  [98.3 °F (36.8 °C)] 98.3 °F (36.8 °C)  Pulse:  [67] 67  Resp:  [16] 16  SpO2:  [100 %] 100 %  BP: (170)/(76) 170/76  Most recent vitals: BP (!) 170/76 (BP Location: Left arm, Patient Position: Lying)   Pulse 67   Temp 98.3 °F (36.8 °C) (Temporal)   Resp 16   Ht 5'  "4" (1.626 m)   Wt 56.7 kg (125 lb)   SpO2 100%   BMI 21.46 kg/m²    GEN: well-developed, well-nourished, awake and alert, non-toxic appearing adult  HEENT: PERRL, sclera anicteric, oral mucosa pink and moist without lesion  NECK: trachea midline; Good ROM  CV: regular rate and rhythm, no murmurs or gallops  RESP: clear to auscultation bilaterally, no wheezes, rhonci or rales  ABD: soft, non-tender, non-distended, normal bowel sounds  EXT: no swelling or edema, 2+ pulses distally  SKIN: no rashes or jaundice  PSYCH: normal affect    Labs:   No results for input(s): "WBC", "MCV", "PLT" in the last 72 hours.    Invalid input(s): "HGBAU"  No results for input(s): "NA", "K", "CL", "CO2", "BUN", "GLU" in the last 72 hours.    Invalid input(s): "CREA"  No results for input(s): "ALB" in the last 72 hours.    Invalid input(s): "ALKP", "SGOT", "SGPT", "TBIL", "DBIL", "TPRO"  No results for input(s): "PT", "INR", "PTT" in the last 72 hours.      IMPRESSION / RECOMMENDATIONS:  Colon  with interventions as warranted.   RIsks, benefits, alternatives discussed in detail regarding upcoming procedures and sedation. Some of the more common endoscopic complications include but not limited to immediate or delayed perforation, bleeding, infections, pain, inadvertent injury to surrounding tissue / organs and possible need for surgical evaluation. Patient expressed understanding, all questions answered and will proceed with procedure as planned.     Avila MONTES Dasamuelive III  4/29/2025  10:07 AM           [1]   Medications Prior to Admission   Medication Sig Dispense Refill Last Dose/Taking    aspirin (ECOTRIN) 81 MG EC tablet Take 81 mg by mouth once daily.   4/28/2025    apixaban (ELIQUIS) 5 mg Tab Take 1 tablet (5 mg total) by mouth 2 (two) times daily. 60 tablet 6 4/24/2025    atorvastatin (LIPITOR) 40 MG tablet Take 40 mg by mouth once daily.       carvediloL (COREG) 12.5 MG tablet Take 1 tablet (12.5 mg total) by mouth 2 (two) times " daily with meals. 180 tablet 3     LOKELMA 5 gram packet Take 5 g by mouth every Tuesday, Thursday, Saturday, Sunday.       midodrine (PROAMATINE) 10 MG tablet Take 10 mg by mouth every Mon, Wed, Fri. With dialysis       ondansetron (ZOFRAN-ODT) 4 MG TbDL Take 4 mg by mouth every 6 (six) hours as needed.       MERVAT-STEPHAN 0.8 mg Tab Take 1 tablet by mouth once daily.       sacubitriL-valsartan (ENTRESTO) 24-26 mg per tablet Take 1 tablet by mouth 2 (two) times daily. 60 tablet 11     sevelamer carbonate (RENVELA) 800 mg Tab Take 2,400 mg by mouth 3 (three) times daily with meals.        umeclidinium brm/vilanterol tr (ANORO ELLIPTA INHL) Inhale into the lungs.

## 2025-04-29 NOTE — TRANSFER OF CARE
"Anesthesia Transfer of Care Note    Patient: Latricia Higgins    Procedure(s) Performed: Procedure(s) (LRB):  COLONOSCOPY (N/A)    Patient location: GI    Anesthesia Type: general    Transport from OR: Transported from OR on room air with adequate spontaneous ventilation    Post pain: adequate analgesia    Post assessment: no apparent anesthetic complications    Post vital signs: stable    Level of consciousness: awake    Nausea/Vomiting: no nausea/vomiting    Complications: none    Transfer of care protocol was followed      Last vitals: Visit Vitals  BP (!) 170/76 (BP Location: Left arm, Patient Position: Lying)   Pulse 67   Temp 36.8 °C (98.3 °F) (Temporal)   Resp 16   Ht 5' 4" (1.626 m)   Wt 56.7 kg (125 lb)   SpO2 100%   BMI 21.46 kg/m²     "

## 2025-04-29 NOTE — ANESTHESIA POSTPROCEDURE EVALUATION
Anesthesia Post Evaluation    Patient: Latricia Higgins    Procedure(s) Performed: Procedure(s) (LRB):  COLONOSCOPY (N/A)    Final Anesthesia Type: general      Patient location during evaluation: GI PACU  Patient participation: Yes- Able to Participate  Level of consciousness: awake and alert  Post-procedure vital signs: reviewed and stable  Pain management: adequate  Airway patency: patent    PONV status at discharge: No PONV  Anesthetic complications: no      Cardiovascular status: hemodynamically stable  Respiratory status: unassisted, spontaneous ventilation and room air  Hydration status: euvolemic  Follow-up not needed.              Vitals Value Taken Time   /85 04/29/25 10:55   Temp 36 °C (96.8 °F) 04/29/25 10:45   Pulse 71 04/29/25 10:55   Resp  04/29/25 10:58   SpO2 98 % 04/29/25 10:55   Vitals shown include unfiled device data.      Event Time   Out of Recovery 10:55:20         Pain/Karen Score: No data recorded

## 2025-05-13 DIAGNOSIS — N18.6 END STAGE RENAL DISEASE: ICD-10-CM

## 2025-05-13 DIAGNOSIS — Z76.82 ORGAN TRANSPLANT CANDIDATE: ICD-10-CM

## 2025-05-13 DIAGNOSIS — Z76.82 AWAITING ORGAN TRANSPLANT: Primary | ICD-10-CM

## 2025-05-26 ENCOUNTER — TELEPHONE (OUTPATIENT)
Dept: TRANSPLANT | Facility: CLINIC | Age: 70
End: 2025-05-26
Payer: MEDICARE

## 2025-05-26 NOTE — LETTER
May 26, 2025    Latricia Higgins  [address] [facility] [facility] [address]        Dear Latricia Higgins:  MRN: [id number]  : 1955    You are scheduled on 2025 for follow up in the transplant clinic to update your records and evaluate your health status as you wait for a transplant.  It is very important that we ensure you are ready for your transplant.      Please make arrangements to be in our transplant clinic from 12:30 pm- 4 pm.  During this time you will watch an educational video and then be seen by our transplant , financial counselor, and advance practice provider.     If you have had a change in your medical history since your last visit, please call your transplant coordinator to ensure we have the medical records to review prior to your appointment.     Please bring the following with you to this appointment:  A Caregiver is REQUIRED  Bring ALL insurance information including medication card  Current list of medications  Copies of any outside medical records from the past year, such as: mammogram, pap smear, ultrasound, CAT scan, colonoscopy, cardiac angiogram or cardiac stress test    To reschedule your appointments please call [phone] or [phone] ([phone]). Please ask for the .      Failure to cancel in advance or arrive on time could result in a $50 cancellation fee.  Your transplant candidacy could be affected by no showing these appointments.  We look forward to seeing you.    Sincerely,    [facility] [place]  [facility] [place] [address]  [phone]

## 2025-08-05 ENCOUNTER — OFFICE VISIT (OUTPATIENT)
Dept: PULMONOLOGY | Facility: CLINIC | Age: 70
End: 2025-08-05
Payer: MEDICARE

## 2025-08-05 VITALS
SYSTOLIC BLOOD PRESSURE: 140 MMHG | DIASTOLIC BLOOD PRESSURE: 76 MMHG | OXYGEN SATURATION: 97 % | WEIGHT: 128.63 LBS | HEART RATE: 70 BPM | HEIGHT: 64 IN | BODY MASS INDEX: 21.96 KG/M2

## 2025-08-05 DIAGNOSIS — J84.9 ILD (INTERSTITIAL LUNG DISEASE): ICD-10-CM

## 2025-08-05 DIAGNOSIS — J43.9 PULMONARY EMPHYSEMA, UNSPECIFIED EMPHYSEMA TYPE: ICD-10-CM

## 2025-08-05 DIAGNOSIS — N18.6 ESRD (END STAGE RENAL DISEASE): Primary | ICD-10-CM

## 2025-08-05 PROBLEM — J18.9 COMMUNITY ACQUIRED PNEUMONIA: Status: RESOLVED | Noted: 2024-11-14 | Resolved: 2025-08-05

## 2025-08-05 PROBLEM — J90 PLEURAL EFFUSION: Status: RESOLVED | Noted: 2021-03-28 | Resolved: 2025-08-05

## 2025-08-05 PROCEDURE — 99213 OFFICE O/P EST LOW 20 MIN: CPT | Mod: PBBFAC,PN,TXP | Performed by: NURSE PRACTITIONER

## 2025-08-05 PROCEDURE — 99999 PR PBB SHADOW E&M-EST. PATIENT-LVL III: CPT | Mod: PBBFAC,TXP,, | Performed by: NURSE PRACTITIONER

## 2025-08-05 PROCEDURE — 99213 OFFICE O/P EST LOW 20 MIN: CPT | Mod: S$PBB,TXP,, | Performed by: NURSE PRACTITIONER

## 2025-08-05 NOTE — PROGRESS NOTES
SUBJECTIVE:    Patient ID: Latricia Higgins is a 70 y.o. female.    Chief Complaint: Follow-up (1 year follow up ESRD, Pulmonary emphysema)    Follow-up      Patient here today feeling well.  She is still getting dialysis 3 days a week.  She is not sure where she stands as far as the renal transplant, she is listed but not sure where at on list   She denies dyspnea, no cough and overall feels well from a pulmonary standpoint.  She has follow up with transplant team later this month. She will call me and let me know when she has xrays.  She has seen cardiothoracic surgeon regarding aneurysmal dilatation of the ascending thoracic aorta which is stable, states she does not need intervention.  Her last CT in November showed small bilateral effusions.  Chest xray is scheduled for next week to assess.     Past Medical History:   Diagnosis Date    Dialysis patient 10/05/2020    Emphysema of lung     ESRD (end stage renal disease)     Hepatitis 1980    HLD (hyperlipidemia)     Hypertension     Renal disorder      Past Surgical History:   Procedure Laterality Date    AORTOGRAPHY WITH SERIALOGRAPHY N/A 09/30/2020    Procedure: co2 renal angio;  Surgeon: Lance Bustamante MD;  Location: [facility] CATH/EP LAB;  Service: Vascular;  Laterality: N/A;    APPENDECTOMY      Age 16    AV FISTULA PLACEMENT Right 12/09/2020    Procedure: CREATION, AV FISTULA;  Surgeon: Lance Bustamante MD;  Location: [facility] OR;  Service: General;  Laterality: Right;    COLONOSCOPY N/A 06/08/2021    Procedure: COLONOSCOPY;  Surgeon: Avila Smith III, MD;  Location: [facility] ENDO;  Service: Endoscopy;  Laterality: N/A;    COLONOSCOPY N/A 12/14/2021    Procedure: COLONOSCOPY;  Surgeon: Avila Smith III, MD;  Location: [facility] ENDO;  Service: Endoscopy;  Laterality: N/A;    COLONOSCOPY N/A 4/29/2025    Procedure: COLONOSCOPY;  Surgeon: Avila Smith III, MD;  Location: [facility] ENDO;  Service: Endoscopy;  Laterality: N/A;    COLONOSCOPY W/ POLYPECTOMY   06/08/2021    FISTULOGRAM Bilateral 05/18/2022    Procedure: Fistulogram;  Surgeon: Lance Bustamante MD;  Location: [facility] CATH/EP LAB;  Service: General;  Laterality: Bilateral;    INSERTION OF TUNNELED CENTRAL VENOUS HEMODIALYSIS CATHETER  10/02/2020    Procedure: INSERTION, CATHETER, CENTRAL VENOUS, HEMODIALYSIS, TUNNELED;  Surgeon: Ali Khoobehi, MD;  Location: [facility] OR;  Service: Vascular;;    LEFT HEART CATHETERIZATION Left 05/17/2022    Procedure: Left heart cath;  Surgeon: Daniel Tyler MD;  Location: [facility] CATH LAB;  Service: Cardiology;  Laterality: Left;    OPEN THROMBECTOMY OF GRAFT  05/18/2022    Procedure: THROMBECTOMY, GRAFT, OPEN;  Surgeon: Lance Bustamante MD;  Location: [facility] CATH/EP LAB;  Service: General;;    PLACEMENT OF DIALYSIS ACCESS Right 09/30/2020    Procedure: Insertion, Dialysis Access;  Surgeon: Lance Bustamante MD;  Location: [facility] CATH/EP LAB;  Service: Vascular;  Laterality: Right;    REMOVAL OF TUNNELED CENTRAL VENOUS CATHETER (CVC) N/A 12/16/2020    Procedure: REMOVAL, CATHETER, CENTRAL VENOUS, TUNNELED;  Surgeon: Ali Khoobehi, MD;  Location: [facility] OR;  Service: Vascular;  Laterality: N/A;    TONSILLECTOMY      TUBAL LIGATION       Family History   Problem Relation Name Age of Onset    Cancer Mother      Heart disease Father          Social History:   Marital Status:   Occupation: Data Unavailable  Alcohol History:  reports that she does not currently use alcohol.  Tobacco History:  reports that she quit smoking about 4 years ago. Her smoking use included cigarettes. She started smoking about 24 years ago. She has a 5 pack-year smoking history. She has never used smokeless tobacco.  Drug History:  reports that she does not currently use drugs.    Review of patient's allergies indicates:  No Known Allergies    Current Outpatient Medications   Medication Sig Dispense Refill    apixaban (ELIQUIS) 5 mg Tab Take 1 tablet (5 mg total) by mouth 2 (two) times daily. 60 tablet 6     aspirin (ECOTRIN) 81 MG EC tablet Take 81 mg by mouth once daily.      atorvastatin (LIPITOR) 40 MG tablet Take 40 mg by mouth once daily.      carvediloL (COREG) 12.5 MG tablet Take 1 tablet (12.5 mg total) by mouth 2 (two) times daily with meals. 180 tablet 3    LOKELMA 5 gram packet Take 5 g by mouth every Tuesday, Thursday, Saturday, Sunday.      midodrine (PROAMATINE) 10 MG tablet Take 10 mg by mouth every Mon, Wed, Fri. With dialysis      ondansetron (ZOFRAN-ODT) 4 MG TbDL Take 4 mg by mouth every 6 (six) hours as needed.      sevelamer carbonate (RENVELA) 800 mg Tab Take 2,400 mg by mouth 3 (three) times daily with meals.       MERVAT-STEPHAN 0.8 mg Tab Take 1 tablet by mouth once daily. (Patient not taking: Reported on 8/5/2025)      sacubitriL-valsartan (ENTRESTO) 24-26 mg per tablet Take 1 tablet by mouth 2 (two) times daily. 60 tablet 11    umeclidinium brm/vilanterol tr (ANORO ELLIPTA INHL) Inhale into the lungs. (Patient not taking: Reported on 8/5/2025)       No current facility-administered medications for this visit.       Last CT of chest 11/2024  Impression:     Smooth interlobular septal thickening throughout the lungs which appears increased in conspicuity from prior examination and may reflect interstitial edema in the appropriate clinical setting.  New small bilateral pleural effusions with adjacent atelectasis.     Mildly enlarged mediastinal lymph nodes, nonspecific, possibly reactive or secondary to elevated heart pressures.  Attention on follow-up imaging advised.     Stable mild fusiform aneurysmal dilatation of the ascending thoracic aorta measuring up to 4.4 cm.     Mild cardiomegaly.  Prominent three-vessel coronary artery calcification.     Mildly dilated main pulmonary artery nonspecific although can be seen with pulmonary arterial hypertension.        Last PFT shows no obstruction, no restriction, moderate diffusion defect.   Review of Systems  General: Feeling Well.  Eyes: Vision is  "good.  ENT:  No sinusitis or pharyngitis.   Heart:: No chest pain or palpitations.  Lungs: No cough, sputum, or wheezing.  GI: No Nausea, vomiting, constipation, diarrhea, or reflux.  : dialysis three days a week   Musculoskeletal: No joint pain or myalgias.  Skin: No lesions or rashes.  Neuro: No headaches or neuropathy.  Lymph: No edema or adenopathy.  Psych: No anxiety or depression.  Endo: weight stable     OBJECTIVE:      BP (!) 140/76   Pulse 70   Ht 5' 4" (1.626 m)   Wt 58.3 kg (128 lb 9.6 oz)   SpO2 97%   BMI 22.07 kg/m²     Physical Exam  GENERAL: Older patient in no distress.  HEENT: Pupils equal and reactive. Extraocular movements intact. Nose intact.      Pharynx moist.  NECK: Supple.   HEART: Regular rate and rhythm. No murmur or gallop auscultated.  LUNGS: Clear to auscultation and percussion. Lung excursion symmetrical. No change in fremitus. No adventitial noises.  ABDOMEN: Bowel sounds present. Non-tender, no masses palpated.  EXTREMITIES: Normal muscle tone and joint movement, no cyanosis or clubbing.   LYMPHATICS: No adenopathy palpated, no edema.  SKIN: Dry, intact, no lesions.   NEURO: Cranial nerves II-XII intact. Motor strength 5/5 bilaterally, upper and lower extremities.  PSYCH: Appropriate affect.    Assessment:       1. ESRD (end stage renal disease)    2. Pulmonary emphysema, unspecified emphysema type    3. ILD (interstitial lung disease)              Some ILD in bases    Plan:       ESRD (end stage renal disease)    Pulmonary emphysema, unspecified emphysema type    ILD (interstitial lung disease)          Let me know when you have the chest xray so I can look for it        Follow up if symptoms worsen or fail to improve.            "

## 2025-08-08 ENCOUNTER — TELEPHONE (OUTPATIENT)
Dept: TRANSPLANT | Facility: CLINIC | Age: 70
End: 2025-08-08
Payer: MEDICARE

## 2025-08-11 ENCOUNTER — TELEPHONE (OUTPATIENT)
Dept: TRANSPLANT | Facility: CLINIC | Age: 70
End: 2025-08-11
Payer: MEDICARE

## 2025-08-12 ENCOUNTER — TELEPHONE (OUTPATIENT)
Dept: TRANSPLANT | Facility: CLINIC | Age: 70
End: 2025-08-12
Payer: MEDICARE

## 2025-08-13 ENCOUNTER — TELEPHONE (OUTPATIENT)
Dept: TRANSPLANT | Facility: CLINIC | Age: 70
End: 2025-08-13
Payer: MEDICARE

## 2025-08-21 ENCOUNTER — HOSPITAL ENCOUNTER (OUTPATIENT)
Dept: RADIOLOGY | Facility: HOSPITAL | Age: 70
Discharge: HOME | End: 2025-08-21
Attending: NURSE PRACTITIONER
Payer: MEDICARE

## 2025-08-21 ENCOUNTER — HOSPITAL ENCOUNTER (OUTPATIENT)
Dept: CARDIOLOGY | Facility: HOSPITAL | Age: 70
Discharge: HOME | End: 2025-08-21
Attending: NURSE PRACTITIONER
Payer: MEDICARE

## 2025-08-21 VITALS
SYSTOLIC BLOOD PRESSURE: 119 MMHG | WEIGHT: 128 LBS | HEART RATE: 84 BPM | HEIGHT: 64 IN | BODY MASS INDEX: 21.85 KG/M2 | DIASTOLIC BLOOD PRESSURE: 59 MMHG | RESPIRATION RATE: 16 BRPM

## 2025-08-21 DIAGNOSIS — Z76.82 AWAITING ORGAN TRANSPLANT: ICD-10-CM

## 2025-08-21 LAB
CFR FLOW - ANTERIOR: 1.76
CFR FLOW - INFERIOR: 1.76
CFR FLOW - LATERAL: 1.63
CFR FLOW - MAX: 2.29
CFR FLOW - MIN: 1.47
CFR FLOW - SEPTAL: 1.75
CFR FLOW - WHOLE HEART: 1.73
CV STRESS BASE HR: 70 BPM
DIASTOLIC BLOOD PRESSURE: 77 MMHG
EJECTION FRACTION- HIGH: 65 %
END DIASTOLIC INDEX-HIGH: 153 ML/M2
END DIASTOLIC INDEX-LOW: 93 ML/M2
END SYSTOLIC INDEX-HIGH: 71 ML/M2
END SYSTOLIC INDEX-LOW: 31 ML/M2
NUC REST DIASTOLIC VOLUME INDEX: 86
NUC REST EJECTION FRACTION: 55
NUC REST SYSTOLIC VOLUME INDEX: 39
NUC STRESS DIASTOLIC VOLUME INDEX: 84
NUC STRESS EJECTION FRACTION: 61 %
NUC STRESS SYSTOLIC VOLUME INDEX: 33
OHS CV CPX 1 MINUTE RECOVERY HEART RATE: 92 BPM
OHS CV CPX 85 PERCENT MAX PREDICTED HEART RATE MALE: 128
OHS CV CPX MAX PREDICTED HEART RATE: 150
OHS CV CPX PATIENT HEIGHT IN: 64
OHS CV CPX PATIENT IS FEMALE: 1
OHS CV CPX PATIENT IS MALE: 0
OHS CV CPX PEAK HEAR RATE: 67 BPM
OHS CV CPX PERCENT MAX PREDICTED HEART RATE ACHIEVED: 46
OHS CV CPX RATE PRESSURE PRODUCT PRESENTING: 9590
OHS CV INITIAL DOSE: 17.8 MCG/KG/MIN
OHS CV MODERATELY REDUCED FLOW CAPACITY: 0 %
OHS CV MYOCARDIAL STEAL: 0 %
OHS CV NO ISCHEMIA MILDLY REDUCED FLOW CAPACTY: 82 %
OHS CV NO ISCHEMIA MINIMALLY REDUCED FLOW CAPACITY: 17 %
OHS CV NON-TRANSMURAL MYOCARDIAL INFARCTION SINGLE CONTINUOUS REGION: 0 %
OHS CV NORMAL FLOW CAPACITY COMPARABLE TO HEALTHY YOUNG VOLUNTEERS: 1 %
OHS CV PEAK DOSE: 17.7 MCG/KG/MIN
OHS CV PET ID: 9589
OHS CV PRE-DOMINANTLY MYOCARDIAL SCAR: 0 %
OHS CV SEVERELY REDUCED FLOW CAPACITY LARGEST SINGLE CONTINUOUS REGION: 0 %
OHS CV SEVERELY REDUCED FLOW CAPACITY: 0 %
OHS CV TOTAL EXAM DLP: 144.16 MGY-CM
REST FLOW - ANTERIOR: 1.04 CC/MIN/G
REST FLOW - INFERIOR: 0.89 CC/MIN/G
REST FLOW - LATERAL: 1.08 CC/MIN/G
REST FLOW - MAX: 1.36 CC/MIN/G
REST FLOW - MIN: 0.31 CC/MIN/G
REST FLOW - SEPTAL: 0.89 CC/MIN/G
REST FLOW - WHOLE HEART: 0.98 CC/MIN/G
RETIRED EF AND QEF - SEE NOTES: 53 %
STRESS FLOW - ANTERIOR: 1.83 CC/MIN/G
STRESS FLOW - INFERIOR: 1.57 CC/MIN/G
STRESS FLOW - LATERAL: 1.76 CC/MIN/G
STRESS FLOW - MAX: 2.19 CC/MIN/G
STRESS FLOW - MIN: 0.69 CC/MIN/G
STRESS FLOW - SEPTAL: 1.53 CC/MIN/G
STRESS FLOW - WHOLE HEART: 1.67 CC/MIN/G
SYSTOLIC BLOOD PRESSURE: 137 MMHG

## 2025-08-21 PROCEDURE — A9555 RB82 RUBIDIUM: HCPCS | Mod: TXP | Performed by: NURSE PRACTITIONER

## 2025-08-21 PROCEDURE — 63600175 PHARM REV CODE 636 W HCPCS: Mod: TXP | Performed by: NURSE PRACTITIONER

## 2025-08-21 PROCEDURE — 78434 AQMBF PET REST & RX STRESS: CPT | Mod: TXP

## 2025-08-21 PROCEDURE — 71046 X-RAY EXAM CHEST 2 VIEWS: CPT | Mod: TC,TXP

## 2025-08-21 RX ORDER — REGADENOSON 0.08 MG/ML
0.4 INJECTION, SOLUTION INTRAVENOUS
Status: COMPLETED | OUTPATIENT
Start: 2025-08-21 | End: 2025-08-21

## 2025-08-21 RX ORDER — AMINOPHYLLINE 25 MG/ML
75 INJECTION, SOLUTION INTRAVENOUS
Status: COMPLETED | OUTPATIENT
Start: 2025-08-21 | End: 2025-08-21

## 2025-08-21 RX ADMIN — AMINOPHYLLINE 75 MG: 25 INJECTION, SOLUTION INTRAVENOUS at 11:08

## 2025-08-21 RX ADMIN — REGADENOSON 0.4 MG: 0.08 INJECTION, SOLUTION INTRAVENOUS at 10:08

## 2025-08-21 RX ADMIN — RUBIDIUM CHLORIDE RB-82 17.7 MILLICURIE: 150 INJECTION, SOLUTION INTRAVENOUS at 11:08

## 2025-08-21 RX ADMIN — RUBIDIUM CHLORIDE RB-82 17.8 MILLICURIE: 150 INJECTION, SOLUTION INTRAVENOUS at 10:08

## 2025-08-27 ENCOUNTER — TELEPHONE (OUTPATIENT)
Dept: TRANSPLANT | Facility: CLINIC | Age: 70
End: 2025-08-27
Payer: MEDICARE

## 2025-08-28 ENCOUNTER — OFFICE VISIT (OUTPATIENT)
Dept: TRANSPLANT | Facility: CLINIC | Age: 70
End: 2025-08-28
Payer: MEDICARE

## 2025-08-28 VITALS
SYSTOLIC BLOOD PRESSURE: 92 MMHG | HEIGHT: 64 IN | OXYGEN SATURATION: 98 % | TEMPERATURE: 97 F | DIASTOLIC BLOOD PRESSURE: 55 MMHG | BODY MASS INDEX: 21.56 KG/M2 | RESPIRATION RATE: 16 BRPM | HEART RATE: 81 BPM | WEIGHT: 126.31 LBS

## 2025-08-28 DIAGNOSIS — Z76.82 PATIENT ON WAITING LIST FOR KIDNEY TRANSPLANT: Primary | ICD-10-CM

## 2025-08-28 DIAGNOSIS — I10 ESSENTIAL HYPERTENSION: ICD-10-CM

## 2025-08-28 DIAGNOSIS — Z86.718 HISTORY OF DVT (DEEP VEIN THROMBOSIS): ICD-10-CM

## 2025-08-28 DIAGNOSIS — I50.42 CHRONIC COMBINED SYSTOLIC AND DIASTOLIC HEART FAILURE: ICD-10-CM

## 2025-08-28 PROCEDURE — 99215 OFFICE O/P EST HI 40 MIN: CPT | Mod: S$PBB,TXP,, | Performed by: NURSE PRACTITIONER

## 2025-08-28 PROCEDURE — 99213 OFFICE O/P EST LOW 20 MIN: CPT | Mod: PBBFAC,TXP | Performed by: NURSE PRACTITIONER

## 2025-08-28 PROCEDURE — 99999 PR PBB SHADOW E&M-EST. PATIENT-LVL III: CPT | Mod: PBBFAC,TXP,, | Performed by: NURSE PRACTITIONER

## 2025-08-28 RX ORDER — SODIUM ZIRCONIUM CYCLOSILICATE 10 G/10G
10 POWDER, FOR SUSPENSION ORAL
COMMUNITY
Start: 2025-08-26

## (undated) DEVICE — TOWEL OR BLUE      MDT2168284

## (undated) DEVICE — SHEATH PINNACLE 5FRX10CM W/GUIDEWIRE

## (undated) DEVICE — DRAPE T THYROID W/ARMBOARD COVER

## (undated) DEVICE — SEE MEDLINE ITEM 156894

## (undated) DEVICE — KIT CUSTOM MANIFOLD

## (undated) DEVICE — ADHESIVE TISSUE EXOFIN

## (undated) DEVICE — VALVE BLEEDBACK CONTROL 1003330

## (undated) DEVICE — PAD RADI FEMORAL

## (undated) DEVICE — BAG DECANTER 10-102

## (undated) DEVICE — SUTURE ETHILON 2-0 PS 18 585H

## (undated) DEVICE — GUIDEWIRE ASTATO 20 300CM

## (undated) DEVICE — GUIDEWIRE EMERALD 150CM PTFE

## (undated) DEVICE — SOLUTION IRRI NS BOTTLE 1000ML R5200-01

## (undated) DEVICE — DERMABOND HVD MINI  DHVM12

## (undated) DEVICE — TRAY SKIN PREP DRY

## (undated) DEVICE — BLADE SCALPEL #11 371111

## (undated) DEVICE — GLOVE BIOGEL PI  GOLD SZ 7

## (undated) DEVICE — GLOVE BIOGEL PI ULTRA TOUCH GRAY SZ7.5

## (undated) DEVICE — SYRINGE 10ML 302995

## (undated) DEVICE — BOOT SUTURE YELLOW 30-706

## (undated) DEVICE — TRAY MINOR SLIDELL MEMORIAL HOSPITAL

## (undated) DEVICE — BLADE BEAVER #69  AVER6900

## (undated) DEVICE — LOOP MINI BLUE 30-713

## (undated) DEVICE — NEEDLE SAFETY ECLIPSE 25G 1-1/2IN 305767

## (undated) DEVICE — PACK BASIC V 88151

## (undated) DEVICE — CATH DXTERITY JL50 100CM 6FR

## (undated) DEVICE — PENCIL BOVIE E2515H

## (undated) DEVICE — SHEATH PINNACLE 6FRX10CM W/GUIDEWIRE

## (undated) DEVICE — SOLUTION DURAPREP 8630

## (undated) DEVICE — SUTURE VICRYL 3-0 18 SH VCP864D

## (undated) DEVICE — GOWN SMART LRG 044673

## (undated) DEVICE — SHEATH INTRODUCER 6FR 11CM

## (undated) DEVICE — DRESSING TEGADERM 6X8 1628

## (undated) DEVICE — DRAPE C-ARM (FITS NEW C-ARM) 07-CA108

## (undated) DEVICE — KIT MICROINTRO 4F .018X40X7CM

## (undated) DEVICE — DRESSING POST OP MEPILEX AG 4X6  498300

## (undated) DEVICE — PREP CHLORA 26ML

## (undated) DEVICE — CLIP APPLIER MCS20 STERILMED ETHMCS20

## (undated) DEVICE — OMNIPAQUE 350 200ML

## (undated) DEVICE — CLIP APPLIER MSM20 STERILMED  ETHMSM20

## (undated) DEVICE — SUTURE PROLENE 7-0 BV175-6 24 8735H

## (undated) DEVICE — COVER LIGHT HANDLE LB53

## (undated) DEVICE — PAD BOVIE ADULT

## (undated) DEVICE — TRAY VASCULAR SLIDELL MEMORIAL

## (undated) DEVICE — CATHETER ANGIO GUIDIECATH 5FRX65CM

## (undated) DEVICE — SPONGE GAUZE 10S 4X4  442214

## (undated) DEVICE — 6F LIMA

## (undated) DEVICE — SUTURE ETHILON 2-0 PS-2 18 593H

## (undated) DEVICE — SUTURE VICRYL 4-0 18 PS-2 VCP496H

## (undated) DEVICE — GUIDEWIRE VASCULAR L180CM OD0.035IN ODSEC6MM L2.5CM STANDARD

## (undated) DEVICE — DRESSING TEGADERM 4X4 3/4 TD1004

## (undated) DEVICE — SOLUTION SCRUB IODINE 4OZ

## (undated) DEVICE — SPIKE CONTRAST CONTROLLER

## (undated) DEVICE — DRAPE SPLIT W/ ADHESIVE

## (undated) DEVICE — CATHETER ANGIO OMNI FLUSH 10732201

## (undated) DEVICE — Device

## (undated) DEVICE — UNDERGLOVE BIOGEL PI MICRO BLUE SZ 6.5

## (undated) DEVICE — DRAPE 3/4

## (undated) DEVICE — SUTURE MONOCRYL 4-0 PS-2 27 MCP426H

## (undated) DEVICE — JELLY LUBRICATING TUBE 4OZ 4OZLUB

## (undated) DEVICE — GUIDEWIRE DOUBLE ENDED .035 DIA. 150CML

## (undated) DEVICE — SET AVX THROMBECTOMY 50CM,OTW,6F

## (undated) DEVICE — SPONGE GAUZE 2S 4X4 STERILE NON21424

## (undated) DEVICE — SPONGE LAP 18X18

## (undated) DEVICE — SUTURE VICRYL 3-0 SH 27 VCP416H

## (undated) DEVICE — GUIDEWIRE ANGLED GLIDE .035-150 GR3506

## (undated) DEVICE — CATH DXTERITY JR40 100CM 6FR

## (undated) DEVICE — SUTURE ETHILON 4-0 PS-2 18 1667H

## (undated) DEVICE — CATH BLLN CQUEST 44 7MMX40MMX75CM

## (undated) DEVICE — GUIDEWIRE REGALIA XS 0.014X300CM

## (undated) DEVICE — PROTECTION STATION PLUS

## (undated) DEVICE — CATHETER TRAILBLAZER 0.014/150

## (undated) DEVICE — CATH DXTERITY JL40 100CM 6FR

## (undated) DEVICE — CATHETER ANGIO 5FR25X65CM X0.038IN CG507